# Patient Record
Sex: FEMALE | Race: WHITE
[De-identification: names, ages, dates, MRNs, and addresses within clinical notes are randomized per-mention and may not be internally consistent; named-entity substitution may affect disease eponyms.]

---

## 2020-07-28 ENCOUNTER — HOSPITAL ENCOUNTER (EMERGENCY)
Dept: HOSPITAL 8 - ED | Age: 85
End: 2020-07-28
Payer: MEDICARE

## 2020-07-28 VITALS — WEIGHT: 135.28 LBS | HEIGHT: 68 IN | BODY MASS INDEX: 20.5 KG/M2

## 2020-07-28 VITALS — DIASTOLIC BLOOD PRESSURE: 77 MMHG | SYSTOLIC BLOOD PRESSURE: 135 MMHG

## 2020-07-28 DIAGNOSIS — M79.7: Primary | ICD-10-CM

## 2020-07-28 DIAGNOSIS — Z76.0: ICD-10-CM

## 2020-07-28 PROCEDURE — 99283 EMERGENCY DEPT VISIT LOW MDM: CPT

## 2020-07-28 NOTE — NUR
D/C INSTRUCTIONS RV'WD WITH PT. PT AGAIN ASKING FOR OXYCODONE RX. INSTRUCTED PT 
SHE MUST F/U WITH A LOCAL PCP FOR THAT RX. NAPROXEN RX GIVEN. PT STATES SHE 
WILL CALL HER BROTHER TO HAVE HIM PICK HER UP.

## 2020-07-28 NOTE — NUR
PT BIB MIGDALIA FROM HER BROTHER'S HOUSE, WHERE SHE JUST MOVED FROM [place], [place]. PER PT AND EMS, PT RAN OUT OF HER OXYCODONE RX 3 DAYS AGO AND HASN'T BEEN 
ABLE TO ESTABLISH CARE WITH A PCP HERE. PT HAS HX OF FIBROMYALGIA & CHRONIC 
PAIN. REPORTS PAIN "GREATER THAN 10" OUT OF 10 CURRENTLY. NO OBVIOUS SIGNS OF 
DISTRESS. PT ALSO HAS HX OF MI, 4-VESSEL BYPASS, AFIB, EPILEPSY, HTN, 
HYPOTHYROID. ARRIVES TO ED A&OX4.

## 2020-08-04 ENCOUNTER — HOSPITAL ENCOUNTER (EMERGENCY)
Facility: MEDICAL CENTER | Age: 85
End: 2020-08-04
Attending: EMERGENCY MEDICINE
Payer: MEDICARE

## 2020-08-04 VITALS
RESPIRATION RATE: 18 BRPM | OXYGEN SATURATION: 99 % | SYSTOLIC BLOOD PRESSURE: 166 MMHG | WEIGHT: 175 LBS | TEMPERATURE: 97.2 F | HEART RATE: 73 BPM | BODY MASS INDEX: 26.52 KG/M2 | DIASTOLIC BLOOD PRESSURE: 78 MMHG | HEIGHT: 68 IN

## 2020-08-04 DIAGNOSIS — M79.7 FIBROMYALGIA: ICD-10-CM

## 2020-08-04 DIAGNOSIS — G89.4 CHRONIC PAIN SYNDROME: ICD-10-CM

## 2020-08-04 LAB
ALBUMIN SERPL BCP-MCNC: 4.2 G/DL (ref 3.2–4.9)
ALBUMIN/GLOB SERPL: 1.4 G/DL
ALP SERPL-CCNC: 89 U/L (ref 30–99)
ALT SERPL-CCNC: 20 U/L (ref 2–50)
ANION GAP SERPL CALC-SCNC: 12 MMOL/L (ref 7–16)
AST SERPL-CCNC: 21 U/L (ref 12–45)
BASOPHILS # BLD AUTO: 0.8 % (ref 0–1.8)
BASOPHILS # BLD: 0.07 K/UL (ref 0–0.12)
BILIRUB SERPL-MCNC: 0.3 MG/DL (ref 0.1–1.5)
BUN SERPL-MCNC: 19 MG/DL (ref 8–22)
CALCIUM SERPL-MCNC: 9.6 MG/DL (ref 8.5–10.5)
CHLORIDE SERPL-SCNC: 95 MMOL/L (ref 96–112)
CO2 SERPL-SCNC: 22 MMOL/L (ref 20–33)
CREAT SERPL-MCNC: 0.99 MG/DL (ref 0.5–1.4)
EOSINOPHIL # BLD AUTO: 0.61 K/UL (ref 0–0.51)
EOSINOPHIL NFR BLD: 6.8 % (ref 0–6.9)
ERYTHROCYTE [DISTWIDTH] IN BLOOD BY AUTOMATED COUNT: 43 FL (ref 35.9–50)
GLOBULIN SER CALC-MCNC: 3.1 G/DL (ref 1.9–3.5)
GLUCOSE SERPL-MCNC: 109 MG/DL (ref 65–99)
HCT VFR BLD AUTO: 37.8 % (ref 37–47)
HGB BLD-MCNC: 12.5 G/DL (ref 12–16)
IMM GRANULOCYTES # BLD AUTO: 0.05 K/UL (ref 0–0.11)
IMM GRANULOCYTES NFR BLD AUTO: 0.6 % (ref 0–0.9)
LYMPHOCYTES # BLD AUTO: 3.03 K/UL (ref 1–4.8)
LYMPHOCYTES NFR BLD: 33.7 % (ref 22–41)
MCH RBC QN AUTO: 28.7 PG (ref 27–33)
MCHC RBC AUTO-ENTMCNC: 33.1 G/DL (ref 33.6–35)
MCV RBC AUTO: 86.9 FL (ref 81.4–97.8)
MONOCYTES # BLD AUTO: 0.73 K/UL (ref 0–0.85)
MONOCYTES NFR BLD AUTO: 8.1 % (ref 0–13.4)
NEUTROPHILS # BLD AUTO: 4.49 K/UL (ref 2–7.15)
NEUTROPHILS NFR BLD: 50 % (ref 44–72)
NRBC # BLD AUTO: 0 K/UL
NRBC BLD-RTO: 0 /100 WBC
PLATELET # BLD AUTO: 325 K/UL (ref 164–446)
PMV BLD AUTO: 9.9 FL (ref 9–12.9)
POTASSIUM SERPL-SCNC: 4.3 MMOL/L (ref 3.6–5.5)
PROT SERPL-MCNC: 7.3 G/DL (ref 6–8.2)
RBC # BLD AUTO: 4.35 M/UL (ref 4.2–5.4)
SODIUM SERPL-SCNC: 129 MMOL/L (ref 135–145)
WBC # BLD AUTO: 9 K/UL (ref 4.8–10.8)

## 2020-08-04 PROCEDURE — 85025 COMPLETE CBC W/AUTO DIFF WBC: CPT

## 2020-08-04 PROCEDURE — 99284 EMERGENCY DEPT VISIT MOD MDM: CPT

## 2020-08-04 PROCEDURE — 80053 COMPREHEN METABOLIC PANEL: CPT

## 2020-08-04 RX ORDER — CITALOPRAM 20 MG/1
10 TABLET ORAL DAILY
Status: SHIPPED | COMMUNITY
End: 2021-10-21

## 2020-08-04 RX ORDER — LOSARTAN POTASSIUM 50 MG/1
50 TABLET ORAL DAILY
Status: SHIPPED | COMMUNITY
End: 2022-01-12 | Stop reason: SDUPTHER

## 2020-08-04 RX ORDER — LEVOTHYROXINE SODIUM 0.15 MG/1
150 TABLET ORAL
Status: SHIPPED | COMMUNITY
End: 2021-10-21

## 2020-08-04 RX ORDER — NORTRIPTYLINE HYDROCHLORIDE 25 MG/1
25 CAPSULE ORAL NIGHTLY
Status: ON HOLD | COMMUNITY
End: 2020-11-15

## 2020-08-04 RX ORDER — OXYCODONE AND ACETAMINOPHEN 10; 325 MG/1; MG/1
1 TABLET ORAL EVERY 6 HOURS PRN
Status: SHIPPED | COMMUNITY
End: 2021-10-21

## 2020-08-04 RX ORDER — NAPROXEN 500 MG/1
500 TABLET ORAL EVERY 12 HOURS PRN
Status: ON HOLD | COMMUNITY
End: 2020-11-15

## 2020-08-04 RX ORDER — ZOLPIDEM TARTRATE 5 MG/1
10 TABLET ORAL NIGHTLY PRN
Status: ON HOLD | COMMUNITY
End: 2020-11-15

## 2020-08-04 RX ORDER — ASPIRIN 81 MG/1
81 TABLET, CHEWABLE ORAL DAILY
Status: SHIPPED | COMMUNITY
End: 2021-10-21

## 2020-08-04 RX ORDER — DIAZEPAM 5 MG/1
5 TABLET ORAL
Status: ON HOLD | COMMUNITY
End: 2020-11-15

## 2020-08-04 RX ORDER — ROSUVASTATIN CALCIUM 10 MG/1
10 TABLET, COATED ORAL EVERY EVENING
Status: ON HOLD | COMMUNITY
End: 2020-11-15

## 2020-08-04 NOTE — ED NOTES
Patient came in with large blue box of home meds. Home meds placed in pharm bag and signed by 2 RNs. Tech aware of need for pickup.

## 2020-08-04 NOTE — DISCHARGE INSTRUCTIONS
As you are chronic pain patient who receives narcotics, additional narcotics will not be prescribed you from the emergency room.  If you are changing locations you need to get a new physician, please call the number provided to get an appoint with a next available primary practitioner, also ask if they have an availability for a pain management physician.

## 2020-08-04 NOTE — ED PROVIDER NOTES
"ED Provider  Scribed for Julio César Sandy D.O. by Esthela Denton. 8/4/2020  6:32 AM    Means of arrival:EMS  History obtained from:Patient and EMS  History limited by: None    CHIEF COMPLAINT  Chief Complaint   Patient presents with   • Pain     generalized pain x 3 weeks. per patient she fell off her bed yesterday. denies hitting her hed.        [facility]  Davie Montejo is a 85 y.o. female who presents with generalized body pain(\"hurting all over to my feet\").  Patient has a history of fibromyalgia, and ran out of her Percocet prescription 5 days ago.  Patient went to [facility] for similar complaints, and was prescribed naproxen which did not help.  Patient also had a mechanical fall in her shower 3 days ago, where she fell from her chair and landed on her backside.  She did not sustain any injuries or head trauma in the fall, and denies loss of consciousness, or bleeding. Patient denies history of frequent falls or presyncopal symptoms.  Patient denies headache, vision changes, chest pain, shortness of breath, palpitations, nausea, vomiting, abdominal pain fever, chills, dysuria, hematuria, leg swelling.      REVIEW OF SYSTEMS  See HPI for further details. All other systems are negative.     PAST MEDICAL HISTORY   Fibromyalgia  Coronary artery disease status post four-vessel CABG in 2008    SOCIAL HISTORY  Social History     Tobacco Use   • Smoking status: Not on file   Substance and Sexual Activity   • Alcohol use: Not on file   • Drug use: Not on file   • Sexual activity: Not on file       SURGICAL HISTORY   has a past surgical history that includes coronary artery bypas, 4 (2008).    CURRENT MEDICATIONS  Home Medications     Reviewed by Radha Garcia R.N. (Registered Nurse) on 08/04/20 at 0638  Med List Status: Partial   Medication Last Dose Status   aspirin (ASA) 81 MG Chew Tab chewable tablet  Active   citalopram (CELEXA) 20 MG Tab  Active   diazePAM (VALIUM) 5 MG Tab  Active   levothyroxine " "(SYNTHROID) 150 MCG Tab  Active   losartan (COZAAR) 50 MG Tab  Active   metoprolol (LOPRESSOR) 25 MG Tab  Active   naproxen (NAPROSYN) 500 MG Tab  Active   nortriptyline (PAMELOR) 25 MG Cap  Active   oxyCODONE-acetaminophen (PERCOCET-10)  MG Tab  Active   rosuvastatin (CRESTOR) 10 MG Tab  Active   zolpidem (AMBIEN) 5 MG Tab  Active                ALLERGIES  Allergies   Allergen Reactions   • Sulfa Drugs Unspecified     Patient states \"I can't remember what this does to me\" but recalls and allergy.        PHYSICAL EXAM  VITAL SIGNS: BP (!) 181/77   Pulse 69   Temp 36.2 °C (97.2 °F) (Temporal)   Resp (!) 22   Ht 1.727 m (5' 8\")   Wt 79.4 kg (175 lb)   LMP  (LMP Unknown)   SpO2 96%   Breastfeeding No   BMI 26.61 kg/m²   Constitutional: Alert in no apparent distress.  HENT: No signs of trauma, mucous membranes are moist  Eyes: Conjunctiva normal, Non-icteric.   Neck: Normal range of motion, No tenderness, Supple.  Lymphatic: No lymphadenopathy noted.   Cardiovascular: Regular rate and rhythm, no murmurs.   Thorax & Lungs: Normal breath sounds, No respiratory distress, No wheezing, No chest tenderness.   Abdomen: Bowel sounds normal, Soft, No tenderness, No masses, No pulsatile masses. No peritoneal signs.  Skin: Warm, Dry, normal color.   Back: No bony tenderness, No CVA tenderness.  No obvious injuries or bruises.  Extremities: No edema, No tenderness, No cyanosis  Musculoskeletal: Good range of motion in all major joints. No tenderness to palpation or major deformities noted.   Neurologic: Alert and oriented x4, Normal motor function, Normal sensory function, No focal deficits noted.   Psychiatric: Affect normal, Judgment normal, Mood normal.       DIAGNOSTIC STUDIES / PROCEDURES  N/A    LABS  Results for orders placed or performed during the hospital encounter of 08/04/20   CBC WITH DIFFERENTIAL   Result Value Ref Range    WBC 9.0 4.8 - 10.8 K/uL    RBC 4.35 4.20 - 5.40 M/uL    Hemoglobin 12.5 12.0 - " 16.0 g/dL    Hematocrit 37.8 37.0 - 47.0 %    MCV 86.9 81.4 - 97.8 fL    MCH 28.7 27.0 - 33.0 pg    MCHC 33.1 (L) 33.6 - 35.0 g/dL    RDW 43.0 35.9 - 50.0 fL    Platelet Count 325 164 - 446 K/uL    MPV 9.9 9.0 - 12.9 fL    Neutrophils-Polys 50.00 44.00 - 72.00 %    Lymphocytes 33.70 22.00 - 41.00 %    Monocytes 8.10 0.00 - 13.40 %    Eosinophils 6.80 0.00 - 6.90 %    Basophils 0.80 0.00 - 1.80 %    Immature Granulocytes 0.60 0.00 - 0.90 %    Nucleated RBC 0.00 /100 WBC    Neutrophils (Absolute) 4.49 2.00 - 7.15 K/uL    Lymphs (Absolute) 3.03 1.00 - 4.80 K/uL    Monos (Absolute) 0.73 0.00 - 0.85 K/uL    Eos (Absolute) 0.61 (H) 0.00 - 0.51 K/uL    Baso (Absolute) 0.07 0.00 - 0.12 K/uL    Immature Granulocytes (abs) 0.05 0.00 - 0.11 K/uL    NRBC (Absolute) 0.00 K/uL   COMP METABOLIC PANEL   Result Value Ref Range    Sodium 129 (L) 135 - 145 mmol/L    Potassium 4.3 3.6 - 5.5 mmol/L    Chloride 95 (L) 96 - 112 mmol/L    Co2 22 20 - 33 mmol/L    Anion Gap 12.0 7.0 - 16.0    Glucose 109 (H) 65 - 99 mg/dL    Bun 19 8 - 22 mg/dL    Creatinine 0.99 0.50 - 1.40 mg/dL    Calcium 9.6 8.5 - 10.5 mg/dL    AST(SGOT) 21 12 - 45 U/L    ALT(SGPT) 20 2 - 50 U/L    Alkaline Phosphatase 89 30 - 99 U/L    Total Bilirubin 0.3 0.1 - 1.5 mg/dL    Albumin 4.2 3.2 - 4.9 g/dL    Total Protein 7.3 6.0 - 8.2 g/dL    Globulin 3.1 1.9 - 3.5 g/dL    A-G Ratio 1.4 g/dL   ESTIMATED GFR   Result Value Ref Range    GFR If African American >60 >60 mL/min/1.73 m 2    GFR If Non African American 54 (A) >60 mL/min/1.73 m 2     All labs reviewed by me.    RADIOLOGY  No orders to display     The radiologist's interpretations of all radiological studies have been reviewed by me.    Films have been independently by me      COURSE  Pertinent Labs & Imaging studies reviewed. (See chart for details)    6:32 AM - Patient seen and examined at bedside. Discussed plan of care.    MEDICAL DECISION MAKING  This is a 85 y.o. female who presents with generalized body pain  after she ran out of her Percocet prescription for chronic pain secondary to fibromyalgia.  Patient is on other central nervous system depressing medications including Ambien and Valium.  Therefore it is unsafe to prescribe narcotics.  At this point, imaging does not appear to be indicated for patient's fall.    Patient was independently interviewed by me at this time. She is here for exacerbation of chronic pain and out of medications. She has not made an appointment in this state with a primary or pain management doctor. I explained the policies including no narcotic prescription with patient who already has a prescribing physician.     I Dr. Rodgers still concur with the history and physical of the resident.  This time there is no medical emergency noted, pain management is an outpatient consideration and she is stable to continue outpatient follow-up for that.        FINAL IMPRESSION  1. Chronic pain syndrome    2. Fibromyalgia         Esthela MCGARRY (Scribe), am scribing for, and in the presence of, Julio César Sandy D.O..    Electronically signed by: Esthela Denton (Elia), 8/4/2020    Julio César MCGARRY D.O. personally performed the services described in this documentation, as scribed by Esthela Denton in my presence, and it is both accurate and complete.    The note accurately reflects work and decisions made by me.  Julio César Sandy D.O.  8/4/2020  8:05 AM

## 2020-08-04 NOTE — ED TRIAGE NOTES
"Chief Complaint   Patient presents with   • Pain     generalized pain x 3 weeks. per patient she fell off her bed yesterday. denies hitting her hed.      Patient to BLUE 22. Hooked up to monitors. VSS. Patient disoriented to year.    Patient states she \"needs oxycodone to walk.\"   "

## 2020-08-04 NOTE — ED NOTES
Pt does not have phone numbers available for ride home. Located number for her brother but apparently was incorrect. She states she is unable to afford a cab and was provided with a voucher by .  Verbalizes understanding of discharge and followup instructions. PIV removed. Ambulatory at baseline and assisted to discharge by wheelchair.

## 2020-08-04 NOTE — ED NOTES
Assumed care of patient. Resting on gurney. Labs in process. Given blankets for comfort and call light within reach.

## 2020-08-24 PROBLEM — E03.9 HYPOTHYROIDISM: Status: ACTIVE | Noted: 2020-08-24

## 2020-08-24 PROBLEM — I25.10 ARTERIOSCLEROSIS OF CORONARY ARTERY: Status: ACTIVE | Noted: 2020-08-24

## 2020-08-24 PROBLEM — I50.23 ACUTE ON CHRONIC SYSTOLIC HEART FAILURE (HCC): Status: ACTIVE | Noted: 2020-08-24

## 2020-08-24 PROBLEM — G40.909 SEIZURE DISORDER (HCC): Status: ACTIVE | Noted: 2020-08-24

## 2020-08-24 PROBLEM — F32.A DEPRESSIVE DISORDER: Status: ACTIVE | Noted: 2020-08-24

## 2020-08-24 PROBLEM — Z87.39 HISTORY OF MUSCULOSKELETAL DISORDER: Status: ACTIVE | Noted: 2020-08-24

## 2020-08-24 PROBLEM — I10 ESSENTIAL HYPERTENSION: Status: ACTIVE | Noted: 2018-10-16

## 2020-08-24 PROBLEM — E78.5 HYPERLIPIDEMIA: Status: ACTIVE | Noted: 2020-08-24

## 2020-11-15 ENCOUNTER — APPOINTMENT (OUTPATIENT)
Dept: RADIOLOGY | Facility: MEDICAL CENTER | Age: 85
End: 2020-11-15
Attending: EMERGENCY MEDICINE
Payer: MEDICARE

## 2020-11-15 ENCOUNTER — PHARMACY VISIT (OUTPATIENT)
Dept: PHARMACY | Facility: MEDICAL CENTER | Age: 85
End: 2020-11-15
Payer: COMMERCIAL

## 2020-11-15 ENCOUNTER — HOSPITAL ENCOUNTER (OUTPATIENT)
Facility: MEDICAL CENTER | Age: 85
End: 2020-11-15
Attending: EMERGENCY MEDICINE | Admitting: STUDENT IN AN ORGANIZED HEALTH CARE EDUCATION/TRAINING PROGRAM
Payer: MEDICARE

## 2020-11-15 VITALS
RESPIRATION RATE: 16 BRPM | WEIGHT: 164.68 LBS | DIASTOLIC BLOOD PRESSURE: 54 MMHG | BODY MASS INDEX: 24.96 KG/M2 | HEIGHT: 68 IN | SYSTOLIC BLOOD PRESSURE: 122 MMHG | TEMPERATURE: 98.3 F | OXYGEN SATURATION: 94 % | HEART RATE: 63 BPM

## 2020-11-15 DIAGNOSIS — R07.9 CHEST PAIN, UNSPECIFIED TYPE: ICD-10-CM

## 2020-11-15 PROBLEM — R41.89 COGNITIVE IMPAIRMENT: Status: RESOLVED | Noted: 2020-11-15 | Resolved: 2020-11-15

## 2020-11-15 PROBLEM — R41.89 COGNITIVE IMPAIRMENT: Status: ACTIVE | Noted: 2020-11-15

## 2020-11-15 PROBLEM — E78.5 HYPERLIPIDEMIA: Status: RESOLVED | Noted: 2020-08-24 | Resolved: 2020-11-15

## 2020-11-15 PROBLEM — Z79.899 POLYPHARMACY: Status: ACTIVE | Noted: 2020-11-15

## 2020-11-15 LAB
AMPHET UR QL SCN: NEGATIVE
ANION GAP SERPL CALC-SCNC: 7 MMOL/L (ref 7–16)
APPEARANCE UR: CLEAR
APTT PPP: 31.7 SEC (ref 24.7–36)
BARBITURATES UR QL SCN: NEGATIVE
BASOPHILS # BLD AUTO: 1 % (ref 0–1.8)
BASOPHILS # BLD: 0.07 K/UL (ref 0–0.12)
BENZODIAZ UR QL SCN: NEGATIVE
BILIRUB UR QL STRIP.AUTO: NEGATIVE
BUN SERPL-MCNC: 17 MG/DL (ref 8–22)
BZE UR QL SCN: NEGATIVE
CALCIUM SERPL-MCNC: 9.2 MG/DL (ref 8.5–10.5)
CANNABINOIDS UR QL SCN: POSITIVE
CHLORIDE SERPL-SCNC: 103 MMOL/L (ref 96–112)
CO2 SERPL-SCNC: 25 MMOL/L (ref 20–33)
COLOR UR: YELLOW
COVID ORDER STATUS COVID19: NORMAL
CREAT SERPL-MCNC: 0.92 MG/DL (ref 0.5–1.4)
EKG IMPRESSION: NORMAL
EOSINOPHIL # BLD AUTO: 0.25 K/UL (ref 0–0.51)
EOSINOPHIL NFR BLD: 3.6 % (ref 0–6.9)
ERYTHROCYTE [DISTWIDTH] IN BLOOD BY AUTOMATED COUNT: 44.5 FL (ref 35.9–50)
GLUCOSE SERPL-MCNC: 101 MG/DL (ref 65–99)
GLUCOSE UR STRIP.AUTO-MCNC: NEGATIVE MG/DL
HCT VFR BLD AUTO: 35.6 % (ref 37–47)
HGB BLD-MCNC: 11.4 G/DL (ref 12–16)
IMM GRANULOCYTES # BLD AUTO: 0.03 K/UL (ref 0–0.11)
IMM GRANULOCYTES NFR BLD AUTO: 0.4 % (ref 0–0.9)
INR PPP: 0.96 (ref 0.87–1.13)
KETONES UR STRIP.AUTO-MCNC: NEGATIVE MG/DL
LEUKOCYTE ESTERASE UR QL STRIP.AUTO: NEGATIVE
LYMPHOCYTES # BLD AUTO: 3.19 K/UL (ref 1–4.8)
LYMPHOCYTES NFR BLD: 45.4 % (ref 22–41)
MCH RBC QN AUTO: 28.8 PG (ref 27–33)
MCHC RBC AUTO-ENTMCNC: 32 G/DL (ref 33.6–35)
MCV RBC AUTO: 89.9 FL (ref 81.4–97.8)
METHADONE UR QL SCN: NEGATIVE
MICRO URNS: NORMAL
MONOCYTES # BLD AUTO: 0.77 K/UL (ref 0–0.85)
MONOCYTES NFR BLD AUTO: 11 % (ref 0–13.4)
NEUTROPHILS # BLD AUTO: 2.72 K/UL (ref 2–7.15)
NEUTROPHILS NFR BLD: 38.6 % (ref 44–72)
NITRITE UR QL STRIP.AUTO: NEGATIVE
NRBC # BLD AUTO: 0 K/UL
NRBC BLD-RTO: 0 /100 WBC
OPIATES UR QL SCN: NEGATIVE
OXYCODONE UR QL SCN: POSITIVE
PCP UR QL SCN: NEGATIVE
PH UR STRIP.AUTO: 6.5 [PH] (ref 5–8)
PLATELET # BLD AUTO: 314 K/UL (ref 164–446)
PMV BLD AUTO: 10.1 FL (ref 9–12.9)
POTASSIUM SERPL-SCNC: 4.1 MMOL/L (ref 3.6–5.5)
PROPOXYPH UR QL SCN: NEGATIVE
PROT UR QL STRIP: NEGATIVE MG/DL
PROTHROMBIN TIME: 13 SEC (ref 12–14.6)
RBC # BLD AUTO: 3.96 M/UL (ref 4.2–5.4)
RBC UR QL AUTO: NEGATIVE
SARS-COV-2 RNA RESP QL NAA+PROBE: NOTDETECTED
SODIUM SERPL-SCNC: 135 MMOL/L (ref 135–145)
SP GR UR STRIP.AUTO: 1.01
SPECIMEN SOURCE: NORMAL
TROPONIN T SERPL-MCNC: 10 NG/L (ref 6–19)
TROPONIN T SERPL-MCNC: 11 NG/L (ref 6–19)
UROBILINOGEN UR STRIP.AUTO-MCNC: 0.2 MG/DL
WBC # BLD AUTO: 7 K/UL (ref 4.8–10.8)

## 2020-11-15 PROCEDURE — U0003 INFECTIOUS AGENT DETECTION BY NUCLEIC ACID (DNA OR RNA); SEVERE ACUTE RESPIRATORY SYNDROME CORONAVIRUS 2 (SARS-COV-2) (CORONAVIRUS DISEASE [COVID-19]), AMPLIFIED PROBE TECHNIQUE, MAKING USE OF HIGH THROUGHPUT TECHNOLOGIES AS DESCRIBED BY CMS-2020-01-R: HCPCS

## 2020-11-15 PROCEDURE — 84484 ASSAY OF TROPONIN QUANT: CPT

## 2020-11-15 PROCEDURE — A9270 NON-COVERED ITEM OR SERVICE: HCPCS | Performed by: STUDENT IN AN ORGANIZED HEALTH CARE EDUCATION/TRAINING PROGRAM

## 2020-11-15 PROCEDURE — 700111 HCHG RX REV CODE 636 W/ 250 OVERRIDE (IP): Performed by: STUDENT IN AN ORGANIZED HEALTH CARE EDUCATION/TRAINING PROGRAM

## 2020-11-15 PROCEDURE — 85730 THROMBOPLASTIN TIME PARTIAL: CPT

## 2020-11-15 PROCEDURE — 93005 ELECTROCARDIOGRAM TRACING: CPT | Mod: XE,77 | Performed by: STUDENT IN AN ORGANIZED HEALTH CARE EDUCATION/TRAINING PROGRAM

## 2020-11-15 PROCEDURE — G0378 HOSPITAL OBSERVATION PER HR: HCPCS

## 2020-11-15 PROCEDURE — 93005 ELECTROCARDIOGRAM TRACING: CPT | Mod: XE,76 | Performed by: INTERNAL MEDICINE

## 2020-11-15 PROCEDURE — 85610 PROTHROMBIN TIME: CPT

## 2020-11-15 PROCEDURE — 36415 COLL VENOUS BLD VENIPUNCTURE: CPT

## 2020-11-15 PROCEDURE — 80048 BASIC METABOLIC PNL TOTAL CA: CPT

## 2020-11-15 PROCEDURE — 93010 ELECTROCARDIOGRAM REPORT: CPT | Performed by: INTERNAL MEDICINE

## 2020-11-15 PROCEDURE — 81003 URINALYSIS AUTO W/O SCOPE: CPT

## 2020-11-15 PROCEDURE — 85025 COMPLETE CBC W/AUTO DIFF WBC: CPT

## 2020-11-15 PROCEDURE — 96372 THER/PROPH/DIAG INJ SC/IM: CPT

## 2020-11-15 PROCEDURE — 93005 ELECTROCARDIOGRAM TRACING: CPT | Mod: XE | Performed by: EMERGENCY MEDICINE

## 2020-11-15 PROCEDURE — 93010 ELECTROCARDIOGRAM REPORT: CPT | Mod: 76 | Performed by: INTERNAL MEDICINE

## 2020-11-15 PROCEDURE — 99226 PR SUBSEQUENT OBSERVATION CARE,LEVEL III: CPT | Performed by: STUDENT IN AN ORGANIZED HEALTH CARE EDUCATION/TRAINING PROGRAM

## 2020-11-15 PROCEDURE — 700102 HCHG RX REV CODE 250 W/ 637 OVERRIDE(OP): Performed by: STUDENT IN AN ORGANIZED HEALTH CARE EDUCATION/TRAINING PROGRAM

## 2020-11-15 PROCEDURE — 80307 DRUG TEST PRSMV CHEM ANLYZR: CPT

## 2020-11-15 PROCEDURE — 71045 X-RAY EXAM CHEST 1 VIEW: CPT

## 2020-11-15 PROCEDURE — 99285 EMERGENCY DEPT VISIT HI MDM: CPT

## 2020-11-15 PROCEDURE — RXMED WILLOW AMBULATORY MEDICATION CHARGE: Performed by: STUDENT IN AN ORGANIZED HEALTH CARE EDUCATION/TRAINING PROGRAM

## 2020-11-15 RX ORDER — MEMANTINE HYDROCHLORIDE 10 MG/1
10 TABLET ORAL DAILY
Status: CANCELLED | OUTPATIENT
Start: 2020-11-15

## 2020-11-15 RX ORDER — ROSUVASTATIN CALCIUM 20 MG/1
20 TABLET, COATED ORAL EVERY EVENING
Status: DISCONTINUED | OUTPATIENT
Start: 2020-11-15 | End: 2020-11-15

## 2020-11-15 RX ORDER — CLOPIDOGREL BISULFATE 75 MG/1
75 TABLET ORAL DAILY
Status: DISCONTINUED | OUTPATIENT
Start: 2020-11-15 | End: 2020-11-15 | Stop reason: HOSPADM

## 2020-11-15 RX ORDER — CITALOPRAM 20 MG/1
20 TABLET ORAL DAILY
Status: CANCELLED | OUTPATIENT
Start: 2020-11-15

## 2020-11-15 RX ORDER — DONEPEZIL HYDROCHLORIDE 5 MG/1
10 TABLET, FILM COATED ORAL EVERY EVENING
Status: CANCELLED | OUTPATIENT
Start: 2020-11-15

## 2020-11-15 RX ORDER — LEVOTHYROXINE SODIUM 0.07 MG/1
150 TABLET ORAL
Status: CANCELLED | OUTPATIENT
Start: 2020-11-15

## 2020-11-15 RX ORDER — OXYCODONE AND ACETAMINOPHEN 10; 325 MG/1; MG/1
1 TABLET ORAL ONCE
Status: COMPLETED | OUTPATIENT
Start: 2020-11-15 | End: 2020-11-15

## 2020-11-15 RX ORDER — NAPROXEN 500 MG/1
500 TABLET ORAL 2 TIMES DAILY
Status: CANCELLED | OUTPATIENT
Start: 2020-11-15

## 2020-11-15 RX ORDER — ATORVASTATIN CALCIUM 40 MG/1
40 TABLET, FILM COATED ORAL DAILY
Qty: 30 TAB | Refills: 0 | Status: SHIPPED | OUTPATIENT
Start: 2020-11-15 | End: 2021-10-21

## 2020-11-15 RX ORDER — HEPARIN SODIUM 5000 [USP'U]/ML
5000 INJECTION, SOLUTION INTRAVENOUS; SUBCUTANEOUS EVERY 8 HOURS
Status: DISCONTINUED | OUTPATIENT
Start: 2020-11-15 | End: 2020-11-15 | Stop reason: HOSPADM

## 2020-11-15 RX ORDER — CLOPIDOGREL BISULFATE 75 MG/1
75 TABLET ORAL DAILY
Qty: 30 TAB | Refills: 0 | Status: SHIPPED | OUTPATIENT
Start: 2020-11-16 | End: 2022-01-12 | Stop reason: SDUPTHER

## 2020-11-15 RX ORDER — GABAPENTIN 100 MG/1
100 CAPSULE ORAL 3 TIMES DAILY
Status: DISCONTINUED | OUTPATIENT
Start: 2020-11-15 | End: 2020-11-15 | Stop reason: HOSPADM

## 2020-11-15 RX ORDER — GABAPENTIN 100 MG/1
100 CAPSULE ORAL 3 TIMES DAILY
Qty: 90 CAP | Refills: 0 | Status: SHIPPED | OUTPATIENT
Start: 2020-11-15 | End: 2021-12-29

## 2020-11-15 RX ADMIN — ASPIRIN 81 MG: 81 TABLET, COATED ORAL at 10:07

## 2020-11-15 RX ADMIN — OXYCODONE HYDROCHLORIDE AND ACETAMINOPHEN 1 TABLET: 10; 325 TABLET ORAL at 08:32

## 2020-11-15 RX ADMIN — HEPARIN SODIUM 5000 UNITS: 5000 INJECTION, SOLUTION INTRAVENOUS; SUBCUTANEOUS at 13:17

## 2020-11-15 RX ADMIN — GABAPENTIN 100 MG: 100 CAPSULE ORAL at 08:32

## 2020-11-15 RX ADMIN — HEPARIN SODIUM 5000 UNITS: 5000 INJECTION, SOLUTION INTRAVENOUS; SUBCUTANEOUS at 05:27

## 2020-11-15 RX ADMIN — GABAPENTIN 100 MG: 100 CAPSULE ORAL at 17:00

## 2020-11-15 RX ADMIN — CLOPIDOGREL BISULFATE 75 MG: 75 TABLET ORAL at 10:07

## 2020-11-15 RX ADMIN — GABAPENTIN 100 MG: 100 CAPSULE ORAL at 13:17

## 2020-11-15 SDOH — HEALTH STABILITY: MENTAL HEALTH: HOW OFTEN DO YOU HAVE A DRINK CONTAINING ALCOHOL?: NEVER

## 2020-11-15 ASSESSMENT — ENCOUNTER SYMPTOMS
PSYCHIATRIC NEGATIVE: 1
DIAPHORESIS: 0
GASTROINTESTINAL NEGATIVE: 1
RESPIRATORY NEGATIVE: 1
MYALGIAS: 1
SHORTNESS OF BREATH: 0
COUGH: 0
CONSTITUTIONAL NEGATIVE: 1
EYES NEGATIVE: 1
NEUROLOGICAL NEGATIVE: 1
MUSCULOSKELETAL NEGATIVE: 1

## 2020-11-15 ASSESSMENT — FIBROSIS 4 INDEX
FIB4 SCORE: 1.2
FIB4 SCORE: 1.24

## 2020-11-15 ASSESSMENT — PAIN DESCRIPTION - PAIN TYPE: TYPE: ACUTE PAIN

## 2020-11-15 NOTE — NON-PROVIDER
Daily Progress Note:     Date of Service: 11/15/2020  Primary Team: [employer] [facility] Blue Team   Attending: Conrad Lyon M.D.   Senior Resident: Dr. Chavira  Intern: Dr. Sin  Contact:  [phone]    Chief Complaint:   Chest pain    Subjective:   86 yo F who presented with chest pain that started in the neck and spread to her chest, to her R arm, then to her L arm, that was unresponsive to 650 mg total aspirin and 3 doses nitroglycerin.     Upon encounter pt denies chest pain and instead c/o severe generalized pain. Hx fibromyalgia. Upon chart review pt was noted to have recently moved from [place] (July 20) and to have polypharmacy. Pt is an unreliable historian and unable to tell which medications she is on currently. She organizes them herself. Pain improved significantly with gabapentin.     Consultants/Specialty:  N/A    Review of Systems:    Review of Systems   Constitutional: Negative for diaphoresis.   Respiratory: Negative for cough and shortness of breath.    Cardiovascular: Positive for chest pain. Negative for leg swelling.        On admit. Current chest pain is similar to her usual fibromyalgia pain.   Musculoskeletal: Positive for joint pain and myalgias.        Hx fibromyalgia. Pt reports she usually takes oxycodone but has not had it this morning.       Objective Data:   Physical Exam:   Vitals:   Temp:  [36.2 °C (97.1 °F)-37.1 °C (98.7 °F)] 36.7 °C (98.1 °F)  Pulse:  [58-80] 66  Resp:  [11-40] 16  BP: ()/(44-80) 114/80  SpO2:  [92 %-97 %] 92 %     Physical Exam  Constitutional:       General: She is in acute distress.      Appearance: She is not ill-appearing, toxic-appearing or diaphoretic.      Comments: Appears to be in pain and a state of confusion.    Cardiovascular:      Rate and Rhythm: Normal rate and regular rhythm.      Pulses: Normal pulses.      Heart sounds: Normal heart sounds.   Pulmonary:      Effort: Pulmonary effort is normal. No respiratory distress.      Breath sounds: Normal  "breath sounds.      Comments: Tenderness to slight pressure from stethoscope while listening to heart sounds.  Chest:      Chest wall: Tenderness present.   Abdominal:      General: Bowel sounds are normal. There is no distension.      Palpations: Abdomen is soft.   Neurological:      Comments: Answers questions appropriately about half of the time during interview. Ex: on med questions, \"that sounds familiar..\"; also unable to answer with her address when prompted.           Labs:   Lab Results   Component Value Date/Time    WBC 7.0 11/15/2020 03:25 AM    RBC 3.96 (L) 11/15/2020 03:25 AM    HEMOGLOBIN 11.4 (L) 11/15/2020 03:25 AM    HEMATOCRIT 35.6 (L) 11/15/2020 03:25 AM    MCV 89.9 11/15/2020 03:25 AM    MCH 28.8 11/15/2020 03:25 AM    MCHC 32.0 (L) 11/15/2020 03:25 AM    MPV 10.1 11/15/2020 03:25 AM    NEUTSPOLYS 38.60 (L) 11/15/2020 03:25 AM    LYMPHOCYTES 45.40 (H) 11/15/2020 03:25 AM    MONOCYTES 11.00 11/15/2020 03:25 AM    EOSINOPHILS 3.60 11/15/2020 03:25 AM    BASOPHILS 1.00 11/15/2020 03:25 AM      Lab Results   Component Value Date/Time    SODIUM 135 11/15/2020 03:25 AM    POTASSIUM 4.1 11/15/2020 03:25 AM    CHLORIDE 103 11/15/2020 03:25 AM    CO2 25 11/15/2020 03:25 AM    GLUCOSE 101 (H) 11/15/2020 03:25 AM    BUN 17 11/15/2020 03:25 AM    CREATININE 0.92 11/15/2020 03:25 AM      Lab Results   Component Value Date/Time    PROTHROMBTM 13.0 11/15/2020 03:25 AM    INR 0.96 11/15/2020 03:25 AM      Results for JUNIOR CHIU (MRN [id number]) as of 11/15/2020 14:49   Ref. Range 11/15/2020 11:15   Barbiturates Latest Ref Range: Negative  Negative   Benzodiazepines Latest Ref Range: Negative  Negative   Cannabinoid Metab Latest Ref Range: Negative  Positive (A)   Cocaine Metabolite Latest Ref Range: Negative  Negative   Methadone Latest Ref Range: Negative  Negative   Opiates Latest Ref Range: Negative  Negative   Phencyclidine -Pcp Latest Ref Range: Negative  Negative   Propoxyphene Latest Ref " Range: Negative  Negative   Oxycodone Latest Ref Range: Negative  Positive (A)   Amphetamines Urine Latest Ref Range: Negative  Negative   Pt received fentanyl in ED.    Results for JUNIOR CHIU (MRN [id number]) as of 11/15/2020 14:49   Ref. Range 11/15/2020 03:25 11/15/2020 08:37   Troponin T Latest Ref Range: 6 - 19 ng/L 11 10         EKG 11/15 1045  Interpretive Statements   SINUS RHYTHM   FIRST DEGREE AV BLOCK   NONSPECIFIC INTRAVENTRICULAR CONDUCTION DELAY   Compared to ECG 11/15/2020 06:20:25   Sinus bradycardia no longer present   Left ventricular hypertrophy no longer present     Imaging:   CXR 11/15 1225  IMPRESSION:     No acute cardiopulmonary abnormality.    A&P:  86 yo F who presented with chest pain not responsive to aspirin or nitroglycerin. She was admitted to telemetry as a r/o ACS.     #Polypharmacy  Pt's list of medications was outdated, as pt ran out of several after moving here from [place]. Medications were thoroughly reviewed with her and her status on them are still unclear. She says she takes 6-7 pills daily. She verbally confirmed taking phenobarbital and diazepam but is negative on UDS.  · D/C phenobarbital and diazepam, as she tested negative on UDS  · D/C estrogen, as pt has hx of MI in 2008. Unclear if she is taking them currently.  · D/C solifenacin and lasix, as these medications counteract each other  · D/C phenytoin, as pt reports not taking and reports no seizures in over a year   · D/C magnesium oxide, pt reports not taking  · D/C donepezil and memantine, as pt reports not taking memantine  · D/C alendronate, pt reports not taking  · D/C nortriptyline, pt reports not taking  · D/C zolpidem due to incr fall risk and drowsiness  · D/C naproxen due to increased bleeding risk  · D/C pravastatin  · Rosuvastatin dose increased to 20 mg; high dose statin indicated due to hx of MI, CAD, 4 stent CABG  · Cont Zantac 300 mg, sublingual nitroglycerin PRN, levothyroxine 150 mcg,  citalopram 10 mg bid, losartan 50 mg, and metoprolol 12.5 mg bid (25 mg tablets, pt to cut in half)    #Chest pain likely due to fibromyalgia  EKG showed nonspecific findings and trending troponin values were normal. Since admit pt has not complained of chest pain, but rather generalized pain concentrated in the shoulders, and has been requesting oxycodone. She received IV fentanyl in the ED but missed her dose this morning.  · D/C Percocet  · Cont sublingual nitroglycerin PRN  · Initiated gabapentin titrate to help with neurologic pain  ·  and encourage pt that this will help with the fibromyalgia pain more than an opioid  · Citalopram 10 mg bid (20 mg tablets, pt to cut in half)  · PT denies but is poor historian  · Continued to help with possible anxiety factor to her pain, unlikely to cause complications  · F/U outpatient PCP to ensure adequate pain management    #Hx of MI  · ASA 81 mg due to hx of cardiac event  · Plavix 75 mg due to hx of CABG with 4 vessel stent    #Essential HTN  Present on admit, currently stable and well controlled.  · Metoprolol 12.5 mg bid (25 mg tablets, pt to cut in half)  · Losartan 50 mg   · F/U outpatient    #Hypothyroidism  Pt reported history. Takes synthroid 150 mcg at home  · Continue home dose, did not take while inpatient  · F/U with outpatient for TSH level and further assessment    D/C today

## 2020-11-15 NOTE — ED TRIAGE NOTES
"Chief Complaint   Patient presents with   • Chest Pain   • Back Pain       BP (!) 199/77   Pulse 63   Temp 36.2 °C (97.2 °F) (Temporal)   Resp 18   Ht 1.727 m (5' 8\")   Wt 86.2 kg (190 lb)   LMP  (LMP Unknown)   SpO2 96%   BMI 28.89 kg/m²     Pt arrives via EMS, with c/o of Chest pain radiating down to arms. Pt states pain started around mid afternoon. Took some nitro and one 324mg ASA took a nap. Pain worsening, took 2 more nitro and another 324 ASA. Some relief but pain worsening now. Pt's brother convinced her to come. EMS gave 100mcg of fentanyl in route. Pain was 8/10 and now currently a 4/10. .  Pt placed in gown, and connected to cardiac monitor. Pt has cardiac hx and fibromyalgia.   "

## 2020-11-15 NOTE — PROGRESS NOTES
Pt received from ED. Tele monitor in place and monitor room notified. VSS. Pt oriented to room. Educated on use of the call light. Pt demonstrated use of the call light. Discussed POC. All questions answered.

## 2020-11-15 NOTE — PROGRESS NOTES
2 RN skin check complete.   Devices in place PIV.  Skin assessed under devices intact.  Confirmed pressure ulcers found on NA.  Sacrum red and blanching. Skin grossly intact.

## 2020-11-15 NOTE — ED PROVIDER NOTES
ED Provider Note        Primary care provider: No primary care provider on file.    I verified that the patient was wearing a mask and I was wearing appropriate PPE every time I entered the room. The patient's mask was on the patient at all times during my encounter except for a brief view of the oropharynx.      CHIEF COMPLAINT  Chief Complaint   Patient presents with   • Chest Pain   • Back Pain       HPI  Davie Montejo is a 85 y.o. female who presents to the Emergency Department with chief complaint of chest pain.  Patient reports that she awoke shortly prior to arrival and was having some pain in the substernal area she also reports that she had radiation of the pain down her right arm and then moved to her left arm.  He stated that it did not go into her neck or jaw like it did when she had her heart attack.  She had minimal shortness of breath that she had no fevers she reports that she had a coughing fit earlier in the evening but took a cough drop and this resolved.  No headache no fevers no chills no nausea no vomiting she reports intermittent constipation and diarrhea over the last few days no urinary symptoms no skin changes no other acute symptoms or concerns she had taken 2/3/2025 aspirin prior to calling EMS and 3 nitroglycerin she was still having moderate pain and EMS administered 100 mcg of fentanyl.    REVIEW OF SYSTEMS  10 systems reviewed and otherwise negative, pertinent positives and negatives listed in the history of present illness.    PAST MEDICAL HISTORY   Coronary artery disease    SURGICAL HISTORY   has a past surgical history that includes coronary artery bypas, 4 (2008).    SOCIAL HISTORY  Social History     Tobacco Use   • Smoking status: Not on file   Substance Use Topics   • Alcohol use: Not on file   • Drug use: Not on file      Social History     Substance and Sexual Activity   Drug Use Not on file       FAMILY HISTORY  Non-Contributory    CURRENT MEDICATIONS  Home  "Medications    **Home medications have not yet been reviewed for this encounter**         ALLERGIES  Allergies   Allergen Reactions   • Codeine Unspecified   • Lisinopril    • Lyrica Unspecified     Lyrica affects patients vision.     • Penicillin G    • Simvastatin    • Sulfa Drugs Unspecified     Patient states \"I can't remember what this does to me\" but recalls and allergy.        PHYSICAL EXAM  VITAL SIGNS: BP (!) 199/77   Pulse 63   Temp 36.2 °C (97.2 °F) (Temporal)   Resp 18   Ht 1.727 m (5' 8\")   Wt 86.2 kg (190 lb)   LMP  (LMP Unknown)   SpO2 96%   BMI 28.89 kg/m²   Pulse ox interpretation: I interpret this pulse ox as normal.  Constitutional: Alert and oriented x 3, no acute distress  HEENT: Atraumatic normocephalic, pupils are equal round reactive to light extraocular movements are intact. The nares is clear, external ears are normal, mouth shows moist mucous membranes  Neck: Supple, no JVD no tracheal deviation  Cardiovascular: Regular rate and rhythm no murmur rub or gallop 2+ pulses peripherally x4  Thorax & Lungs: No respiratory distress, no wheezes rales or rhonchi, No chest tenderness.   GI: Soft nontender nondistended positive bowel sounds, no peritoneal signs  Skin: Warm dry no acute rash or lesion  Musculoskeletal: Moving all extremities with full range and 5 of 5 strength, no acute  deformity  Neurologic: Cranial nerves III through XII are grossly intact, no sensory deficit, no cerebellar dysfunction   Psychiatric: Appropriate affect for situation at this time      DIAGNOSTIC STUDIES / PROCEDURES  LABS      Results for orders placed or performed during the hospital encounter of 11/15/20   CBC w/ Differential   Result Value Ref Range    WBC 7.0 4.8 - 10.8 K/uL    RBC 3.96 (L) 4.20 - 5.40 M/uL    Hemoglobin 11.4 (L) 12.0 - 16.0 g/dL    Hematocrit 35.6 (L) 37.0 - 47.0 %    MCV 89.9 81.4 - 97.8 fL    MCH 28.8 27.0 - 33.0 pg    MCHC 32.0 (L) 33.6 - 35.0 g/dL    RDW 44.5 35.9 - 50.0 fL    " Platelet Count 314 164 - 446 K/uL    MPV 10.1 9.0 - 12.9 fL    Neutrophils-Polys 38.60 (L) 44.00 - 72.00 %    Lymphocytes 45.40 (H) 22.00 - 41.00 %    Monocytes 11.00 0.00 - 13.40 %    Eosinophils 3.60 0.00 - 6.90 %    Basophils 1.00 0.00 - 1.80 %    Immature Granulocytes 0.40 0.00 - 0.90 %    Nucleated RBC 0.00 /100 WBC    Neutrophils (Absolute) 2.72 2.00 - 7.15 K/uL    Lymphs (Absolute) 3.19 1.00 - 4.80 K/uL    Monos (Absolute) 0.77 0.00 - 0.85 K/uL    Eos (Absolute) 0.25 0.00 - 0.51 K/uL    Baso (Absolute) 0.07 0.00 - 0.12 K/uL    Immature Granulocytes (abs) 0.03 0.00 - 0.11 K/uL    NRBC (Absolute) 0.00 K/uL   Basic Metabolic Panel (BMP)   Result Value Ref Range    Sodium 135 135 - 145 mmol/L    Potassium 4.1 3.6 - 5.5 mmol/L    Chloride 103 96 - 112 mmol/L    Co2 25 20 - 33 mmol/L    Glucose 101 (H) 65 - 99 mg/dL    Bun 17 8 - 22 mg/dL    Creatinine 0.92 0.50 - 1.40 mg/dL    Calcium 9.2 8.5 - 10.5 mg/dL    Anion Gap 7.0 7.0 - 16.0   Troponin STAT   Result Value Ref Range    Troponin T 11 6 - 19 ng/L   PT/INR   Result Value Ref Range    PT 13.0 12.0 - 14.6 sec    INR 0.96 0.87 - 1.13   APTT   Result Value Ref Range    APTT 31.7 24.7 - 36.0 sec   COVID/SARS CoV-2 PCR    Specimen: Nasopharyngeal; Respirate   Result Value Ref Range    COVID Order Status Received    ESTIMATED GFR   Result Value Ref Range    GFR If African American >60 >60 mL/min/1.73 m 2    GFR If Non African American 58 (A) >60 mL/min/1.73 m 2   SARS-CoV-2, PCR (In-House)   Result Value Ref Range    SARS-CoV-2 Source NP Swab    EKG (NOW)   Result Value Ref Range    Report       [facility] Emergency Dept[facility]    Test Date:  2020-11-15  Pt Name:    JUNIOR CHIU            Department: ER  MRN:        [id number]                      Room:        14  Gender:     Female                       Technician: [id number]  :        [id number]                   Requested By:VIC CLEVELAND  Order #:    [id number]                    Reading  "MD: VIC CLEVELAND MD    Measurements  Intervals                                Axis  Rate:       61                           P:          77  CO:         228                          QRS:        -42  QRSD:       112                          T:          124  QT:         464  QTc:        468    Interpretive Statements  First-degree AV block minimal ST elevation limited to lead V2 and V3 less  than 1  box in comparison to the TP segment T wave inversion in the lateral leads  Electronically Signed On 11- 3:30:38 PST by VIC CLEVELAND MD         All labs reviewed by me.      RADIOLOGY  DX-CHEST-PORTABLE (1 VIEW)   Final Result      No acute cardiopulmonary abnormality.        The radiologist's interpretation of all radiological studies have been reviewed by me.    COURSE & MEDICAL DECISION MAKING  Pertinent Labs & Imaging studies reviewed. (See chart for details)    3:14 AM - Patient seen and examined at bedside.     Coagulation studies were ordered in light of chest pain and possible need for anticoagulation    Patient noted to have slightly elevated blood pressure likely circumstantial secondary to presenting complaint. Referred to primary care physician for further evaluation.      Medical Decision Making: Patient previous history of coronary artery disease has not had any provocative testing or angiogram in several years she just moved here from [place] she does not have an established cardiologist or primary care physician.  Her pain does feel similar to her previous MI.  She has some very minor ST elevation in the anterior precordial leads on ekg   x-ray is unremarkable her troponin is negative at this point patient will be admitted for further evaluation and treatment I discussed this with hospitalist Dr. Hanna and she is admitted in guarded condition.       BP (!) 199/77   Pulse 63   Temp 36.2 °C (97.2 °F) (Temporal)   Resp 18   Ht 1.727 m (5' 8\")   Wt 86.2 kg (190 lb)   LMP  (LMP " Unknown)   SpO2 96%   BMI 28.89 kg/m²           FINAL IMPRESSION  1. Chest pain, unspecified type Active        This dictation has been created using voice recognition software and/or scribes. The accuracy of the dictation is limited by the abilities of the software and the expertise of the scribes. I expect there may be some errors of grammar and possibly content. I made every attempt to manually correct the errors within my dictation. However, errors related to voice recognition software and/or scribes may still exist and should be interpreted within the appropriate context.

## 2020-11-15 NOTE — DISCHARGE INSTRUCTIONS
Myofascial Pain Syndrome and Fibromyalgia  Myofascial pain syndrome and fibromyalgia are both pain disorders. This pain may be felt mainly in your muscles.  · Myofascial pain syndrome:  ? Always has tender points in the muscle that will cause pain when pressed (trigger points). The pain may come and go.  ? Usually affects your neck, upper back, and shoulder areas. The pain often radiates into your arms and hands.  · Fibromyalgia:  ? Has muscle pains and tenderness that come and go.  ? Is often associated with fatigue and sleep problems.  ? Has trigger points.  ? Tends to be long-lasting (chronic), but is not life-threatening.  Fibromyalgia and myofascial pain syndrome are not the same. However, they often occur together. If you have both conditions, each can make the other worse. Both are common and can cause enough pain and fatigue to make day-to-day activities difficult. Both can be hard to diagnose because their symptoms are common in many other conditions.  What are the causes?  The exact causes of these conditions are not known.  What increases the risk?  You are more likely to develop this condition if:  · You have a family history of the condition.  · You have certain triggers, such as:  ? Spine disorders.  ? An injury (trauma) or other physical stressors.  ? Being under a lot of stress.  ? Medical conditions such as osteoarthritis, rheumatoid arthritis, or lupus.  What are the signs or symptoms?  Fibromyalgia  The main symptom of fibromyalgia is widespread pain and tenderness in your muscles. Pain is sometimes described as stabbing, shooting, or burning.  You may also have:  · Tingling or numbness.  · Sleep problems and fatigue.  · Problems with attention and concentration (fibro fog).  Other symptoms may include:   · Bowel and bladder problems.  · Headaches.  · Visual problems.  · Problems with odors and noises.  · Depression or mood changes.  · Painful menstrual periods (dysmenorrhea).  · Dry skin or  eyes.  These symptoms can vary over time.  Myofascial pain syndrome  Symptoms of myofascial pain syndrome include:  · Tight, ropy bands of muscle.  · Uncomfortable sensations in muscle areas. These may include aching, cramping, burning, numbness, tingling, and weakness.  · Difficulty moving certain parts of the body freely (poor range of motion).  How is this diagnosed?  This condition may be diagnosed by your symptoms and medical history. You will also have a physical exam. In general:  · Fibromyalgia is diagnosed if you have pain, fatigue, and other symptoms for more than 3 months, and symptoms cannot be explained by another condition.  · Myofascial pain syndrome is diagnosed if you have trigger points in your muscles, and those trigger points are tender and cause pain elsewhere in your body (referred pain).  How is this treated?  Treatment for these conditions depends on the type that you have.  · For fibromyalgia:  ? Pain medicines, such as NSAIDs.  ? Medicines for treating depression.  ? Medicines for treating seizures.  ? Medicines that relax the muscles.  · For myofascial pain:  ? Pain medicines, such as NSAIDs.  ? Cooling and stretching of muscles.  ? Trigger point injections.  ? Sound wave (ultrasound) treatments to stimulate muscles.  Treating these conditions often requires a team of health care providers. These may include:  · Your primary care provider.  · Physical therapist.  · Complementary health care providers, such as massage therapists or acupuncturists.  · Psychiatrist for cognitive behavioral therapy.  Follow these instructions at home:  Medicines  · Take over-the-counter and prescription medicines only as told by your health care provider.  · Do not drive or use heavy machinery while taking prescription pain medicine.  · If you are taking prescription pain medicine, take actions to prevent or treat constipation. Your health care provider may recommend that you:  ? Drink enough fluid to keep  your urine pale yellow.  ? Eat foods that are high in fiber, such as fresh fruits and vegetables, whole grains, and beans.  ? Limit foods that are high in fat and processed sugars, such as fried or sweet foods.  ? Take an over-the-counter or prescription medicine for constipation.  Lifestyle    · Exercise as directed by your health care provider or physical therapist.  · Practice relaxation techniques to control your stress. You may want to try:  ? Biofeedback.  ? Visual imagery.  ? Hypnosis.  ? Muscle relaxation.  ? Yoga.  ? Meditation.  · Maintain a healthy lifestyle. This includes eating a healthy diet and getting enough sleep.  · Do not use any products that contain nicotine or tobacco, such as cigarettes and e-cigarettes. If you need help quitting, ask your health care provider.  General instructions  · Talk to your health care provider about complementary treatments, such as acupuncture or massage.  · Consider joining a support group with others who are diagnosed with this condition.  · Do not do activities that stress or strain your muscles. This includes repetitive motions and heavy lifting.  · Keep all follow-up visits as told by your health care provider. This is important.  Where to find more information  · National Fibromyalgia Association: www.fmaware.org  · Arthritis Foundation: www.arthritis.org  · American Chronic Pain Association: www.theacpa.org  Contact a health care provider if:  · You have new symptoms.  · Your symptoms get worse or your pain is severe.  · You have side effects from your medicines.  · You have trouble sleeping.  · Your condition is causing depression or anxiety.  Summary  · Myofascial pain syndrome and fibromyalgia are pain disorders.  · Myofascial pain syndrome has tender points in the muscle that will cause pain when pressed (trigger points). Fibromyalgia also has muscle pains and tenderness that come and go, but this condition is often associated with fatigue and sleep  disturbances.  · Fibromyalgia and myofascial pain syndrome are not the same but often occur together, causing pain and fatigue that make day-to-day activities difficult.  · Treatment for fibromyalgia includes taking medicines to relax the muscles and medicines for pain, depression, or seizures. Treatment for myofascial pain syndrome includes taking medicines for pain, cooling and stretching of muscles, and injecting medicines into trigger points.  · Follow your health care provider's instructions for taking medicines and maintaining a healthy lifestyle.  This information is not intended to replace advice given to you by your health care provider. Make sure you discuss any questions you have with your health care provider.  Document Released: 12/18/2006 Document Revised: 04/10/2020 Document Reviewed: 01/02/2019  [employer] Patient Education © 2020 [employer]    Discharge Instructions    Discharged to home by taxi with self. Discharged via wheelchair, hospital escort: Yes.  Special equipment needed: Not Applicable    Be sure to schedule a follow-up appointment with your primary care doctor or any specialists as instructed.     Discharge Plan:        I understand that a diet low in cholesterol, fat, and sodium is recommended for good health. Unless I have been given specific instructions below for another diet, I accept this instruction as my diet prescription.   Other diet: heart healthy    Special Instructions: None    · Is patient discharged on Warfarin / Coumadin?   No     Depression / Suicide Risk    As you are discharged from this [facility] facility, it is important to learn how to keep safe from harming yourself.    Recognize the warning signs:  · Abrupt changes in personality, positive or negative- including increase in energy   · Giving away possessions  · Change in eating patterns- significant weight changes-  positive or negative  · Change in sleeping patterns- unable to sleep or sleeping all the  time   · Unwillingness or inability to communicate  · Depression  · Unusual sadness, discouragement and loneliness  · Talk of wanting to die  · Neglect of personal appearance   · Rebelliousness- reckless behavior  · Withdrawal from people/activities they love  · Confusion- inability to concentrate     If you or a loved one observes any of these behaviors or has concerns about self-harm, here's what you can do:  · Talk about it- your feelings and reasons for harming yourself  · Remove any means that you might use to hurt yourself (examples: pills, rope, extension cords, firearm)  · Get professional help from the community (Mental Health, Substance Abuse, psychological counseling)  · Do not be alone:Call your Safe Contact- someone whom you trust who will be there for you.  · Call your local CRISIS HOTLINE 421-7091 or 757-454-2973  · Call your local Children's Mobile Crisis Response Team [place] (837) 358-6369 or www.PriceTag  · Call the toll free National Suicide Prevention Hotlines   · National Suicide Prevention Lifeline 698-912-AMLK (1821)  · "[employer] Line Network 800-SUICIDE (809-0270)    Chest Pain Observation  It is often hard to give a specific diagnosis for the cause of chest pain. Among other possibilities your symptoms might be caused by inadequate oxygen delivery to your heart (angina). Angina that is not treated or evaluated can lead to a heart attack (myocardial infarction) or death.  Blood tests, electrocardiograms, and X-rays may have been done to help determine a possible cause of your chest pain. After evaluation and observation, your health care provider has determined that it is unlikely your pain was caused by an unstable condition that requires hospitalization. However, a full evaluation of your pain may need to be completed, with additional diagnostic testing as directed. It is very important to keep your follow-up appointments. Not keeping your follow-up appointments could  result in permanent heart damage, disability, or death. If there is any problem keeping your follow-up appointments, you must call your health care provider.  HOME CARE INSTRUCTIONS   Due to the slight chance that your pain could be angina, it is important to follow your health care provider's treatment plan and also maintain a healthy lifestyle:  · Maintain or work toward achieving a healthy weight.  · Stay physically active and exercise regularly.  · Decrease your salt intake.  · Eat a balanced, healthy diet. Talk to a dietitian to learn about heart-healthy foods.  · Increase your fiber intake by including whole grains, vegetables, fruits, and nuts in your diet.  · Avoid situations that cause stress, anger, or depression.  · Take medicines as advised by your health care provider. Report any side effects to your health care provider. Do not stop medicines or adjust the dosages on your own.  · Quit smoking. Do not use nicotine patches or gum until you check with your health care provider.  · Keep your blood pressure, blood sugar, and cholesterol levels within normal limits.  · Limit alcohol intake to no more than 1 drink per day for women who are not pregnant and 2 drinks per day for men.  · Do not abuse drugs.  SEEK IMMEDIATE MEDICAL CARE IF:  You have severe chest pain or pressure which may include symptoms such as:  · You feel pain or pressure in your arms, neck, jaw, or back.  · You have severe back or abdominal pain, feel sick to your stomach (nauseous), or throw up (vomit).  · You are sweating profusely.  · You are having a fast or irregular heartbeat.  · You feel short of breath while at rest.  · You notice increasing shortness of breath during rest, sleep, or with activity.  · You have chest pain that does not get better after rest or after taking your usual medicine.  · You wake from sleep with chest pain.  · You are unable to sleep because you cannot breathe.  · You develop a frequent cough or you are  coughing up blood.  · You feel dizzy, faint, or experience extreme fatigue.  · You develop severe weakness, dizziness, fainting, or chills.  Any of these symptoms may represent a serious problem that is an emergency. Do not wait to see if the symptoms will go away. Call your local emergency services (911 in the [place] Do not drive yourself to the hospital.  MAKE SURE YOU:  · Understand these instructions.  · Will watch your condition.  · Will get help right away if you are not doing well or get worse.     This information is not intended to replace advice given to you by your health care provider. Make sure you discuss any questions you have with your health care provider.     Document Released: 01/20/2012 Document Revised: 12/23/2014 Document Reviewed: 06/19/2014  [employer] Interactive Patient Education ©2016 [employer]      Hypertension, Adult  Hypertension is another name for high blood pressure. High blood pressure forces your heart to work harder to pump blood. This can cause problems over time.  There are two numbers in a blood pressure reading. There is a top number (systolic) over a bottom number (diastolic). It is best to have a blood pressure that is below 120/80. Healthy choices can help lower your blood pressure, or you may need medicine to help lower it.  What are the causes?  The cause of this condition is not known. Some conditions may be related to high blood pressure.  What increases the risk?  · Smoking.  · Having type 2 diabetes mellitus, high cholesterol, or both.  · Not getting enough exercise or physical activity.  · Being overweight.  · Having too much fat, sugar, calories, or salt (sodium) in your diet.  · Drinking too much alcohol.  · Having long-term (chronic) kidney disease.  · Having a family history of high blood pressure.  · Age. Risk increases with age.  · Race. You may be at higher risk if you are .  · Gender. Men are at higher risk than women before age 45. After  age 65, women are at higher risk than men.  · Having obstructive sleep apnea.  · Stress.  What are the signs or symptoms?  · High blood pressure may not cause symptoms. Very high blood pressure (hypertensive crisis) may cause:  ? Headache.  ? Feelings of worry or nervousness (anxiety).  ? Shortness of breath.  ? Nosebleed.  ? A feeling of being sick to your stomach (nausea).  ? Throwing up (vomiting).  ? Changes in how you see.  ? Very bad chest pain.  ? Seizures.  How is this treated?  · This condition is treated by making healthy lifestyle changes, such as:  ? Eating healthy foods.  ? Exercising more.  ? Drinking less alcohol.  · Your health care provider may prescribe medicine if lifestyle changes are not enough to get your blood pressure under control, and if:  ? Your top number is above 130.  ? Your bottom number is above 80.  · Your personal target blood pressure may vary.  Follow these instructions at home:  Eating and drinking    · If told, follow the DASH eating plan. To follow this plan:  ? Fill one half of your plate at each meal with fruits and vegetables.  ? Fill one fourth of your plate at each meal with whole grains. Whole grains include whole-wheat pasta, brown rice, and whole-grain bread.  ? Eat or drink low-fat dairy products, such as skim milk or low-fat yogurt.  ? Fill one fourth of your plate at each meal with low-fat (lean) proteins. Low-fat proteins include fish, chicken without skin, eggs, beans, and tofu.  ? Avoid fatty meat, cured and processed meat, or chicken with skin.  ? Avoid pre-made or processed food.  · Eat less than 1,500 mg of salt each day.  · Do not drink alcohol if:  ? Your doctor tells you not to drink.  ? You are pregnant, may be pregnant, or are planning to become pregnant.  · If you drink alcohol:  ? Limit how much you use to:  § 0-1 drink a day for women.  § 0-2 drinks a day for men.  ? Be aware of how much alcohol is in your drink. In the [place] one drink equals one 12 oz  bottle of beer (355 mL), one 5 oz glass of wine (148 mL), or one 1½ oz glass of hard liquor (44 mL).  Lifestyle    · Work with your doctor to stay at a healthy weight or to lose weight. Ask your doctor what the best weight is for you.  · Get at least 30 minutes of exercise most days of the week. This may include walking, swimming, or biking.  · Get at least 30 minutes of exercise that strengthens your muscles (resistance exercise) at least 3 days a week. This may include lifting weights or doing Pilates.  · Do not use any products that contain nicotine or tobacco, such as cigarettes, e-cigarettes, and chewing tobacco. If you need help quitting, ask your doctor.  · Check your blood pressure at home as told by your doctor.  · Keep all follow-up visits as told by your doctor. This is important.  Medicines  · Take over-the-counter and prescription medicines only as told by your doctor. Follow directions carefully.  · Do not skip doses of blood pressure medicine. The medicine does not work as well if you skip doses. Skipping doses also puts you at risk for problems.  · Ask your doctor about side effects or reactions to medicines that you should watch for.  Contact a doctor if you:  · Think you are having a reaction to the medicine you are taking.  · Have headaches that keep coming back (recurring).  · Feel dizzy.  · Have swelling in your ankles.  · Have trouble with your vision.  Get help right away if you:  · Get a very bad headache.  · Start to feel mixed up (confused).  · Feel weak or numb.  · Feel faint.  · Have very bad pain in your:  ? Chest.  ? Belly (abdomen).  · Throw up more than once.  · Have trouble breathing.  Summary  · Hypertension is another name for high blood pressure.  · High blood pressure forces your heart to work harder to pump blood.  · For most people, a normal blood pressure is less than 120/80.  · Making healthy choices can help lower blood pressure. If your blood pressure does not get lower  with healthy choices, you may need to take medicine.  This information is not intended to replace advice given to you by your health care provider. Make sure you discuss any questions you have with your health care provider.  Document Released: 06/05/2009 Document Revised: 08/28/2019 Document Reviewed: 08/28/2019  [employer] Patient Education © 2020 [employer]      Polypharmacy Problems  Polypharmacy problems mean that certain medicines can cause problems when you take them together. Some medicine problems can be life-threatening. Your doctors need to know about all the medicines you take. This includes vitamins, herbs, eyedrops, over-the-counter medicines, prescribed medicines, and creams.  HOME CARE  · Pick one doctor to be in charge of your medicines. Tell this doctor about all the medicines you take, even if they are prescribed by another doctor.  · Buy all your medicines at the same pharmacy. The pharmacy keeps track of your medicines. They can tell your doctor if there are possible problems with your medicines.  · Read the instructions you get with your medicines.  · Keep a list of all your medicines and how much you need to take (doses) with you.  · Use a system to keep track of your medicines. You can use a pillbox to sort your medicines by the day and time you need to take them.  · Keep a list of your medical problems with you.  · Ask your doctor or pharmacy if you have any questions about your medicines.  GET HELP RIGHT AWAY IF:   · You have any problems that may be caused by your medicines.  · You have chest pain.  · You are short of breath.  · You have a pulse that is not normal.  · You pass out (faint).  · You are shaking (tremors).  · You feel weak or tired (lethargic).  · You have a rash or puffiness (swelling) in any part of your body.  · You have more bleeding, or bleeding from the butt (rectum) or vagina.  · You bruise easily.  · You have belly (abdominal) pain.  · You feel sick to your stomach  (nauseous).  · You throw up (vomit).  MAKE SURE YOU:  · Understand these instructions.  · Will watch your condition.  · Will get help right away if you are not doing well or get worse.     This information is not intended to replace advice given to you by your health care provider. Make sure you discuss any questions you have with your health care provider.     Document Released: 03/14/2011 Document Revised: 03/11/2013 Document Reviewed: 09/06/2012  [employer] Interactive Patient Education ©2016 [employer]

## 2020-11-15 NOTE — DISCHARGE PLANNING
Care Transition Team Assessment      Patient reports No PCP, difficulty getting prescriptions filled, and lack of socialization since moving here from [place].     Found pt's Medi-[employer] to be inactive; Pt only has Medicare part B and reports she does not pay for the premiums. Email sent to [facility] to assist patient in filing for Medicaid. Pt reports she receives about $900 a month from SSDI.   Pt does not have PCP. With her permission called [facility], left her information for them to contact her to set-up appointment for PCP.    Pt was medicated so difficult to assess cognitive level, however not sure patient will f/u with Medicaid, PCP, etc. Sent email to [facility] requesting community f/u. Pt would most likely benefit from Community  involvement.     Pt recently re-located to [place] from [facility] and verbalized lack of socialization. Lives with brother, who has health issues. His caregiver also lives there.     Reports she has a hx of depression that she thinks she is on medication for, and a remote hx of SI.     Pt will need assistance getting home upon discharge. She came by ambulance, has no support here except brother who cannot drive.     Information Source  Information Given By: Patient  Informant's Name: Davie  Who is responsible for making decisions for patient? : Patient    Readmission Evaluation  Is this a readmission?: No    Elopement Risk  Legal Hold: No    Interdisciplinary Discharge Planning  Does Admitting Nurse Feel This Could be a Complex Discharge?: Yes  Primary Care Physician: none([facility] scheduling called)  Lives with - Patient's Self Care Capacity: Other (Comments)(brother and his caregiver)  Support Systems: Other (Comments)(brother)  Housing / Facility: [address]  Do You Take your Prescribed Medications Regularly: (difficult to determine. Reports difficulty getting RX filled)  Able to Return to Previous ADL's: Yes  Mobility Issues: No  Patient Prefers to be  Discharged to:: home  Assistance Needed: Yes(will need dc prescriptions covered)  Durable Medical Equipment: Not Applicable    Discharge Preparedness  What is your plan after discharge?: Other (comment)(Home independent)  What are your discharge supports?: Sibling  Prior Functional Level: Drives Self, Independent with Activities of Daily Living, Independent with Medication Management  Difficulity with ADLs: None  Difficulity with IADLs: None    Functional Assesment  Prior Functional Level: Drives Self, Independent with Activities of Daily Living, Independent with Medication Management    Finances  Financial Barriers to Discharge: Yes(no prescription coverage)  Average Monthly Income: 900 $  Source of Income: Social Security Disability  Prescription Coverage: No  Prescription Coverage Comments: no longer has active medi-sylvia              Advance Directive  Advance Directive?: None  Advance Directive offered?: AD Booklet given    Domestic Abuse  Have you ever been the victim of abuse or violence?: No    Psychological Assessment  History of Substance Abuse: Alcohol  Date Last Used - Alcohol: about a year  History of Psychiatric Problems: Yes(thinks she takes medication for this, hx of SI remote past)    Discharge Risks or Barriers  Discharge risks or barriers?: No PCP, Uninsured / underinsured, Mental health  Patient risk factors: Cognitive / sensory / physical deficit, Lack of outside supports, Mental health, No PCP    Anticipated Discharge Information  Discharge Disposition: (home)  Discharge Address: [address]  Discharge Contact Phone Number: [phone]

## 2020-11-15 NOTE — H&P
Hospital Medicine History & Physical Note    Date of Service  11/15/2020    Primary Care Physician  No primary care provider on file.    Consultants  None    Code Status  Full Code    Chief Complaint  Chief Complaint   Patient presents with   • Chest Pain   • Back Pain       History of Presenting Illness  85 y.o. female who presented 11/15/2020 with chest pain.  Patient states that chest pain started today in the afternoon.  She took 325 milligrams of aspirin for the pain. However pain would not resolve so she ended up taking nitroglycerin and another 325 mg aspirin.  Patient was given 100 mcg of fentanyl by EMS.     In ED, pt found to have elevated blood pressure, other vital signs wnl. Remarkable labs include normocytic anemia, lymphocytosis, sugar 101, troponin x1 negative.  EKG shows normal sinus rhythm with left axis deviation nonspecific T wave changes in the lateral leads.    When seen at bedside, patient states that her chest pain has resolved but however she does have pain in her right arm.    Review of Systems  Review of Systems   Constitutional: Negative.    HENT: Negative.    Eyes: Negative.    Respiratory: Negative.    Cardiovascular: Positive for chest pain.   Gastrointestinal: Negative.    Genitourinary: Negative.    Musculoskeletal: Negative.    Skin: Negative.    Neurological: Negative.    Endo/Heme/Allergies: Negative.    Psychiatric/Behavioral: Negative.          Past Medical History   has no past medical history on file.    Surgical History   has a past surgical history that includes coronary artery bypas, 4 (2008).     Family History  family history is not on file.     Social History   reports that she has quit smoking. She has never used smokeless tobacco. She reports previous drug use. She reports that she does not drink alcohol.    Allergies  Allergies   Allergen Reactions   • Codeine Unspecified   • Lisinopril    • Lyrica Unspecified     Lyrica affects patients vision.     • Penicillin G   "  • Simvastatin    • Sulfa Drugs Unspecified     Patient states \"I can't remember what this does to me\" but recalls and allergy.        Medications  Prior to Admission Medications   Prescriptions Last Dose Informant Patient Reported? Taking?   PHENobarbital (LUMINAL) 32.4 MG Tab   Yes No   Sig: Take 32.4 mg by mouth.   alendronate (FOSAMAX) 70 MG Tab   Yes No   Sig: Fosamax 70 mg   aspirin (ASA) 81 MG Chew Tab chewable tablet   Yes No   Sig: Take 81 mg by mouth every day.   citalopram (CELEXA) 20 MG Tab   Yes No   Sig: Take 10 mg by mouth every day.   clopidogrel (PLAVIX) 75 MG Tab   Yes No   Sig: Plavix 75 mg   diazePAM (VALIUM) 5 MG Tab   Yes No   Sig: Take 5 mg by mouth 1 time daily as needed for Anxiety.   donepezil (ARICEPT) 10 MG tablet   Yes No   Sig: Take 10 mg by mouth.   estrogens, conjugated (PREMARIN) 0.625 MG Tab   Yes No   Sig: Take 0.625 mg by mouth every day.   folic acid (FOLVITE) 1 MG Tab   Yes No   Sig: folic acid 1 mg   furosemide (LASIX) 40 MG Tab   Yes No   Sig: Take 40 mg by mouth.   levothyroxine (SYNTHROID) 150 MCG Tab   Yes No   Sig: Take 150 mcg by mouth Every morning on an empty stomach.   loratadine (CLARITIN) 10 MG Tab   Yes No   Sig: Claritin 10 mg   losartan (COZAAR) 50 MG Tab   Yes No   Sig: Take 50 mg by mouth every day.   magnesium oxide (MAG-OX) 400 MG Tab tablet   Yes No   Sig: Take 400 mg by mouth every day.   memantine (NAMENDA) 10 MG Tab   Yes No   Sig: Take 10 mg by mouth.   metoprolol (LOPRESSOR) 25 MG Tab   Yes No   Sig: Take 12.5 mg by mouth 2 times a day.   naproxen (NAPROSYN) 500 MG Tab   Yes No   Sig: Take 500 mg by mouth every 12 hours as needed.   nitroglycerin (NITROSTAT) 0.4 MG SL Tab   Yes No   Sig: Nitrostat 0.4 mg   nortriptyline (PAMELOR) 25 MG Cap   Yes No   Sig: Take 25 mg by mouth every evening.   oxyCODONE-acetaminophen (PERCOCET-10)  MG Tab   Yes No   Sig: Take 1 Tab by mouth every 6 hours as needed for Severe Pain.   phenytoin (DILANTIN) 200 MG ER " capsule   Yes No   Sig: Take 200 mg by mouth.   potassium chloride (KLOR-CON) 20 MEQ Pack   Yes No   Sig: Take 20 mEq by mouth.   pravastatin (PRAVACHOL) 40 MG tablet   Yes No   Sig: pravastatin 40 mg   ranitidine (ZANTAC) 300 MG tablet   Yes No   Sig: Zantac 300 300 mg   rosuvastatin (CRESTOR) 10 MG Tab   Yes No   Sig: Take 10 mg by mouth every evening.   solifenacin (VESICARE) 10 MG tablet   Yes No   Sig: VESIcare 10 mg   zolpidem (AMBIEN) 5 MG Tab   Yes No   Sig: Take 10 mg by mouth at bedtime as needed for Sleep.      Facility-Administered Medications: None       Physical Exam  Temp:  [36.2 °C (97.2 °F)] 36.2 °C (97.2 °F)  Pulse:  [58-63] 58  Resp:  [11-40] 40  BP: ()/(50-77) 109/55  SpO2:  [96 %-97 %] 97 %    Physical Exam    Laboratory:  Recent Labs     11/15/20  0325   WBC 7.0   RBC 3.96*   HEMOGLOBIN 11.4*   HEMATOCRIT 35.6*   MCV 89.9   MCH 28.8   MCHC 32.0*   RDW 44.5   PLATELETCT 314   MPV 10.1     Recent Labs     11/15/20  0325   SODIUM 135   POTASSIUM 4.1   CHLORIDE 103   CO2 25   GLUCOSE 101*   BUN 17   CREATININE 0.92   CALCIUM 9.2     Recent Labs     11/15/20  0325   GLUCOSE 101*     Recent Labs     11/15/20  0325   APTT 31.7   INR 0.96     No results for input(s): NTPROBNP in the last 72 hours.      Recent Labs     11/15/20  0325   TROPONINT 11       Imaging:  DX-CHEST-PORTABLE (1 VIEW)    (Results Pending)         Assessment/Plan:  I anticipate this patient is appropriate for observation status at this time.    Pain in the chest  Assessment & Plan  Admit patient to telemetry for chest pain rule out ACS  EKG shows NSR w/ mild ST repolarization abnormalities and non specific T wave changes in lateral leads   Initial troponin x1 -, trend 1 more troponin 7 AM  Patient took total 650 mg aspirin today      Essential hypertension- (present on admission)  Assessment & Plan  Restart medications as needed

## 2020-11-15 NOTE — DISCHARGE SUMMARY
Discharge Summary    CHIEF COMPLAINT ON ADMISSION  Chief Complaint   Patient presents with   • Chest Pain   • Back Pain       Reason for Admission  Chest pain    Admission Date  11/15/2020    CODE STATUS  Full Code    HPI & HOSPITAL COURSE:  History is limited, the patient is poor historian  85 years old female patient with a past medical history of fibromyalgia, coronary artery disease status post CABG who presented to the ER on 11/15/2020 with chest pain.    Further questioning, patient denied actual chest pain.  However she reported neck pain radiating to right arm which is he thinks just worsening of her underlying fibromyalgia.  In the ED the patient was found to have elevated blood pressure.  She received aspirin 320 mg and nitroglycerin.  EKG was done did not show acute ischemic changes, initial troponin was 11, repeat is 10.  The patient remained chest pain-free throughout hospital admission.  Upon chart review, patient was noted to be on several medications, the medications list was reviewed with the patient and cleaned.     Therefore, she is discharged in good and stable condition to home with close outpatient follow-up.  With PCP    The patient recovered much more quickly than anticipated on admission.    Discharge Date  11/15/2020    FOLLOW UP ITEMS POST DISCHARGE  Follow-up with primary care physician    DISCHARGE DIAGNOSES  Active Problems:    Essential hypertension POA: Yes    Pain in the chest POA: Unknown    Polypharmacy POA: Unknown  Resolved Problems:    Hyperlipidemia POA: Yes    Cognitive impairment POA: Unknown      FOLLOW UP  No future appointments.  [facility]  [address] [place] [address]  Schedule an appointment as soon as possible for a visit in 1 week  For titration of gabapentin, anti-seizure needs, thyroid check      MEDICATIONS ON DISCHARGE     Medication List      START taking these medications      Instructions   atorvastatin 40 MG Tabs  Commonly known as: LIPITOR    Take 1 Tablet by mouth every day.  Dose: 40 mg     gabapentin 100 MG Caps  Commonly known as: NEURONTIN   Take 1 Capsule by mouth 3 times a day.  Dose: 100 mg        CHANGE how you take these medications      Instructions   clopidogrel 75 MG Tabs  Start taking on: November 16, 2020  What changed: See the new instructions.  Commonly known as: Plavix   Take 1 Tablet by mouth every day.  Dose: 75 mg        CONTINUE taking these medications      Instructions   aspirin 81 MG Chew chewable tablet  Commonly known as: ASA   Take 81 mg by mouth every day.  Dose: 81 mg     citalopram 20 MG Tabs  Commonly known as: CeleXA   Take 10 mg by mouth every day.  Dose: 10 mg     folic acid 1 MG Tabs  Commonly known as: FOLVITE   folic acid 1 mg     levothyroxine 150 MCG Tabs  Commonly known as: SYNTHROID   Take 150 mcg by mouth Every morning on an empty stomach.  Dose: 150 mcg     losartan 50 MG Tabs  Commonly known as: COZAAR   Take 50 mg by mouth every day.  Dose: 50 mg     metoprolol 25 MG Tabs  Commonly known as: LOPRESSOR   Take 12.5 mg by mouth 2 times a day.  Dose: 12.5 mg     Nitrostat 0.4 MG Subl  Generic drug: nitroglycerin   Nitrostat 0.4 mg     oxyCODONE-acetaminophen  MG Tabs  Commonly known as: PERCOCET-10   Take 1 Tab by mouth every 6 hours as needed for Severe Pain.  Dose: 1 Tab     Zantac 300 MG tablet  Generic drug: ranitidine   Zantac 300 300 mg        STOP taking these medications    Claritin 10 MG Tabs  Generic drug: loratadine     diazePAM 5 MG Tabs  Commonly known as: VALIUM     donepezil 10 MG tablet  Commonly known as: ARICEPT     estrogens, conjugated 0.625 MG Tabs  Commonly known as: PREMARIN     Fosamax 70 MG Tabs  Generic drug: alendronate     furosemide 40 MG Tabs  Commonly known as: LASIX     magnesium oxide 400 MG Tabs tablet  Commonly known as: MAG-OX     memantine 10 MG Tabs  Commonly known as: Namenda     naproxen 500 MG Tabs  Commonly known as: NAPROSYN     nortriptyline 25 MG Caps  Commonly  "known as: PAMELOR     PHENobarbital 32.4 MG Tabs  Commonly known as: LUMINAL     phenytoin 200 MG ER capsule  Commonly known as: DILANTIN     potassium chloride 20 MEQ Pack  Commonly known as: KLOR-CON     pravastatin 40 MG tablet  Commonly known as: PRAVACHOL     rosuvastatin 10 MG Tabs  Commonly known as: CRESTOR     VESIcare 10 MG tablet  Generic drug: solifenacin     zolpidem 5 MG Tabs  Commonly known as: AMBIEN            Allergies  Allergies   Allergen Reactions   • Codeine Unspecified   • Lisinopril    • Lyrica Unspecified     Lyrica affects patients vision.     • Penicillin G    • Simvastatin    • Sulfa Drugs Unspecified     Patient states \"I can't remember what this does to me\" but recalls and allergy.        DIET  Orders Placed This Encounter   Procedures   • Diet Order Diet: Cardiac     Standing Status:   Standing     Number of Occurrences:   1     Order Specific Question:   Diet:     Answer:   Cardiac [6]       ACTIVITY  As tolerated.  Weight bearing as tolerated    CONSULTATIONS  none    PROCEDURES  DX-CHEST-PORTABLE (1 VIEW)   Final Result      No acute cardiopulmonary abnormality.            LABORATORY  Lab Results   Component Value Date    SODIUM 135 11/15/2020    POTASSIUM 4.1 11/15/2020    CHLORIDE 103 11/15/2020    CO2 25 11/15/2020    GLUCOSE 101 (H) 11/15/2020    BUN 17 11/15/2020    CREATININE 0.92 11/15/2020        Lab Results   Component Value Date    WBC 7.0 11/15/2020    HEMOGLOBIN 11.4 (L) 11/15/2020    HEMATOCRIT 35.6 (L) 11/15/2020    PLATELETCT 314 11/15/2020        Total time of the discharge process exceeds 45 minutes.  "

## 2020-11-15 NOTE — ASSESSMENT & PLAN NOTE
Admit patient to telemetry for chest pain rule out ACS  EKG shows NSR w/ mild ST repolarization abnormalities and non specific T wave changes in lateral leads   Initial troponin x1 -, trend 1 more troponin 7 AM  Patient took total 650 mg aspirin today

## 2020-11-15 NOTE — ASSESSMENT & PLAN NOTE
Concern for polypharmacy given multiple medications this can worsen her cognition.  We will try to clean up the medications last

## 2020-11-16 ENCOUNTER — PATIENT OUTREACH (OUTPATIENT)
Dept: HEALTH INFORMATION MANAGEMENT | Facility: OTHER | Age: 85
End: 2020-11-16

## 2020-11-16 NOTE — PROGRESS NOTES
Pt dc'd home. IV and monitor removed; monitor room notified. Pt left unit via wheelchair with CNA. Personal belongings with pt when leaving unit. Pt given discharge instructions prior to leaving unit including where to  prescriptions and when to follow-up; verbalizes understanding. Copy of discharge instructions with pt and in the chart.

## 2020-11-16 NOTE — DISCHARGE PLANNING
Meds-to-Beds: Discharge prescription orders listed below delivered to patient's bedside. RN notified. Patient counseled.       Davie Montejo   Home Medication Instructions JAZMINE:[id number]    Printed on:11/15/20 1632   Medication Information                      atorvastatin (LIPITOR) 40 MG Tab  Take 1 Tablet by mouth every day.             clopidogrel (PLAVIX) 75 MG Tab  Take 1 Tablet by mouth every day.             gabapentin (NEURONTIN) 100 MG Cap  Take 1 Capsule by mouth 3 times a day.                 Devaughn Soliman, Pharmacy Intern

## 2020-11-16 NOTE — PROGRESS NOTES
CHW received incoming message from [facility] to reach-out and connect this patient with a PCP, medication assistance, and any other resources she may benefit from. This worker sent e-mail referral to [facility] care to reach-out and connect this patient with follow-up care, medication assistance, and any other resources she may need or benefit from.

## 2020-12-04 ENCOUNTER — HOME HEALTH ADMISSION (OUTPATIENT)
Dept: HOME HEALTH SERVICES | Facility: HOME HEALTHCARE | Age: 85
End: 2020-12-04
Payer: MEDICARE

## 2021-01-12 ENCOUNTER — PATIENT OUTREACH (OUTPATIENT)
Dept: HEALTH INFORMATION MANAGEMENT | Facility: OTHER | Age: 86
End: 2021-01-12

## 2021-01-12 ENCOUNTER — HOSPITAL ENCOUNTER (EMERGENCY)
Facility: MEDICAL CENTER | Age: 86
End: 2021-01-12
Attending: EMERGENCY MEDICINE
Payer: MEDICARE

## 2021-01-12 VITALS
DIASTOLIC BLOOD PRESSURE: 55 MMHG | HEART RATE: 70 BPM | OXYGEN SATURATION: 96 % | WEIGHT: 165 LBS | HEIGHT: 66 IN | RESPIRATION RATE: 14 BRPM | SYSTOLIC BLOOD PRESSURE: 113 MMHG | TEMPERATURE: 98.7 F | BODY MASS INDEX: 26.52 KG/M2

## 2021-01-12 DIAGNOSIS — G89.4 CHRONIC PAIN SYNDROME: ICD-10-CM

## 2021-01-12 DIAGNOSIS — Z23 NEED FOR VACCINATION: ICD-10-CM

## 2021-01-12 DIAGNOSIS — F11.93 OPIOID WITHDRAWAL (HCC): ICD-10-CM

## 2021-01-12 LAB
ALBUMIN SERPL BCP-MCNC: 4.9 G/DL (ref 3.2–4.9)
ALBUMIN/GLOB SERPL: 1.3 G/DL
ALP SERPL-CCNC: 115 U/L (ref 30–99)
ALT SERPL-CCNC: 12 U/L (ref 2–50)
ANION GAP SERPL CALC-SCNC: 14 MMOL/L (ref 7–16)
AST SERPL-CCNC: 16 U/L (ref 12–45)
BILIRUB SERPL-MCNC: 0.6 MG/DL (ref 0.1–1.5)
BUN SERPL-MCNC: 15 MG/DL (ref 8–22)
CALCIUM SERPL-MCNC: 10.6 MG/DL (ref 8.5–10.5)
CHLORIDE SERPL-SCNC: 100 MMOL/L (ref 96–112)
CO2 SERPL-SCNC: 21 MMOL/L (ref 20–33)
CREAT SERPL-MCNC: 0.7 MG/DL (ref 0.5–1.4)
EKG IMPRESSION: NORMAL
ERYTHROCYTE [DISTWIDTH] IN BLOOD BY AUTOMATED COUNT: 44.7 FL (ref 35.9–50)
GLOBULIN SER CALC-MCNC: 3.7 G/DL (ref 1.9–3.5)
GLUCOSE SERPL-MCNC: 143 MG/DL (ref 65–99)
HCT VFR BLD AUTO: 42.3 % (ref 37–47)
HGB BLD-MCNC: 14 G/DL (ref 12–16)
MCH RBC QN AUTO: 28.3 PG (ref 27–33)
MCHC RBC AUTO-ENTMCNC: 33.1 G/DL (ref 33.6–35)
MCV RBC AUTO: 85.6 FL (ref 81.4–97.8)
PLATELET # BLD AUTO: 382 K/UL (ref 164–446)
PMV BLD AUTO: 10.8 FL (ref 9–12.9)
POTASSIUM SERPL-SCNC: 3.4 MMOL/L (ref 3.6–5.5)
PROT SERPL-MCNC: 8.6 G/DL (ref 6–8.2)
RBC # BLD AUTO: 4.94 M/UL (ref 4.2–5.4)
SODIUM SERPL-SCNC: 135 MMOL/L (ref 135–145)
TROPONIN T SERPL-MCNC: 13 NG/L (ref 6–19)
WBC # BLD AUTO: 7.7 K/UL (ref 4.8–10.8)

## 2021-01-12 PROCEDURE — 93005 ELECTROCARDIOGRAM TRACING: CPT | Performed by: EMERGENCY MEDICINE

## 2021-01-12 PROCEDURE — 99285 EMERGENCY DEPT VISIT HI MDM: CPT

## 2021-01-12 PROCEDURE — 700102 HCHG RX REV CODE 250 W/ 637 OVERRIDE(OP): Performed by: EMERGENCY MEDICINE

## 2021-01-12 PROCEDURE — A9270 NON-COVERED ITEM OR SERVICE: HCPCS | Performed by: EMERGENCY MEDICINE

## 2021-01-12 PROCEDURE — 96374 THER/PROPH/DIAG INJ IV PUSH: CPT

## 2021-01-12 PROCEDURE — 84484 ASSAY OF TROPONIN QUANT: CPT

## 2021-01-12 PROCEDURE — 700105 HCHG RX REV CODE 258: Performed by: EMERGENCY MEDICINE

## 2021-01-12 PROCEDURE — 85027 COMPLETE CBC AUTOMATED: CPT

## 2021-01-12 PROCEDURE — 700111 HCHG RX REV CODE 636 W/ 250 OVERRIDE (IP): Performed by: EMERGENCY MEDICINE

## 2021-01-12 PROCEDURE — 96375 TX/PRO/DX INJ NEW DRUG ADDON: CPT

## 2021-01-12 PROCEDURE — 80053 COMPREHEN METABOLIC PANEL: CPT

## 2021-01-12 RX ORDER — ONDANSETRON 4 MG/1
4 TABLET, ORALLY DISINTEGRATING ORAL EVERY 6 HOURS PRN
Qty: 20 TAB | Refills: 1 | Status: SHIPPED | OUTPATIENT
Start: 2021-01-12 | End: 2021-10-21

## 2021-01-12 RX ORDER — MORPHINE SULFATE 4 MG/ML
4 INJECTION, SOLUTION INTRAMUSCULAR; INTRAVENOUS ONCE
Status: COMPLETED | OUTPATIENT
Start: 2021-01-12 | End: 2021-01-12

## 2021-01-12 RX ORDER — OXYCODONE AND ACETAMINOPHEN 10; 325 MG/1; MG/1
1 TABLET ORAL ONCE
Status: COMPLETED | OUTPATIENT
Start: 2021-01-12 | End: 2021-01-12

## 2021-01-12 RX ORDER — ACETAMINOPHEN 325 MG/1
650 TABLET ORAL ONCE
Status: COMPLETED | OUTPATIENT
Start: 2021-01-12 | End: 2021-01-12

## 2021-01-12 RX ORDER — PROCHLORPERAZINE EDISYLATE 5 MG/ML
10 INJECTION INTRAMUSCULAR; INTRAVENOUS ONCE
Status: COMPLETED | OUTPATIENT
Start: 2021-01-12 | End: 2021-01-12

## 2021-01-12 RX ORDER — SODIUM CHLORIDE 9 MG/ML
500 INJECTION, SOLUTION INTRAVENOUS ONCE
Status: COMPLETED | OUTPATIENT
Start: 2021-01-12 | End: 2021-01-12

## 2021-01-12 RX ORDER — OXYCODONE AND ACETAMINOPHEN 10; 325 MG/1; MG/1
1 TABLET ORAL EVERY 8 HOURS PRN
Qty: 21 TAB | Refills: 0 | Status: SHIPPED | OUTPATIENT
Start: 2021-01-12 | End: 2021-01-19

## 2021-01-12 RX ADMIN — MORPHINE SULFATE 4 MG: 4 INJECTION INTRAVENOUS at 13:19

## 2021-01-12 RX ADMIN — OXYCODONE HYDROCHLORIDE AND ACETAMINOPHEN 1 TABLET: 10; 325 TABLET ORAL at 16:10

## 2021-01-12 RX ADMIN — ACETAMINOPHEN 650 MG: 325 TABLET, FILM COATED ORAL at 22:51

## 2021-01-12 RX ADMIN — SODIUM CHLORIDE 500 ML: 9 INJECTION, SOLUTION INTRAVENOUS at 18:30

## 2021-01-12 RX ADMIN — PROCHLORPERAZINE EDISYLATE 10 MG: 5 INJECTION INTRAMUSCULAR; INTRAVENOUS at 13:18

## 2021-01-12 SDOH — ECONOMIC STABILITY: TRANSPORTATION INSECURITY
IN THE PAST 12 MONTHS, HAS THE LACK OF TRANSPORTATION KEPT YOU FROM MEDICAL APPOINTMENTS OR FROM GETTING MEDICATIONS?: YES

## 2021-01-12 ASSESSMENT — LIFESTYLE VARIABLES
EVER FELT BAD OR GUILTY ABOUT YOUR DRINKING: NO
HAVE PEOPLE ANNOYED YOU BY CRITICIZING YOUR DRINKING: NO
DOES PATIENT WANT TO STOP DRINKING: NO
TOTAL SCORE: 0
EVER HAD A DRINK FIRST THING IN THE MORNING TO STEADY YOUR NERVES TO GET RID OF A HANGOVER: NO
CONSUMPTION TOTAL: INCOMPLETE
TOTAL SCORE: 0
DO YOU DRINK ALCOHOL: NO
TOTAL SCORE: 0
HAVE YOU EVER FELT YOU SHOULD CUT DOWN ON YOUR DRINKING: NO

## 2021-01-12 ASSESSMENT — FIBROSIS 4 INDEX: FIB4 SCORE: 1.24

## 2021-01-12 NOTE — DISCHARGE PLANNING
Received incoming order from [facility] to establish patient with PCP for med refill. CHW met pt at bedside and offered CCM program services. Patient accepted assistance. CHW will be calling [facility] services to schedule patient.     Patient disclosed she currently has no transportation. CHW informed patient she can use [facility] for transportation to medical appointments or  transportation services as well. Patient accepted the offer for transportation services.

## 2021-01-12 NOTE — DISCHARGE PLANNING
Patient has been scheduled at [facility] for PCP 1.25.21 at 10:00 am. CHW will be contacting [facility] transportation for patient.     CHW has given CCM pamphlet and [facility] transportation to patient as well. This has all been reiterated to patient at bedside.

## 2021-01-12 NOTE — ED TRIAGE NOTES
"Here for opiod withdrawal. EMS states her bottle was empty when there should have been 14 days left of meds. Pt states she lives with roommates and thinks someone takes them. Denies SI or HI. Here for generalized body aches and nausea with HA. .     Gets meds filled at \"[facility] [address]\"  "

## 2021-01-13 SDOH — ECONOMIC STABILITY: FOOD INSECURITY: WITHIN THE PAST 12 MONTHS, YOU WORRIED THAT YOUR FOOD WOULD RUN OUT BEFORE YOU GOT MONEY TO BUY MORE.: PATIENT DECLINED

## 2021-01-13 SDOH — ECONOMIC STABILITY: FOOD INSECURITY: WITHIN THE PAST 12 MONTHS, THE FOOD YOU BOUGHT JUST DIDN'T LAST AND YOU DIDN'T HAVE MONEY TO GET MORE.: PATIENT DECLINED

## 2021-01-13 NOTE — PROGRESS NOTES
CHW has called  transportation for patient. [facility] has been scheduled for  at 9:15 am 1.25.21. CHW called patient (mobile and home) to confirm this information but no answer. CHW left a VM on patient's mobile. CHW will be calling patient again to confirm the information

## 2021-01-13 NOTE — ED PROVIDER NOTES
ED Provider Note    CHIEF COMPLAINT  Chief Complaint   Patient presents with   • Generalized Body Aches     withdrawl from Opiod.        HPI  Davie Montejo is a 85 y.o. female who presents to emergency room today with generalized body aches, nausea and vomiting.  Patient has history of chronic pain.  Takes Percocet 10 mg tablets for this.  Her last dose was yesterday morning.  Started developing symptoms later that day.  Generalized body aches nausea vomiting headache.  She denies any fever chills no changes to bowel or bladder.  Sure previous chronic management doctor has retired Dr. Benson.  Patient states that she did not think about getting a new physician to help her with her pain management.  That she ran out of her medication she states that possibly a couple pills were lost.  She denies any Covid risks or exposures.    REVIEW OF SYSTEMS  See HPI for further details. All other systems are negative.     PAST MEDICAL HISTORY  Past Medical History:   Diagnosis Date   • Heart attack (HCC) 2008       FAMILY HISTORY  [unfilled]    SOCIAL HISTORY  Social History     Socioeconomic History   • Marital status:      Spouse name: Not on file   • Number of children: Not on file   • Years of education: Not on file   • Highest education level: Not on file   Occupational History   • Not on file   Social Needs   • Financial resource strain: Not on file   • Food insecurity     Worry: Not on file     Inability: Not on file   • Transportation needs     Medical: Yes     Non-medical: Not on file   Tobacco Use   • Smoking status: Former Smoker   • Smokeless tobacco: Never Used   Substance and Sexual Activity   • Alcohol use: Never     Frequency: Never   • Drug use: Not Currently   • Sexual activity: Not on file   Lifestyle   • Physical activity     Days per week: Not on file     Minutes per session: Not on file   • Stress: Not on file   Relationships   • Social connections     Talks on phone: Not on file     Gets  "together: Not on file     Attends Baptism service: Not on file     Active member of club or organization: Not on file     Attends meetings of clubs or organizations: Not on file     Relationship status: Not on file   • Intimate partner violence     Fear of current or ex partner: Not on file     Emotionally abused: Not on file     Physically abused: Not on file     Forced sexual activity: Not on file   Other Topics Concern   • Not on file   Social History Narrative   • Not on file       SURGICAL HISTORY  Past Surgical History:   Procedure Laterality Date   • CORONARY ARTERY BYPAS, 4 2008       CURRENT MEDICATIONS  Home Medications    **Home medications have not yet been reviewed for this encounter**         ALLERGIES  Allergies   Allergen Reactions   • Codeine Unspecified   • Lisinopril    • Lyrica Unspecified     Lyrica affects patients vision.     • Penicillin G    • Simvastatin    • Sulfa Drugs Unspecified     Patient states \"I can't remember what this does to me\" but recalls and allergy.        PHYSICAL EXAM  VITAL SIGNS: BP (!) 166/70   Pulse 98   Temp 37.1 °C (98.7 °F) (Tympanic)   Resp 16   Ht 1.676 m (5' 6\")   Wt 74.8 kg (165 lb)   LMP  (LMP Unknown)   SpO2 94%   BMI 26.63 kg/m²       Constitutional: Well developed, Well nourished, acute distress, Non-toxic appearance.   HENT: Normocephalic, Atraumatic, Bilateral external ears normal, Oropharynx moist, No oral exudates, Nose normal.   Eyes: PERRLA, EOMI, Conjunctiva normal, No discharge.   Neck: Normal range of motion, No tenderness, Supple, No stridor.   Lymphatic: No lymphadenopathy noted.   Cardiovascular: Normal heart rate, Normal rhythm, No murmurs, No rubs, No gallops.   Thorax & Lungs: Normal breath sounds, No respiratory distress, No wheezing, No chest tenderness.   Abdomen: Bowel sounds normal, Soft, No tenderness, No masses, No pulsatile masses.   Skin: Warm, Dry, No erythema, No rash.   Back: No tenderness, No CVA tenderness.    "   Extremities: Intact distal pulses, No edema, No tenderness, No cyanosis, No clubbing.   Musculoskeletal: Good range of motion in all major joints. No tenderness to palpation or major deformities noted.   Neurologic: Alert & oriented x 3, Normal motor function, Normal sensory function, No focal deficits noted.   Psychiatric: Affect normal, Judgment poor, Mood normal.     EKG  Normal sinus rhythm at 98 bpm, no ST elevation or depression, there is widening of the QRS complex, poor R wave progression and left axis deviation noted from left bundle branch block.  ND intervals are normal.  This is a twelve-lead EKG no previous EKG available to staff comparison.      RADIOLOGY/PROCEDURES  No orders to display         COURSE & MEDICAL DECISION MAKING  Pertinent Labs & Imaging studies reviewed. (See chart for details)  Patient appears dehydrated given IV fluids this improved most likely from her vomiting cannot hold down oral fluids Compazine, morphine with resolution of her symptoms she still continued has some pain and was given her usual dose of Percocet.  On reexamination she appears much improved.  Attempted to get her into a new appointment for new physician and were able to schedule appointment with [facility] on 1/25 for a new physician and chronic pain management.  I will attempt to try to bridge her in which she was given the medication of Percocet limited amount.  Antinausea medication Zofran.  She will follow-up as directed and hopefully will get a earlier appointment she was given information in the ER  has come down and talk to her.  Have case management to help her get home.  Her vital signs improved and she is able to take an oral fluids without difficulty and improved and discharged as above to home.    FINAL IMPRESSION  1.  Acute opiate withdrawal  2.  Chronic pain  3.  Nausea and vomiting second #1         Electronically signed by: Oliver Griffiths D.O., 1/12/2021 4:16 PM

## 2021-01-13 NOTE — ED NOTES
Patient remains comfortable at this time. BP wdl. Updated charge RN, Micha, about planning of discharge.

## 2021-01-13 NOTE — ED NOTES
Discharge instructions given to patient. Education provided on diagnosis, treatment, follow up, and prescriptions provided. Pt verbalized understanding. All questions answered. IV removed. Pt taken to the lobby via wheelchair, waiting for taxi. ED tech in lobby calling taxi and in possession of taxi voucher.

## 2021-01-13 NOTE — ED NOTES
Awaiting for better Blood Pressure, pt is up for DC pending a better BP on file. Oncoming ERP (Dr. Minor made aware of this; initial ERP,  Hearing left for shift) charge RN aware. Will inform as needed on status.    Pt has been placed on full cardiac monitor and fitting cuff applied for most accurate vitals

## 2021-01-21 NOTE — PROGRESS NOTES
CHW called patient to confirm transportation information with patient. Patient did not answer. CHW left a VM for patient. CHW will be discharging patient from list.

## 2021-09-09 ENCOUNTER — HOSPITAL ENCOUNTER (EMERGENCY)
Dept: HOSPITAL 8 - ED | Age: 86
Discharge: HOME | End: 2021-09-09
Payer: MEDICAID

## 2021-09-09 VITALS — BODY MASS INDEX: 23.72 KG/M2 | WEIGHT: 156.53 LBS | HEIGHT: 68 IN

## 2021-09-09 VITALS — SYSTOLIC BLOOD PRESSURE: 135 MMHG | DIASTOLIC BLOOD PRESSURE: 88 MMHG

## 2021-09-09 DIAGNOSIS — R94.31: ICD-10-CM

## 2021-09-09 DIAGNOSIS — R10.13: ICD-10-CM

## 2021-09-09 DIAGNOSIS — Z72.9: ICD-10-CM

## 2021-09-09 DIAGNOSIS — M79.7: ICD-10-CM

## 2021-09-09 DIAGNOSIS — R07.9: ICD-10-CM

## 2021-09-09 DIAGNOSIS — R51.9: ICD-10-CM

## 2021-09-09 DIAGNOSIS — I71.4: ICD-10-CM

## 2021-09-09 DIAGNOSIS — R10.84: Primary | ICD-10-CM

## 2021-09-09 LAB
ALBUMIN SERPL-MCNC: 3.4 G/DL (ref 3.4–5)
ALP SERPL-CCNC: 98 U/L (ref 45–117)
ALT SERPL-CCNC: 24 U/L (ref 12–78)
ANION GAP SERPL CALC-SCNC: 5 MMOL/L (ref 5–15)
BASOPHILS # BLD AUTO: 0.1 X10^3/UL (ref 0–0.1)
BASOPHILS NFR BLD AUTO: 1 % (ref 0–1)
BILIRUB SERPL-MCNC: 0.5 MG/DL (ref 0.2–1)
CALCIUM SERPL-MCNC: 9.3 MG/DL (ref 8.5–10.1)
CHLORIDE SERPL-SCNC: 110 MMOL/L (ref 98–107)
CREAT SERPL-MCNC: 0.9 MG/DL (ref 0.55–1.02)
EOSINOPHIL # BLD AUTO: 0.3 X10^3/UL (ref 0–0.4)
EOSINOPHIL NFR BLD AUTO: 3 % (ref 1–7)
ERYTHROCYTE [DISTWIDTH] IN BLOOD BY AUTOMATED COUNT: 14.9 % (ref 9.6–15.2)
LYMPHOCYTES # BLD AUTO: 2.4 X10^3/UL (ref 1–3.4)
LYMPHOCYTES NFR BLD AUTO: 29 % (ref 22–44)
MCH RBC QN AUTO: 28.9 PG (ref 27–34.8)
MCHC RBC AUTO-ENTMCNC: 33.4 G/DL (ref 32.4–35.8)
MONOCYTES # BLD AUTO: 0.8 X10^3/UL (ref 0.2–0.8)
MONOCYTES NFR BLD AUTO: 9 % (ref 2–9)
NEUTROPHILS # BLD AUTO: 4.9 X10^3/UL (ref 1.8–6.8)
NEUTROPHILS NFR BLD AUTO: 58 % (ref 42–75)
PLATELET # BLD AUTO: 289 X10^3/UL (ref 130–400)
PMV BLD AUTO: 8.4 FL (ref 7.4–10.4)
PROT SERPL-MCNC: 7.2 G/DL (ref 6.4–8.2)
RBC # BLD AUTO: 4.17 X10^6/UL (ref 3.82–5.3)
TROPONIN I SERPL-MCNC: < 0.015 NG/ML (ref 0–0.04)

## 2021-09-09 PROCEDURE — 84484 ASSAY OF TROPONIN QUANT: CPT

## 2021-09-09 PROCEDURE — 85025 COMPLETE CBC W/AUTO DIFF WBC: CPT

## 2021-09-09 PROCEDURE — 96374 THER/PROPH/DIAG INJ IV PUSH: CPT

## 2021-09-09 PROCEDURE — 83690 ASSAY OF LIPASE: CPT

## 2021-09-09 PROCEDURE — 80048 BASIC METABOLIC PNL TOTAL CA: CPT

## 2021-09-09 PROCEDURE — 99285 EMERGENCY DEPT VISIT HI MDM: CPT

## 2021-09-09 PROCEDURE — 93005 ELECTROCARDIOGRAM TRACING: CPT

## 2021-09-09 PROCEDURE — 36415 COLL VENOUS BLD VENIPUNCTURE: CPT

## 2021-09-09 PROCEDURE — 74177 CT ABD & PELVIS W/CONTRAST: CPT

## 2021-09-09 PROCEDURE — 70450 CT HEAD/BRAIN W/O DYE: CPT

## 2021-09-09 PROCEDURE — 96375 TX/PRO/DX INJ NEW DRUG ADDON: CPT

## 2021-09-09 PROCEDURE — 96376 TX/PRO/DX INJ SAME DRUG ADON: CPT

## 2021-09-09 PROCEDURE — 80076 HEPATIC FUNCTION PANEL: CPT

## 2021-09-09 PROCEDURE — 71045 X-RAY EXAM CHEST 1 VIEW: CPT

## 2021-09-09 NOTE — NUR
First contact with patient, dc instruct and f/u provided. Pt verbalizes 
understanding of instruct. VSS, provided with cab voucher as pt states she came 
by [facility] and has no means of getting home.

## 2021-10-14 ENCOUNTER — APPOINTMENT (OUTPATIENT)
Dept: RADIOLOGY | Facility: MEDICAL CENTER | Age: 86
End: 2021-10-14
Attending: EMERGENCY MEDICINE
Payer: MEDICARE

## 2021-10-14 ENCOUNTER — HOSPITAL ENCOUNTER (EMERGENCY)
Facility: MEDICAL CENTER | Age: 86
End: 2021-10-14
Attending: EMERGENCY MEDICINE
Payer: MEDICARE

## 2021-10-14 VITALS
TEMPERATURE: 97.3 F | HEART RATE: 81 BPM | RESPIRATION RATE: 18 BRPM | DIASTOLIC BLOOD PRESSURE: 65 MMHG | OXYGEN SATURATION: 99 % | WEIGHT: 140 LBS | SYSTOLIC BLOOD PRESSURE: 108 MMHG | HEIGHT: 64 IN | BODY MASS INDEX: 23.9 KG/M2

## 2021-10-14 DIAGNOSIS — R07.2 PRECORDIAL PAIN: ICD-10-CM

## 2021-10-14 LAB
ALBUMIN SERPL BCP-MCNC: 4.3 G/DL (ref 3.2–4.9)
ALBUMIN/GLOB SERPL: 1.3 G/DL
ALP SERPL-CCNC: 117 U/L (ref 30–99)
ALT SERPL-CCNC: 11 U/L (ref 2–50)
ANION GAP SERPL CALC-SCNC: 11 MMOL/L (ref 7–16)
AST SERPL-CCNC: 18 U/L (ref 12–45)
BASOPHILS # BLD AUTO: 0.5 % (ref 0–1.8)
BASOPHILS # BLD: 0.06 K/UL (ref 0–0.12)
BILIRUB SERPL-MCNC: 0.3 MG/DL (ref 0.1–1.5)
BUN SERPL-MCNC: 34 MG/DL (ref 8–22)
CALCIUM SERPL-MCNC: 9.6 MG/DL (ref 8.5–10.5)
CHLORIDE SERPL-SCNC: 103 MMOL/L (ref 96–112)
CO2 SERPL-SCNC: 24 MMOL/L (ref 20–33)
COHGB MFR BLD: 1.8 % (ref 0–4.9)
CREAT SERPL-MCNC: 1.21 MG/DL (ref 0.5–1.4)
EKG IMPRESSION: NORMAL
EOSINOPHIL # BLD AUTO: 0.46 K/UL (ref 0–0.51)
EOSINOPHIL NFR BLD: 4 % (ref 0–6.9)
ERYTHROCYTE [DISTWIDTH] IN BLOOD BY AUTOMATED COUNT: 44.4 FL (ref 35.9–50)
GLOBULIN SER CALC-MCNC: 3.2 G/DL (ref 1.9–3.5)
GLUCOSE SERPL-MCNC: 106 MG/DL (ref 65–99)
HCT VFR BLD AUTO: 35.9 % (ref 37–47)
HGB BLD-MCNC: 11.8 G/DL (ref 12–16)
IMM GRANULOCYTES # BLD AUTO: 0.08 K/UL (ref 0–0.11)
IMM GRANULOCYTES NFR BLD AUTO: 0.7 % (ref 0–0.9)
LYMPHOCYTES # BLD AUTO: 2.5 K/UL (ref 1–4.8)
LYMPHOCYTES NFR BLD: 21.7 % (ref 22–41)
MCH RBC QN AUTO: 29.2 PG (ref 27–33)
MCHC RBC AUTO-ENTMCNC: 32.9 G/DL (ref 33.6–35)
MCV RBC AUTO: 88.9 FL (ref 81.4–97.8)
MONOCYTES # BLD AUTO: 1.09 K/UL (ref 0–0.85)
MONOCYTES NFR BLD AUTO: 9.5 % (ref 0–13.4)
NEUTROPHILS # BLD AUTO: 7.33 K/UL (ref 2–7.15)
NEUTROPHILS NFR BLD: 63.6 % (ref 44–72)
NRBC # BLD AUTO: 0 K/UL
NRBC BLD-RTO: 0 /100 WBC
PLATELET # BLD AUTO: 374 K/UL (ref 164–446)
PMV BLD AUTO: 10.3 FL (ref 9–12.9)
POTASSIUM SERPL-SCNC: 4.6 MMOL/L (ref 3.6–5.5)
PROT SERPL-MCNC: 7.5 G/DL (ref 6–8.2)
RBC # BLD AUTO: 4.04 M/UL (ref 4.2–5.4)
SODIUM SERPL-SCNC: 138 MMOL/L (ref 135–145)
TROPONIN T SERPL-MCNC: 12 NG/L (ref 6–19)
WBC # BLD AUTO: 11.5 K/UL (ref 4.8–10.8)

## 2021-10-14 PROCEDURE — 36415 COLL VENOUS BLD VENIPUNCTURE: CPT

## 2021-10-14 PROCEDURE — 82375 ASSAY CARBOXYHB QUANT: CPT

## 2021-10-14 PROCEDURE — 700101 HCHG RX REV CODE 250: Performed by: EMERGENCY MEDICINE

## 2021-10-14 PROCEDURE — 80053 COMPREHEN METABOLIC PANEL: CPT

## 2021-10-14 PROCEDURE — 71045 X-RAY EXAM CHEST 1 VIEW: CPT

## 2021-10-14 PROCEDURE — 84484 ASSAY OF TROPONIN QUANT: CPT

## 2021-10-14 PROCEDURE — 85025 COMPLETE CBC W/AUTO DIFF WBC: CPT

## 2021-10-14 PROCEDURE — 99284 EMERGENCY DEPT VISIT MOD MDM: CPT

## 2021-10-14 PROCEDURE — 93005 ELECTROCARDIOGRAM TRACING: CPT | Performed by: EMERGENCY MEDICINE

## 2021-10-14 RX ORDER — LIDOCAINE 50 MG/G
1 PATCH TOPICAL ONCE
Status: DISCONTINUED | OUTPATIENT
Start: 2021-10-14 | End: 2021-10-14 | Stop reason: HOSPADM

## 2021-10-14 RX ORDER — LIDOCAINE 50 MG/G
1 PATCH TOPICAL EVERY 24 HOURS
Qty: 20 PATCH | Refills: 0 | Status: SHIPPED | OUTPATIENT
Start: 2021-10-14 | End: 2021-10-21

## 2021-10-14 RX ADMIN — LIDOCAINE 1 PATCH: 50 PATCH CUTANEOUS at 11:30

## 2021-10-14 ASSESSMENT — FIBROSIS 4 INDEX: FIB4 SCORE: 1.02

## 2021-10-14 ASSESSMENT — LIFESTYLE VARIABLES
AVERAGE NUMBER OF DAYS PER WEEK YOU HAVE A DRINK CONTAINING ALCOHOL: 0
DO YOU DRINK ALCOHOL: NO
DOES PATIENT WANT TO STOP DRINKING: NO
CONSUMPTION TOTAL: NEGATIVE
TOTAL SCORE: 0
TOTAL SCORE: 0
EVER FELT BAD OR GUILTY ABOUT YOUR DRINKING: NO
ON A TYPICAL DAY WHEN YOU DRINK ALCOHOL HOW MANY DRINKS DO YOU HAVE: 0
HAVE YOU EVER FELT YOU SHOULD CUT DOWN ON YOUR DRINKING: NO
HAVE PEOPLE ANNOYED YOU BY CRITICIZING YOUR DRINKING: NO
HOW MANY TIMES IN THE PAST YEAR HAVE YOU HAD 5 OR MORE DRINKS IN A DAY: 0
TOTAL SCORE: 0
EVER HAD A DRINK FIRST THING IN THE MORNING TO STEADY YOUR NERVES TO GET RID OF A HANGOVER: NO
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
## 2021-10-14 NOTE — DISCHARGE INSTRUCTIONS
Your laboratory results and imaging were reassuring.  We will have you follow-up with your cardiologist.  Unfortunately I do not believe it is appropriate to prescribe you oxycodone at this point as I believe this is a poor medication for chronic pain, continue taking the medications that were prescribed by your primary care physician, you can also use the Lidoderm patches which I am sending you home with.

## 2021-10-14 NOTE — ED TRIAGE NOTES
Chief Complaint   Patient presents with   • Chest Pain     BIBA for chest pain substernal radiating to back x 2 days. Patient NTG x 1 at home with no relief. Pt describes pain as non constant.   • Back Pain     Patient reports chronic lower back pain.          Vitals:    10/14/21 1013   BP: 102/55   Pulse: 88   Resp: 16   Temp: 36.4 °C (97.5 °F)   SpO2: 97%

## 2021-10-14 NOTE — ED PROVIDER NOTES
ED Provider Note    CHIEF COMPLAINT  Chief Complaint   Patient presents with   • Chest Pain     BIBA for chest pain substernal radiating to back x 2 days. Patient NTG x 1 at home with no relief. Pt describes pain as non constant.   • Back Pain     Patient reports chronic lower back pain.        HPI  Davie Montejo is a 86 y.o. female who presents with chief complaint of chronic chest and back pain.  Patient reports a longstanding history of chest and back pain.  Patient reports that she has had chronic chest and back pain since she had a CABG in .  She is on oxycodone for this.  Patient reports that her primary care physician has recently taken her off the oxycodone as they did not feel it was appropriate for her chronic pain.  Patient reports that her pain has worsened since stopping the narcotic pain medication.  Patient denies any associated nausea or diaphoresis or shortness of breath.  Patient denies any association of pain with exertion.  Patient denies any tearing quality the pain.  Pain is constant and worse with movement of her thorax.  Patient denies any associated pain with deep breaths.  She reports the pain is worse when she presses on her chest or her back.  Patient lives in her  brother's home.  Per EMS she was heating the home with her gas stove.  She reports that she did this because the heater was broken.     REVIEW OF SYSTEMS  ROS    See HPI for further details. All other systems are negative.     PAST MEDICAL HISTORY   has a past medical history of Heart attack (HCC) (2008).    SOCIAL HISTORY  Social History     Tobacco Use   • Smoking status: Former Smoker   • Smokeless tobacco: Never Used   Substance and Sexual Activity   • Alcohol use: Never   • Drug use: Not Currently   • Sexual activity: Not on file       SURGICAL HISTORY   has a past surgical history that includes coronary artery bypas, 4 (2008).    CURRENT MEDICATIONS  Home Medications     Reviewed by Migue Duckworth,  "[place] (Registered Nurse) on 10/14/21 at 1014  Med List Status: Not Addressed   Medication Last Dose Status   aspirin (ASA) 81 MG Chew Tab chewable tablet  Active   atorvastatin (LIPITOR) 40 MG Tab  Active   citalopram (CELEXA) 20 MG Tab  Active   clopidogrel (PLAVIX) 75 MG Tab  Active   folic acid (FOLVITE) 1 MG Tab  Active   gabapentin (NEURONTIN) 100 MG Cap  Active   levothyroxine (SYNTHROID) 150 MCG Tab  Active   losartan (COZAAR) 50 MG Tab  Active   metoprolol (LOPRESSOR) 25 MG Tab  Active   nitroglycerin (NITROSTAT) 0.4 MG SL Tab  Active   ondansetron (ZOFRAN ODT) 4 MG TABLET DISPERSIBLE  Active   oxyCODONE-acetaminophen (PERCOCET-10)  MG Tab  Active   ranitidine (ZANTAC) 300 MG tablet  Active                ALLERGIES  Allergies   Allergen Reactions   • Codeine Unspecified   • Lisinopril    • Lyrica Unspecified     Lyrica affects patients vision.     • Penicillin G    • Simvastatin    • Sulfa Drugs Unspecified     Patient states \"I can't remember what this does to me\" but recalls and allergy.        PHYSICAL EXAM  Vitals:    10/14/21 1013   BP: 102/55   Pulse: 88   Resp: 16   Temp: 36.4 °C (97.5 °F)   SpO2: 97%       Physical Exam  Constitutional:       General: She is not in acute distress.     Appearance: She is well-developed and normal weight. She is not ill-appearing.      Comments: Well kempt     HENT:      Head: Normocephalic and atraumatic.   Eyes:      Conjunctiva/sclera: Conjunctivae normal.   Cardiovascular:      Rate and Rhythm: Normal rate and regular rhythm.      Comments: Toes to palpation along bilateral parasternal margins.  No overlying skin changes.  There is tenderness along patient's entire mid back, from lateral to medial aspect.  There is no specific tenderness of the midline or bony abnormality.  Pulmonary:      Effort: Pulmonary effort is normal.      Breath sounds: Normal breath sounds.   Abdominal:      General: Bowel sounds are normal. There is no distension.      Palpations: " Abdomen is soft.      Tenderness: There is no abdominal tenderness. There is no rebound.   Musculoskeletal:      Cervical back: Normal range of motion and neck supple.   Skin:     General: Skin is warm and dry.      Findings: No rash.   Neurological:      Mental Status: She is alert and oriented to person, place, and time.   Psychiatric:      Comments: Patient is cantankerous and frustrated with my questioning.  She denies any suicidal homicidal ideation.  She reports she can take care of herself without issue.           DIAGNOSTIC STUDIES / PROCEDURES    EKG  See below    LABS  Results for orders placed or performed during the hospital encounter of 10/14/21   CBC WITH DIFFERENTIAL   Result Value Ref Range    WBC 11.5 (H) 4.8 - 10.8 K/uL    RBC 4.04 (L) 4.20 - 5.40 M/uL    Hemoglobin 11.8 (L) 12.0 - 16.0 g/dL    Hematocrit 35.9 (L) 37.0 - 47.0 %    MCV 88.9 81.4 - 97.8 fL    MCH 29.2 27.0 - 33.0 pg    MCHC 32.9 (L) 33.6 - 35.0 g/dL    RDW 44.4 35.9 - 50.0 fL    Platelet Count 374 164 - 446 K/uL    MPV 10.3 9.0 - 12.9 fL    Neutrophils-Polys 63.60 44.00 - 72.00 %    Lymphocytes 21.70 (L) 22.00 - 41.00 %    Monocytes 9.50 0.00 - 13.40 %    Eosinophils 4.00 0.00 - 6.90 %    Basophils 0.50 0.00 - 1.80 %    Immature Granulocytes 0.70 0.00 - 0.90 %    Nucleated RBC 0.00 /100 WBC    Neutrophils (Absolute) 7.33 (H) 2.00 - 7.15 K/uL    Lymphs (Absolute) 2.50 1.00 - 4.80 K/uL    Monos (Absolute) 1.09 (H) 0.00 - 0.85 K/uL    Eos (Absolute) 0.46 0.00 - 0.51 K/uL    Baso (Absolute) 0.06 0.00 - 0.12 K/uL    Immature Granulocytes (abs) 0.08 0.00 - 0.11 K/uL    NRBC (Absolute) 0.00 K/uL   CMP   Result Value Ref Range    Sodium 138 135 - 145 mmol/L    Potassium 4.6 3.6 - 5.5 mmol/L    Chloride 103 96 - 112 mmol/L    Co2 24 20 - 33 mmol/L    Anion Gap 11.0 7.0 - 16.0    Glucose 106 (H) 65 - 99 mg/dL    Bun 34 (H) 8 - 22 mg/dL    Creatinine 1.21 0.50 - 1.40 mg/dL    Calcium 9.6 8.5 - 10.5 mg/dL    AST(SGOT) 18 12 - 45 U/L    ALT(SGPT)  11 2 - 50 U/L    Alkaline Phosphatase 117 (H) 30 - 99 U/L    Total Bilirubin 0.3 0.1 - 1.5 mg/dL    Albumin 4.3 3.2 - 4.9 g/dL    Total Protein 7.5 6.0 - 8.2 g/dL    Globulin 3.2 1.9 - 3.5 g/dL    A-G Ratio 1.3 g/dL   TROPONIN   Result Value Ref Range    Troponin T 12 6 - 19 ng/L   CARBOXYHEMOGLOBIN   Result Value Ref Range    Carbon Monoxide-Co 1.80 0.00 - 4.90 %   ESTIMATED GFR   Result Value Ref Range    GFR If  51 (A) >60 mL/min/1.73 m 2    GFR If Non  42 (A) >60 mL/min/1.73 m 2   EKG (NOW)   Result Value Ref Range    Report       [facility] Emergency Dept.    Test Date:  2021-10-14  Pt Name:    JUNIOR CHUI            Department: ER  MRN:        [id number]                      Room:        11  Gender:     Female                       Technician: [id number]  :        1935                   Requested By:ER TRIAGE PROTOCOL  Order #:    [id number]                    Reading MD: Marta Blanco MD    Measurements  Intervals                                Axis  Rate:       79                           P:          68  NE:         200                          QRS:        -22  QRSD:       92                           T:          129  QT:         392  QTc:        450    Interpretive Statements  EKG is normal sinus rhythm, normal axis, left bundle branch block is  resolved, T  wave inversions in V2, Q waves present in V1 and V2 which are chronic and  unchanged from prior.  No ST elevation or depression consistent with acute  regional ischemia  Electronically Signed On 10- 10:30:46 PDT by SWATHI Blanco MD           RADIOLOGY  DX-CHEST-PORTABLE (1 VIEW)   Final Result      1.  There is no acute cardiopulmonary process.              COURSE & MEDICAL DECISION MAKING  Pertinent Labs & Imaging studies reviewed. (See chart for details)    Well-appearing patient here with symptoms consistent with chronic pain exacerbation secondary to cessation of narcotic  analgesia.  Patient appears very well.  Her symptoms are highly atypical of ACS, pulmonary embolus or aortic dissection.  Patient pain is entirely reproducible.  EKG fails to reveal any evidence of acute regional ischemia.  Patient has had symptoms for greater than 6 hours, I believe a single troponin is sufficient.  Patient will be set up with outpatient cardiology follow-up.  We will report the case to APS and our  will also work with patient to ensure a safe discharge plan.  Patient has been evaluated by our social work team, she is already set up for Meals on Wheels, EPS report has been filed and they will help ensure that patient's living situation is fully evaluated by the elder protective services team  Patient labs are reassuring.  Imaging is reassuring.  Patient has equal pulses bilaterally.  Patient will be discharged home, with follow-up with EPS.  Follow-up with her primary care physician.  Have also placed cardiology referral.  Patient will be followed up by our cardiology team in an expedited manner.    The patient will return for worsening symptoms and is stable at the time of discharge. The patient verbalizes understanding and will comply.    FINAL IMPRESSION    1. Precordial pain             Electronically signed by: Francis Lozano M.D., 10/14/2021 10:16 AM

## 2021-10-21 ENCOUNTER — APPOINTMENT (OUTPATIENT)
Dept: RADIOLOGY | Facility: MEDICAL CENTER | Age: 86
DRG: 378 | End: 2021-10-21
Attending: HOSPITALIST
Payer: MEDICARE

## 2021-10-21 ENCOUNTER — APPOINTMENT (OUTPATIENT)
Dept: RADIOLOGY | Facility: MEDICAL CENTER | Age: 86
DRG: 378 | End: 2021-10-21
Attending: EMERGENCY MEDICINE
Payer: MEDICARE

## 2021-10-21 ENCOUNTER — HOSPITAL ENCOUNTER (INPATIENT)
Facility: MEDICAL CENTER | Age: 86
LOS: 10 days | DRG: 378 | End: 2021-10-31
Attending: EMERGENCY MEDICINE | Admitting: INTERNAL MEDICINE
Payer: MEDICARE

## 2021-10-21 DIAGNOSIS — E03.9 HYPOTHYROIDISM, UNSPECIFIED TYPE: ICD-10-CM

## 2021-10-21 DIAGNOSIS — D62 ACUTE BLOOD LOSS ANEMIA: ICD-10-CM

## 2021-10-21 DIAGNOSIS — F32.A DEPRESSIVE DISORDER: ICD-10-CM

## 2021-10-21 DIAGNOSIS — R07.2 PRECORDIAL PAIN: ICD-10-CM

## 2021-10-21 DIAGNOSIS — K25.2 ACUTE PERFORATED GASTRIC ULCER WITH HEMORRHAGE (HCC): ICD-10-CM

## 2021-10-21 DIAGNOSIS — I25.709 CORONARY ARTERY DISEASE INVOLVING CORONARY BYPASS GRAFT OF NATIVE HEART WITH ANGINA PECTORIS (HCC): ICD-10-CM

## 2021-10-21 DIAGNOSIS — R07.9 CHEST PAIN, UNSPECIFIED TYPE: ICD-10-CM

## 2021-10-21 DIAGNOSIS — E87.1 HYPONATREMIA: ICD-10-CM

## 2021-10-21 PROBLEM — N17.9 AKI (ACUTE KIDNEY INJURY) (HCC): Status: ACTIVE | Noted: 2021-10-21

## 2021-10-21 LAB
ABO + RH BLD: NORMAL
ABO GROUP BLD: NORMAL
ALBUMIN SERPL BCP-MCNC: 3.7 G/DL (ref 3.2–4.9)
ALBUMIN/GLOB SERPL: 1.2 G/DL
ALP SERPL-CCNC: 97 U/L (ref 30–99)
ALT SERPL-CCNC: 9 U/L (ref 2–50)
ANION GAP SERPL CALC-SCNC: 11 MMOL/L (ref 7–16)
APTT PPP: 32.6 SEC (ref 24.7–36)
AST SERPL-CCNC: 16 U/L (ref 12–45)
BARCODED ABORH UBTYP: 6200
BARCODED PRD CODE UBPRD: NORMAL
BARCODED UNIT NUM UBUNT: NORMAL
BASOPHILS # BLD AUTO: 0.5 % (ref 0–1.8)
BASOPHILS # BLD: 0.05 K/UL (ref 0–0.12)
BILIRUB SERPL-MCNC: 0.2 MG/DL (ref 0.1–1.5)
BLD GP AB SCN SERPL QL: NORMAL
BUN SERPL-MCNC: 24 MG/DL (ref 8–22)
CALCIUM SERPL-MCNC: 9.2 MG/DL (ref 8.4–10.2)
CFT BLD TEG: 4.4 MIN (ref 4.6–9.1)
CFT P HPASE BLD TEG: 4.2 MIN (ref 4.3–8.3)
CHLORIDE SERPL-SCNC: 99 MMOL/L (ref 96–112)
CLOT ANGLE BLD TEG: 81.5 DEGREES (ref 63–78)
CLOT LYSIS 30M P MA LENFR BLD TEG: 0 % (ref 0–2.6)
CO2 SERPL-SCNC: 21 MMOL/L (ref 20–33)
COMPONENT P 8504P: NORMAL
COMPONENT R 8504R: NORMAL
COMPONENT R 8504R: NORMAL
CREAT SERPL-MCNC: 1.25 MG/DL (ref 0.5–1.4)
CT.EXTRINSIC BLD ROTEM: 0.6 MIN (ref 0.8–2.1)
EKG IMPRESSION: NORMAL
EOSINOPHIL # BLD AUTO: 0.24 K/UL (ref 0–0.51)
EOSINOPHIL NFR BLD: 2.3 % (ref 0–6.9)
ERYTHROCYTE [DISTWIDTH] IN BLOOD BY AUTOMATED COUNT: 46.3 FL (ref 35.9–50)
GLOBULIN SER CALC-MCNC: 3 G/DL (ref 1.9–3.5)
GLUCOSE SERPL-MCNC: 100 MG/DL (ref 65–99)
HCT VFR BLD AUTO: 22.2 % (ref 37–47)
HGB BLD-MCNC: 7 G/DL (ref 12–16)
HGB BLD-MCNC: 7.4 G/DL (ref 12–16)
HGB BLD-MCNC: 8 G/DL (ref 12–16)
HGB BLD-MCNC: 8.2 G/DL (ref 12–16)
IMM GRANULOCYTES # BLD AUTO: 0.07 K/UL (ref 0–0.11)
IMM GRANULOCYTES NFR BLD AUTO: 0.7 % (ref 0–0.9)
INR PPP: 1.06 (ref 0.87–1.13)
LYMPHOCYTES # BLD AUTO: 1.85 K/UL (ref 1–4.8)
LYMPHOCYTES NFR BLD: 17.6 % (ref 22–41)
MCF BLD TEG: 71.2 MM (ref 52–69)
MCF.PLATELET INHIB BLD ROTEM: 40.7 MM (ref 15–32)
MCH RBC QN AUTO: 28.9 PG (ref 27–33)
MCHC RBC AUTO-ENTMCNC: 31.5 G/DL (ref 33.6–35)
MCV RBC AUTO: 91.7 FL (ref 81.4–97.8)
MONOCYTES # BLD AUTO: 1.04 K/UL (ref 0–0.85)
MONOCYTES NFR BLD AUTO: 9.9 % (ref 0–13.4)
NEUTROPHILS # BLD AUTO: 7.29 K/UL (ref 2–7.15)
NEUTROPHILS NFR BLD: 69 % (ref 44–72)
NRBC # BLD AUTO: 0 K/UL
NRBC BLD-RTO: 0 /100 WBC
PA AA BLD-ACNC: 62.6 % (ref 0–11)
PA ADP BLD-ACNC: 46.6 % (ref 0–17)
PLATELET # BLD AUTO: 344 K/UL (ref 164–446)
PMV BLD AUTO: 10.2 FL (ref 9–12.9)
POTASSIUM SERPL-SCNC: 3.9 MMOL/L (ref 3.6–5.5)
PRODUCT TYPE UPROD: NORMAL
PROT SERPL-MCNC: 6.7 G/DL (ref 6–8.2)
PROTHROMBIN TIME: 13 SEC (ref 12–14.6)
RBC # BLD AUTO: 2.42 M/UL (ref 4.2–5.4)
RH BLD: NORMAL
SODIUM SERPL-SCNC: 131 MMOL/L (ref 135–145)
TEG ALGORITHM TGALG: ABNORMAL
TROPONIN T SERPL-MCNC: 15 NG/L (ref 6–19)
UNIT STATUS USTAT: NORMAL
WBC # BLD AUTO: 10.5 K/UL (ref 4.8–10.8)

## 2021-10-21 PROCEDURE — 700111 HCHG RX REV CODE 636 W/ 250 OVERRIDE (IP): Performed by: INTERNAL MEDICINE

## 2021-10-21 PROCEDURE — P9016 RBC LEUKOCYTES REDUCED: HCPCS

## 2021-10-21 PROCEDURE — 700101 HCHG RX REV CODE 250: Performed by: EMERGENCY MEDICINE

## 2021-10-21 PROCEDURE — 36415 COLL VENOUS BLD VENIPUNCTURE: CPT

## 2021-10-21 PROCEDURE — 80053 COMPREHEN METABOLIC PANEL: CPT

## 2021-10-21 PROCEDURE — 700111 HCHG RX REV CODE 636 W/ 250 OVERRIDE (IP): Performed by: EMERGENCY MEDICINE

## 2021-10-21 PROCEDURE — 85347 COAGULATION TIME ACTIVATED: CPT

## 2021-10-21 PROCEDURE — 74176 CT ABD & PELVIS W/O CONTRAST: CPT | Mod: MG

## 2021-10-21 PROCEDURE — 85576 BLOOD PLATELET AGGREGATION: CPT | Mod: 91

## 2021-10-21 PROCEDURE — 93005 ELECTROCARDIOGRAM TRACING: CPT | Performed by: EMERGENCY MEDICINE

## 2021-10-21 PROCEDURE — P9034 PLATELETS, PHERESIS: HCPCS

## 2021-10-21 PROCEDURE — 96367 TX/PROPH/DG ADDL SEQ IV INF: CPT

## 2021-10-21 PROCEDURE — 700105 HCHG RX REV CODE 258: Performed by: EMERGENCY MEDICINE

## 2021-10-21 PROCEDURE — 85730 THROMBOPLASTIN TIME PARTIAL: CPT

## 2021-10-21 PROCEDURE — 84484 ASSAY OF TROPONIN QUANT: CPT

## 2021-10-21 PROCEDURE — 99291 CRITICAL CARE FIRST HOUR: CPT | Mod: AI | Performed by: INTERNAL MEDICINE

## 2021-10-21 PROCEDURE — 85018 HEMOGLOBIN: CPT | Mod: 91,XU

## 2021-10-21 PROCEDURE — 86900 BLOOD TYPING SEROLOGIC ABO: CPT

## 2021-10-21 PROCEDURE — C9113 INJ PANTOPRAZOLE SODIUM, VIA: HCPCS | Performed by: EMERGENCY MEDICINE

## 2021-10-21 PROCEDURE — 85610 PROTHROMBIN TIME: CPT

## 2021-10-21 PROCEDURE — 30233R1 TRANSFUSION OF NONAUTOLOGOUS PLATELETS INTO PERIPHERAL VEIN, PERCUTANEOUS APPROACH: ICD-10-PCS | Performed by: INTERNAL MEDICINE

## 2021-10-21 PROCEDURE — 700101 HCHG RX REV CODE 250: Performed by: INTERNAL MEDICINE

## 2021-10-21 PROCEDURE — A9270 NON-COVERED ITEM OR SERVICE: HCPCS | Performed by: INTERNAL MEDICINE

## 2021-10-21 PROCEDURE — 96375 TX/PRO/DX INJ NEW DRUG ADDON: CPT | Mod: XU

## 2021-10-21 PROCEDURE — 700102 HCHG RX REV CODE 250 W/ 637 OVERRIDE(OP): Performed by: INTERNAL MEDICINE

## 2021-10-21 PROCEDURE — 99285 EMERGENCY DEPT VISIT HI MDM: CPT

## 2021-10-21 PROCEDURE — 85025 COMPLETE CBC W/AUTO DIFF WBC: CPT

## 2021-10-21 PROCEDURE — 74175 CTA ABDOMEN W/CONTRAST: CPT | Mod: ME

## 2021-10-21 PROCEDURE — 86901 BLOOD TYPING SEROLOGIC RH(D): CPT

## 2021-10-21 PROCEDURE — 700105 HCHG RX REV CODE 258: Performed by: INTERNAL MEDICINE

## 2021-10-21 PROCEDURE — 30233N1 TRANSFUSION OF NONAUTOLOGOUS RED BLOOD CELLS INTO PERIPHERAL VEIN, PERCUTANEOUS APPROACH: ICD-10-PCS | Performed by: INTERNAL MEDICINE

## 2021-10-21 PROCEDURE — 96366 THER/PROPH/DIAG IV INF ADDON: CPT

## 2021-10-21 PROCEDURE — 700117 HCHG RX CONTRAST REV CODE 255: Performed by: HOSPITALIST

## 2021-10-21 PROCEDURE — C9113 INJ PANTOPRAZOLE SODIUM, VIA: HCPCS | Performed by: INTERNAL MEDICINE

## 2021-10-21 PROCEDURE — 71045 X-RAY EXAM CHEST 1 VIEW: CPT

## 2021-10-21 PROCEDURE — 700111 HCHG RX REV CODE 636 W/ 250 OVERRIDE (IP): Performed by: HOSPITALIST

## 2021-10-21 PROCEDURE — 85384 FIBRINOGEN ACTIVITY: CPT

## 2021-10-21 PROCEDURE — 96365 THER/PROPH/DIAG IV INF INIT: CPT | Mod: XU

## 2021-10-21 PROCEDURE — 86923 COMPATIBILITY TEST ELECTRIC: CPT

## 2021-10-21 PROCEDURE — 770020 HCHG ROOM/CARE - TELE (206)

## 2021-10-21 PROCEDURE — 86850 RBC ANTIBODY SCREEN: CPT

## 2021-10-21 PROCEDURE — 700117 HCHG RX CONTRAST REV CODE 255: Performed by: EMERGENCY MEDICINE

## 2021-10-21 PROCEDURE — 36430 TRANSFUSION BLD/BLD COMPNT: CPT

## 2021-10-21 RX ORDER — ATORVASTATIN CALCIUM 40 MG/1
40 TABLET, FILM COATED ORAL DAILY
Status: DISCONTINUED | OUTPATIENT
Start: 2021-10-21 | End: 2021-10-24

## 2021-10-21 RX ORDER — AMOXICILLIN 250 MG
2 CAPSULE ORAL 2 TIMES DAILY
Status: DISCONTINUED | OUTPATIENT
Start: 2021-10-21 | End: 2021-10-28

## 2021-10-21 RX ORDER — CITALOPRAM 20 MG/1
10 TABLET ORAL DAILY
Status: DISCONTINUED | OUTPATIENT
Start: 2021-10-21 | End: 2021-10-31 | Stop reason: HOSPADM

## 2021-10-21 RX ORDER — GABAPENTIN 100 MG/1
100 CAPSULE ORAL 3 TIMES DAILY
Status: DISCONTINUED | OUTPATIENT
Start: 2021-10-21 | End: 2021-10-31 | Stop reason: HOSPADM

## 2021-10-21 RX ORDER — ROSUVASTATIN CALCIUM 10 MG/1
10 TABLET, COATED ORAL EVERY EVENING
COMMUNITY
End: 2021-12-01

## 2021-10-21 RX ORDER — MELOXICAM 15 MG/1
15 TABLET ORAL DAILY
Status: ON HOLD | COMMUNITY
End: 2021-10-31

## 2021-10-21 RX ORDER — LEVOTHYROXINE SODIUM 0.07 MG/1
150 TABLET ORAL
Status: DISCONTINUED | OUTPATIENT
Start: 2021-10-21 | End: 2021-10-31 | Stop reason: HOSPADM

## 2021-10-21 RX ORDER — MORPHINE SULFATE 4 MG/ML
2 INJECTION, SOLUTION INTRAMUSCULAR; INTRAVENOUS ONCE
Status: COMPLETED | OUTPATIENT
Start: 2021-10-21 | End: 2021-10-21

## 2021-10-21 RX ORDER — HYDROMORPHONE HYDROCHLORIDE 1 MG/ML
0.5 INJECTION, SOLUTION INTRAMUSCULAR; INTRAVENOUS; SUBCUTANEOUS
Status: DISCONTINUED | OUTPATIENT
Start: 2021-10-21 | End: 2021-10-24

## 2021-10-21 RX ORDER — SODIUM CHLORIDE 9 MG/ML
INJECTION, SOLUTION INTRAVENOUS CONTINUOUS
Status: DISCONTINUED | OUTPATIENT
Start: 2021-10-21 | End: 2021-10-22

## 2021-10-21 RX ORDER — SODIUM CHLORIDE 9 MG/ML
500 INJECTION, SOLUTION INTRAVENOUS ONCE
Status: COMPLETED | OUTPATIENT
Start: 2021-10-21 | End: 2021-10-21

## 2021-10-21 RX ORDER — POLYETHYLENE GLYCOL 3350 17 G/17G
1 POWDER, FOR SOLUTION ORAL
Status: DISCONTINUED | OUTPATIENT
Start: 2021-10-21 | End: 2021-10-28

## 2021-10-21 RX ORDER — ONDANSETRON 4 MG/1
4 TABLET, ORALLY DISINTEGRATING ORAL EVERY 4 HOURS PRN
Status: DISCONTINUED | OUTPATIENT
Start: 2021-10-21 | End: 2021-10-31 | Stop reason: HOSPADM

## 2021-10-21 RX ORDER — HYDROCODONE BITARTRATE AND ACETAMINOPHEN 5; 325 MG/1; MG/1
1 TABLET ORAL EVERY 6 HOURS PRN
Status: ON HOLD | COMMUNITY
End: 2021-10-31 | Stop reason: SDUPTHER

## 2021-10-21 RX ORDER — LOSARTAN POTASSIUM 25 MG/1
50 TABLET ORAL DAILY
Status: DISCONTINUED | OUTPATIENT
Start: 2021-10-21 | End: 2021-10-21

## 2021-10-21 RX ORDER — OXYCODONE HYDROCHLORIDE 5 MG/1
5 TABLET ORAL
Status: DISCONTINUED | OUTPATIENT
Start: 2021-10-21 | End: 2021-10-22

## 2021-10-21 RX ORDER — ACETAMINOPHEN 325 MG/1
650 TABLET ORAL EVERY 6 HOURS PRN
Status: DISCONTINUED | OUTPATIENT
Start: 2021-10-21 | End: 2021-10-28

## 2021-10-21 RX ORDER — ENALAPRILAT 1.25 MG/ML
1.25 INJECTION INTRAVENOUS EVERY 6 HOURS PRN
Status: DISCONTINUED | OUTPATIENT
Start: 2021-10-21 | End: 2021-10-31 | Stop reason: HOSPADM

## 2021-10-21 RX ORDER — LIDOCAINE 50 MG/G
1 PATCH TOPICAL EVERY 24 HOURS
Status: DISCONTINUED | OUTPATIENT
Start: 2021-10-21 | End: 2021-10-31 | Stop reason: HOSPADM

## 2021-10-21 RX ORDER — BISACODYL 10 MG
10 SUPPOSITORY, RECTAL RECTAL
Status: DISCONTINUED | OUTPATIENT
Start: 2021-10-21 | End: 2021-10-28

## 2021-10-21 RX ORDER — LABETALOL HYDROCHLORIDE 5 MG/ML
10 INJECTION, SOLUTION INTRAVENOUS EVERY 4 HOURS PRN
Status: DISCONTINUED | OUTPATIENT
Start: 2021-10-21 | End: 2021-10-31 | Stop reason: HOSPADM

## 2021-10-21 RX ORDER — CEFTRIAXONE 2 G/1
2 INJECTION, POWDER, FOR SOLUTION INTRAMUSCULAR; INTRAVENOUS ONCE
Status: COMPLETED | OUTPATIENT
Start: 2021-10-21 | End: 2021-10-21

## 2021-10-21 RX ORDER — LORAZEPAM 2 MG/ML
0.5 INJECTION INTRAMUSCULAR EVERY 4 HOURS PRN
Status: DISCONTINUED | OUTPATIENT
Start: 2021-10-21 | End: 2021-10-31 | Stop reason: HOSPADM

## 2021-10-21 RX ORDER — ONDANSETRON 2 MG/ML
4 INJECTION INTRAMUSCULAR; INTRAVENOUS EVERY 4 HOURS PRN
Status: DISCONTINUED | OUTPATIENT
Start: 2021-10-21 | End: 2021-10-31 | Stop reason: HOSPADM

## 2021-10-21 RX ORDER — OXYCODONE HYDROCHLORIDE 10 MG/1
10 TABLET ORAL
Status: DISCONTINUED | OUTPATIENT
Start: 2021-10-21 | End: 2021-10-22

## 2021-10-21 RX ORDER — FOLIC ACID 1 MG/1
1 TABLET ORAL DAILY
Status: DISCONTINUED | OUTPATIENT
Start: 2021-10-21 | End: 2021-10-31 | Stop reason: HOSPADM

## 2021-10-21 RX ADMIN — SODIUM CHLORIDE 80 MG: 9 INJECTION, SOLUTION INTRAVENOUS at 08:30

## 2021-10-21 RX ADMIN — METOPROLOL TARTRATE 12.5 MG: 25 TABLET, FILM COATED ORAL at 08:24

## 2021-10-21 RX ADMIN — SODIUM CHLORIDE 500 ML: 9 INJECTION, SOLUTION INTRAVENOUS at 04:50

## 2021-10-21 RX ADMIN — IOHEXOL 100 ML: 350 INJECTION, SOLUTION INTRAVENOUS at 05:10

## 2021-10-21 RX ADMIN — GABAPENTIN 100 MG: 100 CAPSULE ORAL at 08:28

## 2021-10-21 RX ADMIN — SODIUM CHLORIDE: 9 INJECTION, SOLUTION INTRAVENOUS at 19:30

## 2021-10-21 RX ADMIN — IOHEXOL 25 ML: 240 INJECTION, SOLUTION INTRATHECAL; INTRAVASCULAR; INTRAVENOUS; ORAL at 12:17

## 2021-10-21 RX ADMIN — ATORVASTATIN CALCIUM 40 MG: 40 TABLET, FILM COATED ORAL at 08:28

## 2021-10-21 RX ADMIN — LORAZEPAM 0.5 MG: 2 INJECTION INTRAMUSCULAR; INTRAVENOUS at 17:22

## 2021-10-21 RX ADMIN — SODIUM CHLORIDE 8 MG/HR: 9 INJECTION, SOLUTION INTRAVENOUS at 15:30

## 2021-10-21 RX ADMIN — LOSARTAN POTASSIUM 50 MG: 25 TABLET, FILM COATED ORAL at 08:26

## 2021-10-21 RX ADMIN — LEVOTHYROXINE SODIUM 150 MCG: 0.07 TABLET ORAL at 08:25

## 2021-10-21 RX ADMIN — LIDOCAINE 1 PATCH: 50 PATCH TOPICAL at 08:29

## 2021-10-21 RX ADMIN — MORPHINE SULFATE 2 MG: 4 INJECTION INTRAVENOUS at 04:44

## 2021-10-21 RX ADMIN — OXYCODONE HYDROCHLORIDE 5 MG: 5 TABLET ORAL at 20:45

## 2021-10-21 RX ADMIN — GABAPENTIN 100 MG: 100 CAPSULE ORAL at 17:30

## 2021-10-21 RX ADMIN — FOLIC ACID 1 MG: 1 TABLET ORAL at 08:26

## 2021-10-21 RX ADMIN — SODIUM CHLORIDE 8 MG/HR: 9 INJECTION, SOLUTION INTRAVENOUS at 09:13

## 2021-10-21 RX ADMIN — GABAPENTIN 100 MG: 100 CAPSULE ORAL at 14:11

## 2021-10-21 RX ADMIN — CITALOPRAM HYDROBROMIDE 10 MG: 20 TABLET ORAL at 08:23

## 2021-10-21 RX ADMIN — METRONIDAZOLE 500 MG: 500 INJECTION, SOLUTION INTRAVENOUS at 22:27

## 2021-10-21 RX ADMIN — METOPROLOL TARTRATE 12.5 MG: 25 TABLET, FILM COATED ORAL at 17:30

## 2021-10-21 RX ADMIN — CEFTRIAXONE SODIUM 2 G: 2 INJECTION, POWDER, FOR SOLUTION INTRAMUSCULAR; INTRAVENOUS at 06:10

## 2021-10-21 RX ADMIN — HYDROMORPHONE HYDROCHLORIDE 0.5 MG: 1 INJECTION, SOLUTION INTRAMUSCULAR; INTRAVENOUS; SUBCUTANEOUS at 08:23

## 2021-10-21 RX ADMIN — METRONIDAZOLE 500 MG: 500 INJECTION, SOLUTION INTRAVENOUS at 06:16

## 2021-10-21 RX ADMIN — METRONIDAZOLE 500 MG: 500 INJECTION, SOLUTION INTRAVENOUS at 15:31

## 2021-10-21 ASSESSMENT — COGNITIVE AND FUNCTIONAL STATUS - GENERAL
DRESSING REGULAR UPPER BODY CLOTHING: A LITTLE
WALKING IN HOSPITAL ROOM: A LITTLE
SUGGESTED CMS G CODE MODIFIER DAILY ACTIVITY: CK
CLIMB 3 TO 5 STEPS WITH RAILING: A LOT
HELP NEEDED FOR BATHING: A LOT
MOVING FROM LYING ON BACK TO SITTING ON SIDE OF FLAT BED: A LITTLE
DRESSING REGULAR LOWER BODY CLOTHING: A LOT
SUGGESTED CMS G CODE MODIFIER MOBILITY: CK
MOBILITY SCORE: 18
STANDING UP FROM CHAIR USING ARMS: A LITTLE
MOVING TO AND FROM BED TO CHAIR: A LITTLE
DAILY ACTIVITIY SCORE: 19

## 2021-10-21 ASSESSMENT — ENCOUNTER SYMPTOMS
NAUSEA: 0
DIZZINESS: 0
BACK PAIN: 1
TINGLING: 0
CONSTIPATION: 0
VOMITING: 0
HEADACHES: 0
SHORTNESS OF BREATH: 0
DEPRESSION: 0
PALPITATIONS: 0
MYALGIAS: 0
ABDOMINAL PAIN: 1
SPUTUM PRODUCTION: 0
WEAKNESS: 0
DIARRHEA: 0
STRIDOR: 0
FALLS: 0
LOSS OF CONSCIOUSNESS: 0
FEVER: 0
CHILLS: 0
COUGH: 0

## 2021-10-21 ASSESSMENT — PAIN DESCRIPTION - PAIN TYPE
TYPE: ACUTE PAIN
TYPE: CHRONIC PAIN
TYPE: ACUTE PAIN
TYPE: CHRONIC PAIN
TYPE: ACUTE PAIN

## 2021-10-21 ASSESSMENT — PATIENT HEALTH QUESTIONNAIRE - PHQ9
2. FEELING DOWN, DEPRESSED, IRRITABLE, OR HOPELESS: NOT AT ALL
1. LITTLE INTEREST OR PLEASURE IN DOING THINGS: NOT AT ALL
1. LITTLE INTEREST OR PLEASURE IN DOING THINGS: NOT AT ALL
SUM OF ALL RESPONSES TO PHQ9 QUESTIONS 1 AND 2: 0
SUM OF ALL RESPONSES TO PHQ9 QUESTIONS 1 AND 2: 0
2. FEELING DOWN, DEPRESSED, IRRITABLE, OR HOPELESS: NOT AT ALL

## 2021-10-21 ASSESSMENT — LIFESTYLE VARIABLES
EVER HAD A DRINK FIRST THING IN THE MORNING TO STEADY YOUR NERVES TO GET RID OF A HANGOVER: NO
HAVE PEOPLE ANNOYED YOU BY CRITICIZING YOUR DRINKING: NO
ON A TYPICAL DAY WHEN YOU DRINK ALCOHOL HOW MANY DRINKS DO YOU HAVE: 0
HAVE YOU EVER FELT YOU SHOULD CUT DOWN ON YOUR DRINKING: NO
AVERAGE NUMBER OF DAYS PER WEEK YOU HAVE A DRINK CONTAINING ALCOHOL: 0
TOTAL SCORE: 0
HOW MANY TIMES IN THE PAST YEAR HAVE YOU HAD 5 OR MORE DRINKS IN A DAY: 0
ALCOHOL_USE: YES
TOTAL SCORE: 0
TOTAL SCORE: 0
EVER FELT BAD OR GUILTY ABOUT YOUR DRINKING: NO
CONSUMPTION TOTAL: NEGATIVE

## 2021-10-21 ASSESSMENT — FIBROSIS 4 INDEX
FIB4 SCORE: 1.3
FIB4 SCORE: 1.22
FIB4 SCORE: 1.3

## 2021-10-21 NOTE — ED NOTES
Pharmacy Medication Reconciliation      ~Medication reconciliation updated and complete per pt at bedside & pt home pharmacy  ~Allergies have been verified and updated (pt is unable to recall reactions to medications)  ~No oral ABX within the last 30 days  ~Patient home pharmacy:[facility]

## 2021-10-21 NOTE — CONSULTS
CONSULT NOTE  10/21/21  Consulting Physician: Dr. Hampton    PATIENT ID  Name:             Davie Montejo   YOB: 1935  Age:                 86 y.o.  female   MRN:               [id number]    CHIEF COMPLAINT:        Antral perforation    HISTORY OF PRESENT ILLNESS:  This 86-year-old female who presented to the emergency department with a several month history of chronic chest pain and a significant cardiac history.  She had acute onset of sudden substernal chest pain took 3 nitros 2 aspirins and called the ambulance.  She was evaluated in the emerge department actually with concerns for cardiac events and was found to have a possible contained gastric antral ulcer.    REVIEW OF SYSTEMS:   Constitutional: Denies unintended weight change, night sweats, fatigue  Eyes:   Denies eye pain, redness, discharge, vision changes  Ears/Nose/Throat/Mouth: Denies hearing changes, ear pain, nasal congestion, sore throat, dysphagia  Cardiovascular:  Denies Chest pain, shortness of breath, orthopnea, palpitations, claudication  Respiratory:  Denies cough, sputum, wheezing, dyspnea  Gastrointestinal/Hepatic:  Denies dysphagia, melena, jaundice, hematochezia, changing heartburn  Genitourinary:  Denies dysuria, increased frequency, hematuria, urgency  Musculoskeletal/Rheum: Denies changing arthralgias, myalgias, joint swelling, joint stiffness  Skin/Breast: Denies changing skin lesions, pruritis, nipple discharge, hair changes  Neurological: Denies weakness, numbness, paresthesia, syncope, dizziness  Pyschiatric: Denies acute depression, anxiety, insomnia, personality changes, delusions  Endocrine: Denies temperature intolerance, polydipsia, polyuria  Heme/Oncology/Lymph Nodes: Denies easy bruising, bleeding, lymphadenopathy  All other systems were reviewed and are negative                 Past Medical History:   Past Medical History:   Diagnosis Date   • CAD (coronary artery disease)    • Fibromyalgia    •  Heart attack (HCC) 2008       Past Surgical History:  Past Surgical History:   Procedure Laterality Date   • CORONARY ARTERY BYPAS, 4  2008   • CORONARY ARTERY BYPASS, 1  2008       Current Outpatient Medications:  No current facility-administered medications on file prior to encounter.     Current Outpatient Medications on File Prior to Encounter   Medication Sig Dispense Refill   • lidocaine (LIDODERM) 5 % Patch Place 1 Patch on the skin every 24 hours. 20 Patch 0   • ondansetron (ZOFRAN ODT) 4 MG TABLET DISPERSIBLE Take 1 Tab by mouth every 6 hours as needed for Nausea. 20 Tab 1   • clopidogrel (PLAVIX) 75 MG Tab Take 1 Tablet by mouth every day. 30 Tab 0   • gabapentin (NEURONTIN) 100 MG Cap Take 1 Capsule by mouth 3 times a day. 90 Cap 0   • atorvastatin (LIPITOR) 40 MG Tab Take 1 Tablet by mouth every day. 30 Tab 0   • ranitidine (ZANTAC) 300 MG tablet Zantac 300 300 mg     • nitroglycerin (NITROSTAT) 0.4 MG SL Tab Nitrostat 0.4 mg     • folic acid (FOLVITE) 1 MG Tab folic acid 1 mg     • levothyroxine (SYNTHROID) 150 MCG Tab Take 150 mcg by mouth Every morning on an empty stomach.     • citalopram (CELEXA) 20 MG Tab Take 10 mg by mouth every day.     • losartan (COZAAR) 50 MG Tab Take 50 mg by mouth every day.     • aspirin (ASA) 81 MG Chew Tab chewable tablet Take 81 mg by mouth every day.     • metoprolol (LOPRESSOR) 25 MG Tab Take 12.5 mg by mouth 2 times a day.     • oxyCODONE-acetaminophen (PERCOCET-10)  MG Tab Take 1 Tab by mouth every 6 hours as needed for Severe Pain.         Current Inpatient Medications:   Current Facility-Administered Medications   Medication Last Admin   • pantoprazole (PROTONIX) 80 mg in  mL Infusion     • atorvastatin (LIPITOR) tablet 40 mg 40 mg at 10/21/21 0828   • citalopram (CeleXA) tablet 10 mg 10 mg at 10/21/21 0823   • folic acid (FOLVITE) tablet 1 mg 1 mg at 10/21/21 0826   • gabapentin (NEURONTIN) capsule 100 mg 100 mg at 10/21/21 0828   • levothyroxine  (SYNTHROID) tablet 150 mcg 150 mcg at 10/21/21 0825   • lidocaine (LIDODERM) 5 % 1 Patch 1 Patch at 10/21/21 0829   • losartan (COZAAR) tablet 50 mg 50 mg at 10/21/21 0826   • metoprolol tartrate (LOPRESSOR) tablet 12.5 mg 12.5 mg at 10/21/21 0824   • senna-docusate (PERICOLACE or SENOKOT S) 8.6-50 MG per tablet 2 Tablet      And   • polyethylene glycol/lytes (MIRALAX) PACKET 1 Packet      And   • magnesium hydroxide (MILK OF MAGNESIA) suspension 30 mL      And   • bisacodyl (DULCOLAX) suppository 10 mg     • NS infusion     • acetaminophen (Tylenol) tablet 650 mg     • Pharmacy Consult Request ...Pain Management Review 1 Each     • oxyCODONE immediate-release (ROXICODONE) tablet 5 mg      Or   • oxyCODONE immediate release (ROXICODONE) tablet 10 mg      Or   • HYDROmorphone (Dilaudid) injection 0.5 mg 0.5 mg at 10/21/21 0823   • [START ON 10/22/2021] cefTRIAXone (Rocephin) 1 g in  mL IVPB     • Pharmacy Consult Request     • enalaprilat (Vasotec) injection 1.25 mg 1 mL     • labetalol (NORMODYNE/TRANDATE) injection 10 mg     • ondansetron (ZOFRAN) syringe/vial injection 4 mg     • ondansetron (ZOFRAN ODT) dispertab 4 mg     • metroNIDAZOLE (FLAGYL) IVPB 500 mg       Current Outpatient Medications   Medication   • lidocaine (LIDODERM) 5 % Patch   • ondansetron (ZOFRAN ODT) 4 MG TABLET DISPERSIBLE   • clopidogrel (PLAVIX) 75 MG Tab   • gabapentin (NEURONTIN) 100 MG Cap   • atorvastatin (LIPITOR) 40 MG Tab   • ranitidine (ZANTAC) 300 MG tablet   • nitroglycerin (NITROSTAT) 0.4 MG SL Tab   • folic acid (FOLVITE) 1 MG Tab   • levothyroxine (SYNTHROID) 150 MCG Tab   • citalopram (CELEXA) 20 MG Tab   • losartan (COZAAR) 50 MG Tab   • aspirin (ASA) 81 MG Chew Tab chewable tablet   • metoprolol (LOPRESSOR) 25 MG Tab   • oxyCODONE-acetaminophen (PERCOCET-10)  MG Tab       Medication Allergy/Sensitivities:  Allergies   Allergen Reactions   • Codeine Unspecified   • Lisinopril    • Lyrica Unspecified     Lyrica  "affects patients vision.     • Penicillin G    • Simvastatin    • Sulfa Drugs Unspecified     Patient states \"I can't remember what this does to me\" but recalls and allergy.        Family History:  History reviewed. No pertinent family history.  Denies family history of bleeding disorders or reactions to anesthesia    Social History:  PCP: Pcp Unknown  Social History     Tobacco Use   Smoking Status Former Smoker   Smokeless Tobacco Never Used     Social History     Substance and Sexual Activity   Alcohol Use Never     Social History     Substance and Sexual Activity   Drug Use Not Currently       PHYSICAL EXAM:  Weight/BMI: Body mass index is 24.22 kg/m².  Vitals:    10/21/21 0629 10/21/21 0644 10/21/21 0657 10/21/21 0702   BP: 119/58 140/76 159/65    Pulse: 81 75 70 73   Resp: (!) 22 20 19    Temp: 36.2 °C (97.2 °F) 36.3 °C (97.3 °F)     TempSrc: Temporal Temporal     SpO2: 99% 100%  96%   Weight:         Oxygen Therapy:  Pulse Oximetry: 96 %, O2 Delivery Device: None - Room Air    Constitutional: Well developed, Well nourished, No acute distress, Non-toxic appearance.    HENMT: Normocephalic, Atraumatic, Bilateral external ears normal, Oropharynx moist mucous membranes, No oral exudates, Nose normal.  No thyromegaly.   Eyes: PERRLA, EOMI, Conjunctiva normal, No discharge.   Neck: Normal range of motion, No cervical tenderness, Supple, No stridor, no JVD.  Cardiovascular: Normal heart rate, Normal rhythm, No murmurs, No rubs, No gallops.   Extremites with intact distal pulses, no cyanosis, clubbing or edema.   Lungs:  Respiratory effort is normal. Normal breath sounds, breath sounds clear to auscultation bilaterally,  no rales, no rhonchi, no wheezing.   Abdomen: Bowel sounds normal, Soft, minimal abdominal tenderness, No guarding, No rebound, No masses, No hepatosplenomegaly.    Skin: Warm, Dry, No erythema, No rash, no induration or crepitus.        Neurologic: Alert & oriented x 3, Normal motor function, Normal " sensory function, No focal deficits noted, cranial nerves II through XII are normal.  Psychiatric: Affect normal, Judgment normal, Mood normal.     LAB DATA REVIEWED:  Recent Results (from the past 24 hour(s))   EKG    Collection Time: 10/21/21  3:03 AM   Result Value Ref Range    Report       [facility] Emergency Dept.    Test Date:  2021-10-21  Pt Name:    JUNIOR CHIU            Department[facility]  MRN:        [id number]                      [facility]  Gender:     Female                       Technician: [id number]  :        1935                   Requested By:ER TRIAGE PROTOCOL  Order #:    [id number]                    Reading MD: Donavan Orozco MD    Measurements  Intervals                                Axis  Rate:       75                           P:          75  FL:         207                          QRS:        -17  QRSD:       107                          T:          107  QT:         401  QTc:        448    Interpretive Statements  Sinus rhythm  Borderline left axis deviation  Anteroseptal infarct, old  Nonspecific T abnormalities, lateral leads  Compared to ECG 10/14/2021 10:16:52  Myocardial infarct finding now present  T-wave abnormality now present  Q waves no longer present  ST (T wave) deviation no longer present  Pos sible ischemia no longer present  Electronically Signed On 10- 4:52:47 PDT by Donavan Orozco MD     Complete Metabolic Panel (CMP)    Collection Time: 10/21/21  3:09 AM   Result Value Ref Range    Sodium 131 (L) 135 - 145 mmol/L    Potassium 3.9 3.6 - 5.5 mmol/L    Chloride 99 96 - 112 mmol/L    Co2 21 20 - 33 mmol/L    Anion Gap 11.0 7.0 - 16.0    Glucose 100 (H) 65 - 99 mg/dL    Bun 24 (H) 8 - 22 mg/dL    Creatinine 1.25 0.50 - 1.40 mg/dL    Calcium 9.2 8.4 - 10.2 mg/dL    AST(SGOT) 16 12 - 45 U/L    ALT(SGPT) 9 2 - 50 U/L    Alkaline Phosphatase 97 30 - 99 U/L    Total Bilirubin 0.2 0.1 - 1.5 mg/dL    Albumin 3.7 3.2 - 4.9 g/dL    Total  Protein 6.7 6.0 - 8.2 g/dL    Globulin 3.0 1.9 - 3.5 g/dL    A-G Ratio 1.2 g/dL   Troponin    Collection Time: 10/21/21  3:09 AM   Result Value Ref Range    Troponin T 15 6 - 19 ng/L   ESTIMATED GFR    Collection Time: 10/21/21  3:09 AM   Result Value Ref Range    GFR If  49 (A) >60 mL/min/1.73 m 2    GFR If Non  41 (A) >60 mL/min/1.73 m 2   CBC with Differential    Collection Time: 10/21/21  3:24 AM   Result Value Ref Range    WBC 10.5 4.8 - 10.8 K/uL    RBC 2.42 (L) 4.20 - 5.40 M/uL    Hemoglobin 7.0 (L) 12.0 - 16.0 g/dL    Hematocrit 22.2 (L) 37.0 - 47.0 %    MCV 91.7 81.4 - 97.8 fL    MCH 28.9 27.0 - 33.0 pg    MCHC 31.5 (L) 33.6 - 35.0 g/dL    RDW 46.3 35.9 - 50.0 fL    Platelet Count 344 164 - 446 K/uL    MPV 10.2 9.0 - 12.9 fL    Neutrophils-Polys 69.00 44.00 - 72.00 %    Lymphocytes 17.60 (L) 22.00 - 41.00 %    Monocytes 9.90 0.00 - 13.40 %    Eosinophils 2.30 0.00 - 6.90 %    Basophils 0.50 0.00 - 1.80 %    Immature Granulocytes 0.70 0.00 - 0.90 %    Nucleated RBC 0.00 /100 WBC    Neutrophils (Absolute) 7.29 (H) 2.00 - 7.15 K/uL    Lymphs (Absolute) 1.85 1.00 - 4.80 K/uL    Monos (Absolute) 1.04 (H) 0.00 - 0.85 K/uL    Eos (Absolute) 0.24 0.00 - 0.51 K/uL    Baso (Absolute) 0.05 0.00 - 0.12 K/uL    Immature Granulocytes (abs) 0.07 0.00 - 0.11 K/uL    NRBC (Absolute) 0.00 K/uL   COD - Adult (Type and Screen)    Collection Time: 10/21/21  3:24 AM   Result Value Ref Range    ABO Grouping Only A     Rh Grouping Only POS     Antibody Screen-Cod NEG     Component R       R99                 Red Cells, LR       [id number]   issued       10/21/21   06:23      Product Type R99     Dispense Status issued     Unit Number (Barcoded) [id number]     Product Code (Barcoded) W5544M89     Blood Type (Barcoded) 6200    ABO Rh Confirm    Collection Time: 10/21/21  4:45 AM   Result Value Ref Range    ABO Rh Confirm A POS        IMAGING:   Images Independently Reviewed   CT-CTA COMPLETE  THORACOABDOMINAL AORTA   Final Result         1.  Thickened gastric antrum with extraluminal fluid collection and nondependent focus of air superior to the stomach. Appearance favors perforated gastric ulcer with adjacent gastric succus or abscess.   2.  No aortic aneurysm or dissection identified. There is 2.3 cm infrarenal fusiform abdominal aortic ectasia is seen.   3.  Diverticulosis   4.  Atherosclerosis      These findings were discussed with the patient's clinician, Donavan Orozco, on 10/21/2021 5:38 AM.      DX-CHEST-PORTABLE (1 VIEW)   Final Result         1.  No acute cardiopulmonary disease.          ASSESSMENT/PLAN     Patient with what appears to be a contained gastric antral ulcer.  Recommend n.p.o. and fluoroscopy upper GI to evaluate for extravasation of content.  We will follow-up recommendations after this test.    Mookie Santos MD  [facility]

## 2021-10-21 NOTE — H&P
"Hospital Medicine History & Physical Note    Date of Service  10/21/2021    Primary Care Physician  Pcp Unknown    Consultants  general surgery    Specialist Names: Dr Santos    Code Status  Full Code    Chief Complaint  Chief Complaint   Patient presents with   • Chest Pain     Patient BIB [facility] from home for chest pain \"for months.\" Another sharp sudden onset tonight while \"sound asleep,\" pt took 3 nitro & 2 \"large pink\" ASA. CP dimished to dull ache. Hx MI & 4x bypass in 2008. EKG sinus en route, 114/60, 84 sinus, 96% RA.       History of Presenting Illness  Davie Montejo is a 86 y.o. female who presented 10/21/2021 with chest and back pain.  Patient states she has had a substernal/epigastric and pain under her left breast for months.  She states however tonight it was more severe and woke her from her sleep.  She also feels like the pain radiates to her back.  She did go to [facility] yesterday.  Patient describes the pain as sharp at times, pressure as well, now severe.  She does note black stool, states that has been going on for multiple days as well.  Upon arrival, patient was noted to have a significant decrease in her hemoglobin, upon further work-up, she was noted to have a perforated gastric ulcer.  I did discuss the case including labs and imaging with the ER physician.    I discussed the plan of care with patient and bedside RN.    Review of Systems  Review of Systems   Constitutional: Positive for malaise/fatigue. Negative for chills and fever.   HENT: Negative for congestion.    Respiratory: Negative for cough, sputum production, shortness of breath and stridor.    Cardiovascular: Positive for chest pain. Negative for palpitations and leg swelling.   Gastrointestinal: Positive for abdominal pain and melena. Negative for constipation, diarrhea, nausea and vomiting.   Genitourinary: Negative for dysuria and urgency.   Musculoskeletal: Positive for back pain. Negative for falls and myalgias. " "  Neurological: Negative for dizziness, tingling, loss of consciousness, weakness and headaches.   Psychiatric/Behavioral: Negative for depression and suicidal ideas.   All other systems reviewed and are negative.      Past Medical History   has a past medical history of CAD (coronary artery disease), Fibromyalgia, and Heart attack (HCC) (2008).    Surgical History   has a past surgical history that includes coronary artery bypas, 4 (2008) and coronary artery bypass, 1 (2008).     Family History  family history is not on file.   Family history reviewed with patient. There is no family history that is pertinent to the chief complaint.     Social History   reports that she has quit smoking. She has never used smokeless tobacco. She reports previous drug use. She reports that she does not drink alcohol.    Allergies  Allergies   Allergen Reactions   • Codeine Unspecified   • Lisinopril    • Lyrica Unspecified     Lyrica affects patients vision.     • Penicillin G    • Simvastatin    • Sulfa Drugs Unspecified     Patient states \"I can't remember what this does to me\" but recalls and allergy.        Medications  Prior to Admission Medications   Prescriptions Last Dose Informant Patient Reported? Taking?   aspirin (ASA) 81 MG Chew Tab chewable tablet   Yes No   Sig: Take 81 mg by mouth every day.   atorvastatin (LIPITOR) 40 MG Tab   No No   Sig: Take 1 Tablet by mouth every day.   citalopram (CELEXA) 20 MG Tab   Yes No   Sig: Take 10 mg by mouth every day.   clopidogrel (PLAVIX) 75 MG Tab   No No   Sig: Take 1 Tablet by mouth every day.   folic acid (FOLVITE) 1 MG Tab   Yes No   Sig: folic acid 1 mg   gabapentin (NEURONTIN) 100 MG Cap   No No   Sig: Take 1 Capsule by mouth 3 times a day.   levothyroxine (SYNTHROID) 150 MCG Tab   Yes No   Sig: Take 150 mcg by mouth Every morning on an empty stomach.   lidocaine (LIDODERM) 5 % Patch   No No   Sig: Place 1 Patch on the skin every 24 hours.   losartan (COZAAR) 50 MG Tab   Yes " No   Sig: Take 50 mg by mouth every day.   metoprolol (LOPRESSOR) 25 MG Tab   Yes No   Sig: Take 12.5 mg by mouth 2 times a day.   nitroglycerin (NITROSTAT) 0.4 MG SL Tab   Yes No   Sig: Nitrostat 0.4 mg   ondansetron (ZOFRAN ODT) 4 MG TABLET DISPERSIBLE   No No   Sig: Take 1 Tab by mouth every 6 hours as needed for Nausea.   oxyCODONE-acetaminophen (PERCOCET-10)  MG Tab   Yes No   Sig: Take 1 Tab by mouth every 6 hours as needed for Severe Pain.   ranitidine (ZANTAC) 300 MG tablet   Yes No   Sig: Zantac 300 300 mg      Facility-Administered Medications: None       Physical Exam  Temp:  [36.1 °C (97 °F)-36.3 °C (97.3 °F)] 36.3 °C (97.3 °F)  Pulse:  [72-85] 75  Resp:  [17-22] 20  BP: (119-149)/(46-76) 140/76  SpO2:  [93 %-100 %] 100 %  Blood Pressure : 140/76   Temperature: 36.3 °C (97.3 °F)   Pulse: 75   Respiration: 20   Pulse Oximetry: 100 %       Physical Exam  Vitals and nursing note reviewed.   Constitutional:       General: She is not in acute distress.     Appearance: She is well-developed. She is not diaphoretic.   HENT:      Head: Normocephalic and atraumatic.      Right Ear: External ear normal.      Left Ear: External ear normal.      Nose: Nose normal. No congestion or rhinorrhea.      Mouth/Throat:      Mouth: Mucous membranes are dry.      Pharynx: No oropharyngeal exudate.   Eyes:      General:         Right eye: No discharge.         Left eye: No discharge.      Extraocular Movements: Extraocular movements intact.   Neck:      Trachea: No tracheal deviation.   Cardiovascular:      Rate and Rhythm: Normal rate and regular rhythm.      Heart sounds: No murmur heard.   No friction rub. No gallop.    Pulmonary:      Effort: Pulmonary effort is normal. No respiratory distress.      Breath sounds: Normal breath sounds. No stridor. No wheezing or rales.   Chest:      Chest wall: No tenderness.   Abdominal:      General: Bowel sounds are normal. There is no distension.      Palpations: Abdomen is soft.       Tenderness: There is abdominal tenderness in the epigastric area and left upper quadrant.   Musculoskeletal:         General: No tenderness. Normal range of motion.      Cervical back: Normal range of motion and neck supple.      Right lower leg: No edema.      Left lower leg: No edema.   Lymphadenopathy:      Cervical: No cervical adenopathy.   Skin:     General: Skin is warm and dry.      Coloration: Skin is not jaundiced.      Findings: No erythema or rash.   Neurological:      General: No focal deficit present.      Mental Status: She is alert and oriented to person, place, and time.      Cranial Nerves: No cranial nerve deficit.   Psychiatric:         Mood and Affect: Mood normal.         Behavior: Behavior normal.         Thought Content: Thought content normal.         Judgment: Judgment normal.         Laboratory:  Recent Labs     10/21/21  0324   WBC 10.5   RBC 2.42*   HEMOGLOBIN 7.0*   HEMATOCRIT 22.2*   MCV 91.7   MCH 28.9   MCHC 31.5*   RDW 46.3   PLATELETCT 344   MPV 10.2     Recent Labs     10/21/21  0309   SODIUM 131*   POTASSIUM 3.9   CHLORIDE 99   CO2 21   GLUCOSE 100*   BUN 24*   CREATININE 1.25   CALCIUM 9.2     Recent Labs     10/21/21  0309   ALTSGPT 9   ASTSGOT 16   ALKPHOSPHAT 97   TBILIRUBIN 0.2   GLUCOSE 100*         No results for input(s): NTPROBNP in the last 72 hours.      Recent Labs     10/21/21  0309   TROPONINT 15       Imaging:  CT-CTA COMPLETE THORACOABDOMINAL AORTA   Final Result         1.  Thickened gastric antrum with extraluminal fluid collection and nondependent focus of air superior to the stomach. Appearance favors perforated gastric ulcer with adjacent gastric succus or abscess.   2.  No aortic aneurysm or dissection identified. There is 2.3 cm infrarenal fusiform abdominal aortic ectasia is seen.   3.  Diverticulosis   4.  Atherosclerosis      These findings were discussed with the patient's clinician, Donavan Orozco, on 10/21/2021 5:38 AM.      DX-CHEST-PORTABLE (1  VIEW)   Final Result         1.  No acute cardiopulmonary disease.          X-Ray:  I have personally reviewed the images and compared with prior images.    Assessment/Plan:  I anticipate this patient will require at least two midnights for appropriate medical management, necessitating inpatient admission.    Acute perforated gastric ulcer with hemorrhage (HCC)- (present on admission)  Assessment & Plan  -Noted on CTA of the chest  -ERP has discussed the case with surgery  -Keep patient n.p.o. for potential intervention  -Causing GI bleeding, continue Protonix drip  -High risk of worsening, closely monitor on telemetry  -Causing significant pain, narcotics for pain management  -Potentially associated with an abscess, start Rocephin and Flagyl    Acute blood loss anemia- (present on admission)  Assessment & Plan  -I feel this is more likely acute on chronic however it is chronic only because she is likely been bleeding to some degree for quite some time  -ERP has ordered a unit of of packed red blood cells, obtain hemoglobin frequently  -Continue to transfuse for hemoglobin less than 7  -Patient remains hemodynamically stable    VINCE (acute kidney injury) (HCC)- (present on admission)  Assessment & Plan  -Mild, due to dehydration  -start IV fluids  -Repeat BMP in the morning    Hyponatremia- (present on admission)  Assessment & Plan  -Mild, likely due to dehydration  -Start IV fluids    Hyperlipidemia- (present on admission)  Assessment & Plan  -Continue home statin    Hypothyroidism- (present on admission)  Assessment & Plan  -Continue home Synthroid dose at 150 mcg daily  -No recent TSH    Essential hypertension- (present on admission)  Assessment & Plan  -Continue home losartan and metoprolol  -Start as needed enalapril and labetalol  -Adjust as needed    Depressive disorder- (present on admission)  Assessment & Plan  -Continue home Celexa    Patient is critically ill.   The patient continues to have : gi  bleed  The vital organ system that is effected is the: all are at risk  If untreated there is a high chance of deterioration into: shock   The critical care that I am providing today is: protonix gtt  The critical care that has been undertaken is medically complex.   There has been no overlap in critical care time.  Critical care time not including procedures, no overlap: 40 minutes    VTE prophylaxis: SCDs/TEDs

## 2021-10-21 NOTE — ED PROVIDER NOTES
"ED Provider Note    CHIEF COMPLAINT  Chief Complaint   Patient presents with   • Chest Pain     Patient GANESH FITZGERALD from home for chest pain \"for months.\" Another sharp sudden onset tonight while \"sound asleep,\" pt took 3 nitro & 2 \"large pink\" ASA. CP dimished to dull ache. Hx MI & 4x bypass in 2008. EKG sinus en route, 114/60, 84 sinus, 96% RA.       HPI  Davie Montejo is a 86 y.o. female who presents with a chief complaint of substernal chest pain that has been ongoing for several months.  She notes tonight's chest pain woke her from sleep.  She had some associated foamy emesis and shortness of breath.  She took 3 nitroglycerin without relief.  EMS gave her another dose of nitro in route.  She still complains of pain under her left breast.  She notes that she was seen at [place] yesterday and they \"did nothing\".    REVIEW OF SYSTEMS  See HPI for further details.  Chest pain.  Nausea and vomiting.  Shortness of breath.  All other systems are negative.     PAST MEDICAL HISTORY   has a past medical history of Fibromyalgia and Heart attack (HCC) (2008).    SOCIAL HISTORY  Social History     Tobacco Use   • Smoking status: Former Smoker   • Smokeless tobacco: Never Used   Substance and Sexual Activity   • Alcohol use: Never   • Drug use: Not Currently   • Sexual activity: Not on file       SURGICAL HISTORY   has a past surgical history that includes coronary artery bypas, 4 (2008) and other cardiac surgery.    CURRENT MEDICATIONS  Home Medications    **Home medications have not yet been reviewed for this encounter**         ALLERGIES  Allergies   Allergen Reactions   • Codeine Unspecified   • Lisinopril    • Lyrica Unspecified     Lyrica affects patients vision.     • Penicillin G    • Simvastatin    • Sulfa Drugs Unspecified     Patient states \"I can't remember what this does to me\" but recalls and allergy.        PHYSICAL EXAM  VITAL SIGNS: /46   Pulse 75   Resp 17   Wt 64 kg (141 lb 1.5 oz)   " LMP  (LMP Unknown)   SpO2 95%   BMI 24.22 kg/m²    Pulse ox interpretation: I interpret this pulse ox as normal.  Constitutional: Alert in no apparent distress.  Sunglasses on.  HENT: No signs of trauma, Bilateral external ears normal, Nose normal.  Dry mucous membranes.  Eyes: Pupils are equal and reactive, Conjunctiva normal, Non-icteric.   Neck: Normal range of motion, No tenderness, Supple, No stridor.   Lymphatic: No lymphadenopathy noted.   Cardiovascular: Regular rate and rhythm, no murmurs. Pulses symmetrical.  Thorax & Lungs: Normal breath sounds, No respiratory distress, No wheezing, chest wall tenderness.  Abdomen: Bowel sounds normal, Soft, epigastric tenderness, No masses, No pulsatile masses. No peritoneal signs.  Rectal: Small nonthrombosed external hemorrhoid.  Melanotic, hemoccult positive stool in the rectal vault.  Skin: Warm, Dry, No erythema, No rash.   Back: Normal alignment.  Extremities: No cyanosis.  Musculoskeletal: No major deformities noted.   Neurologic: Alert, No focal deficits noted.   Psychiatric: Affect normal, Judgment normal, Mood normal.     DIAGNOSTIC STUDIES / PROCEDURES    LABS  Results for orders placed or performed during the hospital encounter of 10/21/21   CBC with Differential   Result Value Ref Range    WBC 10.5 4.8 - 10.8 K/uL    RBC 2.42 (L) 4.20 - 5.40 M/uL    Hemoglobin 7.0 (L) 12.0 - 16.0 g/dL    Hematocrit 22.2 (L) 37.0 - 47.0 %    MCV 91.7 81.4 - 97.8 fL    MCH 28.9 27.0 - 33.0 pg    MCHC 31.5 (L) 33.6 - 35.0 g/dL    RDW 46.3 35.9 - 50.0 fL    Platelet Count 344 164 - 446 K/uL    MPV 10.2 9.0 - 12.9 fL    Neutrophils-Polys 69.00 44.00 - 72.00 %    Lymphocytes 17.60 (L) 22.00 - 41.00 %    Monocytes 9.90 0.00 - 13.40 %    Eosinophils 2.30 0.00 - 6.90 %    Basophils 0.50 0.00 - 1.80 %    Immature Granulocytes 0.70 0.00 - 0.90 %    Nucleated RBC 0.00 /100 WBC    Neutrophils (Absolute) 7.29 (H) 2.00 - 7.15 K/uL    Lymphs (Absolute) 1.85 1.00 - 4.80 K/uL    Monos  (Absolute) 1.04 (H) 0.00 - 0.85 K/uL    Eos (Absolute) 0.24 0.00 - 0.51 K/uL    Baso (Absolute) 0.05 0.00 - 0.12 K/uL    Immature Granulocytes (abs) 0.07 0.00 - 0.11 K/uL    NRBC (Absolute) 0.00 K/uL   Complete Metabolic Panel (CMP)   Result Value Ref Range    Sodium 131 (L) 135 - 145 mmol/L    Potassium 3.9 3.6 - 5.5 mmol/L    Chloride 99 96 - 112 mmol/L    Co2 21 20 - 33 mmol/L    Anion Gap 11.0 7.0 - 16.0    Glucose 100 (H) 65 - 99 mg/dL    Bun 24 (H) 8 - 22 mg/dL    Creatinine 1.25 0.50 - 1.40 mg/dL    Calcium 9.2 8.4 - 10.2 mg/dL    AST(SGOT) 16 12 - 45 U/L    ALT(SGPT) 9 2 - 50 U/L    Alkaline Phosphatase 97 30 - 99 U/L    Total Bilirubin 0.2 0.1 - 1.5 mg/dL    Albumin 3.7 3.2 - 4.9 g/dL    Total Protein 6.7 6.0 - 8.2 g/dL    Globulin 3.0 1.9 - 3.5 g/dL    A-G Ratio 1.2 g/dL   Troponin   Result Value Ref Range    Troponin T 15 6 - 19 ng/L   ESTIMATED GFR   Result Value Ref Range    GFR If  49 (A) >60 mL/min/1.73 m 2    GFR If Non  41 (A) >60 mL/min/1.73 m 2       RADIOLOGY  DX-CHEST-PORTABLE (1 VIEW)    (Results Pending)       COURSE & MEDICAL DECISION MAKING  Pertinent Labs & Imaging studies reviewed. (See chart for details)  Records obtained and reviewed: Patient was most recently seen in this emergency department 7 days ago 10/14/2021 for chest and back pain.  She noted that she has had chest and back pain since CABG in 2008 and was previously on oxycodone.  Her primary care physician recently took her off oxycodone as they did not feel it was appropriate treatment for her chronic pain.  She had no associated nausea, diaphoresis, or shortness of breath.  There is no worsening of pain with exertion or tearing quality.  She noted the pain was worse when she pressed on her chest or back.  Patient's labs and imaging were reassuring.  She was set up with outpatient cardiology follow-up.  The case was reported to APS as there was some concern for her being unable to take care of  herself well at home.  She was already set up for Meals on Wheels.  She was discharged to follow-up as an outpatient.    Records were obtained from the patient's ED visit to [facility] yesterday.  She presented there complaining of left-sided/central chest pain radiating to her left flank.  She also complained of generalized abdominal pain.  She had cough and shortness of breath as well as sweating.  The patient was found to be anemic and complained of melena but apparently the patient became upset when nonnarcotic pain medications were offered to her and she ultimately left AGAINST MEDICAL ADVICE.  Her H/H at that time was noted to be 9.3/27.5.  Her troponin was 14.  She was also seen at [facility] 10/17/2021 for chest pain underneath her breasts and radiating up her sternum.  Cardiology was contacted during that time who felt that she was safe for discharge with outpatient follow-up.  Her D-dimer was normal for age-adjusted numbers.    This is an 86-year-old female with a history of CAD and CABG in 2008 as well as chronic chest pain who is here with worsening chest pain, nausea, vomiting, and shortness of breath.  Differential diagnosis includes, but is not limited to, ACS, PE, stable angina, musculoskeletal pain, GERD, fibromyalgia, aortic dissection, pneumonia.  Arrives afebrile with normal vital signs.  Appears well-hydrated and nontoxic.  She does have some chest wall tenderness as well as epigastric and bilateral upper quadrant tenderness but no peritoneal signs.    CBC is without leukocytosis but reveals significant anemia.  H/H is 7/22 today and 1 week ago it was 11.8/35.9.  Metabolic panel demonstrates hyponatremia to 131 with a blood glucose of 100, BUN/creatinine of 24/1.25, and a troponin of 15.    Type and hold was ordered.    Rectal exam was performed.  Patient does have melanotic, Hemoccult positive stool in the rectal vault.  Patient started on Protonix bolus and drip.    Patient now complaining of  back pain.  Given the significant drop in hemoglobin as well as chest pain radiating to the back, a CTA of the thoracoabdominal aorta was ordered.  Patient was given a small dose of morphine.    Given the rapid drop in the patient's H/H is compared to yesterday's visit from [place] I do feel she requires blood transfusion.  1 unit packed red blood cells was ordered.    I have explained to the patient the risks and benefits of transfusion of blood products.  This includes, as appropriate, the risk of mild allergic reaction, hemolytic reaction, transfusion-associated lung injury, febrile reactions, circulatory or iron overload, and infection.    We discussed possible alternatives and their risks, including directed donation, autologous transfusion, and no transfusion, including IV or oral iron supplementation, as appropriate.  I believe the patient understands the risks and benefits and was able to express understanding.    I was contacted by the radiologist to discuss CTA findings that suggest a perforated gastric ulcer with extraluminal fluid collection and free air. Also noted was a 2.3cm  infrarenal aortic ectasia.    Patient was started on ceftriaxone and flagyl. She has a history of PCN G allergy.    I discussed the case with on-call general surgeon, Dr. Santos, who will determine plan of care after he evaluates the patient. For the meantime, she is hemodynamically stable with normal vital signs. She is warm and well perfused. Her abdomen is not peritoneal. He has requested hospitalist admission.    Patient was discussed with the hospitalist and admitted in guarded condition.    Patient is critically ill.   The patient continues to have: perforated viscous and anemia  The vital organ system that is affected is the: all are at risk  If untreated there is a high chance of deterioration into: hemorrhagic shock, severe sepsis, septic shock, cardiovascular collapse, respiratory arrest, and eventually death.   The  critical care that I am providing today is: Initial bedside evaluation and resuscitation, review of medical records, interpretation of lab and imaging results, multiple and frequent bedside reevaluations, discussion with radiologist, general surgeon, and hospitalist, and preparation of the medical record.  The critical that has been undertaken is medically complex.   There has been no overlap in critical care time.   Critical Care Time not including procedures: 35 minutes    HYDRATION  HYDRATION: Based on the patient's presentation of Other pre CT scan hydration the patient was given IV fluids. IV Hydration was used because oral hydration was not adequate alone. Upon recheck following hydration, the patient was Unchanged.    FINAL IMPRESSION  1.  Anemia  2.  Perforated peptic ulcer  3.  Hyponatremia     Electronically signed by: Donavan Orozco M.D., 10/21/2021 3:13 AM

## 2021-10-21 NOTE — ASSESSMENT & PLAN NOTE
More likely acute on chronic  Transfuse if hemoglobin less than 7  Patient remains hemodynamically stable

## 2021-10-21 NOTE — ASSESSMENT & PLAN NOTE
Noted on CTA of the chest  Causing GI bleeding, continue Protonix  Completed Rocephin and Flagyl IV.  Monitor HG q 6 hours, required transfusions PRBCs 10/21 and 10/22  Surgery following, conservative management, recommendations appreciated  Started CLD on 10/27  Advanced diet to full liquid on 10/27  Advanced diet to soft and bite sized on 10/29

## 2021-10-21 NOTE — ED NOTES
0715 Report received from jess  , [facility] at this time  Pt resting in bed , blood transfusion ongoing  0848 blood transfusion completed, no adverse reaction.  protonix bolus and am medication given with small sips.  Pt aware of NPO status.  Call light within reach, no further questions at this time

## 2021-10-21 NOTE — ED NOTES
1030 additional labs done for mapping and hemoglobin ,  poc ex[plained to pt, states pain better at this time

## 2021-10-21 NOTE — ED NOTES
PRBC infusing. This RN at bedside for first 15 minutes to monitor for adverse reactions. Patient tolerated well and vital signs stable.

## 2021-10-21 NOTE — PROGRESS NOTES
Patient admitted 10/21/21 by Dr. Hampton.  I have examined patient and managed due to critical gastric ulcer bleed.  Repeat Hg 8 post transfusion, patient takes plavix and TEG does show 67% inhibition for clotting.  will give platelets to help reverse inhibition and stop bleeding.  CT abdomen with po contrast showing contained gastric perforation.  Dr. Santos aware.  Patient has stable VS at this time.

## 2021-10-22 ENCOUNTER — APPOINTMENT (OUTPATIENT)
Dept: CARDIOLOGY | Facility: MEDICAL CENTER | Age: 86
DRG: 378 | End: 2021-10-22
Attending: HOSPITALIST
Payer: MEDICARE

## 2021-10-22 PROBLEM — D68.9 COAGULOPATHY (HCC): Status: ACTIVE | Noted: 2021-10-22

## 2021-10-22 LAB
ANION GAP SERPL CALC-SCNC: 11 MMOL/L (ref 7–16)
BUN SERPL-MCNC: 12 MG/DL (ref 8–22)
CALCIUM SERPL-MCNC: 8.1 MG/DL (ref 8.4–10.2)
CHLORIDE SERPL-SCNC: 108 MMOL/L (ref 96–112)
CO2 SERPL-SCNC: 18 MMOL/L (ref 20–33)
CREAT SERPL-MCNC: 0.98 MG/DL (ref 0.5–1.4)
EKG IMPRESSION: NORMAL
ERYTHROCYTE [DISTWIDTH] IN BLOOD BY AUTOMATED COUNT: 49.4 FL (ref 35.9–50)
GLUCOSE SERPL-MCNC: 81 MG/DL (ref 65–99)
HCT VFR BLD AUTO: 22 % (ref 37–47)
HGB BLD-MCNC: 6.9 G/DL (ref 12–16)
HGB BLD-MCNC: 8.8 G/DL (ref 12–16)
HGB BLD-MCNC: 9 G/DL (ref 12–16)
LV EJECT FRACT  99904: 65
LV EJECT FRACT MOD 2C 99903: 63.42
LV EJECT FRACT MOD 4C 99902: 61.05
LV EJECT FRACT MOD BP 99901: 63.63
MCH RBC QN AUTO: 29.2 PG (ref 27–33)
MCHC RBC AUTO-ENTMCNC: 31.4 G/DL (ref 33.6–35)
MCV RBC AUTO: 93.2 FL (ref 81.4–97.8)
PLATELET # BLD AUTO: 355 K/UL (ref 164–446)
PMV BLD AUTO: 10 FL (ref 9–12.9)
POTASSIUM SERPL-SCNC: 3.9 MMOL/L (ref 3.6–5.5)
RBC # BLD AUTO: 2.36 M/UL (ref 4.2–5.4)
SODIUM SERPL-SCNC: 137 MMOL/L (ref 135–145)
WBC # BLD AUTO: 7.3 K/UL (ref 4.8–10.8)

## 2021-10-22 PROCEDURE — 85027 COMPLETE CBC AUTOMATED: CPT | Mod: XU

## 2021-10-22 PROCEDURE — A9270 NON-COVERED ITEM OR SERVICE: HCPCS | Performed by: INTERNAL MEDICINE

## 2021-10-22 PROCEDURE — 93306 TTE W/DOPPLER COMPLETE: CPT | Mod: 26 | Performed by: INTERNAL MEDICINE

## 2021-10-22 PROCEDURE — P9016 RBC LEUKOCYTES REDUCED: HCPCS

## 2021-10-22 PROCEDURE — 36430 TRANSFUSION BLD/BLD COMPNT: CPT

## 2021-10-22 PROCEDURE — 700101 HCHG RX REV CODE 250: Performed by: INTERNAL MEDICINE

## 2021-10-22 PROCEDURE — 93010 ELECTROCARDIOGRAM REPORT: CPT | Performed by: INTERNAL MEDICINE

## 2021-10-22 PROCEDURE — 86923 COMPATIBILITY TEST ELECTRIC: CPT

## 2021-10-22 PROCEDURE — 85018 HEMOGLOBIN: CPT | Mod: XU

## 2021-10-22 PROCEDURE — 80048 BASIC METABOLIC PNL TOTAL CA: CPT

## 2021-10-22 PROCEDURE — 700102 HCHG RX REV CODE 250 W/ 637 OVERRIDE(OP): Performed by: INTERNAL MEDICINE

## 2021-10-22 PROCEDURE — 700111 HCHG RX REV CODE 636 W/ 250 OVERRIDE (IP): Performed by: HOSPITALIST

## 2021-10-22 PROCEDURE — 99233 SBSQ HOSP IP/OBS HIGH 50: CPT | Performed by: HOSPITALIST

## 2021-10-22 PROCEDURE — C9113 INJ PANTOPRAZOLE SODIUM, VIA: HCPCS | Performed by: INTERNAL MEDICINE

## 2021-10-22 PROCEDURE — 94760 N-INVAS EAR/PLS OXIMETRY 1: CPT

## 2021-10-22 PROCEDURE — 770020 HCHG ROOM/CARE - TELE (206)

## 2021-10-22 PROCEDURE — 700105 HCHG RX REV CODE 258: Performed by: INTERNAL MEDICINE

## 2021-10-22 PROCEDURE — 700101 HCHG RX REV CODE 250: Performed by: HOSPITALIST

## 2021-10-22 PROCEDURE — 700111 HCHG RX REV CODE 636 W/ 250 OVERRIDE (IP): Performed by: INTERNAL MEDICINE

## 2021-10-22 PROCEDURE — 93306 TTE W/DOPPLER COMPLETE: CPT

## 2021-10-22 RX ORDER — DEXTROSE MONOHYDRATE, SODIUM CHLORIDE, AND POTASSIUM CHLORIDE 50; 1.49; 9 G/1000ML; G/1000ML; G/1000ML
INJECTION, SOLUTION INTRAVENOUS CONTINUOUS
Status: DISCONTINUED | OUTPATIENT
Start: 2021-10-22 | End: 2021-10-25

## 2021-10-22 RX ADMIN — METRONIDAZOLE 500 MG: 500 INJECTION, SOLUTION INTRAVENOUS at 21:29

## 2021-10-22 RX ADMIN — HYDROMORPHONE HYDROCHLORIDE 0.5 MG: 1 INJECTION, SOLUTION INTRAMUSCULAR; INTRAVENOUS; SUBCUTANEOUS at 16:06

## 2021-10-22 RX ADMIN — SODIUM CHLORIDE: 9 INJECTION, SOLUTION INTRAVENOUS at 13:48

## 2021-10-22 RX ADMIN — CEFTRIAXONE SODIUM 1 G: 1 INJECTION, POWDER, FOR SOLUTION INTRAMUSCULAR; INTRAVENOUS at 06:00

## 2021-10-22 RX ADMIN — SODIUM CHLORIDE 8 MG/HR: 9 INJECTION, SOLUTION INTRAVENOUS at 10:03

## 2021-10-22 RX ADMIN — ATORVASTATIN CALCIUM 40 MG: 40 TABLET, FILM COATED ORAL at 05:40

## 2021-10-22 RX ADMIN — SODIUM CHLORIDE 8 MG/HR: 9 INJECTION, SOLUTION INTRAVENOUS at 20:50

## 2021-10-22 RX ADMIN — SENNOSIDES AND DOCUSATE SODIUM 2 TABLET: 50; 8.6 TABLET ORAL at 05:41

## 2021-10-22 RX ADMIN — CITALOPRAM HYDROBROMIDE 10 MG: 20 TABLET ORAL at 05:41

## 2021-10-22 RX ADMIN — METRONIDAZOLE 500 MG: 500 INJECTION, SOLUTION INTRAVENOUS at 05:39

## 2021-10-22 RX ADMIN — METRONIDAZOLE 500 MG: 500 INJECTION, SOLUTION INTRAVENOUS at 13:46

## 2021-10-22 RX ADMIN — LEVOTHYROXINE SODIUM 150 MCG: 0.07 TABLET ORAL at 05:41

## 2021-10-22 RX ADMIN — LIDOCAINE 1 PATCH: 50 PATCH TOPICAL at 05:42

## 2021-10-22 RX ADMIN — POTASSIUM CHLORIDE, DEXTROSE MONOHYDRATE AND SODIUM CHLORIDE: 150; 5; 900 INJECTION, SOLUTION INTRAVENOUS at 16:43

## 2021-10-22 RX ADMIN — GABAPENTIN 100 MG: 100 CAPSULE ORAL at 05:41

## 2021-10-22 RX ADMIN — FOLIC ACID 1 MG: 1 TABLET ORAL at 05:40

## 2021-10-22 RX ADMIN — LORAZEPAM 0.5 MG: 2 INJECTION INTRAMUSCULAR; INTRAVENOUS at 02:28

## 2021-10-22 RX ADMIN — OXYCODONE HYDROCHLORIDE 10 MG: 10 TABLET ORAL at 00:05

## 2021-10-22 RX ADMIN — HYDROMORPHONE HYDROCHLORIDE 0.5 MG: 1 INJECTION, SOLUTION INTRAMUSCULAR; INTRAVENOUS; SUBCUTANEOUS at 20:27

## 2021-10-22 ASSESSMENT — ENCOUNTER SYMPTOMS
VOMITING: 0
BACK PAIN: 0
WEAKNESS: 0
DIAPHORESIS: 0
WHEEZING: 0
SENSORY CHANGE: 0
PALPITATIONS: 0
LOSS OF CONSCIOUSNESS: 0
DIARRHEA: 0
CLAUDICATION: 0
HEADACHES: 0
SPUTUM PRODUCTION: 0
NECK PAIN: 0
SORE THROAT: 0
CONSTIPATION: 0
HEMOPTYSIS: 0
EYE DISCHARGE: 0
FOCAL WEAKNESS: 0
COUGH: 0
FEVER: 0
DEPRESSION: 0
CHILLS: 0
ABDOMINAL PAIN: 1
BRUISES/BLEEDS EASILY: 0
MYALGIAS: 0
DIZZINESS: 0
SPEECH CHANGE: 0
SHORTNESS OF BREATH: 0
NAUSEA: 0
EYE PAIN: 0

## 2021-10-22 ASSESSMENT — PAIN DESCRIPTION - PAIN TYPE
TYPE: CHRONIC PAIN
TYPE: CHRONIC PAIN
TYPE: ACUTE PAIN;CHRONIC PAIN
TYPE: ACUTE PAIN;CHRONIC PAIN

## 2021-10-22 ASSESSMENT — LIFESTYLE VARIABLES: SUBSTANCE_ABUSE: 0

## 2021-10-22 NOTE — CODE DOCUMENTATION
Dr. Hampton consulted. EKG obtained. Pt has gastric ulcer and had similar pain last night. Will treat pain per MD. No need for further cardiac w/u at this time per MD.

## 2021-10-22 NOTE — CARE PLAN
Assumed day shift care at start of shift  Patient a+o x 4  denies pain  Monitored on telemetry (SR/SB), vss - on 6lpm via oxymask on initial assessment, titrated down to room air and oxygen saturation is 96%  1 unit of prbc's infused - no adverse reaction noted  Maintained npo - dr. Rodriguez to check with GI if NPO should be strict or sips with meds  No needs at this time, wctm      Fall precautions/hourly rounding maintained, call light within reach and functioning, all items within reach.  Patient encouraged to call for assistance, poc reviewed with patient, ?'s/concerns answered.  Bed alarm active.   Problem: Knowledge Deficit - Standard  Goal: Patient and family/care givers will demonstrate understanding of plan of care, disease process/condition, diagnostic tests and medications  Outcome: Progressing   The patient is Stable - Low risk of patient condition declining or worsening    Shift Goals  Clinical Goals: Pain medication  Patient Goals: Be able to eat

## 2021-10-22 NOTE — ASSESSMENT & PLAN NOTE
Secondary to asa and plavix use.  Holding both medications.  TEG with increased inhibition noted.  Gave 1 unit platelets 10/21.  Monitor for further bleeding and given platelets as needed to correct coagulopathy from plavix.  PTT and INR wnl.

## 2021-10-22 NOTE — PROGRESS NOTES
Telemetry shift summary:  Rhythm: SR, SB with 1st degree AV-block and BBB     HR Range: 50s to 60s      Measurements from strip printed at 02:46:31   OR: 0.20   QRS 0.12   QT 0.42     Ectopies: Rare PVC and rare couplets.

## 2021-10-22 NOTE — CARE PLAN
Problem: Pain - Standard  Goal: Alleviation of pain or a reduction in pain to the patient’s comfort goal  Outcome: Progressing     Problem: Knowledge Deficit - Standard  Goal: Patient and family/care givers will demonstrate understanding of plan of care, disease process/condition, diagnostic tests and medications  Outcome: Progressing     Problem: Fall Risk  Goal: Patient will remain free from falls  Outcome: Progressing         The patient is Watcher - Medium risk of patient condition declining or worsening    Shift Goals  Clinical Goals: Pain medication  Patient Goals: Be able to eat    Progress made toward(s) clinical / shift goals:  Have been assessing Pt fot pain and teaching her pain scale. Encouraged Pt to report any pain or discomfort. Applied comfort measures. Gave pain medication to her when she asked for it. Instructed her to notify RN if pain relief is not achieved.      Patient is not progressing towards the following goals:

## 2021-10-22 NOTE — PROGRESS NOTES
At 23:50 Pt c/o CP, pressure in the mid chest, No SOB or any other symptoms. VS wnl, EKG performed, no ST elevation. Charge RN and Dr Hampton notified. Will medicate her for pain and continue monitoring her.

## 2021-10-22 NOTE — PROGRESS NOTES
4 Eyes Skin Assessment Completed by Nancy RN and Brenda RN.    Head Bruising to outer aspect of R eye  Ears WDL  Nose WDL  Mouth WDL  Neck WDL  Breast/Chest WDL  Shoulder Blades WDL  Spine WDL  (R) Arm/Elbow/Hand WDLexcept scratches  (L) Arm/Elbow/Hand WDL except scratches  Abdomen WDL  Groin WDL  Scrotum/Coccyx/Buttocks WDL  (R) Leg WDL except scratches  (L) Leg WDL except scratches  (R) Heel/Foot/Toe WDL  (L) Heel/Foot/Toe WDL          Devices In Places Oxy Mask, telemetry monitor.      Interventions In Place Pillows    Possible Skin Injury No    Pictures Uploaded Into Epic N/A  Wound Consult Placed N/A  RN Wound Prevention Protocol Ordered No

## 2021-10-22 NOTE — PROGRESS NOTES
Bedside report received from Marni ALLISON and assumed Pt's cares. Call light within reach. Safety measures in place.

## 2021-10-22 NOTE — PROGRESS NOTES
Telemetry Shift Summary    Rhythm SR, first deg block  HR Range 66  Ectopy R PVC  Measurements 0.24/0.10/0.42        Normal Values  Rhythm SR  HR Range    Measurements 0.12-0.20 / 0.06-0.10  / 0.30-0.52

## 2021-10-22 NOTE — DIETARY
"Nutrition services: Day 1 of admit.  Davie Montejo is a 86 y.o. female with admitting DX of acute perforated gastric ulcer with hemorrhage.    Consult received for MST 2 with unsure weight loss in >1 year.    Pt asleep at visit, resting in bed with blankets covering neck and down. No obvious observations of malnutrition at this time.    Assessment:  Height: 170.2 cm (5' 7\")  Weight: 64 kg (141 lb 1.5 oz) - bed scale  Body mass index is 22.1 kg/m²., BMI classification: normal  Diet/Intake: NPO    Evaluation:   1. PMH of CAD, fibromyalgia, and heart attack.  2. Pt is NPO currently, repeat upper GI for possible gastric antral ulcer planned for Monday 10/25.   3. Per chart review, pt previously at 165 lbs on 1/12/21 and current weight is 141 lb, with 24 lb (14.5%) weight loss in 9 months which is not significant.  4. Skin: No wounds noted and no edema noted.  5. Labs: Reviewed.  6. Meds: lipitor, celexa, folic acid, synthroid, colace, tylenol prn, zofran prn, bowel protocol, NS infusion, protonix drip  7. Last BM: PTA    Malnutrition Risk: Malnutrition criteria not met.    Recommendations/Plan:  1. Advance diet to cardiac as appropriate.  2. Encourage intake of meals.  3. Document intake of all meals as % taken in ADL's to provide interdisciplinary communication across all shifts.   4. Monitor weight.  5. Nutrition rep will continue to see patient for ongoing meal and snack preferences.     RD following.            "

## 2021-10-22 NOTE — PROGRESS NOTES
"S: Davie Montejo  is a 86 y.o.  female admitted for contained gastric ulcer perforation. H&H downtrending this AM. Pain unchanged      O:  BP (!) 102/38   Pulse 60   Temp 36.7 °C (98.1 °F) (Oral)   Resp 18   Ht 1.702 m (5' 7\")   Wt 64 kg (141 lb 1.5 oz)   SpO2 100%   Intake/Output                             10/20/21 0700 - 10/21/21 0659 (Not Admitted) 10/21/21 0700 - 10/22/21 0659 10/22/21 0700 - 10/23/21 0659     [id number] 0651-3801 Total [id number] [id number] Total [id number] 2679-3656 Total                    Intake    I.V.  --  -- --  --  830 830  --  -- --    Volume (mL) (NS infusion) -- -- -- -- 830 830 -- -- --    IV Piggyback  --  -- --  --  200 200  --  -- --    Volume (mL) (metroNIDAZOLE (FLAGYL) IVPB 500 mg) -- -- -- -- 200 200 -- -- --    Total Intake -- -- -- -- 1030 1030 -- -- --       Output    Urine  --  -- --  --  800 800  --  -- --    Number of Times Voided -- -- -- -- 4 x 4 x -- -- --    Urine Void (mL) -- -- -- -- 800 800 -- -- --    Total Output -- -- -- -- 800 800 -- -- --       Net I/O     -- -- -- -- 230 230 -- -- --        Recent Labs     10/21/21  0309 10/22/21  0515   SODIUM 131* 137   POTASSIUM 3.9 3.9   CHLORIDE 99 108   CO2 21 18*   GLUCOSE 100* 81   BUN 24* 12   CREATININE 1.25 0.98   CALCIUM 9.2 8.1*     Recent Labs     10/21/21  0324 10/21/21  1038 10/21/21  1614 10/21/21  2225 10/22/21  0515   WBC 10.5  --   --   --  7.3   RBC 2.42*  --   --   --  2.36*   HEMOGLOBIN 7.0*   < > 8.2* 7.4* 6.9*   HEMATOCRIT 22.2*  --   --   --  22.0*   MCV 91.7  --   --   --  93.2   MCH 28.9  --   --   --  29.2   MCHC 31.5*  --   --   --  31.4*   RDW 46.3  --   --   --  49.4   PLATELETCT 344  --   --   --  355   MPV 10.2  --   --   --  10.0    < > = values in this interval not displayed.       Alert and Oriented x3, No Acute Distress  Normal Respiratory Effort  Abdomen soft, mildly tender in epigastrium and RUQ  Extremities warm and well perfused    A/P:  Patient with contained gastric " perforation. Recommend strict NPO given perforation. Would continue with conservative management of bleeding given its relatively slow rate and continued hemodynamic stability, crit is essentially unchanged from addmission. Repeat UGI on Monday. Continue PPI and trending crit. Call with questions or concerns. Will be available over the weekend for questions or acute changes in status as needed.    Mookie Santos MD  [facility]
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
## 2021-10-22 NOTE — PROGRESS NOTES
Hospital Medicine Daily Progress Note    Date of Service  10/22/2021    Chief Complaint  Davie Montejo is a 86 y.o. female admitted 10/21/2021 with acute GI bleed with perforation and anemia requiring blood transfusion    Hospital Course  Davie Montejo is a 86 y.o. female who presented 10/21/2021 with chest and back pain.  Patient states she has had a substernal/epigastric and pain under her left breast for months.  She states however tonight it was more severe and woke her from her sleep.  She also feels like the pain radiates to her back.  She did go to [place] yesterday.  Patient describes the pain as sharp at times, pressure as well, now severe.  She does note black stool, states that has been going on for multiple days as well.  Upon arrival, patient was noted to have a significant decrease in her hemoglobin, upon further work-up, she was noted to have a perforated gastric ulcer.  Patient admitted 10/21/21 by Dr. Hampton.  I have examined patient and managed due to critical gastric ulcer bleed.  Repeat Hg 8 post transfusion, patient takes plavix and TEG does show 67% inhibition for clotting.  will give platelets to help reverse inhibition and stop bleeding.  CT abdomen with po contrast showing contained gastric perforation.  Dr. Santos aware.     Interval Problem Update  10/22:  Patient wants fluids, however strict NPO per surgery x 3 days total to prevent need for OR given her contained gastric perforation.  IV protonix gtts continue.  Need for 1 unit PRBCs this a.m. for Hg 6.9.      I have personally seen and examined the patient at bedside. I discussed the plan of care with patient, bedside RN, charge RN and .    Consultants/Specialty  general surgery    Code Status  Full Code    Disposition  Patient is not medically cleared.   Anticipate discharge to to home with organized home healthcare and close outpatient follow-up.  I have placed the appropriate orders for  post-discharge needs.    Review of Systems  Review of Systems   Constitutional: Negative for chills, diaphoresis, fever and malaise/fatigue.   HENT: Negative for congestion and sore throat.    Eyes: Negative for pain and discharge.   Respiratory: Negative for cough, hemoptysis, sputum production, shortness of breath and wheezing.    Cardiovascular: Negative for chest pain, palpitations and claudication.   Gastrointestinal: Positive for abdominal pain. Negative for constipation, diarrhea, melena, nausea and vomiting.   Genitourinary: Negative for dysuria, frequency and urgency.   Musculoskeletal: Negative for back pain, joint pain, myalgias and neck pain.   Skin: Negative for itching and rash.   Neurological: Negative for dizziness, sensory change, speech change, focal weakness, loss of consciousness, weakness and headaches.   Endo/Heme/Allergies: Does not bruise/bleed easily.   Psychiatric/Behavioral: Negative for depression, substance abuse and suicidal ideas.        Physical Exam  Temp:  [36 °C (96.8 °F)-36.9 °C (98.4 °F)] 36.3 °C (97.4 °F)  Pulse:  [58-72] 71  Resp:  [16-22] 16  BP: (101-129)/(35-53) 129/53  SpO2:  [90 %-100 %] 96 %    Physical Exam  Vitals and nursing note reviewed.   Constitutional:       General: She is not in acute distress.     Appearance: Normal appearance. She is well-developed and normal weight. She is not diaphoretic.      Comments: Patient states she is hungry   HENT:      Head: Normocephalic and atraumatic.      Nose: Nose normal.      Mouth/Throat:      Pharynx: No oropharyngeal exudate.   Eyes:      General: No scleral icterus.        Right eye: No discharge.         Left eye: No discharge.      Conjunctiva/sclera: Conjunctivae normal.      Pupils: Pupils are equal, round, and reactive to light.   Neck:      Thyroid: No thyromegaly.      Vascular: No JVD.      Trachea: No tracheal deviation.   Cardiovascular:      Rate and Rhythm: Normal rate and regular rhythm.      Heart sounds:  Normal heart sounds. No murmur heard.   No friction rub. No gallop.    Pulmonary:      Effort: Pulmonary effort is normal. No respiratory distress.      Breath sounds: Normal breath sounds. No stridor. No wheezing or rales.   Chest:      Chest wall: No tenderness.   Abdominal:      General: Bowel sounds are normal. There is no distension.      Palpations: Abdomen is soft. There is no mass.      Tenderness: There is abdominal tenderness (epigastric, no abdominal distention). There is no guarding or rebound.   Musculoskeletal:         General: No tenderness. Normal range of motion.      Cervical back: Normal range of motion and neck supple.   Lymphadenopathy:      Cervical: No cervical adenopathy.   Skin:     General: Skin is warm and dry.      Findings: No erythema or rash.   Neurological:      General: No focal deficit present.      Mental Status: She is alert and oriented to person, place, and time.      Cranial Nerves: No cranial nerve deficit.      Motor: No abnormal muscle tone.      Coordination: Coordination normal.   Psychiatric:         Mood and Affect: Mood normal.         Behavior: Behavior normal.         Thought Content: Thought content normal.         Judgment: Judgment normal.         Fluids    Intake/Output Summary (Last 24 hours) at 10/22/2021 1615  Last data filed at 10/22/2021 1140  Gross per 24 hour   Intake 1355 ml   Output 800 ml   Net 555 ml       Laboratory  Recent Labs     10/21/21  0324 10/21/21  1038 10/21/21  2225 10/22/21  0515 10/22/21  1239   WBC 10.5  --   --  7.3  --    RBC 2.42*  --   --  2.36*  --    HEMOGLOBIN 7.0*   < > 7.4* 6.9* 8.8*   HEMATOCRIT 22.2*  --   --  22.0*  --    MCV 91.7  --   --  93.2  --    MCH 28.9  --   --  29.2  --    MCHC 31.5*  --   --  31.4*  --    RDW 46.3  --   --  49.4  --    PLATELETCT 344  --   --  355  --    MPV 10.2  --   --  10.0  --     < > = values in this interval not displayed.     Recent Labs     10/21/21  0309 10/22/21  0515   SODIUM 131* 137    POTASSIUM 3.9 3.9   CHLORIDE 99 108   CO2 21 18*   GLUCOSE 100* 81   BUN 24* 12   CREATININE 1.25 0.98   CALCIUM 9.2 8.1*     Recent Labs     10/21/21  1038   APTT 32.6   INR 1.06               Imaging  CT-ABDOMEN-PELVIS W/O   Final Result      1.  Again seen prominent thickening of the wall of the stomach most prominently within the gastric antrum and extending into the proximal duodenum consistent with peptic ulcer disease. There is again seen a focal area of contrast density fluid and gas    extending cephalad from that area of gastric antral thickening which appears to be extraluminal and with some surrounding inflammatory stranding and fluid within the mesentery. This most likely represents a contained perforated gastric antral ulcer.    There is no other evidence of free intraperitoneal air or evidence of contrast extravasation into the peritoneal cavity.      2.  Prior cholecystectomy.      3.  Renal cortical thinning and scarring.      4.  Sigmoid diverticulosis.      CT-CTA COMPLETE THORACOABDOMINAL AORTA   Final Result         1.  Thickened gastric antrum with extraluminal fluid collection and nondependent focus of air superior to the stomach. Appearance favors perforated gastric ulcer with adjacent gastric succus or abscess.   2.  No aortic aneurysm or dissection identified. There is 2.3 cm infrarenal fusiform abdominal aortic ectasia is seen.   3.  Diverticulosis   4.  Atherosclerosis      These findings were discussed with the patient's clinician, Donavan Orozco, on 10/21/2021 5:38 AM.      DX-CHEST-PORTABLE (1 VIEW)   Final Result         1.  No acute cardiopulmonary disease.      EC-ECHOCARDIOGRAM COMPLETE W/O CONT    (Results Pending)        Assessment/Plan  Coagulopathy (HCC)  Assessment & Plan  Secondary to asa and plavix use.  Holding both medications.  TEG with increased inhibition noted.  Gave 1 unit platelets 10/21.  Monitor for further bleeding and given platelets as needed to correct  coagulopathy from plavix.  PTT and INR wnl.    Acute blood loss anemia- (present on admission)  Assessment & Plan  -I feel this is more likely acute on chronic however it is chronic only because she is likely been bleeding to some degree for quite some time  protonix gtts.  -Continue to transfuse for hemoglobin less than 7  -Patient remains hemodynamically stable    VINCE (acute kidney injury) (HCC)- (present on admission)  Assessment & Plan  -Mild, due to dehydration  -start IV fluids  -Repeat BMP improved    Hyponatremia- (present on admission)  Assessment & Plan  -Mild, likely due to dehydration  - IV fluids    Acute perforated gastric ulcer with hemorrhage (HCC)- (present on admission)  Assessment & Plan  -Noted on CTA of the chest  -ERP has discussed the case with surgery  -Causing GI bleeding, continue Protonix drip  -High risk of worsening, closely monitor on telemetry  -Causing significant pain, narcotics for pain management  -Potentially associated with an abscess, start Rocephin and Flagyl IV.  Per Dr. Santos, strict NPO x 3 days, then trial of ice chips.  Monitor HG q 6 hours, required transfusions PRBCs 10/21 and 10/22.    Hyperlipidemia- (present on admission)  Assessment & Plan  -hold home statin    Hypothyroidism- (present on admission)  Assessment & Plan  -hold home Synthroid dose at 150 mcg daily since strict npo x 3 days.  -No recent TSH    Essential hypertension- (present on admission)  Assessment & Plan  -hold home losartan and metoprolol  -Start as needed enalapril and labetalol      Depressive disorder- (present on admission)  Assessment & Plan  -hold home Celexa       VTE prophylaxis: SCDs/TEDs    I have performed a physical exam and reviewed and updated ROS and Plan today (10/22/2021). In review of yesterday's note (10/21/2021), there are no changes except as documented above.

## 2021-10-22 NOTE — PROGRESS NOTES
Medicated for anxiety as requested by Pt, Ativan given as per MAR.    04:40 Pt resting with eyes closed. Easily to arousal. Regular and unlabored breathing. No signs of acute distress noticed. Safety measures in place.

## 2021-10-23 LAB
ANION GAP SERPL CALC-SCNC: 11 MMOL/L (ref 7–16)
BUN SERPL-MCNC: 12 MG/DL (ref 8–22)
CALCIUM SERPL-MCNC: 8.5 MG/DL (ref 8.4–10.2)
CHLORIDE SERPL-SCNC: 105 MMOL/L (ref 96–112)
CO2 SERPL-SCNC: 20 MMOL/L (ref 20–33)
CREAT SERPL-MCNC: 0.95 MG/DL (ref 0.5–1.4)
GLUCOSE SERPL-MCNC: 91 MG/DL (ref 65–99)
HGB BLD-MCNC: 10.4 G/DL (ref 12–16)
HGB BLD-MCNC: 9 G/DL (ref 12–16)
POTASSIUM SERPL-SCNC: 4.2 MMOL/L (ref 3.6–5.5)
SODIUM SERPL-SCNC: 136 MMOL/L (ref 135–145)

## 2021-10-23 PROCEDURE — 700105 HCHG RX REV CODE 258: Performed by: INTERNAL MEDICINE

## 2021-10-23 PROCEDURE — 700101 HCHG RX REV CODE 250: Performed by: INTERNAL MEDICINE

## 2021-10-23 PROCEDURE — 80048 BASIC METABOLIC PNL TOTAL CA: CPT

## 2021-10-23 PROCEDURE — 700111 HCHG RX REV CODE 636 W/ 250 OVERRIDE (IP): Performed by: INTERNAL MEDICINE

## 2021-10-23 PROCEDURE — 99232 SBSQ HOSP IP/OBS MODERATE 35: CPT | Performed by: HOSPITALIST

## 2021-10-23 PROCEDURE — 700101 HCHG RX REV CODE 250: Performed by: HOSPITALIST

## 2021-10-23 PROCEDURE — 770020 HCHG ROOM/CARE - TELE (206)

## 2021-10-23 PROCEDURE — 85018 HEMOGLOBIN: CPT

## 2021-10-23 RX ADMIN — POTASSIUM CHLORIDE, DEXTROSE MONOHYDRATE AND SODIUM CHLORIDE: 150; 5; 900 INJECTION, SOLUTION INTRAVENOUS at 18:10

## 2021-10-23 RX ADMIN — ONDANSETRON 4 MG: 2 INJECTION INTRAMUSCULAR; INTRAVENOUS at 03:48

## 2021-10-23 RX ADMIN — HYDROMORPHONE HYDROCHLORIDE 0.5 MG: 1 INJECTION, SOLUTION INTRAMUSCULAR; INTRAVENOUS; SUBCUTANEOUS at 00:15

## 2021-10-23 RX ADMIN — HYDROMORPHONE HYDROCHLORIDE 0.5 MG: 1 INJECTION, SOLUTION INTRAMUSCULAR; INTRAVENOUS; SUBCUTANEOUS at 23:38

## 2021-10-23 RX ADMIN — HYDROMORPHONE HYDROCHLORIDE 0.5 MG: 1 INJECTION, SOLUTION INTRAMUSCULAR; INTRAVENOUS; SUBCUTANEOUS at 15:31

## 2021-10-23 RX ADMIN — METRONIDAZOLE 500 MG: 500 INJECTION, SOLUTION INTRAVENOUS at 05:01

## 2021-10-23 RX ADMIN — HYDROMORPHONE HYDROCHLORIDE 0.5 MG: 1 INJECTION, SOLUTION INTRAMUSCULAR; INTRAVENOUS; SUBCUTANEOUS at 08:27

## 2021-10-23 RX ADMIN — CEFTRIAXONE SODIUM 1 G: 1 INJECTION, POWDER, FOR SOLUTION INTRAMUSCULAR; INTRAVENOUS at 05:00

## 2021-10-23 RX ADMIN — HYDROMORPHONE HYDROCHLORIDE 0.5 MG: 1 INJECTION, SOLUTION INTRAMUSCULAR; INTRAVENOUS; SUBCUTANEOUS at 03:38

## 2021-10-23 RX ADMIN — METRONIDAZOLE 500 MG: 500 INJECTION, SOLUTION INTRAVENOUS at 23:10

## 2021-10-23 RX ADMIN — POTASSIUM CHLORIDE, DEXTROSE MONOHYDRATE AND SODIUM CHLORIDE: 150; 5; 900 INJECTION, SOLUTION INTRAVENOUS at 03:33

## 2021-10-23 RX ADMIN — LIDOCAINE 1 PATCH: 50 PATCH TOPICAL at 05:00

## 2021-10-23 RX ADMIN — METRONIDAZOLE 500 MG: 500 INJECTION, SOLUTION INTRAVENOUS at 15:11

## 2021-10-23 ASSESSMENT — ENCOUNTER SYMPTOMS
WHEEZING: 0
NAUSEA: 0
EYE DISCHARGE: 0
EYE PAIN: 0
CONSTIPATION: 0
DIARRHEA: 0
COUGH: 0
DEPRESSION: 0
CLAUDICATION: 0
VOMITING: 0
HEMOPTYSIS: 0
CHILLS: 0
DIAPHORESIS: 0
SENSORY CHANGE: 0
FEVER: 0
LOSS OF CONSCIOUSNESS: 0
MYALGIAS: 0
WEAKNESS: 0
DIZZINESS: 0
HEADACHES: 0
BACK PAIN: 0
BRUISES/BLEEDS EASILY: 0
ABDOMINAL PAIN: 1
SORE THROAT: 0
FOCAL WEAKNESS: 0
SPUTUM PRODUCTION: 0
SPEECH CHANGE: 0
NECK PAIN: 0
PALPITATIONS: 0
SHORTNESS OF BREATH: 0

## 2021-10-23 ASSESSMENT — PAIN DESCRIPTION - PAIN TYPE
TYPE: ACUTE PAIN;CHRONIC PAIN
TYPE: ACUTE PAIN
TYPE: ACUTE PAIN
TYPE: CHRONIC PAIN

## 2021-10-23 ASSESSMENT — LIFESTYLE VARIABLES: SUBSTANCE_ABUSE: 0

## 2021-10-23 NOTE — CARE PLAN
The patient is Stable - Low risk of patient condition declining or worsening    Shift Goals  Clinical Goals: pain control  Patient Goals: sleep, pain control    Progress made toward(s) clinical / shift goals:        Problem: Knowledge Deficit - Standard  Goal: Patient and family/care givers will demonstrate understanding of plan of care, disease process/condition, diagnostic tests and medications  Outcome: Progressing     Problem: Fall Risk  Goal: Patient will remain free from falls  Outcome: Progressing       Patient is not progressing towards the following goals:      Problem: Pain - Standard  Goal: Alleviation of pain or a reduction in pain to the patient’s comfort goal  Outcome: Not Met   Patient has chronic pain. PRN pain medication administered per MAR. Heat packs also given and repositioning patient as requested.

## 2021-10-23 NOTE — PROGRESS NOTES
Telemetry Shift Summary    Rhythm SR with BBB and 1*AVB  HR Range 60s  Ectopy rare PVC/PAC  Measurements 0.24/0.14/0.42        Normal Values  Rhythm SR  HR Range    Measurements 0.12-0.20 / 0.06-0.10  / 0.30-0.52

## 2021-10-23 NOTE — PROGRESS NOTES
Report received from Abhijit ALLISON. Pt awake and watching TV at the time of report. Patient A&O 4. Plan of care discussed at bedside. White board has been. Safety precautions in place and call light within reach.

## 2021-10-23 NOTE — PROGRESS NOTES
Telemetry Shift Summary     RHYTHM: SB/SR first degree AVB/BBB  HR RANGE: 69-79  ECTOPY: r PVC  MEASUREMENTS: 0.22/0.12/0.44    Normal Measurements  Rhythm: SR  HR RANGE:   Measurements: 0.12-0.20/0.06-0.10/0.30-0.52      Tele strips reviewed and placed in chart

## 2021-10-23 NOTE — PROGRESS NOTES
Hospital Medicine Daily Progress Note    Date of Service  10/23/2021    Chief Complaint  Davie Montejo is a 86 y.o. female admitted 10/21/2021 with acute GI bleed with perforation and anemia requiring blood transfusion    Hospital Course  Davie Montejo is a 86 y.o. female who presented 10/21/2021 with chest and back pain.  Patient states she has had a substernal/epigastric and pain under her left breast for months.  She states however tonight it was more severe and woke her from her sleep.  She also feels like the pain radiates to her back.  She did go to [place] yesterday.  Patient describes the pain as sharp at times, pressure as well, now severe.  She does note black stool, states that has been going on for multiple days as well.  Upon arrival, patient was noted to have a significant decrease in her hemoglobin, upon further work-up, she was noted to have a perforated gastric ulcer.  Patient admitted 10/21/21 by Dr. Hampton.  I have examined patient and managed due to critical gastric ulcer bleed.  Repeat Hg 8 post transfusion, patient takes plavix and TEG does show 67% inhibition for clotting.  will give platelets to help reverse inhibition and stop bleeding.  CT abdomen with po contrast showing contained gastric perforation.  Dr. Santos aware.     Interval Problem Update  10/22:  Patient wants fluids, however strict NPO per surgery x 3 days total to prevent need for OR given her contained gastric perforation.  IV protonix gtts continue.  Need for 1 unit PRBCs this a.m. for Hg 6.9.    10/23: Hemoglobin stabilizing 10.4-9.0 after 1 unit packed red blood cells yesterday.  No further transfusions overnight.  Vital signs stable.  EF 65%.  She is currently on room air.  She is on strict n.p.o. per general surgeon until Monday when we repeat an upper GI to assess her perforated gastric ulcer.  Contained.  She remains on Rocephin and Flagyl as well as D5 normal saline +20 K at 100 and hour.   Remains on protein drip.    I have personally seen and examined the patient at bedside. I discussed the plan of care with patient, bedside RN, charge RN and .    Consultants/Specialty  general surgery    Code Status  Full Code    Disposition  Patient is not medically cleared.   Anticipate discharge to to home with organized home healthcare and close outpatient follow-up.  I have placed the appropriate orders for post-discharge needs.    Review of Systems  Review of Systems   Constitutional: Negative for chills, diaphoresis, fever and malaise/fatigue.   HENT: Negative for congestion and sore throat.    Eyes: Negative for pain and discharge.   Respiratory: Negative for cough, hemoptysis, sputum production, shortness of breath and wheezing.    Cardiovascular: Negative for chest pain, palpitations and claudication.   Gastrointestinal: Positive for abdominal pain. Negative for constipation, diarrhea, melena, nausea and vomiting.   Genitourinary: Negative for dysuria, frequency and urgency.   Musculoskeletal: Negative for back pain, joint pain, myalgias and neck pain.   Skin: Negative for itching and rash.   Neurological: Negative for dizziness, sensory change, speech change, focal weakness, loss of consciousness, weakness and headaches.   Endo/Heme/Allergies: Does not bruise/bleed easily.   Psychiatric/Behavioral: Negative for depression, substance abuse and suicidal ideas.        Physical Exam  Temp:  [36.3 °C (97.4 °F)-37.2 °C (98.9 °F)] 36.6 °C (97.9 °F)  Pulse:  [63-93] 93  Resp:  [16-19] 19  BP: (107-132)/(32-53) 125/46  SpO2:  [91 %-97 %] 93 %    Physical Exam  Vitals and nursing note reviewed.   Constitutional:       General: She is not in acute distress.     Appearance: Normal appearance. She is well-developed and normal weight. She is not diaphoretic.      Comments: Patient states she is hungry   HENT:      Head: Normocephalic and atraumatic.      Nose: Nose normal.      Mouth/Throat:      Pharynx:  No oropharyngeal exudate.   Eyes:      General: No scleral icterus.        Right eye: No discharge.         Left eye: No discharge.      Conjunctiva/sclera: Conjunctivae normal.      Pupils: Pupils are equal, round, and reactive to light.   Neck:      Thyroid: No thyromegaly.      Vascular: No JVD.      Trachea: No tracheal deviation.   Cardiovascular:      Rate and Rhythm: Normal rate and regular rhythm.      Heart sounds: Normal heart sounds. No murmur heard.   No friction rub. No gallop.    Pulmonary:      Effort: Pulmonary effort is normal. No respiratory distress.      Breath sounds: Normal breath sounds. No stridor. No wheezing or rales.   Chest:      Chest wall: No tenderness.   Abdominal:      General: Bowel sounds are normal. There is no distension.      Palpations: Abdomen is soft. There is no mass.      Tenderness: There is abdominal tenderness (epigastric, no abdominal distention). There is no guarding or rebound.   Musculoskeletal:         General: No tenderness. Normal range of motion.      Cervical back: Normal range of motion and neck supple.   Lymphadenopathy:      Cervical: No cervical adenopathy.   Skin:     General: Skin is warm and dry.      Findings: No erythema or rash.   Neurological:      General: No focal deficit present.      Mental Status: She is alert and oriented to person, place, and time.      Cranial Nerves: No cranial nerve deficit.      Motor: No abnormal muscle tone.      Coordination: Coordination normal.   Psychiatric:         Mood and Affect: Mood normal.         Behavior: Behavior normal.         Thought Content: Thought content normal.         Judgment: Judgment normal.         Fluids  No intake or output data in the 24 hours ending 10/23/21 1329    Laboratory  Recent Labs     10/21/21  0324 10/21/21  1038 10/22/21  0515 10/22/21  1239 10/22/21  1826 10/23/21  0041 10/23/21  0717   WBC 10.5  --  7.3  --   --   --   --    RBC 2.42*  --  2.36*  --   --   --   --    HEMOGLOBIN  7.0*   < > 6.9*   < > 9.0* 10.4* 9.0*   HEMATOCRIT 22.2*  --  22.0*  --   --   --   --    MCV 91.7  --  93.2  --   --   --   --    MCH 28.9  --  29.2  --   --   --   --    MCHC 31.5*  --  31.4*  --   --   --   --    RDW 46.3  --  49.4  --   --   --   --    PLATELETCT 344  --  355  --   --   --   --    MPV 10.2  --  10.0  --   --   --   --     < > = values in this interval not displayed.     Recent Labs     10/21/21  0309 10/22/21  0515 10/23/21  0041   SODIUM 131* 137 136   POTASSIUM 3.9 3.9 4.2   CHLORIDE 99 108 105   CO2 21 18* 20   GLUCOSE 100* 81 91   BUN 24* 12 12   CREATININE 1.25 0.98 0.95   CALCIUM 9.2 8.1* 8.5     Recent Labs     10/21/21  1038   APTT 32.6   INR 1.06               Imaging  EC-ECHOCARDIOGRAM COMPLETE W/O CONT   Final Result      CT-ABDOMEN-PELVIS W/O   Final Result      1.  Again seen prominent thickening of the wall of the stomach most prominently within the gastric antrum and extending into the proximal duodenum consistent with peptic ulcer disease. There is again seen a focal area of contrast density fluid and gas    extending cephalad from that area of gastric antral thickening which appears to be extraluminal and with some surrounding inflammatory stranding and fluid within the mesentery. This most likely represents a contained perforated gastric antral ulcer.    There is no other evidence of free intraperitoneal air or evidence of contrast extravasation into the peritoneal cavity.      2.  Prior cholecystectomy.      3.  Renal cortical thinning and scarring.      4.  Sigmoid diverticulosis.      CT-CTA COMPLETE THORACOABDOMINAL AORTA   Final Result         1.  Thickened gastric antrum with extraluminal fluid collection and nondependent focus of air superior to the stomach. Appearance favors perforated gastric ulcer with adjacent gastric succus or abscess.   2.  No aortic aneurysm or dissection identified. There is 2.3 cm infrarenal fusiform abdominal aortic ectasia is seen.   3.   Diverticulosis   4.  Atherosclerosis      These findings were discussed with the patient's clinician, Donavan Orozco, on 10/21/2021 5:38 AM.      DX-CHEST-PORTABLE (1 VIEW)   Final Result         1.  No acute cardiopulmonary disease.           Assessment/Plan  Coagulopathy (HCC)  Assessment & Plan  Secondary to asa and plavix use.  Holding both medications.  TEG with increased inhibition noted.  Gave 1 unit platelets 10/21.  Monitor for further bleeding and given platelets as needed to correct coagulopathy from plavix.  PTT and INR wnl.    Acute blood loss anemia- (present on admission)  Assessment & Plan  -I feel this is more likely acute on chronic however it is chronic only because she is likely been bleeding to some degree for quite some time  protonix gtts.  -Continue to transfuse for hemoglobin less than 7  -Patient remains hemodynamically stable    VINCE (acute kidney injury) (HCC)- (present on admission)  Assessment & Plan  -Mild, due to dehydration  -start IV fluids  -Repeat BMP improved    Hyponatremia- (present on admission)  Assessment & Plan  -Mild, likely due to dehydration  - IV fluids    Acute perforated gastric ulcer with hemorrhage (HCC)- (present on admission)  Assessment & Plan  -Noted on CTA of the chest  -ERP has discussed the case with surgery  -Causing GI bleeding, continue Protonix drip  -High risk of worsening, closely monitor on telemetry  -Causing significant pain, narcotics for pain management  -Potentially associated with an abscess, start Rocephin and Flagyl IV.  Per Dr. Santos, strict NPO x 3 days, then trial of ice chips.  Plan for repeat UGI xrays on 10/25 to ensure no leakage of contrast.  Monitor HG q 6 hours, required transfusions PRBCs 10/21 and 10/22.  10/23:  Stable Hg, no further melena noted.    Hyperlipidemia- (present on admission)  Assessment & Plan  -hold home statin    Hypothyroidism- (present on admission)  Assessment & Plan  -hold home Synthroid dose at 150 mcg daily since  strict npo x 3 days.  -No recent TSH    Essential hypertension- (present on admission)  Assessment & Plan  -hold home losartan and metoprolol  -Start as needed enalapril and labetalol      Depressive disorder- (present on admission)  Assessment & Plan  -hold home Celexa       VTE prophylaxis: SCDs/TEDs    I have performed a physical exam and reviewed and updated ROS and Plan today (10/23/2021). In review of yesterday's note (10/22/2021), there are no changes except as documented above.

## 2021-10-24 LAB
ANION GAP SERPL CALC-SCNC: 9 MMOL/L (ref 7–16)
BASOPHILS # BLD AUTO: 0.8 % (ref 0–1.8)
BASOPHILS # BLD: 0.05 K/UL (ref 0–0.12)
BUN SERPL-MCNC: 6 MG/DL (ref 8–22)
CALCIUM SERPL-MCNC: 8.7 MG/DL (ref 8.4–10.2)
CHLORIDE SERPL-SCNC: 104 MMOL/L (ref 96–112)
CO2 SERPL-SCNC: 21 MMOL/L (ref 20–33)
CREAT SERPL-MCNC: 0.82 MG/DL (ref 0.5–1.4)
EOSINOPHIL # BLD AUTO: 0.25 K/UL (ref 0–0.51)
EOSINOPHIL NFR BLD: 4.2 % (ref 0–6.9)
ERYTHROCYTE [DISTWIDTH] IN BLOOD BY AUTOMATED COUNT: 46.4 FL (ref 35.9–50)
GLUCOSE SERPL-MCNC: 84 MG/DL (ref 65–99)
HCT VFR BLD AUTO: 30.7 % (ref 37–47)
HGB BLD-MCNC: 9.9 G/DL (ref 12–16)
IMM GRANULOCYTES # BLD AUTO: 0.05 K/UL (ref 0–0.11)
IMM GRANULOCYTES NFR BLD AUTO: 0.8 % (ref 0–0.9)
LYMPHOCYTES # BLD AUTO: 1.08 K/UL (ref 1–4.8)
LYMPHOCYTES NFR BLD: 18 % (ref 22–41)
MCH RBC QN AUTO: 29.2 PG (ref 27–33)
MCHC RBC AUTO-ENTMCNC: 32.2 G/DL (ref 33.6–35)
MCV RBC AUTO: 90.6 FL (ref 81.4–97.8)
MONOCYTES # BLD AUTO: 0.58 K/UL (ref 0–0.85)
MONOCYTES NFR BLD AUTO: 9.7 % (ref 0–13.4)
NEUTROPHILS # BLD AUTO: 3.99 K/UL (ref 2–7.15)
NEUTROPHILS NFR BLD: 66.5 % (ref 44–72)
NRBC # BLD AUTO: 0 K/UL
NRBC BLD-RTO: 0 /100 WBC
PLATELET # BLD AUTO: 410 K/UL (ref 164–446)
PMV BLD AUTO: 9.2 FL (ref 9–12.9)
POTASSIUM SERPL-SCNC: 4 MMOL/L (ref 3.6–5.5)
RBC # BLD AUTO: 3.39 M/UL (ref 4.2–5.4)
SODIUM SERPL-SCNC: 134 MMOL/L (ref 135–145)
WBC # BLD AUTO: 6 K/UL (ref 4.8–10.8)

## 2021-10-24 PROCEDURE — 700111 HCHG RX REV CODE 636 W/ 250 OVERRIDE (IP): Performed by: HOSPITALIST

## 2021-10-24 PROCEDURE — 85025 COMPLETE CBC W/AUTO DIFF WBC: CPT

## 2021-10-24 PROCEDURE — 700101 HCHG RX REV CODE 250: Performed by: INTERNAL MEDICINE

## 2021-10-24 PROCEDURE — 770020 HCHG ROOM/CARE - TELE (206)

## 2021-10-24 PROCEDURE — 700101 HCHG RX REV CODE 250: Performed by: HOSPITALIST

## 2021-10-24 PROCEDURE — 80048 BASIC METABOLIC PNL TOTAL CA: CPT

## 2021-10-24 PROCEDURE — 700105 HCHG RX REV CODE 258: Performed by: INTERNAL MEDICINE

## 2021-10-24 PROCEDURE — 99232 SBSQ HOSP IP/OBS MODERATE 35: CPT | Performed by: HOSPITALIST

## 2021-10-24 PROCEDURE — C9113 INJ PANTOPRAZOLE SODIUM, VIA: HCPCS | Performed by: INTERNAL MEDICINE

## 2021-10-24 PROCEDURE — 700111 HCHG RX REV CODE 636 W/ 250 OVERRIDE (IP): Performed by: INTERNAL MEDICINE

## 2021-10-24 RX ORDER — HYDROMORPHONE HYDROCHLORIDE 1 MG/ML
1 INJECTION, SOLUTION INTRAMUSCULAR; INTRAVENOUS; SUBCUTANEOUS EVERY 4 HOURS PRN
Status: DISCONTINUED | OUTPATIENT
Start: 2021-10-24 | End: 2021-10-25

## 2021-10-24 RX ADMIN — LORAZEPAM 0.5 MG: 2 INJECTION INTRAMUSCULAR; INTRAVENOUS at 22:00

## 2021-10-24 RX ADMIN — METRONIDAZOLE 500 MG: 500 INJECTION, SOLUTION INTRAVENOUS at 05:37

## 2021-10-24 RX ADMIN — LORAZEPAM 0.5 MG: 2 INJECTION INTRAMUSCULAR; INTRAVENOUS at 02:10

## 2021-10-24 RX ADMIN — SODIUM CHLORIDE 8 MG/HR: 9 INJECTION, SOLUTION INTRAVENOUS at 08:30

## 2021-10-24 RX ADMIN — LIDOCAINE 1 PATCH: 50 PATCH TOPICAL at 05:37

## 2021-10-24 RX ADMIN — HYDROMORPHONE HYDROCHLORIDE 0.5 MG: 1 INJECTION, SOLUTION INTRAMUSCULAR; INTRAVENOUS; SUBCUTANEOUS at 11:42

## 2021-10-24 RX ADMIN — METRONIDAZOLE 500 MG: 500 INJECTION, SOLUTION INTRAVENOUS at 21:08

## 2021-10-24 RX ADMIN — HYDROMORPHONE HYDROCHLORIDE 1 MG: 1 INJECTION, SOLUTION INTRAMUSCULAR; INTRAVENOUS; SUBCUTANEOUS at 14:56

## 2021-10-24 RX ADMIN — POTASSIUM CHLORIDE, DEXTROSE MONOHYDRATE AND SODIUM CHLORIDE: 150; 5; 900 INJECTION, SOLUTION INTRAVENOUS at 16:32

## 2021-10-24 RX ADMIN — SODIUM CHLORIDE 8 MG/HR: 9 INJECTION, SOLUTION INTRAVENOUS at 19:02

## 2021-10-24 RX ADMIN — CEFTRIAXONE SODIUM 1 G: 1 INJECTION, POWDER, FOR SOLUTION INTRAMUSCULAR; INTRAVENOUS at 05:01

## 2021-10-24 RX ADMIN — METRONIDAZOLE 500 MG: 500 INJECTION, SOLUTION INTRAVENOUS at 14:55

## 2021-10-24 RX ADMIN — HYDROMORPHONE HYDROCHLORIDE 1 MG: 1 INJECTION, SOLUTION INTRAMUSCULAR; INTRAVENOUS; SUBCUTANEOUS at 21:08

## 2021-10-24 ASSESSMENT — ENCOUNTER SYMPTOMS
ABDOMINAL PAIN: 1
SPUTUM PRODUCTION: 0
PALPITATIONS: 0
SORE THROAT: 0
DIARRHEA: 0
BRUISES/BLEEDS EASILY: 0
MYALGIAS: 0
SHORTNESS OF BREATH: 0
NAUSEA: 0
BACK PAIN: 0
WHEEZING: 0
LOSS OF CONSCIOUSNESS: 0
DIAPHORESIS: 0
COUGH: 0
CONSTIPATION: 0
DEPRESSION: 0
CHILLS: 0
CLAUDICATION: 0
HEADACHES: 0
HEMOPTYSIS: 0
WEAKNESS: 0
FEVER: 0
VOMITING: 0
FOCAL WEAKNESS: 0
NECK PAIN: 0
EYE PAIN: 0
SPEECH CHANGE: 0
SENSORY CHANGE: 0
EYE DISCHARGE: 0
DIZZINESS: 0

## 2021-10-24 ASSESSMENT — PAIN DESCRIPTION - PAIN TYPE
TYPE: ACUTE PAIN

## 2021-10-24 ASSESSMENT — LIFESTYLE VARIABLES: SUBSTANCE_ABUSE: 0

## 2021-10-24 NOTE — PROGRESS NOTES
Received bedside report from RN Vickie, pt care assumed. VSS, pt assessment complete. Pt AOx4, no c/o pain at this time. POC discussed with pt and verbalizes no questions. Pt denies any additional needs at this time. Bed locked and in lowest position, bed alarm is on. Pt educated on fall risk and verbalized understanding, call light within reach, hourly rounding initiated.

## 2021-10-24 NOTE — PROGRESS NOTES
Hospital Medicine Daily Progress Note    Date of Service  10/24/2021    Chief Complaint  Davie Montejo is a 86 y.o. female admitted 10/21/2021 with acute GI bleed with perforation and anemia requiring blood transfusion    Hospital Course  Davie Montejo is a 86 y.o. female who presented 10/21/2021 with chest and back pain.  Patient states she has had a substernal/epigastric and pain under her left breast for months.  She states however tonight it was more severe and woke her from her sleep.  She also feels like the pain radiates to her back.  She did go to [place] yesterday.  Patient describes the pain as sharp at times, pressure as well, now severe.  She does note black stool, states that has been going on for multiple days as well.  Upon arrival, patient was noted to have a significant decrease in her hemoglobin, upon further work-up, she was noted to have a perforated gastric ulcer.  Patient admitted 10/21/21 by Dr. Hampton.  I have examined patient and managed due to critical gastric ulcer bleed.  Repeat Hg 8 post transfusion, patient takes plavix and TEG does show 67% inhibition for clotting.  will give platelets to help reverse inhibition and stop bleeding.  CT abdomen with po contrast showing contained gastric perforation.  Dr. Santos aware.     Interval Problem Update  10/22:  Patient wants fluids, however strict NPO per surgery x 3 days total to prevent need for OR given her contained gastric perforation.  IV protonix gtts continue.  Need for 1 unit PRBCs this a.m. for Hg 6.9.    10/23: Hemoglobin stabilizing 10.4-9.0 after 1 unit packed red blood cells yesterday.  No further transfusions overnight.  Vital signs stable.  EF 65%.  She is currently on room air.  She is on strict n.p.o. per general surgeon until Monday when we repeat an upper GI to assess her perforated gastric ulcer.  Contained.  She remains on Rocephin and Flagyl as well as D5 normal saline +20 K at 100 and hour.   Remains on protonix drip.  10/24: Patient sleeping on exam.  She was able to awaken briefly but falls back to sleep.  She remains strict n.p.o. until upper GI with p.o. contrast study performed tomorrow.  She remains sinus rhythm 65-89.  Hemoglobin stable.    I have personally seen and examined the patient at bedside. I discussed the plan of care with patient, bedside RN, charge RN and .    Consultants/Specialty  general surgery    Code Status  Full Code    Disposition  Patient is not medically cleared.   Anticipate discharge to to home with organized home healthcare and close outpatient follow-up.  I have placed the appropriate orders for post-discharge needs.    Review of Systems  Review of Systems   Constitutional: Negative for chills, diaphoresis, fever and malaise/fatigue.   HENT: Negative for congestion and sore throat.    Eyes: Negative for pain and discharge.   Respiratory: Negative for cough, hemoptysis, sputum production, shortness of breath and wheezing.    Cardiovascular: Negative for chest pain, palpitations and claudication.   Gastrointestinal: Positive for abdominal pain. Negative for constipation, diarrhea, melena, nausea and vomiting.   Genitourinary: Negative for dysuria, frequency and urgency.   Musculoskeletal: Negative for back pain, joint pain, myalgias and neck pain.   Skin: Negative for itching and rash.   Neurological: Negative for dizziness, sensory change, speech change, focal weakness, loss of consciousness, weakness and headaches.   Endo/Heme/Allergies: Does not bruise/bleed easily.   Psychiatric/Behavioral: Negative for depression, substance abuse and suicidal ideas.        Physical Exam  Temp:  [36.8 °C (98.2 °F)-36.8 °C (98.3 °F)] 36.8 °C (98.3 °F)  Pulse:  [61-67] 67  Resp:  [16-18] 16  BP: (140-169)/(46-57) 169/57  SpO2:  [90 %-95 %] 95 %    Physical Exam  Vitals and nursing note reviewed.   Constitutional:       General: She is not in acute distress.     Appearance:  Normal appearance. She is well-developed and normal weight. She is not diaphoretic.      Comments: Patient states she is hungry   HENT:      Head: Normocephalic and atraumatic.      Nose: Nose normal.      Mouth/Throat:      Pharynx: No oropharyngeal exudate.   Eyes:      General: No scleral icterus.        Right eye: No discharge.         Left eye: No discharge.      Conjunctiva/sclera: Conjunctivae normal.      Pupils: Pupils are equal, round, and reactive to light.   Neck:      Thyroid: No thyromegaly.      Vascular: No JVD.      Trachea: No tracheal deviation.   Cardiovascular:      Rate and Rhythm: Normal rate and regular rhythm.      Heart sounds: Normal heart sounds. No murmur heard.   No friction rub. No gallop.    Pulmonary:      Effort: Pulmonary effort is normal. No respiratory distress.      Breath sounds: Normal breath sounds. No stridor. No wheezing or rales.   Chest:      Chest wall: No tenderness.   Abdominal:      General: Bowel sounds are normal. There is no distension.      Palpations: Abdomen is soft. There is no mass.      Tenderness: There is abdominal tenderness (epigastric, no abdominal distention). There is no guarding or rebound.   Musculoskeletal:         General: No tenderness. Normal range of motion.      Cervical back: Normal range of motion and neck supple.   Lymphadenopathy:      Cervical: No cervical adenopathy.   Skin:     General: Skin is warm and dry.      Findings: No erythema or rash.   Neurological:      General: No focal deficit present.      Mental Status: She is alert and oriented to person, place, and time.      Cranial Nerves: No cranial nerve deficit.      Motor: No abnormal muscle tone.      Coordination: Coordination normal.   Psychiatric:         Mood and Affect: Mood normal.         Behavior: Behavior normal.         Thought Content: Thought content normal.         Judgment: Judgment normal.         Fluids  No intake or output data in the 24 hours ending 10/24/21  1331    Laboratory  Recent Labs     10/22/21  0515 10/22/21  1239 10/22/21  1826 10/23/21  0041 10/23/21  0717   WBC 7.3  --   --   --   --    RBC 2.36*  --   --   --   --    HEMOGLOBIN 6.9*   < > 9.0* 10.4* 9.0*   HEMATOCRIT 22.0*  --   --   --   --    MCV 93.2  --   --   --   --    MCH 29.2  --   --   --   --    MCHC 31.4*  --   --   --   --    RDW 49.4  --   --   --   --    PLATELETCT 355  --   --   --   --    MPV 10.0  --   --   --   --     < > = values in this interval not displayed.     Recent Labs     10/22/21  0515 10/23/21  0041 10/24/21  0024   SODIUM 137 136 134*   POTASSIUM 3.9 4.2 4.0   CHLORIDE 108 105 104   CO2 18* 20 21   GLUCOSE 81 91 84   BUN 12 12 6*   CREATININE 0.98 0.95 0.82   CALCIUM 8.1* 8.5 8.7                   Imaging  EC-ECHOCARDIOGRAM COMPLETE W/O CONT   Final Result      CT-ABDOMEN-PELVIS W/O   Final Result      1.  Again seen prominent thickening of the wall of the stomach most prominently within the gastric antrum and extending into the proximal duodenum consistent with peptic ulcer disease. There is again seen a focal area of contrast density fluid and gas    extending cephalad from that area of gastric antral thickening which appears to be extraluminal and with some surrounding inflammatory stranding and fluid within the mesentery. This most likely represents a contained perforated gastric antral ulcer.    There is no other evidence of free intraperitoneal air or evidence of contrast extravasation into the peritoneal cavity.      2.  Prior cholecystectomy.      3.  Renal cortical thinning and scarring.      4.  Sigmoid diverticulosis.      CT-CTA COMPLETE THORACOABDOMINAL AORTA   Final Result         1.  Thickened gastric antrum with extraluminal fluid collection and nondependent focus of air superior to the stomach. Appearance favors perforated gastric ulcer with adjacent gastric succus or abscess.   2.  No aortic aneurysm or dissection identified. There is 2.3 cm infrarenal  fusiform abdominal aortic ectasia is seen.   3.  Diverticulosis   4.  Atherosclerosis      These findings were discussed with the patient's clinician, Donavan Orozco, on 10/21/2021 5:38 AM.      DX-CHEST-PORTABLE (1 VIEW)   Final Result         1.  No acute cardiopulmonary disease.           Assessment/Plan  Coagulopathy (HCC)  Assessment & Plan  Secondary to asa and plavix use.  Holding both medications.  TEG with increased inhibition noted.  Gave 1 unit platelets 10/21.  Monitor for further bleeding and given platelets as needed to correct coagulopathy from plavix.  PTT and INR wnl.    Acute blood loss anemia- (present on admission)  Assessment & Plan  -I feel this is more likely acute on chronic however it is chronic only because she is likely been bleeding to some degree for quite some time  protonix gtts.  -Continue to transfuse for hemoglobin less than 7  -Patient remains hemodynamically stable    VINCE (acute kidney injury) (HCC)- (present on admission)  Assessment & Plan  -Mild, due to dehydration  -start IV fluids  -Repeat BMP improved    Hyponatremia- (present on admission)  Assessment & Plan  -Mild, likely due to dehydration  - IV fluids    Acute perforated gastric ulcer with hemorrhage (HCC)- (present on admission)  Assessment & Plan  -Noted on CTA of the chest  -ERP has discussed the case with surgery  -Causing GI bleeding, continue Protonix drip  -High risk of worsening, closely monitor on telemetry  -Causing significant pain, narcotics for pain management  -Potentially associated with an abscess, start Rocephin and Flagyl IV.  Per Dr. Santos, strict NPO x 3 days, then trial of ice chips.  Plan for repeat UGI xrays on 10/25 to ensure no leakage of contrast.  Monitor HG q 6 hours, required transfusions PRBCs 10/21 and 10/22.  10/23:  Stable Hg, no further melena noted.    Hyperlipidemia- (present on admission)  Assessment & Plan  -hold home statin    Hypothyroidism- (present on admission)  Assessment &  Plan  -hold home Synthroid dose at 150 mcg daily since strict npo x 3 days.  -No recent TSH    Essential hypertension- (present on admission)  Assessment & Plan  -hold home losartan and metoprolol  -Start as needed enalapril and labetalol      Depressive disorder- (present on admission)  Assessment & Plan  -hold home Celexa       VTE prophylaxis: SCDs/TEDs    I have performed a physical exam and reviewed and updated ROS and Plan today (10/24/2021). In review of yesterday's note (10/23/2021), there are no changes except as documented above.

## 2021-10-24 NOTE — CARE PLAN
Problem: Pain - Standard  Goal: Alleviation of pain or a reduction in pain to the patient’s comfort goal  Outcome: Progressing     Problem: Knowledge Deficit - Standard  Goal: Patient and family/care givers will demonstrate understanding of plan of care, disease process/condition, diagnostic tests and medications  Outcome: Progressing     Problem: Fall Risk  Goal: Patient will remain free from falls  Outcome: Progressing   The patient is Watcher - Medium risk of patient condition declining or worsening    Shift Goals  Clinical Goals: monitor Hgb  Patient Goals: rest    Progress made toward(s) clinical / shift goals:  pt medicated for pain and anxiety.     Patient is not progressing towards the following goals:

## 2021-10-24 NOTE — PROGRESS NOTES
Telemetry Shift Summary    Rhythm SR, BBB  HR Range 65-89  Ectopy R/O PVC, R couplet  Measurements 0.20/0.12/0.44        Normal Values  Rhythm SR  HR Range    Measurements 0.12-0.20 / 0.06-0.10  / 0.30-0.52

## 2021-10-24 NOTE — PROGRESS NOTES
Patient resting quietly in bed. No c/o pain. Bed is locked and in lowest position. Call light within reach, pt calls appropriately and does not get out of bed.

## 2021-10-24 NOTE — PROGRESS NOTES
Telemetry Shift Summary    Rhythm SR with BBB, 1*AVB  HR Range 60s  Ectopy rare PVC's  Measurements 0.24/0.12/0.46        Normal Values  Rhythm SR  HR Range    Measurements 0.12-0.20 / 0.06-0.10  / 0.30-0.52

## 2021-10-25 ENCOUNTER — APPOINTMENT (OUTPATIENT)
Dept: RADIOLOGY | Facility: MEDICAL CENTER | Age: 86
DRG: 378 | End: 2021-10-25
Attending: HOSPITALIST
Payer: MEDICARE

## 2021-10-25 LAB
ANION GAP SERPL CALC-SCNC: 10 MMOL/L (ref 7–16)
BASOPHILS # BLD AUTO: 0.9 % (ref 0–1.8)
BASOPHILS # BLD: 0.05 K/UL (ref 0–0.12)
BUN SERPL-MCNC: 4 MG/DL (ref 8–22)
CALCIUM SERPL-MCNC: 8.1 MG/DL (ref 8.4–10.2)
CHLORIDE SERPL-SCNC: 107 MMOL/L (ref 96–112)
CO2 SERPL-SCNC: 20 MMOL/L (ref 20–33)
CREAT SERPL-MCNC: 0.8 MG/DL (ref 0.5–1.4)
EOSINOPHIL # BLD AUTO: 0.3 K/UL (ref 0–0.51)
EOSINOPHIL NFR BLD: 5.6 % (ref 0–6.9)
ERYTHROCYTE [DISTWIDTH] IN BLOOD BY AUTOMATED COUNT: 47.1 FL (ref 35.9–50)
GLUCOSE SERPL-MCNC: 105 MG/DL (ref 65–99)
HCT VFR BLD AUTO: 29.8 % (ref 37–47)
HGB BLD-MCNC: 9.7 G/DL (ref 12–16)
IMM GRANULOCYTES # BLD AUTO: 0.02 K/UL (ref 0–0.11)
IMM GRANULOCYTES NFR BLD AUTO: 0.4 % (ref 0–0.9)
LYMPHOCYTES # BLD AUTO: 1.44 K/UL (ref 1–4.8)
LYMPHOCYTES NFR BLD: 26.8 % (ref 22–41)
MCH RBC QN AUTO: 29.4 PG (ref 27–33)
MCHC RBC AUTO-ENTMCNC: 32.6 G/DL (ref 33.6–35)
MCV RBC AUTO: 90.3 FL (ref 81.4–97.8)
MONOCYTES # BLD AUTO: 0.66 K/UL (ref 0–0.85)
MONOCYTES NFR BLD AUTO: 12.3 % (ref 0–13.4)
NEUTROPHILS # BLD AUTO: 2.9 K/UL (ref 2–7.15)
NEUTROPHILS NFR BLD: 54 % (ref 44–72)
NRBC # BLD AUTO: 0 K/UL
NRBC BLD-RTO: 0 /100 WBC
PLATELET # BLD AUTO: 411 K/UL (ref 164–446)
PMV BLD AUTO: 9.5 FL (ref 9–12.9)
POTASSIUM SERPL-SCNC: 3.8 MMOL/L (ref 3.6–5.5)
RBC # BLD AUTO: 3.3 M/UL (ref 4.2–5.4)
SODIUM SERPL-SCNC: 137 MMOL/L (ref 135–145)
WBC # BLD AUTO: 5.4 K/UL (ref 4.8–10.8)

## 2021-10-25 PROCEDURE — 700101 HCHG RX REV CODE 250: Performed by: INTERNAL MEDICINE

## 2021-10-25 PROCEDURE — 700105 HCHG RX REV CODE 258: Performed by: INTERNAL MEDICINE

## 2021-10-25 PROCEDURE — 770006 HCHG ROOM/CARE - MED/SURG/GYN SEMI*

## 2021-10-25 PROCEDURE — 700117 HCHG RX CONTRAST REV CODE 255: Performed by: HOSPITALIST

## 2021-10-25 PROCEDURE — 700111 HCHG RX REV CODE 636 W/ 250 OVERRIDE (IP): Performed by: INTERNAL MEDICINE

## 2021-10-25 PROCEDURE — C9113 INJ PANTOPRAZOLE SODIUM, VIA: HCPCS

## 2021-10-25 PROCEDURE — A9270 NON-COVERED ITEM OR SERVICE: HCPCS | Performed by: HOSPITALIST

## 2021-10-25 PROCEDURE — 85025 COMPLETE CBC W/AUTO DIFF WBC: CPT

## 2021-10-25 PROCEDURE — 700102 HCHG RX REV CODE 250 W/ 637 OVERRIDE(OP): Performed by: HOSPITALIST

## 2021-10-25 PROCEDURE — 36415 COLL VENOUS BLD VENIPUNCTURE: CPT

## 2021-10-25 PROCEDURE — 700111 HCHG RX REV CODE 636 W/ 250 OVERRIDE (IP): Performed by: HOSPITALIST

## 2021-10-25 PROCEDURE — 99232 SBSQ HOSP IP/OBS MODERATE 35: CPT | Performed by: HOSPITALIST

## 2021-10-25 PROCEDURE — A9270 NON-COVERED ITEM OR SERVICE: HCPCS

## 2021-10-25 PROCEDURE — 700101 HCHG RX REV CODE 250: Performed by: HOSPITALIST

## 2021-10-25 PROCEDURE — C9113 INJ PANTOPRAZOLE SODIUM, VIA: HCPCS | Performed by: INTERNAL MEDICINE

## 2021-10-25 PROCEDURE — 700111 HCHG RX REV CODE 636 W/ 250 OVERRIDE (IP)

## 2021-10-25 PROCEDURE — 700102 HCHG RX REV CODE 250 W/ 637 OVERRIDE(OP)

## 2021-10-25 PROCEDURE — 74240 X-RAY XM UPR GI TRC 1CNTRST: CPT

## 2021-10-25 PROCEDURE — 80048 BASIC METABOLIC PNL TOTAL CA: CPT

## 2021-10-25 RX ORDER — PANTOPRAZOLE SODIUM 40 MG/10ML
INJECTION, POWDER, LYOPHILIZED, FOR SOLUTION INTRAVENOUS
Status: COMPLETED
Start: 2021-10-25 | End: 2021-10-25

## 2021-10-25 RX ORDER — PANTOPRAZOLE SODIUM 40 MG/10ML
40 INJECTION, POWDER, LYOPHILIZED, FOR SOLUTION INTRAVENOUS 2 TIMES DAILY
Status: DISCONTINUED | OUTPATIENT
Start: 2021-10-25 | End: 2021-10-28

## 2021-10-25 RX ORDER — HYDROMORPHONE HYDROCHLORIDE 1 MG/ML
0.5 INJECTION, SOLUTION INTRAMUSCULAR; INTRAVENOUS; SUBCUTANEOUS EVERY 4 HOURS PRN
Status: DISCONTINUED | OUTPATIENT
Start: 2021-10-25 | End: 2021-10-25

## 2021-10-25 RX ORDER — OXYCODONE HYDROCHLORIDE 5 MG/1
5 TABLET ORAL EVERY 4 HOURS PRN
Status: DISCONTINUED | OUTPATIENT
Start: 2021-10-25 | End: 2021-10-26

## 2021-10-25 RX ORDER — GABAPENTIN 100 MG/1
CAPSULE ORAL
Status: COMPLETED
Start: 2021-10-25 | End: 2021-10-25

## 2021-10-25 RX ADMIN — PANTOPRAZOLE SODIUM 40 MG: 40 INJECTION, POWDER, LYOPHILIZED, FOR SOLUTION INTRAVENOUS at 18:32

## 2021-10-25 RX ADMIN — HYDROMORPHONE HYDROCHLORIDE 1 MG: 1 INJECTION, SOLUTION INTRAMUSCULAR; INTRAVENOUS; SUBCUTANEOUS at 06:19

## 2021-10-25 RX ADMIN — HYDROMORPHONE HYDROCHLORIDE 0.5 MG: 1 INJECTION, SOLUTION INTRAMUSCULAR; INTRAVENOUS; SUBCUTANEOUS at 16:05

## 2021-10-25 RX ADMIN — METOPROLOL TARTRATE 12.5 MG: 25 TABLET, FILM COATED ORAL at 18:31

## 2021-10-25 RX ADMIN — CEFTRIAXONE SODIUM 1 G: 1 INJECTION, POWDER, FOR SOLUTION INTRAMUSCULAR; INTRAVENOUS at 05:03

## 2021-10-25 RX ADMIN — IOHEXOL 200 ML: 300 INJECTION, SOLUTION INTRAVENOUS at 10:52

## 2021-10-25 RX ADMIN — LIDOCAINE 1 PATCH: 50 PATCH TOPICAL at 05:01

## 2021-10-25 RX ADMIN — METRONIDAZOLE 500 MG: 500 INJECTION, SOLUTION INTRAVENOUS at 06:21

## 2021-10-25 RX ADMIN — GABAPENTIN 100 MG: 100 CAPSULE ORAL at 18:31

## 2021-10-25 RX ADMIN — POTASSIUM CHLORIDE, DEXTROSE MONOHYDRATE AND SODIUM CHLORIDE: 150; 5; 900 INJECTION, SOLUTION INTRAVENOUS at 04:15

## 2021-10-25 RX ADMIN — METRONIDAZOLE 500 MG: 500 INJECTION, SOLUTION INTRAVENOUS at 14:23

## 2021-10-25 RX ADMIN — LORAZEPAM 0.5 MG: 2 INJECTION INTRAMUSCULAR; INTRAVENOUS at 21:34

## 2021-10-25 RX ADMIN — OXYCODONE 5 MG: 5 TABLET ORAL at 22:49

## 2021-10-25 RX ADMIN — SODIUM CHLORIDE 8 MG/HR: 9 INJECTION, SOLUTION INTRAVENOUS at 04:15

## 2021-10-25 ASSESSMENT — ENCOUNTER SYMPTOMS
MYALGIAS: 0
FOCAL WEAKNESS: 0
SORE THROAT: 0
COUGH: 0
DIZZINESS: 0
ABDOMINAL PAIN: 1
WHEEZING: 0
EYE DISCHARGE: 0
WEAKNESS: 0
CONSTIPATION: 0
VOMITING: 0
BACK PAIN: 0
PALPITATIONS: 0
LOSS OF CONSCIOUSNESS: 0
DIARRHEA: 0
CLAUDICATION: 0
EYE PAIN: 0
HEADACHES: 0
DEPRESSION: 0
NAUSEA: 0
DIAPHORESIS: 0
CHILLS: 0
FEVER: 0
SPUTUM PRODUCTION: 0
SPEECH CHANGE: 0
SHORTNESS OF BREATH: 0
HEMOPTYSIS: 0
BRUISES/BLEEDS EASILY: 0
NECK PAIN: 0
SENSORY CHANGE: 0

## 2021-10-25 ASSESSMENT — LIFESTYLE VARIABLES: SUBSTANCE_ABUSE: 0

## 2021-10-25 ASSESSMENT — PAIN DESCRIPTION - PAIN TYPE
TYPE: ACUTE PAIN

## 2021-10-25 NOTE — PROGRESS NOTES
Received bedside report from ESTEFANY Cruz, pt care assumed. VSS, pt assessment complete. Pt AOx4, no pain at this time. POC discussed with pt and verbalizes no questions. Pt denies any additional needs at this time. Bed locked and in lowest position, bed alarm is on. Pt educated on fall risk and verbalized understanding, call light within reach, hourly rounding initiated.

## 2021-10-25 NOTE — PROGRESS NOTES
Hospital Medicine Daily Progress Note    Date of Service  10/25/2021    Chief Complaint  Davie Montejo is a 86 y.o. female admitted 10/21/2021 with acute GI bleed with perforation and anemia requiring blood transfusion    Hospital Course  Davie Montejo is a 86 y.o. female who presented 10/21/2021 with chest and back pain.  Patient states she has had a substernal/epigastric and pain under her left breast for months.  She states however tonight it was more severe and woke her from her sleep.  She also feels like the pain radiates to her back.  She did go to [place] yesterday.  Patient describes the pain as sharp at times, pressure as well, now severe.  She does note black stool, states that has been going on for multiple days as well.  Upon arrival, patient was noted to have a significant decrease in her hemoglobin, upon further work-up, she was noted to have a perforated gastric ulcer.  Patient admitted 10/21/21 by Dr. Hampton.  I have examined patient and managed due to critical gastric ulcer bleed.  Repeat Hg 8 post transfusion, patient takes plavix and TEG does show 67% inhibition for clotting.  will give platelets to help reverse inhibition and stop bleeding.  CT abdomen with po contrast showing contained gastric perforation.  Dr. Santos aware.     Interval Problem Update  10/22:  Patient wants fluids, however strict NPO per surgery x 3 days total to prevent need for OR given her contained gastric perforation.  IV protonix gtts continue.  Need for 1 unit PRBCs this a.m. for Hg 6.9.    10/23: Hemoglobin stabilizing 10.4-9.0 after 1 unit packed red blood cells yesterday.  No further transfusions overnight.  Vital signs stable.  EF 65%.  She is currently on room air.  She is on strict n.p.o. per general surgeon until Monday when we repeat an upper GI to assess her perforated gastric ulcer.  Contained.  She remains on Rocephin and Flagyl as well as D5 normal saline +20 K at 100 and hour.   Remains on protonix drip.  10/24: Patient sleeping on exam.  She was able to awaken briefly but falls back to sleep.  She remains strict n.p.o. until upper GI with p.o. contrast study performed tomorrow.  She remains sinus rhythm 65-89.  Hemoglobin stable.  10/25:  Patient SR.  Hg stable. UGI study stable.  Dc telemetry.  Spoke with Dr. Santos, agrees with ice chips for now and will see patient today for recs.  Hg 9.9 to 9.7.  Patient states her pain has improved since admission and is hungry.  Completed 6 days of IV rocephin and flagyl.  Remains on protonix drip.    I have personally seen and examined the patient at bedside. I discussed the plan of care with patient, bedside RN, charge RN and .    Consultants/Specialty  general surgery    Code Status  Full Code    Disposition  Patient is not medically cleared.   Anticipate discharge to to home with organized home healthcare and close outpatient follow-up.  I have placed the appropriate orders for post-discharge needs.    Review of Systems  Review of Systems   Constitutional: Negative for chills, diaphoresis, fever and malaise/fatigue.   HENT: Negative for congestion and sore throat.    Eyes: Negative for pain and discharge.   Respiratory: Negative for cough, hemoptysis, sputum production, shortness of breath and wheezing.    Cardiovascular: Negative for chest pain, palpitations and claudication.   Gastrointestinal: Positive for abdominal pain. Negative for constipation, diarrhea, melena, nausea and vomiting.   Genitourinary: Negative for dysuria, frequency and urgency.   Musculoskeletal: Negative for back pain, joint pain, myalgias and neck pain.   Skin: Negative for itching and rash.   Neurological: Negative for dizziness, sensory change, speech change, focal weakness, loss of consciousness, weakness and headaches.   Endo/Heme/Allergies: Does not bruise/bleed easily.   Psychiatric/Behavioral: Negative for depression, substance abuse and suicidal ideas.         Physical Exam  Temp:  [36.8 °C (98.3 °F)-36.9 °C (98.4 °F)] 36.9 °C (98.4 °F)  Pulse:  [61-81] 61  Resp:  [15-16] 16  BP: (117-137)/() 127/60  SpO2:  [92 %-95 %] 92 %    Physical Exam  Vitals and nursing note reviewed.   Constitutional:       General: She is not in acute distress.     Appearance: Normal appearance. She is well-developed and normal weight. She is not diaphoretic.      Comments: Patient states she is hungry   HENT:      Head: Normocephalic and atraumatic.      Nose: Nose normal.      Mouth/Throat:      Pharynx: No oropharyngeal exudate.   Eyes:      General: No scleral icterus.        Right eye: No discharge.         Left eye: No discharge.      Conjunctiva/sclera: Conjunctivae normal.      Pupils: Pupils are equal, round, and reactive to light.   Neck:      Thyroid: No thyromegaly.      Vascular: No JVD.      Trachea: No tracheal deviation.   Cardiovascular:      Rate and Rhythm: Normal rate and regular rhythm.      Heart sounds: Normal heart sounds. No murmur heard.   No friction rub. No gallop.    Pulmonary:      Effort: Pulmonary effort is normal. No respiratory distress.      Breath sounds: Normal breath sounds. No stridor. No wheezing or rales.   Chest:      Chest wall: No tenderness.   Abdominal:      General: Bowel sounds are normal. There is no distension.      Palpations: Abdomen is soft. There is no mass.      Tenderness: There is abdominal tenderness (epigastric, no abdominal distention). There is no guarding or rebound.   Musculoskeletal:         General: No tenderness. Normal range of motion.      Cervical back: Normal range of motion and neck supple.   Lymphadenopathy:      Cervical: No cervical adenopathy.   Skin:     General: Skin is warm and dry.      Findings: No erythema or rash.   Neurological:      General: No focal deficit present.      Mental Status: She is alert and oriented to person, place, and time.      Cranial Nerves: No cranial nerve deficit.      Motor: No  abnormal muscle tone.      Coordination: Coordination normal.   Psychiatric:         Mood and Affect: Mood normal.         Behavior: Behavior normal.         Thought Content: Thought content normal.         Judgment: Judgment normal.         Fluids  No intake or output data in the 24 hours ending 10/25/21 1419    Laboratory  Recent Labs     10/23/21  0717 10/24/21  1509 10/25/21  0406   WBC  --  6.0 5.4   RBC  --  3.39* 3.30*   HEMOGLOBIN 9.0* 9.9* 9.7*   HEMATOCRIT  --  30.7* 29.8*   MCV  --  90.6 90.3   MCH  --  29.2 29.4   MCHC  --  32.2* 32.6*   RDW  --  46.4 47.1   PLATELETCT  --  410 411   MPV  --  9.2 9.5     Recent Labs     10/23/21  0041 10/24/21  0024 10/25/21  0406   SODIUM 136 134* 137   POTASSIUM 4.2 4.0 3.8   CHLORIDE 105 104 107   CO2 20 21 20   GLUCOSE 91 84 105*   BUN 12 6* 4*   CREATININE 0.95 0.82 0.80   CALCIUM 8.5 8.7 8.1*                   Imaging  DX-UPPER GI-SERIES WITH KUB   Final Result      1.  Contained gastric perforation contiguous with the superior aspect of the gastric distal body/antrum      2.  No free leakage into the peritoneal cavity      3.  The distal body and antrum of the stomach are narrowed consistent with wall thickening      4.  Overall, there probably no changes since recent CT abdomen and pelvis      EC-ECHOCARDIOGRAM COMPLETE W/O CONT   Final Result      CT-ABDOMEN-PELVIS W/O   Final Result      1.  Again seen prominent thickening of the wall of the stomach most prominently within the gastric antrum and extending into the proximal duodenum consistent with peptic ulcer disease. There is again seen a focal area of contrast density fluid and gas    extending cephalad from that area of gastric antral thickening which appears to be extraluminal and with some surrounding inflammatory stranding and fluid within the mesentery. This most likely represents a contained perforated gastric antral ulcer.    There is no other evidence of free intraperitoneal air or evidence of  contrast extravasation into the peritoneal cavity.      2.  Prior cholecystectomy.      3.  Renal cortical thinning and scarring.      4.  Sigmoid diverticulosis.      CT-CTA COMPLETE THORACOABDOMINAL AORTA   Final Result         1.  Thickened gastric antrum with extraluminal fluid collection and nondependent focus of air superior to the stomach. Appearance favors perforated gastric ulcer with adjacent gastric succus or abscess.   2.  No aortic aneurysm or dissection identified. There is 2.3 cm infrarenal fusiform abdominal aortic ectasia is seen.   3.  Diverticulosis   4.  Atherosclerosis      These findings were discussed with the patient's clinician, Donavan Orozco, on 10/21/2021 5:38 AM.      DX-CHEST-PORTABLE (1 VIEW)   Final Result         1.  No acute cardiopulmonary disease.           Assessment/Plan  Coagulopathy (HCC)- (present on admission)  Assessment & Plan  Secondary to asa and plavix use.  Holding both medications.  TEG with increased inhibition noted.  Gave 1 unit platelets 10/21.  Monitor for further bleeding and given platelets as needed to correct coagulopathy from plavix.  PTT and INR wnl.    Acute blood loss anemia- (present on admission)  Assessment & Plan  -I feel this is more likely acute on chronic however it is chronic only because she is likely been bleeding to some degree for quite some time  protonix gtts.  -Continue to transfuse for hemoglobin less than 7  -Patient remains hemodynamically stable    VINCE (acute kidney injury) (HCC)- (present on admission)  Assessment & Plan  -Mild, due to dehydration  Resolved with IVFs.    Hyponatremia- (present on admission)  Assessment & Plan  -Mild, likely due to dehydration  - IV fluids    Acute perforated gastric ulcer with hemorrhage (HCC)- (present on admission)  Assessment & Plan  -Noted on CTA of the chest  -ERP has discussed the case with surgery  -Causing GI bleeding, continue Protonix drip  -High risk of worsening, closely monitor on  telemetry  -Causing significant pain, narcotics for pain management  -Potentially associated with an abscess, start Rocephin and Flagyl IV.  Per Dr. Santos, strict NPO x 3 days, then trial of ice chips.  Plan for repeat UGI xrays on 10/25 to ensure no leakage of contrast.  Monitor HG q 6 hours, required transfusions PRBCs 10/21 and 10/22.  10/23:  Stable Hg, no further melena noted.  10/25:  UGI stable, no peritoneal leakage of contrast.  Contained gastric perforation.  Ice chips started.  Continue IVFs D5NS + 20 K.  protonix gtts.    Hyperlipidemia- (present on admission)  Assessment & Plan  -hold home statin    Hypothyroidism- (present on admission)  Assessment & Plan  -hold home Synthroid dose at 150 mcg daily since strict npo x 3 days.  -No recent TSH    Essential hypertension- (present on admission)  Assessment & Plan  -hold home losartan and metoprolol  -Start as needed enalapril and labetalol      Depressive disorder- (present on admission)  Assessment & Plan  -hold home Celexa       VTE prophylaxis: SCDs/TEDs    I have performed a physical exam and reviewed and updated ROS and Plan today (10/25/2021). In review of yesterday's note (10/24/2021), there are no changes except as documented above.

## 2021-10-25 NOTE — DISCHARGE PLANNING
Anticipated Discharge Disposition:   Home when medically cleared     Action:   Chart review complete.     Per chart review, this patient to have an upper GI study with contrast today.     Patient is A&Ox4 and is ambulatory. Anticipated discharge to home with resources.     Barriers to Discharge:   Medical Clearance    Plan:   Hospital care management will continue to assist with discharge planning needs.     Care Transition Team Assessment    Information Source  Orientation Level: Oriented X4  Information Given By: Other (Comments)  Who is responsible for making decisions for patient? : Patient    Readmission Evaluation  Is this a readmission?: Yes - unplanned readmission    Elopement Risk  Legal Hold: No  Ambulatory or Self Mobile in Wheelchair: Yes  Disoriented: No  Psychiatric Symptoms: None  History of Wandering: No  Elopement this Admit: No  Vocalizing Wanting to Leave: No  Displays Behaviors, Body Language Wanting to Leave: No-Not at Risk for Elopement  Elopement Risk: Not at Risk for Elopement    Interdisciplinary Discharge Planning  Does Admitting Nurse Feel This Could be a Complex Discharge?: No  Lives with - Patient's Self Care Capacity: Other (Comments) (adult brother)  Patient or legal guardian wants to designate a caregiver: No  Support Systems: Friends / Neighbors  Housing / Facility: [address]  Do You Take your Prescribed Medications Regularly: Yes  Able to Return to Previous ADL's: Yes  Mobility Issues: No  Durable Medical Equipment: Unknown    Discharge Preparedness  What is your plan after discharge?: Home with help  What are your discharge supports?: Other (comment)  Prior Functional Level: Ambulatory    Functional Assesment  Prior Functional Level: Ambulatory    Finances  Financial Barriers to Discharge: No  Prescription Coverage: Yes    Vision / Hearing Impairment  Vision Impairment : Yes  Right Eye Vision: Impaired, Wears Glasses  Left Eye Vision: Impaired, Wears Glasses  Hearing Impairment  : No    Advance Directive  Advance Directive?: None    Domestic Abuse  Have you ever been the victim of abuse or violence?: no  Was the violence by:: Other (caregiver)  Is this happening now?: No  Has the violence increased in frequency and severity?: No  Are you afraid to go home today?: No  Did you have pets at the time of Abuse?: No  Do you know Where to get Help?: No  Physical Abuse or Sexual Abuse: No  Verbal Abuse or Emotional Abuse: No  Possible Abuse/Neglect Reported to:: Not Applicable    Discharge risks or Barriers  Discharge risks or barriers?: Complex medical needs  Patient risk factors: Complex medical needs, Vulnerable adult    Anticipated Discharge Information  Discharge Disposition: Still a Patient (30)  Discharge Address: [address] [facility]

## 2021-10-25 NOTE — PROGRESS NOTES
Telemetry Shift Summary     RHYTHM: SR First Degree AVB/BBB  HR RANGE: 67-71  ECTOPY: r PVC, r PAC  MEASUREMENTS: 0.22/0.12/0.42    Normal Measurements  Rhythm: SR  HR RANGE:   Measurements: 0.12-0.20/0.06-0.10/0.30-0.52      Tele strips reviewed and placed in chart

## 2021-10-25 NOTE — CARE PLAN
The patient is Stable - Low risk of patient condition declining or worsening    Shift Goals  Clinical Goals: manage pain  Patient Goals: manage pain, rest  Family Goals: no family present    Progress made toward(s) clinical / shift goals:     Problem: Knowledge Deficit - Standard  Goal: Patient and family/care givers will demonstrate understanding of plan of care, disease process/condition, diagnostic tests and medications  Outcome: Progressing     Problem: Fall Risk  Goal: Patient will remain free from falls  Outcome: Progressing     Problem: Communication  Goal: The ability to communicate needs accurately and effectively will improve  Outcome: Progressing     Problem: Mobility  Goal: Patient's capacity to carry out activities will improve  Outcome: Progressing     Problem: Self Care  Goal: Patient will have the ability to perform ADLs independently or with assistance (bathe, groom, dress, toilet and feed)  Outcome: Progressing       Patient is not progressing towards the following goals:      Problem: Pain - Standard  Goal: Alleviation of pain or a reduction in pain to the patient’s comfort goal  Outcome: Not Met

## 2021-10-25 NOTE — PROGRESS NOTES
Report received from Laura ALLISON. Pt awake and sitting up in bed at the time of report. Plan of care discussed at bedside. White board has been updated. Safety precautions in place and call light within reach.

## 2021-10-26 LAB
ANION GAP SERPL CALC-SCNC: 12 MMOL/L (ref 7–16)
BASOPHILS # BLD AUTO: 1 % (ref 0–1.8)
BASOPHILS # BLD: 0.06 K/UL (ref 0–0.12)
BUN SERPL-MCNC: 3 MG/DL (ref 8–22)
CALCIUM SERPL-MCNC: 8.8 MG/DL (ref 8.4–10.2)
CHLORIDE SERPL-SCNC: 103 MMOL/L (ref 96–112)
CO2 SERPL-SCNC: 22 MMOL/L (ref 20–33)
CREAT SERPL-MCNC: 0.84 MG/DL (ref 0.5–1.4)
EOSINOPHIL # BLD AUTO: 0.29 K/UL (ref 0–0.51)
EOSINOPHIL NFR BLD: 4.9 % (ref 0–6.9)
ERYTHROCYTE [DISTWIDTH] IN BLOOD BY AUTOMATED COUNT: 46.5 FL (ref 35.9–50)
GLUCOSE SERPL-MCNC: 72 MG/DL (ref 65–99)
HCT VFR BLD AUTO: 31.6 % (ref 37–47)
HGB BLD-MCNC: 10.1 G/DL (ref 12–16)
IMM GRANULOCYTES # BLD AUTO: 0.04 K/UL (ref 0–0.11)
IMM GRANULOCYTES NFR BLD AUTO: 0.7 % (ref 0–0.9)
LYMPHOCYTES # BLD AUTO: 1.65 K/UL (ref 1–4.8)
LYMPHOCYTES NFR BLD: 27.9 % (ref 22–41)
MCH RBC QN AUTO: 28.7 PG (ref 27–33)
MCHC RBC AUTO-ENTMCNC: 32 G/DL (ref 33.6–35)
MCV RBC AUTO: 89.8 FL (ref 81.4–97.8)
MONOCYTES # BLD AUTO: 0.59 K/UL (ref 0–0.85)
MONOCYTES NFR BLD AUTO: 10 % (ref 0–13.4)
NEUTROPHILS # BLD AUTO: 3.29 K/UL (ref 2–7.15)
NEUTROPHILS NFR BLD: 55.5 % (ref 44–72)
NRBC # BLD AUTO: 0 K/UL
NRBC BLD-RTO: 0 /100 WBC
PLATELET # BLD AUTO: 431 K/UL (ref 164–446)
PMV BLD AUTO: 9.6 FL (ref 9–12.9)
POTASSIUM SERPL-SCNC: 3.7 MMOL/L (ref 3.6–5.5)
RBC # BLD AUTO: 3.52 M/UL (ref 4.2–5.4)
SODIUM SERPL-SCNC: 137 MMOL/L (ref 135–145)
WBC # BLD AUTO: 5.9 K/UL (ref 4.8–10.8)

## 2021-10-26 PROCEDURE — 770006 HCHG ROOM/CARE - MED/SURG/GYN SEMI*

## 2021-10-26 PROCEDURE — 700111 HCHG RX REV CODE 636 W/ 250 OVERRIDE (IP): Performed by: INTERNAL MEDICINE

## 2021-10-26 PROCEDURE — 36415 COLL VENOUS BLD VENIPUNCTURE: CPT

## 2021-10-26 PROCEDURE — 85025 COMPLETE CBC W/AUTO DIFF WBC: CPT

## 2021-10-26 PROCEDURE — A9270 NON-COVERED ITEM OR SERVICE: HCPCS | Performed by: INTERNAL MEDICINE

## 2021-10-26 PROCEDURE — 700101 HCHG RX REV CODE 250: Performed by: INTERNAL MEDICINE

## 2021-10-26 PROCEDURE — 80048 BASIC METABOLIC PNL TOTAL CA: CPT

## 2021-10-26 PROCEDURE — 700102 HCHG RX REV CODE 250 W/ 637 OVERRIDE(OP): Performed by: INTERNAL MEDICINE

## 2021-10-26 PROCEDURE — 700111 HCHG RX REV CODE 636 W/ 250 OVERRIDE (IP): Performed by: HOSPITALIST

## 2021-10-26 PROCEDURE — 700105 HCHG RX REV CODE 258: Performed by: INTERNAL MEDICINE

## 2021-10-26 PROCEDURE — C9113 INJ PANTOPRAZOLE SODIUM, VIA: HCPCS | Performed by: HOSPITALIST

## 2021-10-26 PROCEDURE — 99232 SBSQ HOSP IP/OBS MODERATE 35: CPT | Performed by: STUDENT IN AN ORGANIZED HEALTH CARE EDUCATION/TRAINING PROGRAM

## 2021-10-26 RX ORDER — SODIUM CHLORIDE, SODIUM LACTATE, POTASSIUM CHLORIDE, AND CALCIUM CHLORIDE .6; .31; .03; .02 G/100ML; G/100ML; G/100ML; G/100ML
500 INJECTION, SOLUTION INTRAVENOUS ONCE
Status: COMPLETED | OUTPATIENT
Start: 2021-10-26 | End: 2021-10-27

## 2021-10-26 RX ORDER — OXYCODONE HYDROCHLORIDE 5 MG/1
5-10 TABLET ORAL EVERY 4 HOURS PRN
Status: DISCONTINUED | OUTPATIENT
Start: 2021-10-26 | End: 2021-10-28

## 2021-10-26 RX ADMIN — OXYCODONE HYDROCHLORIDE 10 MG: 5 TABLET ORAL at 13:03

## 2021-10-26 RX ADMIN — OXYCODONE HYDROCHLORIDE 10 MG: 5 TABLET ORAL at 03:01

## 2021-10-26 RX ADMIN — CEFTRIAXONE SODIUM 1 G: 1 INJECTION, POWDER, FOR SOLUTION INTRAMUSCULAR; INTRAVENOUS at 05:58

## 2021-10-26 RX ADMIN — FOLIC ACID 1 MG: 1 TABLET ORAL at 06:01

## 2021-10-26 RX ADMIN — GABAPENTIN 100 MG: 100 CAPSULE ORAL at 11:31

## 2021-10-26 RX ADMIN — SODIUM CHLORIDE, POTASSIUM CHLORIDE, SODIUM LACTATE AND CALCIUM CHLORIDE 500 ML: 600; 310; 30; 20 INJECTION, SOLUTION INTRAVENOUS at 23:39

## 2021-10-26 RX ADMIN — METOPROLOL TARTRATE 12.5 MG: 25 TABLET, FILM COATED ORAL at 06:02

## 2021-10-26 RX ADMIN — GABAPENTIN 100 MG: 100 CAPSULE ORAL at 06:01

## 2021-10-26 RX ADMIN — PANTOPRAZOLE SODIUM 40 MG: 40 INJECTION, POWDER, LYOPHILIZED, FOR SOLUTION INTRAVENOUS at 05:58

## 2021-10-26 RX ADMIN — CITALOPRAM HYDROBROMIDE 10 MG: 20 TABLET ORAL at 06:01

## 2021-10-26 RX ADMIN — LIDOCAINE 1 PATCH: 50 PATCH TOPICAL at 06:03

## 2021-10-26 RX ADMIN — ONDANSETRON 4 MG: 4 TABLET, ORALLY DISINTEGRATING ORAL at 20:57

## 2021-10-26 RX ADMIN — SENNOSIDES AND DOCUSATE SODIUM 2 TABLET: 50; 8.6 TABLET ORAL at 06:00

## 2021-10-26 RX ADMIN — LEVOTHYROXINE SODIUM 150 MCG: 0.07 TABLET ORAL at 06:00

## 2021-10-26 ASSESSMENT — PAIN DESCRIPTION - PAIN TYPE
TYPE: ACUTE PAIN

## 2021-10-26 ASSESSMENT — ENCOUNTER SYMPTOMS
COUGH: 0
HEADACHES: 0
EYES NEGATIVE: 1
SHORTNESS OF BREATH: 0
FEVER: 0
VOMITING: 0
MYALGIAS: 0
ABDOMINAL PAIN: 1
WEAKNESS: 1
NAUSEA: 0
CHILLS: 0

## 2021-10-26 NOTE — CARE PLAN
The patient is Stable - Low risk of patient condition declining or worsening    Shift Goals  Clinical Goals: pain management   Patient Goals: manage pain, rest  Family Goals: no family present    Progress made toward(s) clinical / shift goals:    Problem: Pain - Standard  Goal: Alleviation of pain or a reduction in pain to the patient’s comfort goal  Outcome: Progressing  Note: Pain managed per MAR        Problem: Knowledge Deficit - Standard  Goal: Patient and family/care givers will demonstrate understanding of plan of care, disease process/condition, diagnostic tests and medications  Outcome: Progressing     Problem: Fall Risk  Goal: Patient will remain free from falls  Outcome: Progressing       Patient is not progressing towards the following goals:

## 2021-10-26 NOTE — PROGRESS NOTES
Received bedside report from NOC RN, pt care assumed. VSS, pt assessment complete. Pt AAOx4, no c/o pain at this time. POC discussed with pt and verbalizes no questions. Pt denies any additional needs at this time. Bed locked and in lowest position. Pt educated on fall risk and verbalized understanding, call light within reach, hourly rounding initiated.

## 2021-10-26 NOTE — CARE PLAN
Problem: Nutritional:  Goal: Achieve adequate nutritional intake  Description: Patient will consume 50% of meals  Outcome: Progressing     Per surgeon's progress note, UGI was negative. Clear liquids started with recommendation from surgeon to advance slowly. Pt NPO/clears x 5 days. Boost Breeze added to meals to increase kcals and protein provided. RD will continue to follow for advancement beyond clears.

## 2021-10-26 NOTE — PROGRESS NOTES
"S: Davie Montejo  is a 86 y.o.  female admitted for contained gastric ulcer perforation. Unchanged this AM      O:  /42   Pulse 69   Temp 36.9 °C (98.4 °F) (Axillary)   Resp 18   Ht 1.702 m (5' 7\")   Wt 64 kg (141 lb 1.5 oz)   SpO2 93%   Intake/Output                             10/24/21 0700 - 10/25/21 0659 10/25/21 0700 - 10/26/21 0659 10/26/21 0700 - 10/27/21 0659     0700-1859 1900-0659 Total 0700-1859 1900-0659 Total 0700-1859 1900-0659 Total                    Intake    Total Intake -- -- -- -- -- -- -- -- --       Output    Urine  --  -- --  --  -- --  --  -- --    Number of Times Voided -- 2 x 2 x -- -- -- -- -- --    Total Output -- -- -- -- -- -- -- -- --       Net I/O     -- -- -- -- -- -- -- -- --        Recent Labs     10/24/21  0024 10/25/21  0406 10/26/21  0239   SODIUM 134* 137 137   POTASSIUM 4.0 3.8 3.7   CHLORIDE 104 107 103   CO2 21 20 22   GLUCOSE 84 105* 72   BUN 6* 4* 3*   CREATININE 0.82 0.80 0.84   CALCIUM 8.7 8.1* 8.8     Recent Labs     10/24/21  1509 10/25/21  0406 10/26/21  0239   WBC 6.0 5.4 5.9   RBC 3.39* 3.30* 3.52*   HEMOGLOBIN 9.9* 9.7* 10.1*   HEMATOCRIT 30.7* 29.8* 31.6*   MCV 90.6 90.3 89.8   MCH 29.2 29.4 28.7   MCHC 32.2* 32.6* 32.0*   RDW 46.4 47.1 46.5   PLATELETCT 410 411 431   MPV 9.2 9.5 9.6       Alert and Oriented x3, No Acute Distress  Normal Respiratory Effort  Abdomen soft, mildly tender in epigastrium and RUQ  Extremities warm and well perfused    A/P:  Negative UGI. Start CLD. If significant changes in pain or vitals would hold PO intake. Slow advancement.    Mookie Santos MD  [facility]  "

## 2021-10-26 NOTE — PROGRESS NOTES
Hospital Medicine Daily Progress Note    Date of Service  10/26/2021    Chief Complaint  Davie Montejo is a 86 y.o. female admitted 10/21/2021 with chest pain    Hospital Course  An 86-year-old woman with h/o HTN, HLD, hypothyroidism, depression presented with chest and back pain. She had substernal/epigastric pain under her left breast for months, however the night before admission, she woke up from pain.  On admission, patient was found to have low hemoglobin. CT abdomen showed contained gastric perforation. Patient received blood transfusions, platelet transfusion.   Surgery recommended conservative management. Patient was on rocephin, flagyl, PPI. Started CLD on 10/25. Slow advancement of diet as tolerated.    Interval Problem Update  10/26: Patient report abdominal pain 7/10. Afebrile, hemodynamically stable. On room air. RLQ pain    I have personally seen and examined the patient at bedside. I discussed the plan of care with patient, bedside RN, charge RN,  and pharmacy.    Consultants/Specialty  general surgery    Code Status  Full Code    Disposition  Patient is not medically cleared.   Anticipate discharge to to home with close outpatient follow-up.  I have placed the appropriate orders for post-discharge needs.    Review of Systems  Review of Systems   Constitutional: Positive for malaise/fatigue. Negative for chills and fever.   HENT: Negative.    Eyes: Negative.    Respiratory: Negative for cough and shortness of breath.    Cardiovascular: Negative for chest pain.   Gastrointestinal: Positive for abdominal pain. Negative for nausea and vomiting.   Musculoskeletal: Negative for myalgias.   Skin: Negative for rash.   Neurological: Positive for weakness. Negative for headaches.        Physical Exam  Temp:  [36.9 °C (98.4 °F)] 36.9 °C (98.4 °F)  Pulse:  [69] 69  Resp:  [18] 18  BP: (117)/(42) 117/42  SpO2:  [93 %] 93 %    Physical Exam  Vitals and nursing note reviewed.    Constitutional:       Appearance: She is ill-appearing.   HENT:      Head: Normocephalic and atraumatic.      Mouth/Throat:      Mouth: Mucous membranes are moist.      Pharynx: Oropharynx is clear.   Eyes:      Pupils: Pupils are equal, round, and reactive to light.   Cardiovascular:      Rate and Rhythm: Normal rate and regular rhythm.   Pulmonary:      Effort: Pulmonary effort is normal. No respiratory distress.      Breath sounds: No wheezing or rales.   Abdominal:      General: Abdomen is flat. Bowel sounds are normal.      Tenderness: There is abdominal tenderness (RLQ). There is no guarding.   Musculoskeletal:         General: Normal range of motion.      Cervical back: Normal range of motion.   Skin:     General: Skin is warm.   Neurological:      General: No focal deficit present.      Mental Status: She is alert and oriented to person, place, and time.         Fluids  No intake or output data in the 24 hours ending 10/26/21 1254    Laboratory  Recent Labs     10/24/21  1509 10/25/21  0406 10/26/21  0239   WBC 6.0 5.4 5.9   RBC 3.39* 3.30* 3.52*   HEMOGLOBIN 9.9* 9.7* 10.1*   HEMATOCRIT 30.7* 29.8* 31.6*   MCV 90.6 90.3 89.8   MCH 29.2 29.4 28.7   MCHC 32.2* 32.6* 32.0*   RDW 46.4 47.1 46.5   PLATELETCT 410 411 431   MPV 9.2 9.5 9.6     Recent Labs     10/24/21  0024 10/25/21  0406 10/26/21  0239   SODIUM 134* 137 137   POTASSIUM 4.0 3.8 3.7   CHLORIDE 104 107 103   CO2 21 20 22   GLUCOSE 84 105* 72   BUN 6* 4* 3*   CREATININE 0.82 0.80 0.84   CALCIUM 8.7 8.1* 8.8                   Imaging  DX-UPPER GI-SERIES WITH KUB   Final Result      1.  Contained gastric perforation contiguous with the superior aspect of the gastric distal body/antrum      2.  No free leakage into the peritoneal cavity      3.  The distal body and antrum of the stomach are narrowed consistent with wall thickening      4.  Overall, there probably no changes since recent CT abdomen and pelvis      EC-ECHOCARDIOGRAM COMPLETE W/O CONT    Final Result      CT-ABDOMEN-PELVIS W/O   Final Result      1.  Again seen prominent thickening of the wall of the stomach most prominently within the gastric antrum and extending into the proximal duodenum consistent with peptic ulcer disease. There is again seen a focal area of contrast density fluid and gas    extending cephalad from that area of gastric antral thickening which appears to be extraluminal and with some surrounding inflammatory stranding and fluid within the mesentery. This most likely represents a contained perforated gastric antral ulcer.    There is no other evidence of free intraperitoneal air or evidence of contrast extravasation into the peritoneal cavity.      2.  Prior cholecystectomy.      3.  Renal cortical thinning and scarring.      4.  Sigmoid diverticulosis.      CT-CTA COMPLETE THORACOABDOMINAL AORTA   Final Result         1.  Thickened gastric antrum with extraluminal fluid collection and nondependent focus of air superior to the stomach. Appearance favors perforated gastric ulcer with adjacent gastric succus or abscess.   2.  No aortic aneurysm or dissection identified. There is 2.3 cm infrarenal fusiform abdominal aortic ectasia is seen.   3.  Diverticulosis   4.  Atherosclerosis      These findings were discussed with the patient's clinician, Donavan Orozco, on 10/21/2021 5:38 AM.      DX-CHEST-PORTABLE (1 VIEW)   Final Result         1.  No acute cardiopulmonary disease.           Assessment/Plan  * Acute perforated gastric ulcer with hemorrhage (HCC)- (present on admission)  Assessment & Plan  -Noted on CTA of the chest  -Causing GI bleeding, continue Protonix drip  -Completed Rocephin and Flagyl IV.  -Per Dr. Santos, strict NPO x 3 days, then trial of ice chips.  -Monitor HG q 6 hours, required transfusions PRBCs 10/21 and 10/22.  -10/25:  UGI stable, no peritoneal leakage of contrast.  Contained gastric perforation.  Ice chips started.  Continue IVFs D5NS + 20 K.  protonix  gtts.  -surgery is following, recommendations appreciated    Coagulopathy (HCC)- (present on admission)  Assessment & Plan  Secondary to asa and plavix use.  Holding both medications.  TEG with increased inhibition noted.  Gave 1 unit platelets 10/21.  Monitor for further bleeding and given platelets as needed to correct coagulopathy from plavix.  PTT and INR wnl.    Acute blood loss anemia- (present on admission)  Assessment & Plan  -more likely acute on chronic however it is chronic only because she is likely been bleeding to some degree for quite some time  protonix gtts.  -Continue to transfuse for hemoglobin less than 7  -Patient remains hemodynamically stable    VINCE (acute kidney injury) (HCC)- (present on admission)  Assessment & Plan  -Mild, due to dehydration  -Resolved with IVFs.    Hyponatremia- (present on admission)  Assessment & Plan  -Resolved  -Mild, likely due to dehydration    Hyperlipidemia- (present on admission)  Assessment & Plan  -hold home statin    Hypothyroidism- (present on admission)  Assessment & Plan  -Continue home Synthroid dose at 150 mcg daily    Essential hypertension- (present on admission)  Assessment & Plan  -hold home losartan  -continue metoprolol  -as needed enalapril and labetalol    Depressive disorder- (present on admission)  Assessment & Plan  -continue home Celexa       VTE prophylaxis: SCDs/TEDs    I have performed a physical exam and reviewed and updated ROS and Plan today (10/26/2021). In review of yesterday's note (10/25/2021), there are no changes except as documented above.

## 2021-10-26 NOTE — PROGRESS NOTES
"S: Davie Montejo  is a 86 y.o.  female admitted for contained gastric ulcer perforation. Unchanged this AM      O:  /42   Pulse 69   Temp 36.9 °C (98.4 °F) (Axillary)   Resp 18   Ht 1.702 m (5' 7\")   Wt 64 kg (141 lb 1.5 oz)   SpO2 93%   Intake/Output                             10/24/21 0700 - 10/25/21 0659 10/25/21 0700 - 10/26/21 0659 10/26/21 0700 - 10/27/21 0659     0700-1859 1900-0659 Total 0700-1859 1900-0659 Total 0700-1859 1900-0659 Total                    Intake    Total Intake -- -- -- -- -- -- -- -- --       Output    Urine  --  -- --  --  -- --  --  -- --    Number of Times Voided -- 2 x 2 x -- -- -- -- -- --    Total Output -- -- -- -- -- -- -- -- --       Net I/O     -- -- -- -- -- -- -- -- --        Recent Labs     10/24/21  0024 10/25/21  0406 10/26/21  0239   SODIUM 134* 137 137   POTASSIUM 4.0 3.8 3.7   CHLORIDE 104 107 103   CO2 21 20 22   GLUCOSE 84 105* 72   BUN 6* 4* 3*   CREATININE 0.82 0.80 0.84   CALCIUM 8.7 8.1* 8.8     Recent Labs     10/24/21  1509 10/25/21  0406 10/26/21  0239   WBC 6.0 5.4 5.9   RBC 3.39* 3.30* 3.52*   HEMOGLOBIN 9.9* 9.7* 10.1*   HEMATOCRIT 30.7* 29.8* 31.6*   MCV 90.6 90.3 89.8   MCH 29.2 29.4 28.7   MCHC 32.2* 32.6* 32.0*   RDW 46.4 47.1 46.5   PLATELETCT 410 411 431   MPV 9.2 9.5 9.6       A/P:  Negative UGI. alyssa CLD. If significant changes in pain or vitals would hold PO intake. Slow advancement to fulls and then soft as tolerated    Mookie Santos MD  [facility]  "

## 2021-10-27 PROBLEM — N17.9 AKI (ACUTE KIDNEY INJURY) (HCC): Status: RESOLVED | Noted: 2021-10-21 | Resolved: 2021-10-27

## 2021-10-27 PROBLEM — E87.1 HYPONATREMIA: Status: RESOLVED | Noted: 2021-10-21 | Resolved: 2021-10-27

## 2021-10-27 LAB
ALBUMIN SERPL BCP-MCNC: 3.2 G/DL (ref 3.2–4.9)
ALBUMIN/GLOB SERPL: 1.2 G/DL
ALP SERPL-CCNC: 80 U/L (ref 30–99)
ALT SERPL-CCNC: 6 U/L (ref 2–50)
ANION GAP SERPL CALC-SCNC: 11 MMOL/L (ref 7–16)
AST SERPL-CCNC: 16 U/L (ref 12–45)
BASOPHILS # BLD AUTO: 0.8 % (ref 0–1.8)
BASOPHILS # BLD: 0.06 K/UL (ref 0–0.12)
BILIRUB SERPL-MCNC: 0.3 MG/DL (ref 0.1–1.5)
BUN SERPL-MCNC: 4 MG/DL (ref 8–22)
CALCIUM SERPL-MCNC: 8.6 MG/DL (ref 8.4–10.2)
CHLORIDE SERPL-SCNC: 101 MMOL/L (ref 96–112)
CO2 SERPL-SCNC: 23 MMOL/L (ref 20–33)
CREAT SERPL-MCNC: 0.87 MG/DL (ref 0.5–1.4)
EOSINOPHIL # BLD AUTO: 0.21 K/UL (ref 0–0.51)
EOSINOPHIL NFR BLD: 2.8 % (ref 0–6.9)
ERYTHROCYTE [DISTWIDTH] IN BLOOD BY AUTOMATED COUNT: 47.8 FL (ref 35.9–50)
GLOBULIN SER CALC-MCNC: 2.6 G/DL (ref 1.9–3.5)
GLUCOSE SERPL-MCNC: 83 MG/DL (ref 65–99)
HCT VFR BLD AUTO: 31.8 % (ref 37–47)
HGB BLD-MCNC: 9.9 G/DL (ref 12–16)
IMM GRANULOCYTES # BLD AUTO: 0.05 K/UL (ref 0–0.11)
IMM GRANULOCYTES NFR BLD AUTO: 0.7 % (ref 0–0.9)
LYMPHOCYTES # BLD AUTO: 1.93 K/UL (ref 1–4.8)
LYMPHOCYTES NFR BLD: 25.7 % (ref 22–41)
MCH RBC QN AUTO: 28.7 PG (ref 27–33)
MCHC RBC AUTO-ENTMCNC: 31.1 G/DL (ref 33.6–35)
MCV RBC AUTO: 92.2 FL (ref 81.4–97.8)
MONOCYTES # BLD AUTO: 0.87 K/UL (ref 0–0.85)
MONOCYTES NFR BLD AUTO: 11.6 % (ref 0–13.4)
NEUTROPHILS # BLD AUTO: 4.38 K/UL (ref 2–7.15)
NEUTROPHILS NFR BLD: 58.4 % (ref 44–72)
NRBC # BLD AUTO: 0 K/UL
NRBC BLD-RTO: 0 /100 WBC
PLATELET # BLD AUTO: 384 K/UL (ref 164–446)
PMV BLD AUTO: 9.4 FL (ref 9–12.9)
POTASSIUM SERPL-SCNC: 3.4 MMOL/L (ref 3.6–5.5)
PROT SERPL-MCNC: 5.8 G/DL (ref 6–8.2)
RBC # BLD AUTO: 3.45 M/UL (ref 4.2–5.4)
SODIUM SERPL-SCNC: 135 MMOL/L (ref 135–145)
WBC # BLD AUTO: 7.5 K/UL (ref 4.8–10.8)

## 2021-10-27 PROCEDURE — C9113 INJ PANTOPRAZOLE SODIUM, VIA: HCPCS | Performed by: HOSPITALIST

## 2021-10-27 PROCEDURE — 700102 HCHG RX REV CODE 250 W/ 637 OVERRIDE(OP): Performed by: INTERNAL MEDICINE

## 2021-10-27 PROCEDURE — 700105 HCHG RX REV CODE 258: Performed by: INTERNAL MEDICINE

## 2021-10-27 PROCEDURE — 36415 COLL VENOUS BLD VENIPUNCTURE: CPT

## 2021-10-27 PROCEDURE — 99232 SBSQ HOSP IP/OBS MODERATE 35: CPT | Performed by: STUDENT IN AN ORGANIZED HEALTH CARE EDUCATION/TRAINING PROGRAM

## 2021-10-27 PROCEDURE — 700111 HCHG RX REV CODE 636 W/ 250 OVERRIDE (IP): Performed by: HOSPITALIST

## 2021-10-27 PROCEDURE — 700111 HCHG RX REV CODE 636 W/ 250 OVERRIDE (IP): Performed by: INTERNAL MEDICINE

## 2021-10-27 PROCEDURE — A9270 NON-COVERED ITEM OR SERVICE: HCPCS | Performed by: INTERNAL MEDICINE

## 2021-10-27 PROCEDURE — 80053 COMPREHEN METABOLIC PANEL: CPT

## 2021-10-27 PROCEDURE — 770006 HCHG ROOM/CARE - MED/SURG/GYN SEMI*

## 2021-10-27 PROCEDURE — 700102 HCHG RX REV CODE 250 W/ 637 OVERRIDE(OP): Performed by: STUDENT IN AN ORGANIZED HEALTH CARE EDUCATION/TRAINING PROGRAM

## 2021-10-27 PROCEDURE — 700101 HCHG RX REV CODE 250: Performed by: INTERNAL MEDICINE

## 2021-10-27 PROCEDURE — A9270 NON-COVERED ITEM OR SERVICE: HCPCS | Performed by: STUDENT IN AN ORGANIZED HEALTH CARE EDUCATION/TRAINING PROGRAM

## 2021-10-27 PROCEDURE — 85025 COMPLETE CBC W/AUTO DIFF WBC: CPT

## 2021-10-27 RX ORDER — POTASSIUM CHLORIDE 20 MEQ/1
40 TABLET, EXTENDED RELEASE ORAL ONCE
Status: COMPLETED | OUTPATIENT
Start: 2021-10-27 | End: 2021-10-27

## 2021-10-27 RX ADMIN — PANTOPRAZOLE SODIUM 40 MG: 40 INJECTION, POWDER, LYOPHILIZED, FOR SOLUTION INTRAVENOUS at 17:30

## 2021-10-27 RX ADMIN — PANTOPRAZOLE SODIUM 40 MG: 40 INJECTION, POWDER, LYOPHILIZED, FOR SOLUTION INTRAVENOUS at 04:58

## 2021-10-27 RX ADMIN — OXYCODONE HYDROCHLORIDE 10 MG: 5 TABLET ORAL at 15:06

## 2021-10-27 RX ADMIN — FOLIC ACID 1 MG: 1 TABLET ORAL at 04:57

## 2021-10-27 RX ADMIN — LIDOCAINE 1 PATCH: 50 PATCH TOPICAL at 05:10

## 2021-10-27 RX ADMIN — LEVOTHYROXINE SODIUM 150 MCG: 0.07 TABLET ORAL at 04:58

## 2021-10-27 RX ADMIN — OXYCODONE HYDROCHLORIDE 5 MG: 5 TABLET ORAL at 01:18

## 2021-10-27 RX ADMIN — LORAZEPAM 0.5 MG: 2 INJECTION INTRAMUSCULAR; INTRAVENOUS at 23:21

## 2021-10-27 RX ADMIN — GABAPENTIN 100 MG: 100 CAPSULE ORAL at 17:29

## 2021-10-27 RX ADMIN — METOPROLOL TARTRATE 12.5 MG: 25 TABLET, FILM COATED ORAL at 17:30

## 2021-10-27 RX ADMIN — GABAPENTIN 100 MG: 100 CAPSULE ORAL at 04:58

## 2021-10-27 RX ADMIN — CITALOPRAM HYDROBROMIDE 10 MG: 20 TABLET ORAL at 04:57

## 2021-10-27 RX ADMIN — GABAPENTIN 100 MG: 100 CAPSULE ORAL at 10:52

## 2021-10-27 RX ADMIN — CEFTRIAXONE SODIUM 1 G: 1 INJECTION, POWDER, FOR SOLUTION INTRAMUSCULAR; INTRAVENOUS at 04:58

## 2021-10-27 RX ADMIN — POTASSIUM CHLORIDE 40 MEQ: 1500 TABLET, EXTENDED RELEASE ORAL at 10:52

## 2021-10-27 RX ADMIN — OXYCODONE HYDROCHLORIDE 5 MG: 5 TABLET ORAL at 10:55

## 2021-10-27 RX ADMIN — METOPROLOL TARTRATE 12.5 MG: 25 TABLET, FILM COATED ORAL at 04:56

## 2021-10-27 ASSESSMENT — PAIN DESCRIPTION - PAIN TYPE
TYPE: ACUTE PAIN

## 2021-10-27 NOTE — PROGRESS NOTES
Pt AOx4, resting in bed. Pain managed with PRN medications per MAR. Pt able to make needs known, safety precautions in place.     COVID-19 surge in effect.

## 2021-10-27 NOTE — PROGRESS NOTES
Hospital Medicine Daily Progress Note    Date of Service  10/27/2021    Chief Complaint  Davie Montejo is a 86 y.o. female admitted 10/21/2021 with chest pain.    Hospital Course  An 86-year-old woman with h/o HTN, HLD, hypothyroidism, depression presented with chest and back pain. She had substernal/epigastric pain under her left breast for months, however the night before admission, she woke up from pain.  On admission, patient was found to have low hemoglobin. CT showed contained gastric perforation. Patient received blood, platelet transfusions.  Surgery recommended conservative management. Patient was on rocephin, flagyl, PPI. Started CLD on 10/25.    Interval Problem Update  10/27: Patient reports diffuse abdominal pain 7/10. Pain is stable, nausea, vomiting, loose stool x1 daily. Afebrile, hemodynamically stable. BP was soft at night. Received LR bolus 500 ml. K 3.4 replaced. Surgery is following recommendations appreciated. Advanced diet to full liquid per surgery recommendation.      I have personally seen and examined the patient at bedside. I discussed the plan of care with patient, bedside RN, charge RN,  and pharmacy.    Consultants/Specialty  general surgery    Code Status  Full Code    Disposition  Patient is not medically cleared.   Anticipate discharge to to home with close outpatient follow-up.  I have placed the appropriate orders for post-discharge needs.    Review of Systems  Constitutional: Positive for malaise/fatigue. Negative for chills and fever.   HENT: Negative.    Eyes: Negative.    Respiratory: Negative for cough and shortness of breath.    Cardiovascular: Negative for chest pain.   Gastrointestinal: Positive for abdominal pain. Negative for nausea and vomiting.   Musculoskeletal: Negative for myalgias.   Skin: Negative for rash.   Neurological: Positive for weakness. Negative for headaches.    Physical Exam  Temp:  [36.8 °C (98.3 °F)] 36.8 °C (98.3 °F)  Pulse:   [57-67] 57  Resp:  [16-18] 16  BP: ()/(34-60) 123/45  SpO2:  [96 %-100 %] 96 %    Physical Exam  Vitals and nursing note reviewed.   Constitutional:       Appearance: She is ill-appearing.   HENT:      Head: Normocephalic and atraumatic.      Mouth/Throat:      Mouth: Mucous membranes are moist.      Pharynx: Oropharynx is clear.   Eyes:      Pupils: Pupils are equal, round, and reactive to light.   Cardiovascular:      Rate and Rhythm: Normal rate and regular rhythm.   Pulmonary:      Effort: Pulmonary effort is normal. No respiratory distress.      Breath sounds: No wheezing or rales.   Abdominal:      General: Abdomen is flat. Bowel sounds are normal.      Tenderness: There is abdominal tenderness (RLQ). There is no guarding.   Musculoskeletal:         General: Normal range of motion.      Cervical back: Normal range of motion.   Skin:     General: Skin is warm.   Neurological:      General: No focal deficit present.      Mental Status: She is alert and oriented to person, place, and time.     Fluids  No intake or output data in the 24 hours ending 10/27/21 1144    Laboratory  Recent Labs     10/25/21  0406 10/26/21  0239 10/27/21  0232   WBC 5.4 5.9 7.5   RBC 3.30* 3.52* 3.45*   HEMOGLOBIN 9.7* 10.1* 9.9*   HEMATOCRIT 29.8* 31.6* 31.8*   MCV 90.3 89.8 92.2   MCH 29.4 28.7 28.7   MCHC 32.6* 32.0* 31.1*   RDW 47.1 46.5 47.8   PLATELETCT 411 431 384   MPV 9.5 9.6 9.4     Recent Labs     10/25/21  0406 10/26/21  0239 10/27/21  0232   SODIUM 137 137 135   POTASSIUM 3.8 3.7 3.4*   CHLORIDE 107 103 101   CO2 20 22 23   GLUCOSE 105* 72 83   BUN 4* 3* 4*   CREATININE 0.80 0.84 0.87   CALCIUM 8.1* 8.8 8.6                   Imaging  DX-UPPER GI-SERIES WITH KUB   Final Result      1.  Contained gastric perforation contiguous with the superior aspect of the gastric distal body/antrum      2.  No free leakage into the peritoneal cavity      3.  The distal body and antrum of the stomach are narrowed consistent with wall  thickening      4.  Overall, there probably no changes since recent CT abdomen and pelvis      EC-ECHOCARDIOGRAM COMPLETE W/O CONT   Final Result      CT-ABDOMEN-PELVIS W/O   Final Result      1.  Again seen prominent thickening of the wall of the stomach most prominently within the gastric antrum and extending into the proximal duodenum consistent with peptic ulcer disease. There is again seen a focal area of contrast density fluid and gas    extending cephalad from that area of gastric antral thickening which appears to be extraluminal and with some surrounding inflammatory stranding and fluid within the mesentery. This most likely represents a contained perforated gastric antral ulcer.    There is no other evidence of free intraperitoneal air or evidence of contrast extravasation into the peritoneal cavity.      2.  Prior cholecystectomy.      3.  Renal cortical thinning and scarring.      4.  Sigmoid diverticulosis.      CT-CTA COMPLETE THORACOABDOMINAL AORTA   Final Result         1.  Thickened gastric antrum with extraluminal fluid collection and nondependent focus of air superior to the stomach. Appearance favors perforated gastric ulcer with adjacent gastric succus or abscess.   2.  No aortic aneurysm or dissection identified. There is 2.3 cm infrarenal fusiform abdominal aortic ectasia is seen.   3.  Diverticulosis   4.  Atherosclerosis      These findings were discussed with the patient's clinician, Donavan Orozco, on 10/21/2021 5:38 AM.      DX-CHEST-PORTABLE (1 VIEW)   Final Result         1.  No acute cardiopulmonary disease.           Assessment/Plan  * Acute perforated gastric ulcer with hemorrhage (HCC)- (present on admission)  Assessment & Plan  Noted on CTA of the chest  Causing GI bleeding, continue Protonix  Completed Rocephin and Flagyl IV.  Monitor HG q 6 hours, required transfusions PRBCs 10/21 and 10/22  Surgery following, conservative management, recommendations appreciated  Started CLD on  10/27  Advanced diet to full liquid on 10/27    Coagulopathy (HCC)- (present on admission)  Assessment & Plan  Secondary to asa and plavix use.  Holding both medications.  TEG with increased inhibition noted.  Gave 1 unit platelets 10/21.  Monitor for further bleeding and given platelets as needed to correct coagulopathy from plavix.  PTT and INR wnl.    Acute blood loss anemia- (present on admission)  Assessment & Plan  More likely acute on chronic  Transfuse if hemoglobin less than 7  Patient remains hemodynamically stable    Hyperlipidemia- (present on admission)  Assessment & Plan  hold home statin    Hypothyroidism- (present on admission)  Assessment & Plan  Continue home Synthroid dose at 150 mcg daily    Essential hypertension- (present on admission)  Assessment & Plan  hold home losartan  continue metoprolol  as needed enalapril and labetalol    Depressive disorder- (present on admission)  Assessment & Plan  continue home Celexa       VTE prophylaxis: SCDs/TEDs    I have performed a physical exam and reviewed and updated ROS and Plan today (10/27/2021). In review of yesterday's note (10/26/2021), there are no changes except as documented above.

## 2021-10-27 NOTE — PROGRESS NOTES
COVID 19 surge in effect.     Received report from night shift RN, assumed care of pt. AAOx4. VSS. Pt resting in bed, no signs of distress noted with unlabored breathing. Safety precautions in place. Pt educated to call for assistance.

## 2021-10-27 NOTE — PROGRESS NOTES
"S: Davie Montejo  is a 86 y.o.  female admitted for contained gastric ulcer perforation. Unchanged this AM      O:  /45   Pulse (!) 57   Temp 36.8 °C (98.3 °F) (Oral)   Resp 18   Ht 1.702 m (5' 7\")   Wt 64 kg (141 lb 1.5 oz)   SpO2 96%   Intake/Output     None        Recent Labs     10/25/21  0406 10/26/21  0239 10/27/21  0232   SODIUM 137 137 135   POTASSIUM 3.8 3.7 3.4*   CHLORIDE 107 103 101   CO2 20 22 23   GLUCOSE 105* 72 83   BUN 4* 3* 4*   CREATININE 0.80 0.84 0.87   CALCIUM 8.1* 8.8 8.6     Recent Labs     10/25/21  0406 10/26/21  0239 10/27/21  0232   WBC 5.4 5.9 7.5   RBC 3.30* 3.52* 3.45*   HEMOGLOBIN 9.7* 10.1* 9.9*   HEMATOCRIT 29.8* 31.6* 31.8*   MCV 90.3 89.8 92.2   MCH 29.4 28.7 28.7   MCHC 32.6* 32.0* 31.1*   RDW 47.1 46.5 47.8   PLATELETCT 411 431 384   MPV 9.5 9.6 9.4       A/P:  Negative UGI. alyssa CLD. If significant changes in pain or vitals would hold PO intake. Slow advancement to fulls and then soft as tolerated    Mookie Santos MD  [facility]  "

## 2021-10-27 NOTE — CARE PLAN
The patient is Stable - Low risk of patient condition declining or worsening    Shift Goals  Clinical Goals: pain managment, safety   Patient Goals: manage pain, rest  Family Goals: no family present    Progress made toward(s) clinical / shift goals:    Problem: Pain - Standard  Goal: Alleviation of pain or a reduction in pain to the patient’s comfort goal  Outcome: Progressing     Problem: Knowledge Deficit - Standard  Goal: Patient and family/care givers will demonstrate understanding of plan of care, disease process/condition, diagnostic tests and medications  Outcome: Progressing     Problem: Fall Risk  Goal: Patient will remain free from falls  Outcome: Progressing       Patient is not progressing towards the following goals:

## 2021-10-28 LAB
ANION GAP SERPL CALC-SCNC: 10 MMOL/L (ref 7–16)
BASOPHILS # BLD AUTO: 1 % (ref 0–1.8)
BASOPHILS # BLD: 0.06 K/UL (ref 0–0.12)
BUN SERPL-MCNC: 4 MG/DL (ref 8–22)
CALCIUM SERPL-MCNC: 8.6 MG/DL (ref 8.4–10.2)
CHLORIDE SERPL-SCNC: 106 MMOL/L (ref 96–112)
CO2 SERPL-SCNC: 23 MMOL/L (ref 20–33)
CREAT SERPL-MCNC: 0.87 MG/DL (ref 0.5–1.4)
EKG IMPRESSION: NORMAL
EOSINOPHIL # BLD AUTO: 0.38 K/UL (ref 0–0.51)
EOSINOPHIL NFR BLD: 6 % (ref 0–6.9)
ERYTHROCYTE [DISTWIDTH] IN BLOOD BY AUTOMATED COUNT: 47.5 FL (ref 35.9–50)
GLUCOSE SERPL-MCNC: 90 MG/DL (ref 65–99)
HCT VFR BLD AUTO: 30.1 % (ref 37–47)
HGB BLD-MCNC: 9.5 G/DL (ref 12–16)
IMM GRANULOCYTES # BLD AUTO: 0.02 K/UL (ref 0–0.11)
IMM GRANULOCYTES NFR BLD AUTO: 0.3 % (ref 0–0.9)
LYMPHOCYTES # BLD AUTO: 2.17 K/UL (ref 1–4.8)
LYMPHOCYTES NFR BLD: 34.4 % (ref 22–41)
MCH RBC QN AUTO: 28.8 PG (ref 27–33)
MCHC RBC AUTO-ENTMCNC: 31.6 G/DL (ref 33.6–35)
MCV RBC AUTO: 91.2 FL (ref 81.4–97.8)
MONOCYTES # BLD AUTO: 0.6 K/UL (ref 0–0.85)
MONOCYTES NFR BLD AUTO: 9.5 % (ref 0–13.4)
NEUTROPHILS # BLD AUTO: 3.07 K/UL (ref 2–7.15)
NEUTROPHILS NFR BLD: 48.8 % (ref 44–72)
NRBC # BLD AUTO: 0 K/UL
NRBC BLD-RTO: 0 /100 WBC
PLATELET # BLD AUTO: 394 K/UL (ref 164–446)
PMV BLD AUTO: 9.5 FL (ref 9–12.9)
POTASSIUM SERPL-SCNC: 3.7 MMOL/L (ref 3.6–5.5)
RBC # BLD AUTO: 3.3 M/UL (ref 4.2–5.4)
SODIUM SERPL-SCNC: 139 MMOL/L (ref 135–145)
TROPONIN T SERPL-MCNC: 13 NG/L (ref 6–19)
WBC # BLD AUTO: 6.3 K/UL (ref 4.8–10.8)

## 2021-10-28 PROCEDURE — 770006 HCHG ROOM/CARE - MED/SURG/GYN SEMI*

## 2021-10-28 PROCEDURE — 700111 HCHG RX REV CODE 636 W/ 250 OVERRIDE (IP): Performed by: HOSPITALIST

## 2021-10-28 PROCEDURE — 85025 COMPLETE CBC W/AUTO DIFF WBC: CPT

## 2021-10-28 PROCEDURE — 700102 HCHG RX REV CODE 250 W/ 637 OVERRIDE(OP): Performed by: INTERNAL MEDICINE

## 2021-10-28 PROCEDURE — 80048 BASIC METABOLIC PNL TOTAL CA: CPT

## 2021-10-28 PROCEDURE — A9270 NON-COVERED ITEM OR SERVICE: HCPCS | Performed by: INTERNAL MEDICINE

## 2021-10-28 PROCEDURE — 99232 SBSQ HOSP IP/OBS MODERATE 35: CPT | Performed by: STUDENT IN AN ORGANIZED HEALTH CARE EDUCATION/TRAINING PROGRAM

## 2021-10-28 PROCEDURE — C9113 INJ PANTOPRAZOLE SODIUM, VIA: HCPCS | Performed by: HOSPITALIST

## 2021-10-28 PROCEDURE — 93010 ELECTROCARDIOGRAM REPORT: CPT | Performed by: INTERNAL MEDICINE

## 2021-10-28 PROCEDURE — 87493 C DIFF AMPLIFIED PROBE: CPT

## 2021-10-28 PROCEDURE — 93005 ELECTROCARDIOGRAM TRACING: CPT | Performed by: STUDENT IN AN ORGANIZED HEALTH CARE EDUCATION/TRAINING PROGRAM

## 2021-10-28 PROCEDURE — A9270 NON-COVERED ITEM OR SERVICE: HCPCS | Performed by: STUDENT IN AN ORGANIZED HEALTH CARE EDUCATION/TRAINING PROGRAM

## 2021-10-28 PROCEDURE — 36415 COLL VENOUS BLD VENIPUNCTURE: CPT

## 2021-10-28 PROCEDURE — 700101 HCHG RX REV CODE 250: Performed by: INTERNAL MEDICINE

## 2021-10-28 PROCEDURE — 700102 HCHG RX REV CODE 250 W/ 637 OVERRIDE(OP): Performed by: STUDENT IN AN ORGANIZED HEALTH CARE EDUCATION/TRAINING PROGRAM

## 2021-10-28 PROCEDURE — 84484 ASSAY OF TROPONIN QUANT: CPT

## 2021-10-28 RX ORDER — OXYCODONE HYDROCHLORIDE 5 MG/1
5-10 TABLET ORAL EVERY 4 HOURS PRN
Status: DISCONTINUED | OUTPATIENT
Start: 2021-10-28 | End: 2021-10-29

## 2021-10-28 RX ORDER — OMEPRAZOLE 20 MG/1
20 CAPSULE, DELAYED RELEASE ORAL 2 TIMES DAILY
Status: DISCONTINUED | OUTPATIENT
Start: 2021-10-28 | End: 2021-10-31 | Stop reason: HOSPADM

## 2021-10-28 RX ORDER — ACETAMINOPHEN 325 MG/1
650 TABLET ORAL EVERY 6 HOURS PRN
Status: DISCONTINUED | OUTPATIENT
Start: 2021-10-28 | End: 2021-10-31 | Stop reason: HOSPADM

## 2021-10-28 RX ADMIN — OXYCODONE 10 MG: 5 TABLET ORAL at 08:37

## 2021-10-28 RX ADMIN — LORAZEPAM 0.5 MG: 2 INJECTION INTRAMUSCULAR; INTRAVENOUS at 23:05

## 2021-10-28 RX ADMIN — LIDOCAINE 1 PATCH: 50 PATCH TOPICAL at 05:46

## 2021-10-28 RX ADMIN — METOPROLOL TARTRATE 12.5 MG: 25 TABLET, FILM COATED ORAL at 05:45

## 2021-10-28 RX ADMIN — PANTOPRAZOLE SODIUM 40 MG: 40 INJECTION, POWDER, LYOPHILIZED, FOR SOLUTION INTRAVENOUS at 05:43

## 2021-10-28 RX ADMIN — OXYCODONE 10 MG: 5 TABLET ORAL at 14:40

## 2021-10-28 RX ADMIN — GABAPENTIN 100 MG: 100 CAPSULE ORAL at 17:25

## 2021-10-28 RX ADMIN — GABAPENTIN 100 MG: 100 CAPSULE ORAL at 05:45

## 2021-10-28 RX ADMIN — GABAPENTIN 100 MG: 100 CAPSULE ORAL at 11:09

## 2021-10-28 RX ADMIN — LEVOTHYROXINE SODIUM 150 MCG: 0.07 TABLET ORAL at 05:44

## 2021-10-28 RX ADMIN — FOLIC ACID 1 MG: 1 TABLET ORAL at 05:45

## 2021-10-28 RX ADMIN — OMEPRAZOLE 20 MG: 20 CAPSULE, DELAYED RELEASE ORAL at 17:25

## 2021-10-28 RX ADMIN — CITALOPRAM HYDROBROMIDE 10 MG: 20 TABLET ORAL at 05:45

## 2021-10-28 ASSESSMENT — PAIN DESCRIPTION - PAIN TYPE
TYPE: CHRONIC PAIN
TYPE: ACUTE PAIN
TYPE: CHRONIC PAIN
TYPE: CHRONIC PAIN

## 2021-10-28 ASSESSMENT — FIBROSIS 4 INDEX: FIB4 SCORE: 1.39

## 2021-10-28 NOTE — PROGRESS NOTES
COVID-19 surge in effect  Received report from night shift RN. Pt is awake and alert. No signs of distress. Pt denies any nausea at this time. Pt denies any numbness or tingling. Pt reports pain 10/10, pt medicated per MAR.  fall precautions in place. Bed in lowest and locked position. Call light within reach.

## 2021-10-28 NOTE — PROGRESS NOTES
Hospital Medicine Daily Progress Note    Date of Service  10/28/2021    Chief Complaint  Davie Montejo is a 86 y.o. female admitted 10/21/2021 with chest pain    Hospital Course  An 86-year-old woman with h/o HTN, HLD, hypothyroidism, depression presented with chest and back pain. She had substernal/epigastric pain under her left breast for months, however the night before admission, she woke up from pain.  On admission, patient was found to have low hemoglobin. CT showed contained gastric perforation. Patient received blood, platelet transfusions.  Surgery recommended conservative management. Patient was on rocephin, flagyl, PPI. Started CLD on 10/25.    Interval Problem Update  10/27: Patient reports diffuse abdominal pain 7/10. Pain is stable, nausea, vomiting, loose stool x1 daily. Afebrile, hemodynamically stable. BP was soft at night. Received LR bolus 500 ml. K 3.4 replaced. Surgery is following recommendations appreciated. Advanced diet to full liquid per surgery recommendation.    10/28: Patient reports chest pain. Ordered ecg, troponin. Afebrile, hemodynamically stable. Pain goes from 10/10 to 5/10 after oxycodone. Reports diarrhea. Ordered c.diff.      I have personally seen and examined the patient at bedside. I discussed the plan of care with patient, bedside RN, charge RN,  and pharmacy.    Consultants/Specialty  general surgery    Code Status  Full Code    Disposition  Patient is not medically cleared.   Anticipate discharge to to home with close outpatient follow-up.  I have placed the appropriate orders for post-discharge needs.    Review of Systems  Constitutional: Positive for malaise/fatigue. Negative for chills and fever.   HENT: Negative.    Eyes: Negative.    Respiratory: Negative for cough and shortness of breath.    Cardiovascular: Negative for chest pain.   Gastrointestinal: Positive for abdominal pain. Negative for nausea and vomiting.   Musculoskeletal: Negative  for myalgias.   Skin: Negative for rash.   Neurological: Positive for weakness. Negative for headaches.     Physical Exam  Temp:  [36.7 °C (98 °F)-36.9 °C (98.4 °F)] 36.7 °C (98 °F)  Pulse:  [51-62] 56  Resp:  [18] 18  BP: (114-149)/(56-79) 114/71  SpO2:  [94 %-99 %] 95 %    Physical Exam  Vitals and nursing note reviewed.   Constitutional:       Appearance: She is ill-appearing.   HENT:      Head: Normocephalic and atraumatic.      Mouth/Throat:      Mouth: Mucous membranes are moist.      Pharynx: Oropharynx is clear.   Eyes:      Pupils: Pupils are equal, round, and reactive to light.   Cardiovascular:      Rate and Rhythm: Normal rate and regular rhythm.   Pulmonary:      Effort: Pulmonary effort is normal. No respiratory distress.      Breath sounds: No wheezing or rales.   Abdominal:      General: Abdomen is flat. Bowel sounds are normal.      Tenderness: There is abdominal tenderness (RLQ). There is no guarding.   Musculoskeletal:         General: Normal range of motion.      Cervical back: Normal range of motion.   Skin:     General: Skin is warm.   Neurological:      General: No focal deficit present.      Mental Status: She is alert and oriented to person, place, and time.        Fluids    Intake/Output Summary (Last 24 hours) at 10/28/2021 1230  Last data filed at 10/28/2021 0426  Gross per 24 hour   Intake 1520 ml   Output --   Net 1520 ml       Laboratory  Recent Labs     10/26/21  0239 10/27/21  0232 10/28/21  0237   WBC 5.9 7.5 6.3   RBC 3.52* 3.45* 3.30*   HEMOGLOBIN 10.1* 9.9* 9.5*   HEMATOCRIT 31.6* 31.8* 30.1*   MCV 89.8 92.2 91.2   MCH 28.7 28.7 28.8   MCHC 32.0* 31.1* 31.6*   RDW 46.5 47.8 47.5   PLATELETCT 431 384 394   MPV 9.6 9.4 9.5     Recent Labs     10/26/21  0239 10/27/21  0232 10/28/21  0237   SODIUM 137 135 139   POTASSIUM 3.7 3.4* 3.7   CHLORIDE 103 101 106   CO2 22 23 23   GLUCOSE 72 83 90   BUN 3* 4* 4*   CREATININE 0.84 0.87 0.87   CALCIUM 8.8 8.6 8.6                    Imaging  DX-UPPER GI-SERIES WITH KUB   Final Result      1.  Contained gastric perforation contiguous with the superior aspect of the gastric distal body/antrum      2.  No free leakage into the peritoneal cavity      3.  The distal body and antrum of the stomach are narrowed consistent with wall thickening      4.  Overall, there probably no changes since recent CT abdomen and pelvis      EC-ECHOCARDIOGRAM COMPLETE W/O CONT   Final Result      CT-ABDOMEN-PELVIS W/O   Final Result      1.  Again seen prominent thickening of the wall of the stomach most prominently within the gastric antrum and extending into the proximal duodenum consistent with peptic ulcer disease. There is again seen a focal area of contrast density fluid and gas    extending cephalad from that area of gastric antral thickening which appears to be extraluminal and with some surrounding inflammatory stranding and fluid within the mesentery. This most likely represents a contained perforated gastric antral ulcer.    There is no other evidence of free intraperitoneal air or evidence of contrast extravasation into the peritoneal cavity.      2.  Prior cholecystectomy.      3.  Renal cortical thinning and scarring.      4.  Sigmoid diverticulosis.      CT-CTA COMPLETE THORACOABDOMINAL AORTA   Final Result         1.  Thickened gastric antrum with extraluminal fluid collection and nondependent focus of air superior to the stomach. Appearance favors perforated gastric ulcer with adjacent gastric succus or abscess.   2.  No aortic aneurysm or dissection identified. There is 2.3 cm infrarenal fusiform abdominal aortic ectasia is seen.   3.  Diverticulosis   4.  Atherosclerosis      These findings were discussed with the patient's clinician, Donavan Orozco, on 10/21/2021 5:38 AM.      DX-CHEST-PORTABLE (1 VIEW)   Final Result         1.  No acute cardiopulmonary disease.           Assessment/Plan  * Acute perforated gastric ulcer with hemorrhage (HCC)-  (present on admission)  Assessment & Plan  Noted on CTA of the chest  Causing GI bleeding, continue Protonix  Completed Rocephin and Flagyl IV.  Monitor HG q 6 hours, required transfusions PRBCs 10/21 and 10/22  Surgery following, conservative management, recommendations appreciated  Started CLD on 10/27  Advanced diet to full liquid on 10/27    Coagulopathy (HCC)- (present on admission)  Assessment & Plan  Secondary to asa and plavix use.  Holding both medications.  TEG with increased inhibition noted.  Gave 1 unit platelets 10/21.  Monitor for further bleeding and given platelets as needed to correct coagulopathy from plavix.  PTT and INR wnl.    Acute blood loss anemia- (present on admission)  Assessment & Plan  More likely acute on chronic  Transfuse if hemoglobin less than 7  Patient remains hemodynamically stable    Hyperlipidemia- (present on admission)  Assessment & Plan  hold home statin    Hypothyroidism- (present on admission)  Assessment & Plan  Continue home Synthroid dose at 150 mcg daily    Essential hypertension- (present on admission)  Assessment & Plan  hold home losartan  continue metoprolol  as needed enalapril and labetalol    Depressive disorder- (present on admission)  Assessment & Plan  continue home Celexa       VTE prophylaxis: SCDs/TEDs    I have performed a physical exam and reviewed and updated ROS and Plan today (10/28/2021). In review of yesterday's note (10/27/2021), there are no changes except as documented above.

## 2021-10-28 NOTE — PROGRESS NOTES
"S: Davie Montejo  is a 86 y.o.  female admitted for contained gastric ulcer perforation. Unchanged this AM      O:  /71   Pulse (!) 56   Temp 36.7 °C (98 °F) (Oral)   Resp 18   Ht 1.702 m (5' 7\")   Wt 75.4 kg (166 lb 3.6 oz)   SpO2 95%   Intake/Output                             10/26/21 0700 - 10/27/21 0659 10/27/21 0700 - 10/28/21 0659 10/28/21 0700 - 10/29/21 0659     9515-6038 5898-6968 Total 7519-2569 7596-8750 Total 4078-8275 6852-0346 Total                    Intake    P.O.  --  -- --  1490  1520 3010  --  -- --    P.O. -- -- -- 1490 1520 3010 -- -- --    Total Intake -- -- -- 1490 1520 3010 -- -- --       Output    Stool  --  -- --  --  -- --  --  -- --    Number of Times Stooled -- -- -- -- 2 x 2 x -- -- --    Total Output -- -- -- -- -- -- -- -- --       Net I/O     -- -- -- 1490 1520 3010 -- -- --        Recent Labs     10/26/21  0239 10/27/21  0232 10/28/21  0237   SODIUM 137 135 139   POTASSIUM 3.7 3.4* 3.7   CHLORIDE 103 101 106   CO2 22 23 23   GLUCOSE 72 83 90   BUN 3* 4* 4*   CREATININE 0.84 0.87 0.87   CALCIUM 8.8 8.6 8.6     Recent Labs     10/26/21  0239 10/27/21  0232 10/28/21  0237   WBC 5.9 7.5 6.3   RBC 3.52* 3.45* 3.30*   HEMOGLOBIN 10.1* 9.9* 9.5*   HEMATOCRIT 31.6* 31.8* 30.1*   MCV 89.8 92.2 91.2   MCH 28.7 28.7 28.8   MCHC 32.0* 31.1* 31.6*   RDW 46.5 47.8 47.5   PLATELETCT 431 384 394   MPV 9.6 9.4 9.5       A/P:  Negative UGI. alyssa CLD. If significant changes in pain or vitals would hold PO intake. Slow advancement to fulls and then soft as tolerated. Would treat with quad therapy on discharge. No need for outpatient follow-up with surgery. Will need outpatient GI follow-up for EGD more then 6 weeks from now.     Mookie Santos MD  [facility]  "

## 2021-10-28 NOTE — CARE PLAN
The patient is Stable - Low risk of patient condition declining or worsening    Shift Goals  Clinical Goals: pain control, rest  Patient Goals: pt will rest  Family Goals: no family present    Progress made toward(s) clinical / shift goals:  pt provided with oxycodone 10mg and heat packs during shift for pain as well as rest.    Patient is not progressing towards the following goals:

## 2021-10-28 NOTE — CARE PLAN
The patient is Stable - Low risk of patient condition declining or worsening    Shift Goals  Clinical Goals: Pt will rest  Patient Goals: pt will rest  Family Goals: no family present    Progress made toward(s) clinical / shift goals:  Pt rested well    Patient is not progressing towards the following goals:      Problem: Pain - Standard  Goal: Alleviation of pain or a reduction in pain to the patient’s comfort goal  Outcome: Progressing     Problem: Knowledge Deficit - Standard  Goal: Patient and family/care givers will demonstrate understanding of plan of care, disease process/condition, diagnostic tests and medications  Outcome: Progressing     Problem: Fall Risk  Goal: Patient will remain free from falls  Outcome: Progressing

## 2021-10-28 NOTE — PROGRESS NOTES
"\"Covid 19 surge in effect\"    Report received from day RN Pt is AAO x 4.  Pt reports no pain except continuously going to the bathroom for semi diarrhea.POC discussed to continue monitoring bowel movement.  All needs met at this time.Bed in low position.Call light within reach.Rounding in place.   "

## 2021-10-28 NOTE — DIETARY
Nutrition Services: Update   Day 7 of admit.  Davie Montejo is a 86 y.o. female with admitting DX of Acute perforated gastric ulcer with hemorrhage (HCC) [K25.2]    Pt is currently on full liquid diet. PO intake % x 2 and 50-75% x 1. If fulls is tolerated plan is to advance to GI soft diet.     Wt 10/28: 75.4 kg via bed scale - Questionable weight gain noted of 11.4 kg x 7 days. Weight taken on 10/28 may be inaccurate.    Malnutrition Risk: criteria not met    Recommendations/Plan:   1. Encourage intake of meals  2. Document intake of all meals as % taken in ADL's to provide interdisciplinary communication across all shifts.   3. Monitor weight.  4. Nutrition rep will continue to see patient for ongoing meal and snack preferences.    RD following

## 2021-10-28 NOTE — CARE PLAN
Problem: Nutritional:  Goal: Achieve adequate nutritional intake  Description: Patient will consume 50% of meals  Outcome: Progressing   See RD note.

## 2021-10-28 NOTE — PROGRESS NOTES
"Pt reporting pain in her chest. Pt reports it feels similar to pain on admission but Pt reports, \"it has let up a bit\". When asked where location of pain was Pt gestured to left shoulder and down across chest to abdomen. Pt reports slight shortness of breath but denies any lightheadedness or dizziness while laying in bed. Vitals taken 140/71 HR: 56 SPO2 95% on RA, resps 16. Dr. Preston updated and aware.  MD to place orders for EKG and troponin.  "

## 2021-10-29 LAB
BASOPHILS # BLD AUTO: 1 % (ref 0–1.8)
BASOPHILS # BLD: 0.06 K/UL (ref 0–0.12)
C DIFF DNA SPEC QL NAA+PROBE: NEGATIVE
C DIFF TOX GENS STL QL NAA+PROBE: NEGATIVE
EOSINOPHIL # BLD AUTO: 0.39 K/UL (ref 0–0.51)
EOSINOPHIL NFR BLD: 6.7 % (ref 0–6.9)
ERYTHROCYTE [DISTWIDTH] IN BLOOD BY AUTOMATED COUNT: 47.5 FL (ref 35.9–50)
HCT VFR BLD AUTO: 30 % (ref 37–47)
HGB BLD-MCNC: 9.6 G/DL (ref 12–16)
IMM GRANULOCYTES # BLD AUTO: 0.01 K/UL (ref 0–0.11)
IMM GRANULOCYTES NFR BLD AUTO: 0.2 % (ref 0–0.9)
LYMPHOCYTES # BLD AUTO: 2.2 K/UL (ref 1–4.8)
LYMPHOCYTES NFR BLD: 38.1 % (ref 22–41)
MCH RBC QN AUTO: 29.3 PG (ref 27–33)
MCHC RBC AUTO-ENTMCNC: 32 G/DL (ref 33.6–35)
MCV RBC AUTO: 91.5 FL (ref 81.4–97.8)
MONOCYTES # BLD AUTO: 0.54 K/UL (ref 0–0.85)
MONOCYTES NFR BLD AUTO: 9.3 % (ref 0–13.4)
NEUTROPHILS # BLD AUTO: 2.58 K/UL (ref 2–7.15)
NEUTROPHILS NFR BLD: 44.7 % (ref 44–72)
NRBC # BLD AUTO: 0 K/UL
NRBC BLD-RTO: 0 /100 WBC
PLATELET # BLD AUTO: 372 K/UL (ref 164–446)
PMV BLD AUTO: 9.7 FL (ref 9–12.9)
RBC # BLD AUTO: 3.28 M/UL (ref 4.2–5.4)
WBC # BLD AUTO: 5.8 K/UL (ref 4.8–10.8)

## 2021-10-29 PROCEDURE — 36415 COLL VENOUS BLD VENIPUNCTURE: CPT

## 2021-10-29 PROCEDURE — A9270 NON-COVERED ITEM OR SERVICE: HCPCS | Performed by: INTERNAL MEDICINE

## 2021-10-29 PROCEDURE — 700102 HCHG RX REV CODE 250 W/ 637 OVERRIDE(OP): Performed by: STUDENT IN AN ORGANIZED HEALTH CARE EDUCATION/TRAINING PROGRAM

## 2021-10-29 PROCEDURE — 99232 SBSQ HOSP IP/OBS MODERATE 35: CPT | Performed by: STUDENT IN AN ORGANIZED HEALTH CARE EDUCATION/TRAINING PROGRAM

## 2021-10-29 PROCEDURE — 97165 OT EVAL LOW COMPLEX 30 MIN: CPT

## 2021-10-29 PROCEDURE — 770006 HCHG ROOM/CARE - MED/SURG/GYN SEMI*

## 2021-10-29 PROCEDURE — 700111 HCHG RX REV CODE 636 W/ 250 OVERRIDE (IP): Performed by: STUDENT IN AN ORGANIZED HEALTH CARE EDUCATION/TRAINING PROGRAM

## 2021-10-29 PROCEDURE — 700102 HCHG RX REV CODE 250 W/ 637 OVERRIDE(OP): Performed by: INTERNAL MEDICINE

## 2021-10-29 PROCEDURE — 97162 PT EVAL MOD COMPLEX 30 MIN: CPT

## 2021-10-29 PROCEDURE — 700101 HCHG RX REV CODE 250: Performed by: INTERNAL MEDICINE

## 2021-10-29 PROCEDURE — A9270 NON-COVERED ITEM OR SERVICE: HCPCS | Performed by: STUDENT IN AN ORGANIZED HEALTH CARE EDUCATION/TRAINING PROGRAM

## 2021-10-29 PROCEDURE — 85025 COMPLETE CBC W/AUTO DIFF WBC: CPT

## 2021-10-29 RX ORDER — HEPARIN SODIUM 5000 [USP'U]/ML
5000 INJECTION, SOLUTION INTRAVENOUS; SUBCUTANEOUS EVERY 8 HOURS
Status: DISCONTINUED | OUTPATIENT
Start: 2021-10-29 | End: 2021-10-31 | Stop reason: HOSPADM

## 2021-10-29 RX ORDER — HYDROCODONE BITARTRATE AND ACETAMINOPHEN 5; 325 MG/1; MG/1
1-2 TABLET ORAL EVERY 6 HOURS PRN
Status: DISCONTINUED | OUTPATIENT
Start: 2021-10-29 | End: 2021-10-29

## 2021-10-29 RX ORDER — LOSARTAN POTASSIUM 25 MG/1
25 TABLET ORAL
Status: DISCONTINUED | OUTPATIENT
Start: 2021-10-29 | End: 2021-10-31 | Stop reason: HOSPADM

## 2021-10-29 RX ORDER — HYDROCODONE BITARTRATE AND ACETAMINOPHEN 5; 325 MG/1; MG/1
1-2 TABLET ORAL EVERY 6 HOURS PRN
Status: DISCONTINUED | OUTPATIENT
Start: 2021-10-29 | End: 2021-10-31 | Stop reason: HOSPADM

## 2021-10-29 RX ORDER — CHOLECALCIFEROL (VITAMIN D3) 125 MCG
5 CAPSULE ORAL NIGHTLY
Status: DISCONTINUED | OUTPATIENT
Start: 2021-10-29 | End: 2021-10-31 | Stop reason: HOSPADM

## 2021-10-29 RX ADMIN — OMEPRAZOLE 20 MG: 20 CAPSULE, DELAYED RELEASE ORAL at 17:34

## 2021-10-29 RX ADMIN — GABAPENTIN 100 MG: 100 CAPSULE ORAL at 11:27

## 2021-10-29 RX ADMIN — LIDOCAINE 1 PATCH: 50 PATCH TOPICAL at 05:35

## 2021-10-29 RX ADMIN — LEVOTHYROXINE SODIUM 150 MCG: 0.07 TABLET ORAL at 05:34

## 2021-10-29 RX ADMIN — Medication 5 MG: at 23:14

## 2021-10-29 RX ADMIN — HYDROCODONE BITARTRATE AND ACETAMINOPHEN 2 TABLET: 5; 325 TABLET ORAL at 22:36

## 2021-10-29 RX ADMIN — GABAPENTIN 100 MG: 100 CAPSULE ORAL at 17:34

## 2021-10-29 RX ADMIN — OMEPRAZOLE 20 MG: 20 CAPSULE, DELAYED RELEASE ORAL at 05:33

## 2021-10-29 RX ADMIN — OXYCODONE 10 MG: 5 TABLET ORAL at 11:35

## 2021-10-29 RX ADMIN — HEPARIN SODIUM 5000 UNITS: 5000 INJECTION, SOLUTION INTRAVENOUS; SUBCUTANEOUS at 22:36

## 2021-10-29 RX ADMIN — METOPROLOL TARTRATE 12.5 MG: 25 TABLET, FILM COATED ORAL at 05:34

## 2021-10-29 RX ADMIN — LOSARTAN POTASSIUM 25 MG: 25 TABLET, FILM COATED ORAL at 11:52

## 2021-10-29 RX ADMIN — HYDROCODONE BITARTRATE AND ACETAMINOPHEN 2 TABLET: 5; 325 TABLET ORAL at 16:34

## 2021-10-29 RX ADMIN — HEPARIN SODIUM 5000 UNITS: 5000 INJECTION, SOLUTION INTRAVENOUS; SUBCUTANEOUS at 13:46

## 2021-10-29 RX ADMIN — CITALOPRAM HYDROBROMIDE 10 MG: 20 TABLET ORAL at 05:33

## 2021-10-29 RX ADMIN — METOPROLOL TARTRATE 12.5 MG: 25 TABLET, FILM COATED ORAL at 17:34

## 2021-10-29 RX ADMIN — GABAPENTIN 100 MG: 100 CAPSULE ORAL at 05:34

## 2021-10-29 RX ADMIN — FOLIC ACID 1 MG: 1 TABLET ORAL at 05:34

## 2021-10-29 ASSESSMENT — PAIN DESCRIPTION - PAIN TYPE
TYPE: CHRONIC PAIN
TYPE: ACUTE PAIN

## 2021-10-29 ASSESSMENT — COGNITIVE AND FUNCTIONAL STATUS - GENERAL
SUGGESTED CMS G CODE MODIFIER DAILY ACTIVITY: CI
MOBILITY SCORE: 24
HELP NEEDED FOR BATHING: A LITTLE
SUGGESTED CMS G CODE MODIFIER MOBILITY: CH
DAILY ACTIVITIY SCORE: 23

## 2021-10-29 ASSESSMENT — GAIT ASSESSMENTS
GAIT LEVEL OF ASSIST: SUPERVISED
DEVIATION: STEP TO
DISTANCE (FEET): 300

## 2021-10-29 ASSESSMENT — ACTIVITIES OF DAILY LIVING (ADL): TOILETING: INDEPENDENT

## 2021-10-29 NOTE — PROGRESS NOTES
Received report from night shift. Patient A&Ox4, denies N/V, SOB at this time. Patient reporting 10/10 generalized pain. Upon arrival with pain medication less than five minutes later, patient found calm, sleeping, respirations even and unlabored. Call light, belongings in reach. Bed in lowest, locked position.     COVID-19 surge in effect.

## 2021-10-29 NOTE — PROGRESS NOTES
Received bedside patient report from ESTEFANY Carmichael. Patient resting comfortably in bed, no complaints at this time. Safety precautions in place. Will continue to monitor.

## 2021-10-29 NOTE — THERAPY
Occupational Therapy   Initial Evaluation     Patient Name: Davie Montejo  Age:  86 y.o., Sex:  female  Medical Record #: [id number]  Today's Date: 10/29/2021     Precautions  Precautions: (P) Fall Risk    Assessment  Patient is 86 y.o. female admitted with gastric ulcer perforation. A&Ox4, motivated for activity. Shows Spv/Mod Indep with ADL's. Indep/Spv with transfers, mobility. Appears to be at baseline level. Lives alone   (had been living at brother's who passed away recently). No further acute OT needs.       Plan  Recommend Occupational Therapy for Evaluation only     DC Equipment Recommendations: None  Discharge Recommendations: Anticipate that the patient will have no further occupational therapy needs after discharge from the hospital      10/29/21 1501   Prior Living Situation   Prior Services None   Housing / Facility 1 Story House   Steps Into Home 0   Steps In Home 0   Bathroom Set up Walk In Shower   Equipment Owned None   Lives with - Patient's Self Care Capacity Alone and Able to Care For Self  (had been with brother who has passed away recently)   Prior Level of ADL Function   Self Feeding Independent   Grooming / Hygiene Independent   Bathing Independent   Dressing Independent   Toileting Independent   Prior Level of IADL Function   Medication Management Independent   Laundry Independent   Kitchen Mobility Independent   Finances Independent   Home Management Independent   Shopping Independent   Prior Level Of Mobility Independent Without Device in Home   Driving / Transportation Unable To Determine At This Time   Occupation (Pre-Hospital Vocational) Retired Due To Age   Leisure Interests Unable To Determine At This Time   Precautions   Precautions Fall Risk   Vitals   O2 Delivery Device None - Room Air   Pain 0 - 10 Group   Therapist Pain Assessment Post Activity Pain Same as Prior to Activity;Nurse Notified   Cognition    Cognition / Consciousness WDL   Passive ROM Upper Body   Passive  ROM Upper Body WDL   Active ROM Upper Body   Active ROM Upper Body  WDL   Strength Upper Body   Upper Body Strength  WDL   Sensation Upper Body   Upper Extremity Sensation  WDL   Upper Body Muscle Tone   Upper Body Muscle Tone  WDL   Neurological Concerns   Neurological Concerns No   Coordination Upper Body   Coordination WDL   Balance Assessment   Sitting Balance (Static) Good   Sitting Balance (Dynamic) Good   Standing Balance (Static) Good   Standing Balance (Dynamic) Fair +   Weight Shift Sitting Good   Weight Shift Standing Good   Bed Mobility    Supine to Sit Independent   Sit to Supine Independent   Scooting Independent   Rolling Independent   ADL Assessment   Eating Independent   Grooming Supervision;Standing   Upper Body Dressing Independent   Lower Body Dressing Independent   Toileting Independent   How much help from another person does the patient currently need...   Putting on and taking off regular lower body clothing? 4   Bathing (including washing, rinsing, and drying)? 3   Toileting, which includes using a toilet, bedpan, or urinal? 4   Putting on and taking off regular upper body clothing? 4   Taking care of personal grooming such as brushing teeth? 4   Eating meals? 4   6 Clicks Daily Activity Score 23   Functional Mobility   Sit to Stand Supervised   Bed, Chair, Wheelchair Transfer Supervised   Toilet Transfers Supervised   Transfer Method Stand Step   Activity Tolerance   Sitting in Chair 7   Sitting Edge of Bed 18   Standing 10

## 2021-10-29 NOTE — CARE PLAN
The patient is Stable - Low risk of patient condition declining or worsening    Shift Goals  Clinical Goals: Remain free from falls and injury  Patient Goals: Rest and sleep  Family Goals: no family present    Progress made toward(s) clinical / shift goals: Patient reporting pain, medicated per MAR.     Problem: Fall Risk  Goal: Patient will remain free from falls  Outcome: Progressing  Note: Bed in lowest, locked position. Safety precautions in place.      Problem: Knowledge Deficit - Standard  Goal: Patient and family/care givers will demonstrate understanding of plan of care, disease process/condition, diagnostic tests and medications  Outcome: Progressing     Problem: Pain - Standard  Goal: Alleviation of pain or a reduction in pain to the patient’s comfort goal  Outcome: Progressing

## 2021-10-29 NOTE — DISCHARGE PLANNING
Anticipated Discharge Disposition: Home with resources    Action: Discussed pt in IDT rounds. Per MD, pt is cleared to d/c in 2 days. Pt has a 6-clicks score of 19. No needs reported or identified at this time.    Barriers to Discharge: None    Plan: LSW to follow and assist as needed. LSW to provide pt with resources at d/c.

## 2021-10-29 NOTE — PROGRESS NOTES
"S: Davie Montejo  is a 86 y.o.  female admitted for contained gastric ulcer perforation. Unchanged this AM      O:  /43   Pulse 61   Temp 36.4 °C (97.6 °F) (Oral)   Resp 18   Ht 1.702 m (5' 7\")   Wt 75.4 kg (166 lb 3.6 oz)   SpO2 91%   Intake/Output                             10/27/21 0700 - 10/28/21 0659 10/28/21 0700 - 10/29/21 0659 10/29/21 0700 - 10/30/21 0659     9523-7050 1992-4816 Total 9896-9930 8435-9194 Total 1868-6519 6087-4349 Total                    Intake    P.O.  1490  1520 3010  640  -- 640  --  -- --    P.O. 1490 1520 3010 640 -- 640 -- -- --    Total Intake 1490 1520 3010 640 -- 640 -- -- --       Output    Urine  --  -- --  --  -- --  --  -- --    Number of Times Voided -- -- -- 3 x 1 x 4 x 1 x -- 1 x    Stool  --  -- --  --  -- --  --  -- --    Number of Times Stooled -- 2 x 2 x 3 x -- 3 x 1 x -- 1 x    Total Output -- -- -- -- -- -- -- -- --       Net I/O     1490 1520 3010 640 -- 640 -- -- --        Recent Labs     10/27/21  0232 10/28/21  0237   SODIUM 135 139   POTASSIUM 3.4* 3.7   CHLORIDE 101 106   CO2 23 23   GLUCOSE 83 90   BUN 4* 4*   CREATININE 0.87 0.87   CALCIUM 8.6 8.6     Recent Labs     10/27/21  0232 10/28/21  0237 10/29/21  0440   WBC 7.5 6.3 5.8   RBC 3.45* 3.30* 3.28*   HEMOGLOBIN 9.9* 9.5* 9.6*   HEMATOCRIT 31.8* 30.1* 30.0*   MCV 92.2 91.2 91.5   MCH 28.7 28.8 29.3   MCHC 31.1* 31.6* 32.0*   RDW 47.8 47.5 47.5   PLATELETCT 384 394 372   MPV 9.4 9.5 9.7       A/P:  Negative UGI. alyssa CLD. If significant changes in pain or vitals would hold PO intake. Slow advancement to fulls and then soft as tolerated. Would treat with quad therapy for h pylori empirically on discharge. No need for outpatient follow-up with surgery. Will need outpatient GI follow-up for EGD more then 6 weeks from now.     Mookie Santos MD  [facility]  "

## 2021-10-29 NOTE — PROGRESS NOTES
Hospital Medicine Daily Progress Note    Date of Service  10/29/2021    Chief Complaint  Davie Montejo is a 86 y.o. female admitted 10/21/2021 with chest pain.    Hospital Course  An 86-year-old woman with h/o HTN, HLD, hypothyroidism, depression presented with chest and back pain. She had substernal/epigastric pain under her left breast for months, however the night before admission, she woke up from pain.  On admission, patient was found to have low hemoglobin. CT showed contained gastric perforation. Patient received blood, platelet transfusions.  Surgery recommended conservative management. Patient was on rocephin, flagyl, PPI. Started CLD on 10/25.    Interval Problem Update  10/27: Patient reports diffuse abdominal pain 7/10. Pain is stable, nausea, vomiting, loose stool x1 daily. Afebrile, hemodynamically stable. BP was soft at night. Received LR bolus 500 ml. K 3.4 replaced. Surgery is following recommendations appreciated. Advanced diet to full liquid per surgery recommendation.     10/28: Patient reports chest pain. Ordered ecg, troponin. Afebrile, hemodynamically stable. Pain goes from 10/10 to 5/10 after oxycodone. Troponin negative. No ECG changes. Reports diarrhea. Ordered c.diff.    10/29: Still reports chest pain, abdominal diffuse pain, nausea, vomiting. Still has loose stool. Afebrile, hemodynamically stable. C.diff negative. Ordered PT, OT eval. Advanced diet to soft and bite sized. Resumed home losartan at dose 25 mg.    I have personally seen and examined the patient at bedside. I discussed the plan of care with patient, bedside RN, charge RN,  and pharmacy.    Consultants/Specialty  general surgery    Code Status  Full Code    Disposition  Patient is not medically cleared.   Anticipate discharge to to home with close outpatient follow-up.  I have placed the appropriate orders for post-discharge needs.    Review of Systems  Constitutional: Positive for malaise/fatigue.  Negative for chills and fever.   HENT: Negative.    Eyes: Negative.    Respiratory: Negative for cough and shortness of breath.    Cardiovascular: Negative for chest pain.   Gastrointestinal: Positive for abdominal pain. Negative for nausea and vomiting.   Musculoskeletal: Negative for myalgias.   Skin: Negative for rash.   Neurological: Positive for weakness. Negative for headaches.    Physical Exam  Temp:  [36.4 °C (97.6 °F)-36.8 °C (98.2 °F)] 36.7 °C (98 °F)  Pulse:  [55-69] 55  Resp:  [16-18] 16  BP: (102-148)/(43-74) 146/58  SpO2:  [91 %-96 %] 95 %    Physical Exam  Vitals and nursing note reviewed.   Constitutional:       Appearance: She is ill-appearing.   HENT:      Head: Normocephalic and atraumatic.      Mouth/Throat:      Mouth: Mucous membranes are moist.      Pharynx: Oropharynx is clear.   Eyes:      Pupils: Pupils are equal, round, and reactive to light.   Cardiovascular:      Rate and Rhythm: Normal rate and regular rhythm.   Pulmonary:      Effort: Pulmonary effort is normal. No respiratory distress.      Breath sounds: No wheezing or rales.   Abdominal:      General: Abdomen is flat. Bowel sounds are normal.      Tenderness: There is abdominal tenderness (RLQ). There is no guarding.   Musculoskeletal:         General: Normal range of motion.      Cervical back: Normal range of motion.   Skin:     General: Skin is warm.   Neurological:      General: No focal deficit present.      Mental Status: She is alert and oriented to person, place, and time.     Fluids    Intake/Output Summary (Last 24 hours) at 10/29/2021 1149  Last data filed at 10/28/2021 1800  Gross per 24 hour   Intake 400 ml   Output --   Net 400 ml       Laboratory  Recent Labs     10/27/21  0232 10/28/21  0237 10/29/21  0440   WBC 7.5 6.3 5.8   RBC 3.45* 3.30* 3.28*   HEMOGLOBIN 9.9* 9.5* 9.6*   HEMATOCRIT 31.8* 30.1* 30.0*   MCV 92.2 91.2 91.5   MCH 28.7 28.8 29.3   MCHC 31.1* 31.6* 32.0*   RDW 47.8 47.5 47.5   PLATELETCT 384 394 372    MPV 9.4 9.5 9.7     Recent Labs     10/27/21  0232 10/28/21  0237   SODIUM 135 139   POTASSIUM 3.4* 3.7   CHLORIDE 101 106   CO2 23 23   GLUCOSE 83 90   BUN 4* 4*   CREATININE 0.87 0.87   CALCIUM 8.6 8.6                   Imaging  DX-UPPER GI-SERIES WITH KUB   Final Result      1.  Contained gastric perforation contiguous with the superior aspect of the gastric distal body/antrum      2.  No free leakage into the peritoneal cavity      3.  The distal body and antrum of the stomach are narrowed consistent with wall thickening      4.  Overall, there probably no changes since recent CT abdomen and pelvis      EC-ECHOCARDIOGRAM COMPLETE W/O CONT   Final Result      CT-ABDOMEN-PELVIS W/O   Final Result      1.  Again seen prominent thickening of the wall of the stomach most prominently within the gastric antrum and extending into the proximal duodenum consistent with peptic ulcer disease. There is again seen a focal area of contrast density fluid and gas    extending cephalad from that area of gastric antral thickening which appears to be extraluminal and with some surrounding inflammatory stranding and fluid within the mesentery. This most likely represents a contained perforated gastric antral ulcer.    There is no other evidence of free intraperitoneal air or evidence of contrast extravasation into the peritoneal cavity.      2.  Prior cholecystectomy.      3.  Renal cortical thinning and scarring.      4.  Sigmoid diverticulosis.      CT-CTA COMPLETE THORACOABDOMINAL AORTA   Final Result         1.  Thickened gastric antrum with extraluminal fluid collection and nondependent focus of air superior to the stomach. Appearance favors perforated gastric ulcer with adjacent gastric succus or abscess.   2.  No aortic aneurysm or dissection identified. There is 2.3 cm infrarenal fusiform abdominal aortic ectasia is seen.   3.  Diverticulosis   4.  Atherosclerosis      These findings were discussed with the patient's  clinician, Donavan Orozco, on 10/21/2021 5:38 AM.      DX-CHEST-PORTABLE (1 VIEW)   Final Result         1.  No acute cardiopulmonary disease.           Assessment/Plan  * Acute perforated gastric ulcer with hemorrhage (HCC)- (present on admission)  Assessment & Plan  Noted on CTA of the chest  Causing GI bleeding, continue Protonix  Completed Rocephin and Flagyl IV.  Monitor HG q 6 hours, required transfusions PRBCs 10/21 and 10/22  Surgery following, conservative management, recommendations appreciated  Started CLD on 10/27  Advanced diet to full liquid on 10/27  Advanced diet to soft and bite sized on 10/29    Coagulopathy (HCC)- (present on admission)  Assessment & Plan  Secondary to asa and plavix use.  Holding both medications.  TEG with increased inhibition noted.  Gave 1 unit platelets 10/21.  Monitor for further bleeding and given platelets as needed to correct coagulopathy from plavix.  PTT and INR wnl.    Acute blood loss anemia- (present on admission)  Assessment & Plan  More likely acute on chronic  Transfuse if hemoglobin less than 7  Patient remains hemodynamically stable    Hyperlipidemia- (present on admission)  Assessment & Plan  hold home statin    Hypothyroidism- (present on admission)  Assessment & Plan  Continue home Synthroid dose at 150 mcg daily    Essential hypertension- (present on admission)  Assessment & Plan  Resumed home losartan on 10/29  continue metoprolol  as needed enalapril and labetalol    Depressive disorder- (present on admission)  Assessment & Plan  continue home Celexa       VTE prophylaxis: heparin ppx    I have performed a physical exam and reviewed and updated ROS and Plan today (10/29/2021). In review of yesterday's note (10/28/2021), there are no changes except as documented above.

## 2021-10-29 NOTE — CARE PLAN
The patient is Stable - Low risk of patient condition declining or worsening    Shift Goals  Clinical Goals: Free from falls and injury  Patient Goals: Rest and sleep  Family Goals: no family present    Progress made toward(s) clinical / shift goals:    Problem: Knowledge Deficit - Standard  Goal: Patient and family/care givers will demonstrate understanding of plan of care, disease process/condition, diagnostic tests and medications  Outcome: Progressing  Note: Patient updated on the plan of care. Encouraged to voice feelings and to ask questions. Answered all questions and concerns. Agrees with the plan of care.      Problem: Fall Risk  Goal: Patient will remain free from falls  Outcome: Progressing  Note: Safety precautions in place. Bed alarm ON. Will continue to monitor patient.

## 2021-10-29 NOTE — THERAPY
Physical Therapy   Initial Evaluation     Patient Name: Davie Montejo  Age:  86 y.o., Sex:  female  Medical Record #: [id number]  Today's Date: 10/29/2021          Assessment  Patient is 86 y.o. female with a diagnosis of acute perforated gastric ulcer.Pt lives at home with a friend of her brother who recently passed away.Pt is safe with bed mob,transfers and ambulation.She denies any equipment needs but is concerned about living with the brothers friend      Plan    Recommend Physical Therapy for Evaluation only      10/29/21 1500   Total Time Spent   Total Time Spent (Mins) 30   Charge Group   PT Evaluation PT Evaluation Mod   Initial Contact Note    Initial Contact Note Order Received and Verified, Physical Therapy Evaluation in Progress with Full Report to Follow.   Pain 0 - 10 Group   Therapist Pain Assessment 3   Prior Living Situation   Prior Services None   Housing / Facility 1 Story House   Steps Into Home 0   Steps In Home 0   Equipment Owned None   Lives with - Patient's Self Care Capacity Other (Comments)  (brothers friend)   Prior Level of Functional Mobility   Bed Mobility Independent   Transfer Status Independent   Ambulation Independent   Distance Ambulation (Feet)   (community amb)   Assistive Devices Used None   Cognition    Cognition / Consciousness WDL   Passive ROM Lower Body   Passive ROM Lower Body WDL   Active ROM Lower Body    Active ROM Lower Body  WDL   Strength Lower Body   Lower Body Strength  WDL   Sensation Lower Body   Lower Extremity Sensation   WDL   Coordination Lower Body    Coordination Lower Body  WDL   Balance Assessment   Sitting Balance (Static) Good   Sitting Balance (Dynamic) Good   Standing Balance (Static) Good   Standing Balance (Dynamic) Fair +   Weight Shift Sitting Good   Weight Shift Standing Good   Gait Analysis   Gait Level Of Assist Supervised   Assistive Device None   Distance (Feet) 300   # of Times Distance was Traveled 1   Deviation Step To   Weight  Bearing Status full   Bed Mobility    Supine to Sit Independent   Sit to Supine Independent   Scooting Independent   Functional Mobility   Sit to Stand Supervised   Bed, Chair, Wheelchair Transfer Supervised   Transfer Method Stand Step   How much difficulty does the patient currently have...   Turning over in bed (including adjusting bedclothes, sheets and blankets)? 4   Sitting down on and standing up from a chair with arms (e.g., wheelchair, bedside commode, etc.) 4   Moving from lying on back to sitting on the side of the bed? 4   How much help from another person does the patient currently need...   Moving to and from a bed to a chair (including a wheelchair)? 4   Need to walk in a hospital room? 4   Climbing 3-5 steps with a railing? 4   6 clicks Mobility Score 24   Activity Tolerance   Sitting Edge of Bed 10   Standing 10   Patient / Family Goals    Patient / Family Goal #1 Home   Anticipated Discharge Equipment and Recommendations   DC Equipment Recommendations None   Discharge Recommendations Anticipate that the patient will have no further physical therapy needs after discharge from the hospital   Interdisciplinary Plan of Care Collaboration   IDT Collaboration with  Nursing   Session Information   Date / Session Number  10/29   Priority 0       DC Equipment Recommendations: (P) None  Discharge Recommendations: (P) Anticipate that the patient will have no further physical therapy needs after discharge from the hospital

## 2021-10-30 LAB
ANION GAP SERPL CALC-SCNC: 12 MMOL/L (ref 7–16)
BUN SERPL-MCNC: 9 MG/DL (ref 8–22)
CALCIUM SERPL-MCNC: 8.6 MG/DL (ref 8.4–10.2)
CHLORIDE SERPL-SCNC: 105 MMOL/L (ref 96–112)
CO2 SERPL-SCNC: 21 MMOL/L (ref 20–33)
CREAT SERPL-MCNC: 1.02 MG/DL (ref 0.5–1.4)
ERYTHROCYTE [DISTWIDTH] IN BLOOD BY AUTOMATED COUNT: 47.2 FL (ref 35.9–50)
GLUCOSE SERPL-MCNC: 93 MG/DL (ref 65–99)
HCT VFR BLD AUTO: 31.4 % (ref 37–47)
HGB BLD-MCNC: 10 G/DL (ref 12–16)
MCH RBC QN AUTO: 29.1 PG (ref 27–33)
MCHC RBC AUTO-ENTMCNC: 31.8 G/DL (ref 33.6–35)
MCV RBC AUTO: 91.3 FL (ref 81.4–97.8)
PLATELET # BLD AUTO: 363 K/UL (ref 164–446)
PMV BLD AUTO: 9.9 FL (ref 9–12.9)
POTASSIUM SERPL-SCNC: 4.2 MMOL/L (ref 3.6–5.5)
RBC # BLD AUTO: 3.44 M/UL (ref 4.2–5.4)
SODIUM SERPL-SCNC: 138 MMOL/L (ref 135–145)
WBC # BLD AUTO: 5.7 K/UL (ref 4.8–10.8)

## 2021-10-30 PROCEDURE — 80048 BASIC METABOLIC PNL TOTAL CA: CPT

## 2021-10-30 PROCEDURE — 94760 N-INVAS EAR/PLS OXIMETRY 1: CPT

## 2021-10-30 PROCEDURE — 700102 HCHG RX REV CODE 250 W/ 637 OVERRIDE(OP): Performed by: INTERNAL MEDICINE

## 2021-10-30 PROCEDURE — 85027 COMPLETE CBC AUTOMATED: CPT

## 2021-10-30 PROCEDURE — 700101 HCHG RX REV CODE 250: Performed by: INTERNAL MEDICINE

## 2021-10-30 PROCEDURE — 99232 SBSQ HOSP IP/OBS MODERATE 35: CPT | Performed by: STUDENT IN AN ORGANIZED HEALTH CARE EDUCATION/TRAINING PROGRAM

## 2021-10-30 PROCEDURE — 770006 HCHG ROOM/CARE - MED/SURG/GYN SEMI*

## 2021-10-30 PROCEDURE — A9270 NON-COVERED ITEM OR SERVICE: HCPCS | Performed by: STUDENT IN AN ORGANIZED HEALTH CARE EDUCATION/TRAINING PROGRAM

## 2021-10-30 PROCEDURE — 36415 COLL VENOUS BLD VENIPUNCTURE: CPT

## 2021-10-30 PROCEDURE — 700102 HCHG RX REV CODE 250 W/ 637 OVERRIDE(OP): Performed by: STUDENT IN AN ORGANIZED HEALTH CARE EDUCATION/TRAINING PROGRAM

## 2021-10-30 PROCEDURE — A9270 NON-COVERED ITEM OR SERVICE: HCPCS | Performed by: INTERNAL MEDICINE

## 2021-10-30 PROCEDURE — 700111 HCHG RX REV CODE 636 W/ 250 OVERRIDE (IP): Performed by: STUDENT IN AN ORGANIZED HEALTH CARE EDUCATION/TRAINING PROGRAM

## 2021-10-30 RX ADMIN — GABAPENTIN 100 MG: 100 CAPSULE ORAL at 17:32

## 2021-10-30 RX ADMIN — GABAPENTIN 100 MG: 100 CAPSULE ORAL at 11:53

## 2021-10-30 RX ADMIN — HYDROCODONE BITARTRATE AND ACETAMINOPHEN 2 TABLET: 5; 325 TABLET ORAL at 20:50

## 2021-10-30 RX ADMIN — HEPARIN SODIUM 5000 UNITS: 5000 INJECTION, SOLUTION INTRAVENOUS; SUBCUTANEOUS at 20:51

## 2021-10-30 RX ADMIN — OMEPRAZOLE 20 MG: 20 CAPSULE, DELAYED RELEASE ORAL at 17:32

## 2021-10-30 RX ADMIN — CITALOPRAM HYDROBROMIDE 10 MG: 20 TABLET ORAL at 05:57

## 2021-10-30 RX ADMIN — FOLIC ACID 1 MG: 1 TABLET ORAL at 05:57

## 2021-10-30 RX ADMIN — HEPARIN SODIUM 5000 UNITS: 5000 INJECTION, SOLUTION INTRAVENOUS; SUBCUTANEOUS at 13:32

## 2021-10-30 RX ADMIN — Medication 5 MG: at 20:50

## 2021-10-30 RX ADMIN — LIDOCAINE 1 PATCH: 50 PATCH TOPICAL at 05:59

## 2021-10-30 RX ADMIN — HEPARIN SODIUM 5000 UNITS: 5000 INJECTION, SOLUTION INTRAVENOUS; SUBCUTANEOUS at 05:57

## 2021-10-30 RX ADMIN — OMEPRAZOLE 20 MG: 20 CAPSULE, DELAYED RELEASE ORAL at 05:57

## 2021-10-30 RX ADMIN — HYDROCODONE BITARTRATE AND ACETAMINOPHEN 2 TABLET: 5; 325 TABLET ORAL at 06:03

## 2021-10-30 RX ADMIN — HYDROCODONE BITARTRATE AND ACETAMINOPHEN 2 TABLET: 5; 325 TABLET ORAL at 13:43

## 2021-10-30 RX ADMIN — GABAPENTIN 100 MG: 100 CAPSULE ORAL at 05:57

## 2021-10-30 RX ADMIN — METOPROLOL TARTRATE 12.5 MG: 25 TABLET, FILM COATED ORAL at 05:57

## 2021-10-30 RX ADMIN — LOSARTAN POTASSIUM 25 MG: 25 TABLET, FILM COATED ORAL at 05:57

## 2021-10-30 RX ADMIN — LEVOTHYROXINE SODIUM 150 MCG: 0.07 TABLET ORAL at 05:57

## 2021-10-30 ASSESSMENT — PATIENT HEALTH QUESTIONNAIRE - PHQ9
2. FEELING DOWN, DEPRESSED, IRRITABLE, OR HOPELESS: NOT AT ALL
1. LITTLE INTEREST OR PLEASURE IN DOING THINGS: NOT AT ALL
SUM OF ALL RESPONSES TO PHQ9 QUESTIONS 1 AND 2: 0

## 2021-10-30 ASSESSMENT — PAIN DESCRIPTION - PAIN TYPE
TYPE: ACUTE PAIN
TYPE: ACUTE PAIN
TYPE: CHRONIC PAIN
TYPE: ACUTE PAIN
TYPE: ACUTE PAIN
TYPE: CHRONIC PAIN

## 2021-10-30 NOTE — PROGRESS NOTES
Received report from night shift RN. Patient A&Ox4, denies N/V, Sob at this time. Safety precautions in place. Call light, belongings in reach.     COVID-19 surge in effect.

## 2021-10-30 NOTE — CARE PLAN
The patient is Stable - Low risk of patient condition declining or worsening    Shift Goals  Clinical Goals: Safety & hemodynamic stability   Patient Goals: Pain control and sleep   Family Goals: DEE DEE    Progress made toward(s) clinical / shift goals: Pain well controlled with PRN medication and repositioning. Hemodynamically stable. Melotonin for sleep is effective.    Patient is not progressing towards the following goals: N/A      Problem: Pain - Standard  Goal: Alleviation of pain or a reduction in pain to the patient’s comfort goal  Outcome: Progressing     Problem: Fall Risk  Goal: Patient will remain free from falls  Outcome: Progressing     Problem: Hemodynamics  Goal: Patient's hemodynamics, fluid balance and neurologic status will be stable or improve  Outcome: Progressing

## 2021-10-30 NOTE — PROGRESS NOTES
Assumed care of patient at 1915, received bedside report from day shift RN. Bed is locked and in lowest position with call light within reach. Treaded socks in place. Patient updated on plan of care. Patient has generalized pain and requests pain medications. Patient understands that pain medications must be spaced out q6 hours. Will reassess when patient eligible for medication. White board updated. Pt A&Ox4. Patient's breathing pattern is unlabored. All needs met at this time.

## 2021-10-30 NOTE — PROGRESS NOTES
[facility] Hospital Medicine Daily Progress Note    Date of Service  10/30/2021    Chief Complaint  Davie Montejo is a 86 y.o. female admitted 10/21/2021 with chest pain.    Hospital Course  An 86-year-old woman with h/o HTN, HLD, hypothyroidism, depression presented with chest and back pain. She had substernal/epigastric pain under her left breast for months, however the night before admission, she woke up from pain.  On admission, patient was found to have low hemoglobin. CT showed contained gastric perforation. Patient received blood, platelet transfusions.  Surgery recommended conservative management. Patient was on rocephin, flagyl, PPI. Started CLD on 10/25.     10/27: Patient reports diffuse abdominal pain 7/10. Pain is stable, nausea, vomiting, loose stool x1 daily. Afebrile, hemodynamically stable. BP was soft at night. Received LR bolus 500 ml. K 3.4 replaced. Surgery is following recommendations appreciated. Advanced diet to full liquid per surgery recommendation.     10/28: Patient reports chest pain. Ordered ecg, troponin. Afebrile, hemodynamically stable. Pain goes from 10/10 to 5/10 after oxycodone. Troponin negative. No ECG changes. Reports diarrhea. Ordered c.diff.     10/29: Still reports chest pain, abdominal diffuse pain, nausea, vomiting. Still has loose stool. Afebrile, hemodynamically stable. C.diff negative. Ordered PT, OT eval. Advanced diet to soft and bite sized. Resumed home losartan at dose 25 mg.      Interval Problem Update  10/30: Reports that abdominal pain is better today, has nausea, vomited once yesterday. Couldn't sleep last night. Afebrile, hemodynamically stable. C.diff negative. PT, OT no needs. Askin for oxycodone 320 mg, says, she is taking it for fybromyalgia x 4-5 years. Advanced diet to level 7 easy to chew.    I have personally seen and examined the patient at bedside. I discussed the plan of care with patient, bedside RN, charge RN,  and  pharmacy.    Consultants/Specialty  general surgery    Code Status  Full Code    Disposition  Patient is not medically cleared.   Anticipate discharge to to home with close outpatient follow-up.  I have placed the appropriate orders for post-discharge needs.    Review of Systems  Constitutional: Positive for malaise/fatigue. Negative for chills and fever.   HENT: Negative.    Eyes: Negative.    Respiratory: Negative for cough and shortness of breath.    Cardiovascular: Negative for chest pain.   Gastrointestinal: Positive for abdominal pain, nausea.   Musculoskeletal: Positive for myalgias.   Skin: Negative for rash.   Neurological: Positive for weakness. Negative for headaches.    Physical Exam  Temp:  [36.5 °C (97.7 °F)-36.9 °C (98.4 °F)] 36.7 °C (98 °F)  Pulse:  [57-69] 60  Resp:  [16-17] 16  BP: (119-139)/(41-64) 133/57  SpO2:  [93 %-96 %] 96 %    Physical Exam  Vitals and nursing note reviewed.   Constitutional:       Appearance: She is ill-appearing.   HENT:      Head: Normocephalic and atraumatic.      Mouth/Throat:      Mouth: Mucous membranes are moist.      Pharynx: Oropharynx is clear.   Eyes:      Pupils: Pupils are equal, round, and reactive to light.   Cardiovascular:      Rate and Rhythm: Normal rate and regular rhythm.   Pulmonary:      Effort: Pulmonary effort is normal. No respiratory distress.      Breath sounds: No wheezing or rales.   Abdominal:      General: Abdomen is flat. Bowel sounds are normal.      Tenderness: There is abdominal tenderness (RLQ). There is no guarding.   Musculoskeletal:         General: Normal range of motion.      Cervical back: Normal range of motion.   Skin:     General: Skin is warm.   Neurological:      General: No focal deficit present.      Mental Status: She is alert and oriented to person, place, and time.     Fluids    Intake/Output Summary (Last 24 hours) at 10/30/2021 1140  Last data filed at 10/30/2021 0809  Gross per 24 hour   Intake 720 ml   Output --   Net  720 ml       Laboratory  Recent Labs     10/28/21  0237 10/29/21  0440 10/30/21  0329   WBC 6.3 5.8 5.7   RBC 3.30* 3.28* 3.44*   HEMOGLOBIN 9.5* 9.6* 10.0*   HEMATOCRIT 30.1* 30.0* 31.4*   MCV 91.2 91.5 91.3   MCH 28.8 29.3 29.1   MCHC 31.6* 32.0* 31.8*   RDW 47.5 47.5 47.2   PLATELETCT 394 372 363   MPV 9.5 9.7 9.9     Recent Labs     10/28/21  0237 10/30/21  0329   SODIUM 139 138   POTASSIUM 3.7 4.2   CHLORIDE 106 105   CO2 23 21   GLUCOSE 90 93   BUN 4* 9   CREATININE 0.87 1.02   CALCIUM 8.6 8.6                   Imaging  DX-UPPER GI-SERIES WITH KUB   Final Result      1.  Contained gastric perforation contiguous with the superior aspect of the gastric distal body/antrum      2.  No free leakage into the peritoneal cavity      3.  The distal body and antrum of the stomach are narrowed consistent with wall thickening      4.  Overall, there probably no changes since recent CT abdomen and pelvis      EC-ECHOCARDIOGRAM COMPLETE W/O CONT   Final Result      CT-ABDOMEN-PELVIS W/O   Final Result      1.  Again seen prominent thickening of the wall of the stomach most prominently within the gastric antrum and extending into the proximal duodenum consistent with peptic ulcer disease. There is again seen a focal area of contrast density fluid and gas    extending cephalad from that area of gastric antral thickening which appears to be extraluminal and with some surrounding inflammatory stranding and fluid within the mesentery. This most likely represents a contained perforated gastric antral ulcer.    There is no other evidence of free intraperitoneal air or evidence of contrast extravasation into the peritoneal cavity.      2.  Prior cholecystectomy.      3.  Renal cortical thinning and scarring.      4.  Sigmoid diverticulosis.      CT-CTA COMPLETE THORACOABDOMINAL AORTA   Final Result         1.  Thickened gastric antrum with extraluminal fluid collection and nondependent focus of air superior to the stomach.  Appearance favors perforated gastric ulcer with adjacent gastric succus or abscess.   2.  No aortic aneurysm or dissection identified. There is 2.3 cm infrarenal fusiform abdominal aortic ectasia is seen.   3.  Diverticulosis   4.  Atherosclerosis      These findings were discussed with the patient's clinician, Donavan Orozco, on 10/21/2021 5:38 AM.      DX-CHEST-PORTABLE (1 VIEW)   Final Result         1.  No acute cardiopulmonary disease.           Assessment/Plan  * Acute perforated gastric ulcer with hemorrhage (HCC)- (present on admission)  Assessment & Plan  Noted on CTA of the chest  Causing GI bleeding, continue Protonix  Completed Rocephin and Flagyl IV.  Monitor HG q 6 hours, required transfusions PRBCs 10/21 and 10/22  Surgery following, conservative management, recommendations appreciated  Started CLD on 10/27  Advanced diet to full liquid on 10/27  Advanced diet to soft and bite sized on 10/29    Coagulopathy (HCC)- (present on admission)  Assessment & Plan  Secondary to asa and plavix use.  Holding both medications.  TEG with increased inhibition noted.  Gave 1 unit platelets 10/21.  Monitor for further bleeding and given platelets as needed to correct coagulopathy from plavix.  PTT and INR wnl.    Acute blood loss anemia- (present on admission)  Assessment & Plan  More likely acute on chronic  Transfuse if hemoglobin less than 7  Patient remains hemodynamically stable    Hyperlipidemia- (present on admission)  Assessment & Plan  hold home statin    Hypothyroidism- (present on admission)  Assessment & Plan  Continue home Synthroid dose at 150 mcg daily    Essential hypertension- (present on admission)  Assessment & Plan  Resumed home losartan on 10/29  continue metoprolol  as needed enalapril and labetalol    Depressive disorder- (present on admission)  Assessment & Plan  continue home Celexa       VTE prophylaxis: heparin ppx    I have performed a physical exam and reviewed and updated ROS and Plan  today (10/30/2021). In review of yesterday's note (10/29/2021), there are no changes except as documented above.

## 2021-10-31 VITALS
HEIGHT: 67 IN | DIASTOLIC BLOOD PRESSURE: 62 MMHG | RESPIRATION RATE: 18 BRPM | WEIGHT: 166.23 LBS | BODY MASS INDEX: 26.09 KG/M2 | TEMPERATURE: 97.7 F | HEART RATE: 56 BPM | OXYGEN SATURATION: 95 % | SYSTOLIC BLOOD PRESSURE: 132 MMHG

## 2021-10-31 LAB
ERYTHROCYTE [DISTWIDTH] IN BLOOD BY AUTOMATED COUNT: 47.9 FL (ref 35.9–50)
HCT VFR BLD AUTO: 32.1 % (ref 37–47)
HGB BLD-MCNC: 10 G/DL (ref 12–16)
MCH RBC QN AUTO: 28.9 PG (ref 27–33)
MCHC RBC AUTO-ENTMCNC: 31.2 G/DL (ref 33.6–35)
MCV RBC AUTO: 92.8 FL (ref 81.4–97.8)
PLATELET # BLD AUTO: 355 K/UL (ref 164–446)
PMV BLD AUTO: 10.2 FL (ref 9–12.9)
RBC # BLD AUTO: 3.46 M/UL (ref 4.2–5.4)
WBC # BLD AUTO: 5.4 K/UL (ref 4.8–10.8)

## 2021-10-31 PROCEDURE — 700102 HCHG RX REV CODE 250 W/ 637 OVERRIDE(OP): Performed by: STUDENT IN AN ORGANIZED HEALTH CARE EDUCATION/TRAINING PROGRAM

## 2021-10-31 PROCEDURE — 700102 HCHG RX REV CODE 250 W/ 637 OVERRIDE(OP): Performed by: INTERNAL MEDICINE

## 2021-10-31 PROCEDURE — 99239 HOSP IP/OBS DSCHRG MGMT >30: CPT | Performed by: STUDENT IN AN ORGANIZED HEALTH CARE EDUCATION/TRAINING PROGRAM

## 2021-10-31 PROCEDURE — 85027 COMPLETE CBC AUTOMATED: CPT

## 2021-10-31 PROCEDURE — A9270 NON-COVERED ITEM OR SERVICE: HCPCS | Performed by: STUDENT IN AN ORGANIZED HEALTH CARE EDUCATION/TRAINING PROGRAM

## 2021-10-31 PROCEDURE — A9270 NON-COVERED ITEM OR SERVICE: HCPCS | Performed by: INTERNAL MEDICINE

## 2021-10-31 PROCEDURE — 36415 COLL VENOUS BLD VENIPUNCTURE: CPT

## 2021-10-31 PROCEDURE — 700111 HCHG RX REV CODE 636 W/ 250 OVERRIDE (IP): Performed by: STUDENT IN AN ORGANIZED HEALTH CARE EDUCATION/TRAINING PROGRAM

## 2021-10-31 PROCEDURE — 700101 HCHG RX REV CODE 250: Performed by: INTERNAL MEDICINE

## 2021-10-31 PROCEDURE — 700111 HCHG RX REV CODE 636 W/ 250 OVERRIDE (IP): Performed by: HOSPITALIST

## 2021-10-31 RX ORDER — ONDANSETRON 4 MG/1
4 TABLET, ORALLY DISINTEGRATING ORAL EVERY 4 HOURS PRN
Qty: 10 TABLET | Refills: 0 | Status: SHIPPED | OUTPATIENT
Start: 2021-10-31 | End: 2021-12-04

## 2021-10-31 RX ORDER — OMEPRAZOLE 20 MG/1
20 CAPSULE, DELAYED RELEASE ORAL 2 TIMES DAILY
Qty: 28 CAPSULE | Refills: 0 | Status: SHIPPED | OUTPATIENT
Start: 2021-10-31 | End: 2021-11-14

## 2021-10-31 RX ORDER — ACETAMINOPHEN 325 MG/1
650 TABLET ORAL EVERY 6 HOURS PRN
Qty: 30 TABLET | Refills: 0 | Status: SHIPPED | OUTPATIENT
Start: 2021-10-31 | End: 2021-12-04

## 2021-10-31 RX ORDER — TETRACYCLINE HYDROCHLORIDE 500 MG/1
500 CAPSULE ORAL 4 TIMES DAILY
Qty: 56 CAPSULE | Refills: 0 | Status: SHIPPED | OUTPATIENT
Start: 2021-10-31 | End: 2021-11-14

## 2021-10-31 RX ORDER — LEVOTHYROXINE SODIUM 0.15 MG/1
150 TABLET ORAL
Qty: 30 TABLET | Refills: 1 | Status: SHIPPED | OUTPATIENT
Start: 2021-10-31 | End: 2021-12-22 | Stop reason: SDUPTHER

## 2021-10-31 RX ORDER — HYDROCODONE BITARTRATE AND ACETAMINOPHEN 5; 325 MG/1; MG/1
1 TABLET ORAL EVERY 6 HOURS PRN
Qty: 12 TABLET | Refills: 0 | Status: SHIPPED | OUTPATIENT
Start: 2021-10-31 | End: 2021-11-03

## 2021-10-31 RX ORDER — BISMUTH SUBSALICYLATE 525 MG/30ML
300 LIQUID ORAL 4 TIMES DAILY
Qty: 354 ML | Refills: 0 | Status: SHIPPED | OUTPATIENT
Start: 2021-10-31 | End: 2021-11-14

## 2021-10-31 RX ORDER — CITALOPRAM 20 MG/1
10 TABLET ORAL DAILY
Qty: 30 TABLET | Refills: 1 | Status: SHIPPED | OUTPATIENT
Start: 2021-10-31 | End: 2022-01-12 | Stop reason: SDUPTHER

## 2021-10-31 RX ORDER — METRONIDAZOLE 500 MG/1
500 TABLET ORAL EVERY 8 HOURS
Qty: 42 TABLET | Refills: 0 | Status: SHIPPED | OUTPATIENT
Start: 2021-10-31 | End: 2021-11-14

## 2021-10-31 RX ORDER — LIDOCAINE 50 MG/G
1 PATCH TOPICAL EVERY 24 HOURS
Qty: 20 PATCH | Refills: 0 | Status: SHIPPED | OUTPATIENT
Start: 2021-10-31 | End: 2022-01-11 | Stop reason: SDUPTHER

## 2021-10-31 RX ORDER — FOLIC ACID 1 MG/1
1 TABLET ORAL DAILY
Qty: 30 TABLET | Refills: 0 | Status: SHIPPED | OUTPATIENT
Start: 2021-11-01 | End: 2021-12-04

## 2021-10-31 RX ADMIN — OMEPRAZOLE 20 MG: 20 CAPSULE, DELAYED RELEASE ORAL at 06:13

## 2021-10-31 RX ADMIN — GABAPENTIN 100 MG: 100 CAPSULE ORAL at 06:14

## 2021-10-31 RX ADMIN — HEPARIN SODIUM 5000 UNITS: 5000 INJECTION, SOLUTION INTRAVENOUS; SUBCUTANEOUS at 06:09

## 2021-10-31 RX ADMIN — HYDROCODONE BITARTRATE AND ACETAMINOPHEN 2 TABLET: 5; 325 TABLET ORAL at 06:22

## 2021-10-31 RX ADMIN — LORAZEPAM 0.5 MG: 2 INJECTION INTRAMUSCULAR; INTRAVENOUS at 00:19

## 2021-10-31 RX ADMIN — GABAPENTIN 100 MG: 100 CAPSULE ORAL at 11:44

## 2021-10-31 RX ADMIN — LEVOTHYROXINE SODIUM 150 MCG: 0.07 TABLET ORAL at 06:14

## 2021-10-31 RX ADMIN — LIDOCAINE 1 PATCH: 50 PATCH TOPICAL at 06:07

## 2021-10-31 RX ADMIN — LOSARTAN POTASSIUM 25 MG: 25 TABLET, FILM COATED ORAL at 06:15

## 2021-10-31 RX ADMIN — FOLIC ACID 1 MG: 1 TABLET ORAL at 06:14

## 2021-10-31 RX ADMIN — CITALOPRAM HYDROBROMIDE 10 MG: 20 TABLET ORAL at 06:14

## 2021-10-31 ASSESSMENT — PAIN DESCRIPTION - PAIN TYPE: TYPE: CHRONIC PAIN

## 2021-10-31 NOTE — DISCHARGE PLANNING
Anticipated Discharge Disposition: Home with resources    Action: LSW called [facility] at [id number], spoke to Janette. LSW notified Janette that pt is discharging without HH. Per Janette, she will notify the .    1025: LSW met w/ pt at bedside to provide resources and deliver IMM. Pt expressed understanding and signed IMM 10/31/2021 at 1025. LSW provided pt with copy of IMM. LSW provided pt with list of general resources. Pt states she is hoping Alberto, her SW, can pick her up from hospital.    LSW faxed IMM to [facility].    Barriers to Discharge: None    Plan: LSW to follow and assist as needed.    1245: LSW notified that pt needs a ride. LSW provided cab voucher to [employer].

## 2021-10-31 NOTE — CARE PLAN
The patient is Stable - Low risk of patient condition declining or worsening    Shift Goals  Clinical Goals: Safety  Patient Goals: Pain mgmt  Family Goals: DEE DEE    Progress made toward(s) clinical / shift goals:    Problem: Pain - Standard  Goal: Alleviation of pain or a reduction in pain to the patient’s comfort goal  Outcome: Met     Problem: Knowledge Deficit - Standard  Goal: Patient and family/care givers will demonstrate understanding of plan of care, disease process/condition, diagnostic tests and medications  Outcome: Met     Problem: Fall Risk  Goal: Patient will remain free from falls  Outcome: Met     Problem: Psychosocial  Goal: Patient's level of anxiety will decrease  Outcome: Met  Goal: Patient's ability to verbalize feelings about condition will improve  Outcome: Met  Goal: Patient's ability to re-evaluate and adapt role responsibilities will improve  Outcome: Met  Goal: Patient and family will demonstrate ability to cope with life altering diagnosis and/or procedure  Outcome: Met  Goal: Spiritual and cultural needs incorporated into hospitalization  Outcome: Met     Problem: Communication  Goal: The ability to communicate needs accurately and effectively will improve  Outcome: Met     Problem: Discharge Barriers/Planning  Goal: Patient's continuum of care needs are met  Outcome: Met     Problem: Hemodynamics  Goal: Patient's hemodynamics, fluid balance and neurologic status will be stable or improve  Outcome: Met     Problem: Respiratory  Goal: Patient will achieve/maintain optimum respiratory ventilation and gas exchange  Outcome: Met     Problem: Chest Tube Management  Goal: Complications related to chest tube will be avoided or minimized  Outcome: Met     Problem: Fluid Volume  Goal: Fluid volume balance will be maintained  Outcome: Met     Problem: Mechanical Ventilation  Goal: Safe management of artificial airway and ventilation  Outcome: Met  Goal: Successful weaning off mechanical ventilator,  spontaneously maintains adequate gas exchange  Outcome: Met  Goal: Patient will be able to express needs and understand communication  Outcome: Met     Problem: Dysphagia  Goal: Dysphagia will improve  Outcome: Met     Problem: Risk for Aspiration  Goal: Patient's risk for aspiration will be absent or decrease  Outcome: Met     Problem: Nutrition  Goal: Patient's nutritional and fluid intake will be adequate or improve  Outcome: Met  Goal: Enteral nutrition will be maintained or improve  Outcome: Met  Goal: Enteral nutrition will be maintained or improve  Outcome: Met     Problem: Urinary Elimination  Goal: Establish and maintain regular urinary output  Outcome: Met     Problem: Bowel Elimination  Goal: Establish and maintain regular bowel function  Outcome: Met     Problem: Gastrointestinal Irritability  Goal: Nausea and vomiting will be absent or improve  Outcome: Met  Goal: Diarrhea will be absent or improved  Outcome: Met     Problem: Rectal Tube  Goal: Fecal output will be contained and skin will remain free from irritation  Outcome: Met     Problem: Mobility  Goal: Patient's capacity to carry out activities will improve  Outcome: Met     Problem: Self Care  Goal: Patient will have the ability to perform ADLs independently or with assistance (bathe, groom, dress, toilet and feed)  Outcome: Met     Problem: Infection - Standard  Goal: Patient will remain free from infection  Outcome: Met     Problem: Wound/ / Incision Healing  Goal: Patient's wound/surgical incision will decrease in size and heals properly  Outcome: Met     Problem: Skin Integrity  Goal: Skin integrity is maintained or improved  Outcome: Met  Patient progressing.    Patient is not progressing towards the following goals:

## 2021-10-31 NOTE — CARE PLAN
The patient is Stable - Low risk of patient condition declining or worsening    Shift Goals  Clinical Goals: Safety, pain control  Patient Goals: Pain control and sleep   Family Goals: DEE DEE    Progress made toward(s) clinical / shift goals:  Patient able to communicate needs appropriately. Patient medicated PRN for pain per MAR.   Problem: Pain - Standard  Goal: Alleviation of pain or a reduction in pain to the patient’s comfort goal  Outcome: Progressing     Problem: Knowledge Deficit - Standard  Goal: Patient and family/care givers will demonstrate understanding of plan of care, disease process/condition, diagnostic tests and medications  Outcome: Progressing     Problem: Fall Risk  Goal: Patient will remain free from falls  Outcome: Progressing     Problem: Psychosocial  Goal: Patient's level of anxiety will decrease  Outcome: Progressing  Goal: Patient's ability to verbalize feelings about condition will improve  Outcome: Progressing  Goal: Patient's ability to re-evaluate and adapt role responsibilities will improve  Outcome: Progressing  Goal: Patient and family will demonstrate ability to cope with life altering diagnosis and/or procedure  Outcome: Progressing  Goal: Spiritual and cultural needs incorporated into hospitalization  Outcome: Progressing     Problem: Communication  Goal: The ability to communicate needs accurately and effectively will improve  Outcome: Progressing     Problem: Discharge Barriers/Planning  Goal: Patient's continuum of care needs are met  Outcome: Progressing     Problem: Hemodynamics  Goal: Patient's hemodynamics, fluid balance and neurologic status will be stable or improve  Outcome: Progressing     Problem: Respiratory  Goal: Patient will achieve/maintain optimum respiratory ventilation and gas exchange  Outcome: Progressing     Problem: Nutrition  Goal: Patient's nutritional and fluid intake will be adequate or improve  Outcome: Progressing  Goal: Enteral nutrition will be  maintained or improve  Outcome: Progressing  Goal: Enteral nutrition will be maintained or improve  Outcome: Progressing     Problem: Urinary Elimination  Goal: Establish and maintain regular urinary output  Outcome: Progressing     Problem: Bowel Elimination  Goal: Establish and maintain regular bowel function  Outcome: Progressing     Problem: Gastrointestinal Irritability  Goal: Nausea and vomiting will be absent or improve  Outcome: Progressing  Goal: Diarrhea will be absent or improved  Outcome: Progressing     Problem: Mobility  Goal: Patient's capacity to carry out activities will improve  Outcome: Progressing     Problem: Self Care  Goal: Patient will have the ability to perform ADLs independently or with assistance (bathe, groom, dress, toilet and feed)  Outcome: Progressing     Problem: Infection - Standard  Goal: Patient will remain free from infection  Outcome: Progressing     Problem: Skin Integrity  Goal: Skin integrity is maintained or improved  Outcome: Progressing

## 2021-10-31 NOTE — CARE PLAN
Problem: Pain - Standard  Goal: Alleviation of pain or a reduction in pain to the patient’s comfort goal  Outcome: Met     Problem: Knowledge Deficit - Standard  Goal: Patient and family/care givers will demonstrate understanding of plan of care, disease process/condition, diagnostic tests and medications  Outcome: Met     Problem: Fall Risk  Goal: Patient will remain free from falls  Outcome: Met     Problem: Psychosocial  Goal: Patient's level of anxiety will decrease  Outcome: Met  Goal: Patient's ability to verbalize feelings about condition will improve  Outcome: Met  Goal: Patient's ability to re-evaluate and adapt role responsibilities will improve  Outcome: Met  Goal: Patient and family will demonstrate ability to cope with life altering diagnosis and/or procedure  Outcome: Met  Goal: Spiritual and cultural needs incorporated into hospitalization  Outcome: Met     Problem: Communication  Goal: The ability to communicate needs accurately and effectively will improve  Outcome: Met     Problem: Discharge Barriers/Planning  Goal: Patient's continuum of care needs are met  Outcome: Met     Problem: Hemodynamics  Goal: Patient's hemodynamics, fluid balance and neurologic status will be stable or improve  Outcome: Met     Problem: Respiratory  Goal: Patient will achieve/maintain optimum respiratory ventilation and gas exchange  Outcome: Met     Problem: Chest Tube Management  Goal: Complications related to chest tube will be avoided or minimized  Outcome: Met     Problem: Fluid Volume  Goal: Fluid volume balance will be maintained  Outcome: Met     Problem: Mechanical Ventilation  Goal: Safe management of artificial airway and ventilation  Outcome: Met  Goal: Successful weaning off mechanical ventilator, spontaneously maintains adequate gas exchange  Outcome: Met  Goal: Patient will be able to express needs and understand communication  Outcome: Met     Problem: Dysphagia  Goal: Dysphagia will improve  Outcome:  Met     Problem: Risk for Aspiration  Goal: Patient's risk for aspiration will be absent or decrease  Outcome: Met     Problem: Nutrition  Goal: Patient's nutritional and fluid intake will be adequate or improve  Outcome: Met  Goal: Enteral nutrition will be maintained or improve  Outcome: Met  Goal: Enteral nutrition will be maintained or improve  Outcome: Met     Problem: Urinary Elimination  Goal: Establish and maintain regular urinary output  Outcome: Met     Problem: Bowel Elimination  Goal: Establish and maintain regular bowel function  Outcome: Met     Problem: Gastrointestinal Irritability  Goal: Nausea and vomiting will be absent or improve  Outcome: Met  Goal: Diarrhea will be absent or improved  Outcome: Met     Problem: Rectal Tube  Goal: Fecal output will be contained and skin will remain free from irritation  Outcome: Met     Problem: Mobility  Goal: Patient's capacity to carry out activities will improve  Outcome: Met     Problem: Self Care  Goal: Patient will have the ability to perform ADLs independently or with assistance (bathe, groom, dress, toilet and feed)  Outcome: Met     Problem: Infection - Standard  Goal: Patient will remain free from infection  Outcome: Met     Problem: Wound/ / Incision Healing  Goal: Patient's wound/surgical incision will decrease in size and heals properly  Outcome: Met     Problem: Skin Integrity  Goal: Skin integrity is maintained or improved  Outcome: Met   The patient is Watcher - Medium risk of patient condition declining or worsening    Shift Goals  Clinical Goals: Safety  Patient Goals: Pain mgmt  Family Goals: DEE DEE    Progress made toward(s) clinical / shift goals: Patient progressing    Patient is not progressing towards the following goals:

## 2021-10-31 NOTE — DISCHARGE SUMMARY
"Discharge Summary    CHIEF COMPLAINT ON ADMISSION  Chief Complaint   Patient presents with   • Chest Pain     Patient GANESH FITZGERALD from home for chest pain \"for months.\" Another sharp sudden onset tonight while \"sound asleep,\" pt took 3 nitro & 2 \"large pink\" ASA. CP dimished to dull ache. Hx MI & 4x bypass in 2008. EKG sinus en route, 114/60, 84 sinus, 96% RA.       Reason for Admission  EMS     Admission Date  10/21/2021    CODE STATUS  Full Code    HPI & HOSPITAL COURSE  An 86-year-old woman with h/o HTN, HLD, hypothyroidism, depression presented with chest and back pain. She had substernal/epigastric pain under her left breast for months, however the night before admission, she woke up from pain.  On admission, patient was found to have low hemoglobin. CT showed contained gastric perforation. Patient received blood, platelet transfusions.  Surgery recommended conservative management. Patient was on rocephin, flagyl, PPI. Started CLD on 10/25. Advanced diet to full liquid per surgery recommendation on 10/27. On 10/28 patient developed diarrhea. D.diff negative. Advanced diet to soft and bite sized on 10/29. Resumed home losartan at dose 25 mg.  PT, OT no needs after discharge. Advanced diet to level 7 easy to chew on 10/30.    Patient is afebrile, hemodynamically stable. Surgery recommended   treat with quad therapy for H. pylori empirically on discharge; no need for outpatient follow-up with surgery; will need outpatient GI follow-up for EGD more then 6 weeks from now.        Therefore, she is discharged in fair and stable condition to home with close outpatient follow-up.    The patient met 2-midnight criteria for an inpatient stay at the time of discharge.    Discharge Date  10/31/2021.    FOLLOW UP ITEMS POST DISCHARGE  Follow up with PCP  Follow up with GI    DISCHARGE DIAGNOSES  Principal Problem:    Acute perforated gastric ulcer with hemorrhage (HCC) POA: Yes  Active Problems:    Depressive disorder POA: " Yes    Essential hypertension POA: Yes    Hypothyroidism POA: Yes    Hyperlipidemia POA: Yes    Acute blood loss anemia POA: Yes    Coagulopathy (HCC) POA: Yes  Resolved Problems:    Hyponatremia POA: Yes    VINCE (acute kidney injury) (HCC) POA: Yes      FOLLOW UP  No future appointments.  No follow-up provider specified.    MEDICATIONS ON DISCHARGE     Medication List      START taking these medications      Instructions   acetaminophen 325 MG Tabs  Commonly known as: Tylenol   Take 2 Tablets by mouth every 6 hours as needed.  Dose: 650 mg     Bismuth Subsalicylate 525 MG/30ML Susp   Take 300 mg by mouth 4 times a day for 14 days.  Dose: 300 mg     metroNIDAZOLE 500 MG Tabs  Commonly known as: FLAGYL   Take 1 Tablet by mouth every 8 hours for 14 days.  Dose: 500 mg     omeprazole 20 MG delayed-release capsule  Commonly known as: PRILOSEC   Take 1 Capsule by mouth 2 times a day for 14 days.  Dose: 20 mg     ondansetron 4 MG Tbdp  Commonly known as: ZOFRAN ODT   Take 1 Tablet by mouth every four hours as needed for Nausea (give PO if IV route is unavailable.).  Dose: 4 mg     tetracycline 500 MG Caps  Commonly known as: SUMYCIN   Take 1 Capsule by mouth 4 times a day for 14 days.  Dose: 500 mg        CHANGE how you take these medications      Instructions   folic acid 1 MG Tabs  Start taking on: November 1, 2021  What changed: See the new instructions.  Commonly known as: FOLVITE   Take 1 Tablet by mouth every day.  Dose: 1 mg     metoprolol tartrate 25 MG Tabs  What changed: when to take this  Commonly known as: LOPRESSOR   Take 0.5 Tablets by mouth 2 times a day.  Dose: 12.5 mg        CONTINUE taking these medications      Instructions   citalopram 20 MG Tabs  Commonly known as: CeleXA   Take 0.5 Tablets by mouth every day.  Dose: 10 mg     clopidogrel 75 MG Tabs  Commonly known as: Plavix   Take 1 Tablet by mouth every day.  Dose: 75 mg     gabapentin 100 MG Caps  Commonly known as: NEURONTIN   Take 1 Capsule by  "mouth 3 times a day.  Dose: 100 mg     HYDROcodone-acetaminophen 5-325 MG Tabs per tablet  Commonly known as: NORCO   Take 1 Tablet by mouth every 6 hours as needed (Pain) for up to 3 days.  Dose: 1 Tablet     levothyroxine 150 MCG Tabs  Commonly known as: SYNTHROID   Take 1 Tablet by mouth every morning on an empty stomach.  Dose: 150 mcg     lidocaine 5 % Ptch  Commonly known as: LIDODERM   Place 1 Patch on the skin every 24 hours.  Dose: 1 Patch     losartan 50 MG Tabs  Commonly known as: COZAAR   Take 50 mg by mouth every day.  Dose: 50 mg     Nitrostat 0.4 MG Subl  Generic drug: nitroglycerin   Place 0.4 mg under the tongue as needed.  Dose: 0.4 mg     rosuvastatin 10 MG Tabs  Commonly known as: CRESTOR   Take 10 mg by mouth every evening.  Dose: 10 mg        STOP taking these medications    meloxicam 15 MG tablet  Commonly known as: MOBIC            Allergies  Allergies   Allergen Reactions   • Codeine Unspecified     Unknown reaction     • Lisinopril Unspecified     Unknown reaction   • Penicillin G Unspecified     Unknown reaction     • Pregabalin Unspecified     Lyrica affects patients vision.     • Simvastatin Unspecified     Unknown reaction     • Sulfa Drugs Unspecified     Patient states \"I can't remember what this does to me\" but recalls and allergy.        DIET  Orders Placed This Encounter   Procedures   • Diet Order Diet: Level 7 - Easy to Chew (no coffee, please give tea); Liquid level: Level 0 - Thin     Standing Status:   Standing     Number of Occurrences:   1     Order Specific Question:   Diet:     Answer:   Level 7 - Easy to Chew [22]     Comments:   no coffee, please give tea     Order Specific Question:   Liquid level     Answer:   Level 0 - Thin       ACTIVITY  As tolerated.  Weight bearing as tolerated    CONSULTATIONS  General surgery    PROCEDURES  None    LABORATORY  Lab Results   Component Value Date    SODIUM 138 10/30/2021    POTASSIUM 4.2 10/30/2021    CHLORIDE 105 10/30/2021    " CO2 21 10/30/2021    GLUCOSE 93 10/30/2021    BUN 9 10/30/2021    CREATININE 1.02 10/30/2021        Lab Results   Component Value Date    WBC 5.4 10/31/2021    HEMOGLOBIN 10.0 (L) 10/31/2021    HEMATOCRIT 32.1 (L) 10/31/2021    PLATELETCT 355 10/31/2021        Total time of the discharge process exceeds 35 minutes.
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
## 2021-10-31 NOTE — DISCHARGE INSTRUCTIONS
Discharge Instructions    Discharged to home by taxi with self. Discharged via wheelchair, hospital escort: Yes.  Special equipment needed: Not Applicable    Be sure to schedule a follow-up appointment with your primary care doctor or any specialists as instructed.     Discharge Plan:   Diet Plan: Discussed  Activity Level: Discussed  Confirmed Follow up Appointment: Patient to Call and Schedule Appointment  Confirmed Symptoms Management: Discussed  Medication Reconciliation Updated: Yes  Influenza Vaccine Indication: Patient Refuses    I understand that a diet low in cholesterol, fat, and sodium is recommended for good health. Unless I have been given specific instructions below for another diet, I accept this instruction as my diet prescription.   Other diet: home diet    Special Instructions: None    · Is patient discharged on Warfarin / Coumadin?   No     Depression / Suicide Risk    As you are discharged from this [facility] facility, it is important to learn how to keep safe from harming yourself.    Recognize the warning signs:  · Abrupt changes in personality, positive or negative- including increase in energy   · Giving away possessions  · Change in eating patterns- significant weight changes-  positive or negative  · Change in sleeping patterns- unable to sleep or sleeping all the time   · Unwillingness or inability to communicate  · Depression  · Unusual sadness, discouragement and loneliness  · Talk of wanting to die  · Neglect of personal appearance   · Rebelliousness- reckless behavior  · Withdrawal from people/activities they love  · Confusion- inability to concentrate     If you or a loved one observes any of these behaviors or has concerns about self-harm, here's what you can do:  · Talk about it- your feelings and reasons for harming yourself  · Remove any means that you might use to hurt yourself (examples: pills, rope, extension cords, firearm)  · Get professional help from the community  (Mental Health, Substance Abuse, psychological counseling)  · Do not be alone:Call your Safe Contact- someone whom you trust who will be there for you.  · Call your local CRISIS HOTLINE 102-4525 or 487-100-4143  · Call your local Children's Mobile Crisis Response Team [place] (349) 695-2696 or www.Paradine  · Call the toll free National Suicide Prevention Hotlines   · National Suicide Prevention Lifeline 981-955-DRLN (7428)  · [employer] Line Network 800-SUICIDE (586-5319)    Peptic Ulcer       A peptic ulcer is a sore in the lining of the stomach (gastric ulcer) or the first part of the small intestine (duodenal ulcer). The ulcer causes a gradual wearing away (erosion) of the deeper tissue.  What are the causes?  Normally, the lining of the stomach and the small intestine protects them from the acid that digests food. The protective lining can be damaged by:  · An infection caused by a type of bacteria called Helicobacter pylori or H. pylori.  · Regular use of NSAIDs, such as ibuprofen or aspirin.  · Rare tumors in the stomach, small intestine, or pancreas (Zollinger-Lynne syndrome).  What increases the risk?  The following factors may make you more likely to develop this condition:  · Smoking.  · Having a family history of ulcer disease.  · Drinking alcohol.  · Having been hospitalized in an intensive care unit (ICU).  What are the signs or symptoms?  Symptoms of this condition include:  · Persistent burning pain in the area between the chest and the belly button. The pain may be worse on an empty stomach and at night.  · Heartburn.  · Nausea and vomiting.  · Bloating.  If the ulcer results in bleeding, it can cause:  · Black, tarry stools.  · Vomiting of bright red blood.  · Vomiting of material that looks like coffee grounds.  How is this diagnosed?  This condition may be diagnosed based on:  · Your medical history and a physical exam.  · Various tests or procedures, such as:  ? Blood tests,  stool tests, or breath tests to check for the H. pylori bacteria.  ? An X-ray exam (upper gastrointestinal series) of the esophagus, stomach, and small intestine.  ? Upper endoscopy. The health care provider examines the esophagus, stomach, and small intestine using a small flexible tube that has a video camera at the end.  ? Biopsy. A tissue sample is removed to be examined under a microscope.  How is this treated?  Treatment for this condition may include:  · Eliminating the cause of the ulcer, such as smoking or use of NSAIDs, and limiting alcohol and caffeine intake.  · Medicines to reduce the amount of acid in your digestive tract.  · Antibiotic medicines, if the ulcer is caused by an H. pylori infection.  · An upper endoscopy may be used to treat a bleeding ulcer.  · Surgery. This may be needed if the bleeding is severe or if the ulcer created a hole somewhere in the digestive system.  Follow these instructions at home:  · Do not drink alcohol if your health care provider tells you not to drink.  · Do not use any products that contain nicotine or tobacco, such as cigarettes, e-cigarettes, and chewing tobacco. If you need help quitting, ask your health care provider.  · Take over-the-counter and prescription medicines only as told by your health care provider.  ? Do not use over-the-counter medicines in place of prescription medicines unless your health care provider approves.  ? Do not take aspirin, ibuprofen, or other NSAIDs unless your health care provider told you to do so.  · Take over-the-counter and prescription medicines only as told by your health care provider.  · Keep all follow-up visits as told by your health care provider. This is important.  Contact a health care provider if:  · Your symptoms do not improve within 7 days of starting treatment.  · You have ongoing indigestion or heartburn.  Get help right away if:  · You have sudden, sharp, or persistent pain in your abdomen.  · You have bloody or  dark black, tarry stools.  · You vomit blood or material that looks like coffee grounds.  · You become light-headed or you feel faint.  · You become weak.  · You become sweaty or clammy.  Summary  · A peptic ulcer is a sore in the lining of the stomach (gastric ulcer) or the first part of the small intestine (duodenal ulcer). The ulcer causes a gradual wearing away (erosion) of the deeper tissue.  · Do not use any products that contain nicotine or tobacco, such as cigarettes, e-cigarettes, and chewing tobacco. If you need help quitting, ask your health care provider.  · Take over-the-counter and prescription medicines only as told by your health care provider. Do not use over-the-counter medicines in place of prescription medicines unless your health care provider approves.  · Contact your health care provider if you have ongoing indigestion or heartburn.  · Keep all follow-up visits as told by your health care provider. This is important.  This information is not intended to replace advice given to you by your health care provider. Make sure you discuss any questions you have with your health care provider.  Document Released: 12/15/2001 Document Revised: 06/25/2019 Document Reviewed: 06/25/2019  Elsevier Patient Education © 2020 [employer].

## 2021-11-02 NOTE — DISCHARGE PLANNING
Call placed to [phone] to follow up on PA.  Mark Savage with PA department, No claims for this patient since September.     Call placed to [facility] pharmacy [phone]  Per Bryce pt has [employer] as primary Insurance. Not listed on facesheet.     Requested denial be faxed to this CM for follow up.     Call received from [facility] dept. Pt is not followed by their department. Calls were placed by CCM to pt using numbers listed on facesheet and they could not get through to the patient.     Update to  LSW.

## 2021-11-02 NOTE — DISCHARGE PLANNING
Received request for MICHELLE sparrow accessed Key [id number] case ID [id number].     Left message for Janette @ [phone] to return call back for this CM

## 2021-11-03 ENCOUNTER — PATIENT OUTREACH (OUTPATIENT)
Dept: HEALTH INFORMATION MANAGEMENT | Facility: OTHER | Age: 86
End: 2021-11-03

## 2021-11-05 NOTE — PROGRESS NOTES
CHW Prakash will discharge patient from CCM services due to lack of contact after x3 TC attempts 11/05/21. No contact numbers listed worked.

## 2021-11-06 ENCOUNTER — APPOINTMENT (OUTPATIENT)
Dept: RADIOLOGY | Facility: MEDICAL CENTER | Age: 86
End: 2021-11-06
Attending: EMERGENCY MEDICINE
Payer: MEDICARE

## 2021-11-06 ENCOUNTER — HOSPITAL ENCOUNTER (EMERGENCY)
Facility: MEDICAL CENTER | Age: 86
End: 2021-11-06
Attending: EMERGENCY MEDICINE
Payer: MEDICARE

## 2021-11-06 ENCOUNTER — PATIENT OUTREACH (OUTPATIENT)
Dept: HEALTH INFORMATION MANAGEMENT | Facility: OTHER | Age: 86
End: 2021-11-06

## 2021-11-06 VITALS
TEMPERATURE: 98 F | HEIGHT: 67 IN | HEART RATE: 76 BPM | DIASTOLIC BLOOD PRESSURE: 58 MMHG | SYSTOLIC BLOOD PRESSURE: 117 MMHG | WEIGHT: 166 LBS | RESPIRATION RATE: 18 BRPM | OXYGEN SATURATION: 90 % | BODY MASS INDEX: 26.06 KG/M2

## 2021-11-06 DIAGNOSIS — R07.9 CHEST PAIN, UNSPECIFIED TYPE: ICD-10-CM

## 2021-11-06 DIAGNOSIS — R10.10 UPPER ABDOMINAL PAIN: ICD-10-CM

## 2021-11-06 DIAGNOSIS — K27.9 PEPTIC ULCER DISEASE: ICD-10-CM

## 2021-11-06 LAB
ALBUMIN SERPL BCP-MCNC: 3.8 G/DL (ref 3.2–4.9)
ALBUMIN/GLOB SERPL: 1.3 G/DL
ALP SERPL-CCNC: 89 U/L (ref 30–99)
ALT SERPL-CCNC: 12 U/L (ref 2–50)
ANION GAP SERPL CALC-SCNC: 12 MMOL/L (ref 7–16)
AST SERPL-CCNC: 12 U/L (ref 12–45)
BASOPHILS # BLD AUTO: 0.8 % (ref 0–1.8)
BASOPHILS # BLD: 0.06 K/UL (ref 0–0.12)
BILIRUB SERPL-MCNC: 0.4 MG/DL (ref 0.1–1.5)
BUN SERPL-MCNC: 14 MG/DL (ref 8–22)
CALCIUM SERPL-MCNC: 9.1 MG/DL (ref 8.5–10.5)
CHLORIDE SERPL-SCNC: 101 MMOL/L (ref 96–112)
CO2 SERPL-SCNC: 24 MMOL/L (ref 20–33)
CREAT SERPL-MCNC: 1.02 MG/DL (ref 0.5–1.4)
EKG IMPRESSION: NORMAL
EOSINOPHIL # BLD AUTO: 0.17 K/UL (ref 0–0.51)
EOSINOPHIL NFR BLD: 2.3 % (ref 0–6.9)
ERYTHROCYTE [DISTWIDTH] IN BLOOD BY AUTOMATED COUNT: 46.8 FL (ref 35.9–50)
GLOBULIN SER CALC-MCNC: 2.9 G/DL (ref 1.9–3.5)
GLUCOSE SERPL-MCNC: 107 MG/DL (ref 65–99)
HCT VFR BLD AUTO: 31.2 % (ref 37–47)
HGB BLD-MCNC: 9.9 G/DL (ref 12–16)
IMM GRANULOCYTES # BLD AUTO: 0.09 K/UL (ref 0–0.11)
IMM GRANULOCYTES NFR BLD AUTO: 1.2 % (ref 0–0.9)
LACTATE BLD-SCNC: 1.2 MMOL/L (ref 0.5–2)
LIPASE SERPL-CCNC: 27 U/L (ref 11–82)
LYMPHOCYTES # BLD AUTO: 0.81 K/UL (ref 1–4.8)
LYMPHOCYTES NFR BLD: 10.9 % (ref 22–41)
MCH RBC QN AUTO: 28.9 PG (ref 27–33)
MCHC RBC AUTO-ENTMCNC: 31.7 G/DL (ref 33.6–35)
MCV RBC AUTO: 91 FL (ref 81.4–97.8)
MONOCYTES # BLD AUTO: 0.95 K/UL (ref 0–0.85)
MONOCYTES NFR BLD AUTO: 12.8 % (ref 0–13.4)
NEUTROPHILS # BLD AUTO: 5.32 K/UL (ref 2–7.15)
NEUTROPHILS NFR BLD: 72 % (ref 44–72)
NRBC # BLD AUTO: 0 K/UL
NRBC BLD-RTO: 0 /100 WBC
PLATELET # BLD AUTO: 261 K/UL (ref 164–446)
PMV BLD AUTO: 10.6 FL (ref 9–12.9)
POTASSIUM SERPL-SCNC: 3.9 MMOL/L (ref 3.6–5.5)
PROT SERPL-MCNC: 6.7 G/DL (ref 6–8.2)
RBC # BLD AUTO: 3.43 M/UL (ref 4.2–5.4)
SODIUM SERPL-SCNC: 137 MMOL/L (ref 135–145)
TROPONIN T SERPL-MCNC: 11 NG/L (ref 6–19)
TROPONIN T SERPL-MCNC: 12 NG/L (ref 6–19)
WBC # BLD AUTO: 7.4 K/UL (ref 4.8–10.8)

## 2021-11-06 PROCEDURE — 85025 COMPLETE CBC W/AUTO DIFF WBC: CPT

## 2021-11-06 PROCEDURE — 36415 COLL VENOUS BLD VENIPUNCTURE: CPT

## 2021-11-06 PROCEDURE — 96376 TX/PRO/DX INJ SAME DRUG ADON: CPT

## 2021-11-06 PROCEDURE — 700105 HCHG RX REV CODE 258: Performed by: EMERGENCY MEDICINE

## 2021-11-06 PROCEDURE — 96374 THER/PROPH/DIAG INJ IV PUSH: CPT | Mod: XU

## 2021-11-06 PROCEDURE — 80053 COMPREHEN METABOLIC PANEL: CPT

## 2021-11-06 PROCEDURE — 71045 X-RAY EXAM CHEST 1 VIEW: CPT

## 2021-11-06 PROCEDURE — 700111 HCHG RX REV CODE 636 W/ 250 OVERRIDE (IP): Performed by: EMERGENCY MEDICINE

## 2021-11-06 PROCEDURE — 83690 ASSAY OF LIPASE: CPT

## 2021-11-06 PROCEDURE — 84484 ASSAY OF TROPONIN QUANT: CPT

## 2021-11-06 PROCEDURE — 99285 EMERGENCY DEPT VISIT HI MDM: CPT

## 2021-11-06 PROCEDURE — 93005 ELECTROCARDIOGRAM TRACING: CPT | Performed by: EMERGENCY MEDICINE

## 2021-11-06 PROCEDURE — 700117 HCHG RX CONTRAST REV CODE 255: Performed by: EMERGENCY MEDICINE

## 2021-11-06 PROCEDURE — 96375 TX/PRO/DX INJ NEW DRUG ADDON: CPT

## 2021-11-06 PROCEDURE — 74177 CT ABD & PELVIS W/CONTRAST: CPT | Mod: ME

## 2021-11-06 PROCEDURE — A9270 NON-COVERED ITEM OR SERVICE: HCPCS | Performed by: EMERGENCY MEDICINE

## 2021-11-06 PROCEDURE — 83605 ASSAY OF LACTIC ACID: CPT

## 2021-11-06 PROCEDURE — 700102 HCHG RX REV CODE 250 W/ 637 OVERRIDE(OP): Performed by: EMERGENCY MEDICINE

## 2021-11-06 RX ORDER — ONDANSETRON 2 MG/ML
4 INJECTION INTRAMUSCULAR; INTRAVENOUS ONCE
Status: COMPLETED | OUTPATIENT
Start: 2021-11-06 | End: 2021-11-06

## 2021-11-06 RX ORDER — SODIUM CHLORIDE 9 MG/ML
500 INJECTION, SOLUTION INTRAVENOUS ONCE
Status: COMPLETED | OUTPATIENT
Start: 2021-11-06 | End: 2021-11-06

## 2021-11-06 RX ADMIN — LIDOCAINE HYDROCHLORIDE 30 ML: 20 SOLUTION OROPHARYNGEAL at 09:13

## 2021-11-06 RX ADMIN — FENTANYL CITRATE 50 MCG: 50 INJECTION, SOLUTION INTRAMUSCULAR; INTRAVENOUS at 05:02

## 2021-11-06 RX ADMIN — SODIUM CHLORIDE 500 ML: 9 INJECTION, SOLUTION INTRAVENOUS at 05:01

## 2021-11-06 RX ADMIN — FENTANYL CITRATE 50 MCG: 50 INJECTION, SOLUTION INTRAMUSCULAR; INTRAVENOUS at 06:21

## 2021-11-06 RX ADMIN — ONDANSETRON 4 MG: 2 INJECTION INTRAMUSCULAR; INTRAVENOUS at 05:01

## 2021-11-06 RX ADMIN — IOHEXOL 100 ML: 350 INJECTION, SOLUTION INTRAVENOUS at 06:45

## 2021-11-06 ASSESSMENT — FIBROSIS 4 INDEX
FIB4 SCORE: 1.11
FIB4 SCORE: 1.55

## 2021-11-06 ASSESSMENT — PAIN DESCRIPTION - PAIN TYPE
TYPE: ACUTE PAIN
TYPE: ACUTE PAIN

## 2021-11-06 NOTE — ED NOTES
This RN assumes care of patient, report received from ESTEFANY Pastor. Patient remains on monitoring, resting on cart with call light within reach.

## 2021-11-06 NOTE — ED NOTES
Patient is resting comfortably, wakes to verbal stim. Pt provided with cup of water and encouraged attempting to consume at this time. Pt removed from supplemental O2. Resting with unlabored respirs in no distress, states some continued nausea. Remains on monitoring, call light within reach, HOB elevated for comfort.

## 2021-11-06 NOTE — ED NOTES
Discharge orders received, IV and monitor discontinued, instructions and education given, follow-up discussed, pt verbalized understanding. Provided with cab voucher and ride called for transportation home.

## 2021-11-06 NOTE — ED NOTES
Pt dresses self, provided with wheelchair for disposition. [employer] cab called and ETA 10-15 minutes. Patient departs with all personal belongings.

## 2021-11-06 NOTE — ED NOTES
Pt ambulatory back to green 28 with stead gait, placed back on continuous monitors and provided additional warm blanket for comfort.

## 2021-11-06 NOTE — ED NOTES
Assist RN:  Pt repositioned for easier breathing,  Noted to 87-89% on RA while sleeping.  Provided crackers and hot tea and water for PO challenge after waking pt.  Pt continues to c/o pain in back.  Alerted ERP, new orders received.

## 2021-11-06 NOTE — ED TRIAGE NOTES
".  Chief Complaint   Patient presents with   • Chest Pain   • N/V   • Abdominal Pain     BIB EMS for CP and N/V that woke pt from sleep this AM. Pt was DC'd from hospital about 1 week ago after being seen for same. Hx MI with CABG. Pt is A&Ox4, VS WNL, pain 5/10 upon arrival.   ./65   Pulse 77   Temp 36.5 °C (97.7 °F) (Temporal)   Resp 16   Ht 1.702 m (5' 7\")   Wt 75.3 kg (166 lb)   LMP  (LMP Unknown)   SpO2 93%   BMI 26.00 kg/m²     "

## 2021-11-06 NOTE — DISCHARGE INSTRUCTIONS
Follow-up with primary care next week for reevaluation, medication management close pressure monitoring.  Assistance with referral to GI is encouraged as well.  Follow-up with GI specialist for reevaluation and EGD.    Continue home medications as previously indicated.    Encourage oral fluid hydration.  [employer] diet recommended.  Avoid taking any NSAIDs or other anti-inflammatory medications.    Return to the emergency department for persistent or worsening abdominal pain, fever, vomiting, bleeding or other new concerns.

## 2021-11-06 NOTE — ED NOTES
Patient is resting comfortably, updated on pending disposition, states improvement with meds received. SW contacted for transportation home, community health worker contacts patient & leaves follow up information for further assistance as needed.

## 2021-11-06 NOTE — PROGRESS NOTES
CHW called RN at bedside. RN will inform patient CHW has placed contact info for CCM in dc paperwork for patient to call when she is able. Patient has no current valid contact info. CHW will not actively follow.

## 2021-11-06 NOTE — ED NOTES
PIV placed. Pt tolerated placement well. Blood drawn, labelled with appropriate pt identifiers and sent to lab. EKG completed and given to Dr. Long for review.

## 2021-11-06 NOTE — ED PROVIDER NOTES
"ED Provider Note    CHIEF COMPLAINT  Chief Complaint   Patient presents with   • Chest Pain   • N/V   • Abdominal Pain       HPI  Davie Montejo is a 86 y.o. female who presents to the emergency department by ambulance from home for chest pain.  Patient points to the right lower chest.  Awoke her from sleep.  Sharp, constant.  No back pain.  She does have some upper abdominal pain.  Worse with movement, deep inspiration and palpation.  No shortness of breath.  No palpitations.  No syncope.  Nausea without vomiting.  Denies bloody stools.  Patient does describe black stools for 2 days.  She has been taking Pepto-Bismol.    She has been compliant with home medications since discharge.    REVIEW OF SYSTEMS  See HPI for further details. All other systems are negative.     PAST MEDICAL HISTORY   has a past medical history of CAD (coronary artery disease), Fibromyalgia, and Heart attack (HCC) (2008).    SOCIAL HISTORY  Social History     Tobacco Use   • Smoking status: Former Smoker   • Smokeless tobacco: Never Used   Vaping Use   • Vaping Use: Never used   Substance and Sexual Activity   • Alcohol use: Never   • Drug use: Not Currently   • Sexual activity: Not on file       SURGICAL HISTORY   has a past surgical history that includes coronary artery bypas, 4 (2008) and coronary artery bypass, 1 (2008).    CURRENT MEDICATIONS  Home Medications    **Home medications have not yet been reviewed for this encounter**         ALLERGIES  Allergies   Allergen Reactions   • Codeine Unspecified     Unknown reaction     • Lisinopril Unspecified     Unknown reaction   • Penicillin G Unspecified     Unknown reaction     • Pregabalin Unspecified     Lyrica affects patients vision.     • Simvastatin Unspecified     Unknown reaction     • Sulfa Drugs Unspecified     Patient states \"I can't remember what this does to me\" but recalls and allergy.        PHYSICAL EXAM  VITAL SIGNS: /58   Pulse 71   Temp 36.5 °C (97.7 °F) " "(Temporal)   Resp 17   Ht 1.702 m (5' 7\")   Wt 75.3 kg (166 lb)   LMP  (LMP Unknown)   SpO2 90%   BMI 26.00 kg/m²   Pulse ox interpretation: I interpret this pulse ox as normal.  Constitutional: Alert in no apparent distress.  Disheveled  HENT: Normocephalic, atraumatic. Bilateral external ears normal, Nose normal.  Dry mucous membranes.    Eyes: Pupils are equal and reactive, Conjunctiva normal.   Neck: Normal range of motion, Supple  Lymphatic: No lymphadenopathy noted.   Cardiovascular: Regular rate and rhythm, no murmurs. Distal pulses intact.    Thorax & Lungs: Normal breath sounds.  No wheezing/rales/ronchi. No increased work of breathing  Abdomen: Soft, non-distended.  Diffusely tender with light touch, greatest in epigastric region, voluntary guarding.  No peritonitis.  No palpable or pulsatile mass although deep palpation is limited due to discomfort.  Rectal: Normal BALBINA.  Dark stool, guaiac negative.  Skin: Warm, Dry, No erythema, No rash.   Musculoskeletal: Good range of motion in all major joints.  Neurologic: Alert and oriented x4.  Speech is clear.  Moves 4 extremity spontaneously.  Psychiatric: Affect normal, Judgment normal, Mood normal.       DIAGNOSTIC STUDIES / PROCEDURES  LABS  Results for orders placed or performed during the hospital encounter of 11/06/21   CBC with Differential   Result Value Ref Range    WBC 7.4 4.8 - 10.8 K/uL    RBC 3.43 (L) 4.20 - 5.40 M/uL    Hemoglobin 9.9 (L) 12.0 - 16.0 g/dL    Hematocrit 31.2 (L) 37.0 - 47.0 %    MCV 91.0 81.4 - 97.8 fL    MCH 28.9 27.0 - 33.0 pg    MCHC 31.7 (L) 33.6 - 35.0 g/dL    RDW 46.8 35.9 - 50.0 fL    Platelet Count 261 164 - 446 K/uL    MPV 10.6 9.0 - 12.9 fL    Neutrophils-Polys 72.00 44.00 - 72.00 %    Lymphocytes 10.90 (L) 22.00 - 41.00 %    Monocytes 12.80 0.00 - 13.40 %    Eosinophils 2.30 0.00 - 6.90 %    Basophils 0.80 0.00 - 1.80 %    Immature Granulocytes 1.20 (H) 0.00 - 0.90 %    Nucleated RBC 0.00 /100 WBC    Neutrophils " (Absolute) 5.32 2.00 - 7.15 K/uL    Lymphs (Absolute) 0.81 (L) 1.00 - 4.80 K/uL    Monos (Absolute) 0.95 (H) 0.00 - 0.85 K/uL    Eos (Absolute) 0.17 0.00 - 0.51 K/uL    Baso (Absolute) 0.06 0.00 - 0.12 K/uL    Immature Granulocytes (abs) 0.09 0.00 - 0.11 K/uL    NRBC (Absolute) 0.00 K/uL   Complete Metabolic Panel (CMP)   Result Value Ref Range    Sodium 137 135 - 145 mmol/L    Potassium 3.9 3.6 - 5.5 mmol/L    Chloride 101 96 - 112 mmol/L    Co2 24 20 - 33 mmol/L    Anion Gap 12.0 7.0 - 16.0    Glucose 107 (H) 65 - 99 mg/dL    Bun 14 8 - 22 mg/dL    Creatinine 1.02 0.50 - 1.40 mg/dL    Calcium 9.1 8.5 - 10.5 mg/dL    AST(SGOT) 12 12 - 45 U/L    ALT(SGPT) 12 2 - 50 U/L    Alkaline Phosphatase 89 30 - 99 U/L    Total Bilirubin 0.4 0.1 - 1.5 mg/dL    Albumin 3.8 3.2 - 4.9 g/dL    Total Protein 6.7 6.0 - 8.2 g/dL    Globulin 2.9 1.9 - 3.5 g/dL    A-G Ratio 1.3 g/dL   Troponin   Result Value Ref Range    Troponin T 11 6 - 19 ng/L   ESTIMATED GFR   Result Value Ref Range    GFR If African American >60 >60 mL/min/1.73 m 2    GFR If Non African American 52 (A) >60 mL/min/1.73 m 2   LACTIC ACID   Result Value Ref Range    Lactic Acid 1.2 0.5 - 2.0 mmol/L   TROPONIN   Result Value Ref Range    Troponin T 12 6 - 19 ng/L   LIPASE   Result Value Ref Range    Lipase 27 11 - 82 U/L   EKG   Result Value Ref Range    Report       [facility] Emergency Dept.    Test Date:  2021  Pt Name:    JUNIOR CHIU            Department: ER  MRN:        [id number]                      Room:        28  Gender:     Female                       Technician: [id number]  :        [id number]                   Requested By:ER TRIAGE PROTOCOL  Order #:    [id number]                    Reading MD: KEL CANALES, DO    Measurements  Intervals                                Axis  Rate:       77                           P:          69  IN:         212                          QRS:        -33  QRSD:       100                           T:          110  QT:         412  QTc:        467    Interpretive Statements  SINUS RHYTHM  BORDERLINE AV CONDUCTION DELAY  LEFT AXIS DEVIATION  LEFT VENTRICULAR HYPERTROPHY  ANTERIOR Q WAVES, POSSIBLY DUE TO LVH  Compared to ECG 10/28/2021 11:52:03  Left ventricular hypertrophy now present  Q waves now present  Sinus bradycardia no longer present  First degree AV block no longe r present  T-wave abnormality no longer present  Possible ischemia no longer present  Electronically Signed On 11-6-2021 5:02:52 PDT by KEL CANALES,        RADIOLOGY  CT-ABDOMEN-PELVIS WITH   Final Result         1.Wall thickening of the pylorus with a contained perforation is redemonstrated, similar to prior. No free fluid or free air in the abdomen.      DX-CHEST-PORTABLE (1 VIEW)   Final Result         1. No acute cardiopulmonary abnormalities are identified.          COURSE & MEDICAL DECISION MAKING  Nursing notes and vital signs were reviewed. (See chart for details)  The patients records were reviewed, history was obtained from the patient;     Medical record review: Discharge summary 10/31/2021 after ED evaluation for chest pain.  History of MI and 4 time bypass in 2008.  Patient was substernal, epigastric pain under her left breast for months, however the night before admission she woke up from pain.  On admission she was found to have low hemoglobin.  CT showed contained gastric perforation.  She received blood, platelet transfusions.  Surgery recommended conservative management.  She was on Rocephin, Flagyl, PPI.  Diarrhea, C. difficile negative. Surgery recommended treat with quad therapy for H. pylori empirically on discharge.  No need for outpatient follow-up with surgery.  Will need outpatient GI follow-up for EGD more than 6 weeks from now.    0445 -patient has been seen evaluated bedside.  CT has been called, stat study requested, she will be next on the table.  Labs are available at this time due to protocol  prior to my evaluation.  No leukocytosis or or left shift despite bandemia.  No electrolyte derangement.  Troponin normal.  EKG within normal limits and unchanged from previous, no ischemia.  Add fluids, pain control.    6:13 AM patient on CT table now.    CT is unrevealing.  No evidence for new gastric perforation or other free air or fluid.  Labs are unremarkable, hemoglobin is stable.  Dark stool but guaiac negative.  No evidence for GI bleed.  No electrolyte derangement.  No pancreatitis.  Lactate is normal.  Troponin negative twice.  Pain is controlled with fentanyl, and then with GI cocktail.  Suspect exacerbation of her peptic ulcer disease if not also gastritis.  She is tolerating oral fluids without difficulty.  She ambulates independently.  She is hemodynamically stable without fever, tachycardia, hypotension or hypoxia.  She will need to follow-up with GI, she is aware of this.  She will follow up with primary care.  Chart records indicate that she was not not able be contacted by community health worker, I spoken with them again today, they will speak with her before discharge today and make arrangements for good outpatient phone number.    Patient is stable for discharge at this time, anticipatory guidance provided, continue home medications, close follow-up is encouraged, and strict ED return instructions have been detailed. Patient is agreeable to the disposition and plan.    Patient's blood pressure was elevated in the emergency department, and has been referred to primary care for close monitoring.      FINAL IMPRESSION  (R10.10) Upper abdominal pain  (R07.9) Chest pain, unspecified type  (K27.9) Peptic ulcer disease      Electronically signed by: Laura Long D.O., 11/6/2021 4:49 AM      This dictation was created using voice recognition software. The accuracy of the dictation is limited to the abilities of the software. I expect there may be some errors of grammar and possibly content. The  nursing notes were reviewed and certain aspects of this information were incorporated into this note.

## 2021-11-11 ENCOUNTER — HOSPITAL ENCOUNTER (EMERGENCY)
Facility: MEDICAL CENTER | Age: 86
End: 2021-11-12
Attending: EMERGENCY MEDICINE
Payer: MEDICARE

## 2021-11-11 ENCOUNTER — HOSPITAL ENCOUNTER (EMERGENCY)
Facility: MEDICAL CENTER | Age: 86
End: 2021-11-11
Attending: EMERGENCY MEDICINE
Payer: MEDICARE

## 2021-11-11 VITALS
SYSTOLIC BLOOD PRESSURE: 178 MMHG | HEART RATE: 74 BPM | TEMPERATURE: 98.4 F | RESPIRATION RATE: 19 BRPM | DIASTOLIC BLOOD PRESSURE: 71 MMHG | OXYGEN SATURATION: 96 %

## 2021-11-11 DIAGNOSIS — R10.10 CHRONIC UPPER ABDOMINAL PAIN: ICD-10-CM

## 2021-11-11 DIAGNOSIS — R10.13 EPIGASTRIC ABDOMINAL PAIN: ICD-10-CM

## 2021-11-11 DIAGNOSIS — G89.29 CHRONIC UPPER ABDOMINAL PAIN: ICD-10-CM

## 2021-11-11 LAB
ALBUMIN SERPL BCP-MCNC: 3.9 G/DL (ref 3.2–4.9)
ALBUMIN/GLOB SERPL: 1.1 G/DL
ALP SERPL-CCNC: 87 U/L (ref 30–99)
ALT SERPL-CCNC: 10 U/L (ref 2–50)
ANION GAP SERPL CALC-SCNC: 13 MMOL/L (ref 7–16)
AST SERPL-CCNC: 23 U/L (ref 12–45)
BASOPHILS # BLD AUTO: 0.8 % (ref 0–1.8)
BASOPHILS # BLD: 0.06 K/UL (ref 0–0.12)
BILIRUB SERPL-MCNC: 0.3 MG/DL (ref 0.1–1.5)
BUN SERPL-MCNC: 12 MG/DL (ref 8–22)
CALCIUM SERPL-MCNC: 9 MG/DL (ref 8.5–10.5)
CHLORIDE SERPL-SCNC: 104 MMOL/L (ref 96–112)
CO2 SERPL-SCNC: 21 MMOL/L (ref 20–33)
CREAT SERPL-MCNC: 0.83 MG/DL (ref 0.5–1.4)
EKG IMPRESSION: NORMAL
EKG IMPRESSION: NORMAL
EOSINOPHIL # BLD AUTO: 0.13 K/UL (ref 0–0.51)
EOSINOPHIL NFR BLD: 1.8 % (ref 0–6.9)
ERYTHROCYTE [DISTWIDTH] IN BLOOD BY AUTOMATED COUNT: 47.2 FL (ref 35.9–50)
GLOBULIN SER CALC-MCNC: 3.4 G/DL (ref 1.9–3.5)
GLUCOSE SERPL-MCNC: 103 MG/DL (ref 65–99)
HCT VFR BLD AUTO: 37.2 % (ref 37–47)
HGB BLD-MCNC: 11.7 G/DL (ref 12–16)
IMM GRANULOCYTES # BLD AUTO: 0.04 K/UL (ref 0–0.11)
IMM GRANULOCYTES NFR BLD AUTO: 0.5 % (ref 0–0.9)
LACTATE BLD-SCNC: 1.5 MMOL/L (ref 0.5–2)
LIPASE SERPL-CCNC: 54 U/L (ref 11–82)
LYMPHOCYTES # BLD AUTO: 1.6 K/UL (ref 1–4.8)
LYMPHOCYTES NFR BLD: 21.7 % (ref 22–41)
MCH RBC QN AUTO: 28.4 PG (ref 27–33)
MCHC RBC AUTO-ENTMCNC: 31.5 G/DL (ref 33.6–35)
MCV RBC AUTO: 90.3 FL (ref 81.4–97.8)
MONOCYTES # BLD AUTO: 0.76 K/UL (ref 0–0.85)
MONOCYTES NFR BLD AUTO: 10.3 % (ref 0–13.4)
NEUTROPHILS # BLD AUTO: 4.78 K/UL (ref 2–7.15)
NEUTROPHILS NFR BLD: 64.9 % (ref 44–72)
NRBC # BLD AUTO: 0 K/UL
NRBC BLD-RTO: 0 /100 WBC
PLATELET # BLD AUTO: 338 K/UL (ref 164–446)
PMV BLD AUTO: 10.3 FL (ref 9–12.9)
POTASSIUM SERPL-SCNC: 4 MMOL/L (ref 3.6–5.5)
PROT SERPL-MCNC: 7.3 G/DL (ref 6–8.2)
RBC # BLD AUTO: 4.12 M/UL (ref 4.2–5.4)
SODIUM SERPL-SCNC: 138 MMOL/L (ref 135–145)
TROPONIN T SERPL-MCNC: 11 NG/L (ref 6–19)
WBC # BLD AUTO: 7.4 K/UL (ref 4.8–10.8)

## 2021-11-11 PROCEDURE — 85025 COMPLETE CBC W/AUTO DIFF WBC: CPT

## 2021-11-11 PROCEDURE — 700101 HCHG RX REV CODE 250: Performed by: EMERGENCY MEDICINE

## 2021-11-11 PROCEDURE — 99284 EMERGENCY DEPT VISIT MOD MDM: CPT

## 2021-11-11 PROCEDURE — 700105 HCHG RX REV CODE 258: Performed by: EMERGENCY MEDICINE

## 2021-11-11 PROCEDURE — 96375 TX/PRO/DX INJ NEW DRUG ADDON: CPT

## 2021-11-11 PROCEDURE — 96374 THER/PROPH/DIAG INJ IV PUSH: CPT

## 2021-11-11 PROCEDURE — 83690 ASSAY OF LIPASE: CPT

## 2021-11-11 PROCEDURE — 93005 ELECTROCARDIOGRAM TRACING: CPT | Performed by: EMERGENCY MEDICINE

## 2021-11-11 PROCEDURE — 80053 COMPREHEN METABOLIC PANEL: CPT

## 2021-11-11 PROCEDURE — 83605 ASSAY OF LACTIC ACID: CPT

## 2021-11-11 PROCEDURE — 84484 ASSAY OF TROPONIN QUANT: CPT

## 2021-11-11 PROCEDURE — 700111 HCHG RX REV CODE 636 W/ 250 OVERRIDE (IP): Performed by: EMERGENCY MEDICINE

## 2021-11-11 PROCEDURE — 93005 ELECTROCARDIOGRAM TRACING: CPT

## 2021-11-11 RX ORDER — KETOROLAC TROMETHAMINE 30 MG/ML
15 INJECTION, SOLUTION INTRAMUSCULAR; INTRAVENOUS ONCE
Status: COMPLETED | OUTPATIENT
Start: 2021-11-11 | End: 2021-11-11

## 2021-11-11 RX ORDER — LORAZEPAM 2 MG/ML
0.5 INJECTION INTRAMUSCULAR ONCE
Status: COMPLETED | OUTPATIENT
Start: 2021-11-12 | End: 2021-11-12

## 2021-11-11 RX ORDER — MIDAZOLAM HYDROCHLORIDE 1 MG/ML
0.5 INJECTION INTRAMUSCULAR; INTRAVENOUS ONCE
Status: COMPLETED | OUTPATIENT
Start: 2021-11-11 | End: 2021-11-11

## 2021-11-11 RX ADMIN — KETOROLAC TROMETHAMINE 15 MG: 30 INJECTION, SOLUTION INTRAMUSCULAR; INTRAVENOUS at 13:18

## 2021-11-11 RX ADMIN — KETAMINE HYDROCHLORIDE 25 MG: 10 INJECTION, SOLUTION INTRAMUSCULAR; INTRAVENOUS at 13:18

## 2021-11-11 RX ADMIN — MIDAZOLAM HYDROCHLORIDE 0.5 MG: 1 INJECTION, SOLUTION INTRAMUSCULAR; INTRAVENOUS at 13:18

## 2021-11-11 ASSESSMENT — FIBROSIS 4 INDEX: FIB4 SCORE: 1.81

## 2021-11-11 NOTE — ED NOTES
"Cab voucher provided for pt, pt till refusing to leave ER.  States \"I need to find other living arrangements\".  SW reminded pt of need [facility].  Pt reports \"he wont help me, He doesn't want to\".  Education and resources provided.  Pt verbalized understanding.    "

## 2021-11-11 NOTE — ED NOTES
Pt presents with chronic chest pain x several months. EKG complete. Pt well known to this facility. sw notified of need for social arrangements for pt, discussing case with dr soto now.
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
## 2021-11-11 NOTE — DISCHARGE INSTRUCTIONS
Please return if persistent vomiting, fever and functional decline  Please see your doctor in follow-up or obtain a doctor in [place] when you move there as per your stated plans

## 2021-11-12 ENCOUNTER — HOSPITAL ENCOUNTER (OUTPATIENT)
Dept: RADIOLOGY | Facility: MEDICAL CENTER | Age: 86
End: 2021-11-12
Attending: EMERGENCY MEDICINE
Payer: MEDICARE

## 2021-11-12 VITALS
TEMPERATURE: 97.7 F | SYSTOLIC BLOOD PRESSURE: 120 MMHG | OXYGEN SATURATION: 90 % | WEIGHT: 166 LBS | HEIGHT: 67 IN | BODY MASS INDEX: 26.06 KG/M2 | DIASTOLIC BLOOD PRESSURE: 80 MMHG | RESPIRATION RATE: 15 BRPM | HEART RATE: 76 BPM

## 2021-11-12 LAB
ALBUMIN SERPL BCP-MCNC: 4.2 G/DL (ref 3.2–4.9)
ALBUMIN/GLOB SERPL: 1.3 G/DL
ALP SERPL-CCNC: 93 U/L (ref 30–99)
ALT SERPL-CCNC: 9 U/L (ref 2–50)
ANION GAP SERPL CALC-SCNC: 14 MMOL/L (ref 7–16)
AST SERPL-CCNC: 16 U/L (ref 12–45)
BASOPHILS # BLD AUTO: 0.5 % (ref 0–1.8)
BASOPHILS # BLD: 0.04 K/UL (ref 0–0.12)
BILIRUB SERPL-MCNC: 0.4 MG/DL (ref 0.1–1.5)
BUN SERPL-MCNC: 16 MG/DL (ref 8–22)
CALCIUM SERPL-MCNC: 9.3 MG/DL (ref 8.5–10.5)
CHLORIDE SERPL-SCNC: 102 MMOL/L (ref 96–112)
CO2 SERPL-SCNC: 20 MMOL/L (ref 20–33)
CREAT SERPL-MCNC: 0.84 MG/DL (ref 0.5–1.4)
EOSINOPHIL # BLD AUTO: 0.13 K/UL (ref 0–0.51)
EOSINOPHIL NFR BLD: 1.7 % (ref 0–6.9)
ERYTHROCYTE [DISTWIDTH] IN BLOOD BY AUTOMATED COUNT: 45.9 FL (ref 35.9–50)
GLOBULIN SER CALC-MCNC: 3.3 G/DL (ref 1.9–3.5)
GLUCOSE SERPL-MCNC: 104 MG/DL (ref 65–99)
HCT VFR BLD AUTO: 34.7 % (ref 37–47)
HGB BLD-MCNC: 11.3 G/DL (ref 12–16)
IMM GRANULOCYTES # BLD AUTO: 0.05 K/UL (ref 0–0.11)
IMM GRANULOCYTES NFR BLD AUTO: 0.6 % (ref 0–0.9)
LIPASE SERPL-CCNC: 68 U/L (ref 11–82)
LYMPHOCYTES # BLD AUTO: 1.6 K/UL (ref 1–4.8)
LYMPHOCYTES NFR BLD: 20.6 % (ref 22–41)
MCH RBC QN AUTO: 28.8 PG (ref 27–33)
MCHC RBC AUTO-ENTMCNC: 32.6 G/DL (ref 33.6–35)
MCV RBC AUTO: 88.3 FL (ref 81.4–97.8)
MONOCYTES # BLD AUTO: 0.85 K/UL (ref 0–0.85)
MONOCYTES NFR BLD AUTO: 10.9 % (ref 0–13.4)
NEUTROPHILS # BLD AUTO: 5.11 K/UL (ref 2–7.15)
NEUTROPHILS NFR BLD: 65.7 % (ref 44–72)
NRBC # BLD AUTO: 0 K/UL
NRBC BLD-RTO: 0 /100 WBC
PLATELET # BLD AUTO: 365 K/UL (ref 164–446)
PMV BLD AUTO: 10.2 FL (ref 9–12.9)
POTASSIUM SERPL-SCNC: 3.8 MMOL/L (ref 3.6–5.5)
PROT SERPL-MCNC: 7.5 G/DL (ref 6–8.2)
RBC # BLD AUTO: 3.93 M/UL (ref 4.2–5.4)
SODIUM SERPL-SCNC: 136 MMOL/L (ref 135–145)
TROPONIN T SERPL-MCNC: 13 NG/L (ref 6–19)
WBC # BLD AUTO: 7.8 K/UL (ref 4.8–10.8)

## 2021-11-12 PROCEDURE — 700102 HCHG RX REV CODE 250 W/ 637 OVERRIDE(OP): Performed by: EMERGENCY MEDICINE

## 2021-11-12 PROCEDURE — 85025 COMPLETE CBC W/AUTO DIFF WBC: CPT

## 2021-11-12 PROCEDURE — A9270 NON-COVERED ITEM OR SERVICE: HCPCS | Performed by: EMERGENCY MEDICINE

## 2021-11-12 PROCEDURE — 83690 ASSAY OF LIPASE: CPT

## 2021-11-12 PROCEDURE — 84484 ASSAY OF TROPONIN QUANT: CPT

## 2021-11-12 PROCEDURE — 80053 COMPREHEN METABOLIC PANEL: CPT

## 2021-11-12 PROCEDURE — 700111 HCHG RX REV CODE 636 W/ 250 OVERRIDE (IP): Performed by: EMERGENCY MEDICINE

## 2021-11-12 PROCEDURE — 71045 X-RAY EXAM CHEST 1 VIEW: CPT

## 2021-11-12 RX ADMIN — LIDOCAINE HYDROCHLORIDE 30 ML: 20 SOLUTION OROPHARYNGEAL at 00:21

## 2021-11-12 RX ADMIN — LORAZEPAM 0.5 MG: 2 INJECTION INTRAMUSCULAR; INTRAVENOUS at 00:20
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
## 2021-11-12 NOTE — ED NOTES
Rounded on pt. Pt resting in bed, respirations even and unlabored, VSS, NADN, call light in reach.

## 2021-11-12 NOTE — ED NOTES
Discharge teaching with paperwork regarding epigastric abdominal pain and follow up care provided to pt. Pt verbalized understanding of teaching and all questions answered. Pt is A&Ox4 with stable vital signs, stable physical assessment, and PIV removed upon discharge. Pt ambulatory out of ED with steady gait with all personal belongings. Pt provided taxi voucher for transport home.

## 2021-11-12 NOTE — ED TRIAGE NOTES
".  Chief Complaint   Patient presents with   • Chest Pain     BIB EMS for chronic chest pain and epigastric pain unrelieved by nitro. Pt was seen here earlier today and discharged. Pt received 4mg morpine and 4mg zofran PTA.      ./81   Pulse 73   Temp 36.6 °C (97.8 °F) (Temporal)   Resp 16   Ht 1.702 m (5' 7\")   Wt 75.3 kg (166 lb)   LMP  (LMP Unknown)   SpO2 100%   BMI 26.00 kg/m²     "

## 2021-12-01 ENCOUNTER — APPOINTMENT (OUTPATIENT)
Dept: RADIOLOGY | Facility: MEDICAL CENTER | Age: 86
End: 2021-12-01
Attending: EMERGENCY MEDICINE
Payer: MEDICARE

## 2021-12-01 ENCOUNTER — HOSPITAL ENCOUNTER (EMERGENCY)
Facility: MEDICAL CENTER | Age: 86
End: 2021-12-01
Attending: EMERGENCY MEDICINE
Payer: MEDICARE

## 2021-12-01 ENCOUNTER — HOSPITAL ENCOUNTER (EMERGENCY)
Facility: MEDICAL CENTER | Age: 86
End: 2021-12-01
Payer: MEDICARE

## 2021-12-01 VITALS
RESPIRATION RATE: 20 BRPM | HEART RATE: 83 BPM | WEIGHT: 165.34 LBS | TEMPERATURE: 97.6 F | OXYGEN SATURATION: 93 % | DIASTOLIC BLOOD PRESSURE: 57 MMHG | HEIGHT: 67 IN | SYSTOLIC BLOOD PRESSURE: 120 MMHG | BODY MASS INDEX: 25.95 KG/M2

## 2021-12-01 DIAGNOSIS — R11.2 NAUSEA AND VOMITING, INTRACTABILITY OF VOMITING NOT SPECIFIED, UNSPECIFIED VOMITING TYPE: ICD-10-CM

## 2021-12-01 DIAGNOSIS — R10.13 EPIGASTRIC PAIN: ICD-10-CM

## 2021-12-01 LAB
ALBUMIN SERPL BCP-MCNC: 4.1 G/DL (ref 3.2–4.9)
ALBUMIN/GLOB SERPL: 1.1 G/DL
ALP SERPL-CCNC: 113 U/L (ref 30–99)
ALT SERPL-CCNC: 10 U/L (ref 2–50)
ANION GAP SERPL CALC-SCNC: 14 MMOL/L (ref 7–16)
APPEARANCE UR: CLEAR
AST SERPL-CCNC: 19 U/L (ref 12–45)
BACTERIA #/AREA URNS HPF: ABNORMAL /HPF
BASOPHILS # BLD AUTO: 0.5 % (ref 0–1.8)
BASOPHILS # BLD: 0.03 K/UL (ref 0–0.12)
BILIRUB SERPL-MCNC: 0.6 MG/DL (ref 0.1–1.5)
BILIRUB UR QL STRIP.AUTO: NEGATIVE
BUN SERPL-MCNC: 17 MG/DL (ref 8–22)
CALCIUM SERPL-MCNC: 9.3 MG/DL (ref 8.4–10.2)
CHLORIDE SERPL-SCNC: 104 MMOL/L (ref 96–112)
CO2 SERPL-SCNC: 21 MMOL/L (ref 20–33)
COLOR UR: YELLOW
CREAT SERPL-MCNC: 0.75 MG/DL (ref 0.5–1.4)
EKG IMPRESSION: NORMAL
EOSINOPHIL # BLD AUTO: 0 K/UL (ref 0–0.51)
EOSINOPHIL NFR BLD: 0 % (ref 0–6.9)
EPI CELLS #/AREA URNS HPF: ABNORMAL /HPF
ERYTHROCYTE [DISTWIDTH] IN BLOOD BY AUTOMATED COUNT: 42.5 FL (ref 35.9–50)
GLOBULIN SER CALC-MCNC: 3.7 G/DL (ref 1.9–3.5)
GLUCOSE SERPL-MCNC: 131 MG/DL (ref 65–99)
GLUCOSE UR STRIP.AUTO-MCNC: NEGATIVE MG/DL
HCT VFR BLD AUTO: 35.5 % (ref 37–47)
HGB BLD-MCNC: 11.3 G/DL (ref 12–16)
IMM GRANULOCYTES # BLD AUTO: 0.04 K/UL (ref 0–0.11)
IMM GRANULOCYTES NFR BLD AUTO: 0.7 % (ref 0–0.9)
KETONES UR STRIP.AUTO-MCNC: ABNORMAL MG/DL
LACTATE BLD-SCNC: 1.7 MMOL/L (ref 0.5–2)
LEUKOCYTE ESTERASE UR QL STRIP.AUTO: NEGATIVE
LIPASE SERPL-CCNC: 35 U/L (ref 7–58)
LYMPHOCYTES # BLD AUTO: 0.75 K/UL (ref 1–4.8)
LYMPHOCYTES NFR BLD: 12.6 % (ref 22–41)
MCH RBC QN AUTO: 27.2 PG (ref 27–33)
MCHC RBC AUTO-ENTMCNC: 31.8 G/DL (ref 33.6–35)
MCV RBC AUTO: 85.5 FL (ref 81.4–97.8)
MICRO URNS: ABNORMAL
MONOCYTES # BLD AUTO: 0.37 K/UL (ref 0–0.85)
MONOCYTES NFR BLD AUTO: 6.2 % (ref 0–13.4)
MUCOUS THREADS #/AREA URNS HPF: ABNORMAL /HPF
NEUTROPHILS # BLD AUTO: 4.78 K/UL (ref 2–7.15)
NEUTROPHILS NFR BLD: 80 % (ref 44–72)
NITRITE UR QL STRIP.AUTO: NEGATIVE
NRBC # BLD AUTO: 0 K/UL
NRBC BLD-RTO: 0 /100 WBC
PH UR STRIP.AUTO: 7 [PH] (ref 5–8)
PLATELET # BLD AUTO: 332 K/UL (ref 164–446)
PMV BLD AUTO: 10.9 FL (ref 9–12.9)
POTASSIUM SERPL-SCNC: 3.6 MMOL/L (ref 3.6–5.5)
PROT SERPL-MCNC: 7.8 G/DL (ref 6–8.2)
PROT UR QL STRIP: 30 MG/DL
RBC # BLD AUTO: 4.15 M/UL (ref 4.2–5.4)
RBC # URNS HPF: ABNORMAL /HPF
RBC UR QL AUTO: ABNORMAL
SODIUM SERPL-SCNC: 139 MMOL/L (ref 135–145)
SP GR UR STRIP.AUTO: 1.02
TROPONIN T SERPL-MCNC: 13 NG/L (ref 6–19)
WBC # BLD AUTO: 6 K/UL (ref 4.8–10.8)
WBC #/AREA URNS HPF: ABNORMAL /HPF

## 2021-12-01 PROCEDURE — 81001 URINALYSIS AUTO W/SCOPE: CPT

## 2021-12-01 PROCEDURE — 83605 ASSAY OF LACTIC ACID: CPT

## 2021-12-01 PROCEDURE — 74022 RADEX COMPL AQT ABD SERIES: CPT

## 2021-12-01 PROCEDURE — 84484 ASSAY OF TROPONIN QUANT: CPT

## 2021-12-01 PROCEDURE — 96375 TX/PRO/DX INJ NEW DRUG ADDON: CPT

## 2021-12-01 PROCEDURE — 87040 BLOOD CULTURE FOR BACTERIA: CPT | Mod: 91

## 2021-12-01 PROCEDURE — 700117 HCHG RX CONTRAST REV CODE 255: Performed by: EMERGENCY MEDICINE

## 2021-12-01 PROCEDURE — 700105 HCHG RX REV CODE 258: Performed by: EMERGENCY MEDICINE

## 2021-12-01 PROCEDURE — 74177 CT ABD & PELVIS W/CONTRAST: CPT | Mod: ME

## 2021-12-01 PROCEDURE — 700102 HCHG RX REV CODE 250 W/ 637 OVERRIDE(OP): Performed by: EMERGENCY MEDICINE

## 2021-12-01 PROCEDURE — 83690 ASSAY OF LIPASE: CPT

## 2021-12-01 PROCEDURE — 302449 STATCHG TRIAGE ONLY (STATISTIC)

## 2021-12-01 PROCEDURE — 85025 COMPLETE CBC W/AUTO DIFF WBC: CPT

## 2021-12-01 PROCEDURE — 96374 THER/PROPH/DIAG INJ IV PUSH: CPT | Mod: XU

## 2021-12-01 PROCEDURE — 93005 ELECTROCARDIOGRAM TRACING: CPT | Performed by: EMERGENCY MEDICINE

## 2021-12-01 PROCEDURE — A9270 NON-COVERED ITEM OR SERVICE: HCPCS | Performed by: EMERGENCY MEDICINE

## 2021-12-01 PROCEDURE — 99285 EMERGENCY DEPT VISIT HI MDM: CPT

## 2021-12-01 PROCEDURE — 36415 COLL VENOUS BLD VENIPUNCTURE: CPT

## 2021-12-01 PROCEDURE — 80053 COMPREHEN METABOLIC PANEL: CPT

## 2021-12-01 PROCEDURE — 700111 HCHG RX REV CODE 636 W/ 250 OVERRIDE (IP): Performed by: EMERGENCY MEDICINE

## 2021-12-01 RX ORDER — ONDANSETRON 2 MG/ML
4 INJECTION INTRAMUSCULAR; INTRAVENOUS ONCE
Status: COMPLETED | OUTPATIENT
Start: 2021-12-01 | End: 2021-12-01

## 2021-12-01 RX ORDER — SODIUM CHLORIDE, SODIUM LACTATE, POTASSIUM CHLORIDE, CALCIUM CHLORIDE 600; 310; 30; 20 MG/100ML; MG/100ML; MG/100ML; MG/100ML
1000 INJECTION, SOLUTION INTRAVENOUS ONCE
Status: COMPLETED | OUTPATIENT
Start: 2021-12-01 | End: 2021-12-01

## 2021-12-01 RX ORDER — ACETAMINOPHEN 325 MG/1
650 TABLET ORAL ONCE
Status: COMPLETED | OUTPATIENT
Start: 2021-12-01 | End: 2021-12-01

## 2021-12-01 RX ORDER — MORPHINE SULFATE 4 MG/ML
4 INJECTION INTRAVENOUS ONCE
Status: COMPLETED | OUTPATIENT
Start: 2021-12-01 | End: 2021-12-01

## 2021-12-01 RX ORDER — HYDROCODONE BITARTRATE AND ACETAMINOPHEN 5; 325 MG/1; MG/1
1 TABLET ORAL EVERY 4 HOURS PRN
Status: SHIPPED | COMMUNITY
End: 2021-12-04

## 2021-12-01 RX ORDER — ONDANSETRON 4 MG/1
4 TABLET, ORALLY DISINTEGRATING ORAL EVERY 8 HOURS PRN
Qty: 6 TABLET | Refills: 0 | Status: SHIPPED | OUTPATIENT
Start: 2021-12-01 | End: 2021-12-03

## 2021-12-01 RX ADMIN — SODIUM CHLORIDE, POTASSIUM CHLORIDE, SODIUM LACTATE AND CALCIUM CHLORIDE 1000 ML: 600; 310; 30; 20 INJECTION, SOLUTION INTRAVENOUS at 14:40

## 2021-12-01 RX ADMIN — ONDANSETRON 4 MG: 2 INJECTION INTRAMUSCULAR; INTRAVENOUS at 14:40

## 2021-12-01 RX ADMIN — IOHEXOL 100 ML: 350 INJECTION, SOLUTION INTRAVENOUS at 19:49

## 2021-12-01 RX ADMIN — MORPHINE SULFATE 4 MG: 4 INJECTION INTRAVENOUS at 14:42

## 2021-12-01 RX ADMIN — ACETAMINOPHEN 650 MG: 325 TABLET, FILM COATED ORAL at 18:49

## 2021-12-01 RX ADMIN — FAMOTIDINE 20 MG: 10 INJECTION INTRAVENOUS at 18:51

## 2021-12-01 ASSESSMENT — PAIN DESCRIPTION - PAIN TYPE: TYPE: ACUTE PAIN

## 2021-12-01 ASSESSMENT — FIBROSIS 4 INDEX: FIB4 SCORE: 1.23

## 2021-12-01 ASSESSMENT — LIFESTYLE VARIABLES: DO YOU DRINK ALCOHOL: NO

## 2021-12-01 NOTE — DISCHARGE PLANNING
SW received phone call from Sana Reyes who is a  for [facility] services. She requested an assessment to be done of pt prior to pt being discharged to ensure safe discharge plan as the pt lives alone. Contact for Sana is [phone]     Notified  ER  for [place].

## 2021-12-01 NOTE — ED PROVIDER NOTES
"ED Provider Note  CHIEF COMPLAINT  No chief complaint on file.      HPI  Davie Montejo is a 86 y.o. female who presents to the ER with complaint of nausea, vomiting and abdominal pain.  She says the abdominal pain started last night.  The vomiting started shortly thereafter.  She is vomited multiple times.  She denied blood in her emesis but said it was \"dark in color.\"  She cannot tell me if it look like coffee-ground.  She says she \"hurts all over\" but mostly describes abdominal pain.  No diarrhea.  She says she felt like she had to use the restroom and defecate but nothing came out.  No fevers or chills.  She had a little bit of a cough yesterday.  She is vaccinated for COVID-19.  She received both of her vaccinations.  She has not yet had a booster.  No ill contacts at home.  She has history of a four-way bypass.  She is on a water pill but is unsure if she has any history of heart failure.  Patient has been seen in the ER multiple times over the last several months at both [facility] as well as [facility].  She is also been seen several times at [facility].  This is her eighth ER visit in the last month and a half.  She typically presents with chest pain or abdominal pain.  She was admitted here at [facility] on October 21, 2021 for a perforated gastric ulcer which was contained.  She was discharged on October 31.  Plan was to send her home on quad therapy for H pylori and outpatient GI follow-up for EGD 6 weeks from the time of discharge.  She did not need to follow-up outpatient with general surgery.  General surgery was, however, consulted while she was inpatient and recommended conservative management.  Patient had a repeat CT scan done when she read presented with upper abdominal pain on November 6 which showed stable contained gastric perforation.  Patient reports having a dark stool  2 days ago but says that the stool became normal in color " yesterday.    REVIEW OF SYSTEMS  See HPI for further details. All other systems are negative.    PAST MEDICAL HISTORY  Past Medical History:   Diagnosis Date   • CAD (coronary artery disease)    • Fibromyalgia    • Heart attack (HCC) 2008       FAMILY HISTORY  No family history on file.    SOCIAL HISTORY  Social History     Socioeconomic History   • Marital status:      Spouse name: Not on file   • Number of children: Not on file   • Years of education: Not on file   • Highest education level: Not on file   Occupational History   • Not on file   Tobacco Use   • Smoking status: Former Smoker   • Smokeless tobacco: Never Used   Vaping Use   • Vaping Use: Never used   Substance and Sexual Activity   • Alcohol use: Never   • Drug use: Not Currently   • Sexual activity: Not on file   Other Topics Concern   • Not on file   Social History Narrative   • Not on file     Social Determinants of Health     Financial Resource Strain:    • Difficulty of Paying Living Expenses: Not on file   Food Insecurity: Unknown   • Worried About Running Out of Food in the Last Year: Patient refused   • Ran Out of Food in the Last Year: Patient refused   Transportation Needs: Unmet Transportation Needs   • Lack of Transportation (Medical): Yes   • Lack of Transportation (Non-Medical): Not on file   Physical Activity:    • Days of Exercise per Week: Not on file   • Minutes of Exercise per Session: Not on file   Stress:    • Feeling of Stress : Not on file   Social Connections:    • Frequency of Communication with Friends and Family: Not on file   • Frequency of Social Gatherings with Friends and Family: Not on file   • Attends Evangelical Services: Not on file   • Active Member of Clubs or Organizations: Not on file   • Attends Club or Organization Meetings: Not on file   • Marital Status: Not on file   Intimate Partner Violence:    • Fear of Current or Ex-Partner: Not on file   • Emotionally Abused: Not on file   • Physically Abused: Not  "on file   • Sexually Abused: Not on file   Housing Stability:    • Unable to Pay for Housing in the Last Year: Not on file   • Number of Places Lived in the Last Year: Not on file   • Unstable Housing in the Last Year: Not on file       SURGICAL HISTORY  Past Surgical History:   Procedure Laterality Date   • CORONARY ARTERY BYPAS, 4  2008   • CORONARY ARTERY BYPASS, 1  2008       CURRENT MEDICATIONS  Home Medications    **Home medications have not yet been reviewed for this encounter**         ALLERGIES  Allergies   Allergen Reactions   • Codeine Unspecified     Unknown reaction     • Lisinopril Unspecified     Unknown reaction   • Penicillin G Unspecified     Unknown reaction     • Pregabalin Unspecified     Lyrica affects patients vision.     • Simvastatin Unspecified     Unknown reaction     • Sulfa Drugs Unspecified     Patient states \"I can't remember what this does to me\" but recalls and allergy.        PHYSICAL EXAM  VITAL SIGNS: BP (!) 206/113   Pulse (!) 106   Temp 36.4 °C (97.5 °F) (Temporal)   Resp 20   Ht 1.702 m (5' 7\")   Wt 75 kg (165 lb 5.5 oz)   LMP  (LMP Unknown)   SpO2 99%   BMI 25.90 kg/m²      Constitutional: Well developed, well nourished; moderate acute distress; actively dry heaving.  Appears uncomfortable  HENT: Normocephalic, atraumatic; Bilateral external ears normal; oropharyngeal examination deferred due to COVID-19 outbreak and lack of oropharyngeal complaint  Eyes: PERRL, EOMI, Conjunctiva normal. No discharge.   Neck:  Supple, nontender midline; No stridor; No nuchal rigidity.   Lymphatic: No cervical lymphadenopathy noted.   Cardiovascular: Regular rate and rhythm without murmurs, rubs, or gallop.   Thorax & Lungs: No respiratory distress, breath sounds clear to auscultation bilaterally without wheezing, rales or rhonchi. Nontender chest wall. No crepitus or subcutaneous air  Abdomen: Soft, bowel sounds markedly diminished.  Diffuse tenderness to percussion.  Maximal " tenderness to palpation is in the midepigastrium, right upper quadrant and right lower quadrant and suprapubic area.. No obvious masses; No pulsatile masses; no rebound, guarding, or peritoneal signs.   : Brown stool guaiac negative  Skin: Good color; warm and dry without rash or petechia.  Back: Nontender, No CVA tenderness.   Extremities: Distal radial, dorsalis pedis, posterior tibial pulses are equal bilaterally; No edema; Nontender calves or saphenous, No cyanosis, No clubbing.   Musculoskeletal: Good range of motion in all major joints. No tenderness to palpation or major deformities noted.   Neurologic: Alert & oriented x 4, clear speech      EKG  Please see my EKG interpretation below    RADIOLOGY/PROCEDURES  CT-ABDOMEN-PELVIS WITH   Final Result      1.  Apparent interval decrease in size in contained perforation in the pylorus.      2.  Diverticulosis without diverticulitis.      DX-ABDOMEN COMPLETE WITH AP OR PA CXR   Final Result      Stable hyperinflation, sternotomy with no acute thoracic or abdominopelvic abnormality identified          COURSE & MEDICAL DECISION MAKING  Pertinent Labs & Imaging studies reviewed. (See chart for details)    Results for orders placed or performed during the hospital encounter of 12/01/21   CBC WITH DIFFERENTIAL   Result Value Ref Range    WBC 6.0 4.8 - 10.8 K/uL    RBC 4.15 (L) 4.20 - 5.40 M/uL    Hemoglobin 11.3 (L) 12.0 - 16.0 g/dL    Hematocrit 35.5 (L) 37.0 - 47.0 %    MCV 85.5 81.4 - 97.8 fL    MCH 27.2 27.0 - 33.0 pg    MCHC 31.8 (L) 33.6 - 35.0 g/dL    RDW 42.5 35.9 - 50.0 fL    Platelet Count 332 164 - 446 K/uL    MPV 10.9 9.0 - 12.9 fL    Neutrophils-Polys 80.00 (H) 44.00 - 72.00 %    Lymphocytes 12.60 (L) 22.00 - 41.00 %    Monocytes 6.20 0.00 - 13.40 %    Eosinophils 0.00 0.00 - 6.90 %    Basophils 0.50 0.00 - 1.80 %    Immature Granulocytes 0.70 0.00 - 0.90 %    Nucleated RBC 0.00 /100 WBC    Neutrophils (Absolute) 4.78 2.00 - 7.15 K/uL    Lymphs (Absolute)  0.75 (L) 1.00 - 4.80 K/uL    Monos (Absolute) 0.37 0.00 - 0.85 K/uL    Eos (Absolute) 0.00 0.00 - 0.51 K/uL    Baso (Absolute) 0.03 0.00 - 0.12 K/uL    Immature Granulocytes (abs) 0.04 0.00 - 0.11 K/uL    NRBC (Absolute) 0.00 K/uL   COMP METABOLIC PANEL   Result Value Ref Range    Sodium 139 135 - 145 mmol/L    Potassium 3.6 3.6 - 5.5 mmol/L    Chloride 104 96 - 112 mmol/L    Co2 21 20 - 33 mmol/L    Anion Gap 14.0 7.0 - 16.0    Glucose 131 (H) 65 - 99 mg/dL    Bun 17 8 - 22 mg/dL    Creatinine 0.75 0.50 - 1.40 mg/dL    Calcium 9.3 8.4 - 10.2 mg/dL    AST(SGOT) 19 12 - 45 U/L    ALT(SGPT) 10 2 - 50 U/L    Alkaline Phosphatase 113 (H) 30 - 99 U/L    Total Bilirubin 0.6 0.1 - 1.5 mg/dL    Albumin 4.1 3.2 - 4.9 g/dL    Total Protein 7.8 6.0 - 8.2 g/dL    Globulin 3.7 (H) 1.9 - 3.5 g/dL    A-G Ratio 1.1 g/dL   LIPASE   Result Value Ref Range    Lipase 35 7 - 58 U/L   TROPONIN   Result Value Ref Range    Troponin T 13 6 - 19 ng/L   LACTIC ACID   Result Value Ref Range    Lactic Acid 1.7 0.5 - 2.0 mmol/L   URINALYSIS (UA)    Specimen: Urine   Result Value Ref Range    Color Yellow     Character Clear     Specific Gravity 1.020 <1.035    Ph 7.0 5.0 - 8.0    Glucose Negative Negative mg/dL    Ketones Trace (A) Negative mg/dL    Protein 30 (A) Negative mg/dL    Bilirubin Negative Negative    Nitrite Negative Negative    Leukocyte Esterase Negative Negative    Occult Blood Trace (A) Negative    Micro Urine Req Microscopic    ESTIMATED GFR   Result Value Ref Range    GFR If African American >60 >60 mL/min/1.73 m 2    GFR If Non African American >60 >60 mL/min/1.73 m 2   URINE MICROSCOPIC (W/UA)   Result Value Ref Range    WBC 0-2 /hpf    RBC 0-2 /hpf    Bacteria Few (A) None /hpf    Epithelial Cells Few Few /hpf    Mucous Threads Few /hpf   EKG (NOW)   Result Value Ref Range    Report       [facility] Emergency Dept.    Test Date:  2021-12-01  Pt Name:    JUNIOR CHIU            Department:  [facility]  MRN:        [id number]                      [facility]  Gender:     Female                       Technician: LYN  :        [id number]                   Requested By:TRACE SANTANA  Order #:    [id number]                    Reading MD: Trace Santana    Measurements  Intervals                                Axis  Rate:       110                          P:          0  OR:         157                          QRS:        -53  QRSD:       117                          T:          95  QT:         340  QTc:        461    Interpretive Statements  Sinus tachycardia rate 110  T wave flat V2 and lead I  T wave inverted in aVL  No ST elevation or depression  Atrial premature complexes  Left anterior fascicular block  Left ventricular hypertrophy  Anterior Q waves, possibly due to LVH  Nonspecific T abnormalities, lateral leads  Compared to ECG 2021 23: 43:05  Electronically Signed On 2021 14:56:17 PST by Trace Santana       Patient presents to the ER with complaint of nausea and vomiting which began last night.  She says abdominal pain started first.  The vomiting started shortly thereafter.  No diarrhea reported to me, but she did report some diarrhea to the triage nurse.  She said she had a dark stool 2 days ago.  The stool has since become normal in color.  She is guaiac negative today on my examination in the stool is brown in color.  Patient was actively dry heaving when I saw her in the ER.  She looks uncomfortable.  She had tenderness in the midepigastrium and right upper quadrant.  Lab work is fairly unremarkable.  Hemoglobin is a little low at 11.3.  This is stable however when compared to previous labs.  Platelet count is normal.  Lactic acid levels normal.  She has history of four-way bypass so I did a troponin and an EKG.  EKG is nonacute.  It is unchanged from previous.  Troponin is 13 and within normal limits.  I do not think this is ACS.  CT scan of the abdomen pelvis was performed to evaluate  for worsening perforation, obstruction, or any other dangerous intra-abdominal pathology.  Patient CT scan reveals improved appearance of the previously seen contained gastric perforation and that the size is quite a bit smaller than seen previously on November 6.  Patient tells me she has not yet followed up with GI.  She says she did not know she needed to.  I will refer her to gastroenterology today.  Her nausea vomiting has been well controlled here in the ER with Zofran and morphine.  She was given a liter of fluids.  She says she is feeling much better.  She is also given some Pepcid.  Urine is clear.  No evidence for UTI.  Patient's vitals are normal and stable.  She is not febrile.  She is not septic or toxic in appearance.  CT scan is stable if not improved.  I think she is safe and stable for outpatient management discharge home.  She will go home with some Zofran.  I will give her referral to gastroenterology.  She has been given strict return precautions and discharge instructions and she understands treatment plan and follow-up.    I verified that the patient was wearing a mask and I was wearing appropriate PPE every time I entered the room. The patient's mask was on the patient at all times during my encounter except for a brief view of the oropharynx.    FINAL IMPRESSION  1. Epigastric pain Acute    2. Nausea and vomiting, intractability of vomiting not specified, unspecified vomiting type Acute ondansetron (ZOFRAN ODT) 4 MG TABLET DISPERSIBLE     .   This dictation has been created using voice recognition software. The accuracy of the dictation is limited by the abilities of the software. I expect there may be some errors of grammar and possibly content. I made every attempt to manually correct the errors within my dictation. However, errors related to voice recognition software may still exist and should be interpreted within the appropriate context.      Electronically signed by: Lamar Santana  M.D., 12/1/2021 2:13 PM

## 2021-12-01 NOTE — ED NOTES
Med rec updated and complete  Allergies reviewed  Pt reports that she is to tired to tell me what medications she is taking or when she took them last.  Pt reports the only pharmacy she goes to is [facility], called [facility] @ [phone] to verify all medications.  Per pharmacy pt last filled her ROSUVASTATIN 10MG on 6/13/2021 for 3 month supply, she picked up a FLAGYL 500MG  1 tablet 3 times a day on 10/31/2021 for 14 day course and OMEPRAZOLE 20MG 1 tablet BID on 10/31/2021 for 14 day course, pt is not sure if she finished those medications per pt reports she is to sick to remember    Per pharmacy is waiting for pts insurance to approve her LIDOCAINE PATCH 5%.    No current facility-administered medications on file prior to encounter.     Current Outpatient Medications on File Prior to Encounter   Medication Sig Dispense Refill   • HYDROcodone-acetaminophen (NORCO) 5-325 MG Tab per tablet Take 1 Tablet by mouth every four hours as needed. Per pharmacy pt picked up on 10/31/2021   Indications: Pain     • acetaminophen (TYLENOL) 325 MG Tab Take 2 Tablets by mouth every 6 hours as needed. (Patient taking differently: Take 650 mg by mouth every 6 hours as needed for Moderate Pain.) 30 Tablet 0   • folic acid (FOLVITE) 1 MG Tab Take 1 Tablet by mouth every day. (Patient taking differently: Take 1 mg by mouth every day. Pt picked up on 11/2/2021 for 30 day supply, with no refills per pharmacy) 30 Tablet 0   • levothyroxine (SYNTHROID) 150 MCG Tab Take 1 Tablet by mouth every morning on an empty stomach. 30 Tablet 1   • lidocaine (LIDODERM) 5 % Patch Place 1 Patch on the skin every 24 hours. 20 Patch 0   • citalopram (CELEXA) 20 MG Tab Take 0.5 Tablets by mouth every day. 30 Tablet 1   • metoprolol tartrate (LOPRESSOR) 25 MG Tab Take 0.5 Tablets by mouth 2 times a day. (Patient taking differently: Take 12.5 mg by mouth 2 times a day. Per pharmacy pt picked up on 10/31/2021 for 30 day course) 60 Tablet 1   • ondansetron (ZOFRAN ODT) 4  MG TABLET DISPERSIBLE Take 1 Tablet by mouth every four hours as needed for Nausea (give PO if IV route is unavailable.). (Patient taking differently: Take 4 mg by mouth every four hours as needed for Nausea (give PO if IV route is unavailable.). Per pharmacy pt picked up on 10/31/2021 for #10 tablets) 10 Tablet 0   • clopidogrel (PLAVIX) 75 MG Tab Take 1 Tablet by mouth every day. 30 Tab 0   • gabapentin (NEURONTIN) 100 MG Cap Take 1 Capsule by mouth 3 times a day. 90 Cap 0   • nitroglycerin (NITROSTAT) 0.4 MG SL Tab Place 0.4 mg under the tongue as needed for Chest Pain.     • losartan (COZAAR) 50 MG Tab Take 50 mg by mouth every day.

## 2021-12-01 NOTE — ED NOTES
"PT notified of need for urine specimen; pt replied \"I just went\".  Pt had been ambulatory to & from [facility].   "

## 2021-12-01 NOTE — ED NOTES
Additional IV placed on 3rd attempt; flushes w/out blood return.  Unable to draw labs from either IV.  LR infusing via RT hand 22G.  Pt now taken to XR per vivienne

## 2021-12-02 NOTE — ED NOTES
Pt c/o epigastric pain.  Pt reminded of need for urine specimen; reports no urge to void.  Pt states she lives alone, doesn't use a walker or cane, is able to get to grocery store & fix own meals, has meals on wheels.

## 2021-12-02 NOTE — ED NOTES
Pt sleeping; even chest rise & fall noted; cardiac monitoring continuing.  Side rails up x2, call light w/in reach.

## 2021-12-02 NOTE — DISCHARGE PLANNING
Bedside discussion with pt, who had difficulty answering questions due to nausea/vomiting.  Pt lives alone, her brother passed away recently. Pt states she has been getting assistance from Sana NICOLAS, pt is unsure where sana works. Pt states Sana is assisting with finding affordable senior housing, doctor appointments, errands, etc. Pt states she also uses [employer].

## 2021-12-02 NOTE — ED NOTES
Patient is stable for discharge at this time, anticipatory guidance provided, close follow-up is encouraged, and ED return instructions have been detailed. Patient is both agreeable to the disposition and plan and discharged home in ambulatory state and in good condition.     Rx education provided, Pt verbalized understanding;    Cab voucher provided to Pt to go home;

## 2021-12-02 NOTE — DISCHARGE PLANNING
Call placed to Sana NICOLAS with Senior services. Update given regarding pt assessment and D/C instructions. Discussed MTM benefits

## 2021-12-02 NOTE — ED NOTES
Pt ambulatory to & from [facility] w/ RN stand-by assist; voided specimen provided, clear yellow.  Pt c/o HA; requests pain med.  Will notify ERP

## 2021-12-02 NOTE — DISCHARGE INSTRUCTIONS
Follow-up with Dr. Grady, gastroenterologist, or what ever gastroenterologist you were referred to within the next 1 to 2 weeks as recommended upon your discharge at the end of October.    Return to the ER for any worsening abdominal pain, changing abdominal pain, worsening nausea/vomiting, diarrhea, black tarry stools, blood in vomit, dizziness or lightheadedness, fevers over 100.4, shaking chills, or for any concerns.    Clear liquid diet for the next 24 hours and then slowly advance your diet as tolerated.    Follow-up with your primary care doctor within the next 1 to 2 days.  Please call for appointment.

## 2021-12-04 ENCOUNTER — HOSPITAL ENCOUNTER (OUTPATIENT)
Facility: MEDICAL CENTER | Age: 86
End: 2021-12-06
Attending: EMERGENCY MEDICINE | Admitting: STUDENT IN AN ORGANIZED HEALTH CARE EDUCATION/TRAINING PROGRAM
Payer: MEDICARE

## 2021-12-04 ENCOUNTER — APPOINTMENT (OUTPATIENT)
Dept: RADIOLOGY | Facility: MEDICAL CENTER | Age: 86
End: 2021-12-04
Attending: EMERGENCY MEDICINE
Payer: MEDICARE

## 2021-12-04 DIAGNOSIS — R11.2 INTRACTABLE VOMITING WITH NAUSEA, UNSPECIFIED VOMITING TYPE: ICD-10-CM

## 2021-12-04 DIAGNOSIS — R10.9 ABDOMINAL PAIN, UNSPECIFIED ABDOMINAL LOCATION: ICD-10-CM

## 2021-12-04 LAB
ALBUMIN SERPL BCP-MCNC: 4.8 G/DL (ref 3.2–4.9)
ALBUMIN/GLOB SERPL: 1.2 G/DL
ALP SERPL-CCNC: 135 U/L (ref 30–99)
ALT SERPL-CCNC: 14 U/L (ref 2–50)
ANION GAP SERPL CALC-SCNC: 17 MMOL/L (ref 7–16)
APPEARANCE UR: CLEAR
AST SERPL-CCNC: 23 U/L (ref 12–45)
BACTERIA #/AREA URNS HPF: NEGATIVE /HPF
BASOPHILS # BLD AUTO: 0.5 % (ref 0–1.8)
BASOPHILS # BLD: 0.04 K/UL (ref 0–0.12)
BILIRUB SERPL-MCNC: 0.5 MG/DL (ref 0.1–1.5)
BILIRUB UR QL STRIP.AUTO: NEGATIVE
BUN SERPL-MCNC: 16 MG/DL (ref 8–22)
CALCIUM SERPL-MCNC: 10 MG/DL (ref 8.5–10.5)
CHLORIDE SERPL-SCNC: 98 MMOL/L (ref 96–112)
CO2 SERPL-SCNC: 21 MMOL/L (ref 20–33)
COLOR UR: YELLOW
CREAT SERPL-MCNC: 0.75 MG/DL (ref 0.5–1.4)
EKG IMPRESSION: NORMAL
EOSINOPHIL # BLD AUTO: 0.01 K/UL (ref 0–0.51)
EOSINOPHIL NFR BLD: 0.1 % (ref 0–6.9)
EPI CELLS #/AREA URNS HPF: NEGATIVE /HPF
ERYTHROCYTE [DISTWIDTH] IN BLOOD BY AUTOMATED COUNT: 41.6 FL (ref 35.9–50)
GLOBULIN SER CALC-MCNC: 4 G/DL (ref 1.9–3.5)
GLUCOSE SERPL-MCNC: 156 MG/DL (ref 65–99)
GLUCOSE UR STRIP.AUTO-MCNC: NEGATIVE MG/DL
HCT VFR BLD AUTO: 41.3 % (ref 37–47)
HGB BLD-MCNC: 13.5 G/DL (ref 12–16)
HYALINE CASTS #/AREA URNS LPF: NORMAL /LPF
IMM GRANULOCYTES # BLD AUTO: 0.05 K/UL (ref 0–0.11)
IMM GRANULOCYTES NFR BLD AUTO: 0.6 % (ref 0–0.9)
KETONES UR STRIP.AUTO-MCNC: ABNORMAL MG/DL
LEUKOCYTE ESTERASE UR QL STRIP.AUTO: NEGATIVE
LIPASE SERPL-CCNC: 43 U/L (ref 11–82)
LYMPHOCYTES # BLD AUTO: 1.13 K/UL (ref 1–4.8)
LYMPHOCYTES NFR BLD: 14.1 % (ref 22–41)
MAGNESIUM SERPL-MCNC: 1.9 MG/DL (ref 1.5–2.5)
MCH RBC QN AUTO: 27.5 PG (ref 27–33)
MCHC RBC AUTO-ENTMCNC: 32.7 G/DL (ref 33.6–35)
MCV RBC AUTO: 84.1 FL (ref 81.4–97.8)
MICRO URNS: ABNORMAL
MONOCYTES # BLD AUTO: 0.55 K/UL (ref 0–0.85)
MONOCYTES NFR BLD AUTO: 6.9 % (ref 0–13.4)
NEUTROPHILS # BLD AUTO: 6.21 K/UL (ref 2–7.15)
NEUTROPHILS NFR BLD: 77.8 % (ref 44–72)
NITRITE UR QL STRIP.AUTO: NEGATIVE
NRBC # BLD AUTO: 0 K/UL
NRBC BLD-RTO: 0 /100 WBC
PH UR STRIP.AUTO: 7 [PH] (ref 5–8)
PHOSPHATE SERPL-MCNC: 2.8 MG/DL (ref 2.5–4.5)
PLATELET # BLD AUTO: 373 K/UL (ref 164–446)
PMV BLD AUTO: 10.8 FL (ref 9–12.9)
POTASSIUM SERPL-SCNC: 3.2 MMOL/L (ref 3.6–5.5)
PROT SERPL-MCNC: 8.8 G/DL (ref 6–8.2)
PROT UR QL STRIP: 30 MG/DL
RBC # BLD AUTO: 4.91 M/UL (ref 4.2–5.4)
RBC # URNS HPF: NORMAL /HPF
RBC UR QL AUTO: NEGATIVE
SODIUM SERPL-SCNC: 136 MMOL/L (ref 135–145)
SP GR UR STRIP.AUTO: 1.01
TROPONIN T SERPL-MCNC: 14 NG/L (ref 6–19)
UROBILINOGEN UR STRIP.AUTO-MCNC: 0.2 MG/DL
WBC # BLD AUTO: 8 K/UL (ref 4.8–10.8)
WBC #/AREA URNS HPF: NORMAL /HPF

## 2021-12-04 PROCEDURE — 700111 HCHG RX REV CODE 636 W/ 250 OVERRIDE (IP): Performed by: EMERGENCY MEDICINE

## 2021-12-04 PROCEDURE — 99285 EMERGENCY DEPT VISIT HI MDM: CPT

## 2021-12-04 PROCEDURE — 96376 TX/PRO/DX INJ SAME DRUG ADON: CPT

## 2021-12-04 PROCEDURE — 85025 COMPLETE CBC W/AUTO DIFF WBC: CPT

## 2021-12-04 PROCEDURE — 84484 ASSAY OF TROPONIN QUANT: CPT

## 2021-12-04 PROCEDURE — 700105 HCHG RX REV CODE 258: Performed by: EMERGENCY MEDICINE

## 2021-12-04 PROCEDURE — 74177 CT ABD & PELVIS W/CONTRAST: CPT | Mod: MG

## 2021-12-04 PROCEDURE — G0378 HOSPITAL OBSERVATION PER HR: HCPCS

## 2021-12-04 PROCEDURE — 96375 TX/PRO/DX INJ NEW DRUG ADDON: CPT

## 2021-12-04 PROCEDURE — 84100 ASSAY OF PHOSPHORUS: CPT

## 2021-12-04 PROCEDURE — 83735 ASSAY OF MAGNESIUM: CPT

## 2021-12-04 PROCEDURE — A9270 NON-COVERED ITEM OR SERVICE: HCPCS | Performed by: STUDENT IN AN ORGANIZED HEALTH CARE EDUCATION/TRAINING PROGRAM

## 2021-12-04 PROCEDURE — 700102 HCHG RX REV CODE 250 W/ 637 OVERRIDE(OP): Performed by: STUDENT IN AN ORGANIZED HEALTH CARE EDUCATION/TRAINING PROGRAM

## 2021-12-04 PROCEDURE — 700105 HCHG RX REV CODE 258: Performed by: STUDENT IN AN ORGANIZED HEALTH CARE EDUCATION/TRAINING PROGRAM

## 2021-12-04 PROCEDURE — 81001 URINALYSIS AUTO W/SCOPE: CPT

## 2021-12-04 PROCEDURE — 96374 THER/PROPH/DIAG INJ IV PUSH: CPT | Mod: XU

## 2021-12-04 PROCEDURE — 700117 HCHG RX CONTRAST REV CODE 255: Performed by: EMERGENCY MEDICINE

## 2021-12-04 PROCEDURE — 93005 ELECTROCARDIOGRAM TRACING: CPT | Performed by: EMERGENCY MEDICINE

## 2021-12-04 PROCEDURE — 80053 COMPREHEN METABOLIC PANEL: CPT

## 2021-12-04 PROCEDURE — 99220 PR INITIAL OBSERVATION CARE,LEVL III: CPT | Performed by: STUDENT IN AN ORGANIZED HEALTH CARE EDUCATION/TRAINING PROGRAM

## 2021-12-04 PROCEDURE — 93005 ELECTROCARDIOGRAM TRACING: CPT

## 2021-12-04 PROCEDURE — 83690 ASSAY OF LIPASE: CPT

## 2021-12-04 RX ORDER — GABAPENTIN 100 MG/1
100 CAPSULE ORAL 3 TIMES DAILY
Status: DISCONTINUED | OUTPATIENT
Start: 2021-12-05 | End: 2021-12-06 | Stop reason: HOSPADM

## 2021-12-04 RX ORDER — ACETAMINOPHEN 325 MG/1
650 TABLET ORAL EVERY 6 HOURS PRN
Status: DISCONTINUED | OUTPATIENT
Start: 2021-12-10 | End: 2021-12-06 | Stop reason: HOSPADM

## 2021-12-04 RX ORDER — POLYETHYLENE GLYCOL 3350 17 G/17G
1 POWDER, FOR SOLUTION ORAL
Status: DISCONTINUED | OUTPATIENT
Start: 2021-12-04 | End: 2021-12-06 | Stop reason: HOSPADM

## 2021-12-04 RX ORDER — LOSARTAN POTASSIUM 50 MG/1
50 TABLET ORAL DAILY
Status: DISCONTINUED | OUTPATIENT
Start: 2021-12-05 | End: 2021-12-06 | Stop reason: HOSPADM

## 2021-12-04 RX ORDER — OXYCODONE HYDROCHLORIDE 10 MG/1
20 TABLET ORAL
Status: DISCONTINUED | OUTPATIENT
Start: 2021-12-04 | End: 2021-12-06 | Stop reason: HOSPADM

## 2021-12-04 RX ORDER — OXYCODONE HYDROCHLORIDE 10 MG/1
10 TABLET ORAL
Status: DISCONTINUED | OUTPATIENT
Start: 2021-12-04 | End: 2021-12-06 | Stop reason: HOSPADM

## 2021-12-04 RX ORDER — LABETALOL HYDROCHLORIDE 5 MG/ML
10 INJECTION, SOLUTION INTRAVENOUS EVERY 4 HOURS PRN
Status: DISCONTINUED | OUTPATIENT
Start: 2021-12-04 | End: 2021-12-06 | Stop reason: HOSPADM

## 2021-12-04 RX ORDER — LEVOTHYROXINE SODIUM 0.15 MG/1
150 TABLET ORAL
Status: DISCONTINUED | OUTPATIENT
Start: 2021-12-05 | End: 2021-12-06 | Stop reason: HOSPADM

## 2021-12-04 RX ORDER — ONDANSETRON 4 MG/1
4 TABLET, ORALLY DISINTEGRATING ORAL EVERY 4 HOURS PRN
Status: DISCONTINUED | OUTPATIENT
Start: 2021-12-04 | End: 2021-12-04

## 2021-12-04 RX ORDER — CITALOPRAM HYDROBROMIDE 10 MG/1
10 TABLET ORAL DAILY
Status: DISCONTINUED | OUTPATIENT
Start: 2021-12-05 | End: 2021-12-06 | Stop reason: HOSPADM

## 2021-12-04 RX ORDER — CLOPIDOGREL BISULFATE 75 MG/1
75 TABLET ORAL DAILY
Status: DISCONTINUED | OUTPATIENT
Start: 2021-12-05 | End: 2021-12-06 | Stop reason: HOSPADM

## 2021-12-04 RX ORDER — METOCLOPRAMIDE HYDROCHLORIDE 5 MG/ML
10 INJECTION INTRAMUSCULAR; INTRAVENOUS ONCE
Status: COMPLETED | OUTPATIENT
Start: 2021-12-04 | End: 2021-12-04

## 2021-12-04 RX ORDER — ACETAMINOPHEN 325 MG/1
650 TABLET ORAL EVERY 6 HOURS
Status: DISCONTINUED | OUTPATIENT
Start: 2021-12-05 | End: 2021-12-06 | Stop reason: HOSPADM

## 2021-12-04 RX ORDER — SODIUM CHLORIDE, SODIUM LACTATE, POTASSIUM CHLORIDE, CALCIUM CHLORIDE 600; 310; 30; 20 MG/100ML; MG/100ML; MG/100ML; MG/100ML
INJECTION, SOLUTION INTRAVENOUS CONTINUOUS
Status: ACTIVE | OUTPATIENT
Start: 2021-12-04 | End: 2021-12-05

## 2021-12-04 RX ORDER — BISACODYL 10 MG
10 SUPPOSITORY, RECTAL RECTAL
Status: DISCONTINUED | OUTPATIENT
Start: 2021-12-04 | End: 2021-12-06 | Stop reason: HOSPADM

## 2021-12-04 RX ORDER — FAMOTIDINE 20 MG/1
20 TABLET, FILM COATED ORAL 2 TIMES DAILY
Status: DISCONTINUED | OUTPATIENT
Start: 2021-12-04 | End: 2021-12-05

## 2021-12-04 RX ORDER — NITROGLYCERIN 0.4 MG/1
0.4 TABLET SUBLINGUAL
Status: COMPLETED | OUTPATIENT
Start: 2021-12-04 | End: 2021-12-04

## 2021-12-04 RX ORDER — AMOXICILLIN 250 MG
2 CAPSULE ORAL 2 TIMES DAILY
Status: DISCONTINUED | OUTPATIENT
Start: 2021-12-04 | End: 2021-12-06 | Stop reason: HOSPADM

## 2021-12-04 RX ORDER — LIDOCAINE 50 MG/G
1 PATCH TOPICAL EVERY 24 HOURS
Status: DISCONTINUED | OUTPATIENT
Start: 2021-12-05 | End: 2021-12-06 | Stop reason: HOSPADM

## 2021-12-04 RX ORDER — ONDANSETRON 2 MG/ML
4 INJECTION INTRAMUSCULAR; INTRAVENOUS ONCE
Status: COMPLETED | OUTPATIENT
Start: 2021-12-04 | End: 2021-12-04

## 2021-12-04 RX ORDER — DIPHENHYDRAMINE HYDROCHLORIDE 50 MG/ML
25 INJECTION INTRAMUSCULAR; INTRAVENOUS
Status: DISCONTINUED | OUTPATIENT
Start: 2021-12-04 | End: 2021-12-06 | Stop reason: HOSPADM

## 2021-12-04 RX ORDER — SODIUM CHLORIDE 9 MG/ML
1000 INJECTION, SOLUTION INTRAVENOUS ONCE
Status: COMPLETED | OUTPATIENT
Start: 2021-12-04 | End: 2021-12-04

## 2021-12-04 RX ORDER — LORAZEPAM 2 MG/ML
1 INJECTION INTRAMUSCULAR EVERY 6 HOURS PRN
Status: DISCONTINUED | OUTPATIENT
Start: 2021-12-04 | End: 2021-12-06 | Stop reason: HOSPADM

## 2021-12-04 RX ORDER — ACETAMINOPHEN 325 MG/1
650 TABLET ORAL EVERY 6 HOURS PRN
Status: DISCONTINUED | OUTPATIENT
Start: 2021-12-04 | End: 2021-12-04

## 2021-12-04 RX ORDER — HYDROMORPHONE HYDROCHLORIDE 1 MG/ML
1 INJECTION, SOLUTION INTRAMUSCULAR; INTRAVENOUS; SUBCUTANEOUS
Status: DISCONTINUED | OUTPATIENT
Start: 2021-12-04 | End: 2021-12-06 | Stop reason: HOSPADM

## 2021-12-04 RX ORDER — ONDANSETRON 2 MG/ML
4 INJECTION INTRAMUSCULAR; INTRAVENOUS EVERY 4 HOURS PRN
Status: DISCONTINUED | OUTPATIENT
Start: 2021-12-04 | End: 2021-12-04

## 2021-12-04 RX ADMIN — FENTANYL CITRATE 100 MCG: 50 INJECTION, SOLUTION INTRAMUSCULAR; INTRAVENOUS at 19:45

## 2021-12-04 RX ADMIN — IOHEXOL 100 ML: 350 INJECTION, SOLUTION INTRAVENOUS at 18:32

## 2021-12-04 RX ADMIN — DOCUSATE SODIUM 50 MG AND SENNOSIDES 8.6 MG 2 TABLET: 8.6; 5 TABLET, FILM COATED ORAL at 22:01

## 2021-12-04 RX ADMIN — LIDOCAINE HYDROCHLORIDE 30 ML: 20 SOLUTION OROPHARYNGEAL at 23:28

## 2021-12-04 RX ADMIN — SODIUM CHLORIDE, POTASSIUM CHLORIDE, SODIUM LACTATE AND CALCIUM CHLORIDE: 600; 310; 30; 20 INJECTION, SOLUTION INTRAVENOUS at 21:59

## 2021-12-04 RX ADMIN — SODIUM CHLORIDE 1000 ML: 9 INJECTION, SOLUTION INTRAVENOUS at 16:22

## 2021-12-04 RX ADMIN — FAMOTIDINE 20 MG: 20 TABLET, FILM COATED ORAL at 23:28

## 2021-12-04 RX ADMIN — METOCLOPRAMIDE 10 MG: 5 INJECTION, SOLUTION INTRAMUSCULAR; INTRAVENOUS at 19:44

## 2021-12-04 RX ADMIN — ACETAMINOPHEN 650 MG: 325 TABLET, FILM COATED ORAL at 23:28

## 2021-12-04 RX ADMIN — NITROGLYCERIN 0.4 MG: 0.4 TABLET, ORALLY DISINTEGRATING SUBLINGUAL at 23:12

## 2021-12-04 RX ADMIN — ONDANSETRON 4 MG: 2 INJECTION INTRAMUSCULAR; INTRAVENOUS at 16:22

## 2021-12-04 RX ADMIN — FENTANYL CITRATE 50 MCG: 50 INJECTION, SOLUTION INTRAMUSCULAR; INTRAVENOUS at 16:22

## 2021-12-04 RX ADMIN — METOPROLOL TARTRATE 12.5 MG: 25 TABLET, FILM COATED ORAL at 21:59

## 2021-12-04 RX ADMIN — FENTANYL CITRATE 50 MCG: 50 INJECTION, SOLUTION INTRAMUSCULAR; INTRAVENOUS at 18:43

## 2021-12-04 ASSESSMENT — LIFESTYLE VARIABLES
TOTAL SCORE: 0
ALCOHOL_USE: NO
CONSUMPTION TOTAL: INCOMPLETE
HAVE PEOPLE ANNOYED YOU BY CRITICIZING YOUR DRINKING: NO
HAVE YOU EVER FELT YOU SHOULD CUT DOWN ON YOUR DRINKING: NO
TOTAL SCORE: 0
EVER HAD A DRINK FIRST THING IN THE MORNING TO STEADY YOUR NERVES TO GET RID OF A HANGOVER: NO
EVER FELT BAD OR GUILTY ABOUT YOUR DRINKING: NO
TOTAL SCORE: 0
DOES PATIENT WANT TO STOP DRINKING: CANNOT ASSESS

## 2021-12-04 ASSESSMENT — PAIN DESCRIPTION - PAIN TYPE
TYPE: ACUTE PAIN

## 2021-12-04 ASSESSMENT — FIBROSIS 4 INDEX
FIB4 SCORE: 1.38
FIB4 SCORE: 1.52

## 2021-12-04 ASSESSMENT — PATIENT HEALTH QUESTIONNAIRE - PHQ9
2. FEELING DOWN, DEPRESSED, IRRITABLE, OR HOPELESS: NOT AT ALL
SUM OF ALL RESPONSES TO PHQ9 QUESTIONS 1 AND 2: 0
1. LITTLE INTEREST OR PLEASURE IN DOING THINGS: NOT AT ALL

## 2021-12-04 NOTE — ED TRIAGE NOTES
"Chief Complaint   Patient presents with   • Abdominal Pain     Starting last night with multiple recent bouts and ER visits for the same, seen in this ER 12/1 with improving gastric perforation seen on CT with unremarkable labs and EKG per MD. Pt states N/V started again last night, denies blood in vomit or stool. States pain is generalized across abdomen.    • N/V     Returning last night and continuing overnight and at triage. Denies blood in vomit.      Pt BIB EMS for above complaints, pt given IV but no interventions, VSS on RA, GCS 15.    Pt is vaccinated against covid and denies other viral sx.     Pt returned to lobby. Educated on triage process and to inform staff of any changes.     BP (!) 167/106   Pulse 87   Temp 36.2 °C (97.2 °F) (Temporal)   Resp 18   Ht 1.702 m (5' 7\")   Wt 74.8 kg (165 lb)   LMP  (LMP Unknown)   SpO2 100%   BMI 25.84 kg/m²      "

## 2021-12-05 PROBLEM — R10.84 GENERALIZED ABDOMINAL PAIN: Status: ACTIVE | Noted: 2021-12-05

## 2021-12-05 PROBLEM — K25.5 CHRONIC GASTRIC ULCER WITH PERFORATION (HCC): Status: ACTIVE | Noted: 2021-12-05

## 2021-12-05 LAB
ANION GAP SERPL CALC-SCNC: 12 MMOL/L (ref 7–16)
BASOPHILS # BLD AUTO: 0.7 % (ref 0–1.8)
BASOPHILS # BLD: 0.05 K/UL (ref 0–0.12)
BUN SERPL-MCNC: 14 MG/DL (ref 8–22)
CALCIUM SERPL-MCNC: 8.8 MG/DL (ref 8.5–10.5)
CHLORIDE SERPL-SCNC: 104 MMOL/L (ref 96–112)
CO2 SERPL-SCNC: 22 MMOL/L (ref 20–33)
CREAT SERPL-MCNC: 0.79 MG/DL (ref 0.5–1.4)
EKG IMPRESSION: NORMAL
EOSINOPHIL # BLD AUTO: 0.03 K/UL (ref 0–0.51)
EOSINOPHIL NFR BLD: 0.4 % (ref 0–6.9)
ERYTHROCYTE [DISTWIDTH] IN BLOOD BY AUTOMATED COUNT: 42.8 FL (ref 35.9–50)
GLUCOSE SERPL-MCNC: 106 MG/DL (ref 65–99)
HCT VFR BLD AUTO: 33.2 % (ref 37–47)
HGB BLD-MCNC: 10.9 G/DL (ref 12–16)
IMM GRANULOCYTES # BLD AUTO: 0.05 K/UL (ref 0–0.11)
IMM GRANULOCYTES NFR BLD AUTO: 0.7 % (ref 0–0.9)
LYMPHOCYTES # BLD AUTO: 2.22 K/UL (ref 1–4.8)
LYMPHOCYTES NFR BLD: 30.9 % (ref 22–41)
MAGNESIUM SERPL-MCNC: 2 MG/DL (ref 1.5–2.5)
MCH RBC QN AUTO: 27.9 PG (ref 27–33)
MCHC RBC AUTO-ENTMCNC: 32.8 G/DL (ref 33.6–35)
MCV RBC AUTO: 85.1 FL (ref 81.4–97.8)
MONOCYTES # BLD AUTO: 0.83 K/UL (ref 0–0.85)
MONOCYTES NFR BLD AUTO: 11.5 % (ref 0–13.4)
NEUTROPHILS # BLD AUTO: 4.01 K/UL (ref 2–7.15)
NEUTROPHILS NFR BLD: 55.8 % (ref 44–72)
NRBC # BLD AUTO: 0 K/UL
NRBC BLD-RTO: 0 /100 WBC
PLATELET # BLD AUTO: 315 K/UL (ref 164–446)
PMV BLD AUTO: 11 FL (ref 9–12.9)
POTASSIUM SERPL-SCNC: 3.2 MMOL/L (ref 3.6–5.5)
RBC # BLD AUTO: 3.9 M/UL (ref 4.2–5.4)
SODIUM SERPL-SCNC: 138 MMOL/L (ref 135–145)
WBC # BLD AUTO: 7.2 K/UL (ref 4.8–10.8)

## 2021-12-05 PROCEDURE — 36415 COLL VENOUS BLD VENIPUNCTURE: CPT

## 2021-12-05 PROCEDURE — 96372 THER/PROPH/DIAG INJ SC/IM: CPT

## 2021-12-05 PROCEDURE — C9113 INJ PANTOPRAZOLE SODIUM, VIA: HCPCS | Performed by: STUDENT IN AN ORGANIZED HEALTH CARE EDUCATION/TRAINING PROGRAM

## 2021-12-05 PROCEDURE — 83735 ASSAY OF MAGNESIUM: CPT

## 2021-12-05 PROCEDURE — 96375 TX/PRO/DX INJ NEW DRUG ADDON: CPT

## 2021-12-05 PROCEDURE — G0378 HOSPITAL OBSERVATION PER HR: HCPCS

## 2021-12-05 PROCEDURE — A9270 NON-COVERED ITEM OR SERVICE: HCPCS | Performed by: STUDENT IN AN ORGANIZED HEALTH CARE EDUCATION/TRAINING PROGRAM

## 2021-12-05 PROCEDURE — 700101 HCHG RX REV CODE 250: Performed by: STUDENT IN AN ORGANIZED HEALTH CARE EDUCATION/TRAINING PROGRAM

## 2021-12-05 PROCEDURE — 700102 HCHG RX REV CODE 250 W/ 637 OVERRIDE(OP): Performed by: STUDENT IN AN ORGANIZED HEALTH CARE EDUCATION/TRAINING PROGRAM

## 2021-12-05 PROCEDURE — 93010 ELECTROCARDIOGRAM REPORT: CPT | Performed by: INTERNAL MEDICINE

## 2021-12-05 PROCEDURE — 80048 BASIC METABOLIC PNL TOTAL CA: CPT

## 2021-12-05 PROCEDURE — 99225 PR SUBSEQUENT OBSERVATION CARE,LEVEL II: CPT | Performed by: STUDENT IN AN ORGANIZED HEALTH CARE EDUCATION/TRAINING PROGRAM

## 2021-12-05 PROCEDURE — 85025 COMPLETE CBC W/AUTO DIFF WBC: CPT

## 2021-12-05 PROCEDURE — 700111 HCHG RX REV CODE 636 W/ 250 OVERRIDE (IP): Performed by: STUDENT IN AN ORGANIZED HEALTH CARE EDUCATION/TRAINING PROGRAM

## 2021-12-05 PROCEDURE — 93005 ELECTROCARDIOGRAM TRACING: CPT | Performed by: STUDENT IN AN ORGANIZED HEALTH CARE EDUCATION/TRAINING PROGRAM

## 2021-12-05 RX ORDER — POTASSIUM CHLORIDE 20 MEQ/1
40 TABLET, EXTENDED RELEASE ORAL 3 TIMES DAILY
Status: COMPLETED | OUTPATIENT
Start: 2021-12-05 | End: 2021-12-05

## 2021-12-05 RX ORDER — PANTOPRAZOLE SODIUM 40 MG/10ML
40 INJECTION, POWDER, LYOPHILIZED, FOR SOLUTION INTRAVENOUS DAILY
Status: DISCONTINUED | OUTPATIENT
Start: 2021-12-05 | End: 2021-12-06 | Stop reason: HOSPADM

## 2021-12-05 RX ADMIN — HYDROMORPHONE HYDROCHLORIDE 1 MG: 1 INJECTION, SOLUTION INTRAMUSCULAR; INTRAVENOUS; SUBCUTANEOUS at 11:56

## 2021-12-05 RX ADMIN — POTASSIUM CHLORIDE 40 MEQ: 1500 TABLET, EXTENDED RELEASE ORAL at 07:43

## 2021-12-05 RX ADMIN — CITALOPRAM HYDROBROMIDE 10 MG: 10 TABLET ORAL at 05:15

## 2021-12-05 RX ADMIN — LOSARTAN POTASSIUM 50 MG: 50 TABLET, FILM COATED ORAL at 05:10

## 2021-12-05 RX ADMIN — OXYCODONE HYDROCHLORIDE 20 MG: 10 TABLET ORAL at 19:38

## 2021-12-05 RX ADMIN — ACETAMINOPHEN 650 MG: 325 TABLET, FILM COATED ORAL at 11:56

## 2021-12-05 RX ADMIN — OXYCODONE HYDROCHLORIDE 10 MG: 10 TABLET ORAL at 05:13

## 2021-12-05 RX ADMIN — CLOPIDOGREL BISULFATE 75 MG: 75 TABLET ORAL at 05:10

## 2021-12-05 RX ADMIN — PANTOPRAZOLE SODIUM 40 MG: 40 INJECTION, POWDER, FOR SOLUTION INTRAVENOUS at 05:11

## 2021-12-05 RX ADMIN — ASPIRIN 81 MG: 81 TABLET, COATED ORAL at 05:10

## 2021-12-05 RX ADMIN — GABAPENTIN 100 MG: 100 CAPSULE ORAL at 17:55

## 2021-12-05 RX ADMIN — ACETAMINOPHEN 650 MG: 325 TABLET, FILM COATED ORAL at 17:55

## 2021-12-05 RX ADMIN — LEVOTHYROXINE SODIUM 150 MCG: 0.15 TABLET ORAL at 05:10

## 2021-12-05 RX ADMIN — POTASSIUM CHLORIDE 40 MEQ: 1500 TABLET, EXTENDED RELEASE ORAL at 11:56

## 2021-12-05 RX ADMIN — METOPROLOL TARTRATE 12.5 MG: 25 TABLET, FILM COATED ORAL at 05:10

## 2021-12-05 RX ADMIN — LIDOCAINE HYDROCHLORIDE 15 ML: 20 SOLUTION OROPHARYNGEAL at 09:03

## 2021-12-05 RX ADMIN — ENOXAPARIN SODIUM 40 MG: 40 INJECTION SUBCUTANEOUS at 05:11

## 2021-12-05 RX ADMIN — GABAPENTIN 100 MG: 100 CAPSULE ORAL at 05:10

## 2021-12-05 RX ADMIN — LORAZEPAM 1 MG: 2 INJECTION INTRAMUSCULAR; INTRAVENOUS at 23:59

## 2021-12-05 RX ADMIN — LIDOCAINE HYDROCHLORIDE 15 ML: 20 SOLUTION OROPHARYNGEAL at 23:30

## 2021-12-05 RX ADMIN — GABAPENTIN 100 MG: 100 CAPSULE ORAL at 11:56

## 2021-12-05 RX ADMIN — LIDOCAINE 1 PATCH: 50 PATCH TOPICAL at 05:18

## 2021-12-05 ASSESSMENT — ENCOUNTER SYMPTOMS
DEPRESSION: 0
DOUBLE VISION: 0
SEIZURES: 0
LOSS OF CONSCIOUSNESS: 0
VOMITING: 1
ABDOMINAL PAIN: 1
HEADACHES: 0
SPUTUM PRODUCTION: 0
PALPITATIONS: 0
DIZZINESS: 0
FOCAL WEAKNESS: 0
SPEECH CHANGE: 0
SHORTNESS OF BREATH: 0
PHOTOPHOBIA: 0
EYE PAIN: 0
NAUSEA: 1
FEVER: 0
DIARRHEA: 0
COUGH: 0
MYALGIAS: 0
CHILLS: 0
SORE THROAT: 0
FALLS: 0
HEARTBURN: 0
NECK PAIN: 0
NERVOUS/ANXIOUS: 1
BLURRED VISION: 0
BACK PAIN: 0

## 2021-12-05 ASSESSMENT — LIFESTYLE VARIABLES
CONSUMPTION TOTAL: NEGATIVE
HAVE PEOPLE ANNOYED YOU BY CRITICIZING YOUR DRINKING: NO
ON A TYPICAL DAY WHEN YOU DRINK ALCOHOL HOW MANY DRINKS DO YOU HAVE: 0
HOW MANY TIMES IN THE PAST YEAR HAVE YOU HAD 5 OR MORE DRINKS IN A DAY: 0
TOTAL SCORE: 0
SUBSTANCE_ABUSE: 0
AVERAGE NUMBER OF DAYS PER WEEK YOU HAVE A DRINK CONTAINING ALCOHOL: 0
EVER HAD A DRINK FIRST THING IN THE MORNING TO STEADY YOUR NERVES TO GET RID OF A HANGOVER: NO
EVER FELT BAD OR GUILTY ABOUT YOUR DRINKING: NO
HAVE YOU EVER FELT YOU SHOULD CUT DOWN ON YOUR DRINKING: NO
ALCOHOL_USE: NO

## 2021-12-05 ASSESSMENT — PAIN DESCRIPTION - PAIN TYPE
TYPE: ACUTE PAIN
TYPE: ACUTE PAIN

## 2021-12-05 ASSESSMENT — PATIENT HEALTH QUESTIONNAIRE - PHQ9
2. FEELING DOWN, DEPRESSED, IRRITABLE, OR HOPELESS: NOT AT ALL
1. LITTLE INTEREST OR PLEASURE IN DOING THINGS: NOT AT ALL
SUM OF ALL RESPONSES TO PHQ9 QUESTIONS 1 AND 2: 0
1. LITTLE INTEREST OR PLEASURE IN DOING THINGS: NOT AT ALL
SUM OF ALL RESPONSES TO PHQ9 QUESTIONS 1 AND 2: 0
2. FEELING DOWN, DEPRESSED, IRRITABLE, OR HOPELESS: NOT AT ALL

## 2021-12-05 NOTE — PROGRESS NOTES
Pt arrived via transport to [facility]  VS WNL   Pt c/o pain in abdomen and back down to 6/10 after pain meds given in ER   Assessment completed, pt educated on NOC plan of care, all pt needs addressed at this time   Bed in low position, call light within reach

## 2021-12-05 NOTE — PROGRESS NOTES
"Pt c/o pain 8/10 radiating to chest under left breast  Pt states she takes Nitro at home for this type of pain   Pt also states she goes to bed each night with Nitro under her tongue \"in case\"   MD notified of chest pain and pts home nitro use   MD notified of increasing pain and nausea and orders requested   "

## 2021-12-05 NOTE — ED NOTES
Patient resting calmly on gurney. Requesting more pain medication for 9-10/10 abdominal and back pain. Dr. Coronado notified.

## 2021-12-05 NOTE — CARE PLAN
The patient is Stable - Low risk of patient condition declining or worsening    Shift Goals  Clinical Goals: pain and nausea control  Patient Goals: pain relief n/v relief  Family Goals: N/A    Progress made toward(s) clinical / shift goals:  Yes    Patient is not progressing towards the following goals:None      Problem: Pain - Standard  Goal: Alleviation of pain or a reduction in pain to the patient’s comfort goal  Outcome: Met     Problem: Knowledge Deficit - Standard  Goal: Patient and family/care givers will demonstrate understanding of plan of care, disease process/condition, diagnostic tests and medications  Outcome: Met

## 2021-12-05 NOTE — ED PROVIDER NOTES
"ED Provider Note    CHIEF COMPLAINT  Chief Complaint   Patient presents with   • Abdominal Pain     Starting last night with multiple recent bouts and ER visits for the same, seen in this ER 12/1 with improving gastric perforation seen on CT with unremarkable labs and EKG per MD. Pt states N/V started again last night, denies blood in vomit or stool. States pain is generalized across abdomen.    • N/V     Returning last night and continuing overnight and at triage. Denies blood in vomit.        HPI  Davie Montejo is a 86 y.o. female here for evaluation of abdominal pain.  The patient states that she has been having multiple bouts of abdominal pain, that is \"everywhere.\"  She has been seen multiple times for the same, and has had a history of gastric perforation.  Patient states that she has had some vomiting today, and some return of her pain.  Started around 2 PM.  She has no chest pain or shortness with, no headache.  She states the pain does wrap around to her back.  It does not go through to her back.  She is not taking anything for pain prior to arrival, and nothing seems alleviate exacerbate her symptoms.      ROS  See HPI for further details, o/w negative.     PAST MEDICAL HISTORY   has a past medical history of CAD (coronary artery disease), Fibromyalgia, and Heart attack (HCC) (2008).    SOCIAL HISTORY  Social History     Tobacco Use   • Smoking status: Former Smoker   • Smokeless tobacco: Never Used   Vaping Use   • Vaping Use: Never used   Substance and Sexual Activity   • Alcohol use: Never   • Drug use: Not Currently   • Sexual activity: Not on file       Family History  No bleeding disorders    SURGICAL HISTORY   has a past surgical history that includes coronary artery bypas, 4 (2008) and coronary artery bypass, 1 (2008).    CURRENT MEDICATIONS  Home Medications     Reviewed by Randal Ornelas R.N. (Registered Nurse) on 12/04/21 at 1259  Med List Status: <None>   Medication Last Dose " "Status   acetaminophen (TYLENOL) 325 MG Tab  Active   citalopram (CELEXA) 20 MG Tab  Active   clopidogrel (PLAVIX) 75 MG Tab  Active   folic acid (FOLVITE) 1 MG Tab  Active   gabapentin (NEURONTIN) 100 MG Cap  Active   HYDROcodone-acetaminophen (NORCO) 5-325 MG Tab per tablet  Active   levothyroxine (SYNTHROID) 150 MCG Tab  Active   lidocaine (LIDODERM) 5 % Patch  Active   losartan (COZAAR) 50 MG Tab  Active   metoprolol tartrate (LOPRESSOR) 25 MG Tab  Active   nitroglycerin (NITROSTAT) 0.4 MG SL Tab  Active   ondansetron (ZOFRAN ODT) 4 MG TABLET DISPERSIBLE  Active                ALLERGIES  Allergies   Allergen Reactions   • Codeine Unspecified     Unknown reaction     • Lisinopril Unspecified     Unknown reaction   • Penicillin G Unspecified     Unknown reaction     • Pregabalin Unspecified     Lyrica affects patients vision.     • Simvastatin Unspecified     Unknown reaction     • Sulfa Drugs Unspecified     Patient states \"I can't remember what this does to me\" but recalls and allergy.        REVIEW OF SYSTEMS  See HPI for further details. Review of systems as above, otherwise all other systems are negative.     PHYSICAL EXAM  Constitutional: Well developed, well nourished.  Mild acute distress.  HEENT: Normocephalic, atraumatic. Posterior pharynx clear and moist.  Eyes:  EOMI. Normal sclera.  Neck: Supple, Full range of motion, nontender.  Chest/Pulmonary: clear to ausculation. Symmetrical expansion.   Cardio: Regular rate and rhythm with no murmur.   Abdomen: Soft, diffuse tenderness, no peritoneal signs. No guarding. No palpable masses.  Musculoskeletal: No deformity, no edema, neurovascular intact.   Neuro: Clear speech, appropriate, cooperative, cranial nerves II-XII grossly intact.  Psych: Normal mood and affect      Results for orders placed or performed during the hospital encounter of 12/04/21   CBC WITH DIFFERENTIAL   Result Value Ref Range    WBC 8.0 4.8 - 10.8 K/uL    RBC 4.91 4.20 - 5.40 M/uL    " Hemoglobin 13.5 12.0 - 16.0 g/dL    Hematocrit 41.3 37.0 - 47.0 %    MCV 84.1 81.4 - 97.8 fL    MCH 27.5 27.0 - 33.0 pg    MCHC 32.7 (L) 33.6 - 35.0 g/dL    RDW 41.6 35.9 - 50.0 fL    Platelet Count 373 164 - 446 K/uL    MPV 10.8 9.0 - 12.9 fL    Neutrophils-Polys 77.80 (H) 44.00 - 72.00 %    Lymphocytes 14.10 (L) 22.00 - 41.00 %    Monocytes 6.90 0.00 - 13.40 %    Eosinophils 0.10 0.00 - 6.90 %    Basophils 0.50 0.00 - 1.80 %    Immature Granulocytes 0.60 0.00 - 0.90 %    Nucleated RBC 0.00 /100 WBC    Neutrophils (Absolute) 6.21 2.00 - 7.15 K/uL    Lymphs (Absolute) 1.13 1.00 - 4.80 K/uL    Monos (Absolute) 0.55 0.00 - 0.85 K/uL    Eos (Absolute) 0.01 0.00 - 0.51 K/uL    Baso (Absolute) 0.04 0.00 - 0.12 K/uL    Immature Granulocytes (abs) 0.05 0.00 - 0.11 K/uL    NRBC (Absolute) 0.00 K/uL   COMP METABOLIC PANEL   Result Value Ref Range    Sodium 136 135 - 145 mmol/L    Potassium 3.2 (L) 3.6 - 5.5 mmol/L    Chloride 98 96 - 112 mmol/L    Co2 21 20 - 33 mmol/L    Anion Gap 17.0 (H) 7.0 - 16.0    Glucose 156 (H) 65 - 99 mg/dL    Bun 16 8 - 22 mg/dL    Creatinine 0.75 0.50 - 1.40 mg/dL    Calcium 10.0 8.5 - 10.5 mg/dL    AST(SGOT) 23 12 - 45 U/L    ALT(SGPT) 14 2 - 50 U/L    Alkaline Phosphatase 135 (H) 30 - 99 U/L    Total Bilirubin 0.5 0.1 - 1.5 mg/dL    Albumin 4.8 3.2 - 4.9 g/dL    Total Protein 8.8 (H) 6.0 - 8.2 g/dL    Globulin 4.0 (H) 1.9 - 3.5 g/dL    A-G Ratio 1.2 g/dL   LIPASE   Result Value Ref Range    Lipase 43 11 - 82 U/L   URINALYSIS    Specimen: Urine   Result Value Ref Range    Color Yellow     Character Clear     Specific Gravity 1.010 <1.035    Ph 7.0 5.0 - 8.0    Glucose Negative Negative mg/dL    Ketones Trace (A) Negative mg/dL    Protein 30 (A) Negative mg/dL    Bilirubin Negative Negative    Urobilinogen, Urine 0.2 Negative    Nitrite Negative Negative    Leukocyte Esterase Negative Negative    Occult Blood Negative Negative    Micro Urine Req Microscopic    ESTIMATED GFR   Result Value Ref  Range    GFR If African American >60 >60 mL/min/1.73 m 2    GFR If Non African American >60 >60 mL/min/1.73 m 2   URINE MICROSCOPIC (W/UA)   Result Value Ref Range    WBC 0-2 /hpf    RBC 0-2 /hpf    Bacteria Negative None /hpf    Epithelial Cells Negative /hpf    Hyaline Cast 0-2 /lpf   EKG   Result Value Ref Range    Report       [facility] Emergency Dept.    Test Date:  2021  Pt Name:    JUNIOR CHIU            Department: ER  MRN:        [id number]                      Room:       [facility]  Gender:     Female                       Technician: [id number]  :        [id number]                   Requested By:ER TRIAGE PROTOCOL  Order #:    [id number]                    Reading MD:    Measurements  Intervals                                Axis  Rate:       126                          P:          -68  NM:         192                          QRS:        -58  QRSD:       112                          T:          95  QT:         348  QTc:        504    Interpretive Statements  SINUS OR ECTOPIC ATRIAL TACHYCARDIA  PROBABLE LEFT ATRIAL ABNORMALITY  LEFT ANTERIOR FASCICULAR BLOCK  LEFT VENTRICULAR HYPERTROPHY  ANTERIOR Q WAVES, POSSIBLY DUE TO LVH  Compared to ECG 2021 14:35:21  Sinus tachycardia no longer present  ST (T wave) deviation no longer present  Atrial premature complex(es) no longe r present  T-wave abnormality no longer present       Ekg; sinus tach at 126. No st elevation, no st depression qtc 504.  Compared to ekg from 21      CT-ABDOMEN-PELVIS WITH   Final Result      1.  Stable contained perforation of the pylorus. This is similar in appearance to prior exam.      2.  Diverticulosis.      3.  Atherosclerotic vascular calcification with mild infrarenal aortic ectasia.            PROCEDURES     MEDICAL RECORD  I have reviewed patient's medical record and pertinent results are listed.    COURSE & MEDICAL DECISION MAKING  I have reviewed any medical record information, laboratory  studies and radiographic results as noted above.    8:08 PM  The pt started vomiting again. She will need to be admitted for intractable vomiting.     HYDRATION: Based on the patient's presentation of Dehydration the patient was given IV fluids. IV Hydration was used because oral hydration was not adequate alone. Upon recheck following hydration, the patient was improved.        FINAL IMPRESSION  1. Abdominal pain, unspecified abdominal location     2. Intractable vomiting with nausea, unspecified vomiting type           Electronically signed by: Mike Coronado D.O., 12/4/2021 4:41 PM

## 2021-12-05 NOTE — PROGRESS NOTES
Pt sleeping comfortably   Still awaiting MD orders   Pt in no distress, easily aroused   Bed in low position, call light within reach, bed alarm on due to fall risk status

## 2021-12-05 NOTE — PROGRESS NOTES
Hospital Medicine Daily Progress Note    Date of Service  12/5/2021    Chief Complaint  Davie Montejo is a 86 y.o. female presented 12/4/2021 with abdominal pain and N/V    Hospital Course  Davie Montejo is a 86 y.o. F with known perforated gastric ulcer, HTN, HLD, hypothyroidism and history of multiple admissions for chronic pains who presented 12/4/2021 with abdominal pain with intractable vomiting and nausea.  Patient presented to the ED complaining of severe abdominal pain starting 2 PM on the day of admission that is generalized, cramping, associated with nausea and vomiting she denies any dyspnea, headache or vision changes, dysuria or diarrhea. Pain does not radiate to the back, no aggravating or alleviating factors.  Pain was admitted in October where she was found to have a contained perforated pyloric ulcer.  She has been doing relatively well since discharge, she unfortunately has had multiple trips to the ED for continued abdominal pain/chest pain but treated in the ED and discharged each time.      Upon presentation, she is nontoxic, she is initially hypertensive, tachycardic and tachypneic, afebrile, no leukocytosis or anemia, CHEM panel with hypokalemia 3.2, normal liver and renal function.  Normal lipase.  UA noninfectious.  CT abdomen pelvis with stable contained perforation of the pylorus similar to prior exam.  Patient was treated for her abdominal pain nausea, subsequently for the hospitalist for admission.    Interval Problem Update  12/5  Vitals reviewed; afebrile.  The rest of the vitals within normal parameters. BP improved   Pain scale reported - 2-6 in abdomen     At bedside, reports of still having pain in her abdomen and concerns about going home. She wondered why she kept having occasional abd pain. I explained her about stable contained perforated pylorus that eventually will need an EGD with GI outpatient.     Current plan for supportive care discussed.     Labs  and imagings reviewed  K 3.2 (supplemented)  No leukocytosis     I have personally seen and examined the patient at bedside. I discussed the plan of care with patient, bedside RN, charge RN and .    Consultants/Specialty  N/A    Code Status  Full Code    Disposition  Patient is not medically cleared.   Anticipate discharge to to home with close outpatient follow-up.  I have placed the appropriate orders for post-discharge needs.    Review of Systems  Review of Systems   Constitutional: Positive for malaise/fatigue. Negative for chills and fever.   HENT: Negative for sore throat.    Eyes: Negative for blurred vision and double vision.   Respiratory: Negative for cough, sputum production and shortness of breath.    Cardiovascular: Negative for chest pain, palpitations and leg swelling.   Gastrointestinal: Positive for abdominal pain, nausea and vomiting. Negative for heartburn.   Genitourinary: Negative for dysuria, frequency and urgency.   Musculoskeletal: Negative for myalgias and neck pain.   Neurological: Negative for dizziness, focal weakness and headaches.   Psychiatric/Behavioral: Negative for depression.        Physical Exam  Temp:  [36.1 °C (96.9 °F)-36.7 °C (98.1 °F)] 36.1 °C (96.9 °F)  Pulse:  [] 67  Resp:  [12-48] 16  BP: ()/(39-88) 85/46  SpO2:  [89 %-98 %] 94 %    Physical Exam  Vitals and nursing note reviewed.   Constitutional:       General: She is not in acute distress.     Appearance: Normal appearance. She is not ill-appearing.   HENT:      Head: Normocephalic and atraumatic.      Mouth/Throat:      Mouth: Mucous membranes are moist.      Pharynx: Oropharynx is clear.   Eyes:      General: No scleral icterus.     Conjunctiva/sclera: Conjunctivae normal.   Cardiovascular:      Rate and Rhythm: Normal rate and regular rhythm.      Pulses: Normal pulses.      Heart sounds: Normal heart sounds.   Pulmonary:      Effort: Pulmonary effort is normal. No respiratory distress.       Breath sounds: Normal breath sounds. No wheezing.   Abdominal:      General: Bowel sounds are normal. There is no distension.      Palpations: Abdomen is soft.      Tenderness: There is abdominal tenderness (mild).   Musculoskeletal:         General: No swelling or tenderness. Normal range of motion.   Skin:     General: Skin is warm and dry.      Capillary Refill: Capillary refill takes less than 2 seconds.   Neurological:      General: No focal deficit present.      Mental Status: She is alert and oriented to person, place, and time. Mental status is at baseline.   Psychiatric:         Mood and Affect: Mood normal.         Fluids    Intake/Output Summary (Last 24 hours) at 12/5/2021 1512  Last data filed at 12/5/2021 0456  Gross per 24 hour   Intake --   Output 400 ml   Net -400 ml       Laboratory  Recent Labs     12/04/21  1333 12/05/21  0431   WBC 8.0 7.2   RBC 4.91 3.90*   HEMOGLOBIN 13.5 10.9*   HEMATOCRIT 41.3 33.2*   MCV 84.1 85.1   MCH 27.5 27.9   MCHC 32.7* 32.8*   RDW 41.6 42.8   PLATELETCT 373 315   MPV 10.8 11.0     Recent Labs     12/04/21  1333 12/05/21  0431   SODIUM 136 138   POTASSIUM 3.2* 3.2*   CHLORIDE 98 104   CO2 21 22   GLUCOSE 156* 106*   BUN 16 14   CREATININE 0.75 0.79   CALCIUM 10.0 8.8                   Imaging  CT-ABDOMEN-PELVIS WITH   Final Result      1.  Stable contained perforation of the pylorus. This is similar in appearance to prior exam.      2.  Diverticulosis.      3.  Atherosclerotic vascular calcification with mild infrarenal aortic ectasia.           Assessment/Plan  * Intractable nausea and vomiting- (present on admission)  Assessment & Plan  Prolonged QTC, will need to avoid QTC prolonging agents, Ativan and Benadryl have been helping with nausea.  Pain control    12/5: tolerated some oral intake but still c/o pain       Chronic gastric ulcer with perforation (HCC)- (present on admission)  Assessment & Plan  Noted on CT October 21, was managed conservatively.  Has been  having normal formed brown bowel movements per patient, had been tolerating p.o. intake up until 2 days prior to presentation when she began having nausea and vomiting.  No reports of bleeding at home, hemoglobin up to 13.5.  Pain control.  Monitor for signs of bleeding.    Will need an outpatient GI follow up    Hypothyroidism- (present on admission)  Assessment & Plan  Continue Synthroid    Essential hypertension- (present on admission)  Assessment & Plan  Continue metoprolol, losartan      Depressive disorder- (present on admission)  Assessment & Plan  Continue Celexa    Coronary artery disease involving coronary bypass graft of native heart- (present on admission)  Assessment & Plan  Continue Plavix, beta-blocker, aspirin  Appeared statin intolerance in the past   Nitro as needed       VTE prophylaxis: enoxaparin ppx    I have performed a physical exam and reviewed and updated ROS and Plan today (12/5/2021).

## 2021-12-05 NOTE — ED NOTES
Pt to ED35 c/o abd pain pt connected to monitor-- tachycardic in 130s no P waves noted on monitor-- EKG ordered. No history of Afib or SVT per pt

## 2021-12-05 NOTE — ED NOTES
Med rec updated and complete. Allergies reviewed. Pt confirmed  Name and date of birth.  Pt denies antibiotic use in last 30 days. Pt took no medications today.    Home pharmacy [facility] [phone]

## 2021-12-05 NOTE — PROGRESS NOTES
4 Eyes Skin Assessment Completed by ESTEFANY Baker and ESTEFANY Killian.    Head WDL  Ears WDL  Nose WDL  Mouth WDL  Neck WDL  Breast/Chest WDL  Shoulder Blades WDL  Spine WDL  (R) Arm/Elbow/Hand Redness and Blanching  (L) Arm/Elbow/Hand WDL  Abdomen WDL  Groin WDL  Scrotum/Coccyx/Buttocks WDL  (R) Leg WDL  (L) Leg WDL  (R) Heel/Foot/Toe WDL  (L) Heel/Foot/Toe WDL          Devices In Places Pulse Ox      Interventions In Place N/A    Possible Skin Injury No    Pictures Uploaded Into Epic N/A  Wound Consult Placed N/A  RN Wound Prevention Protocol Ordered No

## 2021-12-05 NOTE — ASSESSMENT & PLAN NOTE
Noted on CT October 21, was managed conservatively.  Has been having normal formed brown bowel movements per patient, had been tolerating p.o. intake up until 2 days prior to presentation when she began having nausea and vomiting.  No reports of bleeding at home, hemoglobin up to 13.5.  Pain control.  Monitor for signs of bleeding.    Will need an outpatient GI follow up

## 2021-12-05 NOTE — PROGRESS NOTES
Pt resting comfortably in bed   No c/o pain at this time   Pt states GI cocktail resolved previous episode of chest pain

## 2021-12-05 NOTE — H&P
Hospital Medicine History & Physical Note    Date of Service  12/4/2021    Primary Care Physician  No primary care provider on file.    Consultants  None    Code Status  Full Code    Chief Complaint  Chief Complaint   Patient presents with   • Abdominal Pain     Starting last night with multiple recent bouts and ER visits for the same, seen in this ER 12/1 with improving gastric perforation seen on CT with unremarkable labs and EKG per MD. Pt states N/V started again last night, denies blood in vomit or stool. States pain is generalized across abdomen.    • N/V     Returning last night and continuing overnight and at triage. Denies blood in vomit.        History of Presenting Illness  Davie Montejo is a 86 y.o. female with known perforated gastric ulcer, hypertension, hyperlipidemia, hypothyroidism, history of multiple admissions for chronic pains who presented 12/4/2021 with abdominal pain with intractable vomiting and nausea.  Patient presented to the ED complaining of severe abdominal pain starting 2 PM on the day of admission that is generalized, cramping, associated with nausea and vomiting she denies any dyspnea, headache or vision changes, dysuria or diarrhea.  Pain does not radiate to the back, no aggravating or alleviating factors.  Pain was admitted in October where she was found to have a contained perforated pyloric ulcer.  She has been doing relatively well since discharge, she unfortunately has had multiple trips to the ED for continued abdominal pain/chest pain but treated in the ED and discharged each time.  Today presents she is nontoxic, she is initially hypertensive tachycardic and tachypneic, afebrile, no leukocytosis or anemia, CHEM panel with hypokalemia 3.2 normal liver and renal function.  Normal lipase.  UA noninfectious.  CT abdomen pelvis with stable contained perforation of the pylorus similar to prior exam.  Patient was treated for her abdominal pain nausea, subsequently for  "the hospitalist for admission    I discussed the plan of care with patient, bedside RN and pharmacy.    Review of Systems  Review of Systems   Constitutional: Negative for chills and fever.   HENT: Negative for congestion and nosebleeds.    Eyes: Negative for photophobia and pain.   Respiratory: Negative for cough and shortness of breath.    Cardiovascular: Positive for chest pain. Negative for palpitations.   Gastrointestinal: Positive for abdominal pain, nausea and vomiting. Negative for diarrhea.   Genitourinary: Negative for dysuria and urgency.   Musculoskeletal: Negative for back pain and falls.   Neurological: Negative for speech change, focal weakness, seizures and loss of consciousness.   Psychiatric/Behavioral: Negative for substance abuse. The patient is nervous/anxious.        Past Medical History   has a past medical history of CAD (coronary artery disease), Fibromyalgia, and Heart attack (HCC) (2008).    Surgical History   has a past surgical history that includes coronary artery bypas, 4 (2008) and coronary artery bypass, 1 (2008).     Family History  family history is not on file.   Family history reviewed with patient. There is no family history that is pertinent to the chief complaint.     Social History   reports that she has quit smoking. She has never used smokeless tobacco. She reports previous drug use. She reports that she does not drink alcohol.    Allergies  Allergies   Allergen Reactions   • Codeine Unspecified     Unknown reaction     • Lisinopril Unspecified     Unknown reaction   • Penicillin G Unspecified     Unknown reaction     • Pregabalin Unspecified     Lyrica affects patients vision.     • Simvastatin Unspecified     Unknown reaction     • Sulfa Drugs Unspecified     Patient states \"I can't remember what this does to me\" but recalls and allergy.        Medications  Prior to Admission Medications   Prescriptions Last Dose Informant Patient Reported? Taking?   citalopram (CELEXA) " 20 MG Tab 12/3/2021 at 1000 Patient No No   Sig: Take 0.5 Tablets by mouth every day.   clopidogrel (PLAVIX) 75 MG Tab 12/3/2021 at 1000 Patient No No   Sig: Take 1 Tablet by mouth every day.   gabapentin (NEURONTIN) 100 MG Cap 12/3/2021 at 1000 Patient No No   Sig: Take 1 Capsule by mouth 3 times a day.   levothyroxine (SYNTHROID) 150 MCG Tab 12/3/2021 at 1000 Patient No No   Sig: Take 1 Tablet by mouth every morning on an empty stomach.   lidocaine (LIDODERM) 5 % Patch 12/4/2021 at 1000 Patient No No   Sig: Place 1 Patch on the skin every 24 hours.   Patient taking differently: Place 1 Patch on the skin every 24 hours. Apply to lower back   losartan (COZAAR) 50 MG Tab 12/3/2021 at 1000 Patient Yes No   Sig: Take 50 mg by mouth every day.   metoprolol tartrate (LOPRESSOR) 25 MG Tab 12/3/2021 at 1000 Patient No No   Sig: Take 0.5 Tablets by mouth 2 times a day.      Facility-Administered Medications: None       Physical Exam  Temp:  [36.2 °C (97.2 °F)-36.3 °C (97.4 °F)] 36.3 °C (97.4 °F)  Pulse:  [] 93  Resp:  [12-48] 16  BP: (104-179)/() 169/81  SpO2:  [92 %-100 %] 94 %  Blood Pressure : (!) 169/81   Temperature: 36.3 °C (97.4 °F)   Pulse: 93   Respiration: 16   Pulse Oximetry: 94 %       Physical Exam  Vitals and nursing note reviewed. Exam conducted with a chaperone present.   Constitutional:       Appearance: She is normal weight. She is not toxic-appearing.      Comments: 86-year-old female appears stated age, alert and conversant able to speak full sentences, uncomfortable secondary abdominal pain   HENT:      Head: Normocephalic and atraumatic.      Nose: Nose normal. No rhinorrhea.      Mouth/Throat:      Mouth: Mucous membranes are dry.      Pharynx: Oropharynx is clear.   Eyes:      Extraocular Movements: Extraocular movements intact.      Conjunctiva/sclera: Conjunctivae normal.      Pupils: Pupils are equal, round, and reactive to light.   Cardiovascular:      Rate and Rhythm: Normal rate and  regular rhythm.      Pulses: Normal pulses.   Pulmonary:      Effort: Pulmonary effort is normal. No respiratory distress.      Breath sounds: No wheezing or rhonchi.   Abdominal:      General: Bowel sounds are normal.      Palpations: Abdomen is soft.      Tenderness: There is abdominal tenderness (generalized, mild). There is no guarding or rebound.   Musculoskeletal:         General: Normal range of motion.      Cervical back: Normal range of motion and neck supple.      Right lower leg: No edema.      Left lower leg: No edema.   Skin:     General: Skin is warm and dry.      Capillary Refill: Capillary refill takes less than 2 seconds.   Neurological:      General: No focal deficit present.      Mental Status: She is alert. Mental status is at baseline.      Cranial Nerves: No cranial nerve deficit.      Sensory: No sensory deficit.      Motor: No weakness.   Psychiatric:         Mood and Affect: Mood normal.         Behavior: Behavior normal.         Thought Content: Thought content normal.         Judgment: Judgment normal.         Laboratory:  Recent Labs     12/04/21  1333   WBC 8.0   RBC 4.91   HEMOGLOBIN 13.5   HEMATOCRIT 41.3   MCV 84.1   MCH 27.5   MCHC 32.7*   RDW 41.6   PLATELETCT 373   MPV 10.8     Recent Labs     12/04/21  1333   SODIUM 136   POTASSIUM 3.2*   CHLORIDE 98   CO2 21   GLUCOSE 156*   BUN 16   CREATININE 0.75   CALCIUM 10.0     Recent Labs     12/04/21  1333   ALTSGPT 14   ASTSGOT 23   ALKPHOSPHAT 135*   TBILIRUBIN 0.5   LIPASE 43   GLUCOSE 156*         No results for input(s): NTPROBNP in the last 72 hours.      Recent Labs     12/04/21  1333   TROPONINT 14       Imaging:  CT-ABDOMEN-PELVIS WITH   Final Result      1.  Stable contained perforation of the pylorus. This is similar in appearance to prior exam.      2.  Diverticulosis.      3.  Atherosclerotic vascular calcification with mild infrarenal aortic ectasia.          X-Ray:  No film today  EKG:  I have personally reviewed the  images and compared with prior images. and My impression is: Sinus tachycardia, left atrial abnormality, LAFB, LVH    Assessment/Plan:  I anticipate this patient is appropriate for observation status at this time.    * Intractable nausea and vomiting- (present on admission)  Assessment & Plan  Prolonged QTC, will need to avoid QTC prolonging agents, Ativan and Benadryl have been helping with nausea.  Pain control    Chronic gastric ulcer with perforation (HCC)- (present on admission)  Assessment & Plan  Noted on CT October 21, was managed conservatively.  Has been having normal formed brown bowel movements per patient, had been tolerating p.o. intake up until 2 days prior to presentation when she began having nausea and vomiting.  No reports of bleeding at home, hemoglobin up to 13.5.  Pain control.  Monitor for signs of bleeding.      Hypothyroidism- (present on admission)  Assessment & Plan  Continue Synthroid    Essential hypertension- (present on admission)  Assessment & Plan  Continue metoprolol, losartan      Depressive disorder- (present on admission)  Assessment & Plan  Continue Celexa    Coronary artery disease involving coronary bypass graft of native heart- (present on admission)  Assessment & Plan  Continue Plavix, beta-blocker, aspirin  Unclear why patient not on statin.  Nitro as needed        VTE prophylaxis: SCDs/TEDs and enoxaparin ppx

## 2021-12-05 NOTE — ASSESSMENT & PLAN NOTE
Noted on CT October 21, was managed conservatively.  Has been having normal formed brown bowel movements per patient, had been tolerating p.o. intake up until 2 days prior to presentation when she began having nausea and vomiting.

## 2021-12-05 NOTE — ASSESSMENT & PLAN NOTE
Prolonged QTC, will need to avoid QTC prolonging agents, Ativan and Benadryl have been helping with nausea.  Pain control    12/5: tolerated some oral intake but still c/o pain

## 2021-12-05 NOTE — ED NOTES
Patient ambulatory to bathroom with standby assist. Dry heaving. Requesting medication for 10/10 abdominal and back pain and for nausea. Dr. Coronado notified.

## 2021-12-06 ENCOUNTER — PHARMACY VISIT (OUTPATIENT)
Dept: PHARMACY | Facility: MEDICAL CENTER | Age: 86
End: 2021-12-06
Payer: COMMERCIAL

## 2021-12-06 ENCOUNTER — PATIENT OUTREACH (OUTPATIENT)
Dept: HEALTH INFORMATION MANAGEMENT | Facility: OTHER | Age: 86
End: 2021-12-06

## 2021-12-06 VITALS
RESPIRATION RATE: 18 BRPM | WEIGHT: 148.37 LBS | SYSTOLIC BLOOD PRESSURE: 107 MMHG | TEMPERATURE: 96.8 F | HEIGHT: 69 IN | DIASTOLIC BLOOD PRESSURE: 55 MMHG | BODY MASS INDEX: 21.98 KG/M2 | HEART RATE: 55 BPM | OXYGEN SATURATION: 97 %

## 2021-12-06 LAB
ANION GAP SERPL CALC-SCNC: 8 MMOL/L (ref 7–16)
BACTERIA BLD CULT: NORMAL
BACTERIA BLD CULT: NORMAL
BUN SERPL-MCNC: 14 MG/DL (ref 8–22)
CALCIUM SERPL-MCNC: 8.7 MG/DL (ref 8.5–10.5)
CHLORIDE SERPL-SCNC: 106 MMOL/L (ref 96–112)
CO2 SERPL-SCNC: 23 MMOL/L (ref 20–33)
CREAT SERPL-MCNC: 0.96 MG/DL (ref 0.5–1.4)
ERYTHROCYTE [DISTWIDTH] IN BLOOD BY AUTOMATED COUNT: 43.1 FL (ref 35.9–50)
GLUCOSE SERPL-MCNC: 86 MG/DL (ref 65–99)
HCT VFR BLD AUTO: 29.4 % (ref 37–47)
HGB BLD-MCNC: 9.5 G/DL (ref 12–16)
HGB RETIC QN AUTO: 29.5 PG/CELL (ref 29–35)
IMM RETICS NFR: 15.3 % (ref 9.3–17.4)
IRON SATN MFR SERPL: 18 % (ref 15–55)
IRON SERPL-MCNC: 60 UG/DL (ref 40–170)
MCH RBC QN AUTO: 27.9 PG (ref 27–33)
MCHC RBC AUTO-ENTMCNC: 32.3 G/DL (ref 33.6–35)
MCV RBC AUTO: 86.5 FL (ref 81.4–97.8)
PLATELET # BLD AUTO: 238 K/UL (ref 164–446)
PMV BLD AUTO: 11 FL (ref 9–12.9)
POTASSIUM SERPL-SCNC: 4.3 MMOL/L (ref 3.6–5.5)
RBC # BLD AUTO: 3.4 M/UL (ref 4.2–5.4)
RETICS # AUTO: 0.06 M/UL (ref 0.04–0.06)
RETICS/RBC NFR: 1.5 % (ref 0.8–2.1)
SIGNIFICANT IND 70042: NORMAL
SIGNIFICANT IND 70042: NORMAL
SITE SITE: NORMAL
SITE SITE: NORMAL
SODIUM SERPL-SCNC: 137 MMOL/L (ref 135–145)
SOURCE SOURCE: NORMAL
SOURCE SOURCE: NORMAL
TIBC SERPL-MCNC: 325 UG/DL (ref 250–450)
UIBC SERPL-MCNC: 265 UG/DL (ref 110–370)
WBC # BLD AUTO: 5.6 K/UL (ref 4.8–10.8)

## 2021-12-06 PROCEDURE — 96372 THER/PROPH/DIAG INJ SC/IM: CPT

## 2021-12-06 PROCEDURE — 85046 RETICYTE/HGB CONCENTRATE: CPT

## 2021-12-06 PROCEDURE — G0378 HOSPITAL OBSERVATION PER HR: HCPCS

## 2021-12-06 PROCEDURE — A9270 NON-COVERED ITEM OR SERVICE: HCPCS | Performed by: STUDENT IN AN ORGANIZED HEALTH CARE EDUCATION/TRAINING PROGRAM

## 2021-12-06 PROCEDURE — RXMED WILLOW AMBULATORY MEDICATION CHARGE: Performed by: STUDENT IN AN ORGANIZED HEALTH CARE EDUCATION/TRAINING PROGRAM

## 2021-12-06 PROCEDURE — 99217 PR OBSERVATION CARE DISCHARGE: CPT | Performed by: STUDENT IN AN ORGANIZED HEALTH CARE EDUCATION/TRAINING PROGRAM

## 2021-12-06 PROCEDURE — 80048 BASIC METABOLIC PNL TOTAL CA: CPT

## 2021-12-06 PROCEDURE — 83540 ASSAY OF IRON: CPT

## 2021-12-06 PROCEDURE — 85027 COMPLETE CBC AUTOMATED: CPT

## 2021-12-06 PROCEDURE — C9113 INJ PANTOPRAZOLE SODIUM, VIA: HCPCS | Performed by: STUDENT IN AN ORGANIZED HEALTH CARE EDUCATION/TRAINING PROGRAM

## 2021-12-06 PROCEDURE — 83550 IRON BINDING TEST: CPT

## 2021-12-06 PROCEDURE — 36415 COLL VENOUS BLD VENIPUNCTURE: CPT

## 2021-12-06 PROCEDURE — 700102 HCHG RX REV CODE 250 W/ 637 OVERRIDE(OP): Performed by: STUDENT IN AN ORGANIZED HEALTH CARE EDUCATION/TRAINING PROGRAM

## 2021-12-06 PROCEDURE — 700101 HCHG RX REV CODE 250: Performed by: STUDENT IN AN ORGANIZED HEALTH CARE EDUCATION/TRAINING PROGRAM

## 2021-12-06 PROCEDURE — 96376 TX/PRO/DX INJ SAME DRUG ADON: CPT

## 2021-12-06 PROCEDURE — 700111 HCHG RX REV CODE 636 W/ 250 OVERRIDE (IP): Performed by: STUDENT IN AN ORGANIZED HEALTH CARE EDUCATION/TRAINING PROGRAM

## 2021-12-06 RX ORDER — OXYCODONE HYDROCHLORIDE 5 MG/1
5 TABLET ORAL EVERY 8 HOURS PRN
Qty: 15 TABLET | Refills: 0 | Status: SHIPPED | OUTPATIENT
Start: 2021-12-06 | End: 2021-12-11

## 2021-12-06 RX ORDER — ONDANSETRON 4 MG/1
4 TABLET, FILM COATED ORAL EVERY 6 HOURS PRN
Qty: 20 TABLET | Refills: 0 | Status: SHIPPED | OUTPATIENT
Start: 2021-12-06 | End: 2021-12-11

## 2021-12-06 RX ORDER — OMEPRAZOLE 20 MG/1
20 CAPSULE, DELAYED RELEASE ORAL DAILY
Qty: 30 CAPSULE | Refills: 0 | Status: SHIPPED | OUTPATIENT
Start: 2021-12-06 | End: 2022-01-04 | Stop reason: SDUPTHER

## 2021-12-06 RX ADMIN — LEVOTHYROXINE SODIUM 150 MCG: 0.15 TABLET ORAL at 05:18

## 2021-12-06 RX ADMIN — CITALOPRAM HYDROBROMIDE 10 MG: 10 TABLET ORAL at 05:20

## 2021-12-06 RX ADMIN — ACETAMINOPHEN 650 MG: 325 TABLET, FILM COATED ORAL at 05:19

## 2021-12-06 RX ADMIN — CLOPIDOGREL BISULFATE 75 MG: 75 TABLET ORAL at 05:18

## 2021-12-06 RX ADMIN — ENOXAPARIN SODIUM 40 MG: 40 INJECTION SUBCUTANEOUS at 05:18

## 2021-12-06 RX ADMIN — LIDOCAINE 1 PATCH: 50 PATCH TOPICAL at 05:18

## 2021-12-06 RX ADMIN — GABAPENTIN 100 MG: 100 CAPSULE ORAL at 05:18

## 2021-12-06 RX ADMIN — METOPROLOL TARTRATE 12.5 MG: 25 TABLET, FILM COATED ORAL at 05:19

## 2021-12-06 RX ADMIN — OXYCODONE HYDROCHLORIDE 10 MG: 10 TABLET ORAL at 09:15

## 2021-12-06 RX ADMIN — DOCUSATE SODIUM 50 MG AND SENNOSIDES 8.6 MG 2 TABLET: 8.6; 5 TABLET, FILM COATED ORAL at 05:18

## 2021-12-06 RX ADMIN — PANTOPRAZOLE SODIUM 40 MG: 40 INJECTION, POWDER, FOR SOLUTION INTRAVENOUS at 05:18

## 2021-12-06 RX ADMIN — ASPIRIN 81 MG: 81 TABLET, COATED ORAL at 05:19

## 2021-12-06 NOTE — CARE PLAN
The patient is Stable - Low risk of patient condition declining or worsening    Shift Goals  Clinical Goals: pain and nausea control  Patient Goals: pain relief n/v relief  Family Goals: N/A    Progress made toward(s) clinical / shift goals:  patient has had improvement in her nausea and vomiting.     Patient is not progressing towards the following goals:

## 2021-12-06 NOTE — PROGRESS NOTES
Seen by MD this morning, plan for DC. Meds to bed. Will schedule for GI consultant follow up and PCP.

## 2021-12-06 NOTE — DISCHARGE PLANNING
Meds-to-Beds: Discharge prescription orders listed below delivered to patient's bedside. RN Aliyah notified. Patient counseled.     Current Outpatient Medications   Medication Sig Dispense Refill   • omeprazole (PRILOSEC) 20 MG delayed-release capsule Take 1 Capsule by mouth every day. 30 Capsule 0   • oxyCODONE immediate-release (ROXICODONE) 5 MG Tab Take 1 Tablet by mouth every 8 hours as needed for Severe Pain for up to 5 days. 15 Tablet 0   • ondansetron (ZOFRAN) 4 MG Tab tablet Take 1 Tablet by mouth every 6 hours as needed for Nausea/Vomiting for up to 5 days. 20 Tablet 0      Matilda Altamirano, PharmD

## 2021-12-06 NOTE — DISCHARGE INSTRUCTIONS
Abdominal Pain (Nonspecific)  Your exam might not show the exact reason you have abdominal pain. Since there are many different causes of abdominal pain, another checkup and more tests may be needed. It is very important to follow up for lasting (persistent) or worsening symptoms. A possible cause of abdominal pain in any person who still has his or her appendix is acute appendicitis. Appendicitis is often hard to diagnose. Normal blood tests, urine tests, ultrasound, and CT scans do not completely rule out early appendicitis or other causes of abdominal pain. Sometimes, only the changes that happen over time will allow appendicitis and other causes of abdominal pain to be determined. Other potential problems that may require surgery may also take time to become more apparent. Because of this, it is important that you follow all of the instructions below.  HOME CARE INSTRUCTIONS   · Rest as much as possible.   · Do not eat solid food until your pain is gone.   · While adults or children have pain: A diet of water, weak decaffeinated tea, broth or bouillon, gelatin, oral rehydration solutions (ORS), frozen ice pops, or ice chips may be helpful.   · When pain is gone in adults or children: Start a light diet (dry toast, crackers, applesauce, or white rice). Increase the diet slowly as long as it does not bother you. Eat no dairy products (including cheese and eggs) and no spicy, fatty, fried, or high-fiber foods.   · Use no alcohol, caffeine, or cigarettes.   · Take your regular medicines unless your caregiver told you not to.   · Take any prescribed medicine as directed.   · Only take over-the-counter or prescription medicines for pain, discomfort, or fever as directed by your caregiver. Do not give aspirin to children.   If your caregiver has given you a follow-up appointment, it is very important to keep that appointment. Not keeping the appointment could result in a permanent injury and/or lasting (chronic) pain  and/or disability. If there is any problem keeping the appointment, you must call to reschedule.   SEEK IMMEDIATE MEDICAL CARE IF:   · Your pain is not gone in 24 hours.   · Your pain becomes worse, changes location, or feels different.   · You or your child has an oral temperature above 102° F (38.9° C), not controlled by medicine.   · Your baby is older than 3 months with a rectal temperature of 102° F (38.9° C) or higher.   · Your baby is 3 months old or younger with a rectal temperature of 100.4° F (38° C) or higher.   · You have shaking chills.   · You keep throwing up (vomiting) or cannot drink liquids.   · There is blood in your vomit or you see blood in your bowel movements.   · Your bowel movements become dark or black.   · You have frequent bowel movements.   · Your bowel movements stop (become blocked) or you cannot pass gas.   · You have bloody, frequent, or painful urination.   · You have yellow discoloration in the skin or whites of the eyes.   · Your stomach becomes bloated or bigger.   · You have dizziness or fainting.   · You have chest or back pain.   MAKE SURE YOU:   · Understand these instructions.   · Will watch your condition.   · Will get help right away if you are not doing well or get worse.   Document Released: 12/18/2006 Document Revised: 03/11/2013 Document Reviewed: 11/15/2010  [employer]® Patient Information ©2013 [employer].    Discharge Instructions    Discharged to home by car with self. Discharged via walking, hospital escort: Yes.  Special equipment needed: Not Applicable    Be sure to schedule a follow-up appointment with your primary care doctor or any specialists as instructed.     Discharge Plan:   Diet Plan: Discussed  Activity Level: Discussed  Confirmed Follow up Appointment: Patient to Call and Schedule Appointment  Confirmed Symptoms Management: Discussed  Medication Reconciliation Updated: Yes  Influenza Vaccine Indication: Patient Refuses    I understand that a diet low  in cholesterol, fat, and sodium is recommended for good health. Unless I have been given specific instructions below for another diet, I accept this instruction as my diet prescription.   Other diet: Regular Diet    Special Instructions: None    · Is patient discharged on Warfarin / Coumadin?   No     Depression / Suicide Risk    As you are discharged from this [facility] facility, it is important to learn how to keep safe from harming yourself.    Recognize the warning signs:  · Abrupt changes in personality, positive or negative- including increase in energy   · Giving away possessions  · Change in eating patterns- significant weight changes-  positive or negative  · Change in sleeping patterns- unable to sleep or sleeping all the time   · Unwillingness or inability to communicate  · Depression  · Unusual sadness, discouragement and loneliness  · Talk of wanting to die  · Neglect of personal appearance   · Rebelliousness- reckless behavior  · Withdrawal from people/activities they love  · Confusion- inability to concentrate     If you or a loved one observes any of these behaviors or has concerns about self-harm, here's what you can do:  · Talk about it- your feelings and reasons for harming yourself  · Remove any means that you might use to hurt yourself (examples: pills, rope, extension cords, firearm)  · Get professional help from the community (Mental Health, Substance Abuse, psychological counseling)  · Do not be alone:Call your Safe Contact- someone whom you trust who will be there for you.  · Call your local CRISIS HOTLINE 429-2780 or 519-647-7570  · Call your local Children's Mobile Crisis Response Team [place] (997) 962-3482 or www.[employer]  · Call the toll free National Suicide Prevention Hotlines   · National Suicide Prevention Lifeline 825-331-KTIV (4974)  · National Hope Line Network 800-SUICIDE (314-2864)

## 2021-12-06 NOTE — DISCHARGE SUMMARY
Discharge Summary    CHIEF COMPLAINT ON ADMISSION  Chief Complaint   Patient presents with   • Abdominal Pain     Starting last night with multiple recent bouts and ER visits for the same, seen in this ER 12/1 with improving gastric perforation seen on CT with unremarkable labs and EKG per MD. Pt states N/V started again last night, denies blood in vomit or stool. States pain is generalized across abdomen.    • N/V     Returning last night and continuing overnight and at triage. Denies blood in vomit.        Reason for Admission  Intractable N/V     Admission Date  12/4/2021    CODE STATUS  FULL    HPI & HOSPITAL COURSE  Davie Montejo is a 86 y.o. F with known perforated gastric ulcer, HTN, HLD, hypothyroidism and history of multiple admissions for chronic pains who presented 12/4/2021 with abdominal pain with intractable vomiting and nausea.  Patient presented to the ED complaining of severe abdominal pain starting 2 PM on the day of admission that is generalized, cramping, associated with nausea and vomiting she denies any dyspnea, headache or vision changes, dysuria or diarrhea. Pain does not radiate to the back, no aggravating or alleviating factors.  Pain was admitted in October where she was found to have a contained perforated pyloric ulcer.  She has been doing relatively well since discharge, she unfortunately has had multiple trips to the ED for continued abdominal pain/chest pain but treated in the ED and discharged each time.       Upon presentation, she is nontoxic, she is initially hypertensive, tachycardic and tachypneic, afebrile, no leukocytosis or anemia, CHEM panel with hypokalemia 3.2, normal liver and renal function.  Normal lipase.  UA noninfectious.  CT abdomen pelvis with stable contained perforation of the pylorus similar to prior exam.  Patient was treated for her abdominal pain nausea, subsequently for the hospitalist for admission.    12/5 in CDU: BP improved. Pain scale  reported - 2-6 in abdomen. She does tolerated some oral diet but reported still having pain and nausea. She claimed vomiting (none noted). K supplemented.       12/6: stable vitals. Improved pain. Anemia noted but normal iron and retic count - likely a component of hemodilution (monitor as outpatient; no melena or hematochezia). At this point, plan to DC discussed with a close outpatient follow up. Advised to follow up with her community  for her home situation and an important of following up outpatient with GI and PCP.      Therefore, she is discharged in fair and stable condition to home with close outpatient follow-up.    The patient recovered much more quickly than anticipated on admission.    Discharge Date  12/6/2021    FOLLOW UP ITEMS POST DISCHARGE  N/A    DISCHARGE DIAGNOSES  Principal Problem:    Intractable nausea and vomiting POA: Yes  Active Problems:    Coronary artery disease involving coronary bypass graft of native heart POA: Yes      Overview: CABG June 2009    Depressive disorder POA: Yes    Essential hypertension POA: Yes    Hypothyroidism POA: Yes    Chronic gastric ulcer with perforation (HCC) POA: Yes  Resolved Problems:    * No resolved hospital problems. *      FOLLOW UP  [address] [address] [phone]  Call  Please call [facility] to schedule a hospital follow up appointment and to establish with a primary care provider. Thank you.    [facility] - [address] [address] [phone]  Call  Please call [facility] to schedule a follow up appointment. Thank you.      MEDICATIONS ON DISCHARGE     Medication List      START taking these medications      Instructions   omeprazole 20 MG delayed-release capsule  Commonly known as: PRILOSEC   Take 1 Capsule by mouth every day.  Dose: 20 mg     ondansetron 4 MG Tabs tablet  Commonly known as: Zofran   Take 1  "Tablet by mouth every 6 hours as needed for Nausea/Vomiting for up to 5 days.  Dose: 4 mg     oxyCODONE immediate-release 5 MG Tabs  Commonly known as: ROXICODONE   Take 1 Tablet by mouth every 8 hours as needed for Severe Pain for up to 5 days.  Dose: 5 mg        CHANGE how you take these medications      Instructions   lidocaine 5 % Ptch  What changed: additional instructions  Commonly known as: LIDODERM   Place 1 Patch on the skin every 24 hours.  Dose: 1 Patch        CONTINUE taking these medications      Instructions   citalopram 20 MG Tabs  Commonly known as: CeleXA   Take 0.5 Tablets by mouth every day.  Dose: 10 mg     clopidogrel 75 MG Tabs  Commonly known as: Plavix   Take 1 Tablet by mouth every day.  Dose: 75 mg     gabapentin 100 MG Caps  Commonly known as: NEURONTIN   Take 1 Capsule by mouth 3 times a day.  Dose: 100 mg     levothyroxine 150 MCG Tabs  Commonly known as: SYNTHROID   Take 1 Tablet by mouth every morning on an empty stomach.  Dose: 150 mcg     losartan 50 MG Tabs  Commonly known as: COZAAR   Take 50 mg by mouth every day.  Dose: 50 mg     metoprolol tartrate 25 MG Tabs  Commonly known as: LOPRESSOR   Take 0.5 Tablets by mouth 2 times a day.  Dose: 12.5 mg            Allergies  Allergies   Allergen Reactions   • Codeine Unspecified     Unknown reaction     • Lisinopril Unspecified     Unknown reaction   • Penicillin G Unspecified     Unknown reaction     • Pregabalin Unspecified     Lyrica affects patients vision.     • Simvastatin Unspecified     Unknown reaction     • Sulfa Drugs Unspecified     Patient states \"I can't remember what this does to me\" but recalls and allergy.        DIET  Cardiac    ACTIVITY  As tolerated.  Weight bearing as tolerated    CONSULTATIONS  N/A    PROCEDURES  N/A    LABORATORY  Lab Results   Component Value Date    SODIUM 137 12/06/2021    POTASSIUM 4.3 12/06/2021    CHLORIDE 106 12/06/2021    CO2 23 12/06/2021    GLUCOSE 86 12/06/2021    BUN 14 12/06/2021    " CREATININE 0.96 12/06/2021        Lab Results   Component Value Date    WBC 5.6 12/06/2021    HEMOGLOBIN 9.5 (L) 12/06/2021    HEMATOCRIT 29.4 (L) 12/06/2021    PLATELETCT 238 12/06/2021        Total time of the discharge process exceeds 36 minutes.

## 2021-12-07 ENCOUNTER — PATIENT OUTREACH (OUTPATIENT)
Dept: HEALTH INFORMATION MANAGEMENT | Facility: OTHER | Age: 86
End: 2021-12-07

## 2021-12-07 NOTE — PROGRESS NOTES
Community [facility] Worker Simone attempted to reach the patient after discharge from the hospital to follow up. Patient did not answer and CHW left a detailed voicemail requesting a call back.     [facility] Emily Nichols will attempt again at a later date.

## 2021-12-15 SDOH — ECONOMIC STABILITY: FOOD INSECURITY: WITHIN THE PAST 12 MONTHS, YOU WORRIED THAT YOUR FOOD WOULD RUN OUT BEFORE YOU GOT MONEY TO BUY MORE.: NEVER TRUE

## 2021-12-15 SDOH — ECONOMIC STABILITY: FOOD INSECURITY: WITHIN THE PAST 12 MONTHS, THE FOOD YOU BOUGHT JUST DIDN'T LAST AND YOU DIDN'T HAVE MONEY TO GET MORE.: NEVER TRUE

## 2021-12-15 ASSESSMENT — SOCIAL DETERMINANTS OF HEALTH (SDOH): HOW HARD IS IT FOR YOU TO PAY FOR THE VERY BASICS LIKE FOOD, HOUSING, MEDICAL CARE, AND HEATING?: NOT VERY HARD

## 2021-12-15 NOTE — PROGRESS NOTES
CHW Simone introduced community care management and geriatric specialty care to the patient. Patient accepted [facility] and requested CHW schedule a new patient appointment with [facility]. Patient has a PCP appointment scheduled for 1/18/22. Patient was eagar to get off the phone but CHW was able to provide information to Access to Healthcare. Patient reported no other barriers. CHW called Rosalie with [facility] and placed a referral.     Community Health Worker Intake  • Social determinates of health intake complete.   • Identified barriers to none.   • Contact information provided to Davie Montejo   • Has PCP appointment scheduled for 1/18  • Outpatient assessment completed  · Did the patient receive medications post discharge: Yes    Plan: CHW will remove the patient from [facility] caseload as the patient is moving on to other programs.

## 2021-12-29 PROBLEM — G89.29 OTHER CHRONIC PAIN: Status: ACTIVE | Noted: 2021-12-29

## 2022-01-10 ENCOUNTER — HOSPITAL ENCOUNTER (EMERGENCY)
Facility: MEDICAL CENTER | Age: 87
End: 2022-01-10
Attending: EMERGENCY MEDICINE
Payer: MEDICARE

## 2022-01-10 ENCOUNTER — APPOINTMENT (OUTPATIENT)
Dept: RADIOLOGY | Facility: MEDICAL CENTER | Age: 87
End: 2022-01-10
Attending: EMERGENCY MEDICINE
Payer: MEDICARE

## 2022-01-10 VITALS
TEMPERATURE: 97.5 F | SYSTOLIC BLOOD PRESSURE: 147 MMHG | DIASTOLIC BLOOD PRESSURE: 65 MMHG | HEART RATE: 75 BPM | OXYGEN SATURATION: 97 % | RESPIRATION RATE: 18 BRPM

## 2022-01-10 DIAGNOSIS — G89.29 OTHER CHRONIC PAIN: ICD-10-CM

## 2022-01-10 DIAGNOSIS — R10.84 GENERALIZED ABDOMINAL PAIN: ICD-10-CM

## 2022-01-10 DIAGNOSIS — R07.9 CHEST PAIN, UNSPECIFIED TYPE: ICD-10-CM

## 2022-01-10 DIAGNOSIS — Z87.39 HISTORY OF FIBROMYALGIA: ICD-10-CM

## 2022-01-10 LAB
ALBUMIN SERPL BCP-MCNC: 4.2 G/DL (ref 3.2–4.9)
ALBUMIN/GLOB SERPL: 1.3 G/DL
ALP SERPL-CCNC: 119 U/L (ref 30–99)
ALT SERPL-CCNC: 16 U/L (ref 2–50)
ANION GAP SERPL CALC-SCNC: 16 MMOL/L (ref 7–16)
APPEARANCE UR: CLEAR
AST SERPL-CCNC: 17 U/L (ref 12–45)
BACTERIA #/AREA URNS HPF: ABNORMAL /HPF
BASOPHILS # BLD AUTO: 0.6 % (ref 0–1.8)
BASOPHILS # BLD: 0.06 K/UL (ref 0–0.12)
BILIRUB SERPL-MCNC: 0.3 MG/DL (ref 0.1–1.5)
BILIRUB UR QL STRIP.AUTO: NEGATIVE
BUN SERPL-MCNC: 23 MG/DL (ref 8–22)
CALCIUM SERPL-MCNC: 9.7 MG/DL (ref 8.5–10.5)
CHLORIDE SERPL-SCNC: 100 MMOL/L (ref 96–112)
CO2 SERPL-SCNC: 17 MMOL/L (ref 20–33)
COLOR UR: YELLOW
CREAT SERPL-MCNC: 0.81 MG/DL (ref 0.5–1.4)
EKG IMPRESSION: NORMAL
EOSINOPHIL # BLD AUTO: 0.16 K/UL (ref 0–0.51)
EOSINOPHIL NFR BLD: 1.7 % (ref 0–6.9)
EPI CELLS #/AREA URNS HPF: NEGATIVE /HPF
ERYTHROCYTE [DISTWIDTH] IN BLOOD BY AUTOMATED COUNT: 46.9 FL (ref 35.9–50)
GLOBULIN SER CALC-MCNC: 3.2 G/DL (ref 1.9–3.5)
GLUCOSE SERPL-MCNC: 109 MG/DL (ref 65–99)
GLUCOSE UR STRIP.AUTO-MCNC: NEGATIVE MG/DL
HCT VFR BLD AUTO: 32.7 % (ref 37–47)
HGB BLD-MCNC: 10.6 G/DL (ref 12–16)
HYALINE CASTS #/AREA URNS LPF: ABNORMAL /LPF
IMM GRANULOCYTES # BLD AUTO: 0.05 K/UL (ref 0–0.11)
IMM GRANULOCYTES NFR BLD AUTO: 0.5 % (ref 0–0.9)
KETONES UR STRIP.AUTO-MCNC: NEGATIVE MG/DL
LEUKOCYTE ESTERASE UR QL STRIP.AUTO: ABNORMAL
LIPASE SERPL-CCNC: 48 U/L (ref 11–82)
LYMPHOCYTES # BLD AUTO: 2.08 K/UL (ref 1–4.8)
LYMPHOCYTES NFR BLD: 21.7 % (ref 22–41)
MCH RBC QN AUTO: 27 PG (ref 27–33)
MCHC RBC AUTO-ENTMCNC: 32.4 G/DL (ref 33.6–35)
MCV RBC AUTO: 83.4 FL (ref 81.4–97.8)
MICRO URNS: ABNORMAL
MONOCYTES # BLD AUTO: 0.96 K/UL (ref 0–0.85)
MONOCYTES NFR BLD AUTO: 10 % (ref 0–13.4)
NEUTROPHILS # BLD AUTO: 6.29 K/UL (ref 2–7.15)
NEUTROPHILS NFR BLD: 65.5 % (ref 44–72)
NITRITE UR QL STRIP.AUTO: NEGATIVE
NRBC # BLD AUTO: 0 K/UL
NRBC BLD-RTO: 0 /100 WBC
PH UR STRIP.AUTO: 6.5 [PH] (ref 5–8)
PLATELET # BLD AUTO: 283 K/UL (ref 164–446)
PMV BLD AUTO: 9.9 FL (ref 9–12.9)
POTASSIUM SERPL-SCNC: 4.2 MMOL/L (ref 3.6–5.5)
PROT SERPL-MCNC: 7.4 G/DL (ref 6–8.2)
PROT UR QL STRIP: NEGATIVE MG/DL
RBC # BLD AUTO: 3.92 M/UL (ref 4.2–5.4)
RBC # URNS HPF: ABNORMAL /HPF
RBC UR QL AUTO: NEGATIVE
SODIUM SERPL-SCNC: 133 MMOL/L (ref 135–145)
SP GR UR STRIP.AUTO: 1
TROPONIN T SERPL-MCNC: 11 NG/L (ref 6–19)
UROBILINOGEN UR STRIP.AUTO-MCNC: 0.2 MG/DL
WBC # BLD AUTO: 9.6 K/UL (ref 4.8–10.8)
WBC #/AREA URNS HPF: ABNORMAL /HPF

## 2022-01-10 PROCEDURE — 71045 X-RAY EXAM CHEST 1 VIEW: CPT

## 2022-01-10 PROCEDURE — 700111 HCHG RX REV CODE 636 W/ 250 OVERRIDE (IP): Performed by: EMERGENCY MEDICINE

## 2022-01-10 PROCEDURE — 93005 ELECTROCARDIOGRAM TRACING: CPT | Performed by: EMERGENCY MEDICINE

## 2022-01-10 PROCEDURE — 81001 URINALYSIS AUTO W/SCOPE: CPT

## 2022-01-10 PROCEDURE — 96374 THER/PROPH/DIAG INJ IV PUSH: CPT

## 2022-01-10 PROCEDURE — 99285 EMERGENCY DEPT VISIT HI MDM: CPT

## 2022-01-10 PROCEDURE — 93005 ELECTROCARDIOGRAM TRACING: CPT

## 2022-01-10 PROCEDURE — 85025 COMPLETE CBC W/AUTO DIFF WBC: CPT

## 2022-01-10 PROCEDURE — 84484 ASSAY OF TROPONIN QUANT: CPT

## 2022-01-10 PROCEDURE — 700102 HCHG RX REV CODE 250 W/ 637 OVERRIDE(OP): Performed by: EMERGENCY MEDICINE

## 2022-01-10 PROCEDURE — 80053 COMPREHEN METABOLIC PANEL: CPT

## 2022-01-10 PROCEDURE — 83690 ASSAY OF LIPASE: CPT

## 2022-01-10 PROCEDURE — 700105 HCHG RX REV CODE 258: Performed by: EMERGENCY MEDICINE

## 2022-01-10 PROCEDURE — A9270 NON-COVERED ITEM OR SERVICE: HCPCS | Performed by: EMERGENCY MEDICINE

## 2022-01-10 RX ORDER — SODIUM CHLORIDE 9 MG/ML
500 INJECTION, SOLUTION INTRAVENOUS ONCE
Status: COMPLETED | OUTPATIENT
Start: 2022-01-10 | End: 2022-01-10

## 2022-01-10 RX ORDER — HYDROCODONE BITARTRATE AND ACETAMINOPHEN 5; 325 MG/1; MG/1
1 TABLET ORAL ONCE
Status: COMPLETED | OUTPATIENT
Start: 2022-01-10 | End: 2022-01-10

## 2022-01-10 RX ADMIN — FENTANYL CITRATE 25 MCG: 50 INJECTION, SOLUTION INTRAMUSCULAR; INTRAVENOUS at 06:04

## 2022-01-10 RX ADMIN — SODIUM CHLORIDE 500 ML: 9 INJECTION, SOLUTION INTRAVENOUS at 05:16

## 2022-01-10 RX ADMIN — HYDROCODONE BITARTRATE AND ACETAMINOPHEN 1 TABLET: 5; 325 TABLET ORAL at 05:08

## 2022-01-10 ASSESSMENT — PAIN DESCRIPTION - PAIN TYPE: TYPE: ACUTE PAIN

## 2022-01-10 NOTE — DISCHARGE INSTRUCTIONS
Follow-up with primary care this week for re-evaluation, medication management and referral to Pain Management as indicated.    Continue home medications as previously indicated.    Diet and activity per routine; however, recommend avoiding and foods known to trigger your reflux and abdominal discomfort.    Return to the Emergency Department for intractable pain, chest pain, abdominal pain, vomiting, black or bloody stools, fever, syncope or other new concerns.

## 2022-01-10 NOTE — ED PROVIDER NOTES
ED Provider Note    CHIEF COMPLAINT  Chief Complaint   Patient presents with   • Chest Pain     epigastric pain radiating up sternum into shoulders   • Back Pain     shoulder blades up to neck   • Abdominal Pain     diffuse pain, last BM today       HPI  Davie Montejo is a 86 y.o. female who presents by ambulance from home with total body pain.  Patient describes history of fibromyalgia, states her pain is been poorly controlled since switching physicians 1 year ago and no longer being given narcotics.  Total body pain, worse over the last few days.  This includes chest pain, abdominal pain and back pain.  No palpitations, shortness of breath or syncope.  No nausea, vomiting or diarrhea.  No black or bloody stools.  Normal bowel movement earlier today.    REVIEW OF SYSTEMS  See HPI for further details. All other systems are negative.     PAST MEDICAL HISTORY   has a past medical history of CAD (coronary artery disease), Fibromyalgia, and Heart attack (HCC) (2008).    SOCIAL HISTORY  Social History     Tobacco Use   • Smoking status: Former Smoker   • Smokeless tobacco: Never Used   Vaping Use   • Vaping Use: Never used   Substance and Sexual Activity   • Alcohol use: Never   • Drug use: Not Currently   • Sexual activity: Not Currently       SURGICAL HISTORY   has a past surgical history that includes coronary artery bypas, 4 (2008) and coronary artery bypass, 1 (2008).    CURRENT MEDICATIONS  Home Medications     Reviewed by Sunshine Howe R.N. (Registered Nurse) on 01/10/22 at 0341  Med List Status: Not Addressed   Medication Last Dose Status   citalopram (CELEXA) 20 MG Tab  Active   clopidogrel (PLAVIX) 75 MG Tab  Active   gabapentin (NEURONTIN) 100 MG Cap  Active   levothyroxine (SYNTHROID) 150 MCG Tab  Active   lidocaine (LIDODERM) 5 % Patch  Active   losartan (COZAAR) 50 MG Tab  Active   metoprolol tartrate (LOPRESSOR) 25 MG Tab  Active   omeprazole (PRILOSEC) 20 MG delayed-release capsule   "Active                ALLERGIES  Allergies   Allergen Reactions   • Codeine Unspecified     Unknown reaction     • Lisinopril Unspecified     Unknown reaction   • Penicillin G Unspecified     Unknown reaction     • Pregabalin Unspecified     Lyrica affects patients vision.     • Simvastatin Unspecified     Unknown reaction     • Sulfa Drugs Unspecified     Patient states \"I can't remember what this does to me\" but recalls and allergy.        PHYSICAL EXAM  VITAL SIGNS: /65   Pulse 75   Temp 36.4 °C (97.5 °F) (Temporal)   Resp 18   LMP  (LMP Unknown)   SpO2 97%   Pulse ox interpretation: I interpret this pulse ox as normal.  Constitutional: Alert in no apparent distress.  HENT: Normocephalic, atraumatic. Bilateral external ears normal, Nose normal.  Eyes: Pupils are equal and reactive, Conjunctiva normal.   Neck: Normal range of motion, Supple.   Lymphatic: No lymphadenopathy noted.   Cardiovascular: Regular rate and rhythm, no murmurs. Distal pulses intact.    Thorax & Lungs: Normal breath sounds.  No wheezing/rales/ronchi. No increased work of breathing  Abdomen: Soft, non-distended, non-tender to palpation. No palpable or pulsatile masses. No peritoneal signs. No CVA tenderness.   Skin: Warm, Dry, No erythema, No rash.   Musculoskeletal: Good range of motion in all major joints. No major deformities noted.   Neurologic: Alert x4.  Speech clear and cohesive.  Moves 4 extremity spontaneously.  Psychiatric: Odd affect.  Cooperative.      DIAGNOSTIC STUDIES / PROCEDURES    LABS  Results for orders placed or performed during the hospital encounter of 01/10/22   CBC with Differential   Result Value Ref Range    WBC 9.6 4.8 - 10.8 K/uL    RBC 3.92 (L) 4.20 - 5.40 M/uL    Hemoglobin 10.6 (L) 12.0 - 16.0 g/dL    Hematocrit 32.7 (L) 37.0 - 47.0 %    MCV 83.4 81.4 - 97.8 fL    MCH 27.0 27.0 - 33.0 pg    MCHC 32.4 (L) 33.6 - 35.0 g/dL    RDW 46.9 35.9 - 50.0 fL    Platelet Count 283 164 - 446 K/uL    MPV 9.9 9.0 - " 12.9 fL    Neutrophils-Polys 65.50 44.00 - 72.00 %    Lymphocytes 21.70 (L) 22.00 - 41.00 %    Monocytes 10.00 0.00 - 13.40 %    Eosinophils 1.70 0.00 - 6.90 %    Basophils 0.60 0.00 - 1.80 %    Immature Granulocytes 0.50 0.00 - 0.90 %    Nucleated RBC 0.00 /100 WBC    Neutrophils (Absolute) 6.29 2.00 - 7.15 K/uL    Lymphs (Absolute) 2.08 1.00 - 4.80 K/uL    Monos (Absolute) 0.96 (H) 0.00 - 0.85 K/uL    Eos (Absolute) 0.16 0.00 - 0.51 K/uL    Baso (Absolute) 0.06 0.00 - 0.12 K/uL    Immature Granulocytes (abs) 0.05 0.00 - 0.11 K/uL    NRBC (Absolute) 0.00 K/uL   Complete Metabolic Panel (CMP)   Result Value Ref Range    Sodium 133 (L) 135 - 145 mmol/L    Potassium 4.2 3.6 - 5.5 mmol/L    Chloride 100 96 - 112 mmol/L    Co2 17 (L) 20 - 33 mmol/L    Anion Gap 16.0 7.0 - 16.0    Glucose 109 (H) 65 - 99 mg/dL    Bun 23 (H) 8 - 22 mg/dL    Creatinine 0.81 0.50 - 1.40 mg/dL    Calcium 9.7 8.5 - 10.5 mg/dL    AST(SGOT) 17 12 - 45 U/L    ALT(SGPT) 16 2 - 50 U/L    Alkaline Phosphatase 119 (H) 30 - 99 U/L    Total Bilirubin 0.3 0.1 - 1.5 mg/dL    Albumin 4.2 3.2 - 4.9 g/dL    Total Protein 7.4 6.0 - 8.2 g/dL    Globulin 3.2 1.9 - 3.5 g/dL    A-G Ratio 1.3 g/dL   Troponin   Result Value Ref Range    Troponin T 11 6 - 19 ng/L   LIPASE   Result Value Ref Range    Lipase 48 11 - 82 U/L   URINALYSIS    Specimen: Urine   Result Value Ref Range    Color Yellow     Character Clear     Specific Gravity 1.005 <1.035    Ph 6.5 5.0 - 8.0    Glucose Negative Negative mg/dL    Ketones Negative Negative mg/dL    Protein Negative Negative mg/dL    Bilirubin Negative Negative    Urobilinogen, Urine 0.2 Negative    Nitrite Negative Negative    Leukocyte Esterase Small (A) Negative    Occult Blood Negative Negative    Micro Urine Req Microscopic    ESTIMATED GFR   Result Value Ref Range    GFR If African American >60 >60 mL/min/1.73 m 2    GFR If Non African American >60 >60 mL/min/1.73 m 2   URINE MICROSCOPIC (W/UA)   Result Value Ref Range     WBC 10-20 (A) /hpf    RBC 0-2 /hpf    Bacteria Few (A) None /hpf    Epithelial Cells Negative /hpf    Hyaline Cast 0-2 /lpf     RADIOLOGY  DX-CHEST-PORTABLE (1 VIEW)   Final Result         1. No acute cardiopulmonary abnormalities are identified.          COURSE & MEDICAL DECISION MAKING  Nursing notes and vital signs were reviewed. (See chart for details)  The patients records were reviewed, history was obtained from the patient;     Medical record review: Multiple previous evaluations for chronic pain, abdominal pain, chest pain.  She does have history of CAD status post bypass.  History of peptic ulcer.    ED evaluation for total body pain that also includes chest pain abdominal pain today is unrevealing.  She is most concerned about pain control, and discharge with narcotics that she feels she should be on these at home, but her primary care physician will not prescribe these.  Pain is poorly controlled after single Norco, but more so after fentanyl.  She ambulates independently to the bathroom without difficulty.  Labs really quite unrevealing.  No leukocytosis.  Chronic but even improved, normocytic anemia.  Mild clinical dehydration, patient admits to decreased appetite and activity lately.  CO2 is 17 but renal function is preserved.  She did get small IV fluid bolus and is tolerating oral fluids before discharge.  Troponin is negative after days of chest pain.  Chest x-ray is unremarkable.    Exacerbation of chronic pain that includes chest pain and abdominal pain.  No evidence for acute disease process today.  Pain is controlled with Norco and fentanyl.  She strongly encouraged to follow-up with primary care and for pain management referral.    Patient is stable for discharge at this time, anticipatory guidance provided, continue home medications, close follow-up is encouraged, and strict ED return instructions have been detailed. Patient is agreeable to the disposition and plan.    Patient's blood  pressure was elevated in the emergency department, and has been referred to primary care for close monitoring.    FINAL IMPRESSION  (G89.29) Other chronic pain  (Z87.39) History of fibromyalgia  (R07.9) Chest pain, unspecified type  (R10.84) Generalized abdominal pain      Electronically signed by: Laura Long D.O., 1/10/2022 1:24 PM      This dictation was created using voice recognition software. The accuracy of the dictation is limited to the abilities of the software. I expect there may be some errors of grammar and possibly content. The nursing notes were reviewed and certain aspects of this information were incorporated into this note.

## 2022-01-10 NOTE — ED NOTES
Pt given discharge instructions/cab voucher given to pt/ home care instructions explained, pt verbalized understanding of instructions given/pt understands the importance of follow up, pt to [facility]an.

## 2022-01-10 NOTE — ED TRIAGE NOTES
Chief Complaint   Patient presents with   • Chest Pain     epigastric pain radiating up sternum into shoulders   • Back Pain     shoulder blades up to neck   • Abdominal Pain     diffuse pain, last BM today     BIB EMS from home for above complaints, pt unable to remember what symptom presented first. Pt took 3 nitro at home prior to EMS arrival, sts slight relieve but still uncomfortable.    Denies SOB, N/V/D.    /54   Pulse 97   Temp 37.2 °C (98.9 °F) (Temporal)   Resp 18   LMP  (LMP Unknown)   SpO2 96% Comment: Room air

## 2022-01-20 ENCOUNTER — APPOINTMENT (OUTPATIENT)
Dept: RADIOLOGY | Facility: MEDICAL CENTER | Age: 87
End: 2022-01-20
Attending: EMERGENCY MEDICINE
Payer: MEDICARE

## 2022-01-20 ENCOUNTER — HOSPITAL ENCOUNTER (EMERGENCY)
Facility: MEDICAL CENTER | Age: 87
End: 2022-01-20
Attending: EMERGENCY MEDICINE
Payer: MEDICARE

## 2022-01-20 VITALS
SYSTOLIC BLOOD PRESSURE: 158 MMHG | TEMPERATURE: 97.3 F | RESPIRATION RATE: 12 BRPM | OXYGEN SATURATION: 94 % | DIASTOLIC BLOOD PRESSURE: 67 MMHG | HEART RATE: 83 BPM

## 2022-01-20 DIAGNOSIS — G89.4 CHRONIC PAIN SYNDROME: ICD-10-CM

## 2022-01-20 DIAGNOSIS — R07.9 CHEST PAIN, UNSPECIFIED TYPE: ICD-10-CM

## 2022-01-20 LAB
ALBUMIN SERPL BCP-MCNC: 3.9 G/DL (ref 3.2–4.9)
ALBUMIN/GLOB SERPL: 1.2 G/DL
ALP SERPL-CCNC: 140 U/L (ref 30–99)
ALT SERPL-CCNC: 20 U/L (ref 2–50)
ANION GAP SERPL CALC-SCNC: 11 MMOL/L (ref 7–16)
AST SERPL-CCNC: 36 U/L (ref 12–45)
BASOPHILS # BLD AUTO: 0.6 % (ref 0–1.8)
BASOPHILS # BLD: 0.03 K/UL (ref 0–0.12)
BILIRUB SERPL-MCNC: 0.4 MG/DL (ref 0.1–1.5)
BUN SERPL-MCNC: 21 MG/DL (ref 8–22)
CALCIUM SERPL-MCNC: 9.6 MG/DL (ref 8.5–10.5)
CHLORIDE SERPL-SCNC: 105 MMOL/L (ref 96–112)
CO2 SERPL-SCNC: 19 MMOL/L (ref 20–33)
CREAT SERPL-MCNC: 0.86 MG/DL (ref 0.5–1.4)
EKG IMPRESSION: NORMAL
EOSINOPHIL # BLD AUTO: 0.07 K/UL (ref 0–0.51)
EOSINOPHIL NFR BLD: 1.5 % (ref 0–6.9)
ERYTHROCYTE [DISTWIDTH] IN BLOOD BY AUTOMATED COUNT: 45.5 FL (ref 35.9–50)
GLOBULIN SER CALC-MCNC: 3.2 G/DL (ref 1.9–3.5)
GLUCOSE SERPL-MCNC: 101 MG/DL (ref 65–99)
HCT VFR BLD AUTO: 30.3 % (ref 37–47)
HGB BLD-MCNC: 10.1 G/DL (ref 12–16)
IMM GRANULOCYTES # BLD AUTO: 0.03 K/UL (ref 0–0.11)
IMM GRANULOCYTES NFR BLD AUTO: 0.6 % (ref 0–0.9)
LYMPHOCYTES # BLD AUTO: 0.94 K/UL (ref 1–4.8)
LYMPHOCYTES NFR BLD: 19.7 % (ref 22–41)
MCH RBC QN AUTO: 27.4 PG (ref 27–33)
MCHC RBC AUTO-ENTMCNC: 33.3 G/DL (ref 33.6–35)
MCV RBC AUTO: 82.1 FL (ref 81.4–97.8)
MONOCYTES # BLD AUTO: 0.57 K/UL (ref 0–0.85)
MONOCYTES NFR BLD AUTO: 12 % (ref 0–13.4)
NEUTROPHILS # BLD AUTO: 3.12 K/UL (ref 2–7.15)
NEUTROPHILS NFR BLD: 65.6 % (ref 44–72)
NRBC # BLD AUTO: 0 K/UL
NRBC BLD-RTO: 0 /100 WBC
PLATELET # BLD AUTO: 314 K/UL (ref 164–446)
PMV BLD AUTO: 10.5 FL (ref 9–12.9)
POTASSIUM SERPL-SCNC: 4.1 MMOL/L (ref 3.6–5.5)
PROT SERPL-MCNC: 7.1 G/DL (ref 6–8.2)
RBC # BLD AUTO: 3.69 M/UL (ref 4.2–5.4)
SODIUM SERPL-SCNC: 135 MMOL/L (ref 135–145)
TROPONIN T SERPL-MCNC: 14 NG/L (ref 6–19)
WBC # BLD AUTO: 4.8 K/UL (ref 4.8–10.8)

## 2022-01-20 PROCEDURE — 700102 HCHG RX REV CODE 250 W/ 637 OVERRIDE(OP): Performed by: EMERGENCY MEDICINE

## 2022-01-20 PROCEDURE — 71045 X-RAY EXAM CHEST 1 VIEW: CPT

## 2022-01-20 PROCEDURE — 93005 ELECTROCARDIOGRAM TRACING: CPT

## 2022-01-20 PROCEDURE — 80053 COMPREHEN METABOLIC PANEL: CPT

## 2022-01-20 PROCEDURE — 94760 N-INVAS EAR/PLS OXIMETRY 1: CPT

## 2022-01-20 PROCEDURE — 93005 ELECTROCARDIOGRAM TRACING: CPT | Performed by: EMERGENCY MEDICINE

## 2022-01-20 PROCEDURE — 96374 THER/PROPH/DIAG INJ IV PUSH: CPT

## 2022-01-20 PROCEDURE — A9270 NON-COVERED ITEM OR SERVICE: HCPCS | Performed by: EMERGENCY MEDICINE

## 2022-01-20 PROCEDURE — 99284 EMERGENCY DEPT VISIT MOD MDM: CPT

## 2022-01-20 PROCEDURE — 700111 HCHG RX REV CODE 636 W/ 250 OVERRIDE (IP): Performed by: EMERGENCY MEDICINE

## 2022-01-20 PROCEDURE — 36415 COLL VENOUS BLD VENIPUNCTURE: CPT

## 2022-01-20 PROCEDURE — 84484 ASSAY OF TROPONIN QUANT: CPT

## 2022-01-20 PROCEDURE — 85025 COMPLETE CBC W/AUTO DIFF WBC: CPT

## 2022-01-20 PROCEDURE — 96375 TX/PRO/DX INJ NEW DRUG ADDON: CPT

## 2022-01-20 RX ORDER — ONDANSETRON 2 MG/ML
4 INJECTION INTRAMUSCULAR; INTRAVENOUS ONCE
Status: COMPLETED | OUTPATIENT
Start: 2022-01-20 | End: 2022-01-20

## 2022-01-20 RX ORDER — MORPHINE SULFATE 4 MG/ML
4 INJECTION INTRAVENOUS ONCE
Status: COMPLETED | OUTPATIENT
Start: 2022-01-20 | End: 2022-01-20

## 2022-01-20 RX ORDER — OXYCODONE HYDROCHLORIDE AND ACETAMINOPHEN 5; 325 MG/1; MG/1
1 TABLET ORAL ONCE
Status: COMPLETED | OUTPATIENT
Start: 2022-01-20 | End: 2022-01-20

## 2022-01-20 RX ADMIN — MORPHINE SULFATE 4 MG: 4 INJECTION INTRAVENOUS at 16:40

## 2022-01-20 RX ADMIN — OXYCODONE HYDROCHLORIDE AND ACETAMINOPHEN 1 TABLET: 5; 325 TABLET ORAL at 19:02

## 2022-01-20 RX ADMIN — ONDANSETRON 4 MG: 2 INJECTION INTRAMUSCULAR; INTRAVENOUS at 16:39

## 2022-01-20 RX ADMIN — FENTANYL CITRATE 50 MCG: 50 INJECTION INTRAMUSCULAR; INTRAVENOUS at 17:56

## 2022-01-20 ASSESSMENT — PAIN DESCRIPTION - DESCRIPTORS: DESCRIPTORS: CRAMPING

## 2022-01-21 NOTE — ED PROVIDER NOTES
"ED Provider Note    CHIEF COMPLAINT  Chief Complaint   Patient presents with   • Chest Pain       HPI  Davie Montejo is a 86 y.o. female who presents with chest pain.  Patient had a 15-minute episode this morning.  She could not find her nitroglycerin and called paramedics.  Upon their arrival, pain had resolved.  She has been pain-free since.  No dizziness, no shortness of breath.  She is currently suffering from her usual allover body discomfort she describes secondary to fibromyalgia.  Patient was seen a week ago with abdominal discomfort, she states this has improved since then.  No fever, no cough.  Patient states she takes nitroglycerin approximately twice a week for chest discomfort.  Review of chart shows the patient has had poor pain control over the last year since she was switched away from chronic oral narcotic use.    REVIEW OF SYSTEMS    Constitutional: No dizziness, no fever  Respiratory: No shortness of breath, no pleurisy  Cardiac: History of coronary artery disease.  Myocardial infarction 2008  Gastrointestinal: Denies abdominal pain.  No nausea or vomiting  Skin: No diaphoresis, no new rash, no new swelling  Musculoskeletal: \"All over body pain, fibromyalgia\"  Neurologic: Denies headache.  No acute numbness or weakness       All other systems are negative.       PAST MEDICAL HISTORY  Past Medical History:   Diagnosis Date   • CAD (coronary artery disease)    • Fibromyalgia    • Heart attack (HCC) 2008       FAMILY HISTORY  No family history on file.    SOCIAL HISTORY  Social History     Socioeconomic History   • Marital status:      Spouse name: Not on file   • Number of children: Not on file   • Years of education: Not on file   • Highest education level: Not on file   Occupational History   • Not on file   Tobacco Use   • Smoking status: Former Smoker   • Smokeless tobacco: Never Used   Vaping Use   • Vaping Use: Never used   Substance and Sexual Activity   • Alcohol use: Never "   • Drug use: Not Currently   • Sexual activity: Not Currently   Other Topics Concern   • Not on file   Social History Narrative   • Not on file     Social Determinants of Health     Financial Resource Strain: Low Risk    • Difficulty of Paying Living Expenses: Not very hard   Food Insecurity: No Food Insecurity   • Worried About Running Out of Food in the Last Year: Never true   • Ran Out of Food in the Last Year: Never true   Transportation Needs:    • Lack of Transportation (Medical): Not on file   • Lack of Transportation (Non-Medical): Not on file   Physical Activity:    • Days of Exercise per Week: Not on file   • Minutes of Exercise per Session: Not on file   Stress:    • Feeling of Stress : Not on file   Social Connections:    • Frequency of Communication with Friends and Family: Not on file   • Frequency of Social Gatherings with Friends and Family: Not on file   • Attends Restoration Services: Not on file   • Active Member of Clubs or Organizations: Not on file   • Attends Club or Organization Meetings: Not on file   • Marital Status: Not on file   Intimate Partner Violence:    • Fear of Current or Ex-Partner: Not on file   • Emotionally Abused: Not on file   • Physically Abused: Not on file   • Sexually Abused: Not on file   Housing Stability:    • Unable to Pay for Housing in the Last Year: Not on file   • Number of Places Lived in the Last Year: Not on file   • Unstable Housing in the Last Year: Not on file       SURGICAL HISTORY  Past Surgical History:   Procedure Laterality Date   • CORONARY ARTERY BYPAS, 4 2008   • CORONARY ARTERY BYPASS, 1 2008       CURRENT MEDICATIONS  Home Medications     Reviewed by Eufemia Sanchez R.N. (Registered Nurse) on 01/20/22 at 1645  Med List Status: <None>   Medication Last Dose Status   citalopram (CELEXA) 20 MG Tab  Active   clopidogrel (PLAVIX) 75 MG Tab  Active   gabapentin (NEURONTIN) 100 MG Cap  Active   levothyroxine (SYNTHROID) 150 MCG Tab  Active   lidocaine  "(LIDODERM) 5 % Patch  Active   losartan (COZAAR) 50 MG Tab  Active   metoprolol tartrate (LOPRESSOR) 25 MG Tab  Active   nitroglycerin (NITROSTAT) 0.4 MG SL Tab  Active   omeprazole (PRILOSEC) 20 MG delayed-release capsule  Active                ALLERGIES  Allergies   Allergen Reactions   • Codeine Unspecified     Unknown reaction     • Lisinopril Unspecified     Unknown reaction   • Penicillin G Unspecified     Unknown reaction     • Pregabalin Unspecified     Lyrica affects patients vision.     • Simvastatin Unspecified     Unknown reaction     • Sulfa Drugs Unspecified     Patient states \"I can't remember what this does to me\" but recalls and allergy.        PHYSICAL EXAM  VITAL SIGNS: /55   Pulse 87   Temp 36.5 °C (97.7 °F) (Temporal)   Resp (!) 23   LMP  (LMP Unknown)   SpO2 100%   Constitutional:  Non-toxic appearance.  No distress  HENT: No facial swelling  Eyes: Anicteric, no conjunctivitis.     Cardiovascular: Normal heart rate, Normal rhythm  Pulmonary:  No wheezing, No rales.   Gastrointestinal: Soft, No tenderness, No masses  Skin: Warm, Dry, No cyanosis.   Neurologic: Speech is clear, follows commands, facial expression is symmetrical.  Psychiatric: Affect normal,  Mood normal.  Patient is calm and cooperative  Musculoskeletal: Chest wall pain to palpation.  Diffuse discomfort to palpation extremities and back.  No deformity, no evidence of trauma.    EKG/Labs  Results for orders placed or performed during the hospital encounter of 01/20/22   CBC WITH DIFFERENTIAL   Result Value Ref Range    WBC 4.8 4.8 - 10.8 K/uL    RBC 3.69 (L) 4.20 - 5.40 M/uL    Hemoglobin 10.1 (L) 12.0 - 16.0 g/dL    Hematocrit 30.3 (L) 37.0 - 47.0 %    MCV 82.1 81.4 - 97.8 fL    MCH 27.4 27.0 - 33.0 pg    MCHC 33.3 (L) 33.6 - 35.0 g/dL    RDW 45.5 35.9 - 50.0 fL    Platelet Count 314 164 - 446 K/uL    MPV 10.5 9.0 - 12.9 fL    Neutrophils-Polys 65.60 44.00 - 72.00 %    Lymphocytes 19.70 (L) 22.00 - 41.00 %    Monocytes " 12.00 0.00 - 13.40 %    Eosinophils 1.50 0.00 - 6.90 %    Basophils 0.60 0.00 - 1.80 %    Immature Granulocytes 0.60 0.00 - 0.90 %    Nucleated RBC 0.00 /100 WBC    Neutrophils (Absolute) 3.12 2.00 - 7.15 K/uL    Lymphs (Absolute) 0.94 (L) 1.00 - 4.80 K/uL    Monos (Absolute) 0.57 0.00 - 0.85 K/uL    Eos (Absolute) 0.07 0.00 - 0.51 K/uL    Baso (Absolute) 0.03 0.00 - 0.12 K/uL    Immature Granulocytes (abs) 0.03 0.00 - 0.11 K/uL    NRBC (Absolute) 0.00 K/uL   Comp Metabolic Panel   Result Value Ref Range    Sodium 135 135 - 145 mmol/L    Potassium 4.1 3.6 - 5.5 mmol/L    Chloride 105 96 - 112 mmol/L    Co2 19 (L) 20 - 33 mmol/L    Anion Gap 11.0 7.0 - 16.0    Glucose 101 (H) 65 - 99 mg/dL    Bun 21 8 - 22 mg/dL    Creatinine 0.86 0.50 - 1.40 mg/dL    Calcium 9.6 8.5 - 10.5 mg/dL    AST(SGOT) 36 12 - 45 U/L    ALT(SGPT) 20 2 - 50 U/L    Alkaline Phosphatase 140 (H) 30 - 99 U/L    Total Bilirubin 0.4 0.1 - 1.5 mg/dL    Albumin 3.9 3.2 - 4.9 g/dL    Total Protein 7.1 6.0 - 8.2 g/dL    Globulin 3.2 1.9 - 3.5 g/dL    A-G Ratio 1.2 g/dL   TROPONIN   Result Value Ref Range    Troponin T 14 6 - 19 ng/L   ESTIMATED GFR   Result Value Ref Range    GFR If African American >60 >60 mL/min/1.73 m 2    GFR If Non African American >60 >60 mL/min/1.73 m 2   EKG (NOW)   Result Value Ref Range    Report       [facility] Emergency Dept.    Test Date:  2022  Pt Name:    JUNIOR CHIU            Department: ER  MRN:        [id number]                      Room:       TRAUMA - EXAM 1  Gender:     Female                       Technician: [id number]  :        [id number]                   Requested By:ER TRIAGE PROTOCOL  Order #:    [id number]                    Reading MD: CRESCENCIO AVILA MD    Measurements  Intervals                                Axis  Rate:       82                           P:          72  IA:         236                          QRS:        -61  QRSD:       134                          T:           127  QT:         400  QTc:        468    Interpretive Statements  SINUS RHYTHM  FIRST DEGREE AV BLOCK  IVCD, CONSIDER ATYPICAL RBBB  ANTERIOR Q WAVES, POSSIBLY DUE TO LVH  BASELINE WANDER IN LEAD(S) V2  Compared to ECG 01/10/2022 04:41:22  Limb leads are unchanged from previous  Leads V1 and V2 with different morphology  No evidence of acute ischemia or ar rhythmia  Electronically Signed On 1- 18:34:38 PST by CRESCENCIO AVILA MD           RADIOLOGY/PROCEDURES  DX-CHEST-PORTABLE (1 VIEW)   Final Result      No acute cardiopulmonary abnormality.               COURSE & MEDICAL DECISION MAKING  Pertinent Labs & Imaging studies reviewed. (See chart for details)  This is the patient's eighth visit to the emergency department for chest pain in the last 3 months.  Patient previously had her aorta scanned, no dissection, no aneurysm in the chest however there was a finding of a perforated gastric ulcer.  This was treated conservatively.  She denies recurrent abdominal pain.  No fever, no leukocytosis.  Her abdominal exam is benign, soft and nontender.  Patient has equal blood pressures in both arms, pain is nonmigratory, none tearing in nature, dissection unlikely.  No pleurisy, no shortness of breath, no cough, pulmonary embolism unlikely.  Troponin negative, EKG negative for ischemia.  Patient was given morphine for pain control, stated no relief.  She was given 50 mcg of fentanyl with moderate relief, she is comfortable in appearance, talkative.  She requested a prescription of pain medicine, given history, ER policy on chronic pain medication refills, and previous documentation, no prescription for pain will be given.  She was given a dose of oxycodone prior to discharge at her request.  She is advised to follow-up with her primary care doctor for reevaluation, discussion regarding chronic pain control and referral to pain specialist.  Patient states she has a refill of her nitroglycerin waiting for her at  the pharmacy and will pick this up as soon as possible.  The patient is well-appearing upon discharge    FINAL IMPRESSION     1. Chronic pain syndrome     2. Chest pain, unspecified type                     Electronically signed by: Imtiaz Haji M.D., 1/20/2022 6:31 PM

## 2022-01-21 NOTE — ED TRIAGE NOTES
CP for 15 hrs PTA. Saw PCP for Rx refill 1/19 unable to get to pharmacy after visit. No nitro at home. In ED notes left CP and pain in between shoulders. A/Ox3 unlaboured breathing. PMHx cabg & fibromyalgia

## 2022-01-21 NOTE — ED NOTES
MD aware of pain between shoulders. EKG completed and shown to MD. Repeat BP complete in BL arms. Blood work sent to lab via SL.

## 2022-01-21 NOTE — DISCHARGE INSTRUCTIONS
Call your primary doctor for evaluation, consider referral to pain specialist, discussed this with your primary doctor.  Return for fever, shortness of breath, dizziness, severe pain or any concerns.

## 2022-01-21 NOTE — ED NOTES
Pt A/Ox3 unlaboured breathing VSS. Agrees and understands d/c teaching. Ambulated off unit independently. Taking uber to pharmacy then home

## 2022-02-14 PROBLEM — F10.11 HISTORY OF ALCOHOL ABUSE: Status: ACTIVE | Noted: 2022-02-14

## 2022-02-14 PROBLEM — R41.3 MEMORY DEFICIT: Status: ACTIVE | Noted: 2020-11-15

## 2022-02-14 PROBLEM — M79.7 FIBROMYALGIA: Status: ACTIVE | Noted: 2022-02-14

## 2022-02-14 PROBLEM — Z95.1 HISTORY OF CORONARY ARTERY BYPASS SURGERY: Status: ACTIVE | Noted: 2022-02-14

## 2022-02-14 PROBLEM — F43.9 STRESS: Status: ACTIVE | Noted: 2022-02-14

## 2022-02-14 PROBLEM — D64.9 ANEMIA: Status: ACTIVE | Noted: 2022-02-14

## 2022-02-14 PROBLEM — F12.920: Status: ACTIVE | Noted: 2022-02-14

## 2022-02-14 PROBLEM — F43.21 GRIEF: Status: ACTIVE | Noted: 2022-02-14

## 2022-05-06 PROBLEM — R11.2 INTRACTABLE NAUSEA AND VOMITING: Status: RESOLVED | Noted: 2021-12-04 | Resolved: 2022-05-06

## 2022-05-06 PROBLEM — E03.9 HYPOTHYROIDISM: Chronic | Status: ACTIVE | Noted: 2020-08-24

## 2022-05-06 PROBLEM — I77.811 AORTIC ECTASIA, ABDOMINAL (HCC): Chronic | Status: ACTIVE | Noted: 2022-05-06

## 2022-05-06 PROBLEM — G40.909 SEIZURE DISORDER (HCC): Chronic | Status: ACTIVE | Noted: 2020-08-24

## 2022-05-06 PROBLEM — R07.9 PAIN IN THE CHEST: Status: RESOLVED | Noted: 2020-11-15 | Resolved: 2022-05-06

## 2022-05-06 PROBLEM — K25.2 ACUTE PERFORATED GASTRIC ULCER WITH HEMORRHAGE (HCC): Status: RESOLVED | Noted: 2021-10-21 | Resolved: 2022-05-06

## 2022-05-06 PROBLEM — K25.5 CHRONIC GASTRIC ULCER WITH PERFORATION (HCC): Chronic | Status: ACTIVE | Noted: 2021-12-05

## 2022-05-06 PROBLEM — I10 ESSENTIAL HYPERTENSION: Chronic | Status: ACTIVE | Noted: 2018-10-16

## 2022-05-06 PROBLEM — I50.23 ACUTE ON CHRONIC SYSTOLIC HEART FAILURE (HCC): Chronic | Status: ACTIVE | Noted: 2020-08-24

## 2022-05-06 PROBLEM — I77.811 AORTIC ECTASIA, ABDOMINAL (HCC): Status: ACTIVE | Noted: 2022-05-06

## 2022-05-07 PROBLEM — F33.41 RECURRENT MAJOR DEPRESSION IN PARTIAL REMISSION (HCC): Chronic | Status: ACTIVE | Noted: 2022-05-06

## 2022-05-07 PROBLEM — M79.7 FIBROMYALGIA: Chronic | Status: ACTIVE | Noted: 2022-02-14

## 2022-05-07 PROBLEM — Z91.89 AT RISK FOR POLYPHARMACY: Chronic | Status: ACTIVE | Noted: 2022-05-06

## 2022-05-07 PROBLEM — R41.3 MEMORY DEFICIT: Chronic | Status: ACTIVE | Noted: 2020-11-15

## 2022-05-07 PROBLEM — Z91.89 AT RISK FOR POLYPHARMACY: Status: ACTIVE | Noted: 2022-05-06

## 2022-05-07 PROBLEM — F33.41 RECURRENT MAJOR DEPRESSION IN PARTIAL REMISSION (HCC): Status: ACTIVE | Noted: 2022-05-06

## 2022-05-07 PROBLEM — F32.A DEPRESSIVE DISORDER: Status: RESOLVED | Noted: 2020-08-24 | Resolved: 2022-05-07

## 2022-06-11 PROBLEM — D62 ACUTE BLOOD LOSS ANEMIA: Status: RESOLVED | Noted: 2021-10-21 | Resolved: 2022-06-11

## 2022-06-11 PROBLEM — E55.9 VITAMIN D DEFICIENCY: Status: ACTIVE | Noted: 2022-06-10

## 2022-06-11 PROBLEM — D64.9 ANEMIA: Status: RESOLVED | Noted: 2022-02-14 | Resolved: 2022-06-11

## 2022-06-11 PROBLEM — K27.9 PEPTIC ULCER DISEASE: Chronic | Status: ACTIVE | Noted: 2022-06-10

## 2022-06-11 PROBLEM — K27.9 PEPTIC ULCER DISEASE: Status: ACTIVE | Noted: 2022-06-10

## 2022-06-11 PROBLEM — R10.84 GENERALIZED ABDOMINAL PAIN: Status: RESOLVED | Noted: 2021-12-05 | Resolved: 2022-06-11

## 2022-06-11 PROBLEM — F12.920: Status: RESOLVED | Noted: 2022-02-14 | Resolved: 2022-06-11

## 2022-07-11 ENCOUNTER — TELEPHONE (OUTPATIENT)
Dept: INTERNAL MEDICINE | Facility: OTHER | Age: 87
End: 2022-07-11
Payer: MEDICARE

## 2022-07-11 NOTE — TELEPHONE ENCOUNTER
Spoke with patient, it is starting to go away, she has a few spots on her arm and back, she does want to be seen, she will need a cab voucher, she has not been seen at our office but did want to schedule with Dr. Harper, transferred her to get scheduled as she is a new patient...

## 2022-07-11 NOTE — TELEPHONE ENCOUNTER
MA's: Can you please call the patient today and see if her rash is still occurring/if she would like to be seen today by a provider? Thank you!    Received a page from patient at 1:42 AM, Phone [phone]:    Patient had been having a reddish, itchy rash starting 1 week ago in both of her wrists which had been gradually spreading to her hands, inside of arms, and back. .She tried taking baths/showers to see if they would help but itching has been getting worse. She cannot sleep at all because of this. She has been outside recently only to take out the trash - did not go gardening anywhere. She recently changed her lotion - since could not found her previous one; she did not change her laundry detergent recently. She has no trouble breathing (change in breathing from baseline) or tongue swelling.    I told her that her rash may be an allergic reaction to the new lotion and for the severity of symptoms, she may need either corticosteroid creams/medications for this. Advised the patient to stop using her lotion.     It would be necessary for her to be seen in person by a medical provider for further diagnosis. I gave her a few options to get these - she could either go make an appointment with her PCP, or go to Urgent Care as soon as it opens, or if she would really like, could go to the emergency room. She stated that she would like to observe herself for a few hours and see how she does. I told her that if she were to have ANY issues with worsening of breathing or tongue swelling or if she was unable to protect her airway, to immediately call 911 to get help. She expressed understanding and thanked me for the call.    Bing Harper MD, MPH  [employer] [facility], PGY-3

## 2022-07-15 ENCOUNTER — OFFICE VISIT (OUTPATIENT)
Dept: CARDIOLOGY | Facility: MEDICAL CENTER | Age: 87
End: 2022-07-15
Attending: PHYSICIAN ASSISTANT
Payer: MEDICARE

## 2022-07-15 VITALS
RESPIRATION RATE: 12 BRPM | DIASTOLIC BLOOD PRESSURE: 30 MMHG | OXYGEN SATURATION: 94 % | SYSTOLIC BLOOD PRESSURE: 90 MMHG | BODY MASS INDEX: 25.43 KG/M2 | WEIGHT: 162 LBS | HEART RATE: 63 BPM | HEIGHT: 67 IN

## 2022-07-15 DIAGNOSIS — D53.0 ANEMIA DUE TO PROTEIN DEFICIENCY: ICD-10-CM

## 2022-07-15 DIAGNOSIS — D68.9 COAGULOPATHY (HCC): ICD-10-CM

## 2022-07-15 DIAGNOSIS — I25.10 ARTERIOSCLEROSIS OF CORONARY ARTERY: ICD-10-CM

## 2022-07-15 DIAGNOSIS — Z79.899 POLYPHARMACY: ICD-10-CM

## 2022-07-15 DIAGNOSIS — F10.11 HISTORY OF ALCOHOL ABUSE: ICD-10-CM

## 2022-07-15 DIAGNOSIS — K25.5 CHRONIC GASTRIC ULCER WITH PERFORATION (HCC): Chronic | ICD-10-CM

## 2022-07-15 DIAGNOSIS — F33.41 RECURRENT MAJOR DEPRESSION IN PARTIAL REMISSION (HCC): Chronic | ICD-10-CM

## 2022-07-15 DIAGNOSIS — I10 ESSENTIAL HYPERTENSION: Chronic | ICD-10-CM

## 2022-07-15 DIAGNOSIS — I77.811 AORTIC ECTASIA, ABDOMINAL (HCC): Chronic | ICD-10-CM

## 2022-07-15 DIAGNOSIS — I25.708 CORONARY ARTERY DISEASE OF BYPASS GRAFT OF NATIVE HEART WITH STABLE ANGINA PECTORIS (HCC): Chronic | ICD-10-CM

## 2022-07-15 DIAGNOSIS — E78.5 HYPERLIPIDEMIA, UNSPECIFIED HYPERLIPIDEMIA TYPE: ICD-10-CM

## 2022-07-15 DIAGNOSIS — I20.89 CHRONIC STABLE ANGINA (HCC): ICD-10-CM

## 2022-07-15 DIAGNOSIS — G40.909 SEIZURE DISORDER (HCC): Chronic | ICD-10-CM

## 2022-07-15 DIAGNOSIS — I50.23 ACUTE ON CHRONIC SYSTOLIC HEART FAILURE (HCC): Chronic | ICD-10-CM

## 2022-07-15 PROBLEM — D64.89 OTHER SPECIFIED ANEMIAS: Status: ACTIVE | Noted: 2022-02-14

## 2022-07-15 PROCEDURE — 99205 OFFICE O/P NEW HI 60 MIN: CPT | Performed by: INTERNAL MEDICINE

## 2022-07-15 RX ORDER — ATORVASTATIN CALCIUM 10 MG/1
10 TABLET, FILM COATED ORAL NIGHTLY
Qty: 30 TABLET | Refills: 11 | Status: SHIPPED | OUTPATIENT
Start: 2022-07-15 | End: 2022-07-18 | Stop reason: SDUPTHER

## 2022-07-15 ASSESSMENT — ENCOUNTER SYMPTOMS
SORE THROAT: 0
EYES NEGATIVE: 1
ORTHOPNEA: 0
STRIDOR: 0
SPUTUM PRODUCTION: 0
CLAUDICATION: 0
NEUROLOGICAL NEGATIVE: 1
COUGH: 0
BRUISES/BLEEDS EASILY: 0
SHORTNESS OF BREATH: 0
WEAKNESS: 0
CARDIOVASCULAR NEGATIVE: 1
CONSTITUTIONAL NEGATIVE: 1
GASTROINTESTINAL NEGATIVE: 1
LOSS OF CONSCIOUSNESS: 0
DIZZINESS: 0
WHEEZING: 0
FEVER: 0
RESPIRATORY NEGATIVE: 1
MUSCULOSKELETAL NEGATIVE: 1
PALPITATIONS: 0
CHILLS: 0
PND: 0
HEMOPTYSIS: 0

## 2022-07-15 ASSESSMENT — FIBROSIS 4 INDEX: FIB4 SCORE: 2.18

## 2022-07-15 NOTE — PROGRESS NOTES
Chief Complaint   Patient presents with   • Congestive Heart Failure     NP Dx: Acute on chronic systolic heart failure (HCC)   • Coronary Artery Disease     NP Dx: Coronary artery disease involving coronary bypass graft of native heart       Subjective     Davie Montejo is a 87 y.o. female who presents today as a new consultation for CAD hypertension hyperlipidemia and a chart diagnosis of heart failure.  She is an 87-year-old female the past medical history of CAD status post CABG back in 2008.  She does not know if she had any stents before or after.  She had an echocardiogram last year which was normal.  She has no shortness of breath with exertion.  She does endorse occasional chest pain but nothing functionally limiting and does not sound cardiac.  She is not taking a lipid-lowering medication.  She is on Plavix.  She does not check her blood pressure at home.  She never gets lightheaded or dizzy.    Past Medical History:   Diagnosis Date   • Acute blood loss anemia 10/21/2021   • Acute perforated gastric ulcer with hemorrhage ([facility]) 10/21/2021   • Anemia 2/14/2022   • Angina decubitus ([facility]) 6/18/2010   • CAD (coronary artery disease)    • Cannabis use, unspecified with intoxication, uncomplicated ([facility]) 2/14/2022   • Fibromyalgia    • Generalized abdominal pain 12/5/2021   • Heart attack ([facility]) 2008   • Intractable nausea and vomiting 12/4/2021   • Pain in the chest 11/15/2020     Past Surgical History:   Procedure Laterality Date   • CORONARY ARTERY BYPAS, 4 2008   • CORONARY ARTERY BYPASS, 1  2008     History reviewed. No pertinent family history.  Social History     Socioeconomic History   • Marital status:      Spouse name: Not on file   • Number of children: Not on file   • Years of education: Not on file   • Highest education level: Not on file   Occupational History   • Not on file   Tobacco Use   • Smoking status: Former Smoker   • Smokeless tobacco: Never Used   Vaping Use   • Vaping  "Use: Never used   Substance and Sexual Activity   • Alcohol use: Never   • Drug use: Not Currently   • Sexual activity: Not Currently   Other Topics Concern   • Not on file   Social History Narrative   • Not on file     Social Determinants of Health     Financial Resource Strain: Low Risk    • Difficulty of Paying Living Expenses: Not very hard   Food Insecurity: No Food Insecurity   • Worried About Running Out of Food in the Last Year: Never true   • Ran Out of Food in the Last Year: Never true   Transportation Needs: Not on file   Physical Activity: Not on file   Stress: Not on file   Social Connections: Not on file   Intimate Partner Violence: Not on file   Housing Stability: Not on file     Allergies   Allergen Reactions   • Tramadol Rash     Itchy red in nature   • Latex Unspecified   • Codeine Unspecified     Unknown reaction     • Lisinopril Unspecified     Unknown reaction   • Penicillin G Unspecified     Unknown reaction     • Pregabalin Unspecified     Lyrica affects patients vision.     • Simvastatin Unspecified     Unknown reaction     • Sulfa Drugs Unspecified     Patient states \"I can't remember what this does to me\" but recalls and allergy.      Outpatient Encounter Medications as of 7/15/2022   Medication Sig Dispense Refill   • atorvastatin (LIPITOR) 10 MG Tab Take 1 Tablet by mouth every evening. 30 Tablet 11   • losartan (COZAAR) 25 MG Tab Take 2 Tablets by mouth every day. 30 Tablet 11   • ergocalciferol (DRISDOL) 76960 UNIT capsule Take 1 Capsule by mouth every 7 days for 180 days. 4 Capsule 4   • citalopram (CELEXA) 20 MG Tab Take 1 Tablet by mouth every day. 30 Tablet 11   • clopidogrel (PLAVIX) 75 MG Tab Take 1 Tablet by mouth every day. 30 Tablet 11   • gabapentin (NEURONTIN) 600 MG tablet Take 1 Tablet by mouth 2 times a day. 60 Tablet 11   • acetaminophen (TYLENOL 8 HOUR) 650 MG CR tablet Take 1 Tablet by mouth 2 times a day. 60 Tablet 11   • levothyroxine (SYNTHROID) 125 MCG Tab Take 1 " "Tablet by mouth every morning on an empty stomach. 30 Tablet 11   • omeprazole (PRILOSEC) 20 MG delayed-release capsule Take 1 Capsule by mouth every day. 30 Capsule 11   • sucralfate (CARAFATE) 1 GM Tab Take 1 Tablet by mouth 2 (two) times a day. 60 Tablet 11   • nitroglycerin (NITROSTAT) 0.4 MG SL Tab Place 1 Tablet under the tongue as needed for Chest Pain. Call MD if 3 pills taken Q 5 min with no relief 25 Tablet 5   • [DISCONTINUED] HYDROcodone-acetaminophen (NORCO) 5-325 MG Tab per tablet Take 1 Tablet by mouth 2 times a day as needed. (Patient not taking: Reported on 7/15/2022)       No facility-administered encounter medications on file as of 7/15/2022.     Review of Systems   Constitutional: Negative.  Negative for chills, fever and malaise/fatigue.   HENT: Negative.  Negative for sore throat.    Eyes: Negative.    Respiratory: Negative.  Negative for cough, hemoptysis, sputum production, shortness of breath, wheezing and stridor.    Cardiovascular: Negative.  Negative for chest pain, palpitations, orthopnea, claudication, leg swelling and PND.   Gastrointestinal: Negative.    Genitourinary: Negative.    Musculoskeletal: Negative.    Skin: Negative.    Neurological: Negative.  Negative for dizziness, loss of consciousness and weakness.   Endo/Heme/Allergies: Negative.  Does not bruise/bleed easily.   All other systems reviewed and are negative.             Objective     BP (!) 90/30 (BP Location: Left arm, Patient Position: Sitting, BP Cuff Size: Adult)   Pulse 63   Resp 12   Ht 1.702 m (5' 7\")   Wt 73.5 kg (162 lb)   LMP  (LMP Unknown)   SpO2 94%   BMI 25.37 kg/m²     Physical Exam  Vitals and nursing note reviewed.   Constitutional:       General: She is not in acute distress.     Appearance: She is well-developed. She is not diaphoretic.   HENT:      Head: Normocephalic and atraumatic.      Right Ear: External ear normal.      Left Ear: External ear normal.      Nose: Nose normal.      " Mouth/Throat:      Pharynx: No oropharyngeal exudate.   Eyes:      General: No scleral icterus.        Right eye: No discharge.         Left eye: No discharge.      Conjunctiva/sclera: Conjunctivae normal.      Pupils: Pupils are equal, round, and reactive to light.   Neck:      Vascular: No JVD.   Cardiovascular:      Rate and Rhythm: Normal rate and regular rhythm.      Heart sounds: No murmur heard.    No friction rub. No gallop.   Pulmonary:      Effort: Pulmonary effort is normal. No respiratory distress.      Breath sounds: No stridor. No wheezing or rales.   Chest:      Chest wall: No tenderness.   Abdominal:      General: There is no distension.      Palpations: Abdomen is soft.      Tenderness: There is no guarding.   Musculoskeletal:         General: No tenderness or deformity. Normal range of motion.      Cervical back: Neck supple.   Skin:     General: Skin is warm and dry.      Coloration: Skin is not pale.      Findings: No erythema or rash.   Neurological:      Mental Status: She is alert.      Cranial Nerves: No cranial nerve deficit.      Motor: No abnormal muscle tone.      Coordination: Coordination normal.      Deep Tendon Reflexes: Reflexes are normal and symmetric. Reflexes normal.   Psychiatric:         Behavior: Behavior normal.         Thought Content: Thought content normal.         Judgment: Judgment normal.            Echocardiogram: Dated 10/22/2021 personally viewed and interpreted myself showing normal LV systolic function no valvular heart disease.    ECG: Dated 1/20/2022 personally viewed inter myself showing sinus rhythm with intraventricular conduction delay.    No results found for: CHOLSTRLTOT, LDL, HDL, TRIGLYCERIDE    Lab Results   Component Value Date/Time    SODIUM 135 01/20/2022 05:58 PM    POTASSIUM 4.1 01/20/2022 05:58 PM    CHLORIDE 105 01/20/2022 05:58 PM    CO2 19 (L) 01/20/2022 05:58 PM    GLUCOSE 101 (H) 01/20/2022 05:58 PM    BUN 21 01/20/2022 05:58 PM    CREATININE  0.86 01/20/2022 05:58 PM     Lab Results   Component Value Date/Time    ALKPHOSPHAT 140 (H) 01/20/2022 05:58 PM    ASTSGOT 36 01/20/2022 05:58 PM    ALTSGPT 20 01/20/2022 05:58 PM    TBILIRUBIN 0.4 01/20/2022 05:58 PM          Assessment & Plan     1. Acute on chronic systolic heart failure (HCC)     2. Coronary artery disease of bypass graft of native heart with stable angina pectoris (HCC)     3. Essential hypertension     4. Chronic gastric ulcer with perforation (HCC)     5. Aortic ectasia, abdominal (HCC)  Comp Metabolic Panel   6. 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 Arteriosclerosis of coronary artery  Comp Metabolic Panel    atorvastatin (LIPITOR) 10 MG Tab   8. Hyperlipidemia, unspecified hyperlipidemia type  Comp Metabolic Panel    atorvastatin (LIPITOR) 10 MG Tab   9. Coagulopathy (HCC)  Comp Metabolic Panel   10. Polypharmacy     11. History of alcohol abuse     12. Seizure disorder (HCC)     13. Chronic stable angina (HCC)     14. Anemia due to protein deficiency  CBC W/ DIFF W/O PLATELETS       Medical Decision Making: Today's Assessment/Status/Plan:        87-year-old female being evaluated for heart failure.  At this point she does not carry diagnosis of heart failure and this will be removed from her chart.  She does have some anemia.  We will recheck a CBC.  If continues to be reduced we will refer this back to her primary to work-up her anemia.  For her history of CAD status post CABG we will start on atorvastatin 10.  We can see her back as needed.

## 2022-07-18 PROBLEM — I50.23 ACUTE ON CHRONIC SYSTOLIC HEART FAILURE (HCC): Chronic | Status: RESOLVED | Noted: 2020-08-24 | Resolved: 2022-07-18

## 2022-07-18 PROBLEM — I95.2 HYPOTENSION DUE TO MEDICATION: Chronic | Status: ACTIVE | Noted: 2022-07-18

## 2022-07-18 PROBLEM — E55.9 VITAMIN D DEFICIENCY: Chronic | Status: ACTIVE | Noted: 2022-06-10

## 2022-07-18 PROBLEM — I95.2 HYPOTENSION DUE TO MEDICATION: Status: ACTIVE | Noted: 2022-07-18

## 2022-09-11 PROBLEM — F01.52: Chronic | Status: ACTIVE | Noted: 2022-09-11

## 2022-09-11 PROBLEM — F01.52: Status: ACTIVE | Noted: 2022-09-11

## 2022-12-27 PROBLEM — M54.50 CHRONIC BILATERAL LOW BACK PAIN WITHOUT SCIATICA: Status: ACTIVE | Noted: 2022-12-27

## 2022-12-27 PROBLEM — G89.29 CHRONIC BILATERAL LOW BACK PAIN WITHOUT SCIATICA: Status: ACTIVE | Noted: 2022-12-27

## 2023-01-31 PROBLEM — I95.2 HYPOTENSION DUE TO MEDICATION: Chronic | Status: RESOLVED | Noted: 2022-07-18 | Resolved: 2023-01-31

## 2023-03-03 ENCOUNTER — HOSPITAL ENCOUNTER (EMERGENCY)
Facility: MEDICAL CENTER | Age: 88
End: 2023-03-04
Attending: EMERGENCY MEDICINE
Payer: MEDICARE

## 2023-03-03 ENCOUNTER — APPOINTMENT (OUTPATIENT)
Dept: RADIOLOGY | Facility: MEDICAL CENTER | Age: 88
End: 2023-03-03
Attending: EMERGENCY MEDICINE
Payer: MEDICARE

## 2023-03-03 DIAGNOSIS — M54.2 NECK PAIN ON RIGHT SIDE: ICD-10-CM

## 2023-03-03 LAB
ALBUMIN SERPL BCP-MCNC: 4.2 G/DL (ref 3.2–4.9)
ALBUMIN/GLOB SERPL: 1.4 G/DL
ALP SERPL-CCNC: 136 U/L (ref 30–99)
ALT SERPL-CCNC: 13 U/L (ref 2–50)
ANION GAP SERPL CALC-SCNC: 11 MMOL/L (ref 7–16)
AST SERPL-CCNC: 17 U/L (ref 12–45)
BASOPHILS # BLD AUTO: 1 % (ref 0–1.8)
BASOPHILS # BLD: 0.05 K/UL (ref 0–0.12)
BILIRUB SERPL-MCNC: 0.3 MG/DL (ref 0.1–1.5)
BUN SERPL-MCNC: 7 MG/DL (ref 8–22)
CALCIUM ALBUM COR SERPL-MCNC: 9.2 MG/DL (ref 8.5–10.5)
CALCIUM SERPL-MCNC: 9.4 MG/DL (ref 8.5–10.5)
CHLORIDE SERPL-SCNC: 107 MMOL/L (ref 96–112)
CO2 SERPL-SCNC: 23 MMOL/L (ref 20–33)
CREAT SERPL-MCNC: 0.97 MG/DL (ref 0.5–1.4)
EKG IMPRESSION: NORMAL
EOSINOPHIL # BLD AUTO: 0.19 K/UL (ref 0–0.51)
EOSINOPHIL NFR BLD: 3.7 % (ref 0–6.9)
ERYTHROCYTE [DISTWIDTH] IN BLOOD BY AUTOMATED COUNT: 45.6 FL (ref 35.9–50)
GFR SERPLBLD CREATININE-BSD FMLA CKD-EPI: 57 ML/MIN/1.73 M 2
GLOBULIN SER CALC-MCNC: 2.9 G/DL (ref 1.9–3.5)
GLUCOSE SERPL-MCNC: 107 MG/DL (ref 65–99)
HCT VFR BLD AUTO: 37.7 % (ref 37–47)
HGB BLD-MCNC: 12.3 G/DL (ref 12–16)
IMM GRANULOCYTES # BLD AUTO: 0.01 K/UL (ref 0–0.11)
IMM GRANULOCYTES NFR BLD AUTO: 0.2 % (ref 0–0.9)
LYMPHOCYTES # BLD AUTO: 1.88 K/UL (ref 1–4.8)
LYMPHOCYTES NFR BLD: 37.1 % (ref 22–41)
MCH RBC QN AUTO: 27.8 PG (ref 27–33)
MCHC RBC AUTO-ENTMCNC: 32.6 G/DL (ref 33.6–35)
MCV RBC AUTO: 85.1 FL (ref 81.4–97.8)
MONOCYTES # BLD AUTO: 0.63 K/UL (ref 0–0.85)
MONOCYTES NFR BLD AUTO: 12.4 % (ref 0–13.4)
NEUTROPHILS # BLD AUTO: 2.31 K/UL (ref 2–7.15)
NEUTROPHILS NFR BLD: 45.6 % (ref 44–72)
NRBC # BLD AUTO: 0 K/UL
NRBC BLD-RTO: 0 /100 WBC
PLATELET # BLD AUTO: 286 K/UL (ref 164–446)
PMV BLD AUTO: 10.3 FL (ref 9–12.9)
POTASSIUM SERPL-SCNC: 4 MMOL/L (ref 3.6–5.5)
PROT SERPL-MCNC: 7.1 G/DL (ref 6–8.2)
RBC # BLD AUTO: 4.43 M/UL (ref 4.2–5.4)
SODIUM SERPL-SCNC: 141 MMOL/L (ref 135–145)
TROPONIN T SERPL-MCNC: 11 NG/L (ref 6–19)
WBC # BLD AUTO: 5.1 K/UL (ref 4.8–10.8)

## 2023-03-03 PROCEDURE — 700102 HCHG RX REV CODE 250 W/ 637 OVERRIDE(OP): Performed by: EMERGENCY MEDICINE

## 2023-03-03 PROCEDURE — A9270 NON-COVERED ITEM OR SERVICE: HCPCS | Performed by: EMERGENCY MEDICINE

## 2023-03-03 PROCEDURE — 36415 COLL VENOUS BLD VENIPUNCTURE: CPT

## 2023-03-03 PROCEDURE — 93005 ELECTROCARDIOGRAM TRACING: CPT | Performed by: EMERGENCY MEDICINE

## 2023-03-03 PROCEDURE — 99284 EMERGENCY DEPT VISIT MOD MDM: CPT

## 2023-03-03 PROCEDURE — 84484 ASSAY OF TROPONIN QUANT: CPT

## 2023-03-03 PROCEDURE — 85025 COMPLETE CBC W/AUTO DIFF WBC: CPT

## 2023-03-03 PROCEDURE — 80053 COMPREHEN METABOLIC PANEL: CPT

## 2023-03-03 PROCEDURE — 93005 ELECTROCARDIOGRAM TRACING: CPT

## 2023-03-03 PROCEDURE — 71045 X-RAY EXAM CHEST 1 VIEW: CPT

## 2023-03-03 RX ORDER — ACETAMINOPHEN 325 MG/1
650 TABLET ORAL ONCE
Status: COMPLETED | OUTPATIENT
Start: 2023-03-03 | End: 2023-03-03

## 2023-03-03 RX ADMIN — ACETAMINOPHEN 650 MG: 325 TABLET, FILM COATED ORAL at 23:15

## 2023-03-03 ASSESSMENT — PAIN DESCRIPTION - PAIN TYPE
TYPE: ACUTE PAIN
TYPE: ACUTE PAIN

## 2023-03-03 ASSESSMENT — FIBROSIS 4 INDEX: FIB4 SCORE: 2.2

## 2023-03-03 ASSESSMENT — PAIN DESCRIPTION - DESCRIPTORS: DESCRIPTORS: ACHING

## 2023-03-04 VITALS
RESPIRATION RATE: 19 BRPM | HEART RATE: 61 BPM | TEMPERATURE: 96.8 F | OXYGEN SATURATION: 96 % | SYSTOLIC BLOOD PRESSURE: 150 MMHG | WEIGHT: 170.86 LBS | DIASTOLIC BLOOD PRESSURE: 74 MMHG | BODY MASS INDEX: 25.89 KG/M2 | HEIGHT: 68 IN

## 2023-03-04 LAB — TROPONIN T SERPL-MCNC: 10 NG/L (ref 6–19)

## 2023-03-04 NOTE — ED NOTES
Pt has discharge orders. Provided discharged instructions. Pt. educated on medications and to follow up with MD. Pt verbalized understanding. Educated to come back to emergency if still symptoms unresolved.  Pt ambulatory with steady gait to lobby with all her belongings.

## 2023-03-04 NOTE — ED NOTES
Taken patient from triage waiting room, ambulatory with steady gait, alert x 4.Verified patient identification.  Assumed patient care. Placed on patient room. Changed clothes to hospital gown. Connected to cardiac monitor. Will continue to monitor for any complications     Complaining of neck pain, also on/off chest pain with shortness of breath

## 2023-03-04 NOTE — ED NOTES
Checked on bed, connected to monitor,  with unlabored respirations. Vital signs is stable. Denied any new complaints. Gurney in low position, side rail up for pt safety. Call light within reach. Will continue to monitor for any complications.

## 2023-03-04 NOTE — ED TRIAGE NOTES
Davie Montejo  88 y.o.  female  Chief Complaint   Patient presents with    Neck Pain     C/o right lateral neck pain radiates to shoulder and arm since yesterday. Described as constant aching. Off and on chest pain and SOB with exertion.

## 2023-03-04 NOTE — ED PROVIDER NOTES
ED Provider Note    CHIEF COMPLAINT  Chief Complaint   Patient presents with    Neck Pain     C/o right lateral neck pain radiates to shoulder and arm since yesterday. Described as constant aching. Off and on chest pain and SOB with exertion.        EXTERNAL RECORDS REVIEWED  Outpatient Notes patient has a history of CABG with the bypass of 4 vessels compliant with her medications has not had any recurrent issues in years per patient last echo 2021 EF 65%      HPI/ROS  LIMITATION TO HISTORY   Select: : None      Davie Montejo is a 88 y.o. female who presents to the emerged part chief complaint of right-sided neck pain that radiated down the right shoulder and arm last evening.  Patient states that she was sitting there and all of a sudden felt a sharp pain that went from her shoulder down her arm she states hurt for a little while and then she was able to sleep when she woke up this morning felt a little better she just feels a mild achiness now.  There is no associated chest pain or shortness of breath at the time.  No weakness numbness or tingling at the time or currently.  She states that she called the nurse hotline and they told her to come in to get checked because they made her worried.  Triage said something about chest pain or shortness of breath with exertion when I talk to the patient about it she states that she is always had that since her last CABG nothing is acutely changed including leg swelling and denies any chest pain since yesterday    PAST MEDICAL HISTORY   has a past medical history of Acute blood loss anemia (10/21/2021), Acute on chronic systolic heart failure (HCC) (8/24/2020), Acute perforated gastric ulcer with hemorrhage ([facility]) (10/21/2021), Anemia (2/14/2022), Angina decubitus ([facility]) (6/18/2010), CAD (coronary artery disease), Cannabis use, unspecified with intoxication, uncomplicated ([facility]) (2/14/2022), Fibromyalgia, Generalized abdominal pain (12/5/2021), Heart attack ([facility])  "(2008), Hypotension due to medication (7/18/2022), Intractable nausea and vomiting (12/4/2021), and Pain in the chest (11/15/2020).    SURGICAL HISTORY   has a past surgical history that includes coronary artery bypas, 4 (2008) and coronary artery bypass, 1 (2008).    FAMILY HISTORY  History reviewed. No pertinent family history.    SOCIAL HISTORY  Social History     Tobacco Use    Smoking status: Former    Smokeless tobacco: Never   Vaping Use    Vaping Use: Never used   Substance and Sexual Activity    Alcohol use: Never    Drug use: Not Currently    Sexual activity: Not Currently       CURRENT MEDICATIONS  Home Medications       Reviewed by Micha Farias R.N. (Registered Nurse) on 03/03/23 at 2036  Med List Status: Partial     Medication Last Dose Status   acetaminophen (TYLENOL 8 HOUR) 650 MG CR tablet  Active   atorvastatin (LIPITOR) 10 MG Tab  Active   clopidogrel (PLAVIX) 75 MG Tab  Active   DULoxetine (CYMBALTA) 60 MG Cap DR Particles delayed-release capsule  Active   gabapentin (NEURONTIN) 600 MG tablet  Active   levothyroxine (SYNTHROID) 125 MCG Tab  Active   nitroglycerin (NITROSTAT) 0.4 MG SL Tab  Active   omeprazole (PRILOSEC) 20 MG delayed-release capsule  Active   sucralfate (CARAFATE) 1 GM Tab  Active                    ALLERGIES  Allergies   Allergen Reactions    Tramadol Rash     Itchy red in nature    Latex Unspecified    Codeine Unspecified     Unknown reaction      Lisinopril Unspecified     Unknown reaction    Penicillin G Unspecified     Unknown reaction      Pregabalin Unspecified     Lyrica affects patients vision.      Simvastatin Unspecified     Unknown reaction      Sulfa Drugs Unspecified     Patient states \"I can't remember what this does to me\" but recalls and allergy.        PHYSICAL EXAM  VITAL SIGNS: BP (!) 156/54   Pulse 66   Temp 36.1 °C (97 °F) (Temporal)   Resp 18   Ht 1.727 m (5' 8\")   Wt 77.5 kg (170 lb 13.7 oz)   LMP  (LMP Unknown)   SpO2 97%   BMI 25.98 kg/m²    Pulse " Ox Interpretation:   Pulse Ox is normal   Constitutional: Alert in no apparent distress.  HENT: Normocephalic atraumatic, MMM  Eyes: PER, Conjunctiva normal, Non-icteric.   Neck: Normal range of motion, right anterior lateral muscular tenderness without swelling bruit or thrill supple, No stridor.   Cardiovascular: Regular rate and rhythm, no murmurs.  Radial pulses 2+ bilaterally  Thorax & Lungs: Normal breath sounds, No respiratory distress, No wheezing, No chest tenderness.   Abdomen: Bowel sounds normal, Soft, No tenderness, No pulsatile masses. No peritoneal signs.  Skin: Warm, Dry, No erythema, No rash.   Back: No bony tenderness, No CVA tenderness.   Extremities/MSK: Intact equal distal pulses, No edema, No tenderness, No cyanosis, no major deformities noted  Neurologic: Alert and oriented x3, cranial nerves II through XII and track strength 5 out of 5 bilateral upper lower extremities with sensation intact light touch no dysarthria      DIAGNOSTIC STUDIES / PROCEDURES  EKG  I have independently interpreted this EKG  Results for orders placed or performed during the hospital encounter of 23   EKG   Result Value Ref Range    Report       [facility] Emergency Dept.    Test Date:  2023  Pt Name:    JUNIOR CHIU            Department: ER  MRN:        [id number]                      Room:  Gender:     Female                       Technician: [id number]  :        [id number]                   Requested By:ER TRIAGE PROTOCOL  Order #:    [id number]                    Reading MD: Laura Gillis MD    Measurements  Intervals                                Axis  Rate:       67                           P:          72  TX:         214                          QRS:        -27  QRSD:       112                          T:          111  QT:         447  QTc:        472    Interpretive Statements  Sinus rhythm at a rate of 67 with a first-degree AV block no ST elevations or  ST  depressions or  T wave inversions in leads I and aVL which are unchanged from  prior Q waves in V1 and V2 also unchanged from prior almost RSr' possible  early  BBB otherwise normal intervals and normal axis  Compared to ECG 01/20/2022  16:00:43  no sig change  Electronically Signed On 3-3-2023 22:43:25 PST by Laura Gillis MD           LABS  Labs Reviewed   CBC WITH DIFFERENTIAL - Abnormal; Notable for the following components:       Result Value    MCHC 32.6 (*)     All other components within normal limits    Narrative:     Biotin intake of greater than 5 mg per day may interfere with  troponin levels, causing false low values.   COMP METABOLIC PANEL - Abnormal; Notable for the following components:    Glucose 107 (*)     Bun 7 (*)     Alkaline Phosphatase 136 (*)     All other components within normal limits    Narrative:     Biotin intake of greater than 5 mg per day may interfere with  troponin levels, causing false low values.   ESTIMATED GFR - Abnormal; Notable for the following components:    GFR (CKD-EPI) 57 (*)     All other components within normal limits    Narrative:     Biotin intake of greater than 5 mg per day may interfere with  troponin levels, causing false low values.   TROPONIN    Narrative:     Biotin intake of greater than 5 mg per day may interfere with  troponin levels, causing false low values.   CORRECTED CALCIUM    Narrative:     Biotin intake of greater than 5 mg per day may interfere with  troponin levels, causing false low values.   TROPONIN         RADIOLOGY  I have independently interpreted the diagnostic imaging associated with this visit and am waiting the final reading from the radiologist.   My preliminary interpretation is as follows:     CXR -wires seen in the sternum from prior CABG no evidence of pneumonia nor effusion pneumothorax    Radiologist interpretation:   DX-CHEST-PORTABLE (1 VIEW)   Final Result         No acute cardiac or pulmonary abnormality is identified.              COURSE  & MEDICAL DECISION MAKING    ED Observation Status? Yes; I am placing the patient in to an observation status due to a diagnostic uncertainty as well as therapeutic intensity. Patient placed in observation status at 10:50 PM, 3/3/2023.     Observation plan is as follows: Repeat troponin laboratory analysis    Upon Reevaluation, the patient's condition has: Improved; and will be discharged.    Patient discharged from ED Observation status at 12:16 AM  (Time) 03/04/23  (Date).     INITIAL ASSESSMENT, COURSE AND PLAN  Care Narrative: Patient presents with acute onset right hand and neck discomfort rating down the right arm yesterday, neurologic exam is normal I doubt acute stroke radial pulses are intact bilaterally and equal I doubt a dissection, there is no bruit or thrill she does have some muscular tenderness on that side it could have just been an acute muscle strain or spasm.  However she does have an extensive cardiac history so repeat troponin will be ordered the first 1 is within normal limits her EKG is unchanged.  The patient is very adamant that she does not wish to stay everything looks good which if her repeat troponin is normal I feel comfortable the patient going.  But at this time I discussed the importance of evaluating her heart a little further and she agrees  Treated w tylenol     12:16 AM  Patient resting comfortably repeat trop wnl patient wishes to go home.        ADDITIONAL PROBLEM LIST  Right neck pain right arm pain  DISPOSITION AND DISCUSSIONS    Patient presents with right neck and arm pain that began last evening and really is just been kind of achy today she does have tenderness on her musculoskeletal examination.  EKG is unchanged opponent is normal x2 she really had no chest pain or shortness of breath neurologically she was intact no weakness numbness or tingling with normal radial pulses no bruit I doubt a dissection she wishes to go home she feels better with the Tylenol and she will  follow with primary care.      Escalation of care considered, and ultimately not performed:acute inpatient care management, however at this time, the patient is most appropriate for outpatient management        Decision tools and prescription drugs considered including, but not limited to: HEART Score patient did nto actually have any chest pain so not indicated .    The patient will return for new or worsening symptoms and is stable at the time of discharge.    The patient is referred to a primary physician for blood pressure management, diabetic screening, and for all other preventative health concerns.    DISPOSITION:  Patient will be discharged home in stable condition.    FOLLOW UP:  PERLITA Lew  [address] [id number]  [phone]    Schedule an appointment as soon as possible for a visit       [facility], Emergency Dept  [address] [id number] [phone]    If symptoms worsen      OUTPATIENT MEDICATIONS:  Discharge Medication List as of 3/4/2023 12:21 AM            FINAL DIAGNOSIS  1. Neck pain on right side           Electronically signed by: Laura Gillis M.D., 3/3/2023 10:50 PM

## 2023-05-06 NOTE — PROGRESS NOTES
Assumed care of pt. Bedside report received from night ESTEFANY Sams. Pt appears to be asleep. Call light, phone and personal belongings within reach. Bed alarm on and working appropriately.      no

## 2023-05-11 PROBLEM — R23.2 HOT FLASHES: Chronic | Status: ACTIVE | Noted: 2023-05-11

## 2023-05-11 PROBLEM — R23.2 HOT FLASHES: Status: ACTIVE | Noted: 2023-05-11

## 2023-05-13 PROBLEM — J30.2 SEASONAL ALLERGIES: Status: ACTIVE | Noted: 2023-05-13

## 2023-05-13 PROBLEM — M54.2 NECK PAIN: Status: ACTIVE | Noted: 2023-05-13

## 2023-05-18 ENCOUNTER — OFFICE VISIT (OUTPATIENT)
Dept: URGENT CARE | Facility: CLINIC | Age: 88
End: 2023-05-18
Payer: MEDICARE

## 2023-05-18 ENCOUNTER — HOSPITAL ENCOUNTER (OUTPATIENT)
Dept: LAB | Facility: MEDICAL CENTER | Age: 88
End: 2023-05-18
Payer: MEDICARE

## 2023-05-18 VITALS
RESPIRATION RATE: 16 BRPM | HEART RATE: 79 BPM | WEIGHT: 168.1 LBS | DIASTOLIC BLOOD PRESSURE: 58 MMHG | BODY MASS INDEX: 26.38 KG/M2 | TEMPERATURE: 97.2 F | SYSTOLIC BLOOD PRESSURE: 128 MMHG | HEIGHT: 67 IN | OXYGEN SATURATION: 95 %

## 2023-05-18 DIAGNOSIS — M54.2 NECK PAIN: ICD-10-CM

## 2023-05-18 LAB
25(OH)D3 SERPL-MCNC: 23 NG/ML (ref 30–100)
ERYTHROCYTE [DISTWIDTH] IN BLOOD BY AUTOMATED COUNT: 47.7 FL (ref 35.9–50)
HCT VFR BLD AUTO: 39.1 % (ref 37–47)
HGB BLD-MCNC: 12.2 G/DL (ref 12–16)
MCH RBC QN AUTO: 27.7 PG (ref 27–33)
MCHC RBC AUTO-ENTMCNC: 31.2 G/DL (ref 33.6–35)
MCV RBC AUTO: 88.7 FL (ref 81.4–97.8)
PLATELET # BLD AUTO: 287 K/UL (ref 164–446)
PMV BLD AUTO: 10.9 FL (ref 9–12.9)
RBC # BLD AUTO: 4.41 M/UL (ref 4.2–5.4)
T3FREE SERPL-MCNC: 3.39 PG/ML (ref 2–4.4)
T4 FREE SERPL-MCNC: 1.83 NG/DL (ref 0.93–1.7)
TSH SERPL DL<=0.005 MIU/L-ACNC: <0.005 UIU/ML (ref 0.38–5.33)
WBC # BLD AUTO: 7.3 K/UL (ref 4.8–10.8)

## 2023-05-18 PROCEDURE — 3078F DIAST BP <80 MM HG: CPT | Performed by: NURSE PRACTITIONER

## 2023-05-18 PROCEDURE — 80053 COMPREHEN METABOLIC PANEL: CPT

## 2023-05-18 PROCEDURE — 82306 VITAMIN D 25 HYDROXY: CPT

## 2023-05-18 PROCEDURE — 84481 FREE ASSAY (FT-3): CPT

## 2023-05-18 PROCEDURE — 99214 OFFICE O/P EST MOD 30 MIN: CPT | Performed by: NURSE PRACTITIONER

## 2023-05-18 PROCEDURE — 84443 ASSAY THYROID STIM HORMONE: CPT

## 2023-05-18 PROCEDURE — 36415 COLL VENOUS BLD VENIPUNCTURE: CPT

## 2023-05-18 PROCEDURE — 85027 COMPLETE CBC AUTOMATED: CPT

## 2023-05-18 PROCEDURE — 3074F SYST BP LT 130 MM HG: CPT | Performed by: NURSE PRACTITIONER

## 2023-05-18 PROCEDURE — 80061 LIPID PANEL: CPT

## 2023-05-18 PROCEDURE — 84439 ASSAY OF FREE THYROXINE: CPT

## 2023-05-18 ASSESSMENT — ENCOUNTER SYMPTOMS
FEVER: 0
HEADACHES: 0
EYE PAIN: 0
MYALGIAS: 0
NAUSEA: 0
SHORTNESS OF BREATH: 0
NECK PAIN: 1
VOMITING: 0
BACK PAIN: 0
DIZZINESS: 0
CHILLS: 0
SORE THROAT: 0

## 2023-05-18 ASSESSMENT — FIBROSIS 4 INDEX: FIB4 SCORE: 1.42

## 2023-05-18 NOTE — PROGRESS NOTES
Subjective:   Davie Montejo is a 88 y.o. female who presents for Thyroid Problem      HPI  Patient is an 88-year-old female presents urgent care for evaluation of what she suspects is a thyroid issue that has been ongoing.  Patient states that she has had a history in the past of hypothyroidism does have a lab slip in her hand to have her thyroid checked.  Patient has been experiencing pain in the right side of her neck with swelling that waxes and wanes.  No acute injury to the neck.  She denies any difficulty swallowing, denies any shortness of breath, chest pain, palpitations.  Denies any blurred vision, headache.  Patient states pain is worse with certain movements has difficulty intermittently using her right shoulder.  Pain was worse last evening however has improved today.  She has not take any medication for the symptoms other than Tylenol.  Patient was recently seen by her PCP    Review of Systems   Constitutional:  Negative for chills, fever and malaise/fatigue.   HENT:  Negative for sore throat.    Eyes:  Negative for pain.   Respiratory:  Negative for shortness of breath.    Cardiovascular:  Negative for chest pain.   Gastrointestinal:  Negative for nausea and vomiting.   Genitourinary:  Negative for hematuria.   Musculoskeletal:  Positive for neck pain. Negative for back pain, joint pain and myalgias.   Skin:  Negative for rash.   Neurological:  Negative for dizziness and headaches.       Medications:    acetaminophen  atorvastatin Tabs  citalopram Tabs  clopidogrel Tabs  DULoxetine Cpep  estrogens, conjugated Tabs  gabapentin  levothyroxine Tabs  loratadine Tabs  losartan Tabs  nitroglycerin Subl  omeprazole  sucralfate Tabs    Allergies: Tramadol, Latex, Codeine, Lisinopril, Penicillin g, Pregabalin, Simvastatin, and Sulfa drugs    Problem List: Davie Montejo does not have any pertinent problems on file.    Surgical History:  Past Surgical History:   Procedure Laterality Date     "CORONARY ARTERY BYPAS, 4  2008    CORONARY ARTERY BYPASS, 1  2008       Past Social Hx: Davie Montejo  reports that she has quit smoking. She has never used smokeless tobacco. She reports that she does not currently use drugs. She reports that she does not drink alcohol.     Past Family Hx:  Davie Montejo family history is not on file.     Problem list, medications, and allergies reviewed by myself today in Epic.     Objective:     /58   Pulse 79   Temp 36.2 °C (97.2 °F) (Temporal)   Resp 16   Ht 1.702 m (5' 7\")   Wt 76.2 kg (168 lb 1.6 oz)   LMP  (LMP Unknown)   SpO2 95%   BMI 26.33 kg/m²     Physical Exam  Vitals and nursing note reviewed.   Constitutional:       General: She is not in acute distress.     Appearance: She is well-developed.   HENT:      Head: Normocephalic and atraumatic.      Right Ear: External ear normal.      Left Ear: External ear normal.      Nose: Nose normal.      Mouth/Throat:      Mouth: Mucous membranes are moist.   Eyes:      Conjunctiva/sclera: Conjunctivae normal.   Neck:     Cardiovascular:      Rate and Rhythm: Normal rate.   Pulmonary:      Effort: Pulmonary effort is normal. No respiratory distress.      Breath sounds: Normal breath sounds.   Abdominal:      General: There is no distension.   Musculoskeletal:         General: Normal range of motion.      Cervical back: Spasms and tenderness present. No swelling, erythema, signs of trauma or bony tenderness. Pain with movement and muscular tenderness present. Normal range of motion.      Thoracic back: Normal.      Lumbar back: Normal.        Back:    Skin:     General: Skin is warm and dry.   Neurological:      General: No focal deficit present.      Mental Status: She is alert and oriented to person, place, and time. Mental status is at baseline.      Gait: Gait (gait at baseline) normal.   Psychiatric:         Judgment: Judgment normal.         Assessment/Plan:     Diagnosis and associated " orders:     1. Neck pain           Comments/MDM:     I personally reviewed prior external notes and prior test results pertinent to today's visit.  Patient with muscular tenderness on the neck no masses or lumps noted discussed possible musculoskeletal encouraged gentle range of motion, heat,/, stretching.  Patient does have a lab order to have labs checked patient was directed to the lab after today's visit.  She will follow-up with her PCP for further evaluation and management.  Discussed management options, risks and benefits, and alternatives to treatment plan agreed upon.   Red flags discussed and indications to immediately call 911 or present to the Emergency Department.   Supportive care, differential diagnoses, and indications for immediate follow-up discussed with patient.    Patient expresses understanding and agrees to plan. Patient denies any other questions or concerns.            My total time spent caring for the patient on the day of the encounter was 31 minutes.   This does not include time spent on separately billable procedures/tests.      Please note that this dictation was created using voice recognition software. I have made a reasonable attempt to correct obvious errors, but I expect that there are errors of grammar and possibly content that I did not discover before finalizing the note.    This note was electronically signed by Serge TRIMBLE.

## 2023-05-19 LAB
ALBUMIN SERPL BCP-MCNC: 4.2 G/DL (ref 3.2–4.9)
ALBUMIN/GLOB SERPL: 1.4 G/DL
ALP SERPL-CCNC: 124 U/L (ref 30–99)
ALT SERPL-CCNC: 17 U/L (ref 2–50)
ANION GAP SERPL CALC-SCNC: 12 MMOL/L (ref 7–16)
AST SERPL-CCNC: 26 U/L (ref 12–45)
BILIRUB SERPL-MCNC: 0.4 MG/DL (ref 0.1–1.5)
BUN SERPL-MCNC: 15 MG/DL (ref 8–22)
CALCIUM ALBUM COR SERPL-MCNC: 9.3 MG/DL (ref 8.5–10.5)
CALCIUM SERPL-MCNC: 9.5 MG/DL (ref 8.5–10.5)
CHLORIDE SERPL-SCNC: 107 MMOL/L (ref 96–112)
CHOLEST SERPL-MCNC: 148 MG/DL (ref 100–199)
CO2 SERPL-SCNC: 24 MMOL/L (ref 20–33)
CREAT SERPL-MCNC: 0.95 MG/DL (ref 0.5–1.4)
GFR SERPLBLD CREATININE-BSD FMLA CKD-EPI: 58 ML/MIN/1.73 M 2
GLOBULIN SER CALC-MCNC: 3.1 G/DL (ref 1.9–3.5)
GLUCOSE SERPL-MCNC: 94 MG/DL (ref 65–99)
HDLC SERPL-MCNC: 57 MG/DL
LDLC SERPL CALC-MCNC: 70 MG/DL
POTASSIUM SERPL-SCNC: 4.4 MMOL/L (ref 3.6–5.5)
PROT SERPL-MCNC: 7.3 G/DL (ref 6–8.2)
SODIUM SERPL-SCNC: 143 MMOL/L (ref 135–145)
TRIGL SERPL-MCNC: 103 MG/DL (ref 0–149)

## 2023-06-09 ENCOUNTER — APPOINTMENT (OUTPATIENT)
Dept: RADIOLOGY | Facility: MEDICAL CENTER | Age: 88
End: 2023-06-09
Payer: MEDICARE

## 2023-06-09 DIAGNOSIS — M54.2 NECK PAIN: ICD-10-CM

## 2023-06-09 PROCEDURE — 76536 US EXAM OF HEAD AND NECK: CPT

## 2023-08-01 ENCOUNTER — HOSPITAL ENCOUNTER (OUTPATIENT)
Dept: LAB | Facility: MEDICAL CENTER | Age: 88
End: 2023-08-01
Payer: MEDICARE

## 2023-08-01 DIAGNOSIS — R73.9 HYPERGLYCEMIA: ICD-10-CM

## 2023-08-01 DIAGNOSIS — E78.5 HYPERLIPIDEMIA, UNSPECIFIED HYPERLIPIDEMIA TYPE: ICD-10-CM

## 2023-08-01 DIAGNOSIS — E03.9 HYPOTHYROIDISM, UNSPECIFIED TYPE: Chronic | ICD-10-CM

## 2023-08-01 DIAGNOSIS — E55.9 VITAMIN D DEFICIENCY: Chronic | ICD-10-CM

## 2023-08-01 LAB
25(OH)D3 SERPL-MCNC: 17 NG/ML (ref 30–100)
ALBUMIN SERPL BCP-MCNC: 4.2 G/DL (ref 3.2–4.9)
ALBUMIN/GLOB SERPL: 1.4 G/DL
ALP SERPL-CCNC: 118 U/L (ref 30–99)
ALT SERPL-CCNC: 13 U/L (ref 2–50)
ANION GAP SERPL CALC-SCNC: 13 MMOL/L (ref 7–16)
AST SERPL-CCNC: 19 U/L (ref 12–45)
BASOPHILS # BLD AUTO: 0.8 % (ref 0–1.8)
BASOPHILS # BLD: 0.05 K/UL (ref 0–0.12)
BILIRUB SERPL-MCNC: 0.6 MG/DL (ref 0.1–1.5)
BUN SERPL-MCNC: 14 MG/DL (ref 8–22)
CALCIUM ALBUM COR SERPL-MCNC: 9.4 MG/DL (ref 8.5–10.5)
CALCIUM SERPL-MCNC: 9.6 MG/DL (ref 8.5–10.5)
CHLORIDE SERPL-SCNC: 111 MMOL/L (ref 96–112)
CHOLEST SERPL-MCNC: 156 MG/DL (ref 100–199)
CO2 SERPL-SCNC: 23 MMOL/L (ref 20–33)
CREAT SERPL-MCNC: 0.83 MG/DL (ref 0.5–1.4)
CREAT UR-MCNC: 152.83 MG/DL
EOSINOPHIL # BLD AUTO: 0.12 K/UL (ref 0–0.51)
EOSINOPHIL NFR BLD: 2 % (ref 0–6.9)
ERYTHROCYTE [DISTWIDTH] IN BLOOD BY AUTOMATED COUNT: 46.2 FL (ref 35.9–50)
EST. AVERAGE GLUCOSE BLD GHB EST-MCNC: 111 MG/DL
GFR SERPLBLD CREATININE-BSD FMLA CKD-EPI: 68 ML/MIN/1.73 M 2
GLOBULIN SER CALC-MCNC: 2.9 G/DL (ref 1.9–3.5)
GLUCOSE SERPL-MCNC: 81 MG/DL (ref 65–99)
HBA1C MFR BLD: 5.5 % (ref 4–5.6)
HCT VFR BLD AUTO: 39.4 % (ref 37–47)
HDLC SERPL-MCNC: 61 MG/DL
HGB BLD-MCNC: 12.7 G/DL (ref 12–16)
IMM GRANULOCYTES # BLD AUTO: 0.04 K/UL (ref 0–0.11)
IMM GRANULOCYTES NFR BLD AUTO: 0.7 % (ref 0–0.9)
LDLC SERPL CALC-MCNC: 74 MG/DL
LYMPHOCYTES # BLD AUTO: 2.08 K/UL (ref 1–4.8)
LYMPHOCYTES NFR BLD: 33.9 % (ref 22–41)
MCH RBC QN AUTO: 28.4 PG (ref 27–33)
MCHC RBC AUTO-ENTMCNC: 32.2 G/DL (ref 32.2–35.5)
MCV RBC AUTO: 88.1 FL (ref 81.4–97.8)
MICROALBUMIN UR-MCNC: 6.3 MG/DL
MICROALBUMIN/CREAT UR: 41 MG/G (ref 0–30)
MONOCYTES # BLD AUTO: 0.69 K/UL (ref 0–0.85)
MONOCYTES NFR BLD AUTO: 11.2 % (ref 0–13.4)
NEUTROPHILS # BLD AUTO: 3.16 K/UL (ref 1.82–7.42)
NEUTROPHILS NFR BLD: 51.4 % (ref 44–72)
NRBC # BLD AUTO: 0 K/UL
NRBC BLD-RTO: 0 /100 WBC (ref 0–0.2)
PLATELET # BLD AUTO: 284 K/UL (ref 164–446)
PMV BLD AUTO: 11.2 FL (ref 9–12.9)
POTASSIUM SERPL-SCNC: 3.3 MMOL/L (ref 3.6–5.5)
PROT SERPL-MCNC: 7.1 G/DL (ref 6–8.2)
RBC # BLD AUTO: 4.47 M/UL (ref 4.2–5.4)
SODIUM SERPL-SCNC: 147 MMOL/L (ref 135–145)
T3FREE SERPL-MCNC: 3.54 PG/ML (ref 2–4.4)
T4 FREE SERPL-MCNC: 1.7 NG/DL (ref 0.93–1.7)
TRIGL SERPL-MCNC: 106 MG/DL (ref 0–149)
TSH SERPL DL<=0.005 MIU/L-ACNC: 0.01 UIU/ML (ref 0.38–5.33)
WBC # BLD AUTO: 6.1 K/UL (ref 4.8–10.8)

## 2023-08-01 PROCEDURE — 82570 ASSAY OF URINE CREATININE: CPT

## 2023-08-01 PROCEDURE — 84443 ASSAY THYROID STIM HORMONE: CPT

## 2023-08-01 PROCEDURE — 82043 UR ALBUMIN QUANTITATIVE: CPT

## 2023-08-01 PROCEDURE — 84681 ASSAY OF C-PEPTIDE: CPT

## 2023-08-01 PROCEDURE — 36415 COLL VENOUS BLD VENIPUNCTURE: CPT

## 2023-08-01 PROCEDURE — 85025 COMPLETE CBC W/AUTO DIFF WBC: CPT

## 2023-08-01 PROCEDURE — 83036 HEMOGLOBIN GLYCOSYLATED A1C: CPT | Mod: GA

## 2023-08-01 PROCEDURE — 84439 ASSAY OF FREE THYROXINE: CPT

## 2023-08-01 PROCEDURE — 80053 COMPREHEN METABOLIC PANEL: CPT

## 2023-08-01 PROCEDURE — 84481 FREE ASSAY (FT-3): CPT

## 2023-08-01 PROCEDURE — 80061 LIPID PANEL: CPT

## 2023-08-01 PROCEDURE — 82306 VITAMIN D 25 HYDROXY: CPT

## 2023-08-03 LAB — C PEPTIDE SERPL-MCNC: 2.2 NG/ML (ref 0.5–3.3)

## 2023-08-10 PROBLEM — D68.9 COAGULOPATHY (HCC): Chronic | Status: ACTIVE | Noted: 2021-10-22

## 2023-08-18 PROBLEM — F43.9 STRESS: Chronic | Status: ACTIVE | Noted: 2022-02-14

## 2024-02-08 PROBLEM — F33.42 RECURRENT MAJOR DEPRESSIVE DISORDER, IN FULL REMISSION (HCC): Status: ACTIVE | Noted: 2022-05-06

## 2024-02-08 PROBLEM — G89.29 CHRONIC BILATERAL LOW BACK PAIN WITHOUT SCIATICA: Chronic | Status: ACTIVE | Noted: 2022-12-27

## 2024-02-08 PROBLEM — F33.42 RECURRENT MAJOR DEPRESSIVE DISORDER, IN FULL REMISSION (HCC): Chronic | Status: ACTIVE | Noted: 2022-05-06

## 2024-02-08 PROBLEM — M54.50 CHRONIC BILATERAL LOW BACK PAIN WITHOUT SCIATICA: Chronic | Status: ACTIVE | Noted: 2022-12-27

## 2024-02-26 ENCOUNTER — HOSPITAL ENCOUNTER (INPATIENT)
Facility: MEDICAL CENTER | Age: 89
LOS: 3 days | DRG: 291 | End: 2024-02-29
Attending: STUDENT IN AN ORGANIZED HEALTH CARE EDUCATION/TRAINING PROGRAM | Admitting: STUDENT IN AN ORGANIZED HEALTH CARE EDUCATION/TRAINING PROGRAM
Payer: MEDICARE

## 2024-02-26 ENCOUNTER — APPOINTMENT (OUTPATIENT)
Dept: RADIOLOGY | Facility: MEDICAL CENTER | Age: 89
DRG: 291 | End: 2024-02-26
Attending: STUDENT IN AN ORGANIZED HEALTH CARE EDUCATION/TRAINING PROGRAM
Payer: MEDICARE

## 2024-02-26 DIAGNOSIS — R06.00 DYSPNEA, UNSPECIFIED TYPE: ICD-10-CM

## 2024-02-26 DIAGNOSIS — J90 PLEURAL EFFUSION: ICD-10-CM

## 2024-02-26 DIAGNOSIS — R79.89 ELEVATED BRAIN NATRIURETIC PEPTIDE (BNP) LEVEL: ICD-10-CM

## 2024-02-26 DIAGNOSIS — I50.21 ACUTE SYSTOLIC CONGESTIVE HEART FAILURE (HCC): ICD-10-CM

## 2024-02-26 PROBLEM — E78.5 HYPERLIPIDEMIA: Status: RESOLVED | Noted: 2020-08-24 | Resolved: 2024-02-26

## 2024-02-26 PROBLEM — E87.6 HYPOKALEMIA: Status: ACTIVE | Noted: 2024-02-26

## 2024-02-26 PROBLEM — Z71.89 ACP (ADVANCE CARE PLANNING): Status: ACTIVE | Noted: 2024-02-26

## 2024-02-26 PROBLEM — Z95.1 HX OF CABG: Status: ACTIVE | Noted: 2024-02-26

## 2024-02-26 LAB
ALBUMIN SERPL BCP-MCNC: 3.7 G/DL (ref 3.2–4.9)
ALBUMIN/GLOB SERPL: 1 G/DL
ALP SERPL-CCNC: 94 U/L (ref 30–99)
ALT SERPL-CCNC: 8 U/L (ref 2–50)
ANION GAP SERPL CALC-SCNC: 16 MMOL/L (ref 7–16)
AST SERPL-CCNC: 23 U/L (ref 12–45)
BASOPHILS # BLD AUTO: 1 % (ref 0–1.8)
BASOPHILS # BLD: 0.06 K/UL (ref 0–0.12)
BILIRUB SERPL-MCNC: 1.4 MG/DL (ref 0.1–1.5)
BUN SERPL-MCNC: 10 MG/DL (ref 8–22)
CALCIUM ALBUM COR SERPL-MCNC: 9.4 MG/DL (ref 8.5–10.5)
CALCIUM SERPL-MCNC: 9.2 MG/DL (ref 8.5–10.5)
CHLORIDE SERPL-SCNC: 101 MMOL/L (ref 96–112)
CO2 SERPL-SCNC: 23 MMOL/L (ref 20–33)
CREAT SERPL-MCNC: 0.97 MG/DL (ref 0.5–1.4)
EKG IMPRESSION: NORMAL
EOSINOPHIL # BLD AUTO: 0.01 K/UL (ref 0–0.51)
EOSINOPHIL NFR BLD: 0.2 % (ref 0–6.9)
ERYTHROCYTE [DISTWIDTH] IN BLOOD BY AUTOMATED COUNT: 53.2 FL (ref 35.9–50)
FLUAV RNA SPEC QL NAA+PROBE: NEGATIVE
FLUBV RNA SPEC QL NAA+PROBE: NEGATIVE
GFR SERPLBLD CREATININE-BSD FMLA CKD-EPI: 57 ML/MIN/1.73 M 2
GLOBULIN SER CALC-MCNC: 3.6 G/DL (ref 1.9–3.5)
GLUCOSE SERPL-MCNC: 115 MG/DL (ref 65–99)
HCT VFR BLD AUTO: 41.7 % (ref 37–47)
HGB BLD-MCNC: 13.6 G/DL (ref 12–16)
IMM GRANULOCYTES # BLD AUTO: 0.03 K/UL (ref 0–0.11)
IMM GRANULOCYTES NFR BLD AUTO: 0.5 % (ref 0–0.9)
LYMPHOCYTES # BLD AUTO: 1.2 K/UL (ref 1–4.8)
LYMPHOCYTES NFR BLD: 19.9 % (ref 22–41)
MCH RBC QN AUTO: 26 PG (ref 27–33)
MCHC RBC AUTO-ENTMCNC: 32.6 G/DL (ref 32.2–35.5)
MCV RBC AUTO: 79.7 FL (ref 81.4–97.8)
MONOCYTES # BLD AUTO: 0.51 K/UL (ref 0–0.85)
MONOCYTES NFR BLD AUTO: 8.5 % (ref 0–13.4)
NEUTROPHILS # BLD AUTO: 4.21 K/UL (ref 1.82–7.42)
NEUTROPHILS NFR BLD: 69.9 % (ref 44–72)
NRBC # BLD AUTO: 0 K/UL
NRBC BLD-RTO: 0 /100 WBC (ref 0–0.2)
NT-PROBNP SERPL IA-MCNC: ABNORMAL PG/ML (ref 0–125)
PLATELET # BLD AUTO: 233 K/UL (ref 164–446)
PMV BLD AUTO: 10.8 FL (ref 9–12.9)
POTASSIUM SERPL-SCNC: 2.9 MMOL/L (ref 3.6–5.5)
PROT SERPL-MCNC: 7.3 G/DL (ref 6–8.2)
RBC # BLD AUTO: 5.23 M/UL (ref 4.2–5.4)
RSV RNA SPEC QL NAA+PROBE: NEGATIVE
SARS-COV-2 RNA RESP QL NAA+PROBE: NOTDETECTED
SODIUM SERPL-SCNC: 140 MMOL/L (ref 135–145)
TROPONIN T SERPL-MCNC: 29 NG/L (ref 6–19)
WBC # BLD AUTO: 6 K/UL (ref 4.8–10.8)

## 2024-02-26 PROCEDURE — 83880 ASSAY OF NATRIURETIC PEPTIDE: CPT

## 2024-02-26 PROCEDURE — 71045 X-RAY EXAM CHEST 1 VIEW: CPT

## 2024-02-26 PROCEDURE — 93005 ELECTROCARDIOGRAM TRACING: CPT

## 2024-02-26 PROCEDURE — 80053 COMPREHEN METABOLIC PANEL: CPT

## 2024-02-26 PROCEDURE — 96374 THER/PROPH/DIAG INJ IV PUSH: CPT

## 2024-02-26 PROCEDURE — 84484 ASSAY OF TROPONIN QUANT: CPT

## 2024-02-26 PROCEDURE — 36415 COLL VENOUS BLD VENIPUNCTURE: CPT

## 2024-02-26 PROCEDURE — 770020 HCHG ROOM/CARE - TELE (206)

## 2024-02-26 PROCEDURE — 0241U HCHG SARS-COV-2 COVID-19 NFCT DS RESP RNA 4 TRGT ED POC: CPT

## 2024-02-26 PROCEDURE — A9270 NON-COVERED ITEM OR SERVICE: HCPCS | Mod: JZ | Performed by: STUDENT IN AN ORGANIZED HEALTH CARE EDUCATION/TRAINING PROGRAM

## 2024-02-26 PROCEDURE — 99223 1ST HOSP IP/OBS HIGH 75: CPT | Mod: AI,25 | Performed by: STUDENT IN AN ORGANIZED HEALTH CARE EDUCATION/TRAINING PROGRAM

## 2024-02-26 PROCEDURE — 700111 HCHG RX REV CODE 636 W/ 250 OVERRIDE (IP): Performed by: STUDENT IN AN ORGANIZED HEALTH CARE EDUCATION/TRAINING PROGRAM

## 2024-02-26 PROCEDURE — 85025 COMPLETE CBC W/AUTO DIFF WBC: CPT

## 2024-02-26 PROCEDURE — 700102 HCHG RX REV CODE 250 W/ 637 OVERRIDE(OP): Mod: JZ | Performed by: STUDENT IN AN ORGANIZED HEALTH CARE EDUCATION/TRAINING PROGRAM

## 2024-02-26 PROCEDURE — 99285 EMERGENCY DEPT VISIT HI MDM: CPT

## 2024-02-26 PROCEDURE — 93005 ELECTROCARDIOGRAM TRACING: CPT | Performed by: STUDENT IN AN ORGANIZED HEALTH CARE EDUCATION/TRAINING PROGRAM

## 2024-02-26 PROCEDURE — 99497 ADVNCD CARE PLAN 30 MIN: CPT | Performed by: STUDENT IN AN ORGANIZED HEALTH CARE EDUCATION/TRAINING PROGRAM

## 2024-02-26 RX ORDER — ESTRADIOL 1 MG/1
1 TABLET ORAL DAILY
Status: DISCONTINUED | OUTPATIENT
Start: 2024-02-27 | End: 2024-02-29 | Stop reason: HOSPADM

## 2024-02-26 RX ORDER — LEVOTHYROXINE SODIUM 0.12 MG/1
125 TABLET ORAL
Status: DISCONTINUED | OUTPATIENT
Start: 2024-02-27 | End: 2024-02-29 | Stop reason: HOSPADM

## 2024-02-26 RX ORDER — LOSARTAN POTASSIUM 25 MG/1
25 TABLET ORAL DAILY
Status: DISCONTINUED | OUTPATIENT
Start: 2024-02-27 | End: 2024-02-29 | Stop reason: HOSPADM

## 2024-02-26 RX ORDER — FUROSEMIDE 10 MG/ML
40 INJECTION INTRAMUSCULAR; INTRAVENOUS ONCE
Status: COMPLETED | OUTPATIENT
Start: 2024-02-26 | End: 2024-02-26

## 2024-02-26 RX ORDER — POTASSIUM CHLORIDE 20 MEQ/1
40 TABLET, EXTENDED RELEASE ORAL
Status: DISPENSED | OUTPATIENT
Start: 2024-02-26 | End: 2024-02-26

## 2024-02-26 RX ORDER — FUROSEMIDE 10 MG/ML
40 INJECTION INTRAMUSCULAR; INTRAVENOUS
Status: DISCONTINUED | OUTPATIENT
Start: 2024-02-27 | End: 2024-02-28

## 2024-02-26 RX ORDER — CLOPIDOGREL BISULFATE 75 MG/1
75 TABLET ORAL DAILY
Status: DISCONTINUED | OUTPATIENT
Start: 2024-02-27 | End: 2024-02-29 | Stop reason: HOSPADM

## 2024-02-26 RX ORDER — ACETAMINOPHEN 325 MG/1
650 TABLET ORAL EVERY 6 HOURS PRN
Status: DISCONTINUED | OUTPATIENT
Start: 2024-02-26 | End: 2024-02-29 | Stop reason: HOSPADM

## 2024-02-26 RX ORDER — DULOXETIN HYDROCHLORIDE 60 MG/1
60 CAPSULE, DELAYED RELEASE ORAL DAILY
Status: DISCONTINUED | OUTPATIENT
Start: 2024-02-27 | End: 2024-02-29 | Stop reason: HOSPADM

## 2024-02-26 RX ORDER — GABAPENTIN 300 MG/1
600 CAPSULE ORAL 2 TIMES DAILY
Status: DISCONTINUED | OUTPATIENT
Start: 2024-02-26 | End: 2024-02-29 | Stop reason: HOSPADM

## 2024-02-26 RX ORDER — ATORVASTATIN CALCIUM 10 MG/1
10 TABLET, FILM COATED ORAL NIGHTLY
Status: DISCONTINUED | OUTPATIENT
Start: 2024-02-26 | End: 2024-02-29 | Stop reason: HOSPADM

## 2024-02-26 RX ADMIN — GABAPENTIN 600 MG: 300 CAPSULE ORAL at 20:35

## 2024-02-26 RX ADMIN — POTASSIUM CHLORIDE 40 MEQ: 1500 TABLET, EXTENDED RELEASE ORAL at 20:34

## 2024-02-26 RX ADMIN — FUROSEMIDE 40 MG: 10 INJECTION INTRAMUSCULAR; INTRAVENOUS at 19:15

## 2024-02-26 ASSESSMENT — FIBROSIS 4 INDEX: FIB4 SCORE: 1.61

## 2024-02-26 NOTE — ED TRIAGE NOTES
"Chief Complaint   Patient presents with    Shortness of Breath     \"For a while\"     Pt BIB EMS for complaints of SOB and feeling cold. Pt states this has been going on for a while but can't state exactly how long.     /75   Pulse 86   Temp 36.7 °C (98.1 °F) (Temporal)   Resp (!) 24   Ht 1.702 m (5' 7\")   Wt 68.9 kg (152 lb)   SpO2 (!) 86%     "

## 2024-02-27 ENCOUNTER — APPOINTMENT (OUTPATIENT)
Dept: RADIOLOGY | Facility: MEDICAL CENTER | Age: 89
DRG: 291 | End: 2024-02-27
Attending: STUDENT IN AN ORGANIZED HEALTH CARE EDUCATION/TRAINING PROGRAM
Payer: MEDICARE

## 2024-02-27 ENCOUNTER — APPOINTMENT (OUTPATIENT)
Dept: CARDIOLOGY | Facility: MEDICAL CENTER | Age: 89
DRG: 291 | End: 2024-02-27
Attending: STUDENT IN AN ORGANIZED HEALTH CARE EDUCATION/TRAINING PROGRAM
Payer: MEDICARE

## 2024-02-27 PROBLEM — I50.21 ACUTE SYSTOLIC CONGESTIVE HEART FAILURE (HCC): Status: ACTIVE | Noted: 2020-08-24

## 2024-02-27 LAB
ANION GAP SERPL CALC-SCNC: 13 MMOL/L (ref 7–16)
BUN SERPL-MCNC: 10 MG/DL (ref 8–22)
CALCIUM SERPL-MCNC: 8.7 MG/DL (ref 8.5–10.5)
CHLORIDE SERPL-SCNC: 103 MMOL/L (ref 96–112)
CO2 SERPL-SCNC: 26 MMOL/L (ref 20–33)
CREAT SERPL-MCNC: 1.02 MG/DL (ref 0.5–1.4)
EKG IMPRESSION: NORMAL
ERYTHROCYTE [DISTWIDTH] IN BLOOD BY AUTOMATED COUNT: 54 FL (ref 35.9–50)
GFR SERPLBLD CREATININE-BSD FMLA CKD-EPI: 53 ML/MIN/1.73 M 2
GLUCOSE SERPL-MCNC: 89 MG/DL (ref 65–99)
HCT VFR BLD AUTO: 39.1 % (ref 37–47)
HGB BLD-MCNC: 12.6 G/DL (ref 12–16)
MCH RBC QN AUTO: 26.1 PG (ref 27–33)
MCHC RBC AUTO-ENTMCNC: 32.2 G/DL (ref 32.2–35.5)
MCV RBC AUTO: 81.1 FL (ref 81.4–97.8)
PLATELET # BLD AUTO: 221 K/UL (ref 164–446)
PMV BLD AUTO: 11 FL (ref 9–12.9)
POTASSIUM SERPL-SCNC: 3.3 MMOL/L (ref 3.6–5.5)
RBC # BLD AUTO: 4.82 M/UL (ref 4.2–5.4)
SODIUM SERPL-SCNC: 142 MMOL/L (ref 135–145)
TROPONIN T SERPL-MCNC: 35 NG/L (ref 6–19)
WBC # BLD AUTO: 6.1 K/UL (ref 4.8–10.8)

## 2024-02-27 PROCEDURE — A9270 NON-COVERED ITEM OR SERVICE: HCPCS | Performed by: INTERNAL MEDICINE

## 2024-02-27 PROCEDURE — 85027 COMPLETE CBC AUTOMATED: CPT

## 2024-02-27 PROCEDURE — C1729 CATH, DRAINAGE: HCPCS

## 2024-02-27 PROCEDURE — 93306 TTE W/DOPPLER COMPLETE: CPT

## 2024-02-27 PROCEDURE — 93005 ELECTROCARDIOGRAM TRACING: CPT | Performed by: STUDENT IN AN ORGANIZED HEALTH CARE EDUCATION/TRAINING PROGRAM

## 2024-02-27 PROCEDURE — 84484 ASSAY OF TROPONIN QUANT: CPT

## 2024-02-27 PROCEDURE — A9270 NON-COVERED ITEM OR SERVICE: HCPCS

## 2024-02-27 PROCEDURE — 93306 TTE W/DOPPLER COMPLETE: CPT | Mod: 26 | Performed by: INTERNAL MEDICINE

## 2024-02-27 PROCEDURE — 99223 1ST HOSP IP/OBS HIGH 75: CPT | Mod: GC | Performed by: INTERNAL MEDICINE

## 2024-02-27 PROCEDURE — 700102 HCHG RX REV CODE 250 W/ 637 OVERRIDE(OP)

## 2024-02-27 PROCEDURE — 700102 HCHG RX REV CODE 250 W/ 637 OVERRIDE(OP): Performed by: STUDENT IN AN ORGANIZED HEALTH CARE EDUCATION/TRAINING PROGRAM

## 2024-02-27 PROCEDURE — 770020 HCHG ROOM/CARE - TELE (206)

## 2024-02-27 PROCEDURE — 0W993ZZ DRAINAGE OF RIGHT PLEURAL CAVITY, PERCUTANEOUS APPROACH: ICD-10-PCS | Performed by: RADIOLOGY

## 2024-02-27 PROCEDURE — 97535 SELF CARE MNGMENT TRAINING: CPT

## 2024-02-27 PROCEDURE — 90662 IIV NO PRSV INCREASED AG IM: CPT | Performed by: STUDENT IN AN ORGANIZED HEALTH CARE EDUCATION/TRAINING PROGRAM

## 2024-02-27 PROCEDURE — A9270 NON-COVERED ITEM OR SERVICE: HCPCS | Performed by: STUDENT IN AN ORGANIZED HEALTH CARE EDUCATION/TRAINING PROGRAM

## 2024-02-27 PROCEDURE — 36415 COLL VENOUS BLD VENIPUNCTURE: CPT

## 2024-02-27 PROCEDURE — 99233 SBSQ HOSP IP/OBS HIGH 50: CPT | Performed by: STUDENT IN AN ORGANIZED HEALTH CARE EDUCATION/TRAINING PROGRAM

## 2024-02-27 PROCEDURE — 80048 BASIC METABOLIC PNL TOTAL CA: CPT

## 2024-02-27 PROCEDURE — 700117 HCHG RX CONTRAST REV CODE 255: Performed by: STUDENT IN AN ORGANIZED HEALTH CARE EDUCATION/TRAINING PROGRAM

## 2024-02-27 PROCEDURE — 3E02340 INTRODUCTION OF INFLUENZA VACCINE INTO MUSCLE, PERCUTANEOUS APPROACH: ICD-10-PCS | Performed by: STUDENT IN AN ORGANIZED HEALTH CARE EDUCATION/TRAINING PROGRAM

## 2024-02-27 PROCEDURE — 93010 ELECTROCARDIOGRAM REPORT: CPT | Performed by: INTERNAL MEDICINE

## 2024-02-27 PROCEDURE — 90471 IMMUNIZATION ADMIN: CPT

## 2024-02-27 PROCEDURE — 71045 X-RAY EXAM CHEST 1 VIEW: CPT

## 2024-02-27 PROCEDURE — 700111 HCHG RX REV CODE 636 W/ 250 OVERRIDE (IP): Performed by: STUDENT IN AN ORGANIZED HEALTH CARE EDUCATION/TRAINING PROGRAM

## 2024-02-27 PROCEDURE — 97162 PT EVAL MOD COMPLEX 30 MIN: CPT

## 2024-02-27 PROCEDURE — 700102 HCHG RX REV CODE 250 W/ 637 OVERRIDE(OP): Performed by: INTERNAL MEDICINE

## 2024-02-27 RX ORDER — METOPROLOL SUCCINATE 25 MG/1
12.5 TABLET, EXTENDED RELEASE ORAL EVERY EVENING
Status: DISCONTINUED | OUTPATIENT
Start: 2024-02-27 | End: 2024-02-29 | Stop reason: HOSPADM

## 2024-02-27 RX ORDER — OXYCODONE HYDROCHLORIDE 5 MG/1
5 TABLET ORAL EVERY 4 HOURS PRN
Status: DISCONTINUED | OUTPATIENT
Start: 2024-02-27 | End: 2024-02-28

## 2024-02-27 RX ORDER — SPIRONOLACTONE 25 MG/1
25 TABLET ORAL
Status: DISCONTINUED | OUTPATIENT
Start: 2024-02-27 | End: 2024-02-29

## 2024-02-27 RX ORDER — POTASSIUM CHLORIDE 20 MEQ/1
40 TABLET, EXTENDED RELEASE ORAL ONCE
Status: COMPLETED | OUTPATIENT
Start: 2024-02-27 | End: 2024-02-27

## 2024-02-27 RX ADMIN — LEVOTHYROXINE SODIUM 125 MCG: 0.12 TABLET ORAL at 05:37

## 2024-02-27 RX ADMIN — ATORVASTATIN CALCIUM 10 MG: 10 TABLET, FILM COATED ORAL at 20:34

## 2024-02-27 RX ADMIN — OXYCODONE 5 MG: 5 TABLET ORAL at 11:16

## 2024-02-27 RX ADMIN — FUROSEMIDE 40 MG: 10 INJECTION, SOLUTION INTRAVENOUS at 10:40

## 2024-02-27 RX ADMIN — ESTRADIOL 1 MG: 1 TABLET ORAL at 05:37

## 2024-02-27 RX ADMIN — ATORVASTATIN CALCIUM 10 MG: 10 TABLET, FILM COATED ORAL at 01:30

## 2024-02-27 RX ADMIN — HUMAN ALBUMIN MICROSPHERES AND PERFLUTREN 3 ML: 10; .22 INJECTION, SOLUTION INTRAVENOUS at 11:45

## 2024-02-27 RX ADMIN — INFLUENZA A VIRUS A/VICTORIA/4897/2022 IVR-238 (H1N1) ANTIGEN (FORMALDEHYDE INACTIVATED), INFLUENZA A VIRUS A/DARWIN/9/2021 SAN-010 (H3N2) ANTIGEN (FORMALDEHYDE INACTIVATED), INFLUENZA B VIRUS B/PHUKET/3073/2013 ANTIGEN (FORMALDEHYDE INACTIVATED), AND INFLUENZA B VIRUS B/MICHIGAN/01/2021 ANTIGEN (FORMALDEHYDE INACTIVATED) 0.7 ML: 60; 60; 60; 60 INJECTION, SUSPENSION INTRAMUSCULAR at 05:37

## 2024-02-27 RX ADMIN — DULOXETINE HYDROCHLORIDE 60 MG: 60 CAPSULE, DELAYED RELEASE ORAL at 05:37

## 2024-02-27 RX ADMIN — ACETAMINOPHEN 650 MG: 325 TABLET, FILM COATED ORAL at 03:53

## 2024-02-27 RX ADMIN — POTASSIUM CHLORIDE 40 MEQ: 1500 TABLET, EXTENDED RELEASE ORAL at 09:37

## 2024-02-27 RX ADMIN — LOSARTAN POTASSIUM 25 MG: 25 TABLET, FILM COATED ORAL at 05:37

## 2024-02-27 RX ADMIN — METOPROLOL SUCCINATE 12.5 MG: 25 TABLET, EXTENDED RELEASE ORAL at 17:48

## 2024-02-27 RX ADMIN — OXYCODONE 5 MG: 5 TABLET ORAL at 05:37

## 2024-02-27 RX ADMIN — OXYCODONE 5 MG: 5 TABLET ORAL at 22:39

## 2024-02-27 RX ADMIN — ACETAMINOPHEN 650 MG: 325 TABLET, FILM COATED ORAL at 23:41

## 2024-02-27 RX ADMIN — GABAPENTIN 600 MG: 300 CAPSULE ORAL at 05:37

## 2024-02-27 RX ADMIN — GABAPENTIN 600 MG: 300 CAPSULE ORAL at 17:48

## 2024-02-27 RX ADMIN — SPIRONOLACTONE 25 MG: 25 TABLET ORAL at 17:49

## 2024-02-27 RX ADMIN — ACETAMINOPHEN 650 MG: 325 TABLET, FILM COATED ORAL at 11:15

## 2024-02-27 RX ADMIN — CLOPIDOGREL BISULFATE 75 MG: 75 TABLET ORAL at 05:37

## 2024-02-27 ASSESSMENT — LIFESTYLE VARIABLES
ON A TYPICAL DAY WHEN YOU DRINK ALCOHOL HOW MANY DRINKS DO YOU HAVE: 0
ALCOHOL_USE: NO
HAVE PEOPLE ANNOYED YOU BY CRITICIZING YOUR DRINKING: NO
HAVE YOU EVER FELT YOU SHOULD CUT DOWN ON YOUR DRINKING: NO
AVERAGE NUMBER OF DAYS PER WEEK YOU HAVE A DRINK CONTAINING ALCOHOL: 0
TOTAL SCORE: 0
TOTAL SCORE: 0
EVER FELT BAD OR GUILTY ABOUT YOUR DRINKING: NO
SUBSTANCE_ABUSE: 0
HOW MANY TIMES IN THE PAST YEAR HAVE YOU HAD 5 OR MORE DRINKS IN A DAY: 0
EVER HAD A DRINK FIRST THING IN THE MORNING TO STEADY YOUR NERVES TO GET RID OF A HANGOVER: NO
TOTAL SCORE: 0
CONSUMPTION TOTAL: NEGATIVE

## 2024-02-27 ASSESSMENT — COGNITIVE AND FUNCTIONAL STATUS - GENERAL
SUGGESTED CMS G CODE MODIFIER MOBILITY: CJ
SUGGESTED CMS G CODE MODIFIER DAILY ACTIVITY: CJ
STANDING UP FROM CHAIR USING ARMS: A LITTLE
MOBILITY SCORE: 18
HELP NEEDED FOR BATHING: A LITTLE
SUGGESTED CMS G CODE MODIFIER MOBILITY: CK
TOILETING: A LITTLE
MOVING TO AND FROM BED TO CHAIR: A LITTLE
DAILY ACTIVITIY SCORE: 20
STANDING UP FROM CHAIR USING ARMS: A LITTLE
MOBILITY SCORE: 20
WALKING IN HOSPITAL ROOM: A LITTLE
MOVING FROM LYING ON BACK TO SITTING ON SIDE OF FLAT BED: A LITTLE
CLIMB 3 TO 5 STEPS WITH RAILING: A LOT
DRESSING REGULAR LOWER BODY CLOTHING: A LOT
WALKING IN HOSPITAL ROOM: A LITTLE
CLIMB 3 TO 5 STEPS WITH RAILING: A LOT

## 2024-02-27 ASSESSMENT — ENCOUNTER SYMPTOMS
FEVER: 0
NAUSEA: 0
INSOMNIA: 0
SHORTNESS OF BREATH: 1
PALPITATIONS: 0
COUGH: 1
HEMOPTYSIS: 0
SPUTUM PRODUCTION: 0
BACK PAIN: 0
DIZZINESS: 0
BLURRED VISION: 0
ABDOMINAL PAIN: 0
EYE PAIN: 0
CHILLS: 0
HEADACHES: 0
VOMITING: 0

## 2024-02-27 ASSESSMENT — FIBROSIS 4 INDEX: FIB4 SCORE: 3.04

## 2024-02-27 ASSESSMENT — PAIN DESCRIPTION - PAIN TYPE
TYPE: ACUTE PAIN
TYPE: CHRONIC PAIN
TYPE: ACUTE PAIN

## 2024-02-27 ASSESSMENT — PATIENT HEALTH QUESTIONNAIRE - PHQ9
SUM OF ALL RESPONSES TO PHQ9 QUESTIONS 1 AND 2: 0
2. FEELING DOWN, DEPRESSED, IRRITABLE, OR HOPELESS: NOT AT ALL
1. LITTLE INTEREST OR PLEASURE IN DOING THINGS: NOT AT ALL

## 2024-02-27 ASSESSMENT — GAIT ASSESSMENTS
GAIT LEVEL OF ASSIST: CONTACT GUARD ASSIST
DEVIATION: INCREASED BASE OF SUPPORT;BRADYKINETIC;DECREASED HEEL STRIKE;DECREASED TOE OFF
DISTANCE (FEET): 10

## 2024-02-27 NOTE — PROGRESS NOTES
4 Eyes Skin Assessment Completed by ESTEFANY Matos and PARTH Roman-A.    Head WDL  Ears WDL  Nose WDL  Mouth WDL  Neck WDL  Breast/Chest WDL  Shoulder Blades WDL  Spine WDL  (R) Arm/Elbow/Hand WDL  (L) Arm/Elbow/Hand WDL  Abdomen WDL  Groin WDL  Scrotum/Coccyx/Buttocks Redness and Blanching  (R) Leg WDL  (L) Leg WDL  (R) Heel/Foot/Toe Redness and Blanching  (L) Heel/Foot/Toe Redness and Blanching          Devices In Places Tele Box, Blood Pressure Cuff, Pulse Ox, and Nasal Cannula      Interventions In Place NC W/Ear Foams, Pillows, and Heels Loaded W/Pillows    Possible Skin Injury No    Pictures Uploaded Into Epic N/A  Wound Consult Placed N/A  RN Wound Prevention Protocol Ordered No

## 2024-02-27 NOTE — ASSESSMENT & PLAN NOTE
Reports progressively worsening shortness of breath, worse upon supine position.  Noted to have bilateral pleural effusion, right greater than left.  Patient has elevated BNP and hypokalemia.  Lasix IV daily  Echo shows new LV EF 20-25%, moderate TR/MR  S/p thoracentesis, 1.9L removed 2/27; follow up fluid labs  Oxygen as needed, keep O2 of 90%  Cardiology consulted for new CHF

## 2024-02-27 NOTE — H&P
"Hospital Medicine History & Physical Note    Date of Service  2/26/2024    Primary Care Physician  PERLITA Lew    Consultants  None    Code Status  DNAR/DNI    Chief Complaint  Chief Complaint   Patient presents with    Shortness of Breath     \"For a while\"       History of Presenting Illness  Davie Montejo is a 89 y.o. female who presented 2/26/2024 with shortness of breath.  Patient reports for the last few weeks she has had progressively worsening shortness of breath.  States that it is worse upon exertion, and worse upon supine position.  She reports that shortness of breath is improved upon sitting up.  She reports occasional sharp chest pain, that is worse with deep inspiration.  She denies lower extremity swelling, fever, chills, vomiting, diarrhea.  Patient lives alone at home and is able to take care of herself.  As symptoms not improved, decision was made to come to ER for evaluation.    In ER, patient found to be on 2 L of oxygen, saturating 97%. Found to have K 2.9, BNP 53702. Chest x-ray showing bilateral pleural effusion, right side greater than left.  Admitted for pleural effusion.    I discussed the plan of care with patient.    Review of Systems  ROS    Past Medical History   has a past medical history of Acute blood loss anemia (10/21/2021), Acute on chronic systolic heart failure (HCC) (8/24/2020), Acute perforated gastric ulcer with hemorrhage (HCC) (10/21/2021), Anemia (2/14/2022), Angina decubitus (6/18/2010), CAD (coronary artery disease), Cannabis use, unspecified with intoxication, uncomplicated (HCC) (2/14/2022), Fibromyalgia, Generalized abdominal pain (12/5/2021), Heart attack (HCC) (2008), Hypotension due to medication (7/18/2022), Intractable nausea and vomiting (12/4/2021), and Pain in the chest (11/15/2020).    Surgical History   has a past surgical history that includes coronary artery bypas, 4 (2008) and coronary artery bypass, 1 (2008).     Family History  family " "history is not on file.   Family history reviewed with patient. There is no family history that is pertinent to the chief complaint.     Social History   reports that she has quit smoking. She has never used smokeless tobacco. She reports that she does not currently use drugs. She reports that she does not drink alcohol.    Allergies  Allergies   Allergen Reactions    Tramadol Rash     Itchy red in nature    Latex Unspecified    Codeine Unspecified     Unknown reaction      Lisinopril Unspecified     Unknown reaction    Penicillin G Unspecified     Unknown reaction      Pregabalin Unspecified     Lyrica affects patients vision.    Simvastatin Unspecified     Unknown reaction      Sulfa Drugs Unspecified     Patient states \"I can't remember what this does to me\" but recalls and allergy.        Medications  Prior to Admission Medications   Prescriptions Last Dose Informant Patient Reported? Taking?   DULoxetine (CYMBALTA) 60 MG Cap DR Particles delayed-release capsule   No No   Sig: Take 1 Capsule by mouth every day. AFTER FINISHING 30MG DAILY FOR 7 DAYS.  FOR FIBROMYALGIA PAIN.   Misc Natural Products (TURMERIC CURCUMIN) Cap   Yes No   Sig: Take 250 mg by mouth every day.   acetaminophen (TYLENOL 8 HOUR) 650 MG CR tablet   No No   Sig: Take 1 Tablet by mouth 2 times a day.   atorvastatin (LIPITOR) 10 MG Tab   No No   Sig: Take 1 Tablet by mouth every evening. FOR CHOLESTEROL.   clopidogrel (PLAVIX) 75 MG Tab   No No   Sig: TAKE 1 TABLET BY MOUTH EVERY DAY.   estradiol (ESTRACE) 1 MG Tab   No No   Sig: TAKE 1 TABLET BY MOUTH EVERY DAY.   gabapentin (NEURONTIN) 600 MG tablet   No No   Sig: Take 1 Tablet by mouth 2 times a day.   levothyroxine (SYNTHROID) 125 MCG Tab   No No   Sig: TAKE 1 TABLET BY MOUTH EVERY MORNING ON AN EMPTY STOMACH.   loratadine (CLARITIN) 10 MG Tab   No No   Sig: Take 1 Tablet by mouth every day.   losartan (COZAAR) 25 MG Tab   No No   Sig: Take 1 Tablet by mouth every day. FOR BLOOD PRESSURE "   meloxicam (MOBIC) 15 MG tablet   No No   Sig: Take 1 Tablet by mouth every day. FOR BACK PAIN   nitroglycerin (NITROSTAT) 0.4 MG SL Tab   No No   Sig: PLACE 1 TABLET UNDER THE TONGUE AS NEEDED FOR CHEST PAIN. CALL MD IF THREE PILLS TAKEN EVERY 5 MIN WITH NO *   omeprazole (PRILOSEC) 20 MG delayed-release capsule   No No   Sig: TAKE 1 CAPSULE BY MOUTH EVERY DAY.   polyethylene glycol 3350 (MIRALAX) 17 GM/SCOOP Powder   No No   Sig: Take 17 g by mouth 1 time a day as needed (CONSTIPATION).   sucralfate (CARAFATE) 1 GM Tab   No No   Sig: TAKE 1 TABLET BY MOUTH TWO (TWO) TIMES A DAY.      Facility-Administered Medications: None       Physical Exam  Temp:  [36.7 °C (98.1 °F)] 36.7 °C (98.1 °F)  Pulse:  [64-86] 64  Resp:  [24] 24  BP: (134-139)/(73-75) 139/73  SpO2:  [86 %-97 %] 97 %  Blood Pressure : 139/73   Temperature: 36.7 °C (98.1 °F)   Pulse: 64   Respiration: (!) 24   Pulse Oximetry: 97 %       Physical Exam  Constitutional:       Appearance: Normal appearance. She is normal weight.   HENT:      Head: Normocephalic.      Nose: Nose normal.      Mouth/Throat:      Mouth: Mucous membranes are moist.   Eyes:      Pupils: Pupils are equal, round, and reactive to light.   Cardiovascular:      Rate and Rhythm: Normal rate and regular rhythm.      Pulses: Normal pulses.   Pulmonary:      Comments: Bibasilar crackles, right side greater than left  Abdominal:      General: Abdomen is flat. Bowel sounds are normal. There is no distension.      Palpations: Abdomen is soft.   Musculoskeletal:         General: No swelling.      Cervical back: Neck supple.   Skin:     General: Skin is warm.   Neurological:      General: No focal deficit present.      Mental Status: She is alert and oriented to person, place, and time.      Cranial Nerves: No cranial nerve deficit.   Psychiatric:         Mood and Affect: Mood normal.         Behavior: Behavior normal.         Thought Content: Thought content normal.         Judgment: Judgment  normal.         Laboratory:  Recent Labs     02/26/24  1536   WBC 6.0   RBC 5.23   HEMOGLOBIN 13.6   HEMATOCRIT 41.7   MCV 79.7*   MCH 26.0*   MCHC 32.6   RDW 53.2*   PLATELETCT 233   MPV 10.8     Recent Labs     02/26/24  1536   SODIUM 140   POTASSIUM 2.9*   CHLORIDE 101   CO2 23   GLUCOSE 115*   BUN 10   CREATININE 0.97   CALCIUM 9.2     Recent Labs     02/26/24  1536   ALTSGPT 8   ASTSGOT 23   ALKPHOSPHAT 94   TBILIRUBIN 1.4   GLUCOSE 115*         Recent Labs     02/26/24  1536   NTPROBNP 62426*         Recent Labs     02/26/24  1536   TROPONINT 29*       Imaging:  DX-CHEST-PORTABLE (1 VIEW)   Final Result      1. No pulmonary vascular congestion.   2. Bilateral pleural effusions, moderate to large on the right and small on the left.   3. Stable postsurgical changes in the sternum and mediastinum.      EC-ECHOCARDIOGRAM COMPLETE W/O CONT    (Results Pending)       X-Ray:  I have personally reviewed the images and compared with prior images.    Assessment/Plan:  Justification for Admission Status  I anticipate this patient will require at least two midnights for appropriate medical management, necessitating inpatient admission because pt has pleural effusion    Patient will need a Telemetry bed on MEDICAL service .  The need is secondary to pleural effusion.    * Pleural effusion- (present on admission)  Assessment & Plan  Reports progressively worsening shortness of breath, worse upon supine position.  Noted to have bilateral pleural effusion, right greater than left.  Patient has elevated BNP and hypokalemia.  Lasix IV daily  TTE  Thoracocentesis  Oxygen as needed, keep O2 of 90%    ACP (advance care planning)  Assessment & Plan  16-minute spent discussing goals of care with patient.  She was explained that she has pleural effusions and that she will have an ultrasound of the heart in the morning along with a possibility of thoracocentesis.  She lives alone at home and is able to take care of herself.  She was  asked about CODE STATUS, to which she states that she would not want her aggressive measures to prolong her life.  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

## 2024-02-27 NOTE — THERAPY
"Physical Therapy   Initial Evaluation     Patient Name: Davie Montejo  Age:  89 y.o., Sex:  female  Medical Record #: [id number]  Today's Date: 2/27/2024     Precautions  Precautions: Fall Risk    Assessment  Patient is 89 y.o. female admitted for worsening shortness of breath found to have bilateral pleural effusion.  PMH includes CABG, CAD, seizure disorder, fibromyalgia, peptic ulcer disease, dementia with paranoia, alcohol abuse, polypharmacy.      Patient received in bed and agreeable to PT session. Pt was able to mobilize at a SBA level with occasional unsteadiness. Pt was able to ambulate to the bathroom with no AD, however reported her balance felt off and required HHA back to bed. Pt declined further ambulation or stair training due to fatigue. Pt is currently mobilizing below baseline and is primarily limited by decreased functional strength, activity tolerance, and balance. Currently recommend post acute placement to address functional needs; pt agreeable. Will follow for acute care PT needs.    Plan    Physical Therapy Initial Treatment Plan   Treatment Plan : Bed Mobility, Gait Training, Neuro Re-Education / Balance, Self Care / Home Evaluation, Stair Training, Therapeutic Activities, Therapeutic Exercise  Treatment Frequency: 4 Times per Week  Duration: Until Therapy Goals Met    DC Equipment Recommendations: Unable to determine at this time (likely FWW)  Discharge Recommendations: Recommend post-acute placement for additional physical therapy services prior to discharge home       Subjective    \"My friend and I go walking often\".      Objective       02/27/24 0836   Initial Contact Note    Initial Contact Note Order Received and Verified, Physical Therapy Evaluation in Progress with Full Report to Follow.   Precautions   Precautions Fall Risk   Vitals   Pulse Oximetry 94 %   O2 (LPM) 3   O2 Delivery Device Silicone Nasal Cannula   Vitals Comments pt declined dizziness throughout session   Pain " 0 - 10 Group   Therapist Pain Assessment Post Activity Pain Same as Prior to Activity;Nurse Notified  (pain not rated)   Prior Living Situation   Prior Services Home-Independent   Housing / Facility 2 Story Apartment / Condo   Steps Into Home   (2 half FOS)   Rail Both Rail (Steps into Home)   Equipment Owned Single Point Cane   Lives with - Patient's Self Care Capacity Alone and Able to Care For Self   Prior Level of Functional Mobility   Bed Mobility Independent   Transfer Status Independent   Ambulation Independent   Assistive Devices Used None   Stairs Independent   History of Falls   History of Falls No   Cognition    Cognition / Consciousness WDL   Comments pleasant and cooperative   Active ROM Upper Body   Active ROM Upper Body  WDL   Strength Upper Body   Upper Body Strength  X   Comments generalized weakness, functional   Upper Body Muscle Tone   Upper Body Muscle Tone  WDL   Active ROM Lower Body    Active ROM Lower Body  WDL   Strength Lower Body   Lower Body Strength  X   Comments generalized weakness, functional for ambulation   Sensation Lower Body   Lower Extremity Sensation   WDL   Lower Body Muscle Tone   Lower Body Muscle Tone  WDL   Neurological Concerns   Neurological Concerns No   Coordination Lower Body    Coordination Lower Body  WDL   Balance Assessment   Sitting Balance (Static) Fair +   Sitting Balance (Dynamic) Fair   Standing Balance (Static) Fair -   Standing Balance (Dynamic) Fair -   Weight Shift Sitting Good   Weight Shift Standing Fair   Comments no AD   Bed Mobility    Supine to Sit Standby Assist   Sit to Supine Standby Assist   Scooting Standby Assist   Rolling Standby Assist   Comments HOB slightly elevated   Gait Analysis   Gait Level Of Assist Contact Guard Assist   Assistive Device None   Distance (Feet) 10   # of Times Distance was Traveled 2   Deviation Increased Base Of Support;Bradykinetic;Decreased Heel Strike;Decreased Toe Off   # of Stairs Climbed 0   Weight Bearing  Status no restrictions   Functional Mobility   Sit to Stand Standby Assist   Bed, Chair, Wheelchair Transfer Standby Assist   Toilet Transfers Standby Assist   Mobility bed <> ambulate to bathroom   How much difficulty does the patient currently have...   Turning over in bed (including adjusting bedclothes, sheets and blankets)? 4   Sitting down on and standing up from a chair with arms (e.g., wheelchair, bedside commode, etc.) 3   Moving from lying on back to sitting on the side of the bed? 3   How much help from another person does the patient currently need...   Moving to and from a bed to a chair (including a wheelchair)? 3   Need to walk in a hospital room? 3   Climbing 3-5 steps with a railing? 2   6 clicks Mobility Score 18   Short Term Goals    Short Term Goal # 1 Pt will be able to perform supine <> sit with bed flat with SPV in 6 visits to improve bed mobility   Short Term Goal # 2 Pt will be able to perform STS with SPV in 6 visits to improve functional mobility   Short Term Goal # 3 Pt will be able to ambulate 250ft with LRAD in 6 visits to improve functional ambulation   Short Term Goal # 4 Pt will be able to navigate a FOS with SPV in 6 visits in order to safely navigate her home   Education Group   Education Provided Role of Physical Therapist   Role of Physical Therapist Patient Response Patient;Acceptance;Explanation;Verbal Demonstration   Physical Therapy Initial Treatment Plan    Treatment Plan  Bed Mobility;Gait Training;Neuro Re-Education / Balance;Self Care / Home Evaluation;Stair Training;Therapeutic Activities;Therapeutic Exercise   Treatment Frequency 4 Times per Week   Duration Until Therapy Goals Met   Problem List    Problems Impaired Bed Mobility;Functional Strength Deficit;Decreased Activity Tolerance   Anticipated Discharge Equipment and Recommendations   DC Equipment Recommendations Unable to determine at this time  (likely FWW)   Discharge Recommendations Recommend post-acute  placement for additional physical therapy services prior to discharge home   Interdisciplinary Plan of Care Collaboration   IDT Collaboration with  Nursing   Patient Position at End of Therapy In Bed;Bed Alarm On;Call Light within Reach;Tray Table within Reach;Phone within Reach   Collaboration Comments RN updated   Session Information   Date / Session Number  2/27 - 1 (1/4, 3/04)

## 2024-02-27 NOTE — PROGRESS NOTES
Report received, patient transported to T8 via gurney, ACT-A, RN, and Lucio with oxygen running. Upon arrival patient A&Ox4, VS's, patient complaining of chronic pain and some dyspnea with exertion. Education provided on safety, diet, POC, fall precautions in place, all current questions and concerns addressed, call light within reach. Belongings accounted for.

## 2024-02-27 NOTE — ED PROVIDER NOTES
"ED Provider Note    CHIEF COMPLAINT  Chief Complaint   Patient presents with    Shortness of Breath     \"For a while\"       EXTERNAL RECORDS REVIEWED  Outpatient Notes patient seen by geriatrician 2/8/2024 for follow-up.  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 female who presents to the emergency department for evaluation of shortness of breath that has been going on now for the last few months.  She states initially it occurred only when she was attempting to walk around but now occurs at rest.  She also reports she is having difficulty laying flat as this makes her symptoms worse.  She denies any chest pain or pressure, cough fever, leg swelling or additional symptoms.    PAST MEDICAL HISTORY   has a past medical history of Acute blood loss anemia (10/21/2021), Acute on chronic systolic heart failure (HCC) (8/24/2020), Acute perforated gastric ulcer with hemorrhage ([facility]) (10/21/2021), Anemia (2/14/2022), Angina decubitus (6/18/2010), CAD (coronary artery disease), Cannabis use, unspecified with intoxication, uncomplicated ([facility]) (2/14/2022), Fibromyalgia, Generalized abdominal pain (12/5/2021), Heart attack ([facility]) (2008), Hypotension due to medication (7/18/2022), Intractable nausea and vomiting (12/4/2021), and Pain in the chest (11/15/2020).    SURGICAL HISTORY   has a past surgical history that includes coronary artery bypas, 4 (2008) and coronary artery bypass, 1 (2008).    FAMILY HISTORY  No family history on file.    SOCIAL HISTORY  Social History     Tobacco Use    Smoking status: Former    Smokeless tobacco: Never   Vaping Use    Vaping Use: Never used   Substance and Sexual Activity    Alcohol use: Never    Drug use: Not Currently    Sexual activity: Not Currently       CURRENT MEDICATIONS  Home " "Medications       Reviewed by Oswaldo Johnston (Pharmacy Tech) on 02/26/24 at 1938  Med List Status: Complete     Medication Last Dose Status   atorvastatin (LIPITOR) 10 MG Tab 2/25/2024 Active   clopidogrel (PLAVIX) 75 MG Tab 2/25/2024 Active   DULoxetine (CYMBALTA) 60 MG Cap DR Particles delayed-release capsule 2/25/2024 Active   estradiol (ESTRACE) 1 MG Tab 2/25/2024 Active   gabapentin (NEURONTIN) 600 MG tablet 2/25/2024 Active   levothyroxine (SYNTHROID) 125 MCG Tab 2/26/2024 Active   loratadine (CLARITIN) 10 MG Tab 2/25/2024 Active   losartan (COZAAR) 25 MG Tab 2/25/2024 Active   Misc Natural Products (TURMERIC CURCUMIN) Cap 2/25/2024 Active   nitroglycerin (NITROSTAT) 0.4 MG SL Tab 2/26/2024 Active   omeprazole (PRILOSEC) 20 MG delayed-release capsule 2/25/2024 Active   sucralfate (CARAFATE) 1 GM Tab 2/25/2024 Active                    ALLERGIES  Allergies   Allergen Reactions    Latex Unspecified          Tramadol Rash     Itchy red in nature    Codeine Unspecified     Unknown reaction      Lisinopril Unspecified     Unknown reaction    Penicillin G Unspecified     Unknown reaction      Pregabalin Unspecified     Lyrica affects patients vision.    Simvastatin Unspecified     Unknown reaction      Sulfa Drugs Unspecified     Patient states \"I can't remember what this does to me\" but recalls and allergy.        PHYSICAL EXAM  VITAL SIGNS: /73   Pulse 64   Temp 36.7 °C (98.1 °F) (Temporal)   Resp (!) 24   Ht 1.702 m (5' 7\")   Wt 68.9 kg (152 lb)   LMP  (LMP Unknown)   SpO2 97%   BMI 23.81 kg/m²    Constitutional: No acute distress, pleasant elderly female  HEENT: Atraumatic, normocephalic, pupils are equal round reactive to light, nose normal, mouth shows moist mucous membranes  Neck: Supple, bilateral elevated JVD, no tracheal deviation  Cardiovascular: Regular rate and rhythm, no murmur, rub or gallop, 2+ pulses peripherally x4  Thorax & Lungs: No respiratory distress, crackles through the mid " to lower lung fields bilaterally  GI: Soft, non-distended, non-tender, no rebound  Skin: Warm, dry, no acute rash or lesion  Musculoskeletal: Moving all extremities, no acute deformity, no edema, no tenderness  Neurologic: A&Ox3, at baseline mentation, cranial nerves II through XII are grossly intact, no sensory deficit, no ataxia  Psychiatric: Appropriate affect for situation at this time      DIAGNOSTIC STUDIES / PROCEDURES  EKG  I have independently interpreted this EKG  Results for orders placed or performed during the hospital encounter of 24   EKG   Result Value Ref Range    Report       [facility] Emergency Dept.    Test Date:  2024  Pt Name:    JUNIOR CHIU            Department: ER  MRN:        [id number]                      Room:        23  Gender:     Female                       Technician: [id number]  :        1935                   Requested By:ER TRIAGE PROTOCOL  Order #:    [id number]                    Reading MD: Joe Chiu    Measurements  Intervals                                Axis  Rate:       77                           P:          52  TX:         182                          QRS:        -57  QRSD:       148                          T:          146  QT:         531  QTc:        602    Interpretive Statements  Sinus arrhythmia at a rate of 77 with frequent PACs, left anterior fascicular  block, left bundle branch block not meeting Sgarbossa criteria but new  compared to prior EKG in   Electronically Signed On 2024 16:10:50 PST by Joe Chiu           LABS  Labs Reviewed   CBC WITH DIFFERENTIAL - Abnormal; Notable for the following components:       Result Value    MCV 79.7 (*)     MCH 26.0 (*)     RDW 53.2 (*)     Lymphocytes 19.90 (*)     All other components within normal limits   COMP METABOLIC PANEL - Abnormal; Notable for the following components:    Potassium 2.9 (*)     Glucose 115 (*)     Globulin 3.6 (*)     All other  components within normal limits   ESTIMATED GFR - Abnormal; Notable for the following components:    GFR (CKD-EPI) 57 (*)     All other components within normal limits   PROBRAIN NATRIURETIC PEPTIDE, NT - Abnormal; Notable for the following components:    NT-proBNP 67140 (*)     All other components within normal limits   TROPONIN - Abnormal; Notable for the following components:    Troponin T 29 (*)     All other components within normal limits   POCT COV-2, FLU A/B, RSV BY PCR   POC COV-2, FLU A/B, RSV BY PCR         RADIOLOGY  I have independently interpreted the diagnostic imaging associated with this visit and am waiting the final reading from the radiologist.   My preliminary interpretation is as follows: Bilateral moderate pleural effusions.  Radiologist interpretation:   DX-CHEST-PORTABLE (1 VIEW)   Final Result      1. No pulmonary vascular congestion.   2. Bilateral pleural effusions, moderate to large on the right and small on the left.   3. Stable postsurgical changes in the sternum and mediastinum.      EC-ECHOCARDIOGRAM COMPLETE W/O CONT    (Results Pending)   US-THORACENTESIS PUNCTURE RIGHT    (Results Pending)         COURSE & MEDICAL DECISION MAKING    ED Observation Status? No; Patient does not meet criteria for ED Observation.     INITIAL ASSESSMENT, COURSE AND PLAN  Care Narrative:     Patient presents to the emergency department for evaluation of subacute to chronic worsening dyspnea initially on exertion now at rest, orthopnea.  Is notably hypoxic on room air requiring 2 L supplemental oxygen which is new for her.  Chart review demonstrates history of biventricular heart failure the last echocardiogram in 2021 demonstrated an EF of 65%.  Patient not on diuretics.  Also EKG today demonstrates possible new onset atrial fibrillation versus significant ectopy and underlying sinus rhythm.  She is rate controlled however, blood pressure stable.  She denies any symptoms suggestive of ACS.  EKG not  acutely ischemic as she had a prior interventricular conduction delay and now left bundle branch block.  Will plan for troponin, BNP, basic labs and likely admission for echocardiogram.    Laboratory workup including elevated BNP greater than 14,000 concerning for heart failure with exacerbation.  Suspect elevated troponin is demand in the setting of this given no chest pain or pressure.  Will plan for IV Lasix and admission for echocardiogram.  Discussed this with patient who is on board with this plan.    Case discussed with admitting hospitalist Dr. Hanna who accepts patient for admission.      ADDITIONAL PROBLEM LIST  Hypokalemia, elevated troponin    DISPOSITION AND DISCUSSIONS  I have discussed management of the patient with the following physicians and LEYLA's: Dr. Hanna hospitalist    Discussion of management with other [facility] or appropriate source(s): None     FINAL DIAGNOSIS  1. Dyspnea, unspecified type    2. Elevated brain natriuretic peptide (BNP) level    3. Pleural effusion           Electronically signed by: Joe Chiu M.D., 2/26/2024 4:00 PM

## 2024-02-27 NOTE — ED NOTES
Bedside report received from off going RN/tech: Padmini, assumed care of patient.  POC discussed with patient. Call light within reach, all needs addressed at this time.       Fall risk interventions in place: Not Applicable (all applicable per [facility] Fall risk assessment)   Continuous monitoring: Pulse Ox or Blood Pressure  IVF/IV medications: Not Applicable   Oxygen: Room Air  Bedside sitter: Not Applicable   Isolation: Not Applicable

## 2024-02-27 NOTE — ASSESSMENT & PLAN NOTE
16-minute spent discussing goals of care with patient.  She was explained that she has pleural effusions and that she will have an ultrasound of the heart in the morning along with a possibility of thoracocentesis.  She lives alone at home and is able to take care of herself.  She was asked about CODE STATUS, to which she states that she would not want her aggressive measures to prolong her life.  Patient request to be DNR/DNI, okay for medical treatment

## 2024-02-27 NOTE — CARE PLAN
The patient is Stable - Low risk of patient condition declining or worsening    Shift Goals  Clinical Goals: NPO, Echo, Monitor Labs, pain cntrol  Patient Goals: pain control, sleep, breath easier  Family Goals: na    Progress made toward(s) clinical / shift goals:    Problem: Knowledge Deficit - Standard  Goal: Patient and family/care givers will demonstrate understanding of plan of care, disease process/condition, diagnostic tests and medications  Outcome: Progressing     Problem: Pain - Standard  Goal: Alleviation of pain or a reduction in pain to the patient’s comfort goal  Outcome: Progressing       Patient is not progressing towards the following goals:

## 2024-02-27 NOTE — ED NOTES
Medication history reviewed with patient at bedside.   Med rec is complete  Allergies reviewed.   Patient has not had any outpatient antibiotics in the last 30 days.   Anticoagulants: No    Oswaldo Johnston

## 2024-02-28 ENCOUNTER — APPOINTMENT (OUTPATIENT)
Dept: RADIOLOGY | Facility: MEDICAL CENTER | Age: 89
DRG: 291 | End: 2024-02-28
Attending: STUDENT IN AN ORGANIZED HEALTH CARE EDUCATION/TRAINING PROGRAM
Payer: MEDICARE

## 2024-02-28 LAB
ANION GAP SERPL CALC-SCNC: 11 MMOL/L (ref 7–16)
BUN SERPL-MCNC: 16 MG/DL (ref 8–22)
CALCIUM SERPL-MCNC: 8.9 MG/DL (ref 8.5–10.5)
CHLORIDE SERPL-SCNC: 99 MMOL/L (ref 96–112)
CHOLEST SERPL-MCNC: 145 MG/DL (ref 100–199)
CO2 SERPL-SCNC: 26 MMOL/L (ref 20–33)
CREAT SERPL-MCNC: 1.25 MG/DL (ref 0.5–1.4)
GFR SERPLBLD CREATININE-BSD FMLA CKD-EPI: 42 ML/MIN/1.73 M 2
GLUCOSE SERPL-MCNC: 89 MG/DL (ref 65–99)
HDLC SERPL-MCNC: 46 MG/DL
LACTATE SERPL-SCNC: 1.7 MMOL/L (ref 0.5–2)
LACTATE SERPL-SCNC: 2.2 MMOL/L (ref 0.5–2)
LACTATE SERPL-SCNC: 2.4 MMOL/L (ref 0.5–2)
LDLC SERPL CALC-MCNC: 83 MG/DL
POTASSIUM SERPL-SCNC: 3.9 MMOL/L (ref 3.6–5.5)
SODIUM SERPL-SCNC: 136 MMOL/L (ref 135–145)
T3FREE SERPL-MCNC: 1.27 PG/ML (ref 2–4.4)
T4 FREE SERPL-MCNC: 1.16 NG/DL (ref 0.93–1.7)
TRIGL SERPL-MCNC: 82 MG/DL (ref 0–149)
TSH SERPL DL<=0.005 MIU/L-ACNC: 13.2 UIU/ML (ref 0.38–5.33)

## 2024-02-28 PROCEDURE — 770020 HCHG ROOM/CARE - TELE (206)

## 2024-02-28 PROCEDURE — 84439 ASSAY OF FREE THYROXINE: CPT

## 2024-02-28 PROCEDURE — A9270 NON-COVERED ITEM OR SERVICE: HCPCS | Performed by: STUDENT IN AN ORGANIZED HEALTH CARE EDUCATION/TRAINING PROGRAM

## 2024-02-28 PROCEDURE — 700102 HCHG RX REV CODE 250 W/ 637 OVERRIDE(OP): Performed by: STUDENT IN AN ORGANIZED HEALTH CARE EDUCATION/TRAINING PROGRAM

## 2024-02-28 PROCEDURE — 83605 ASSAY OF LACTIC ACID: CPT | Mod: 91

## 2024-02-28 PROCEDURE — 700111 HCHG RX REV CODE 636 W/ 250 OVERRIDE (IP): Mod: JZ | Performed by: STUDENT IN AN ORGANIZED HEALTH CARE EDUCATION/TRAINING PROGRAM

## 2024-02-28 PROCEDURE — 700102 HCHG RX REV CODE 250 W/ 637 OVERRIDE(OP)

## 2024-02-28 PROCEDURE — 71045 X-RAY EXAM CHEST 1 VIEW: CPT

## 2024-02-28 PROCEDURE — 97530 THERAPEUTIC ACTIVITIES: CPT

## 2024-02-28 PROCEDURE — A9270 NON-COVERED ITEM OR SERVICE: HCPCS

## 2024-02-28 PROCEDURE — 84481 FREE ASSAY (FT-3): CPT

## 2024-02-28 PROCEDURE — 99233 SBSQ HOSP IP/OBS HIGH 50: CPT | Performed by: STUDENT IN AN ORGANIZED HEALTH CARE EDUCATION/TRAINING PROGRAM

## 2024-02-28 PROCEDURE — 80061 LIPID PANEL: CPT

## 2024-02-28 PROCEDURE — 36415 COLL VENOUS BLD VENIPUNCTURE: CPT

## 2024-02-28 PROCEDURE — 84443 ASSAY THYROID STIM HORMONE: CPT

## 2024-02-28 PROCEDURE — 80048 BASIC METABOLIC PNL TOTAL CA: CPT

## 2024-02-28 PROCEDURE — 700111 HCHG RX REV CODE 636 W/ 250 OVERRIDE (IP): Performed by: STUDENT IN AN ORGANIZED HEALTH CARE EDUCATION/TRAINING PROGRAM

## 2024-02-28 RX ORDER — METOPROLOL SUCCINATE 25 MG/1
12.5 TABLET, EXTENDED RELEASE ORAL EVERY EVENING
Qty: 30 TABLET | Refills: 0 | Status: SHIPPED | OUTPATIENT
Start: 2024-02-28 | End: 2024-02-29

## 2024-02-28 RX ORDER — POTASSIUM CHLORIDE 750 MG/1
10 TABLET, EXTENDED RELEASE ORAL DAILY
Qty: 60 TABLET | Refills: 0 | Status: ON HOLD | OUTPATIENT
Start: 2024-02-28 | End: 2024-03-11

## 2024-02-28 RX ORDER — FUROSEMIDE 40 MG/1
40 TABLET ORAL DAILY
Qty: 30 TABLET | Refills: 0 | Status: SHIPPED | OUTPATIENT
Start: 2024-02-28 | End: 2024-02-28

## 2024-02-28 RX ORDER — OXYCODONE HYDROCHLORIDE 5 MG/1
5 TABLET ORAL
Status: DISCONTINUED | OUTPATIENT
Start: 2024-02-28 | End: 2024-02-28

## 2024-02-28 RX ORDER — SPIRONOLACTONE 25 MG/1
25 TABLET ORAL DAILY
Qty: 30 TABLET | Refills: 0 | Status: SHIPPED | OUTPATIENT
Start: 2024-02-29 | End: 2024-03-08

## 2024-02-28 RX ORDER — FUROSEMIDE 20 MG/1
20 TABLET ORAL
Status: DISCONTINUED | OUTPATIENT
Start: 2024-02-29 | End: 2024-02-29 | Stop reason: HOSPADM

## 2024-02-28 RX ORDER — OXYCODONE HYDROCHLORIDE 5 MG/1
2.5 TABLET ORAL EVERY 4 HOURS PRN
Status: DISCONTINUED | OUTPATIENT
Start: 2024-02-28 | End: 2024-02-29 | Stop reason: HOSPADM

## 2024-02-28 RX ORDER — KETOROLAC TROMETHAMINE 15 MG/ML
15 INJECTION, SOLUTION INTRAMUSCULAR; INTRAVENOUS EVERY 6 HOURS PRN
Status: DISCONTINUED | OUTPATIENT
Start: 2024-02-28 | End: 2024-02-29 | Stop reason: HOSPADM

## 2024-02-28 RX ORDER — ENOXAPARIN SODIUM 100 MG/ML
30 INJECTION SUBCUTANEOUS DAILY
Status: DISCONTINUED | OUTPATIENT
Start: 2024-02-28 | End: 2024-02-29 | Stop reason: HOSPADM

## 2024-02-28 RX ORDER — OXYCODONE HYDROCHLORIDE 5 MG/1
5 TABLET ORAL EVERY 6 HOURS PRN
Qty: 14 TABLET | Refills: 0 | Status: SHIPPED | OUTPATIENT
Start: 2024-02-28 | End: 2024-02-29

## 2024-02-28 RX ADMIN — DULOXETINE HYDROCHLORIDE 60 MG: 60 CAPSULE, DELAYED RELEASE ORAL at 04:41

## 2024-02-28 RX ADMIN — KETOROLAC TROMETHAMINE 15 MG: 15 INJECTION, SOLUTION INTRAMUSCULAR; INTRAVENOUS at 21:47

## 2024-02-28 RX ADMIN — OXYCODONE 5 MG: 5 TABLET ORAL at 01:49

## 2024-02-28 RX ADMIN — SPIRONOLACTONE 25 MG: 25 TABLET ORAL at 04:41

## 2024-02-28 RX ADMIN — FUROSEMIDE 40 MG: 10 INJECTION, SOLUTION INTRAVENOUS at 04:40

## 2024-02-28 RX ADMIN — OXYCODONE 5 MG: 5 TABLET ORAL at 11:13

## 2024-02-28 RX ADMIN — OXYCODONE 5 MG: 5 TABLET ORAL at 08:33

## 2024-02-28 RX ADMIN — LEVOTHYROXINE SODIUM 125 MCG: 0.12 TABLET ORAL at 04:40

## 2024-02-28 RX ADMIN — ATORVASTATIN CALCIUM 10 MG: 10 TABLET, FILM COATED ORAL at 21:48

## 2024-02-28 RX ADMIN — CLOPIDOGREL BISULFATE 75 MG: 75 TABLET ORAL at 04:41

## 2024-02-28 RX ADMIN — GABAPENTIN 600 MG: 300 CAPSULE ORAL at 04:41

## 2024-02-28 RX ADMIN — ESTRADIOL 1 MG: 1 TABLET ORAL at 04:41

## 2024-02-28 RX ADMIN — ENOXAPARIN SODIUM 30 MG: 100 INJECTION SUBCUTANEOUS at 17:06

## 2024-02-28 RX ADMIN — GABAPENTIN 600 MG: 300 CAPSULE ORAL at 17:06

## 2024-02-28 ASSESSMENT — ENCOUNTER SYMPTOMS
HEADACHES: 0
CHILLS: 0
PALPITATIONS: 0
BLURRED VISION: 0
VOMITING: 0
EYE PAIN: 0
HEMOPTYSIS: 0
ABDOMINAL PAIN: 0
INSOMNIA: 0
FEVER: 0
SPUTUM PRODUCTION: 0
BACK PAIN: 0
SHORTNESS OF BREATH: 1
NAUSEA: 0
COUGH: 1
DIZZINESS: 0

## 2024-02-28 ASSESSMENT — PAIN DESCRIPTION - PAIN TYPE
TYPE: ACUTE PAIN
TYPE: ACUTE PAIN

## 2024-02-28 ASSESSMENT — GAIT ASSESSMENTS: GAIT LEVEL OF ASSIST: UNABLE TO PARTICIPATE

## 2024-02-28 ASSESSMENT — FIBROSIS 4 INDEX: FIB4 SCORE: 3.2

## 2024-02-28 ASSESSMENT — LIFESTYLE VARIABLES: SUBSTANCE_ABUSE: 0

## 2024-02-28 NOTE — CARE PLAN
The patient is Stable - Low risk of patient condition declining or worsening    Shift Goals  Clinical Goals: monitor respiratroy status, IS reinforcement  Patient Goals: pain control  Family Goals: DEE DEE    Progress made toward(s) clinical / shift goals:    Problem: Knowledge Deficit - Standard  Goal: Patient and family/care givers will demonstrate understanding of plan of care, disease process/condition, diagnostic tests and medications  Outcome: Progressing     Problem: Pain - Standard  Goal: Alleviation of pain or a reduction in pain to the patient’s comfort goal  Outcome: Progressing     Problem: Fall Risk  Goal: Patient will remain free from falls  Outcome: Progressing       Patient is not progressing towards the following goals:

## 2024-02-28 NOTE — PROGRESS NOTES
Monitor Summary  Rhythm: sinus arrythmia- afib  Rate: 69-82  Ectopy: PVC, PAC    .- / .15 / .-

## 2024-02-28 NOTE — FACE TO FACE
"Face to Face Note  -  Durable Medical Equipment    Shun Winter M.D. - NPI: [id number]  I certify that this patient is under my care and that they have had a durable medical equipment(DME)face to face encounter by myself that meets the physician DME face-to-face encounter requirements with this patient on:    Date of encounter:   Patient:                    MRN:                       YOB: 2022  Davie Montejo  [id number]  1935     The encounter with the patient was in whole, or in part, for the following medical condition, which is the primary reason for durable medical equipment:  CHF    I certify that, based on my findings, the following durable medical equipment is medically necessary:  Oxygen   HOME O2 Saturation Measurements:(Values must be present for Home Oxygen orders)  Room air sat at rest: 84  Room air sat with amb: 82  With liters of O2: 2, O2 sat at rest with O2: 91  With Liters of O2: 4, O2 sat with amb with O2 : 94  Is the patient mobile?: Yes  If patient feels more short of breath, they can go up to 6 liters per minute and contact healthcare provider.    Supporting Symptoms: The patient requires supplemental oxygen, as the following interventions have been tried with limited or no improvement: \"Ambulation with oximetry.        ------------------------------------------------------------------------------------------------------------------    Face to Face Supporting Documentation - Home Health    The encounter with this patient was in whole or in part the primary reason for home health admission.    Date of encounter:   Patient:                    MRN:                       YOB: 2022  Davie Montejo  [id number]  1935     Home health to see patient for:  Skilled Nursing care for assessment, interventions & education, Physical Therapy evaluation and treatment, and Occupational therapy evaluation and treatment    Skilled need " for:  New Onset Medical Diagnosis congestive heart failure    Skilled nursing interventions to include:  Comment: PT/OT    Homebound evidenced status by:  Needs the assistance of another person in order to leave the home. Leaving home must require a considerable and taxing effort. There must exist a normal inability to leave the home.    Community Physician to provide follow up care: PERLITA Lew     Optional Interventions    Wound information & treatment:    Home Infusion Therapy orders:    Line/Drain/Airway:    I certify the face to face encounter for this home care referral meets the CMS requirements and the encounter/clinical assessment with the patient was, in whole, or in part, for the medical condition(s) listed above, which is the primary reason for home health care. Based on my clinical findings: the service(s) are medically necessary, support the need for home health care, and the homebound criteria are met.  I certify that this patient has had a face to face encounter by myself.  Shun Winter M.D. - NPI: [id number]    *Debility, frailty and advanced age in the absence of an acute deterioration or exacerbation of a condition do not qualify a patient for home health.

## 2024-02-28 NOTE — PROGRESS NOTES
MD Shun Winter ordered lactic acid for pt. Lactic Acid came back at 2.2, MD alerted. MD ordered to repeat lactic Q4hrs, still no fluids at this time. Also no abx at this time. MD states he will adjust lasix for tomorrow and to otherwise monitor BP. No further orders at this time.

## 2024-02-28 NOTE — PROGRESS NOTES
[facility] Medicine Daily Progress Note    Date of Service  2/27/2024    Chief Complaint  Davie Montejo is a 89 y.o. female admitted 2/26/2024 with dyspnea.    Hospital Course  Davie Montejo is a 89 y.o. female who presented 2/26/2024 with shortness of breath.  Patient reports for the last few weeks she has had progressively worsening shortness of breath.  States that it is worse upon exertion, and worse upon supine position.  She reports that shortness of breath is improved upon sitting up.  She reports occasional sharp chest pain, that is worse with deep inspiration.  She denies lower extremity swelling, fever, chills, vomiting, diarrhea.  Patient lives alone at home and is able to take care of herself.  As symptoms not improved, decision was made to come to ER for evaluation.     In ER, patient found to be on 2 L of oxygen, saturating 97%. Found to have K 2.9, BNP 86498. Chest x-ray showing bilateral pleural effusion, right side greater than left.  Admitted for pleural effusion.    Interval Problem Update  No acute events overnight.  On 3L oxygen, wean as tolerated.  Patient feels well overall, reports dyspnea is improving.  S/p thoracentesis right pleural effusion today, 1.9L fluid removed.  Follow up fluid analysis. Suspect transudate.  Echocardiogram shows new LV EF 20-25%, moderate MR and TR. This is new finding compared to previous echo with 60% EF.  Cardiology consulted for consideration of inpatient ischemic evaluation given hx CABG in 2008.  Continue IV lasix for now to assist with pleural effusion resolution.  Repeat troponin trend flat.  K low, repleting orally. Monitor.  Anticipate possible discharge home in next day or two pending fluid analysis and clinical stability.    I have discussed this patient's plan of care and discharge plan at IDT rounds today with Case Management, Nursing, Nursing leadership, and other members of the IDT  team.    Consultants/Specialty  cardiology    Code Status  DNAR/DNI    Disposition  Medically Cleared  I have placed the appropriate orders for post-discharge needs.    Review of Systems  Review of Systems   Constitutional:  Negative for chills and fever.   Eyes:  Negative for blurred vision and pain.   Respiratory:  Positive for cough and shortness of breath. Negative for hemoptysis and sputum production.    Cardiovascular:  Negative for chest pain, palpitations and leg swelling.   Gastrointestinal:  Negative for abdominal pain, nausea and vomiting.   Genitourinary:  Negative for dysuria and urgency.   Musculoskeletal:  Negative for back pain.   Skin:  Negative for itching and rash.   Neurological:  Negative for dizziness and headaches.   Psychiatric/Behavioral:  Negative for substance abuse. The patient does not have insomnia.         Physical Exam  Temp:  [36.2 °C (97.2 °F)-36.8 °C (98.2 °F)] 36.3 °C (97.3 °F)  Pulse:  [66-83] 74  Resp:  [16-20] 20  BP: (108-145)/(48-70) 119/54  SpO2:  [90 %-100 %] 92 %    Physical Exam  Vitals reviewed.   Constitutional:       General: She is not in acute distress.     Appearance: She is not diaphoretic.   HENT:      Head: Normocephalic and atraumatic.      Right Ear: External ear normal.      Left Ear: External ear normal.      Nose: Nose normal. No congestion.      Mouth/Throat:      Pharynx: No oropharyngeal exudate or posterior oropharyngeal erythema.   Eyes:      Extraocular Movements: Extraocular movements intact.      Pupils: Pupils are equal, round, and reactive to light.   Cardiovascular:      Rate and Rhythm: Normal rate and regular rhythm.   Pulmonary:      Effort: Pulmonary effort is normal. No respiratory distress.      Breath sounds: Rhonchi present. No wheezing or rales.   Abdominal:      General: Bowel sounds are normal. There is no distension.      Palpations: Abdomen is soft.      Tenderness: There is no abdominal tenderness.   Musculoskeletal:         General:  No swelling. Normal range of motion.      Cervical back: Normal range of motion and neck supple.      Right lower leg: No edema.      Left lower leg: No edema.   Skin:     General: Skin is warm and dry.   Neurological:      General: No focal deficit present.      Mental Status: She is alert and oriented to person, place, and time.      Cranial Nerves: No cranial nerve deficit.      Sensory: No sensory deficit.      Motor: No weakness.   Psychiatric:         Mood and Affect: Mood normal.         Behavior: Behavior normal.         Fluids    Intake/Output Summary (Last 24 hours) at 2/27/2024 1728  Last data filed at 2/27/2024 1600  Gross per 24 hour   Intake --   Output 600 ml   Net -600 ml       Laboratory  Recent Labs     02/26/24  1536 02/27/24  0544   WBC 6.0 6.1   RBC 5.23 4.82   HEMOGLOBIN 13.6 12.6   HEMATOCRIT 41.7 39.1   MCV 79.7* 81.1*   MCH 26.0* 26.1*   MCHC 32.6 32.2   RDW 53.2* 54.0*   PLATELETCT 233 221   MPV 10.8 11.0     Recent Labs     02/26/24  1536 02/27/24  0544   SODIUM 140 142   POTASSIUM 2.9* 3.3*   CHLORIDE 101 103   CO2 23 26   GLUCOSE 115* 89   BUN 10 10   CREATININE 0.97 1.02   CALCIUM 9.2 8.7                   Imaging  US-THORACENTESIS PUNCTURE RIGHT   Final Result      1. Ultrasound guided right sided diagnostic and therapeutic thoracentesis.      2. 1900 mL of fluid withdrawn.      EC-ECHOCARDIOGRAM COMPLETE W/ CONT   Final Result      DX-CHEST-PORTABLE (1 VIEW)   Final Result      1. No pulmonary vascular congestion.   2. Bilateral pleural effusions, moderate to large on the right and small on the left.   3. Stable postsurgical changes in the sternum and mediastinum.      DX-CHEST-PORTABLE (1 VIEW)    (Results Pending)        Assessment/Plan  * Pleural effusion- (present on admission)  Assessment & Plan  Reports progressively worsening shortness of breath, worse upon supine position.  Noted to have bilateral pleural effusion, right greater than left.  Patient has elevated BNP and  hypokalemia.  Lasix IV daily  Echo shows new LV EF 20-25%, moderate TR/MR  S/p thoracentesis, 1.9L removed 2/27; follow up fluid labs  Oxygen as needed, keep O2 of 90%  Cardiology consulted for new CHF    ACP (advance care planning)  Assessment & Plan  16-minute spent discussing goals of care with patient.  She was explained that she has pleural effusions and that she will have an ultrasound of the heart in the morning along with a possibility of thoracocentesis.  She lives alone at home and is able to take care of herself.  She was asked about CODE STATUS, to which she states that she would not want her aggressive measures to prolong her life.  Patient request to be DNR/DNI, okay for medical treatment    Hypokalemia  Assessment & Plan  Replace    Hx of CABG  Assessment & Plan  Hx of  Continue lipitor    Vascular dementia with paranoia (HCC)- (present on admission)  Assessment & Plan  Hx of    Hypothyroid  Assessment & Plan  Synthroid    Essential hypertension- (present on admission)  Assessment & Plan  Resume home meds    Acute systolic congestive heart failure (HCC)- (present on admission)  Assessment & Plan  Echo shows new LV EF 20-25%, moderate TR and MR; new compared to prior echo with 60% EF  Hx CABG and CAD  Cardiology consulted for new CHF, pleural effusion  Cardiology recommend medical management, outpatient follow up         VTE prophylaxis:   SCDs/TEDs    My total time spent caring for the patient on the day of the encounter was 46 minutes. This time includes reviewing the hospital chart vitals, lab tests, and imaging; counseling and educating the patient about their diagnoses; coordinating care with the nurse, pharmacist, and specialists; documenting clinical information in the electronic medical record.  This does not include time spent on separately billable procedures/tests.

## 2024-02-28 NOTE — PROGRESS NOTES
"1120: PT called RN to report that pt BP dropped to 69/43 while up in chair. When RN arrived to room, pt was in bed and laying flat. BP still 64/39, BP cuff repositioned BP 80's/40's. Pt A&Ox4 but states she \"feels a little dizzy.\" MD Shun Winter walking by pt room and RN called for MD to come inside to assess the pt. RN reported events, MD assessed pt and ordered CXR but no fluids at this time.    1129: BP recovered to 101/54, pt sat up slightly (HOB<30). Pt mentation still intact.    4474-1044: RN continuing to talk with pt and remains at bedside. Pt remains awake and A&Ox4. See VS flowsheet for BP's and position changes.     1200: MD alerted that pt temp 96.6 and that pt is diaphoretic and cool to the touch. Pt still A&Ox4, RN alerted MD that pt did not have an AM CBC and that pt meets some criteria for SIRS. RN requested order for IVF and a lactic acid. MD stated to wait for finding of CXR to come back first before placing any new orders.     1225: MD alerted of results of CXR.  "

## 2024-02-28 NOTE — CONSULTS
CARDIOLOGY CONSULTATION NOTE      Date of Consultation: 2/27/2024  Consulting Provider: Dr. Shun Winter    Patient Name: Davie Montejo  YOB: 1935  MRN: [id number]    Reason for Consultation:   Shortness of breath    History of Present Illness:   Davie Montejo is a 89-year-old female with a past medical history of CAD s/p CABG x4 vessels in 2008 (reported, no records available), HFrEF, hypertension, and hyperlipidemia. Patient states that her CABG was done at [facility] in [place], [place].     Briefly, patient presented on 2/26/2024 with shortness of breath. Reports that for the last few weeks she has had progressively worsening shortness of breath which is exertional in nature.  Her dyspnea is positional: Worse with lying down, better on sitting up.  Patient has no lower extremity swelling, fevers, chills.  She lives alone at home and is able to take care of herself, and is independent with her ADLs.  Patient states that she lives in an apartment which is on second floor, and there is no elevator, so she uses the stairs to get to her apartment.  Patient is very independent and denies any heart failure symptoms.  Currently seen at bedside, patient is on 2 L of oxygen, lying flat in bed, no orthopnea, no crackles or wheezing.    Per chart review, she was seen by Dr. Reynold Zaragoza in 2022 to establish with cardiology. At the time, echocardiogram from 2021 showed no concern for heart failure: the left ventricular ejection fraction is visually estimated to be 65%. Estimated right ventricular systolic pressure is 38 mmHg.      Medical History:     Past Medical History:   Diagnosis Date    Acute blood loss anemia 10/21/2021    Acute on chronic systolic heart failure (HCC) 8/24/2020    Acute perforated gastric ulcer with hemorrhage (HCC) 10/21/2021    Anemia 2/14/2022    Angina decubitus 6/18/2010    CAD (coronary artery disease)     Cannabis use,  unspecified with intoxication, uncomplicated (HCC) 2/14/2022    Fibromyalgia     Generalized abdominal pain 12/5/2021    Heart attack (HCC) 2008    Hypotension due to medication 7/18/2022    Intractable nausea and vomiting 12/4/2021    Pain in the chest 11/15/2020       Surgical History:     Past Surgical History:   Procedure Laterality Date    CORONARY ARTERY BYPAS, 4  2008    CORONARY ARTERY BYPASS, 1  2008       Family History:   No family history on file.    Social History:   The patient is a former smoker, quit smoking years ago.  Denies any alcohol, or recreational IV drug use.    Medications and Allergies:     Current Facility-Administered Medications   Medication Dose Route Frequency Provider Last Rate Last Admin    oxyCODONE immediate-release (Roxicodone) tablet 5 mg  5 mg Oral Q4HRS PRN PERLITA Rascon   5 mg at 02/27/24 1116    atorvastatin (Lipitor) tablet 10 mg  10 mg Oral Nightly Alejandro Hanna M.D.   10 mg at 02/27/24 0130    clopidogrel (Plavix) tablet 75 mg  75 mg Oral DAILY Alejandro Hanna M.D.   75 mg at 02/27/24 0537    DULoxetine (Cymbalta) capsule 60 mg  60 mg Oral DAILY Alejandro Hanna M.D.   60 mg at 02/27/24 0537    estradiol (Estrace) tablet 1 mg  1 mg Oral DAILY Alejandro Hanna M.D.   1 mg at 02/27/24 0537    gabapentin (Neurontin) capsule 600 mg  600 mg Oral BID Alejandro Hanna M.D.   600 mg at 02/27/24 0537    levothyroxine (Synthroid) tablet 125 mcg  125 mcg Oral AM ES Alejandro Hanna M.D.   125 mcg at 02/27/24 0537    losartan (Cozaar) tablet 25 mg  25 mg Oral DAILY Alejandro Hanna M.D.   25 mg at 02/27/24 0537    acetaminophen (Tylenol) tablet 650 mg  650 mg Oral Q6HRS PRN Alejandro Hanna M.D.   650 mg at 02/27/24 1115    furosemide (Lasix) injection 40 mg  40 mg Intravenous Q DAY Alejandro Hanna M.D.   40 mg at 02/27/24 1040       Allergies   Allergen Reactions    Latex Unspecified          Tramadol Rash     Itchy red in nature    Codeine  "Unspecified     Unknown reaction      Lisinopril Unspecified     Unknown reaction    Penicillin G Unspecified     Unknown reaction      Pregabalin Unspecified     Lyrica affects patients vision.    Simvastatin Unspecified     Unknown reaction      Sulfa Drugs Unspecified     Patient states \"I can't remember what this does to me\" but recalls and allergy.        Review of Systems:   A pertinent review of systems was performed and was unremarkable except as per HPI above.    Vital Signs:   /65   Pulse 72   Temp 36.2 °C (97.2 °F) (Temporal)   Resp 20   Ht 1.702 m (5' 7\")   Wt 64.8 kg (142 lb 13.7 oz)   LMP  (LMP Unknown)   SpO2 96%   BMI 22.37 kg/m²   Vitals:    02/27/24 0718 02/27/24 0836 02/27/24 1257 02/27/24 1405   BP: 113/52  113/48 108/65   Pulse: 70  76 72   Resp: 16  20    Temp: 36.6 °C (97.9 °F)  36.2 °C (97.2 °F)    TempSrc: Temporal  Temporal    SpO2: 90% 94% 90% 96%   Weight:       Height:         Body mass index is 22.37 kg/m².  Oxygen Therapy:  Pulse Oximetry: 96 %, O2 (LPM): 3, O2 Delivery Device: Silicone Nasal Cannula    Physical Examination:   General: Well-appearing, no acute distress  Eyes: Extraocular movements intact, anicteric  HENT: Neck full range of motion, no jugular venous distension  Pulmonary: Normal respiratory effort, crackles noted in left lung base  Cardiovascular: Regular rate and rhythm, some ectopy noted on exam, no murmurs or gallops appreciated  Gastrointestinal: Soft, non-tender, non-distended  Extremities: Warm and well perfused, no lower extremity edema  Neurological: Alert and oriented, no gross focal motor deficits  Psychiatric: Normal affect    Laboratories:   Estimated Creatinine Clearance: 37.8 mL/min (by C-G formula based on SCr of 1.02 mg/dL).  Recent Labs     02/26/24  1536 02/27/24  0544   CREATININE 0.97 1.02   BUN 10 10   POTASSIUM 2.9* 3.3*   SODIUM 140 142   CALCIUM 9.2 8.7   CO2 23 26   ALBUMIN 3.7  --      Recent Labs     02/26/24  1536 " 02/27/24  0544   GLUCOSE 115* 89     Recent Labs     02/26/24  1536   ASTSGOT 23   ALTSGPT 8   ALKPHOSPHAT 94     Recent Labs     02/26/24  1536 02/27/24  0544   WBC 6.0 6.1   HEMOGLOBIN 13.6 12.6   PLATELETCT 233 221     Recent Labs     02/26/24  1536 02/27/24  0849   TROPONINT 29* 35*   NTPROBNP 08507*  --      Lab Results   Component Value Date/Time    LDL 74 08/01/2023 1116    LDL 70 05/18/2023 1537     Lab Results   Component Value Date/Time    HDL 61 08/01/2023 1116    HDL 57 05/18/2023 1537       Lab Results   Component Value Date/Time    TRIGLYCERIDE 106 08/01/2023 1116    TRIGLYCERIDE 103 05/18/2023 1537       Lab Results   Component Value Date/Time    CHOLSTRLTOT 156 08/01/2023 1116    CHOLSTRLTOT 148 05/18/2023 1537       Studies:   Echocardiography, 10/22/2021  The left ventricular ejection fraction is visually estimated to be 65%.  Estimated right ventricular systolic pressure is 38 mmHg.    Echocardiography, 2/26/2024  Severely reduced left ventricular systolic function.  The left ventricular ejection fraction is visually estimated to be 20-25%.  Normal right ventricular size. Reduced right ventricular systolic function.  Moderately dilated left atrium  Moderate mitral annular calcification.  Moderate mitral regurgitation.  Moderate tricuspid regurgitation.  Right heart pressures are consistent with moderate pulmonary   hypertension.    Coronary Angiography/Cardiac Catheterization  None    Stress Testing  None    Electrophysiology  None    Assessment and Recommendations:     Davie Montejo is a 89-year-old female with a past medical history of CAD s/p CABG x4 vessels in 2008 (reported, no records available), HFrEF, hypertension, and hyperlipidemia, who presented for worsening shortness of breath. Initial workup showed bilateral pleural effusion, right worse than left.  She underwent thoracentesis on 2/27/2024, with extraction of almost 2 L of fluid.  Fluid studies currently pending.  Patient  is clinically stable.  No concern for orthopnea.    #Heart failure with reduced ejection fraction  #Pleural effusion, bilateral  #History of coronary artery bypass graft  #Hypertension    -Continue medical management for pleural effusion with diuresis with IV furosemide  -GDMT and medical optimization of heart failure as tolerated by hemodynamics  -Consider left-sided thoracentesis for fluid removal  -Spironolactone 25 mg for GDMT, and to help with hypokalemia  -Check TSH, free T4, free T3.  Previous TSH in 2023 showed decreased levels, concerning for overmedication with levothyroxine. Adjust levothyroxine dose after TSH levels results back  -Check lipid levels  -Patient lives alone in a second floor apartment, independent with her ADLs, and uses the stairs to get to her apartment. No indication for ischemic workup at this time.  However, patient will benefit from cardiology referral in the outpatient setting, given new diagnosis of heart failure.      Please note that this dictation was created using voice recognition software. I have made every reasonable attempt to correct obvious errors, but there may be errors of grammar and possibly content that I did not discover before finalizing the note.     Joe Lange M.D.  Internal Medicine Resident  [facility]

## 2024-02-28 NOTE — ASSESSMENT & PLAN NOTE
Echo shows new LV EF 20-25%, moderate TR and MR; new compared to prior echo with 60% EF  Hx CABG and CAD  Cardiology consulted for new CHF, pleural effusion  Cardiology recommend medical management, outpatient follow up

## 2024-02-28 NOTE — DISCHARGE PLANNING
-[id number]  Agency/Facility Name: [facility]   Outcome: [facility] followed up on DME-O2 referral. Jorge Luis informed that they are working on the order, but there are several orders in front of this one. ETA on order could not be given but it will be delivered today at some point.

## 2024-02-28 NOTE — DISCHARGE INSTRUCTIONS
HF Patient Discharge Instructions  Monitor your weight daily, and maintain a weight chart, to track your weight changes.   Activity as tolerated, unless your Doctor has ordered otherwise. Other activity order: as tolerated.  Follow a low fat, low cholesterol, low salt diet unless instructed otherwise by your Doctor. Read the labels on the back of food products and track your intake of fat, cholesterol and salt.   Fluid Restriction No. If a Fluid Restriction has been ordered by your Doctor, measure fluids with a measuring cup to ensure that you are not exceeding the restriction.   No smoking.  Oxygen Yes. If your Doctor has ordered that you wear Oxygen at home, it is important to wear it as ordered.  Did you receive an explanation from staff on the importance of taking each of your medications and why it is necessary to keep taking them unless your doctor says to stop? Yes  Were all of your questions answered about how to manage your heart failure and what to do if you have increased signs and symptoms after you go home? Yes  Do you feel like your heart failure care team involved you in the care treatment plan and allowed you to make decisions regarding your care while in the hospital and addressed any discharge needs you might have? Yes    See the educational handout provided at discharge for more information on monitoring your daily weight, activity and diet. This also explains more about Heart Failure, symptoms of a flare-up and some of the tests that you have undergone.     Warning Signs of a Flare-Up include:  Swelling in the ankles or lower legs.  Shortness of breath, while at rest, or while doing normal activities.   Shortness of breath at night when in bed, or coughing in bed.   Requiring more pillows to sleep at night, or needing to sit up at night to sleep.  Feeling weak, dizzy or fatigued.     When to call your Doctor:  Call your Primary Care Physician or Cardiologist if:   You experience any pain  radiating to your jaw or neck.  You have any difficulty breathing.  You experience weight gain of 3 lbs in a day or 5 lbs in a week.   You feel any palpitations or irregular heartbeats.  You become dizzy or lose consciousness.   If you have had an angiogram or had a pacemaker or AICD placed, and experience:  Bleeding, drainage or swelling at the surgical / puncture site.  Fever greater than 100.0 F  Shock from internal defibrillator.  Cool and / or numb extremities.     Please access the [facility] My HF Guide/Heart Failure Interactive Workbook:   [email or web address]

## 2024-02-28 NOTE — PROGRESS NOTES
Bedside report received from Gena ALLISON. Pt is A&Aron4, currently in SR. Bed in lowest position, call light within reach, pt resting comfortably. Pt educated to call before getting out of bed. No further needs at this time.       Fall Risk Score: HIGH RISK  Fall risk interventions in place: Place yellow fall risk ID band on patient, Provide patient/family education based on risk assessment, Educate patient/family to call staff for assistance when getting out of bed, Place fall precaution signage outside patient door, Utilize bed/chair fall alarm, Notify charge of high risk for huddle, and Bed alarm connected correctly  Bed type: Regular (Regan Score less than 17 interventions in place)  Patient on cardiac monitor: Yes  IVF/IV medications: Not Applicable   Oxygen: How many liters 1L  Bedside sitter: Not Applicable   Isolation: Not applicable

## 2024-02-28 NOTE — THERAPY
"Physical Therapy   Daily Treatment     Patient Name: Davie Montejo  Age:  89 y.o., Sex:  female  Medical Record #: [id number]  Today's Date: 2/28/2024     Precautions  Precautions: Fall Risk    Assessment    This PT paged to see pt for DME recommendations as she is pending d/c home, declining placement. Before ambulation attempt, pt reporting mild dizziness/lightheadedness, BP 69/43, and pt returned to supine immediately, see details below for further vitals. RN and MD made aware. Continue to recommend placement as pt lives alone, hypotensive, with new O2 needs, at risk for falls and re-admission. However, if pt declines, recommend HH PT. Of note, pt will have significant difficulty carrying an O2 tank up the stairs to get to her apartment. Unsure if pt able to get over shoulder device, however, would greatly benefit, especially if deemed to require FWW. Unable to assess pt's need for FWW this session due to hypotension. Will follow up as appropriate.     Plan    Treatment Plan Status: Continue Current Treatment Plan  Type of Treatment: Bed Mobility, Gait Training, Neuro Re-Education / Balance, Self Care / Home Evaluation, Stair Training, Therapeutic Activities, Therapeutic Exercise  Treatment Frequency: 4 Times per Week  Treatment Duration: Until Therapy Goals Met    DC Equipment Recommendations:  (unable to assess need for AD this session. Will trial FWW, however, pt will need to carry it up the stairs if she were to d/c home)  Discharge Recommendations: Recommend post-acute placement for additional physical therapy services prior to discharge home (If declines placement, recommend HH PT)      Subjective    \"I can't carry that thing up the stairs, I won't make it!\"      Objective     02/28/24 1129   Vitals   Vitals Comments Pt c/o dizziness sitting in chair. BP 69/43, HR 82. Returned pt to supine immediately, BP 64/39 HR 82. Pt placed in trendelenburg position, BP returned to 83/42, HR 82 then few minutes " later 100's/40-50's. RN present, MD made aware   Pain 0 - 10 Group   Therapist Pain Assessment Nurse Notified  (c/o pain in her legs when supine and hypotensive. RN aware)   Cognition    Cognition / Consciousness WDL   Level of Consciousness Alert   Comments Pleasant and cooperative   Active ROM Lower Body    Active ROM Lower Body  WDL   Strength Lower Body   Comments not formally assessed due to hypotension   Balance   Sitting Balance (Static) Fair +   Sitting Balance (Dynamic) Fair   Standing Balance (Static) Fair -   Standing Balance (Dynamic) Fair -   Weight Shift Sitting Good   Weight Shift Standing Fair   Skilled Intervention Verbal Cuing   Comments no AD   Bed Mobility    Sit to Supine Supervised   Scooting Supervised   Comments bed flat   Gait Analysis   Gait Level Of Assist Unable to Participate   Comments due to hypotension   Functional Mobility   Sit to Stand Standby Assist   Bed, Chair, Wheelchair Transfer Standby Assist   Transfer Method Stand Step   Mobility chair back to bed   Skilled Intervention Verbal Cuing;Tactile Cuing   Activity Tolerance   Sitting in Chair up pre session   Sitting Edge of Bed < 30 seconds   Standing < 30 seconds   Short Term Goals    Short Term Goal # 1 Pt will be able to perform supine <> sit with bed flat with SPV in 6 visits to improve bed mobility   Goal Outcome # 1 Progressing as expected   Short Term Goal # 2 Pt will be able to perform STS with SPV in 6 visits to improve functional mobility   Goal Outcome # 2 Progressing as expected   Short Term Goal # 3 Pt will be able to ambulate 250ft with LRAD in 6 visits to improve functional ambulation   Goal Outcome # 3 Progressing as expected   Short Term Goal # 4 Pt will be able to navigate a FOS with SPV in 6 visits in order to safely navigate her home   Goal Outcome # 4 Goal not met   Education Group   Education Provided Role of Physical Therapist   Role of Physical Therapist Patient Response  Patient;Acceptance;Explanation;Verbal Demonstration   Physical Therapy Treatment Plan   Physical Therapy Treatment Plan Continue Current Treatment Plan   Anticipated Discharge Equipment and Recommendations   DC Equipment Recommendations   (unable to assess need for AD this session. Will trial FWW, however, pt will need to carry it up the stairs if she were to d/c home)   Discharge Recommendations Recommend post-acute placement for additional physical therapy services prior to discharge home  (If declines placement, recommend  PT)   Interdisciplinary Plan of Care Collaboration   IDT Collaboration with  Nursing;Physician   Patient Position at End of Therapy In Bed  (in trendelenburg with RN, CNA, and MD in room)   Collaboration Comments RN aware   Session Information   Date / Session Number  2/28- 2 (2/4, 3/4)   Priority 4  (amb/stairs, declining placement pending d/c if BP stable)

## 2024-02-28 NOTE — PROGRESS NOTES
Monitor Summary     Rhythm: Sinus Arrhythmia/Sinus Rhythm  Heart Rate: 66-74  Ectopy: R PVC, F PAC  Measurement: .18/.15/.40

## 2024-02-28 NOTE — PROGRESS NOTES
MD Shun Winter notified pt had no pharmacologic DVT prophylaxis. MD placed order for SQ Lovenox QDay. No further orders at this time.

## 2024-02-28 NOTE — PROGRESS NOTES
Bedside report received from off going RN/tech: Chris, assumed care of patient.     Fall Risk Score: MODERATE RISK  Fall risk interventions in place: Provide patient/family education based on risk assessment, Educate patient/family to call staff for assistance when getting out of bed, Place fall precaution signage outside patient door, and Utilize bed/chair fall alarm  Bed type: Regular (Regan Score less than 17 interventions in place)  Patient on cardiac monitor: Yes  IVF/IV medications: Not Applicable   Oxygen: How many liters 2L  Bedside sitter: Not Applicable   Isolation: Not applicable

## 2024-02-28 NOTE — DISCHARGE PLANNING
LMSW spoke with pt on DC plan due to PT recommending placement. Pt stated that she wants to go home with HH instead of post acute placement. CM messaged MD Winter.    LMSW received HH choice [facility]. [facility]. Along with DME choice for Oxygen with [facility]. Faxed to [facility]. Pending DME and HH orders.     [facility] with [facility] is delivered at bedside.

## 2024-02-28 NOTE — CARE PLAN
The patient is Stable - Low risk of patient condition declining or worsening    Shift Goals  Clinical Goals: monitor resp. status, IS reinforcement, pain control  Patient Goals: pain control  Family Goals: DEE DEE    Progress made toward(s) clinical / shift goals:    Problem: Knowledge Deficit - Standard  Goal: Patient and family/care givers will demonstrate understanding of plan of care, disease process/condition, diagnostic tests and medications  Description: Target End Date:  1-3 days or as soon as patient condition allows    Document in Patient Education    1.  Patient and family/caregiver oriented to unit, equipment, visitation policy and means for communicating concern  2.  Complete/review Learning Assessment  3.  Assess knowledge level of disease process/condition, treatment plan, diagnostic tests and medications  4.  Explain disease process/condition, treatment plan, diagnostic tests and medications  Outcome: Progressing  Note: Pt updated on POC. All questions answered at this time.     Problem: Fall Risk  Goal: Patient will remain free from falls  Description: Target End Date:  Prior to discharge or change in level of care    Document interventions on the Reny Wagner Fall Risk Assessment    1.  Assess for fall risk factors  2.  Implement fall precautions  Outcome: Progressing  Note: Pt is a high fall risk. Bed is in lowest, locked position. Call light and belongings within reach. Bed alarm is on. Pt calls appropriately.

## 2024-02-29 ENCOUNTER — PATIENT OUTREACH (OUTPATIENT)
Dept: SCHEDULING | Facility: IMAGING CENTER | Age: 89
End: 2024-02-29
Payer: MEDICARE

## 2024-02-29 VITALS
HEIGHT: 67 IN | DIASTOLIC BLOOD PRESSURE: 50 MMHG | TEMPERATURE: 96.8 F | SYSTOLIC BLOOD PRESSURE: 91 MMHG | OXYGEN SATURATION: 91 % | BODY MASS INDEX: 23.32 KG/M2 | WEIGHT: 148.59 LBS | RESPIRATION RATE: 17 BRPM | HEART RATE: 70 BPM

## 2024-02-29 LAB
ANION GAP SERPL CALC-SCNC: 12 MMOL/L (ref 7–16)
BASOPHILS # BLD AUTO: 0.8 % (ref 0–1.8)
BASOPHILS # BLD: 0.05 K/UL (ref 0–0.12)
BUN SERPL-MCNC: 21 MG/DL (ref 8–22)
CALCIUM SERPL-MCNC: 8.5 MG/DL (ref 8.5–10.5)
CHLORIDE SERPL-SCNC: 92 MMOL/L (ref 96–112)
CO2 SERPL-SCNC: 25 MMOL/L (ref 20–33)
CREAT SERPL-MCNC: 1.37 MG/DL (ref 0.5–1.4)
EOSINOPHIL # BLD AUTO: 0.19 K/UL (ref 0–0.51)
EOSINOPHIL NFR BLD: 2.9 % (ref 0–6.9)
ERYTHROCYTE [DISTWIDTH] IN BLOOD BY AUTOMATED COUNT: 51.8 FL (ref 35.9–50)
GFR SERPLBLD CREATININE-BSD FMLA CKD-EPI: 37 ML/MIN/1.73 M 2
GLUCOSE SERPL-MCNC: 96 MG/DL (ref 65–99)
HCT VFR BLD AUTO: 35.4 % (ref 37–47)
HGB BLD-MCNC: 11.5 G/DL (ref 12–16)
IMM GRANULOCYTES # BLD AUTO: 0.04 K/UL (ref 0–0.11)
IMM GRANULOCYTES NFR BLD AUTO: 0.6 % (ref 0–0.9)
LYMPHOCYTES # BLD AUTO: 1.86 K/UL (ref 1–4.8)
LYMPHOCYTES NFR BLD: 28.2 % (ref 22–41)
MCH RBC QN AUTO: 26 PG (ref 27–33)
MCHC RBC AUTO-ENTMCNC: 32.5 G/DL (ref 32.2–35.5)
MCV RBC AUTO: 79.9 FL (ref 81.4–97.8)
MONOCYTES # BLD AUTO: 1.04 K/UL (ref 0–0.85)
MONOCYTES NFR BLD AUTO: 15.8 % (ref 0–13.4)
NEUTROPHILS # BLD AUTO: 3.42 K/UL (ref 1.82–7.42)
NEUTROPHILS NFR BLD: 51.7 % (ref 44–72)
NRBC # BLD AUTO: 0 K/UL
NRBC BLD-RTO: 0 /100 WBC (ref 0–0.2)
PLATELET # BLD AUTO: 189 K/UL (ref 164–446)
PMV BLD AUTO: 11 FL (ref 9–12.9)
POTASSIUM SERPL-SCNC: 4 MMOL/L (ref 3.6–5.5)
PROCALCITONIN SERPL-MCNC: 0.12 NG/ML
RBC # BLD AUTO: 4.43 M/UL (ref 4.2–5.4)
SODIUM SERPL-SCNC: 129 MMOL/L (ref 135–145)
WBC # BLD AUTO: 6.6 K/UL (ref 4.8–10.8)

## 2024-02-29 PROCEDURE — 700102 HCHG RX REV CODE 250 W/ 637 OVERRIDE(OP): Performed by: STUDENT IN AN ORGANIZED HEALTH CARE EDUCATION/TRAINING PROGRAM

## 2024-02-29 PROCEDURE — 36415 COLL VENOUS BLD VENIPUNCTURE: CPT

## 2024-02-29 PROCEDURE — A9270 NON-COVERED ITEM OR SERVICE: HCPCS | Performed by: STUDENT IN AN ORGANIZED HEALTH CARE EDUCATION/TRAINING PROGRAM

## 2024-02-29 PROCEDURE — 85025 COMPLETE CBC W/AUTO DIFF WBC: CPT

## 2024-02-29 PROCEDURE — 80048 BASIC METABOLIC PNL TOTAL CA: CPT

## 2024-02-29 PROCEDURE — 99239 HOSP IP/OBS DSCHRG MGMT >30: CPT | Performed by: STUDENT IN AN ORGANIZED HEALTH CARE EDUCATION/TRAINING PROGRAM

## 2024-02-29 PROCEDURE — 700111 HCHG RX REV CODE 636 W/ 250 OVERRIDE (IP): Performed by: STUDENT IN AN ORGANIZED HEALTH CARE EDUCATION/TRAINING PROGRAM

## 2024-02-29 PROCEDURE — 97530 THERAPEUTIC ACTIVITIES: CPT

## 2024-02-29 PROCEDURE — 84145 PROCALCITONIN (PCT): CPT

## 2024-02-29 PROCEDURE — 97535 SELF CARE MNGMENT TRAINING: CPT

## 2024-02-29 RX ORDER — FUROSEMIDE 20 MG/1
20 TABLET ORAL DAILY
Qty: 5 TABLET | Refills: 0
Start: 2024-03-01 | End: 2024-03-06

## 2024-02-29 RX ORDER — METOPROLOL SUCCINATE 25 MG/1
12.5 TABLET, EXTENDED RELEASE ORAL EVERY EVENING
Qty: 30 TABLET | Refills: 0 | Status: ON HOLD
Start: 2024-02-29 | End: 2024-03-11

## 2024-02-29 RX ADMIN — OXYCODONE 2.5 MG: 5 TABLET ORAL at 02:43

## 2024-02-29 RX ADMIN — CLOPIDOGREL BISULFATE 75 MG: 75 TABLET ORAL at 05:05

## 2024-02-29 RX ADMIN — KETOROLAC TROMETHAMINE 15 MG: 15 INJECTION, SOLUTION INTRAMUSCULAR; INTRAVENOUS at 05:05

## 2024-02-29 RX ADMIN — LEVOTHYROXINE SODIUM 125 MCG: 0.12 TABLET ORAL at 05:05

## 2024-02-29 RX ADMIN — DULOXETINE HYDROCHLORIDE 60 MG: 60 CAPSULE, DELAYED RELEASE ORAL at 05:05

## 2024-02-29 RX ADMIN — OXYCODONE 2.5 MG: 5 TABLET ORAL at 14:07

## 2024-02-29 RX ADMIN — ESTRADIOL 1 MG: 1 TABLET ORAL at 05:05

## 2024-02-29 RX ADMIN — GABAPENTIN 600 MG: 300 CAPSULE ORAL at 05:04

## 2024-02-29 ASSESSMENT — COGNITIVE AND FUNCTIONAL STATUS - GENERAL
SUGGESTED CMS G CODE MODIFIER MOBILITY: CK
STANDING UP FROM CHAIR USING ARMS: A LITTLE
CLIMB 3 TO 5 STEPS WITH RAILING: A LITTLE
MOVING TO AND FROM BED TO CHAIR: A LITTLE
MOVING FROM LYING ON BACK TO SITTING ON SIDE OF FLAT BED: A LITTLE
WALKING IN HOSPITAL ROOM: A LITTLE
MOBILITY SCORE: 19

## 2024-02-29 ASSESSMENT — FIBROSIS 4 INDEX: FIB4 SCORE: 3.74

## 2024-02-29 ASSESSMENT — PAIN DESCRIPTION - PAIN TYPE
TYPE: ACUTE PAIN
TYPE: ACUTE PAIN

## 2024-02-29 ASSESSMENT — GAIT ASSESSMENTS
GAIT LEVEL OF ASSIST: STANDBY ASSIST
DEVIATION: INCREASED BASE OF SUPPORT;BRADYKINETIC;DECREASED HEEL STRIKE;DECREASED TOE OFF
ASSISTIVE DEVICE: FRONT WHEEL WALKER
DISTANCE (FEET): 50

## 2024-02-29 NOTE — DISCHARGE SUMMARY
"Discharge Summary    CHIEF COMPLAINT ON ADMISSION  Chief Complaint   Patient presents with    Shortness of Breath     \"For a while\"       Reason for Admission  Pleural effusion    Admission Date  2/26/2024     CODE STATUS  DNAR/DNI    HPI & HOSPITAL COURSE  Davie Montejo is a 89 y.o. female who presented 2/26/2024 with shortness of breath. Patient with history of CABG 2008. Patient reports for the last few weeks she has had progressively worsening shortness of breath.    In ER, patient found to be on 2 L of oxygen, saturating 97%. Found to have K 2.9, BNP 46551. Chest x-ray showing bilateral pleural effusion, right side greater than left. Patient was admitted for acute hypoxic respiratory failure due to acute heart failure exacerbation and pleural effusion. Echocardiogram shows new left ventricular ejection fraction 25%. Patient with no chest pain, flat troponin trend. Her home metoprolol and losartan were continued. Spironolactone added for low EF however her blood pressure could not tolerate this and so it was discontinued. Patient underwent thoracentesis of right pleural effusion with 1.9L fluid removed. Labs were unfortunately not obtained, however suspect effusion is due to underlying new heart failure. Patient treated with lasix, transitioned to oral lasix. Cardiology recommend maintenance lasix dose however patient with borderline hypotension and likely unable to tolerate daily lasix. She is on 1L oxygen at time of discharge. She is to take lasix 20 mg daily for next 5 days. She is to follow up with cardiology outpatient for continued management of heart failure.    Therefore, she is discharged in fair and stable condition to skilled nursing facility.    The patient met 2-midnight criteria for an inpatient stay at the time of discharge.      FOLLOW UP ITEMS POST DISCHARGE  Take medications as prescribed.  Follow up with cardiology and PCP.    DISCHARGE DIAGNOSES  Principal Problem:    Pleural " effusion (POA: Yes)  Active Problems:    Acute systolic congestive heart failure (HCC) (POA: Yes)    Essential hypertension (Chronic) (POA: Yes)    Hypothyroid (POA: Unknown)    Vascular dementia with paranoia (HCC) (POA: Yes)    Hx of CABG (POA: Unknown)    Hypokalemia (POA: Unknown)    ACP (advance care planning) (POA: Unknown)  Resolved Problems:    Hyperlipidemia (POA: Yes)      FOLLOW UP  Future Appointments   Date Time Provider Department Center   3/8/2024  2:30 PM PERLITA Lew [facility]   3/14/2024  3:15 PM PERLITA Kowalski [facility] None     PERLITA Lew  [address]  [facility] [facility] [address]  [phone]    Follow up in 1 week(s)      Hair Gutierrez M.D.  [address]  [facility] [address]  [facility] [address]  [phone]    Follow up in 1 week(s)        MEDICATIONS ON DISCHARGE     Medication List        START taking these medications        Instructions   furosemide 20 MG Tabs  Start taking on: March 1, 2024  Commonly known as: Lasix   Take 1 Tablet by mouth every day for 5 days.  Dose: 20 mg     metoprolol SR 25 MG Tb24  Commonly known as: Toprol XL   Take 0.5 Tablets by mouth every evening.  Dose: 12.5 mg     potassium chloride SA 10 MEQ Tbcr  Commonly known as: K-Dur   Take 1 Tablet by mouth every day.  Dose: 10 mEq     spironolactone 25 MG Tabs  Commonly known as: Aldactone   Take 1 Tablet by mouth every day.  Dose: 25 mg            CHANGE how you take these medications        Instructions   nitroglycerin 0.4 MG Subl  What changed: See the new instructions.  Commonly known as: Nitrostat   PLACE 1 TABLET UNDER THE TONGUE AS NEEDED FOR CHEST PAIN. CALL MD IF THREE PILLS TAKEN EVERY 5 MIN WITH NO *     sucralfate 1 GM Tabs  What changed: See the new instructions.  Commonly known as: Carafate   TAKE 1 TABLET BY MOUTH TWO (TWO) TIMES A DAY.            CONTINUE taking these medications        Instructions   atorvastatin 10 MG Tabs  Commonly known as: Lipitor   Take 1 Tablet by mouth every evening. FOR  "CHOLESTEROL.  Dose: 10 mg     clopidogrel 75 MG Tabs  Commonly known as: Plavix   TAKE 1 TABLET BY MOUTH EVERY DAY.  Dose: 75 mg     DULoxetine 60 MG Cpep delayed-release capsule  Commonly known as: Cymbalta   Take 1 Capsule by mouth every day. AFTER FINISHING 30MG DAILY FOR 7 DAYS.  FOR FIBROMYALGIA PAIN.  Dose: 60 mg     estradiol 1 MG Tabs  Commonly known as: Estrace   TAKE 1 TABLET BY MOUTH EVERY DAY.  Dose: 1 mg     gabapentin 600 MG tablet  Commonly known as: Neurontin   Take 1 Tablet by mouth 2 times a day.  Dose: 600 mg     levothyroxine 125 MCG Tabs  Commonly known as: Synthroid   TAKE 1 TABLET BY MOUTH EVERY MORNING ON AN EMPTY STOMACH.  Dose: 125 mcg     loratadine 10 MG Tabs  Commonly known as: Claritin   Take 1 Tablet by mouth every day.  Dose: 10 mg     losartan 25 MG Tabs  Commonly known as: Cozaar   Take 1 Tablet by mouth every day. FOR BLOOD PRESSURE  Dose: 25 mg     omeprazole 20 MG delayed-release capsule  Commonly known as: PriLOSEC   TAKE 1 CAPSULE BY MOUTH EVERY DAY.  Dose: 20 mg     Turmeric Curcumin Caps   Take 250 mg by mouth every day.  Dose: 250 mg              Allergies  Allergies   Allergen Reactions    Latex Unspecified          Tramadol Rash     Itchy red in nature    Codeine Unspecified     Unknown reaction      Lisinopril Unspecified     Unknown reaction    Penicillin G Unspecified     Unknown reaction      Pregabalin Unspecified     Lyrica affects patients vision.    Simvastatin Unspecified     Unknown reaction      Sulfa Drugs Unspecified     Patient states \"I can't remember what this does to me\" but recalls and allergy.        DIET  Orders Placed This Encounter   Procedures    Diet Order Diet: Cardiac     Standing Status:   Standing     Number of Occurrences:   1     Order Specific Question:   Diet:     Answer:   Cardiac [6]       ACTIVITY  As tolerated and directed by skilled nursing.  Weight bearing as tolerated    LINES, DRAINS, AND WOUNDS  This is an automated list. Peripheral " IVs will be removed prior to discharge.  Peripheral IV 02/27/24 20 G Anterior;Right Forearm (Active)   Site Assessment Clean;Dry;Intact 02/29/24 0905   Dressing Type Transparent 02/29/24 0905   Line Status Saline locked 02/29/24 0905   Dressing Status Clean;Intact;Dry 02/29/24 0905   Dressing Intervention N/A 02/29/24 0905   Dressing Change Due 03/02/24 02/27/24 2103   Infiltration Grading ([facility], CV) 0 02/29/24 0905   Phlebitis Scale ([facility] Only) 0 02/29/24 0905          Peripheral IV 02/27/24 20 G Anterior;Right Forearm (Active)   Site Assessment Clean;Dry;Intact 02/29/24 0905   Dressing Type Transparent 02/29/24 0905   Line Status Saline locked 02/29/24 0905   Dressing Status Clean;Intact;Dry 02/29/24 0905   Dressing Intervention N/A 02/29/24 0905   Dressing Change Due 03/02/24 02/27/24 2103   Infiltration Grading ([facility], CV) 0 02/29/24 0905   Phlebitis Scale ([facility] Only) 0 02/29/24 0905               MENTAL STATUS ON TRANSFER         Alert, oriented x3    CONSULTATIONS  cardiology    PROCEDURES  Thoracentesis as above    LABORATORY  Lab Results   Component Value Date    SODIUM 129 (L) 02/29/2024    POTASSIUM 4.0 02/29/2024    CHLORIDE 92 (L) 02/29/2024    CO2 25 02/29/2024    GLUCOSE 96 02/29/2024    BUN 21 02/29/2024    CREATININE 1.37 02/29/2024        Lab Results   Component Value Date    WBC 6.6 02/29/2024    HEMOGLOBIN 11.5 (L) 02/29/2024    HEMATOCRIT 35.4 (L) 02/29/2024    PLATELETCT 189 02/29/2024        Total time of the discharge process exceeds 38 minutes.

## 2024-02-29 NOTE — PROGRESS NOTES
Monitor Summary     Rhythm: SR  Heart Rate: 66-76  Ectopy: R PVC, F PAC  Measurement: .20/.14/.48

## 2024-02-29 NOTE — DOCUMENTATION QUERY
"                                                                         [facility]                                                                       Query Response Note      PATIENT:               JUNIOR CHIU  ACCT #:                  [id number]  MRN:                     [id number]  :                      1935  ADMIT DATE:       2024 3:14 PM  DISCH DATE:        2024 2:41 PM  RESPONDING  PROVIDER #:        [id number]           QUERY TEXT:    Decreased oxygen saturation, shortness of breath are documented in the Medical Record. Can a correlating dx be determined?    The patient's Clinical Indicators include:  89yo with dx of acute on chronic systolic heart failure, bilateral pleural effusions, CAD, HTN     H&P \"Reports progressively worsening shortness of breath, worse upon supine position\"     Epic Nurse VS RR 24, pulse oximetry 86% RA    Risk factors: advanced age, acute on chronic systolic heart failure, HTN, CAD, bilateral pleural effusions  Treatments: imaging, supplemental oxygen 2-3 L bnc, thoracentesis, IV diuretics, monitoring, labwork    Contact me with questions.     Thank you,  Patrica De La Rosa, LARRYP, CDI  [email or web address]  Options provided:   -- Acute respiratory failure with hypoxia   -- Acute on chronic respiratory failure with hypoxia   -- Other explanation:other explanation-, please specify   -- Unable to determine      Query created by: Patrica De La Rosa on 2024 5:01 AM    RESPONSE TEXT:    Acute respiratory failure with hypoxia          Electronically signed by:  NAIF BRADLEY MD 2024 3:41 PM              "

## 2024-02-29 NOTE — DISCHARGE PLANNING
Agency/Facility Name: [facility]  Spoke To: Jose  Outcome: DPA inquired about bed availability. Jose informed DPA that pt can be accepted to facility today. Planning for transport @1400, DPA to follow up with Jso[facility] once time is confirmed.     -1227  DPA called Jose @ [facility] to confirm that pt is scheduled to transport from [facility] between [id number].   YARELI called [facility] to inform company that pt will no longer need home O2 because she is going to SNF. [facility] came to [facility] from room.

## 2024-02-29 NOTE — PROGRESS NOTES
Bedside report received from off going RN/tech: Flaquita, assumed care of patient.     Fall Risk Score: HIGH RISK  Fall risk interventions in place: Provide patient/family education based on risk assessment, Educate patient/family to call staff for assistance when getting out of bed, Place fall precaution signage outside patient door, and Utilize bed/chair fall alarm  Bed type: Regular (Regan Score less than 17 interventions in place)  Patient on cardiac monitor: Yes  IVF/IV medications: Not Applicable   Oxygen: How many liters 2L  Bedside sitter: Not Applicable   Isolation: Not applicable

## 2024-02-29 NOTE — PROGRESS NOTES
Bedside report received from Gena ALLISON. Pt is A&Aron4, currently in SR. Bed in lowest position, call light within reach, pt resting comfortably. Pt educated to call before getting out of bed. No further needs at this time .    Fall Risk Score: HIGH RISK  Fall risk interventions in place: Place yellow fall risk ID band on patient, Provide patient/family education based on risk assessment, Educate patient/family to call staff for assistance when getting out of bed, Place fall precaution signage outside patient door, Utilize bed/chair fall alarm, Notify charge of high risk for huddle, and Bed alarm connected correctly  Bed type: Regular (Regan Score less than 17 interventions in place)  Patient on cardiac monitor: Yes  IVF/IV medications: Not Applicable   Oxygen: How many liters 1L  Bedside sitter: Not Applicable   Isolation: Not applicable

## 2024-02-29 NOTE — PROGRESS NOTES
[facility] Medicine Daily Progress Note    Date of Service  2/28/2024    Chief Complaint  Davie Montejo is a 89 y.o. female admitted 2/26/2024 with dyspnea.    Hospital Course  Davie Montejo is a 89 y.o. female who presented 2/26/2024 with shortness of breath.  Patient reports for the last few weeks she has had progressively worsening shortness of breath.  States that it is worse upon exertion, and worse upon supine position.  She reports that shortness of breath is improved upon sitting up.  She reports occasional sharp chest pain, that is worse with deep inspiration.  She denies lower extremity swelling, fever, chills, vomiting, diarrhea.  Patient lives alone at home and is able to take care of herself.  As symptoms not improved, decision was made to come to ER for evaluation.     In ER, patient found to be on 2 L of oxygen, saturating 97%. Found to have K 2.9, BNP 87532. Chest x-ray showing bilateral pleural effusion, right side greater than left.  Admitted for pleural effusion.    Interval Problem Update  No acute events overnight.  Patient feeling well, eager to go home.  Initially planning to discharge to home with home oxygen, however patient with hypotension when sitting in chair later in the day.  BP improved with rest and reverse trendelenburg positioning. BP now low normotension.  Patient with rales on exam similar to yesterday.  CXR ordered, shows improved right base pleural effusion, stable findings, no pneumothorax or pneumonia.  LA 2.2, trend q4h.  Hold off on IV fluids for now as not to exacerbate pleural effusions.  Can give fluid boluses prn if hypotensive and symptomatic.  IV lasix dose stopped, transition to PO lasix tomorrow with hold parameters.  Oxycodone dose decreased, may be contributing to hypotension. Toradol prn ordered for pain.  May need to consider titrating down/off heart failure medications if blood pressure does not tolerate them.  Home oxygen ordered. Home  health ordered per patient preference.  Monitor overnight, possible discharge home tomorrow if BP improves.    I have discussed this patient's plan of care and discharge plan at IDT rounds today with Case Management, Nursing, Nursing leadership, and other members of the IDT team.    Consultants/Specialty  cardiology    Code Status  DNAR/DNI    Disposition  Medically Cleared  I have placed the appropriate orders for post-discharge needs.    Review of Systems  Review of Systems   Constitutional:  Negative for chills and fever.   Eyes:  Negative for blurred vision and pain.   Respiratory:  Positive for cough and shortness of breath. Negative for hemoptysis and sputum production.    Cardiovascular:  Negative for chest pain, palpitations and leg swelling.   Gastrointestinal:  Negative for abdominal pain, nausea and vomiting.   Genitourinary:  Negative for dysuria and urgency.   Musculoskeletal:  Negative for back pain.   Skin:  Negative for itching and rash.   Neurological:  Negative for dizziness and headaches.   Psychiatric/Behavioral:  Negative for substance abuse. The patient does not have insomnia.         Physical Exam  Temp:  [36.2 °C (97.2 °F)-36.6 °C (97.9 °F)] 36.3 °C (97.3 °F)  Pulse:  [61-74] 68  Resp:  [14-20] 15  BP: ()/(39-68) 97/52  SpO2:  [92 %-97 %] 97 %    Physical Exam  Vitals reviewed.   Constitutional:       General: She is not in acute distress.     Appearance: She is not diaphoretic.   HENT:      Head: Normocephalic and atraumatic.      Right Ear: External ear normal.      Left Ear: External ear normal.      Nose: Nose normal. No congestion.      Mouth/Throat:      Pharynx: No oropharyngeal exudate or posterior oropharyngeal erythema.   Eyes:      Extraocular Movements: Extraocular movements intact.      Pupils: Pupils are equal, round, and reactive to light.   Cardiovascular:      Rate and Rhythm: Normal rate and regular rhythm.   Pulmonary:      Effort: Pulmonary effort is normal. No  respiratory distress.      Breath sounds: Rhonchi present. No wheezing or rales.   Abdominal:      General: Bowel sounds are normal. There is no distension.      Palpations: Abdomen is soft.      Tenderness: There is no abdominal tenderness.   Musculoskeletal:         General: No swelling. Normal range of motion.      Cervical back: Normal range of motion and neck supple.      Right lower leg: No edema.      Left lower leg: No edema.   Skin:     General: Skin is warm and dry.   Neurological:      General: No focal deficit present.      Mental Status: She is alert and oriented to person, place, and time.      Cranial Nerves: No cranial nerve deficit.      Sensory: No sensory deficit.      Motor: No weakness.   Psychiatric:         Mood and Affect: Mood normal.         Behavior: Behavior normal.         Fluids    Intake/Output Summary (Last 24 hours) at 2/28/2024 1628  Last data filed at 2/28/2024 0630  Gross per 24 hour   Intake 817 ml   Output 250 ml   Net 567 ml       Laboratory  Recent Labs     02/26/24  1536 02/27/24  0544   WBC 6.0 6.1   RBC 5.23 4.82   HEMOGLOBIN 13.6 12.6   HEMATOCRIT 41.7 39.1   MCV 79.7* 81.1*   MCH 26.0* 26.1*   MCHC 32.6 32.2   RDW 53.2* 54.0*   PLATELETCT 233 221   MPV 10.8 11.0     Recent Labs     02/26/24  1536 02/27/24  0544 02/28/24  0354   SODIUM 140 142 136   POTASSIUM 2.9* 3.3* 3.9   CHLORIDE 101 103 99   CO2 23 26 26   GLUCOSE 115* 89 89   BUN 10 10 16   CREATININE 0.97 1.02 1.25   CALCIUM 9.2 8.7 8.9             Recent Labs     02/28/24  0354   TRIGLYCERIDE 82   HDL 46   LDL 83       Imaging  DX-CHEST-PORTABLE (1 VIEW)   Final Result      1.  Increased patchy bilateral basilar consolidations with small underlying effusions.   2.  Stable enlargement of the cardiomediastinal silhouette.      US-THORACENTESIS PUNCTURE RIGHT   Final Result      1. Ultrasound guided right sided diagnostic and therapeutic thoracentesis.      2. 1900 mL of fluid withdrawn.      DX-CHEST-PORTABLE (1  VIEW)   Final Result      No evidence of pneumothorax status post right thoracentesis.      EC-ECHOCARDIOGRAM COMPLETE W/ CONT   Final Result      DX-CHEST-PORTABLE (1 VIEW)   Final Result      1. No pulmonary vascular congestion.   2. Bilateral pleural effusions, moderate to large on the right and small on the left.   3. Stable postsurgical changes in the sternum and mediastinum.           Assessment/Plan  * Pleural effusion- (present on admission)  Assessment & Plan  Reports progressively worsening shortness of breath, worse upon supine position.  Noted to have bilateral pleural effusion, right greater than left.  Patient has elevated BNP and hypokalemia.  Lasix IV daily  Echo shows new LV EF 20-25%, moderate TR/MR  S/p thoracentesis, 1.9L removed 2/27; follow up fluid labs  Oxygen as needed, keep O2 of 90%  Cardiology consulted for new CHF    ACP (advance care planning)  Assessment & Plan  16-minute spent discussing goals of care with patient.  She was explained that she has pleural effusions and that she will have an ultrasound of the heart in the morning along with a possibility of thoracocentesis.  She lives alone at home and is able to take care of herself.  She was asked about CODE STATUS, to which she states that she would not want her aggressive measures to prolong her life.  Patient request to be DNR/DNI, okay for medical treatment    Hypokalemia  Assessment & Plan  Replace    Hx of CABG  Assessment & Plan  Hx of  Continue lipitor    Vascular dementia with paranoia (HCC)- (present on admission)  Assessment & Plan  Hx of    Hypothyroid  Assessment & Plan  Synthroid    Essential hypertension- (present on admission)  Assessment & Plan  Resume home meds    Acute systolic congestive heart failure (HCC)- (present on admission)  Assessment & Plan  Echo shows new LV EF 20-25%, moderate TR and MR; new compared to prior echo with 60% EF  Hx CABG and CAD  Cardiology consulted for new CHF, pleural  effusion  Cardiology recommend medical management, outpatient follow up         VTE prophylaxis: VTE Selection    My total time spent caring for the patient on the day of the encounter was 50 minutes. This time includes reviewing the hospital chart vitals, lab tests, and imaging; counseling and educating the patient about their diagnoses; coordinating care with the nurse, pharmacist, and specialists; documenting clinical information in the electronic medical record.  This does not include time spent on separately billable procedures/tests.

## 2024-02-29 NOTE — DISCHARGE PLANNING
DC Transport Scheduled    Transport Company Scheduled:  [employer]  Spoke with Lydia at [facility] to schedule transport.  [facility] Trip #: [id number]    Scheduled Date: 2/29/2024  Scheduled Time: [id number]    Destination: [facility]   Destination address: [address], [place] [address]      Notified care team of scheduled transport via Vo[employer]te.     If there are any changes needed to the DC transportation scheduled, please contact [facility] at ext. [phone] between the hours of 5512-5302 Mon-Fri. If outside those hours, contact the ED Case Manager at ext. [phone].

## 2024-02-29 NOTE — CARE PLAN
The patient is Watcher - Medium risk of patient condition declining or worsening    Shift Goals  Clinical Goals: monitor respiratory status, monitor lactic/labs  Patient Goals: pain control, DC  Family Goals: DEE DEE    Progress made toward(s) clinical / shift goals:    Problem: Knowledge Deficit - Standard  Goal: Patient and family/care givers will demonstrate understanding of plan of care, disease process/condition, diagnostic tests and medications  Outcome: Progressing     Problem: Pain - Standard  Goal: Alleviation of pain or a reduction in pain to the patient’s comfort goal  Outcome: Progressing     Problem: Fall Risk  Goal: Patient will remain free from falls  Outcome: Progressing       Patient is not progressing towards the following goals:

## 2024-02-29 NOTE — DISCHARGE PLANNING
Case Management Discharge Planning    Admission Date: 2/26/2024  GMLOS: 3.9  ALOS: 3    6-Clicks ADL Score: 20  6-Clicks Mobility Score: 18      Anticipated Discharge Dispo: Discharge Disposition: D/T to SNF with Medicare cert in anticipation of skilled care (03)    PASRR # [id number]    LOC # [id number]       DME Needed: No    Action(s) Taken: DC Assessment Complete (See below), Choice obtained, and Transport Arranged     RN CM  met with pt at bedside to complete assessment and discuss discharge planning.Pt A&Ox4 and able to verify the information on the face sheet.     Pt lives alone in a two story apartment and uses no DME at baseline. Eric Hood  (p) [phone] is pt's  and emergency contact.       Patient reports, prior to admission patient is independent with ADL's and IADL’s. The patient's PCP is Jeff TRIMBLE.  Patient's preferred pharmacy is [facility].      Pt reported that her SW  is good support for her and she also has nieces she can call for assistance. Pt receives $ 900 monthly SSI deposits.      Patient denies a history of SNF/IPR nor HHC use in the past. Patient denies alcholol, tobacco and recreational drug use.  Pt denies any SA or MH concerns.      Patients confirmed medical coverage via Medicare and Medicaid FFS.       RN discussed discharge planning.  PT/OT recommending post-acute placement however patient is adamant on going home with home health. Bedside RN and CM spoke with patient and pt is now agreeable to d/c to SNF.     RN CM obtained SNF choice for [facility] from pt, DPA called [facility] and they can accept patient today at 1400. RN CM requested transport for 1400, awaiting confirmation from Ride line.     12:21 PM- RN CM called and spoke with Emelina at [facility], notified her that pt will be discharging to [facility].     Received Ride line conformation for transport at [facility] at [phone].     Escalations Completed: None    Medically Clear:  Yes    Next Steps:  by WMT at [phone].    Barriers to Discharge: Transportation    Is the patient up for discharge tomorrow: No, Pt discharging today to [facility].        Care Transition Team Assessment    Information Source  Orientation Level: Oriented X4  Information Given By: Patient  Informant's Name: Davie  Who is responsible for making decisions for patient? : Patient    Readmission Evaluation  Is this a readmission?: No    Elopement Risk  Legal Hold: No  Ambulatory or Self Mobile in Wheelchair: Yes  Disoriented: No  Psychiatric Symptoms: None  History of Wandering: No  Elopement this Admit: No  Vocalizing Wanting to Leave: No  Displays Behaviors, Body Language Wanting to Leave: No-Not at Risk for Elopement  Elopement Risk: Not at Risk for Elopement    Interdisciplinary Discharge Planning  Lives with - Patient's Self Care Capacity: Alone and Able to Care For Self  Patient or legal guardian wants to designate a caregiver: No  Support Systems: Friends / Neighbors  Housing / Facility: 2 Story Apartment / Condo  Prior Services: Home-Independent  Durable Medical Equipment: Not Applicable    Discharge Preparedness  What is your plan after discharge?: Skilled nursing facility  What are your discharge supports?: Other (comment) ( and [place])  Prior Functional Level: Ambulatory, Independent with Activities of Daily Living, Independent with Medication Management    Functional Assesment  Prior Functional Level: Ambulatory, Independent with Activities of Daily Living, Independent with Medication Management    Finances  Financial Barriers to Discharge: No  Prescription Coverage: Yes    Vision / Hearing Impairment  Vision Impairment : Yes  Right Eye Vision: Impaired, Wears Glasses  Left Eye Vision: Impaired, Wears Glasses  Hearing Impairment : Yes  Hearing Impairment: Both Ears, Hearing Device Not Available  Does Pt Need Special Equipment for the Hearing Impaired?: No     Advance Directive  Advance  Directive?: None    Domestic Abuse  Have you ever been the victim of abuse or violence?: No  Physical Abuse or Sexual Abuse: No  Verbal Abuse or Emotional Abuse: No  Possible Abuse/Neglect Reported to:: Not Applicable    Psychological Assessment  History of Substance Abuse: None  History of Psychiatric Problems: No    Discharge Risks or Barriers  Discharge risks or barriers?: Lives alone, no community support, Complex medical needs  Patient risk factors: Complex medical needs, Lack of outside supports    Anticipated Discharge Information  Discharge Disposition: D/T to SNF with Medicare cert in anticipation of skilled care (03)

## 2024-02-29 NOTE — CARE PLAN
The patient is Stable - Low risk of patient condition declining or worsening    Shift Goals  Clinical Goals: monitor respiratory status, monitor lactic/labs  Patient Goals: pain control, DC  Family Goals: DEE DEE    Progress made toward(s) clinical / shift goals:    Problem: Knowledge Deficit - Standard  Goal: Patient and family/care givers will demonstrate understanding of plan of care, disease process/condition, diagnostic tests and medications  Description: Target End Date:  1-3 days or as soon as patient condition allows    Document in Patient Education    1.  Patient and family/caregiver oriented to unit, equipment, visitation policy and means for communicating concern  2.  Complete/review Learning Assessment  3.  Assess knowledge level of disease process/condition, treatment plan, diagnostic tests and medications  4.  Explain disease process/condition, treatment plan, diagnostic tests and medications  Outcome: Progressing  Note: Pt updated on POC, all questions answered at this time.     Problem: Pain - Standard  Goal: Alleviation of pain or a reduction in pain to the patient’s comfort goal  Description: Target End Date:  Prior to discharge or change in level of care    Document on Vitals flowsheet    1.  Document pain using the appropriate pain scale per order or unit policy  2.  Educate and implement non-pharmacologic comfort measures (i.e. relaxation, distraction, massage, cold/heat therapy, etc.)  3.  Pain management medications as ordered  4.  Reassess pain after pain med administration per policy  5.  If opiods administered assess patient's response to pain medication is appropriate per POSS sedation scale  6.  Follow pain management plan developed in collaboration with patient and interdisciplinary team (including palliative care or pain specialists if applicable)  Outcome: Progressing  Note: Pt frequent complaints of severe generalize aching and burning pain from fibromyalgia. See MAR for implemented  pharmacological intervention. Heat packs also applied to patient back for pain relief.     Problem: Fall Risk  Goal: Patient will remain free from falls  Description: Target End Date:  Prior to discharge or change in level of care    Document interventions on the Reny Wagner Fall Risk Assessment    1.  Assess for fall risk factors  2.  Implement fall precautions  Outcome: Progressing  Note: Pt is a high fall risk. Bed is in lowest, locked position. Call light and belongings with reach. Bed alarm is on. Pt reeducated to utilize call light prior to ambulation.

## 2024-02-29 NOTE — THERAPY
"Physical Therapy   Daily Treatment     Patient Name: Davie Montejo  Age:  89 y.o., Sex:  female  Medical Record #: [id number]  Today's Date: 2/29/2024     Precautions  Precautions: (P) Fall Risk    Assessment    Pt received in bed and agreeable to PT session. Pt was able to ambulate 50ft x2 with FWW and SBA. Pt was able to ascend/descend 3 stairs with CGA; pt declined attempting further stairs due to fatigue. Extensive time discussing importance of frequent mobility and DC planning. Of note, pt will have significant difficulty carrying an oxygen tank up the stairs to get to her apartment. Unsure if pt able to get a backpack device, however, would greatly benefit. Continue to recommend post acute placement; if pt not agreeable then recommend home with HHPT and increased support from family/friends. Will follow for acute care PT.      Plan    Treatment Plan Status: (P) Continue Current Treatment Plan  Type of Treatment: Bed Mobility, Gait Training, Neuro Re-Education / Balance, Self Care / Home Evaluation, Stair Training, Therapeutic Activities, Therapeutic Exercise  Treatment Frequency: 4 Times per Week  Treatment Duration: Until Therapy Goals Met    DC Equipment Recommendations: (P) Front-Wheel Walker  Discharge Recommendations: (P) Recommend post-acute placement for additional physical therapy services prior to discharge home (If pt not agreeable, recommend home with HHPT and increased support from family/friends)      Subjective    \"I will figure it out\". (In reference to discussion on how pt will navigate a FOS with O2 tank)     Objective       02/29/24 1041   Precautions   Precautions Fall Risk   Vitals   Patient BP Position Sitting   Blood Pressure  97/52   O2 (LPM) 1.5   O2 Delivery Device Nasal Cannula   Vitals Comments pt reports no symptoms   Pain 0 - 10 Group   Therapist Pain Assessment Post Activity Pain Same as Prior to Activity;Nurse Notified  (pain not rated)   Cognition    Cognition / " Consciousness WDL   Level of Consciousness Alert   Comments pleasant and cooperative   Active ROM Lower Body    Active ROM Lower Body  WDL   Strength Lower Body   Lower Body Strength  X   Comments generalized weakness, functional for ambulation   Lower Body Muscle Tone   Lower Body Muscle Tone  WDL   Balance   Sitting Balance (Static) Fair +   Sitting Balance (Dynamic) Fair   Standing Balance (Static) Fair -   Standing Balance (Dynamic) Fair -   Weight Shift Sitting Good   Weight Shift Standing Fair   Skilled Intervention Verbal Cuing   Comments w/FWW   Bed Mobility    Supine to Sit Standby Assist   Sit to Supine Supervised   Scooting Supervised   Rolling Standby Assist   Comments HOB slightly elevated   Gait Analysis   Gait Level Of Assist Standby Assist   Assistive Device Front Wheel Walker   Distance (Feet) 50   # of Times Distance was Traveled 2   Deviation Increased Base Of Support;Bradykinetic;Decreased Heel Strike;Decreased Toe Off   # of Stairs Climbed 6   Level of Assist with Stairs Contact Guard    Weight Bearing Status no restrictions   Skilled Intervention Verbal Cuing   Functional Mobility   Sit to Stand Standby Assist   Bed, Chair, Wheelchair Transfer Standby Assist   Mobility bed > walk in hallway > stairs > bed   Skilled Intervention Verbal Cuing   6 Clicks Assessment   Turning from your back to your side while in a flat bed without using bedrails? 4   Moving from lying on your back to sitting on the side of a flat bed without using bedrails? 3   Moving to and from a bed to a chair (including a wheelchair)? 3   Standing up from a chair using your arms (e.g., wheelchair, or bedside chair)? 3   Walking in hospital room? 3   Climbing 3-5 steps with a railing? 3   6 clicks Mobility Score 19   Short Term Goals    Short Term Goal # 1 Pt will be able to perform supine <> sit with bed flat with SPV in 6 visits to improve bed mobility   Goal Outcome # 1 Progressing as expected   Short Term Goal # 2 Pt will be  able to perform STS with SPV in 6 visits to improve functional mobility   Goal Outcome # 2 Progressing as expected   Short Term Goal # 3 Pt will be able to ambulate 250ft with LRAD in 6 visits to improve functional ambulation   Goal Outcome # 3 Goal not met   Short Term Goal # 4 Pt will be able to navigate a FOS with SPV in 6 visits in order to safely navigate her home   Goal Outcome # 4 Goal not met   Education Group   Education Provided Role of Physical Therapist   Role of Physical Therapist Patient Response Patient;Acceptance;Explanation;Verbal Demonstration   Physical Therapy Treatment Plan   Physical Therapy Treatment Plan Continue Current Treatment Plan   Anticipated Discharge Equipment and Recommendations   DC Equipment Recommendations Front-Wheel Walker   Discharge Recommendations Recommend post-acute placement for additional physical therapy services prior to discharge home  (If pt not agreeable, recommend home with HHPT and increased support from family/friends)   Interdisciplinary Plan of Care Collaboration   IDT Collaboration with  Nursing   Patient Position at End of Therapy In Bed;Bed Alarm On;Call Light within Reach;Tray Table within Reach;Phone within Reach   Collaboration Comments RN updated   Session Information   Date / Session Number  2/29 - 3 (3/4, 3/4)

## 2024-02-29 NOTE — PROGRESS NOTES
Report called to Rebecca ALLISON at [facility]. IVs d/c'd with tip intact, gauze with tape applied. Pt instructed to remove dressing in 30 minutes. Discharge paperwork, all medications and f/u appts reviewed with pt. Discharge paperwork copy signed and placed in physical chart. No further questions. Pt dressed in own clothing. Pt taken to transportation via wheelchair, all belongings with pt on transfer. Report called to Rebecca ALLISON at [facility].

## 2024-03-08 ENCOUNTER — APPOINTMENT (OUTPATIENT)
Dept: RADIOLOGY | Facility: MEDICAL CENTER | Age: 89
DRG: 291 | End: 2024-03-08
Attending: STUDENT IN AN ORGANIZED HEALTH CARE EDUCATION/TRAINING PROGRAM
Payer: MEDICARE

## 2024-03-08 ENCOUNTER — HOSPITAL ENCOUNTER (INPATIENT)
Facility: MEDICAL CENTER | Age: 89
LOS: 5 days | DRG: 291 | End: 2024-03-13
Attending: STUDENT IN AN ORGANIZED HEALTH CARE EDUCATION/TRAINING PROGRAM | Admitting: HOSPITALIST
Payer: MEDICARE

## 2024-03-08 DIAGNOSIS — M79.7 FIBROMYALGIA: ICD-10-CM

## 2024-03-08 DIAGNOSIS — I50.21 ACUTE SYSTOLIC CONGESTIVE HEART FAILURE (HCC): ICD-10-CM

## 2024-03-08 PROBLEM — J96.01 ACUTE RESPIRATORY FAILURE WITH HYPOXIA (HCC): Status: ACTIVE | Noted: 2024-03-08

## 2024-03-08 PROBLEM — E43 SEVERE PROTEIN-CALORIE MALNUTRITION (GOMEZ: LESS THAN 60% OF STANDARD WEIGHT) (HCC): Status: ACTIVE | Noted: 2024-03-08

## 2024-03-08 LAB
ALBUMIN SERPL BCP-MCNC: 2.5 G/DL (ref 3.2–4.9)
ALBUMIN/GLOB SERPL: 1.3 G/DL
ALP SERPL-CCNC: 55 U/L (ref 30–99)
ALT SERPL-CCNC: 5 U/L (ref 2–50)
ANION GAP SERPL CALC-SCNC: 9 MMOL/L (ref 7–16)
AST SERPL-CCNC: 12 U/L (ref 12–45)
BASOPHILS # BLD AUTO: 1.2 % (ref 0–1.8)
BASOPHILS # BLD: 0.05 K/UL (ref 0–0.12)
BILIRUB SERPL-MCNC: 0.3 MG/DL (ref 0.1–1.5)
BUN SERPL-MCNC: 11 MG/DL (ref 8–22)
CALCIUM ALBUM COR SERPL-MCNC: 7.4 MG/DL (ref 8.5–10.5)
CALCIUM SERPL-MCNC: 6.2 MG/DL (ref 8.5–10.5)
CHLORIDE SERPL-SCNC: 114 MMOL/L (ref 96–112)
CO2 SERPL-SCNC: 20 MMOL/L (ref 20–33)
CREAT SERPL-MCNC: 0.59 MG/DL (ref 0.5–1.4)
EKG IMPRESSION: NORMAL
EOSINOPHIL # BLD AUTO: 0.1 K/UL (ref 0–0.51)
EOSINOPHIL NFR BLD: 2.4 % (ref 0–6.9)
ERYTHROCYTE [DISTWIDTH] IN BLOOD BY AUTOMATED COUNT: 56.7 FL (ref 35.9–50)
GFR SERPLBLD CREATININE-BSD FMLA CKD-EPI: 87 ML/MIN/1.73 M 2
GLOBULIN SER CALC-MCNC: 2 G/DL (ref 1.9–3.5)
GLUCOSE SERPL-MCNC: 73 MG/DL (ref 65–99)
HCT VFR BLD AUTO: 28.5 % (ref 37–47)
HGB BLD-MCNC: 8.8 G/DL (ref 12–16)
IMM GRANULOCYTES # BLD AUTO: 0.01 K/UL (ref 0–0.11)
IMM GRANULOCYTES NFR BLD AUTO: 0.2 % (ref 0–0.9)
LYMPHOCYTES # BLD AUTO: 1.28 K/UL (ref 1–4.8)
LYMPHOCYTES NFR BLD: 31.1 % (ref 22–41)
MCH RBC QN AUTO: 26 PG (ref 27–33)
MCHC RBC AUTO-ENTMCNC: 30.9 G/DL (ref 32.2–35.5)
MCV RBC AUTO: 84.3 FL (ref 81.4–97.8)
MONOCYTES # BLD AUTO: 0.48 K/UL (ref 0–0.85)
MONOCYTES NFR BLD AUTO: 11.7 % (ref 0–13.4)
NEUTROPHILS # BLD AUTO: 2.19 K/UL (ref 1.82–7.42)
NEUTROPHILS NFR BLD: 53.4 % (ref 44–72)
NRBC # BLD AUTO: 0 K/UL
NRBC BLD-RTO: 0 /100 WBC (ref 0–0.2)
NT-PROBNP SERPL IA-MCNC: 1975 PG/ML (ref 0–125)
PLATELET # BLD AUTO: 215 K/UL (ref 164–446)
PMV BLD AUTO: 11.1 FL (ref 9–12.9)
POTASSIUM SERPL-SCNC: 3.6 MMOL/L (ref 3.6–5.5)
PROT SERPL-MCNC: 4.5 G/DL (ref 6–8.2)
RBC # BLD AUTO: 3.38 M/UL (ref 4.2–5.4)
SODIUM SERPL-SCNC: 143 MMOL/L (ref 135–145)
TROPONIN T SERPL-MCNC: 17 NG/L (ref 6–19)
WBC # BLD AUTO: 4.1 K/UL (ref 4.8–10.8)

## 2024-03-08 PROCEDURE — 96375 TX/PRO/DX INJ NEW DRUG ADDON: CPT

## 2024-03-08 PROCEDURE — 83880 ASSAY OF NATRIURETIC PEPTIDE: CPT

## 2024-03-08 PROCEDURE — 700111 HCHG RX REV CODE 636 W/ 250 OVERRIDE (IP): Mod: JZ,JG,UD | Performed by: STUDENT IN AN ORGANIZED HEALTH CARE EDUCATION/TRAINING PROGRAM

## 2024-03-08 PROCEDURE — 700117 HCHG RX CONTRAST REV CODE 255: Mod: UD | Performed by: STUDENT IN AN ORGANIZED HEALTH CARE EDUCATION/TRAINING PROGRAM

## 2024-03-08 PROCEDURE — 85025 COMPLETE CBC W/AUTO DIFF WBC: CPT

## 2024-03-08 PROCEDURE — 74177 CT ABD & PELVIS W/CONTRAST: CPT

## 2024-03-08 PROCEDURE — 96365 THER/PROPH/DIAG IV INF INIT: CPT

## 2024-03-08 PROCEDURE — 770020 HCHG ROOM/CARE - TELE (206)

## 2024-03-08 PROCEDURE — 99223 1ST HOSP IP/OBS HIGH 75: CPT | Mod: 25,AI | Performed by: HOSPITALIST

## 2024-03-08 PROCEDURE — 99497 ADVNCD CARE PLAN 30 MIN: CPT | Performed by: HOSPITALIST

## 2024-03-08 PROCEDURE — 80053 COMPREHEN METABOLIC PANEL: CPT

## 2024-03-08 PROCEDURE — 36415 COLL VENOUS BLD VENIPUNCTURE: CPT

## 2024-03-08 PROCEDURE — 84484 ASSAY OF TROPONIN QUANT: CPT

## 2024-03-08 PROCEDURE — 71045 X-RAY EXAM CHEST 1 VIEW: CPT

## 2024-03-08 PROCEDURE — 93005 ELECTROCARDIOGRAM TRACING: CPT | Performed by: STUDENT IN AN ORGANIZED HEALTH CARE EDUCATION/TRAINING PROGRAM

## 2024-03-08 PROCEDURE — 99285 EMERGENCY DEPT VISIT HI MDM: CPT

## 2024-03-08 RX ORDER — LOSARTAN POTASSIUM 25 MG/1
12.5 TABLET ORAL DAILY
Status: DISCONTINUED | OUTPATIENT
Start: 2024-03-09 | End: 2024-03-09

## 2024-03-08 RX ORDER — DULOXETIN HYDROCHLORIDE 60 MG/1
60 CAPSULE, DELAYED RELEASE ORAL EVERY MORNING
Status: ON HOLD | COMMUNITY

## 2024-03-08 RX ORDER — SPIRONOLACTONE 25 MG/1
12.5 TABLET ORAL DAILY
Status: ON HOLD | COMMUNITY
End: 2024-03-11

## 2024-03-08 RX ORDER — METOPROLOL SUCCINATE 25 MG/1
12.5 TABLET, EXTENDED RELEASE ORAL EVERY EVENING
Status: DISCONTINUED | OUTPATIENT
Start: 2024-03-09 | End: 2024-03-13 | Stop reason: HOSPADM

## 2024-03-08 RX ORDER — CALCIUM GLUCONATE 20 MG/ML
2 INJECTION, SOLUTION INTRAVENOUS ONCE
Status: COMPLETED | OUTPATIENT
Start: 2024-03-08 | End: 2024-03-08

## 2024-03-08 RX ORDER — DULOXETIN HYDROCHLORIDE 60 MG/1
60 CAPSULE, DELAYED RELEASE ORAL DAILY
Status: DISCONTINUED | OUTPATIENT
Start: 2024-03-09 | End: 2024-03-13 | Stop reason: HOSPADM

## 2024-03-08 RX ORDER — LEVOTHYROXINE SODIUM 0.12 MG/1
125 TABLET ORAL
Status: DISCONTINUED | OUTPATIENT
Start: 2024-03-09 | End: 2024-03-13 | Stop reason: HOSPADM

## 2024-03-08 RX ORDER — LOSARTAN POTASSIUM 25 MG/1
12.5 TABLET ORAL EVERY MORNING
Status: ON HOLD | COMMUNITY
End: 2024-03-11

## 2024-03-08 RX ORDER — SUCRALFATE 1 G/1
1 TABLET ORAL
Status: ON HOLD | COMMUNITY

## 2024-03-08 RX ORDER — CLOPIDOGREL BISULFATE 75 MG/1
75 TABLET ORAL DAILY
Status: DISCONTINUED | OUTPATIENT
Start: 2024-03-09 | End: 2024-03-13 | Stop reason: HOSPADM

## 2024-03-08 RX ORDER — CALCIUM GLUCONATE 20 MG/ML
2 INJECTION, SOLUTION INTRAVENOUS ONCE
Status: DISCONTINUED | OUTPATIENT
Start: 2024-03-08 | End: 2024-03-08

## 2024-03-08 RX ORDER — OXYCODONE HYDROCHLORIDE 5 MG/1
5 TABLET ORAL EVERY 4 HOURS PRN
Status: ON HOLD | COMMUNITY
End: 2024-03-13

## 2024-03-08 RX ORDER — FUROSEMIDE 10 MG/ML
40 INJECTION INTRAMUSCULAR; INTRAVENOUS EVERY 8 HOURS
Status: DISCONTINUED | OUTPATIENT
Start: 2024-03-08 | End: 2024-03-08

## 2024-03-08 RX ORDER — ACETAMINOPHEN 325 MG/1
650 TABLET ORAL EVERY 6 HOURS PRN
Status: DISCONTINUED | OUTPATIENT
Start: 2024-03-08 | End: 2024-03-13 | Stop reason: HOSPADM

## 2024-03-08 RX ORDER — HYDRALAZINE HYDROCHLORIDE 20 MG/ML
10 INJECTION INTRAMUSCULAR; INTRAVENOUS EVERY 4 HOURS PRN
Status: DISCONTINUED | OUTPATIENT
Start: 2024-03-08 | End: 2024-03-13 | Stop reason: HOSPADM

## 2024-03-08 RX ORDER — ACETAMINOPHEN 325 MG/1
650 TABLET ORAL EVERY 4 HOURS PRN
Status: ON HOLD | COMMUNITY

## 2024-03-08 RX ORDER — FUROSEMIDE 10 MG/ML
40 INJECTION INTRAMUSCULAR; INTRAVENOUS EVERY 8 HOURS
Status: DISCONTINUED | OUTPATIENT
Start: 2024-03-09 | End: 2024-03-10

## 2024-03-08 RX ORDER — SPIRONOLACTONE 25 MG/1
12.5 TABLET ORAL DAILY
Status: DISCONTINUED | OUTPATIENT
Start: 2024-03-09 | End: 2024-03-13 | Stop reason: HOSPADM

## 2024-03-08 RX ORDER — CITALOPRAM 20 MG/1
TABLET ORAL
Status: SHIPPED | COMMUNITY
Start: 2024-02-12 | End: 2024-03-08

## 2024-03-08 RX ORDER — FUROSEMIDE 10 MG/ML
20 INJECTION INTRAMUSCULAR; INTRAVENOUS ONCE
Status: COMPLETED | OUTPATIENT
Start: 2024-03-08 | End: 2024-03-08

## 2024-03-08 RX ADMIN — FUROSEMIDE 20 MG: 10 INJECTION INTRAMUSCULAR; INTRAVENOUS at 19:53

## 2024-03-08 RX ADMIN — IOHEXOL 90 ML: 350 INJECTION, SOLUTION INTRAVENOUS at 21:30

## 2024-03-08 RX ADMIN — CALCIUM GLUCONATE 2 G: 20 INJECTION, SOLUTION INTRAVENOUS at 19:38

## 2024-03-08 ASSESSMENT — ENCOUNTER SYMPTOMS
FEVER: 0
PALPITATIONS: 0
HEARTBURN: 0
SHORTNESS OF BREATH: 1
NECK PAIN: 0
ORTHOPNEA: 0
STRIDOR: 0
HALLUCINATIONS: 0
CONSTIPATION: 0
SPUTUM PRODUCTION: 0
WEAKNESS: 0
PHOTOPHOBIA: 0
MYALGIAS: 0
PND: 0
DIZZINESS: 0
CHILLS: 0
DIARRHEA: 0
ABDOMINAL PAIN: 0
NAUSEA: 0
BRUISES/BLEEDS EASILY: 0
SINUS PAIN: 0
BLOOD IN STOOL: 0
COUGH: 1
DOUBLE VISION: 0
BLURRED VISION: 0
VOMITING: 0
WHEEZING: 0
SORE THROAT: 0
HEMOPTYSIS: 0
BACK PAIN: 0
FALLS: 0
FLANK PAIN: 0
TINGLING: 0
CLAUDICATION: 0
POLYDIPSIA: 0
DEPRESSION: 0
DIAPHORESIS: 0
TREMORS: 0
HEADACHES: 1
EYE PAIN: 0

## 2024-03-08 ASSESSMENT — LIFESTYLE VARIABLES
AVERAGE NUMBER OF DAYS PER WEEK YOU HAVE A DRINK CONTAINING ALCOHOL: 0
ON A TYPICAL DAY WHEN YOU DRINK ALCOHOL HOW MANY DRINKS DO YOU HAVE: 0
SUBSTANCE_ABUSE: 0
TOTAL SCORE: 0
TOTAL SCORE: 0
DO YOU DRINK ALCOHOL: NO
HAVE YOU EVER FELT YOU SHOULD CUT DOWN ON YOUR DRINKING: NO
HOW MANY TIMES IN THE PAST YEAR HAVE YOU HAD 5 OR MORE DRINKS IN A DAY: 0
TOTAL SCORE: 0
DOES PATIENT WANT TO STOP DRINKING: NO
EVER HAD A DRINK FIRST THING IN THE MORNING TO STEADY YOUR NERVES TO GET RID OF A HANGOVER: NO
EVER FELT BAD OR GUILTY ABOUT YOUR DRINKING: NO
HAVE PEOPLE ANNOYED YOU BY CRITICIZING YOUR DRINKING: NO
CONSUMPTION TOTAL: NEGATIVE

## 2024-03-08 ASSESSMENT — PAIN DESCRIPTION - PAIN TYPE: TYPE: ACUTE PAIN

## 2024-03-08 ASSESSMENT — FIBROSIS 4 INDEX: FIB4 SCORE: 3.74

## 2024-03-09 LAB
ALBUMIN SERPL BCP-MCNC: 3.6 G/DL (ref 3.2–4.9)
ALBUMIN SERPL BCP-MCNC: 3.6 G/DL (ref 3.2–4.9)
ALBUMIN/GLOB SERPL: 1.1 G/DL
ALBUMIN/GLOB SERPL: 1.2 G/DL
ALP SERPL-CCNC: 85 U/L (ref 30–99)
ALP SERPL-CCNC: 88 U/L (ref 30–99)
ALT SERPL-CCNC: 8 U/L (ref 2–50)
ALT SERPL-CCNC: 9 U/L (ref 2–50)
ANION GAP SERPL CALC-SCNC: 11 MMOL/L (ref 7–16)
ANION GAP SERPL CALC-SCNC: 11 MMOL/L (ref 7–16)
AST SERPL-CCNC: 15 U/L (ref 12–45)
AST SERPL-CCNC: 17 U/L (ref 12–45)
BASOPHILS # BLD AUTO: 1.2 % (ref 0–1.8)
BASOPHILS # BLD: 0.06 K/UL (ref 0–0.12)
BILIRUB SERPL-MCNC: 0.7 MG/DL (ref 0.1–1.5)
BILIRUB SERPL-MCNC: 0.7 MG/DL (ref 0.1–1.5)
BUN SERPL-MCNC: 13 MG/DL (ref 8–22)
BUN SERPL-MCNC: 13 MG/DL (ref 8–22)
CALCIUM ALBUM COR SERPL-MCNC: 10.1 MG/DL (ref 8.5–10.5)
CALCIUM ALBUM COR SERPL-MCNC: 9.6 MG/DL (ref 8.5–10.5)
CALCIUM SERPL-MCNC: 9.3 MG/DL (ref 8.5–10.5)
CALCIUM SERPL-MCNC: 9.8 MG/DL (ref 8.5–10.5)
CHLORIDE SERPL-SCNC: 99 MMOL/L (ref 96–112)
CHLORIDE SERPL-SCNC: 99 MMOL/L (ref 96–112)
CO2 SERPL-SCNC: 28 MMOL/L (ref 20–33)
CO2 SERPL-SCNC: 28 MMOL/L (ref 20–33)
CREAT SERPL-MCNC: 0.81 MG/DL (ref 0.5–1.4)
CREAT SERPL-MCNC: 0.82 MG/DL (ref 0.5–1.4)
EOSINOPHIL # BLD AUTO: 0.15 K/UL (ref 0–0.51)
EOSINOPHIL NFR BLD: 3.1 % (ref 0–6.9)
ERYTHROCYTE [DISTWIDTH] IN BLOOD BY AUTOMATED COUNT: 56.2 FL (ref 35.9–50)
FERRITIN SERPL-MCNC: 62.4 NG/ML (ref 10–291)
GFR SERPLBLD CREATININE-BSD FMLA CKD-EPI: 69 ML/MIN/1.73 M 2
GFR SERPLBLD CREATININE-BSD FMLA CKD-EPI: 70 ML/MIN/1.73 M 2
GLOBULIN SER CALC-MCNC: 3.1 G/DL (ref 1.9–3.5)
GLOBULIN SER CALC-MCNC: 3.4 G/DL (ref 1.9–3.5)
GLUCOSE SERPL-MCNC: 81 MG/DL (ref 65–99)
GLUCOSE SERPL-MCNC: 83 MG/DL (ref 65–99)
HCT VFR BLD AUTO: 39.2 % (ref 37–47)
HGB BLD-MCNC: 12.2 G/DL (ref 12–16)
HGB RETIC QN AUTO: 29.1 PG/CELL (ref 29–35)
IMM GRANULOCYTES # BLD AUTO: 0.02 K/UL (ref 0–0.11)
IMM GRANULOCYTES NFR BLD AUTO: 0.4 % (ref 0–0.9)
IMM RETICS NFR: 15.3 % (ref 2.6–16.1)
IRON SATN MFR SERPL: 16 % (ref 15–55)
IRON SATN MFR SERPL: 16 % (ref 15–55)
IRON SERPL-MCNC: 60 UG/DL (ref 40–170)
IRON SERPL-MCNC: 65 UG/DL (ref 40–170)
LYMPHOCYTES # BLD AUTO: 1.37 K/UL (ref 1–4.8)
LYMPHOCYTES NFR BLD: 28.3 % (ref 22–41)
MAGNESIUM SERPL-MCNC: 1.8 MG/DL (ref 1.5–2.5)
MAGNESIUM SERPL-MCNC: 1.9 MG/DL (ref 1.5–2.5)
MCH RBC QN AUTO: 26.2 PG (ref 27–33)
MCHC RBC AUTO-ENTMCNC: 31.1 G/DL (ref 32.2–35.5)
MCV RBC AUTO: 84.1 FL (ref 81.4–97.8)
MONOCYTES # BLD AUTO: 0.53 K/UL (ref 0–0.85)
MONOCYTES NFR BLD AUTO: 11 % (ref 0–13.4)
NEUTROPHILS # BLD AUTO: 2.71 K/UL (ref 1.82–7.42)
NEUTROPHILS NFR BLD: 56 % (ref 44–72)
NRBC # BLD AUTO: 0 K/UL
NRBC BLD-RTO: 0 /100 WBC (ref 0–0.2)
NT-PROBNP SERPL IA-MCNC: 4034 PG/ML (ref 0–125)
NT-PROBNP SERPL IA-MCNC: 4070 PG/ML (ref 0–125)
PHOSPHATE SERPL-MCNC: 2.8 MG/DL (ref 2.5–4.5)
PHOSPHATE SERPL-MCNC: 2.9 MG/DL (ref 2.5–4.5)
PLATELET # BLD AUTO: 270 K/UL (ref 164–446)
PMV BLD AUTO: 10.2 FL (ref 9–12.9)
POTASSIUM SERPL-SCNC: 4.3 MMOL/L (ref 3.6–5.5)
POTASSIUM SERPL-SCNC: 5.1 MMOL/L (ref 3.6–5.5)
PROT SERPL-MCNC: 6.7 G/DL (ref 6–8.2)
PROT SERPL-MCNC: 7 G/DL (ref 6–8.2)
RBC # BLD AUTO: 4.66 M/UL (ref 4.2–5.4)
RETICS # AUTO: 0.05 M/UL (ref 0.04–0.12)
RETICS/RBC NFR: 1.1 % (ref 0.8–2.6)
SODIUM SERPL-SCNC: 138 MMOL/L (ref 135–145)
SODIUM SERPL-SCNC: 138 MMOL/L (ref 135–145)
TIBC SERPL-MCNC: 372 UG/DL (ref 250–450)
TIBC SERPL-MCNC: 394 UG/DL (ref 250–450)
UIBC SERPL-MCNC: 312 UG/DL (ref 110–370)
UIBC SERPL-MCNC: 329 UG/DL (ref 110–370)
WBC # BLD AUTO: 4.8 K/UL (ref 4.8–10.8)

## 2024-03-09 PROCEDURE — 99233 SBSQ HOSP IP/OBS HIGH 50: CPT | Performed by: STUDENT IN AN ORGANIZED HEALTH CARE EDUCATION/TRAINING PROGRAM

## 2024-03-09 PROCEDURE — 84100 ASSAY OF PHOSPHORUS: CPT | Mod: 91

## 2024-03-09 PROCEDURE — 700102 HCHG RX REV CODE 250 W/ 637 OVERRIDE(OP)

## 2024-03-09 PROCEDURE — 83540 ASSAY OF IRON: CPT | Mod: 91

## 2024-03-09 PROCEDURE — 36415 COLL VENOUS BLD VENIPUNCTURE: CPT

## 2024-03-09 PROCEDURE — 700102 HCHG RX REV CODE 250 W/ 637 OVERRIDE(OP): Performed by: HOSPITALIST

## 2024-03-09 PROCEDURE — A9270 NON-COVERED ITEM OR SERVICE: HCPCS | Performed by: HOSPITALIST

## 2024-03-09 PROCEDURE — 85025 COMPLETE CBC W/AUTO DIFF WBC: CPT

## 2024-03-09 PROCEDURE — 82728 ASSAY OF FERRITIN: CPT

## 2024-03-09 PROCEDURE — 99222 1ST HOSP IP/OBS MODERATE 55: CPT | Performed by: STUDENT IN AN ORGANIZED HEALTH CARE EDUCATION/TRAINING PROGRAM

## 2024-03-09 PROCEDURE — 700111 HCHG RX REV CODE 636 W/ 250 OVERRIDE (IP): Mod: JZ | Performed by: HOSPITALIST

## 2024-03-09 PROCEDURE — 770020 HCHG ROOM/CARE - TELE (206)

## 2024-03-09 PROCEDURE — 83735 ASSAY OF MAGNESIUM: CPT | Mod: 91

## 2024-03-09 PROCEDURE — 80053 COMPREHEN METABOLIC PANEL: CPT

## 2024-03-09 PROCEDURE — 83880 ASSAY OF NATRIURETIC PEPTIDE: CPT

## 2024-03-09 PROCEDURE — 85046 RETICYTE/HGB CONCENTRATE: CPT

## 2024-03-09 PROCEDURE — A9270 NON-COVERED ITEM OR SERVICE: HCPCS

## 2024-03-09 PROCEDURE — 83550 IRON BINDING TEST: CPT

## 2024-03-09 RX ORDER — OXYCODONE HYDROCHLORIDE 5 MG/1
5 TABLET ORAL EVERY 4 HOURS PRN
Status: DISCONTINUED | OUTPATIENT
Start: 2024-03-09 | End: 2024-03-13 | Stop reason: HOSPADM

## 2024-03-09 RX ADMIN — ACETAMINOPHEN 650 MG: 325 TABLET, FILM COATED ORAL at 23:08

## 2024-03-09 RX ADMIN — SPIRONOLACTONE 12.5 MG: 25 TABLET ORAL at 05:23

## 2024-03-09 RX ADMIN — LEVOTHYROXINE SODIUM 125 MCG: 0.12 TABLET ORAL at 05:24

## 2024-03-09 RX ADMIN — OXYCODONE 5 MG: 5 TABLET ORAL at 19:55

## 2024-03-09 RX ADMIN — FUROSEMIDE 40 MG: 10 INJECTION INTRAMUSCULAR; INTRAVENOUS at 05:23

## 2024-03-09 RX ADMIN — LOSARTAN POTASSIUM 12.5 MG: 25 TABLET, FILM COATED ORAL at 05:24

## 2024-03-09 RX ADMIN — DULOXETINE HYDROCHLORIDE 60 MG: 60 CAPSULE, DELAYED RELEASE ORAL at 05:23

## 2024-03-09 RX ADMIN — CLOPIDOGREL BISULFATE 75 MG: 75 TABLET ORAL at 05:24

## 2024-03-09 RX ADMIN — OXYCODONE 5 MG: 5 TABLET ORAL at 23:08

## 2024-03-09 RX ADMIN — ACETAMINOPHEN 650 MG: 325 TABLET, FILM COATED ORAL at 15:07

## 2024-03-09 ASSESSMENT — LIFESTYLE VARIABLES
EVER FELT BAD OR GUILTY ABOUT YOUR DRINKING: NO
AVERAGE NUMBER OF DAYS PER WEEK YOU HAVE A DRINK CONTAINING ALCOHOL: 0
HOW MANY TIMES IN THE PAST YEAR HAVE YOU HAD 5 OR MORE DRINKS IN A DAY: 0
HAVE PEOPLE ANNOYED YOU BY CRITICIZING YOUR DRINKING: NO
HAVE YOU EVER FELT YOU SHOULD CUT DOWN ON YOUR DRINKING: NO
TOTAL SCORE: 0
CONSUMPTION TOTAL: NEGATIVE
ON A TYPICAL DAY WHEN YOU DRINK ALCOHOL HOW MANY DRINKS DO YOU HAVE: 0
ALCOHOL_USE: NO
DOES PATIENT WANT TO STOP DRINKING: NO
EVER HAD A DRINK FIRST THING IN THE MORNING TO STEADY YOUR NERVES TO GET RID OF A HANGOVER: NO
TOTAL SCORE: 0
TOTAL SCORE: 0

## 2024-03-09 ASSESSMENT — PAIN DESCRIPTION - PAIN TYPE
TYPE: ACUTE PAIN

## 2024-03-09 ASSESSMENT — FIBROSIS 4 INDEX
FIB4 SCORE: 2.17
FIB4 SCORE: 2.17
FIB4 SCORE: 1.73

## 2024-03-09 ASSESSMENT — COGNITIVE AND FUNCTIONAL STATUS - GENERAL
WALKING IN HOSPITAL ROOM: A LITTLE
MOBILITY SCORE: 18
PERSONAL GROOMING: A LITTLE
DRESSING REGULAR LOWER BODY CLOTHING: A LOT
TOILETING: A LOT
MOVING FROM LYING ON BACK TO SITTING ON SIDE OF FLAT BED: A LITTLE
MOVING TO AND FROM BED TO CHAIR: A LITTLE
MOBILITY SCORE: 18
MOVING FROM LYING ON BACK TO SITTING ON SIDE OF FLAT BED: A LITTLE
SUGGESTED CMS G CODE MODIFIER MOBILITY: CK
CLIMB 3 TO 5 STEPS WITH RAILING: A LITTLE
CLIMB 3 TO 5 STEPS WITH RAILING: A LITTLE
STANDING UP FROM CHAIR USING ARMS: A LITTLE
DRESSING REGULAR UPPER BODY CLOTHING: A LOT
MOVING TO AND FROM BED TO CHAIR: A LITTLE
STANDING UP FROM CHAIR USING ARMS: A LITTLE
WALKING IN HOSPITAL ROOM: A LITTLE
SUGGESTED CMS G CODE MODIFIER DAILY ACTIVITY: CK
HELP NEEDED FOR BATHING: A LOT
TURNING FROM BACK TO SIDE WHILE IN FLAT BAD: A LITTLE
SUGGESTED CMS G CODE MODIFIER MOBILITY: CK
TURNING FROM BACK TO SIDE WHILE IN FLAT BAD: A LITTLE
DAILY ACTIVITIY SCORE: 15

## 2024-03-09 ASSESSMENT — ENCOUNTER SYMPTOMS: SHORTNESS OF BREATH: 0

## 2024-03-09 NOTE — PROGRESS NOTES
Hospital Medicine Daily Progress Note    Date of Service  3/9/2024    Chief Complaint  Davie Montejo is a 89 y.o. female admitted 3/8/2024 with shortness of breath and hypoxia    Hospital Course  89 female with past medical history of CAD s/p stent , CABG 2008, dementia hypertension, hyperlipidemia,HFrEF 20-25% referred here for shortness of breath.  Subsequent chest x-ray noted with CHF with interstitial pulmonary edema, a small bilateral pleural effusion, CT abdomen noted with moderate bilateral pleural effusion.  Admitted for acute on chronic systolic heart failure.      Interval Problem Update    3/9/2024    Patient seen and examined bedside in morning around  AOx3  vitals remained stable, requiring 2 L which is her baseline    Labs reviewed normal white count, renal function is stable BUN/creatinine 13/0.81, BNP around 4000, normal troponin.  chest x-ray reviewed noted with CHF with interstitial pulmonary edema, a small bilateral pleural effusion, CT abdomen noted with moderate bilateral pleural effusion.  Recent echocardiogram reviewed from 2/27 noted with ejection present 20-25%.  Reduced right ventricular systolic function.   Case discussed with cardiologist Dr. Lerner .    Telemetry monitoring  Lasix 40mg BID, monitor potassium and magnesium level   Supplement O2 as needed  Fluid restriction   Daily strict input and output  Repeat x-ray  Repeat BMP in a.m. to monitor electrolytes and toxicity while on  high-dose of IV  diuretics  Need close monitoring of blood counts, electrolytes and renal function due to potential of organ dysfunction due to acute CHF   High risk of deterioration into arrhythmia, need closely Tele monitoring        I have discussed this patient's plan of care and discharge plan at IDT rounds today with Case Management, Nursing, Nursing leadership, and other members of the IDT team.    Consultants/Specialty  cardiology    Code Status  DNAR/DNI    Disposition  The patient is not  medically cleared for discharge to home or a post-acute facility.  Anticipate discharge to: skilled nursing facility    I have placed the appropriate orders for post-discharge needs.    Review of Systems  Review of Systems   Respiratory:  Negative for shortness of breath.    Cardiovascular:  Negative for chest pain.        Physical Exam  Temp:  [36.1 °C (97 °F)-36.4 °C (97.5 °F)] 36.4 °C (97.5 °F)  Pulse:  [72-92] 79  Resp:  [14-20] 18  BP: (106-140)/(55-70) 108/63  SpO2:  [94 %-99 %] 94 %    Physical Exam  Constitutional:       Appearance: Normal appearance.   Cardiovascular:      Rate and Rhythm: Normal rate and regular rhythm.      Pulses: Normal pulses.      Heart sounds: Normal heart sounds.   Pulmonary:      Effort: Pulmonary effort is normal.      Breath sounds: Rales present.   Musculoskeletal:      Right lower leg: No edema.      Left lower leg: No edema.   Neurological:      General: No focal deficit present.      Mental Status: She is alert and oriented to person, place, and time.   Psychiatric:         Mood and Affect: Mood normal.         Fluids    Intake/Output Summary (Last 24 hours) at 3/9/2024 1114  Last data filed at 3/9/2024 0800  Gross per 24 hour   Intake 100 ml   Output 1100 ml   Net -1000 ml       Laboratory  Recent Labs     03/08/24  1755 03/09/24  0219   WBC 4.1* 4.8   RBC 3.38* 4.66   HEMOGLOBIN 8.8* 12.2   HEMATOCRIT 28.5* 39.2   MCV 84.3 84.1   MCH 26.0* 26.2*   MCHC 30.9* 31.1*   RDW 56.7* 56.2*   PLATELETCT 215 270   MPV 11.1 10.2     Recent Labs     03/08/24  1755 03/09/24  0407 03/09/24  0528   SODIUM 143 138 138   POTASSIUM 3.6 5.1 4.3   CHLORIDE 114* 99 99   CO2 20 28 28   GLUCOSE 73 83 81   BUN 11 13 13   CREATININE 0.59 0.82 0.81   CALCIUM 6.2* 9.8 9.3                   Imaging  CT-ABDOMEN-PELVIS WITH   Final Result      1.  Increased colonic stool.   2.  No bowel obstruction or perforation.   3.  Moderate bilateral pleural effusions with associated atelectasis.       DX-CHEST-PORTABLE (1 VIEW)   Final Result      Findings suggesting CHF with interstitial pulmonary edema, small pleural effusions and atelectasis.           Assessment/Plan  * Acute systolic congestive heart failure (HCC)- (present on admission)  Assessment & Plan  Telemetry monitoring  Lasix 40mg BID, monitor potassium and magnesium level   Continue on metoprolol, losartan, spironolactone  Supplement O2 as needed  Fluid restriction   Daily strict input and output  Repeat x-ray  Repeat BMP in a.m. to monitor electrolytes and toxicity while on  high-dose of IV  diuretics  Cardiology consulted    Severe protein-calorie malnutrition ([place]: less than 60% of standard weight) ([facility])  Assessment & Plan  Dietary consult  Monitor electrolytes    Acute respiratory failure with hypoxia ([facility])  Assessment & Plan  2 L of O2 above her baseline    Pleural effusion- (present on admission)  Assessment & Plan  Likely transudate  I will start IV Lasix for now and if the pleural effusions have not resolved she should get a thoracentesis    Vascular dementia with paranoia (HCC)- (present on admission)  Assessment & Plan  Frequent reorientation  Patient was found to be lethargic on examination which is likely due to polypharmacy.  I will discontinue her gabapentin.  Avoid sedative medications.    Anemia- (present on admission)  Assessment & Plan  Unknown etiology  Check iron panel, ferritin, B12, reticulocyte count    Hypothyroid- (present on admission)  Assessment & Plan  Continue thyroid medications    Essential hypertension- (present on admission)  Assessment & Plan  controlled  Continue current home medications  IV as needed medications have been ordered      Coronary artery disease involving coronary bypass graft of native heart- (present on admission)  Assessment & Plan  Continue Plavix and statins         VTE prophylaxis: lovenox    I have performed a physical exam and reviewed and updated ROS and Plan today (3/9/2024). In review  of yesterday's note (3/8/2024), there are no changes except as documented above.

## 2024-03-09 NOTE — ASSESSMENT & PLAN NOTE
3/12/2024  Telemetry monitoring  Supplement O2 as needed  Fluid restriction   Daily strict input and output  Repeat x-ray  Repeat BMP in a.m. to monitor electrolytes   Cardiology evaluated    She is unable to tolerate IV Lasix.  She is near euvolemic.  Started on oral Lasix, metoprolol  Holding Entresto, spironolactone

## 2024-03-09 NOTE — ASSESSMENT & PLAN NOTE
No active signs of bleeding.    Will continue to trend with CBC  Transfuse to maintain hemoglobin greater than 7.  Results from last 7 days   Lab Units 03/09/24  0219 03/08/24  1755   HGB 1503 g/dL 12.2 8.8*   HCT 1504 % 39.2 28.5*   MCV 1505 fL 84.1 84.3       Reticulocyte Count   Date Value Ref Range Status   03/09/2024 1.1 0.8 - 2.6 % Final     Comment:     Please note new reference range effective 05/22/2023.     Retic, Absolute   Date Value Ref Range Status   03/09/2024 0.05 0.04 - 0.12 M/uL Final     Comment:     Please note new reference range effective 05/22/2023.     Imm. Reticulocyte Fraction   Date Value Ref Range Status   03/09/2024 15.3 2.6 - 16.1 % Final     Comment:     Please note new reference range effective 05/22/2023.     Retic Hgb Equivalent   Date Value Ref Range Status   03/09/2024 29.1 29.0 - 35.0 pg/cell Final      Ferritin   Date Value Ref Range Status   03/09/2024 62.4 10.0 - 291.0 ng/mL Final     Iron   Date Value Ref Range Status   03/09/2024 60 40 - 170 ug/dL Final     Total Iron Binding   Date Value Ref Range Status   03/09/2024 372 250 - 450 ug/dL Final     % Saturation   Date Value Ref Range Status   03/09/2024 16 15 - 55 % Final

## 2024-03-09 NOTE — PROGRESS NOTES
4 Eyes Skin Assessment Completed by ESTEFANY Clark and ESTEFANY Louis.    Head WDL  Ears WDL  Nose WDL  Mouth WDL  Neck WDL  Breast/Chest Scar  Shoulder Blades WDL  Spine WDL  (R) Arm/Elbow/Hand WDL  (L) Arm/Elbow/Hand WDL  Abdomen WDL  Groin WDL  Scrotum/Coccyx/Buttocks Redness and Blanching  (R) Leg WDL  (L) Leg WDL  (R) Heel/Foot/Toe Redness and Blanching  (L) Heel/Foot/Toe Redness and Blanching          Devices In Places Tele Box and Pulse Ox      Interventions In Place Heel Mepilex, Sacral Mepilex, Pillows, and Pressure Redistribution Mattress    Possible Skin Injury No    Pictures Uploaded Into Epic N/A  Wound Consult Placed N/A  RN Wound Prevention Protocol Ordered No      Bilateral heela are red and slow to hal. Mepilex applied to heels and sacrum

## 2024-03-09 NOTE — ED PROVIDER NOTES
CHIEF COMPLAINT  Chief Complaint   Patient presents with    Syncope     Pt was laying in bed when she had syncopal episode per staff, pts oxygen went down to 64% per staff on baseline 2L of oxygen. Pt has hx of dementia, but can state the year, person and place       LIMITATION TO HISTORY   Select: None    HPI    Davie Montejo is a 89 y.o. female who presents to the Emergency Department evaluation of a syncopal episode.  Patient was laying in bed when she had a witnessed syncopal episode noted at the nursing's staff at her skilled nursing facility.  Was also noted to be hypoxic with an O2 saturation of 64% on that time.  She does have a history of dementia a new diagnosis of CHF, is on 2 L at baseline.  Also has an underlying history of coronary artery disease.  Currently the patient denies any chest pain denies any feelings of shortness of breath abdominal pain nausea vomiting or diarrhea    OUTSIDE HISTORIAN(S):  Select: EMS reports that patient had a witnessed syncopal episode by staff    EXTERNAL RECORDS REVIEWED  Select: Other reviewed her discharge summary, she was recently diagnosed with heart failure her most recent echo during that admission showed a ejection fraction of 25% she also had a pleural effusion requiring a thoracentesis approximately 2 L removed at that time      PAST MEDICAL HISTORY  Past Medical History:   Diagnosis Date    Acute blood loss anemia 10/21/2021    Acute on chronic systolic heart failure (HCC) 8/24/2020    Acute perforated gastric ulcer with hemorrhage ([facility]) 10/21/2021    Anemia 2/14/2022    Angina decubitus 6/18/2010    CAD (coronary artery disease)     Cannabis use, unspecified with intoxication, uncomplicated (HCC) 2/14/2022    Fibromyalgia     Generalized abdominal pain 12/5/2021    Heart attack ([facility]) 2008    Hypotension due to medication 7/18/2022    Intractable nausea and vomiting 12/4/2021    Pain in the chest 11/15/2020     .    SURGICAL HISTORY  Past Surgical  History:   Procedure Laterality Date    CORONARY ARTERY BYPAS, 4  2008    CORONARY ARTERY BYPASS, 1  2008         FAMILY HISTORY  No family history on file.       SOCIAL HISTORY  Social History     Socioeconomic History    Marital status:      Spouse name: Not on file    Number of children: Not on file    Years of education: Not on file    Highest education level: Not on file   Occupational History    Not on file   Tobacco Use    Smoking status: Former    Smokeless tobacco: Never   Vaping Use    Vaping Use: Never used   Substance and Sexual Activity    Alcohol use: Never    Drug use: Not Currently    Sexual activity: Not Currently   Other Topics Concern    Not on file   Social History Narrative    Not on file     Social Determinants of Health     Financial Resource Strain: Low Risk  (12/15/2021)    Overall Financial Resource Strain (CARDIA)     Difficulty of Paying Living Expenses: Not very hard   Food Insecurity: No Food Insecurity (12/15/2021)    Hunger Vital Sign     Worried About Running Out of Food in the Last Year: Never true     Ran Out of Food in the Last Year: Never true   Transportation Needs: Unmet Transportation Needs (1/12/2021)    PRAPARE - Transportation     Lack of Transportation (Medical): Yes     Lack of Transportation (Non-Medical): Not on file   Physical Activity: Not on file   Stress: Not on file   Social Connections: Not on file   Intimate Partner Violence: Not on file   Housing Stability: Not on file         CURRENT MEDICATIONS  No current facility-administered medications on file prior to encounter.     Current Outpatient Medications on File Prior to Encounter   Medication Sig Dispense Refill    DULoxetine (CYMBALTA) 60 MG Cap DR Particles delayed-release capsule Take 60 mg by mouth every morning.      losartan (COZAAR) 25 MG Tab Take 12.5 mg by mouth every morning. 1/2 tablet=12.5mg      spironolactone (ALDACTONE) 25 MG Tab Take 12.5 mg by mouth every day. 1/2  tablet= 12.5mg       "sucralfate (CARAFATE) 1 GM Tab Take 1 g by mouth 2 times a day.      acetaminophen (TYLENOL) 325 MG Tab Take 650 mg by mouth every four hours as needed for Mild Pain.      oxyCODONE immediate-release (ROXICODONE) 5 MG Tab Take 5 mg by mouth every four hours as needed for Severe Pain.      metoprolol SR (TOPROL XL) 25 MG TABLET SR 24 HR Take 0.5 Tablets by mouth every evening. 30 Tablet 0    potassium chloride SA (K-DUR) 10 MEQ Tab CR Take 1 Tablet by mouth every day. 60 Tablet 0    estradiol (ESTRACE) 1 MG Tab TAKE 1 TABLET BY MOUTH EVERY DAY. 90 Tablet 3    levothyroxine (SYNTHROID) 125 MCG Tab TAKE 1 TABLET BY MOUTH EVERY MORNING ON AN EMPTY STOMACH. 30 Tablet 11    omeprazole (PRILOSEC) 20 MG delayed-release capsule TAKE 1 CAPSULE BY MOUTH EVERY DAY. 30 Capsule 11    clopidogrel (PLAVIX) 75 MG Tab TAKE 1 TABLET BY MOUTH EVERY DAY. 30 Tablet 11    loratadine (CLARITIN) 10 MG Tab Take 1 Tablet by mouth every day. 90 Tablet 3    gabapentin (NEURONTIN) 600 MG tablet Take 1 Tablet by mouth 2 times a day. 180 Tablet 1    atorvastatin (LIPITOR) 10 MG Tab Take 1 Tablet by mouth every evening. FOR CHOLESTEROL. 90 Tablet 3           ALLERGIES  Allergies   Allergen Reactions    Latex Unspecified          Tramadol Rash     Itchy red in nature    Codeine Unspecified     Unknown reaction      Lisinopril Unspecified     Unknown reaction    Penicillin G Unspecified     Unknown reaction      Pregabalin Unspecified     Lyrica affects patients vision.    Simvastatin Unspecified     Unknown reaction      Sulfa Drugs Unspecified     Patient states \"I can't remember what this does to me\" but recalls and allergy.        PHYSICAL EXAM  VITAL SIGNS:/70   Pulse 89   Temp 36.3 °C (97.3 °F) (Temporal)   Resp 14   Ht 1.702 m (5' 7\")   Wt 67.1 kg (148 lb)   LMP  (LMP Unknown)   SpO2 99%   BMI 23.18 kg/m²       VITALS - vital signs documented prior to this note have been reviewed and noted,  GENERAL - awake, alert, oriented, GCS " 15, no apparent distress, non-toxic  appearing  HEENT - normocephalic, atraumatic, pupils equal, sclera anicteric, mucus  membranes moist  NECK - supple, no meningismus, full active range of motion, trachea midline  CARDIOVASCULAR - regular rate/rhythm, no murmurs/gallops/rubs  PULMONARY - no respiratory distress, speaking in full sentences, clear to  auscultation bilaterally, no wheezing/ronchi/rales, no accessory muscle use  GASTROINTESTINAL -left lower quadrant tenderness otherwise performed with female nurse chaperone present in the room at all times there is no stool in the rectal vault stool guaiac is negative soft, non-tender, non-distended, no rebound, guarding,  or peritonitis  GENITOURINARY -   NEUROLOGIC - Awake alert, normal mental status, speech fluid, cognition  normal, moves all extremities  MUSCULOSKELETAL - no obvious asymmetry or deformities present  EXTREMITIES - warm, well-perfused, no cyanosis or significant edema  DERMATOLOGIC - warm, dry, no rashes, no jaundice  PSYCHIATRIC - normal affect, normal insight, normal concentration    DIAGNOSTIC STUDIES / PROCEDURES  EKG  I have independently interpreted this EKG  Interpreted below by myself as EKG demonstrates sinus rhythm with first-degree AV block a left bundle branch block left axis deviation first-degree AV block appears new, though otherwise unchanged when compared to prior no acute ischemic changes.    LABS  Labs Reviewed   CBC WITH DIFFERENTIAL - Abnormal; Notable for the following components:       Result Value    WBC 4.1 (*)     RBC 3.38 (*)     Hemoglobin 8.8 (*)     Hematocrit 28.5 (*)     MCH 26.0 (*)     MCHC 30.9 (*)     RDW 56.7 (*)     All other components within normal limits    Narrative:     Biotin intake of greater than 5 mg per day may interfere with                  troponin levels, causing false low values.   COMP METABOLIC PANEL - Abnormal; Notable for the following components:    Chloride 114 (*)     Calcium 6.2 (*)      Correct Calcium 7.4 (*)     Albumin 2.5 (*)     Total Protein 4.5 (*)     All other components within normal limits    Narrative:     Biotin intake of greater than 5 mg per day may interfere with                  troponin levels, causing false low values.   PROBRAIN NATRIURETIC PEPTIDE, NT - Abnormal; Notable for the following components:    NT-proBNP 1975 (*)     All other components within normal limits    Narrative:     Biotin intake of greater than 5 mg per day may interfere with                  troponin levels, causing false low values.   TROPONIN    Narrative:     Biotin intake of greater than 5 mg per day may interfere with                  troponin levels, causing false low values.   ESTIMATED GFR    Narrative:     Biotin intake of greater than 5 mg per day may interfere with                  troponin levels, causing false low values.   PROBRAIN NATRIURETIC PEPTIDE, NT   MAGNESIUM   PHOSPHORUS   IRON/TOTAL IRON BIND   FERRITIN   RETICULOCYTES COUNT   CBC WITH DIFFERENTIAL   COMP METABOLIC PANEL   PROBRAIN NATRIURETIC PEPTIDE, NT       CBC does show a new hypochromic normocytic anemia as well as a hypocalcemia  RADIOLOGY  I have independently interpreted the diagnostic imaging associated with this visit and am waiting the final reading from the radiologist.   My preliminary interpretation is as follows: Fluid congestion as well as a recurrence of a right pleural effusion      Radiologist interpretation:   CT-ABDOMEN-PELVIS WITH   Final Result      1.  Increased colonic stool.   2.  No bowel obstruction or perforation.   3.  Moderate bilateral pleural effusions with associated atelectasis.      DX-CHEST-PORTABLE (1 VIEW)   Final Result      Findings suggesting CHF with interstitial pulmonary edema, small pleural effusions and atelectasis.           COURSE & MEDICAL DECISION MAKING    ED COURSE:        INTERVENTIONS BY ME:  Medications   acetaminophen (Tylenol) tablet 650 mg (has no administration in time  range)   hydrALAZINE (Apresoline) injection 10 mg (has no administration in time range)   clopidogrel (Plavix) tablet 75 mg (has no administration in time range)   DULoxetine (Cymbalta) capsule 60 mg (has no administration in time range)   levothyroxine (Synthroid) tablet 125 mcg (has no administration in time range)   losartan (Cozaar) tablet 12.5 mg (has no administration in time range)   metoprolol SR (Toprol XL) tablet 12.5 mg (has no administration in time range)   spironolactone (Aldactone) tablet 12.5 mg (has no administration in time range)   furosemide (Lasix) injection 40 mg (has no administration in time range)   calcium GLUConate 2 g in NaCl IVPB premix ( Intravenous Stopped 3/8/24 2045)   furosemide (Lasix) injection 20 mg (20 mg Intravenous Given 3/8/24 1953)   iohexol (OMNIPAQUE) 350 mg/mL (IV) (90 mL Intravenous Given 3/8/24 2130)         Critical care    Critical Care Procedure Note    Total critical care time: Approximately 36 minutes    Upon my assess due to a high probability of clinically significant, life threatening deterioration, secondary to acute on chronic CHF exacerbation requiring Lasix and hypoxic respiratory failure the patient required my direct attention and intervention. This critical care time included obtaining a history; examining the patient; pulse oximetry; ordering and review of studies; arranging urgent treatment with development of a management plan; evaluation of patient's response to treatment; frequent reassessment; and, discussions with other providers.    was exclusive of separately billable procedures and treating other patients and teaching time.        INITIAL ASSESSMENT, COURSE AND PLAN  Care Narrative: Patient presented for evaluation of shortness of breath a syncopal episode while in her skilled nursing facility.  She has a new diagnosis of CHF was noted to have a pleural effusion which was drained on her last admission.  Upon arrival, the patient is resting  comfortably no acute distress she does have a history of dementia is alert to person place not time or event, does have some mild to moderate left lower quadrant tenderness on examination.  Labs are remarkable for a new anemia, down to 8.8 from 11.57 days ago.  Did perform a rectal exam, stool guaiac was negative.  There is no obvious melena or hematochezia on exam.  Chest x-ray does show recurrent pleural effusion as well as pulmonary edema, was given a dose of Lasix for this.  Obtained a CT abdomen pelvis given her left lower quadrant tenderness which did not show any acute intra-abdominal pathology.  Her CMP did show a hypocalcemia this was repleted in the emergency department, given her recurrent pleural effusion with shortness of breath and syncopal episode as well as downtrending anemia I believe she warrants admission at this time.  Will give her dose of Lasix given the pleural effusion and pulmonary edema noted on chest x-ray.  She is admitted to medicine in serious but stable condition for further care and evaluation         ADDITIONAL PROBLEM LIST    DISPOSITION AND DISCUSSIONS  I have discussed management of the patient with the following physicians and LEYLA's: Hospitalist      Decision tools and prescription drugs considered including, but not limited to: HEART Score 6 .    FINAL DIAGNOSIS  1 acute on chronic CHF exacerbation  2.  Recurrent pleural effusion  3.  Constipation  4.  Hypocalcemia  5.  Anemia           Electronically signed by: Ko Mercado DO ,11:52 PM 03/08/24

## 2024-03-09 NOTE — ASSESSMENT & PLAN NOTE
3/12/2024  2 L of O2 above her baseline  Secondary to pulmonary edema, pleural effusions, heart failure

## 2024-03-09 NOTE — CARE PLAN
Problem: Fall Risk  Goal: Patient will remain free from falls  Description: Target End Date:  Prior to discharge or change in level of care    Document interventions on the [facility] Fall Risk Assessment    1.  Assess for fall risk factors  2.  Implement fall precautions  Outcome: Progressing   The patient is Watcher - Medium risk of patient condition declining or worsening         Progress made toward(s) clinical / shift goals:  PT educated to contact staff for assistance.  Pt up to bathroom with hand held assist, tolerates well    Patient is not progressing towards the following goals:

## 2024-03-09 NOTE — ED NOTES
Bedside report received from off going RN/tech: Fatou ALLISON, assumed care of patient.  POC discussed with patient. Call light within reach, all needs addressed at this time.       Fall risk interventions in place: Move the patient closer to the nurse's station, Patient's personal possessions are with in their safe reach, Place socks on patient, Place fall risk sign on patient's door, Keep floor surfaces clean and dry, and Accompanied to restroom (all applicable per [place] Fall risk assessment)   Continuous monitoring: Cardiac Leads, Pulse Ox, or Blood Pressure  IVF/IV medications: Not Applicable   Oxygen: How many liters 2L  Bedside sitter: Not Applicable   Isolation: Not Applicable

## 2024-03-09 NOTE — CONSULTS
Reason for Consult:  Asked by Dr Dioni Terry M.D. to see this patient with new HFrEF  Patient's PCP: PERLITA Lew    CC:   Chief Complaint   Patient presents with    Syncope     Pt was laying in bed when she had syncopal episode per staff, pts oxygen went down to 64% per staff on baseline 2L of oxygen. Pt has hx of dementia, but can state the year, person and place       HPI:  Davie Montejo is an 87-year-old woman with history of CAD status post CABG in 2008, hypertension, and dyslipidemia who presented with shortness of breath, found to have new onset HFrEF.  The patient did have a chart diagnosis of heart failure, but LVEF from 2021 was normal.    The patient reports feeling okay currently.  She continues to have some shortness of breath.  She denies any orthopnea PND.  Does have some leg swelling.  No palpitations.  No syncope or presyncopal episodes.    Medications / Drug list prior to admission:  No current facility-administered medications on file prior to encounter.     Current Outpatient Medications on File Prior to Encounter   Medication Sig Dispense Refill    DULoxetine (CYMBALTA) 60 MG Cap DR Particles delayed-release capsule Take 60 mg by mouth every morning.      losartan (COZAAR) 25 MG Tab Take 12.5 mg by mouth every morning. 1/2 tablet=12.5mg      spironolactone (ALDACTONE) 25 MG Tab Take 12.5 mg by mouth every day. 1/2  tablet= 12.5mg      sucralfate (CARAFATE) 1 GM Tab Take 1 g by mouth 2 times a day.      acetaminophen (TYLENOL) 325 MG Tab Take 650 mg by mouth every four hours as needed for Mild Pain.      oxyCODONE immediate-release (ROXICODONE) 5 MG Tab Take 5 mg by mouth every four hours as needed for Severe Pain.      metoprolol SR (TOPROL XL) 25 MG TABLET SR 24 HR Take 0.5 Tablets by mouth every evening. 30 Tablet 0    potassium chloride SA (K-DUR) 10 MEQ Tab CR Take 1 Tablet by mouth every day. 60 Tablet 0    estradiol (ESTRACE) 1 MG Tab TAKE 1 TABLET BY MOUTH EVERY DAY. 90  Tablet 3    levothyroxine (SYNTHROID) 125 MCG Tab TAKE 1 TABLET BY MOUTH EVERY MORNING ON AN EMPTY STOMACH. 30 Tablet 11    omeprazole (PRILOSEC) 20 MG delayed-release capsule TAKE 1 CAPSULE BY MOUTH EVERY DAY. 30 Capsule 11    clopidogrel (PLAVIX) 75 MG Tab TAKE 1 TABLET BY MOUTH EVERY DAY. 30 Tablet 11    loratadine (CLARITIN) 10 MG Tab Take 1 Tablet by mouth every day. 90 Tablet 3    gabapentin (NEURONTIN) 600 MG tablet Take 1 Tablet by mouth 2 times a day. 180 Tablet 1    atorvastatin (LIPITOR) 10 MG Tab Take 1 Tablet by mouth every evening. FOR CHOLESTEROL. 90 Tablet 3       Current list of administered Medications:    Current Facility-Administered Medications:     acetaminophen (Tylenol) tablet 650 mg, 650 mg, Oral, Q6HRS PRN, Tom Carrera M.D.    hydrALAZINE (Apresoline) injection 10 mg, 10 mg, Intravenous, Q4HRS PRN, Tom Carrera M.D.    clopidogrel (Plavix) tablet 75 mg, 75 mg, Oral, DAILY, Tom Carrera M.D., 75 mg at 03/09/24 0524    DULoxetine (Cymbalta) capsule 60 mg, 60 mg, Oral, DAILY, Tom Carrera M.D., 60 mg at 03/09/24 0523    levothyroxine (Synthroid) tablet 125 mcg, 125 mcg, Oral, AM ES, Tom Carrera M.D., 125 mcg at 03/09/24 0524    losartan (Cozaar) tablet 12.5 mg, 12.5 mg, Oral, DAILY, Tom Carrera M.D., 12.5 mg at 03/09/24 0524    metoprolol SR (Toprol XL) tablet 12.5 mg, 12.5 mg, Oral, Q EVENING, Tom Carrera M.D.    spironolactone (Aldactone) tablet 12.5 mg, 12.5 mg, Oral, DAILY, Tom Carrera M.D., 12.5 mg at 03/09/24 0523    furosemide (Lasix) injection 40 mg, 40 mg, Intravenous, Q8HRS, Tom Carrera M.D., 40 mg at 03/09/24 0523    Past Medical History:   Diagnosis Date    Acute blood loss anemia 10/21/2021    Acute on chronic systolic heart failure (HCC) 8/24/2020    Acute perforated gastric ulcer with hemorrhage (HCC) 10/21/2021    Anemia 2/14/2022    Angina decubitus 6/18/2010    CAD (coronary artery disease)     Cannabis use, unspecified with intoxication, uncomplicated ([facility])  "2/14/2022    Fibromyalgia     Generalized abdominal pain 12/5/2021    Heart attack (HCC) 2008    Hypotension due to medication 7/18/2022    Intractable nausea and vomiting 12/4/2021    Pain in the chest 11/15/2020       Past Surgical History:   Procedure Laterality Date    CORONARY ARTERY BYPAS, 4  2008    CORONARY ARTERY BYPASS, 1  2008       No family history on file.  Patient family history was personally reviewed, no pertinent family history to current presentation    Social History     Tobacco Use    Smoking status: Former    Smokeless tobacco: Never   Vaping Use    Vaping Use: Never used   Substance Use Topics    Alcohol use: Never    Drug use: Not Currently       ALLERGIES:  Allergies   Allergen Reactions    Latex Unspecified          Tramadol Rash     Itchy red in nature    Codeine Unspecified     Unknown reaction      Lisinopril Unspecified     Unknown reaction    Penicillin G Unspecified     Unknown reaction      Pregabalin Unspecified     Lyrica affects patients vision.    Simvastatin Unspecified     Unknown reaction      Sulfa Drugs Unspecified     Patient states \"I can't remember what this does to me\" but recalls and allergy.        Review of systems:  A complete review of symptoms was reviewed with patient. This is reviewed in H&P and PMH. ALL OTHERS reviewed and negative    Physical exam:  Patient Vitals for the past 24 hrs:   BP Temp Temp src Pulse Resp SpO2 Height Weight   03/09/24 0755 108/63 36.9 °C (98.5 °F) Temporal 73 18 94 % -- --   03/09/24 0410 (!) 140/68 36.4 °C (97.5 °F) Temporal 79 18 97 % -- 64.7 kg (142 lb 10.2 oz)   03/09/24 0304 -- -- -- -- -- -- -- 64.7 kg (142 lb 10.2 oz)   03/09/24 0146 114/67 36.4 °C (97.5 °F) Temporal 73 18 97 % -- 64.7 kg (142 lb 10.2 oz)   03/09/24 0100 134/69 -- -- 72 20 95 % -- --   03/09/24 0000 -- -- -- -- 16 98 % -- --   03/08/24 2200 -- -- -- 89 14 99 % -- --   03/08/24 2100 114/70 -- -- 83 20 97 % -- --   03/08/24 2000 123/66 -- -- 86 19 99 % -- -- " "  03/08/24 1956 106/55 36.3 °C (97.3 °F) Temporal 84 15 98 % -- --   03/08/24 1926 -- 36.3 °C (97.3 °F) Temporal -- -- -- -- --   03/08/24 1900 109/62 -- -- 90 20 95 % -- --   03/08/24 1800 125/64 -- -- 92 18 94 % -- --   03/08/24 1700 107/56 -- -- 90 16 95 % -- --   03/08/24 1658 112/63 36.1 °C (97 °F) Temporal 90 18 94 % -- --   03/08/24 1656 -- -- -- -- -- -- 1.702 m (5' 7\") 67.1 kg (148 lb)     General: No acute distress.   EYES: no jaundice  HEENT: OP clear   Neck: No bruits No JVD.   CVS: RRR. S1 + S2. No M/R/G  Resp: Decreased breath sounds at bases bilaterally  Abdomen: Soft, NT, ND,  Skin: Grossly nothing acute no obvious rashes  Neurological: Alert, Moves all extremities, no cranial nerve defects on limited exam  Extremities: Pulse 2+ in b/l LE.  1+ edema. No cyanosis.       Data:  Laboratory studies personally reviewed by me:  Recent Results (from the past 24 hour(s))   CBC WITH DIFFERENTIAL    Collection Time: 03/08/24  5:55 PM   Result Value Ref Range    WBC 4.1 (L) 4.8 - 10.8 K/uL    RBC 3.38 (L) 4.20 - 5.40 M/uL    Hemoglobin 8.8 (L) 12.0 - 16.0 g/dL    Hematocrit 28.5 (L) 37.0 - 47.0 %    MCV 84.3 81.4 - 97.8 fL    MCH 26.0 (L) 27.0 - 33.0 pg    MCHC 30.9 (L) 32.2 - 35.5 g/dL    RDW 56.7 (H) 35.9 - 50.0 fL    Platelet Count 215 164 - 446 K/uL    MPV 11.1 9.0 - 12.9 fL    Neutrophils-Polys 53.40 44.00 - 72.00 %    Lymphocytes 31.10 22.00 - 41.00 %    Monocytes 11.70 0.00 - 13.40 %    Eosinophils 2.40 0.00 - 6.90 %    Basophils 1.20 0.00 - 1.80 %    Immature Granulocytes 0.20 0.00 - 0.90 %    Nucleated RBC 0.00 0.00 - 0.20 /100 WBC    Neutrophils (Absolute) 2.19 1.82 - 7.42 K/uL    Lymphs (Absolute) 1.28 1.00 - 4.80 K/uL    Monos (Absolute) 0.48 0.00 - 0.85 K/uL    Eos (Absolute) 0.10 0.00 - 0.51 K/uL    Baso (Absolute) 0.05 0.00 - 0.12 K/uL    Immature Granulocytes (abs) 0.01 0.00 - 0.11 K/uL    NRBC (Absolute) 0.00 K/uL   CMP    Collection Time: 03/08/24  5:55 PM   Result Value Ref Range    Sodium " 143 135 - 145 mmol/L    Potassium 3.6 3.6 - 5.5 mmol/L    Chloride 114 (H) 96 - 112 mmol/L    Co2 20 20 - 33 mmol/L    Anion Gap 9.0 7.0 - 16.0    Glucose 73 65 - 99 mg/dL    Bun 11 8 - 22 mg/dL    Creatinine 0.59 0.50 - 1.40 mg/dL    Calcium 6.2 (LL) 8.5 - 10.5 mg/dL    Correct Calcium 7.4 (L) 8.5 - 10.5 mg/dL    AST(SGOT) 12 12 - 45 U/L    ALT(SGPT) 5 2 - 50 U/L    Alkaline Phosphatase 55 30 - 99 U/L    Total Bilirubin 0.3 0.1 - 1.5 mg/dL    Albumin 2.5 (L) 3.2 - 4.9 g/dL    Total Protein 4.5 (L) 6.0 - 8.2 g/dL    Globulin 2.0 1.9 - 3.5 g/dL    A-G Ratio 1.3 g/dL   TROPONIN    Collection Time: 24  5:55 PM   Result Value Ref Range    Troponin T 17 6 - 19 ng/L   proBrain Natriuretic Peptide, NT    Collection Time: 24  5:55 PM   Result Value Ref Range    NT-proBNP 1975 (H) 0 - 125 pg/mL   ESTIMATED GFR    Collection Time: 24  5:55 PM   Result Value Ref Range    GFR (CKD-EPI) 87 >60 mL/min/1.73 m 2   EKG    Collection Time: 24  6:00 PM   Result Value Ref Range    Report       [facility]    Test Date:  2024  Pt Name:    JUNIOR CHIU            Department: ER  MRN:        [id number]                      Room:        06  Gender:     Female                       Technician: [id number]  :        [id number]                   Requested By:HUI VERGARA  Order #:    [id number]                    Reading MD: Hui Vergara    Measurements  Intervals                                Axis  Rate:       90                           P:          75  AR:         217                          QRS:        -51  QRSD:       146                          T:          118  QT:         389  QTc:        476    Interpretive Statements  Sinus rhythm  Borderline prolonged AR interval  Left bundle branch block  Compared to ECG 2024 19:04:51  Atrial premature complex(es) no longer present  Electronically Signed On 2024 18:08:49 PST by Hui Vergara     CBC with  Differential    Collection Time: 03/09/24  2:19 AM   Result Value Ref Range    WBC 4.8 4.8 - 10.8 K/uL    RBC 4.66 4.20 - 5.40 M/uL    Hemoglobin 12.2 12.0 - 16.0 g/dL    Hematocrit 39.2 37.0 - 47.0 %    MCV 84.1 81.4 - 97.8 fL    MCH 26.2 (L) 27.0 - 33.0 pg    MCHC 31.1 (L) 32.2 - 35.5 g/dL    RDW 56.2 (H) 35.9 - 50.0 fL    Platelet Count 270 164 - 446 K/uL    MPV 10.2 9.0 - 12.9 fL    Neutrophils-Polys 56.00 44.00 - 72.00 %    Lymphocytes 28.30 22.00 - 41.00 %    Monocytes 11.00 0.00 - 13.40 %    Eosinophils 3.10 0.00 - 6.90 %    Basophils 1.20 0.00 - 1.80 %    Immature Granulocytes 0.40 0.00 - 0.90 %    Nucleated RBC 0.00 0.00 - 0.20 /100 WBC    Neutrophils (Absolute) 2.71 1.82 - 7.42 K/uL    Lymphs (Absolute) 1.37 1.00 - 4.80 K/uL    Monos (Absolute) 0.53 0.00 - 0.85 K/uL    Eos (Absolute) 0.15 0.00 - 0.51 K/uL    Baso (Absolute) 0.06 0.00 - 0.12 K/uL    Immature Granulocytes (abs) 0.02 0.00 - 0.11 K/uL    NRBC (Absolute) 0.00 K/uL   FERRITIN    Collection Time: 03/09/24  2:19 AM   Result Value Ref Range    Ferritin 62.4 10.0 - 291.0 ng/mL   RETICULOCYTES COUNT    Collection Time: 03/09/24  2:19 AM   Result Value Ref Range    Reticulocyte Count 1.1 0.8 - 2.6 %    Retic, Absolute 0.05 0.04 - 0.12 M/uL    Imm. Reticulocyte Fraction 15.3 2.6 - 16.1 %    Retic Hgb Equivalent 29.1 29.0 - 35.0 pg/cell   Comp Metabolic Panel    Collection Time: 03/09/24  4:07 AM   Result Value Ref Range    Sodium 138 135 - 145 mmol/L    Potassium 5.1 3.6 - 5.5 mmol/L    Chloride 99 96 - 112 mmol/L    Co2 28 20 - 33 mmol/L    Anion Gap 11.0 7.0 - 16.0    Glucose 83 65 - 99 mg/dL    Bun 13 8 - 22 mg/dL    Creatinine 0.82 0.50 - 1.40 mg/dL    Calcium 9.8 8.5 - 10.5 mg/dL    Correct Calcium 10.1 8.5 - 10.5 mg/dL    AST(SGOT) 17 12 - 45 U/L    ALT(SGPT) 8 2 - 50 U/L    Alkaline Phosphatase 88 30 - 99 U/L    Total Bilirubin 0.7 0.1 - 1.5 mg/dL    Albumin 3.6 3.2 - 4.9 g/dL    Total Protein 7.0 6.0 - 8.2 g/dL    Globulin 3.4 1.9 - 3.5 g/dL     A-G Ratio 1.1 g/dL   MAGNESIUM    Collection Time: 03/09/24  4:07 AM   Result Value Ref Range    Magnesium 1.9 1.5 - 2.5 mg/dL   PHOSPHORUS    Collection Time: 03/09/24  4:07 AM   Result Value Ref Range    Phosphorus 2.9 2.5 - 4.5 mg/dL   IRON/TOTAL IRON BIND    Collection Time: 03/09/24  4:07 AM   Result Value Ref Range    Iron 65 40 - 170 ug/dL    Total Iron Binding 394 250 - 450 ug/dL    Unsat Iron Binding 329 110 - 370 ug/dL    % Saturation 16 15 - 55 %   proBrain Natriuretic Peptide, NT    Collection Time: 03/09/24  4:07 AM   Result Value Ref Range    NT-proBNP 4034 (H) 0 - 125 pg/mL   ESTIMATED GFR    Collection Time: 03/09/24  4:07 AM   Result Value Ref Range    GFR (CKD-EPI) 69 >60 mL/min/1.73 m 2   Comp Metabolic Panel    Collection Time: 03/09/24  5:28 AM   Result Value Ref Range    Sodium 138 135 - 145 mmol/L    Potassium 4.3 3.6 - 5.5 mmol/L    Chloride 99 96 - 112 mmol/L    Co2 28 20 - 33 mmol/L    Anion Gap 11.0 7.0 - 16.0    Glucose 81 65 - 99 mg/dL    Bun 13 8 - 22 mg/dL    Creatinine 0.81 0.50 - 1.40 mg/dL    Calcium 9.3 8.5 - 10.5 mg/dL    Correct Calcium 9.6 8.5 - 10.5 mg/dL    AST(SGOT) 15 12 - 45 U/L    ALT(SGPT) 9 2 - 50 U/L    Alkaline Phosphatase 85 30 - 99 U/L    Total Bilirubin 0.7 0.1 - 1.5 mg/dL    Albumin 3.6 3.2 - 4.9 g/dL    Total Protein 6.7 6.0 - 8.2 g/dL    Globulin 3.1 1.9 - 3.5 g/dL    A-G Ratio 1.2 g/dL   IRON/TOTAL IRON BIND    Collection Time: 03/09/24  5:28 AM   Result Value Ref Range    Iron 60 40 - 170 ug/dL    Total Iron Binding 372 250 - 450 ug/dL    Unsat Iron Binding 312 110 - 370 ug/dL    % Saturation 16 15 - 55 %   MAGNESIUM    Collection Time: 03/09/24  5:28 AM   Result Value Ref Range    Magnesium 1.8 1.5 - 2.5 mg/dL   PHOSPHORUS    Collection Time: 03/09/24  5:28 AM   Result Value Ref Range    Phosphorus 2.8 2.5 - 4.5 mg/dL   proBrain Natriuretic Peptide, NT    Collection Time: 03/09/24  5:28 AM   Result Value Ref Range    NT-proBNP 4070 (H) 0 - 125 pg/mL    ESTIMATED GFR    Collection Time: 03/09/24  5:28 AM   Result Value Ref Range    GFR (CKD-EPI) 70 >60 mL/min/1.73 m 2       Imaging:  CT-ABDOMEN-PELVIS WITH   Final Result      1.  Increased colonic stool.   2.  No bowel obstruction or perforation.   3.  Moderate bilateral pleural effusions with associated atelectasis.      DX-CHEST-PORTABLE (1 VIEW)   Final Result      Findings suggesting CHF with interstitial pulmonary edema, small pleural effusions and atelectasis.              EKG 3/8/2024: personally reviewed by me sinus rhythm, borderline prolonged IN interval, left bundle branch block    Echocardiogram 2/27/2024  CONCLUSIONS  Severely reduced left ventricular systolic function.  The left ventricular ejection fraction is visually estimated to be 20-  25%.  Normal right ventricular size. Reduced right ventricular systolic   function.  Moderately dilated left atrium  Moderate mitral annular calcification.  Moderate mitral regurgitation.  Moderate tricuspid regurgitation.  Right heart pressures are consistent with moderate pulmonary   hypertension.     Compared to the prior study on 10/22/2021, there is now severely   reduced LV systolic function. There has also been progression in mitral   and tricuspid regurgitation.     Primary team notifed of the above findings.       Echocardiogram 10/22/2021  CONCLUSIONS  The left ventricular ejection fraction is visually estimated to be 65%.  Estimated right ventricular systolic pressure is 38 mmHg.     No prior study is available for comparison.     All pertinent features of laboratory and imaging reviewed including primary images where applicable      Principal Problem:    Acute systolic congestive heart failure (HCC) (POA: Yes)  Active Problems:    Coronary artery disease involving coronary bypass graft of native heart (Chronic) (POA: Yes)      Overview: CABG June 2009    Essential hypertension (Chronic) (POA: Yes)    Hypothyroid (POA: Yes)    Anemia (POA: Yes)     Vascular dementia with paranoia (HCC) (POA: Yes)    Pleural effusion (POA: Yes)    Acute respiratory failure with hypoxia (HCC) (POA: Unknown)    Severe protein-calorie malnutrition (Cordero: less than 60% of standard weight) (HCC) (POA: Unknown)  Resolved Problems:    * No resolved hospital problems. *      Assessment / Plan:  Acute HFrEF  New onset HFrEF  CAD  Status post CABG  Volume up on exam.  Patient had prior diagnosis of heart failure, but LVEF from 2021 was normal.  No recent ischemic workup.  -Continue IV diuresis  -Based on the overall clinical history and profile, patient is a good candidate for Entresto therapy (with superiority over ARB therapy in terms of survival benefits and prevention of future hospitalization). We will stop ARB therapy.  Will start Entresto therapy at 24/26 mg po bid.  -Check BMP tomorrow  -Continue metoprolol  XL 12.5 mg daily  -Continue Aldactone 12.5 mg daily  -Based on recent data on SGLT2 and heart failure with reduced ejection fraction, patient will be benefited from Farxiga 10 mg p.o. once a day for further reduction in mortality and hospitalization with absolute risk reduction of 4.9%.  Therefore, I will start patient on Farxiga 10 mg p.o. once a day.  Risks and benefits were explained to patient and patient has agreed to proceed.  -Patient without symptoms of ACS.  Will plan for ischemic workup outpatient if in line with patient's goals of care.  Will need to check LVEF on optimal medical therapy in 3 months, and will need to consider ICD placement if LVEF remains below 35%, if in line with patient's goals of care at that point.    We will arrange a close follow-up in heart failure clinic.    Cardiology will sign off.    I personally discussed her case with  Dr Dioni Terry M.D.    Future Appointments   Date Time Provider Department Center   3/14/2024  3:15 PM PERLITA Kowalski None       It is my pleasure to participate in the care of Ms. Montejo.  Please  do not hesitate to contact me with questions or concerns.    Jackie Lerner MD   Cardiologist [facility]    3/9/2024    Please note that this dictation was created using voice recognition software. There may be errors I did not discover before finalizing the note.

## 2024-03-09 NOTE — ED NOTES
Medication history reviewed with MAR provided by [facility] ([phone]).   Med rec is complete  Allergies reviewed.   Patient has not had any outpatient antibiotics in the last 30 days.   Anticoagulants : No    Oswaldo Johnston

## 2024-03-09 NOTE — PROGRESS NOTES
Bedside report received from off going RN/tech: dennys assumed care of patient.     Fall Risk Score: MODERATE RISK  Fall risk interventions in place: Provide patient/family education based on risk assessment, Educate patient/family to call staff for assistance when getting out of bed, Place fall precaution signage outside patient door, Place patient in room close to nursing station, and Utilize bed/chair fall alarm  Bed type: Regular (Regan Score less than 17 interventions in place)  Patient on cardiac monitor: Yes  IVF/IV medications: Not Applicable   Oxygen: How many liters 3L  Bedside sitter: Not Applicable   Isolation: Not applicable    Pt in bed satting at 86% on 2LPM, O2 increased to 3-4LPM and satting 94%. Bed alarm not on but placed and working appropriately.

## 2024-03-09 NOTE — ED TRIAGE NOTES
"Chief Complaint   Patient presents with    Syncope     Pt was laying in bed when she had syncopal episode per staff, pts oxygen went down to 64% per staff on baseline 2L of oxygen. Pt has hx of dementia, but can state the year, person and place     Pt BIB EMS from [facility] for above. Pt is on her baseline 2L of oxygen. Pt is DNR-DNI with no POLSt sent with pt.       /63   Pulse 90   Temp 36.1 °C (97 °F) (Temporal)   Resp 18   Ht 1.702 m (5' 7\")   Wt 67.1 kg (148 lb)   LMP  (LMP Unknown)   SpO2 94%   BMI 23.18 kg/m²     "

## 2024-03-09 NOTE — ASSESSMENT & PLAN NOTE
3/12/2024  Frequent reorientation  Patient was found to be lethargic on examination which is likely due to polypharmacy.  I will discontinue her gabapentin.  Avoid sedative medications.  Improved

## 2024-03-10 ENCOUNTER — APPOINTMENT (OUTPATIENT)
Dept: RADIOLOGY | Facility: MEDICAL CENTER | Age: 89
DRG: 291 | End: 2024-03-10
Attending: STUDENT IN AN ORGANIZED HEALTH CARE EDUCATION/TRAINING PROGRAM
Payer: MEDICARE

## 2024-03-10 LAB
ANION GAP SERPL CALC-SCNC: 9 MMOL/L (ref 7–16)
BUN SERPL-MCNC: 15 MG/DL (ref 8–22)
CALCIUM SERPL-MCNC: 9 MG/DL (ref 8.5–10.5)
CHLORIDE SERPL-SCNC: 97 MMOL/L (ref 96–112)
CO2 SERPL-SCNC: 29 MMOL/L (ref 20–33)
CREAT SERPL-MCNC: 1.04 MG/DL (ref 0.5–1.4)
GFR SERPLBLD CREATININE-BSD FMLA CKD-EPI: 52 ML/MIN/1.73 M 2
GLUCOSE SERPL-MCNC: 91 MG/DL (ref 65–99)
LACTATE SERPL-SCNC: 0.8 MMOL/L (ref 0.5–2)
LACTATE SERPL-SCNC: 1.2 MMOL/L (ref 0.5–2)
LACTATE SERPL-SCNC: 1.4 MMOL/L (ref 0.5–2)
MAGNESIUM SERPL-MCNC: 1.7 MG/DL (ref 1.5–2.5)
PHOSPHATE SERPL-MCNC: 3 MG/DL (ref 2.5–4.5)
POTASSIUM SERPL-SCNC: 4.7 MMOL/L (ref 3.6–5.5)
SODIUM SERPL-SCNC: 135 MMOL/L (ref 135–145)

## 2024-03-10 PROCEDURE — 99233 SBSQ HOSP IP/OBS HIGH 50: CPT | Performed by: STUDENT IN AN ORGANIZED HEALTH CARE EDUCATION/TRAINING PROGRAM

## 2024-03-10 PROCEDURE — 83605 ASSAY OF LACTIC ACID: CPT

## 2024-03-10 PROCEDURE — 36415 COLL VENOUS BLD VENIPUNCTURE: CPT

## 2024-03-10 PROCEDURE — 770020 HCHG ROOM/CARE - TELE (206)

## 2024-03-10 PROCEDURE — 700102 HCHG RX REV CODE 250 W/ 637 OVERRIDE(OP): Performed by: HOSPITALIST

## 2024-03-10 PROCEDURE — 83735 ASSAY OF MAGNESIUM: CPT

## 2024-03-10 PROCEDURE — 80048 BASIC METABOLIC PNL TOTAL CA: CPT

## 2024-03-10 PROCEDURE — 97163 PT EVAL HIGH COMPLEX 45 MIN: CPT

## 2024-03-10 PROCEDURE — A9270 NON-COVERED ITEM OR SERVICE: HCPCS

## 2024-03-10 PROCEDURE — A9270 NON-COVERED ITEM OR SERVICE: HCPCS | Performed by: HOSPITALIST

## 2024-03-10 PROCEDURE — 700102 HCHG RX REV CODE 250 W/ 637 OVERRIDE(OP)

## 2024-03-10 PROCEDURE — 84100 ASSAY OF PHOSPHORUS: CPT

## 2024-03-10 PROCEDURE — 71045 X-RAY EXAM CHEST 1 VIEW: CPT

## 2024-03-10 PROCEDURE — 700105 HCHG RX REV CODE 258

## 2024-03-10 RX ORDER — SODIUM CHLORIDE, SODIUM LACTATE, POTASSIUM CHLORIDE, AND CALCIUM CHLORIDE .6; .31; .03; .02 G/100ML; G/100ML; G/100ML; G/100ML
500 INJECTION, SOLUTION INTRAVENOUS ONCE
Status: COMPLETED | OUTPATIENT
Start: 2024-03-10 | End: 2024-03-10

## 2024-03-10 RX ORDER — FUROSEMIDE 10 MG/ML
20 INJECTION INTRAMUSCULAR; INTRAVENOUS
Status: DISCONTINUED | OUTPATIENT
Start: 2024-03-10 | End: 2024-03-10

## 2024-03-10 RX ADMIN — OXYCODONE 5 MG: 5 TABLET ORAL at 20:00

## 2024-03-10 RX ADMIN — LEVOTHYROXINE SODIUM 125 MCG: 0.12 TABLET ORAL at 05:14

## 2024-03-10 RX ADMIN — CLOPIDOGREL BISULFATE 75 MG: 75 TABLET ORAL at 06:35

## 2024-03-10 RX ADMIN — SODIUM CHLORIDE, POTASSIUM CHLORIDE, SODIUM LACTATE AND CALCIUM CHLORIDE 500 ML: 600; 310; 30; 20 INJECTION, SOLUTION INTRAVENOUS at 04:33

## 2024-03-10 RX ADMIN — ACETAMINOPHEN 650 MG: 325 TABLET, FILM COATED ORAL at 11:50

## 2024-03-10 RX ADMIN — OXYCODONE 5 MG: 5 TABLET ORAL at 11:50

## 2024-03-10 RX ADMIN — DULOXETINE HYDROCHLORIDE 60 MG: 60 CAPSULE, DELAYED RELEASE ORAL at 06:36

## 2024-03-10 ASSESSMENT — COGNITIVE AND FUNCTIONAL STATUS - GENERAL
STANDING UP FROM CHAIR USING ARMS: A LITTLE
TURNING FROM BACK TO SIDE WHILE IN FLAT BAD: A LITTLE
CLIMB 3 TO 5 STEPS WITH RAILING: A LOT
SUGGESTED CMS G CODE MODIFIER MOBILITY: CK
MOVING FROM LYING ON BACK TO SITTING ON SIDE OF FLAT BED: A LITTLE
WALKING IN HOSPITAL ROOM: A LOT
MOBILITY SCORE: 16
MOVING TO AND FROM BED TO CHAIR: A LITTLE

## 2024-03-10 ASSESSMENT — ENCOUNTER SYMPTOMS: SHORTNESS OF BREATH: 0

## 2024-03-10 ASSESSMENT — FIBROSIS 4 INDEX: FIB4 SCORE: 1.611111111111111111

## 2024-03-10 ASSESSMENT — PAIN DESCRIPTION - PAIN TYPE
TYPE: ACUTE PAIN
TYPE: ACUTE PAIN
TYPE: CHRONIC PAIN

## 2024-03-10 ASSESSMENT — GAIT ASSESSMENTS: GAIT LEVEL OF ASSIST: UNABLE TO PARTICIPATE

## 2024-03-10 NOTE — THERAPY
"Physical Therapy   Initial Evaluation     Patient Name: Davie Montejo  Age:  89 y.o., Sex:  female  Medical Record #: [id number]  Today's Date: 3/10/2024     Precautions  Precautions: Fall Risk  Comments: labile orthostatic BPs    Assessment  Patient is 89 y.o. female with syncopal episode at SNF, found to have SPO2 64%. Dx'd with Acute systolic congestive heart failure, Pleural effusion. History of dementia, heart failure with reduced EF, coronary disease with history of CABG in 2008, hypertension, hyperlipidemia.    Pt able to perform bed mobility with SBA, CGA with transfers. Pt with significant decrease in BP with standing at W, pt became fatigued and min dizziness and required to sit down. Assisted pt back to bed and notified RN of low BP with standing. Patient with decreased activity tolerance, high fall risk, decreased standing balance, gait deviations, decreased sitting balance, and pain and will continue to benefit from Acute Care Physical Therapy to assist towards established goals. Anticipate pt will benefit from post acute placement upon DC from hospital.       Plan    Physical Therapy Initial Treatment Plan   Treatment Plan : Bed Mobility, Equipment, Gait Training, Neuro Re-Education / Balance, Stair Training, Therapeutic Activities, Therapeutic Exercise  Treatment Frequency: 4 Times per Week  Duration: Until Therapy Goals Met    DC Equipment Recommendations: Unable to determine at this time  Discharge Recommendations: Recommend post-acute placement for additional physical therapy services prior to discharge home       Subjective    Pt resting in bed, \"I can't find my purse of my glasses.\" Notified pt that her personal items may be still at the SNF. Pt agreed to PT.      Objective       03/10/24 0684   Initial Contact Note    Initial Contact Note Order Received and Verified, Physical Therapy Evaluation in Progress with Full Report to Follow.   Precautions   Precautions Fall Risk   Comments " labile orthostatic BPs   Vitals   Vitals Comments supine 85/44, Hr 72, sitting 1 minute 70/51, HR 77. sitting 3 minutes 99/50 HR 78. Standing 63/47 HR 84. Supine 96/43 HR 74. Pt c/o min dizziness and UE weakness due to pushing on walker. Notified RN of results   Pain 0 - 10 Group   Location Back;Buttock   Location Orientation Posterior   Therapist Pain Assessment During Activity;Post Activity Pain Same as Prior to Activity   Prior Living Situation   Housing / Facility Skilled Nursing Facility   Prior Level of Functional Mobility   Ambulation Required Assist   Ambulation [facility] halls at [facility]   Assistive Devices Used Front-Wheel Walker   Cognition    Cognition / Consciousness WDL   Level of Consciousness Alert   Comments pleasant and cooperative   Active ROM Lower Body    Active ROM Lower Body  WDL   Strength Lower Body   Lower Body Strength  X   Gross Strength Generalized Weakness, Equal Bilaterally   Coordination Lower Body    Coordination Lower Body  WDL   Vision   Vision Comments pt does not have glasses with her, occassional squinting of eyes   Balance Assessment   Sitting Balance (Static) Fair +   Sitting Balance (Dynamic) Fair   Standing Balance (Static) Fair   Standing Balance (Dynamic) Fair -   Weight Shift Sitting Good   Weight Shift Standing Fair   Comments standing assessed with FWW   Bed Mobility    Supine to Sit Standby Assist   Sit to Supine Standby Assist   Scooting Standby Assist   Rolling Standby Assist   Comments HOB elevated 40 degrees, use of bedrail   Gait Analysis   Gait Level Of Assist Unable to Participate   Functional Mobility   Sit to Stand Contact Guard Assist   Bed, Chair, Wheelchair Transfer Contact Guard Assist   6 Clicks Assessment - How much HELP from from another person do you currently need... (If the patient hasn't done an activity recently, how much help from another person do you think he/she would need if he/she tried?)   Turning from your back to your side while in a flat bed  without using bedrails? 3   Moving from lying on your back to sitting on the side of a flat bed without using bedrails? 3   Moving to and from a bed to a chair (including a wheelchair)? 3   Standing up from a chair using your arms (e.g., wheelchair, or bedside chair)? 3   Walking in hospital room? 2   Climbing 3-5 steps with a railing? 2   6 clicks Mobility Score 16   Activity Tolerance   Standing less than one minute due to fatigue   Comments limited by positive orthostatic hypotension   Short Term Goals    Short Term Goal # 1 supine to/from sit flat bed supervised in 6 visits to progress with bed mobility   Short Term Goal # 2 sit to/from stand EOB with FWW supervised in 6 visits to progress with transfers   Short Term Goal # 3 amb 150ft with FWW supervised in 6 visits for functional distances   Short Term Goal # 4 pt will be able to navigate 10 steps with SBA in 6 visits to do steps at her home   Education Group   Education Provided Role of Physical Therapist;Use of Assistive Device   Role of Physical Therapist Patient Response Patient;Acceptance;Explanation;Verbal Demonstration   Use of Assistive Device Patient Response Patient;Acceptance;Explanation;Action Demonstration   Additional Comments Fall prevention   Physical Therapy Initial Treatment Plan    Treatment Plan  Bed Mobility;Equipment;Gait Training;Neuro Re-Education / Balance;Stair Training;Therapeutic Activities;Therapeutic Exercise   Treatment Frequency 4 Times per Week   Duration Until Therapy Goals Met   Problem List    Problems Pain;Impaired Bed Mobility;Impaired Transfers;Impaired Ambulation;Impaired Balance;Decreased Activity Tolerance   Anticipated Discharge Equipment and Recommendations   DC Equipment Recommendations Unable to determine at this time   Discharge Recommendations Recommend post-acute placement for additional physical therapy services prior to discharge home   Interdisciplinary Plan of Care Collaboration   IDT Collaboration with   Nursing   Patient Position at End of Therapy In Bed;Bed Alarm On;Call Light within Reach;Tray Table within Reach;Phone within Reach   Collaboration Comments RN updated   Session Information   Date / Session Number  3/10 1(1/4, 3/16)

## 2024-03-10 NOTE — PROGRESS NOTES
Bedside report received from off going RN/tech: dennys, assumed care of patient.     Fall Risk Score: HIGH RISK  Fall risk interventions in place: Place yellow fall risk ID band on patient, Provide patient/family education based on risk assessment, Educate patient/family to call staff for assistance when getting out of bed, Place fall precaution signage outside patient door, and Utilize bed/chair fall alarm  Bed type: Regular and Waffle cushion (Regan Score less than 17 interventions in place)  Patient on cardiac monitor: Yes  IVF/IV medications: Not Applicable   Oxygen: How many liters 2L  Bedside sitter: Not Applicable   Isolation: Not applicable    Pt awake and looking for glasses and purse. She states she might have left them at the skilled nursing facility.  BP has been low and pt has had low appetite.

## 2024-03-10 NOTE — PROGRESS NOTES
Monitor Summary  Rhythm: Sinus Rhythm with BBB  Rate: 65-90  Ectopy: PAC, PVC, COUP  .0.2 / .14 / .51      7 beats vtach

## 2024-03-10 NOTE — CARE PLAN
The patient is Stable - Low risk of patient condition declining or worsening    Shift Goals  Clinical Goals: monitor labs,maintain BP, diuresis  Patient Goals: pain control, find by glasses and purse  Family Goals: N/A    Progress made toward(s) clinical / shift goals: Plan of care and medications discussed and reviewed over with pt. All fall precaution in place. Bed alarm on. Pt on 3L of NC maintaining O2 sats. Pt BP been low. Currently trending at this time. Notified APRN of pt's low BP. 500ml LR bolus ordered to be run over 1 hr.    Patient is not progressing towards the following goals:      Problem: Pain - Standard  Goal: Alleviation of pain or a reduction in pain to the patient’s comfort goal  Outcome: Progressing     Problem: Care Map:  Admission Optimal Outcome for the Heart Failure Patient  Goal: Admission:  Optimal Care of the heart failure patient  Outcome: Progressing     Problem: Care Map:  Day 1 Optimal Outcome for the Heart Failure Patient  Goal: Day 1:  Optimal Care of the heart failure patient  Outcome: Progressing     Problem: Care Map:  Day 2 Optimal Outcome for the Heart Failure Patient  Goal: Day 2:  Optimal Care of the heart failure patient  Outcome: Progressing     Problem: Knowledge Deficit - Standard  Goal: Patient and family/care givers will demonstrate understanding of plan of care, disease process/condition, diagnostic tests and medications  Outcome: Progressing     Problem: Fall Risk  Goal: Patient will remain free from falls  Outcome: Progressing     Problem: Hemodynamics  Goal: Patient's hemodynamics, fluid balance and neurologic status will be stable or improve  Outcome: Progressing

## 2024-03-10 NOTE — PROGRESS NOTES
Monitor Summary:    Rhythm: SR (68-91)  Interval: .21/.13/.39  Ectopy: occasional pac, rare pvc

## 2024-03-10 NOTE — PROGRESS NOTES
Hospital Medicine Daily Progress Note    Date of Service  3/10/2024    Chief Complaint  Davie Montejo is a 89 y.o. female admitted 3/8/2024 with shortness of breath and hypoxia    Hospital Course  89 female with past medical history of CAD s/p stent , CABG 2008, dementia hypertension, hyperlipidemia,HFrEF 20-25% referred here for shortness of breath.  Subsequent chest x-ray noted with CHF with interstitial pulmonary edema, a small bilateral pleural effusion, CT abdomen noted with moderate bilateral pleural effusion.  Admitted for acute on chronic systolic heart failure.  Initiated on IV Lasix.  Cardiology evaluated and started on Entresto,, Aldactone and recommended to follow-up with outpatient for ischemic workup    Interval Problem Update    3/10/2024  Blood pressure borderline low  Remain asymptomatic  Labs reviewed normal white count, chemistry with sodium 135, potassium 4.7, creatinine 1    X-ray reviewed noted with initial edema.    Telemetry monitoring  Decrease Lasix to 20 mg Iv daily  Supplement O2 as needed  Fluid restriction   Daily strict input and output  Repeat BMP in a.m. to monitor electrolytes and toxicity while on  IV  diuretics  Need close monitoring of blood counts, electrolytes and renal function due to potential of organ dysfunction due to acute CHF       High risk of deterioration into arrhythmia, need close telemetry monitoring  Patient blood pressure remain low.  Need close monitoring for hemodynamic stability.    I have discussed this patient's plan of care and discharge plan at IDT rounds today with Case Management, Nursing, Nursing leadership, and other members of the IDT team.    Consultants/Specialty  cardiology    Code Status  DNAR/DNI    Disposition  The patient is not medically cleared for discharge to home or a post-acute facility.  Anticipate discharge to: home with close outpatient follow-up    I have placed the appropriate orders for post-discharge needs.    Review of  Systems  Review of Systems   Respiratory:  Negative for shortness of breath.    Cardiovascular:  Negative for chest pain.        Physical Exam  Temp:  [36.1 °C (97 °F)-37.6 °C (99.6 °F)] 36.5 °C (97.7 °F)  Pulse:  [64-78] 64  Resp:  [17-19] 18  BP: ()/(42-56) 86/48  SpO2:  [92 %-96 %] 92 %    Physical Exam  Constitutional:       Appearance: Normal appearance.   Cardiovascular:      Rate and Rhythm: Normal rate and regular rhythm.      Pulses: Normal pulses.      Heart sounds: Normal heart sounds.   Pulmonary:      Effort: Pulmonary effort is normal.      Breath sounds: Rales present.   Musculoskeletal:      Right lower leg: No edema.      Left lower leg: No edema.   Neurological:      General: No focal deficit present.      Mental Status: She is alert and oriented to person, place, and time.   Psychiatric:         Mood and Affect: Mood normal.         Fluids    Intake/Output Summary (Last 24 hours) at 3/10/2024 1424  Last data filed at 3/10/2024 0920  Gross per 24 hour   Intake 360 ml   Output --   Net 360 ml       Laboratory  Recent Labs     03/08/24  1755 03/09/24  0219   WBC 4.1* 4.8   RBC 3.38* 4.66   HEMOGLOBIN 8.8* 12.2   HEMATOCRIT 28.5* 39.2   MCV 84.3 84.1   MCH 26.0* 26.2*   MCHC 30.9* 31.1*   RDW 56.7* 56.2*   PLATELETCT 215 270   MPV 11.1 10.2     Recent Labs     03/09/24  0407 03/09/24  0528 03/10/24  0535   SODIUM 138 138 135   POTASSIUM 5.1 4.3 4.7   CHLORIDE 99 99 97   CO2 28 28 29   GLUCOSE 83 81 91   BUN 13 13 15   CREATININE 0.82 0.81 1.04   CALCIUM 9.8 9.3 9.0                   Imaging  DX-CHEST-LIMITED (1 VIEW)   Final Result      1.  Stable cardiomegaly and interstitial edema.      2.  Stable bibasilar atelectasis and pleural effusions.      CT-ABDOMEN-PELVIS WITH   Final Result      1.  Increased colonic stool.   2.  No bowel obstruction or perforation.   3.  Moderate bilateral pleural effusions with associated atelectasis.      DX-CHEST-PORTABLE (1 VIEW)   Final Result      Findings  suggesting CHF with interstitial pulmonary edema, small pleural effusions and atelectasis.           Assessment/Plan  * Acute systolic congestive heart failure (HCC)- (present on admission)  Assessment & Plan  Telemetry monitoring  Lasix 40mg BID, monitor potassium and magnesium level   Supplement O2 as needed  Fluid restriction   Daily strict input and output  Repeat x-ray  Repeat BMP in a.m. to monitor electrolytes and toxicity while on  high-dose of IV  diuretics  Cardiology consulted    Severe protein-calorie malnutrition ([place]: less than 60% of standard weight) (HCC)  Assessment & Plan  Dietary consult  Monitor electrolytes    Acute respiratory failure with hypoxia (HCC)  Assessment & Plan  2 L of O2 above her baseline    Pleural effusion- (present on admission)  Assessment & Plan  Likely transudate  I will start IV Lasix for now and if the pleural effusions have not resolved she should get a thoracentesis    Vascular dementia with paranoia (HCC)- (present on admission)  Assessment & Plan  Frequent reorientation  Patient was found to be lethargic on examination which is likely due to polypharmacy.  I will discontinue her gabapentin.  Avoid sedative medications.    Anemia- (present on admission)  Assessment & Plan  Unknown etiology  Check iron panel, ferritin, B12, reticulocyte count    Hypothyroid- (present on admission)  Assessment & Plan  Continue thyroid medications    Essential hypertension- (present on admission)  Assessment & Plan  Hypertension, holding antihypertensive      Coronary artery disease involving coronary bypass graft of native heart- (present on admission)  Assessment & Plan  Continue Plavix and statins         VTE prophylaxis: lovenox    I have performed a physical exam and reviewed and updated ROS and Plan today (3/10/2024). In review of yesterday's note (3/9/2024), there are no changes except as documented above.

## 2024-03-10 NOTE — DIETARY
"Nutrition services: Day 2 of admit.  Davie Montejo is an 89 y.o. female with admitting DX of pleural effusion.    Consult received for failure to thrive, unsure wt loss per admit screen; severe protein-calorie malnutrition per problems list. Visited pt at bedside. Pt was sleeping, did not rouse to name. She appeared adequately nourished. Lunch tray at bedside had yet to be eaten. RD will add order for oral nutrition supplements and adjust menu accordingly. Magic cup or Ensure Plus BID to bolster intake and optimize nutrition.     Assessment:  Height: 170.2 cm (5' 7\")  Weight: 64 kg (141 lb 1.5 oz)  Body mass index is 22.1 kg/m²., BMI classification: normal  Diet/Intake: 2 gram sodium; PO 25-50% for one meal thus far    Evaluation:   Admitted from SNF with syncope.   Hx of vascular dementia with paranoia; PMH: Acute blood loss anemia, Acute on chronic systolic heart failure, Acute perforated gastric ulcer with hemorrhage, Anemia, Angina decubitus, CAD (coronary artery disease), Cannabis use, unspecified with intoxication, uncomplicated, Fibromyalgia, Generalized abdominal pain, Heart attack, Hypotension due to medication, Intractable nausea and vomiting, and Pain in the chest.  Wt stable per chart review.  Labs and meds reviewed. Albumin 2.5 (3/8), has since normalized.    Malnutrition Risk: No criteria met.     Recommendations/Plan:  Oral supplements BID.  Encourage intake of meals and supplements.  Document intake of all meals and supplements as % taken in ADLs to provide interdisciplinary communication across all shifts.   Monitor weight.  Nutrition rep will continue to see patient for ongoing meal and snack preferences.     RD will follow.         "

## 2024-03-10 NOTE — PROGRESS NOTES
Pt's BP been trending down. All medications that lowers her BP were held tonight. This AM pt's BP been on the low SBP of the 70s. Pt reported of dizziness and a ANTONIO. Notified JLUIS Vitale bolus 500 ml to be given over 1 hr ordered for pt.     New BP after bolus: 103/46, HR: 65 with MAP of 65. Pt reported relief of symptoms

## 2024-03-11 PROBLEM — I95.9 HYPOTENSION: Status: ACTIVE | Noted: 2024-03-11

## 2024-03-11 LAB
ANION GAP SERPL CALC-SCNC: 9 MMOL/L (ref 7–16)
BUN SERPL-MCNC: 12 MG/DL (ref 8–22)
CALCIUM SERPL-MCNC: 9.2 MG/DL (ref 8.5–10.5)
CHLORIDE SERPL-SCNC: 99 MMOL/L (ref 96–112)
CO2 SERPL-SCNC: 30 MMOL/L (ref 20–33)
CORTIS SERPL-MCNC: 6.3 UG/DL (ref 0–23)
CREAT SERPL-MCNC: 0.9 MG/DL (ref 0.5–1.4)
GFR SERPLBLD CREATININE-BSD FMLA CKD-EPI: 62 ML/MIN/1.73 M 2
GLUCOSE SERPL-MCNC: 106 MG/DL (ref 65–99)
LACTATE SERPL-SCNC: 0.9 MMOL/L (ref 0.5–2)
LACTATE SERPL-SCNC: 1.2 MMOL/L (ref 0.5–2)
MAGNESIUM SERPL-MCNC: 1.8 MG/DL (ref 1.5–2.5)
POTASSIUM SERPL-SCNC: 3.9 MMOL/L (ref 3.6–5.5)
PROCALCITONIN SERPL-MCNC: <0.05 NG/ML
SODIUM SERPL-SCNC: 138 MMOL/L (ref 135–145)

## 2024-03-11 PROCEDURE — A9270 NON-COVERED ITEM OR SERVICE: HCPCS

## 2024-03-11 PROCEDURE — 83735 ASSAY OF MAGNESIUM: CPT

## 2024-03-11 PROCEDURE — 84145 PROCALCITONIN (PCT): CPT

## 2024-03-11 PROCEDURE — 99233 SBSQ HOSP IP/OBS HIGH 50: CPT | Performed by: STUDENT IN AN ORGANIZED HEALTH CARE EDUCATION/TRAINING PROGRAM

## 2024-03-11 PROCEDURE — 36415 COLL VENOUS BLD VENIPUNCTURE: CPT

## 2024-03-11 PROCEDURE — 97166 OT EVAL MOD COMPLEX 45 MIN: CPT

## 2024-03-11 PROCEDURE — 700102 HCHG RX REV CODE 250 W/ 637 OVERRIDE(OP): Performed by: HOSPITALIST

## 2024-03-11 PROCEDURE — 770020 HCHG ROOM/CARE - TELE (206)

## 2024-03-11 PROCEDURE — 700102 HCHG RX REV CODE 250 W/ 637 OVERRIDE(OP)

## 2024-03-11 PROCEDURE — 82533 TOTAL CORTISOL: CPT

## 2024-03-11 PROCEDURE — 700111 HCHG RX REV CODE 636 W/ 250 OVERRIDE (IP): Mod: JZ | Performed by: STUDENT IN AN ORGANIZED HEALTH CARE EDUCATION/TRAINING PROGRAM

## 2024-03-11 PROCEDURE — 80048 BASIC METABOLIC PNL TOTAL CA: CPT

## 2024-03-11 PROCEDURE — A9270 NON-COVERED ITEM OR SERVICE: HCPCS | Performed by: STUDENT IN AN ORGANIZED HEALTH CARE EDUCATION/TRAINING PROGRAM

## 2024-03-11 PROCEDURE — 700102 HCHG RX REV CODE 250 W/ 637 OVERRIDE(OP): Performed by: STUDENT IN AN ORGANIZED HEALTH CARE EDUCATION/TRAINING PROGRAM

## 2024-03-11 PROCEDURE — 83605 ASSAY OF LACTIC ACID: CPT | Mod: 91

## 2024-03-11 PROCEDURE — A9270 NON-COVERED ITEM OR SERVICE: HCPCS | Performed by: HOSPITALIST

## 2024-03-11 RX ORDER — OMEPRAZOLE 20 MG/1
20 CAPSULE, DELAYED RELEASE ORAL DAILY
Status: DISCONTINUED | OUTPATIENT
Start: 2024-03-11 | End: 2024-03-13 | Stop reason: HOSPADM

## 2024-03-11 RX ORDER — FUROSEMIDE 20 MG/1
20 TABLET ORAL
Status: DISCONTINUED | OUTPATIENT
Start: 2024-03-11 | End: 2024-03-13 | Stop reason: HOSPADM

## 2024-03-11 RX ORDER — METOPROLOL SUCCINATE 25 MG/1
12.5 TABLET, EXTENDED RELEASE ORAL EVERY EVENING
Qty: 30 TABLET | Refills: 0 | Status: ON HOLD | OUTPATIENT
Start: 2024-03-11 | End: 2024-03-28

## 2024-03-11 RX ADMIN — OMEPRAZOLE 20 MG: 20 CAPSULE, DELAYED RELEASE ORAL at 16:03

## 2024-03-11 RX ADMIN — HYDROCORTISONE SODIUM SUCCINATE 50 MG: 100 INJECTION, POWDER, FOR SOLUTION INTRAMUSCULAR; INTRAVENOUS at 16:02

## 2024-03-11 RX ADMIN — LEVOTHYROXINE SODIUM 125 MCG: 0.12 TABLET ORAL at 05:27

## 2024-03-11 RX ADMIN — HYDROCORTISONE SODIUM SUCCINATE 50 MG: 100 INJECTION, POWDER, FOR SOLUTION INTRAMUSCULAR; INTRAVENOUS at 23:35

## 2024-03-11 RX ADMIN — OXYCODONE 5 MG: 5 TABLET ORAL at 22:56

## 2024-03-11 RX ADMIN — DULOXETINE HYDROCHLORIDE 60 MG: 60 CAPSULE, DELAYED RELEASE ORAL at 05:27

## 2024-03-11 RX ADMIN — OXYCODONE 5 MG: 5 TABLET ORAL at 02:09

## 2024-03-11 RX ADMIN — CLOPIDOGREL BISULFATE 75 MG: 75 TABLET ORAL at 05:27

## 2024-03-11 RX ADMIN — OXYCODONE 5 MG: 5 TABLET ORAL at 10:04

## 2024-03-11 ASSESSMENT — PAIN DESCRIPTION - PAIN TYPE
TYPE: ACUTE PAIN
TYPE: CHRONIC PAIN
TYPE: ACUTE PAIN

## 2024-03-11 ASSESSMENT — COGNITIVE AND FUNCTIONAL STATUS - GENERAL
DRESSING REGULAR UPPER BODY CLOTHING: A LITTLE
TOILETING: A LITTLE
HELP NEEDED FOR BATHING: A LITTLE
DAILY ACTIVITIY SCORE: 20
DRESSING REGULAR LOWER BODY CLOTHING: A LITTLE
SUGGESTED CMS G CODE MODIFIER DAILY ACTIVITY: CJ

## 2024-03-11 ASSESSMENT — FIBROSIS 4 INDEX: FIB4 SCORE: 1.611111111111111111

## 2024-03-11 ASSESSMENT — ACTIVITIES OF DAILY LIVING (ADL): TOILETING: INDEPENDENT

## 2024-03-11 ASSESSMENT — ENCOUNTER SYMPTOMS: SHORTNESS OF BREATH: 0

## 2024-03-11 ASSESSMENT — PATIENT HEALTH QUESTIONNAIRE - PHQ9
1. LITTLE INTEREST OR PLEASURE IN DOING THINGS: NOT AT ALL
2. FEELING DOWN, DEPRESSED, IRRITABLE, OR HOPELESS: NOT AT ALL
SUM OF ALL RESPONSES TO PHQ9 QUESTIONS 1 AND 2: 0

## 2024-03-11 NOTE — HOSPITAL COURSE
89 female with past medical history of CAD s/p stent , CABG 2008, dementia hypertension, hyperlipidemia,HFrEF 20-25% referred here for shortness of breath.  Subsequent chest x-ray noted with CHF with interstitial pulmonary edema, a small bilateral pleural effusion, CT abdomen noted with moderate bilateral pleural effusion.  Admitted for acute on chronic systolic heart failure.  Initiated on IV Lasix.  Cardiology evaluated and started on Entresto,, Aldactone and recommended to follow-up with outpatient for ischemic workup.Started her on hydrocortisone for suspected adrenal insufficiency in context of persistent hypotension.She has appointment with cardiology on 3/14 2024.  If discharged tomorrow she will need to follow-up with cardiology for initiating other medication if BP remained stable.

## 2024-03-11 NOTE — DISCHARGE PLANNING
LMSW submitted for Ride line request for transportation back to [place] for 1600. LMSW spoke with pt on DC plan and transportation back to [place]. Pt said that was okay. Informed her of IMM received verbal.     1208 LMSW spoke with MD and he stated that pt can not go to [place] SNF today due to being Hypotensive.

## 2024-03-11 NOTE — PROGRESS NOTES
Bedside report received from off going RN/tech: Clarissa, assumed care of patient.     Fall Risk Score: HIGH RISK  Fall risk interventions in place: Place yellow fall risk ID band on patient, Provide patient/family education based on risk assessment, Place patient in room close to nursing station, and Utilize bed/chair fall alarm  Bed type: Regular (Regan Score less than 17 interventions in place)  Patient on cardiac monitor: Yes  IVF/IV medications: Not Applicable   Oxygen: How many liters 3L  Bedside sitter: Not Applicable   Isolation: Not applicable

## 2024-03-11 NOTE — DISCHARGE PLANNING
Case Management Discharge Planning    Admission Date: 3/8/2024  GMLOS: 3.9  ALOS: 3    6-Clicks ADL Score: 20  6-Clicks Mobility Score: 16  PT and/or OT Eval ordered: Yes  PT/OT: Recommending PA  Post-acute Referrals Ordered: Yes  Post-acute Choice Obtained: Yes  Has referral(s) been sent to post-acute provider:  Yes      Anticipated Discharge Dispo: Discharge Disposition: D/T to SNF with Medicare cert in anticipation of skilled care (03)  Discharge Address: [address] [place] [id number]  Discharge Contact Phone Number: [phone]  Per chart review pt resides in [place]    Family support:    Name Relation Home Work Mobile   Sana Reyes Other  [phone]    Current Insurance on file:Medicare and Medicaid FFS    DME Needed: pending hospital course    Action(s) Taken: DC Assessment Complete (See below), Choice obtained, Referral(s) sent, and Transport Arranged     LMSW had submitted ride line request back to [facility]. Though MD stated that she is not [facility] anymore due to being hypotensive.     REMSA Transportation ON WILL CALL TO [facility] for when pt is [facility].       Escalations Completed: None    Medically Clear: No    Next Steps: F/U when pt is [facility] to call ride line and take the transportation off of will call.     Barriers to Discharge: Medical clearance    Is the patient up for discharge tomorrow: Possibly

## 2024-03-11 NOTE — PROGRESS NOTES
Hospital Medicine Daily Progress Note    Date of Service  3/11/2024    Chief Complaint  Davie Montejo is a 89 y.o. female admitted 3/8/2024 with shortness of breath and hypoxia    Hospital Course  89 female with past medical history of CAD s/p stent , CABG 2008, dementia hypertension, hyperlipidemia,HFrEF 20-25% referred here for shortness of breath.  Subsequent chest x-ray noted with CHF with interstitial pulmonary edema, a small bilateral pleural effusion, CT abdomen noted with moderate bilateral pleural effusion.  Admitted for acute on chronic systolic heart failure.  Initiated on IV Lasix.  Cardiology evaluated and started on Entresto,, Aldactone and recommended to follow-up with outpatient for ischemic workup.Started her on hydrocortisone for suspected adrenal insufficiency in context of persistent hypotension.She has appointment with cardiology on 3/14 2024.  If discharged tomorrow she will need to follow-up with cardiology for initiating other medication if BP remained stable.    Interval Problem Update      3/11/2024  Her blood pressure remained low.  MAP around low 59  Remain asymptomatic  Labs reviewed BMP unremarkable sodium 138, lactic acid 0.9, magnesium 1.8, cortisol borderline low 6        Telemetry monitoring  Started her on hydrocortisone for suspected adrenal insufficiency.  Can switch to hydrocortisone 20 mg on discharge, ACTH in process  Switch IV Lasix to oral 20 mg daily  Continue metoprolol  Hold Entresto, spironolactone.  She has appointment with cardiology on 3/14 2024.  If discharged tomorrow she will need to follow-up with cardiology for initiating other medication if BP remained stable  Monitor oxygen requirement  Fluid restriction   Daily strict input and output  Repeat BMP in a.m. to monitor electrolytes and toxicity while on  IV  diuretics  Need close monitoring of blood counts, electrolytes and renal function due to potential of organ dysfunction due to acute CHF       High  risk of deterioration into arrhythmia, need close telemetry monitoring  Patient blood pressure remain low.  Need close monitoring for hemodynamic stability.    I have discussed this patient's plan of care and discharge plan at IDT rounds today with Case Management, Nursing, Nursing leadership, and other members of the IDT team.    Consultants/Specialty  cardiology    Code Status  DNAR/DNI    Disposition  The patient is not medically cleared for discharge to home or a post-acute facility.  Anticipate discharge to: skilled nursing facility    I have placed the appropriate orders for post-discharge needs.    Review of Systems  Review of Systems   Respiratory:  Negative for shortness of breath.    Cardiovascular:  Negative for chest pain.        Physical Exam  Temp:  [36.2 °C (97.2 °F)-36.7 °C (98.1 °F)] 36.2 °C (97.2 °F)  Pulse:  [65-92] 91  Resp:  [16-18] 17  BP: ()/(43-67) 109/66  SpO2:  [90 %-96 %] 92 %    Physical Exam  Constitutional:       Appearance: Normal appearance.   Cardiovascular:      Rate and Rhythm: Normal rate and regular rhythm.      Pulses: Normal pulses.      Heart sounds: Normal heart sounds.   Pulmonary:      Effort: Pulmonary effort is normal.      Breath sounds: Rales present.   Musculoskeletal:      Right lower leg: No edema.      Left lower leg: No edema.   Neurological:      General: No focal deficit present.      Mental Status: She is alert and oriented to person, place, and time.   Psychiatric:         Mood and Affect: Mood normal.         Fluids    Intake/Output Summary (Last 24 hours) at 3/11/2024 1631  Last data filed at 3/11/2024 0945  Gross per 24 hour   Intake 370 ml   Output 450 ml   Net -80 ml       Laboratory  Recent Labs     03/08/24  1755 03/09/24  0219   WBC 4.1* 4.8   RBC 3.38* 4.66   HEMOGLOBIN 8.8* 12.2   HEMATOCRIT 28.5* 39.2   MCV 84.3 84.1   MCH 26.0* 26.2*   MCHC 30.9* 31.1*   RDW 56.7* 56.2*   PLATELETCT 215 270   MPV 11.1 10.2     Recent Labs     03/09/24  0553  03/10/24  0535 03/11/24  1229   SODIUM 138 135 138   POTASSIUM 4.3 4.7 3.9   CHLORIDE 99 97 99   CO2 28 29 30   GLUCOSE 81 91 106*   BUN 13 15 12   CREATININE 0.81 1.04 0.90   CALCIUM 9.3 9.0 9.2                   Imaging  DX-CHEST-LIMITED (1 VIEW)   Final Result      1.  Stable cardiomegaly and interstitial edema.      2.  Stable bibasilar atelectasis and pleural effusions.      CT-ABDOMEN-PELVIS WITH   Final Result      1.  Increased colonic stool.   2.  No bowel obstruction or perforation.   3.  Moderate bilateral pleural effusions with associated atelectasis.      DX-CHEST-PORTABLE (1 VIEW)   Final Result      Findings suggesting CHF with interstitial pulmonary edema, small pleural effusions and atelectasis.           Assessment/Plan  * Acute systolic congestive heart failure (HCC)- (present on admission)  Assessment & Plan  Telemetry monitoring  Supplement O2 as needed  Fluid restriction   Daily strict input and output  Repeat x-ray  Repeat BMP in a.m. to monitor electrolytes   Cardiology evaluated    She is unable to tolerate IV Lasix.  She is near euvolemic.  Started on oral Lasix, metoprolol  Holding Entresto, spironolactone    Hypotension  Assessment & Plan  Persistent hypotension despite holding medication  Cortisol level borderline low  Started on IV hydrocortisone  Can be discharged on oral hydrocortisone on discharge  ACTH level in process      Severe protein-calorie malnutrition ([place]: less than 60% of standard weight) ([facility])  Assessment & Plan  Dietary consult  Monitor electrolytes    Acute respiratory failure with hypoxia ([facility])  Assessment & Plan  2 L of O2 above her baseline    Pleural effusion- (present on admission)  Assessment & Plan  Likely transudate  I will start IV Lasix for now and if the pleural effusions have not resolved she should get a thoracentesis    Vascular dementia with paranoia (HCC)- (present on admission)  Assessment & Plan  Frequent reorientation  Patient was found to be  lethargic on examination which is likely due to polypharmacy.  I will discontinue her gabapentin.  Avoid sedative medications.    Anemia- (present on admission)  Assessment & Plan  Unknown etiology  Check iron panel, ferritin, B12, reticulocyte count    Hypothyroid- (present on admission)  Assessment & Plan  Continue thyroid medications    Essential hypertension- (present on admission)  Assessment & Plan  Hypertension, holding antihypertensive      Coronary artery disease involving coronary bypass graft of native heart- (present on admission)  Assessment & Plan  Continue Plavix and statins         VTE prophylaxis: lovenox    I have performed a physical exam and reviewed and updated ROS and Plan today (3/11/2024). In review of yesterday's note (3/10/2024), there are no changes except as documented above.         Greater than 51 minutes spent preparing to see patient (e.g. review of tests) obtaining and/or reviewing separately obtained history. Performing a medically appropriate examination and/ evaluation.  Counseling and educating the patient/family/caregiver.  Ordering medications, tests, or procedures.  Referring and communicating with other health care professionals.  Documenting clinical information in EPIC.  Independently interpreting results and communicating results to patient/family/caregiver.  Care coordination.

## 2024-03-11 NOTE — CARE PLAN
Problem: Pain - Standard  Goal: Alleviation of pain or a reduction in pain to the patient’s comfort goal  Outcome: Progressing     Problem: Care Map:  Day 2 Optimal Outcome for the Heart Failure Patient  Goal: Day 2:  Optimal Care of the heart failure patient  Outcome: Progressing   The patient is Stable - Low risk of patient condition declining or worsening    Shift Goals  Clinical Goals: monitor BP, safety  Patient Goals: rest, find purse  Family Goals: na    Progress made toward(s) clinical / shift goals:  BP    Patient is not progressing towards the following goals:

## 2024-03-11 NOTE — THERAPY
Occupational Therapy   Initial Evaluation     Patient Name: Davie Montejo  Age:  89 y.o., Sex:  female  Medical Record #: [id number]  Today's Date: 3/11/2024     Precautions: Fall Risk  Comments: labile orthostatic BPs    Assessment  Patient is 89 y.o. female admitted from SNF d/t syncopal episode. Pt has been at SNF since 2/29, she was previous admitted here for  SOB dx w/acute hypoxic respiratory failure d/t acute HF exacerbation and pleural effusion, pt has an EF of 25%. Additional medical hx includes: CAD, hypothyroid, vascular dementia/memory impairment, MDD, hx of CABG and polypharmacy. Per chart prior to her Feb admit pt was independent w/ADL's.   This admission pt is dx w/acute systolic CHF, severe protein-calorie malnutrition, acute respiratory failure, pleural effusion and anemia. Pt presents w/decreased activity tolerance, decreased balance/safety awareness and impaired cognition. At this time pt would not be able to safely manager her ADL's at home therefore recommend post acute placement however pt may need a more long term dc option given underlying chronic medical issues and cognitive deficits.     Plan  Occupational Therapy Initial Treatment Plan   Treatment Interventions: Self Care / Activities of Daily Living, Adaptive Equipment, Cognitive Skill Development, Community Re-Integration, Manual Therapy Techniques, Neuro Re-Education / Balance, Therapeutic Exercises, Therapeutic Activity  Treatment Frequency: 3 Times per Week  Duration: Until Therapy Goals Met    DC Equipment Recommendations: Unable to determine at this time  Discharge Recommendations: Recommend post-acute placement for additional occupational therapy services prior to discharge home (has potential to progress to home w/HH but may need more in home assistance see note)     Subjective  Agreeable to therapy      Objective   03/11/24 0901   Charge Group   OT Evaluation OT Evaluation Mod   Total Time Spent   OT Time Spent Yes   OT  "Evaluation (Minutes) 14   OT Total Time Spent (Calculated) 14   Initial Contact Note    Initial Contact Note Order Received and Verified, Occupational Therapy Evaluation in Progress with Full Report to Follow.   Prior Living Situation   Prior Services Continuous (24 Hour) Care Giving Per Service;Other (Comments)   Housing / Facility Skilled Nursing Facility   Equipment Owned Single Point Cane   Lives with - Patient's Self Care Capacity Other (Comments)   Comments pt txf back here from SNF; prior to Feb admit pt was residing alone in her 2nd story apt   Prior Level of ADL Function   Self Feeding Independent   Grooming / Hygiene Independent   Bathing Independent   Dressing Independent   Toileting Independent   Comments per chart prior to Feb admit pt was independnet w/ADL's   Prior Level of IADL Function   Medication Management Unable To Determine At This Time   Comments per chart prior to Feb admit pt was independnet w/most IADL's but has been at SNF most recently   History of Falls   History of Falls Yes   Precautions   Precautions Fall Risk   Comments labile orthostatic BPs   Pain 0 - 10 Group   Therapist Pain Assessment During Activity;Nurse Notified;0   Cognition    Cognition / Consciousness X   Level of Consciousness Alert   Ability To Follow Commands 1 Step   Safety Awareness Impaired;Impulsive   New Learning Impaired   Comments Pleasant and cooperative tends to repeat the question \"where are my glasses and purse\"... \"when can I go home\" per chart hx of dementia/memory deficit   Passive ROM Upper Body   Passive ROM Upper Body WDL   Active ROM Upper Body   Active ROM Upper Body  WDL   Strength Upper Body   Upper Body Strength  WDL   Upper Body Muscle Tone   Upper Body Muscle Tone  WDL   Coordination Upper Body   Coordination WDL   Balance Assessment   Sitting Balance (Static) Fair +   Sitting Balance (Dynamic) Fair   Standing Balance (Static) Fair -   Standing Balance (Dynamic) Fair   Weight Shift Sitting Fair "   Weight Shift Standing Fair   Comments w/fww   Bed Mobility    Supine to Sit Supervised   ADL Assessment   Grooming Standby Assist;Standing   Upper Body Dressing Supervision   Lower Body Dressing Contact Guard Assist   Toileting Standby Assist   Comments cues for safe placement of fww at sink and for toilet txf limtied carryover   How much help from another person does the patient currently need...   6 Clicks Daily Activity Score 20   Functional Mobility   Sit to Stand Standby Assist   Bed, Chair, Wheelchair Transfer Standby Assist   Toilet Transfers Standby Assist   Mobility walking in room w/fww   Activity Tolerance   Comments c/o fatigue   Patient / Family Goals   Patient / Family Goal #1 to get my glassess and purse   Short Term Goals   Short Term Goal # 1 pt will complete FB dressing w/spv   Short Term Goal # 2 pt will complete toilet txf and hygiene w/spv   Short Term Goal # 3 pt will complete grooming standing at sink w/spv   Education Group   Role of Occupational Therapist Patient Response Patient;Acceptance;Explanation;Demonstration;No Learning Evidence;Reinforcement Needed   Occupational Therapy Initial Treatment Plan    Treatment Interventions Self Care / Activities of Daily Living;Adaptive Equipment;Cognitive Skill Development;Community Re-Integration;Manual Therapy Techniques;Neuro Re-Education / Balance;Therapeutic Exercises;Therapeutic Activity   Treatment Frequency 3 Times per Week   Duration Until Therapy Goals Met   Problem List   Problem List Decreased Active Daily Living Skills;Decreased Functional Mobility;Decreased Activity Tolerance;Safety Awareness Deficits / Cognition;Impaired Postural Control / Balance   Anticipated Discharge Equipment and Recommendations   DC Equipment Recommendations Unable to determine at this time   Discharge Recommendations Recommend post-acute placement for additional occupational therapy services prior to discharge home  (has potential to progress to home w/HH but  may need more in home assistance see note)   Interdisciplinary Plan of Care Collaboration   IDT Collaboration with  Nursing;Therapy Tech   Patient Position at End of Therapy Seated;Chair Alarm On;Call Light within Reach;Tray Table within Reach;Phone within Reach   Collaboration Comments RN aware of OT eval and pts efforts   Session Information   Date / Session Number  3/11#1 (1/3, 3/17)

## 2024-03-11 NOTE — ASSESSMENT & PLAN NOTE
3/12/2024  Persistent hypotension despite holding medication  Cortisol level borderline low  Started on IV hydrocortisone  Can be discharged on oral hydrocortisone on discharge  ACTH level in process    Recheck a.m. cortisol

## 2024-03-11 NOTE — PROGRESS NOTES
Monitor Summary  Rhythm: Sinus Rhythm with 1st degree HB with prolonged QT  Rate: 65-85  Ectopy: PVC, PAC  .21 / .14 / .51

## 2024-03-11 NOTE — DISCHARGE PLANNING
Care Transition Team Assessment  LMSW conducted a chart review due to pt being a readmit from 2/26/24 and was discharged on 2/29/24. Pt was DC last admission to [facility]. Pt lives alone in a 2 story apartment and does not use any DME at [facility]. Pt's emergency contact is pt's  and support. Prior to last admission pt was independent with ADLS and IADLS. Pt's PCP is Jeff TRIMBLE. Pt's preferred pharmacy is [facility]. Pt receives $900 monthly SSI. Pt's insurance is Medicare and Medicaid. Upon DC plan is DC back to [facility].    Information Source  Orientation Level: Oriented to place, Oriented to time, Oriented to person, Disoriented to situation  Information Given By: Other (Comments) (Chart review)  Who is responsible for making decisions for patient? : Patient    Readmission Evaluation  Is this a readmission?: Yes - unplanned readmission    Elopement Risk  Legal Hold: No  Ambulatory or Self Mobile in Wheelchair: Yes  Disoriented: No  Psychiatric Symptoms: None  History of Wandering: No  Elopement this Admit: No  Vocalizing Wanting to Leave: No  Displays Behaviors, Body Language Wanting to Leave: No-Not at Risk for Elopement  Elopement Risk: Not at Risk for Elopement    Interdisciplinary Discharge Planning  Lives with - Patient's Self Care Capacity: Other (Comments) (came from a SNF)  Patient or legal guardian wants to designate a caregiver: No  Support Systems: Family Member(s)  Housing / Facility: Skilled Nursing Facility  Name of Care Facility: [place]  Durable Medical Equipment: Home Oxygen, Walker    Discharge Preparedness  What is your plan after discharge?: Skilled nursing facility  What are your discharge supports?: Other (comment) ( and Niece)  Prior Functional Level: Independent with Activities of Daily Living  Difficulity with ADLs: Walking  Difficulity with IADLs: None    Functional Assesment  Prior Functional Level: Independent with Activities of Daily Living    Finances  Financial  Barriers to Discharge: No    Vision / Hearing Impairment  Right Eye Vision: Impaired, Wears Glasses  Left Eye Vision: Impaired, Wears Glasses  Hearing Impairment: Both Ears    Advance Directive  Advance Directive?: None    Domestic Abuse  Have you ever been the victim of abuse or violence?: No  Physical Abuse or Sexual Abuse: No  Verbal Abuse or Emotional Abuse: No  Possible Abuse/Neglect Reported to:: Not Applicable    Psychological Assessment  History of Substance Abuse: None  History of Psychiatric Problems: No  Non-compliant with Treatment: No  Newly Diagnosed Illness: No    Discharge Risks or Barriers  Discharge risks or barriers?: No    Anticipated Discharge Information  Discharge Disposition: D/T to SNF with Medicare cert in anticipation of skilled care (03)  Discharge Address: [facility] [place] [address]  Discharge Contact Phone Number: [phone]

## 2024-03-11 NOTE — PROGRESS NOTES
Monitor Summary:    Rhythm: SR w/ 1st degree and BBB  Rate: 67-85  Ectopy: (R) PVC, (R) PAC  Measurement : .22/.13/.42

## 2024-03-11 NOTE — PROGRESS NOTES
Bedside report received from off going RN: Chris, assumed care of patient. Patient is A&O X 3, disoriented to situation. Pain 9/10. On tele monitor, SR 67.    Fall Risk Score: HIGH RISK  Fall risk interventions in place: Place yellow fall risk ID band on patient, Provide patient/family education based on risk assessment, Educate patient/family to call staff for assistance when getting out of bed, Place fall precaution signage outside patient door, Utilize bed/chair fall alarm, and Bed alarm connected correctly  Bed type: Regular (Regan Score less than 17 interventions in place)  Patient on cardiac monitor: Yes  IVF/IV medications: Not Applicable   Oxygen: How many liters 3L  Bedside sitter: Not Applicable   Isolation: Not applicable

## 2024-03-11 NOTE — DISCHARGE PLANNING
DC Transport Scheduled    Transport Company Scheduled:  [employer]  Spoke with Coby at [facility] to schedule transport.    Scheduled Date: 3/12/2024  Scheduled Time: Will Call     Destination:   [facility]   Destination address: [address] [place] [address] [place] [id number]    Notified care team of scheduled transport via [employer].     If there are any changes needed to the DC transportation scheduled, please contact [facility] at ext. [phone] between the hours of 8033-1744 Mon-Fri. If outside those hours, contact the ED Case Manager at ext. [phone].

## 2024-03-11 NOTE — CARE PLAN
The patient is Watcher - Medium risk of patient condition declining or worsening    Shift Goals  Clinical Goals: monitor BP, safety  Patient Goals: rest, find purse  Family Goals: na    Progress made toward(s) clinical / shift goals:      Patient is not progressing towards the following goals:      Problem: Care Map:  Day 2 Optimal Outcome for the Heart Failure Patient  Goal: Day 2:  Optimal Care of the heart failure patient  Outcome: Not Progressing  Note: Diuretics currently on hold due to blood pressure, BNP is increasing. Patient does not appear to have edema, blood pressures remain soft, patient is down to 3L.

## 2024-03-11 NOTE — DISCHARGE PLANNING
-[phone]  Per LMSW, DPA sent SNF referral to [facility].     -[phone]  Sofi from [facility] informed DPA that pt can be accepted to facility today. Transport tentatively set for 1600.

## 2024-03-12 LAB
ANION GAP SERPL CALC-SCNC: 13 MMOL/L (ref 7–16)
BUN SERPL-MCNC: 14 MG/DL (ref 8–22)
CALCIUM SERPL-MCNC: 9.3 MG/DL (ref 8.5–10.5)
CHLORIDE SERPL-SCNC: 97 MMOL/L (ref 96–112)
CO2 SERPL-SCNC: 25 MMOL/L (ref 20–33)
CREAT SERPL-MCNC: 0.92 MG/DL (ref 0.5–1.4)
GFR SERPLBLD CREATININE-BSD FMLA CKD-EPI: 60 ML/MIN/1.73 M 2
GLUCOSE SERPL-MCNC: 193 MG/DL (ref 65–99)
MAGNESIUM SERPL-MCNC: 1.8 MG/DL (ref 1.5–2.5)
POTASSIUM SERPL-SCNC: 4.1 MMOL/L (ref 3.6–5.5)
SODIUM SERPL-SCNC: 135 MMOL/L (ref 135–145)

## 2024-03-12 PROCEDURE — 700102 HCHG RX REV CODE 250 W/ 637 OVERRIDE(OP): Performed by: INTERNAL MEDICINE

## 2024-03-12 PROCEDURE — A9270 NON-COVERED ITEM OR SERVICE: HCPCS | Performed by: INTERNAL MEDICINE

## 2024-03-12 PROCEDURE — 700102 HCHG RX REV CODE 250 W/ 637 OVERRIDE(OP)

## 2024-03-12 PROCEDURE — 700102 HCHG RX REV CODE 250 W/ 637 OVERRIDE(OP): Performed by: STUDENT IN AN ORGANIZED HEALTH CARE EDUCATION/TRAINING PROGRAM

## 2024-03-12 PROCEDURE — 99233 SBSQ HOSP IP/OBS HIGH 50: CPT | Performed by: INTERNAL MEDICINE

## 2024-03-12 PROCEDURE — 770020 HCHG ROOM/CARE - TELE (206)

## 2024-03-12 PROCEDURE — A9270 NON-COVERED ITEM OR SERVICE: HCPCS | Performed by: STUDENT IN AN ORGANIZED HEALTH CARE EDUCATION/TRAINING PROGRAM

## 2024-03-12 PROCEDURE — 82024 ASSAY OF ACTH: CPT

## 2024-03-12 PROCEDURE — A9270 NON-COVERED ITEM OR SERVICE: HCPCS

## 2024-03-12 PROCEDURE — A9270 NON-COVERED ITEM OR SERVICE: HCPCS | Performed by: HOSPITALIST

## 2024-03-12 PROCEDURE — 80048 BASIC METABOLIC PNL TOTAL CA: CPT

## 2024-03-12 PROCEDURE — 83735 ASSAY OF MAGNESIUM: CPT

## 2024-03-12 PROCEDURE — 700111 HCHG RX REV CODE 636 W/ 250 OVERRIDE (IP): Mod: JZ | Performed by: STUDENT IN AN ORGANIZED HEALTH CARE EDUCATION/TRAINING PROGRAM

## 2024-03-12 PROCEDURE — 700102 HCHG RX REV CODE 250 W/ 637 OVERRIDE(OP): Performed by: HOSPITALIST

## 2024-03-12 PROCEDURE — 36415 COLL VENOUS BLD VENIPUNCTURE: CPT

## 2024-03-12 RX ORDER — LANOLIN ALCOHOL/MO/W.PET/CERES
400 CREAM (GRAM) TOPICAL 2 TIMES DAILY
Status: COMPLETED | OUTPATIENT
Start: 2024-03-12 | End: 2024-03-13

## 2024-03-12 RX ADMIN — FUROSEMIDE 20 MG: 20 TABLET ORAL at 06:00

## 2024-03-12 RX ADMIN — OXYCODONE 5 MG: 5 TABLET ORAL at 19:47

## 2024-03-12 RX ADMIN — CLOPIDOGREL BISULFATE 75 MG: 75 TABLET ORAL at 05:33

## 2024-03-12 RX ADMIN — OXYCODONE 5 MG: 5 TABLET ORAL at 13:19

## 2024-03-12 RX ADMIN — DULOXETINE HYDROCHLORIDE 60 MG: 60 CAPSULE, DELAYED RELEASE ORAL at 05:35

## 2024-03-12 RX ADMIN — Medication 400 MG: at 17:02

## 2024-03-12 RX ADMIN — HYDROCORTISONE SODIUM SUCCINATE 50 MG: 100 INJECTION, POWDER, FOR SOLUTION INTRAMUSCULAR; INTRAVENOUS at 05:34

## 2024-03-12 RX ADMIN — LEVOTHYROXINE SODIUM 125 MCG: 0.12 TABLET ORAL at 05:35

## 2024-03-12 RX ADMIN — METOPROLOL SUCCINATE 12.5 MG: 25 TABLET, EXTENDED RELEASE ORAL at 18:00

## 2024-03-12 RX ADMIN — OMEPRAZOLE 20 MG: 20 CAPSULE, DELAYED RELEASE ORAL at 05:33

## 2024-03-12 ASSESSMENT — PAIN DESCRIPTION - PAIN TYPE
TYPE: ACUTE PAIN;CHRONIC PAIN
TYPE: ACUTE PAIN
TYPE: ACUTE PAIN

## 2024-03-12 ASSESSMENT — ENCOUNTER SYMPTOMS: SHORTNESS OF BREATH: 0

## 2024-03-12 ASSESSMENT — FIBROSIS 4 INDEX: FIB4 SCORE: 1.611111111111111111

## 2024-03-12 NOTE — DOCUMENTATION QUERY
[facility]                                                                       Query Response Note      PATIENT:               JUNIOR CHIU  ACCT #:                  [id number]  MRN:                     [id number]  :                      1935  ADMIT DATE:       3/8/2024 4:52 PM  DISCH DATE:          RESPONDING  PROVIDER #:        [id number]           QUERY TEXT:    Severe protein-calorie malnutrition is documented in the Problem List.  Per Dietary Eval, no malnutrition risk criteria met.   After further study, can the diagnosis of severe-protein calorie malnutrition be clarified with supportive clinical indicators?      The patient's Clinical Indicators include:  3/10 Dietary Eval:  malnutrition risk: no criteria met.  Wt stable per chart review. BMI 22.1.  Appears adequately nourished.   H&P and Progress Notes:  Severe-protein calorie malnutrition   Risk Factors: dementia  Treatment: Dietary Eval, Magic cup or Ensure Plus BID, encourage/ document PO intake, monitor wt, monitor electrolytes.     Thank you,  Gabriela Vidal RN, BSN, CCDS  Clinical   Connect via [facility]  Options provided:   -- Severe protein-calorie malnutrition is ruled out   -- Severe protein-calorie malnutrition exists (please specify supportive clinical indicators), (please document additional clinical indicators)   -- Other explanation, (please specify other explanation)      Query created by: Gabriela Vidal on 3/11/2024 8:50 AM    RESPONSE TEXT:    Severe protein-calorie malnutrition is ruled out          Electronically signed by:  CHELSEA ESTRADA MD 3/12/2024 10:54 AM

## 2024-03-12 NOTE — CARE PLAN
The patient is Stable - Low risk of patient condition declining or worsening    Shift Goals  Clinical Goals: Tele monitoring, trend labs, and safety  Patient Goals: Sleep  Family Goals: No family at bedside    Progress made toward(s) clinical / shift goals:    Problem: Fall Risk  Goal: Patient will remain free from falls  Description: Target End Date:  Prior to discharge or change in level of care    Document interventions on the [place] Bernard Fall Risk Assessment    1.  Assess for fall risk factors  2.  Implement fall precautions  Outcome: Progressing     Problem: Hemodynamics  Goal: Patient's hemodynamics, fluid balance and neurologic status will be stable or improve  Description: Target End Date:  Prior to discharge or change in level of care    Document on Assessment and I/O flowsheet templates    1.  Monitor vital signs, pulse oximetry and cardiac monitor per provider order and/or policy  2.  Maintain blood pressure per provider order  3.  Hemodynamic monitoring per provider order  4.  Manage IV fluids and IV infusions  5.  Monitor intake and output  6.  Daily weights per unit policy or provider order  7.  Assess peripheral pulses and capillary refill  8.  Assess color and body temperature  9.  Position patient for maximum circulation/cardiac output  10. Monitor for signs/symptoms of excessive bleeding  11. Assess mental status, restlessness and changes in level of consciousness  12. Monitor temperature and report fever or hypothermia to provider immediately. Consideration of targeted temperature management.  Outcome: Progressing       Patient is not progressing towards the following goals:

## 2024-03-12 NOTE — PROGRESS NOTES
Hospital Medicine Daily Progress Note    Date of Service  3/12/2024    Chief Complaint  Davie Montejo is a 89 y.o. female admitted 3/8/2024 with shortness of breath and hypoxia    Hospital Course  89 female with past medical history of CAD s/p stent, CABG 2008, dementia hypertension, hyperlipidemia, HFrEF 20-25% referred here for shortness of breath.  Subsequent chest x-ray noted with CHF with interstitial pulmonary edema, a small bilateral pleural effusion.  CT abdomen noted with moderate bilateral pleural effusion.  Admitted for acute on chronic systolic heart failure.  Initiated on diuresis with IV Lasix.  Cardiology evaluated and started on Entresto, Aldactone and recommended to follow-up with outpatient for ischemic workup.      On 3/11 patient became hypotensive.  Random cortisol was borderline low at 6.3 concerning for adrenal insufficiency.  Patient was started on stress dose steroids and her blood pressure medications were held.    Started her on hydrocortisone for suspected adrenal insufficiency in context of persistent hypotension.She has appointment with cardiology on 3/14 2024.  If discharged tomorrow she will need to follow-up with cardiology for initiating other medication if BP remained stable.    Patient does have outpatient follow-up with cardiology 3/14.    Interval Problem Update      3/11/2024  Her blood pressure remained low.  MAP around low 59  Remain asymptomatic  Labs reviewed BMP unremarkable sodium 138, lactic acid 0.9, magnesium 1.8, cortisol borderline low 6        Telemetry monitoring  Started her on hydrocortisone for suspected adrenal insufficiency.  Can switch to hydrocortisone 20 mg on discharge, ACTH in process  Switch IV Lasix to oral 20 mg daily  Continue metoprolol  Hold Entresto, spironolactone.  She has appointment with cardiology on 3/14 2024.  If discharged tomorrow she will need to follow-up with cardiology for initiating other medication if BP remained  stable  Monitor oxygen requirement  Fluid restriction   Daily strict input and output  Repeat BMP in a.m. to monitor electrolytes and toxicity while on  IV  diuretics  Need close monitoring of blood counts, electrolytes and renal function due to potential of organ dysfunction due to acute CHF       High risk of deterioration into arrhythmia, need close telemetry monitoring  Patient blood pressure remain low.  Need close monitoring for hemodynamic stability.    Patient was seen and examined at bedside.  I have personally reviewed and interpreted vitals, labs, and imaging.    3/12.  Afebrile.  Hypotension is improved.  On 2-3 L nasal cannula.  Replete mag.  Denies any fever, chills, chest pains, shortness of breath, dizziness/lightheadedness, palpitations.  Will recheck a.m. cortisol to screen for adrenal insufficiency.  Discontinue stress dose steroids and monitor blood pressure.  Restart metoprolol as telemetry shows episodes of SVT.  Patient tells me this morning that she does not want to go back to [place] or any SNF.  Feels like she can care for herself at home.  Counseled her extensively that she has been going in and out of a skilled nursing facility and PT/OT are recommending postacute placement.  Patient states that she has 2 sons but unfortunately does not have their contact information.  I did leave a message for her  listed on demographics.  Patient does have dementia and orientation waxes and wanes.  Will continue to titrate blood pressure medications and monitor overnight.  If cannot talk patient into going to skilled nursing facility may have to discharge home with home health.  I did very heavily recommend postacute placement.    I have discussed this patient's plan of care and discharge plan at IDT rounds today with Case Management, Nursing, Nursing leadership, and other members of the IDT team.    Consultants/Specialty  cardiology    Code Status  DNAR/DNI    Disposition  Medically  Cleared  I have placed the appropriate orders for post-discharge needs.    Review of Systems  Review of Systems   Respiratory:  Negative for shortness of breath.    Cardiovascular:  Negative for chest pain.        Physical Exam  Temp:  [36.2 °C (97.2 °F)-36.8 °C (98.3 °F)] 36.5 °C (97.7 °F)  Pulse:  [] 84  Resp:  [16-17] 16  BP: (101-149)/(49-83) 112/55  SpO2:  [91 %-96 %] 91 %    Physical Exam  Constitutional:       Appearance: Normal appearance.   Cardiovascular:      Rate and Rhythm: Normal rate and regular rhythm.      Pulses: Normal pulses.      Heart sounds: Normal heart sounds.   Pulmonary:      Effort: Pulmonary effort is normal.      Breath sounds: Rales present.   Musculoskeletal:      Right lower leg: No edema.      Left lower leg: No edema.   Neurological:      General: No focal deficit present.      Mental Status: She is alert and oriented to person, place, and time.   Psychiatric:         Mood and Affect: Mood normal.         Fluids    Intake/Output Summary (Last 24 hours) at 3/12/2024 1135  Last data filed at 3/11/2024 2200  Gross per 24 hour   Intake 120 ml   Output 350 ml   Net -230 ml       Laboratory        Recent Labs     03/10/24  0535 03/11/24  1229 03/12/24  0243   SODIUM 135 138 135   POTASSIUM 4.7 3.9 4.1   CHLORIDE 97 99 97   CO2 29 30 25   GLUCOSE 91 106* 193*   BUN 15 12 14   CREATININE 1.04 0.90 0.92   CALCIUM 9.0 9.2 9.3                   Imaging  DX-CHEST-LIMITED (1 VIEW)   Final Result      1.  Stable cardiomegaly and interstitial edema.      2.  Stable bibasilar atelectasis and pleural effusions.      CT-ABDOMEN-PELVIS WITH   Final Result      1.  Increased colonic stool.   2.  No bowel obstruction or perforation.   3.  Moderate bilateral pleural effusions with associated atelectasis.      DX-CHEST-PORTABLE (1 VIEW)   Final Result      Findings suggesting CHF with interstitial pulmonary edema, small pleural effusions and atelectasis.           Assessment/Plan  * Acute systolic  congestive heart failure (HCC)- (present on admission)  Assessment & Plan  3/12/2024  Telemetry monitoring  Supplement O2 as needed  Fluid restriction   Daily strict input and output  Repeat x-ray  Repeat BMP in a.m. to monitor electrolytes   Cardiology evaluated    She is unable to tolerate IV Lasix.  She is near euvolemic.  Started on oral Lasix, metoprolol  Holding Entresto, spironolactone    Hypotension  Assessment & Plan  3/12/2024  Persistent hypotension despite holding medication  Cortisol level borderline low  Started on IV hydrocortisone  Can be discharged on oral hydrocortisone on discharge  ACTH level in process    Recheck a.m. cortisol    Severe protein-calorie malnutrition ([place]: less than 60% of standard weight) ([facility])  Assessment & Plan  3/12/2024  Dietary consult  Monitor electrolytes    Acute respiratory failure with hypoxia ([facility])  Assessment & Plan  3/12/2024  2 L of O2 above her baseline  Secondary to pulmonary edema, pleural effusions, heart failure    Pleural effusion- (present on admission)  Assessment & Plan  3/12/2024  Continue diuresis    Vascular dementia with paranoia ([facility])- (present on admission)  Assessment & Plan  3/12/2024  Frequent reorientation  Patient was found to be lethargic on examination which is likely due to polypharmacy.  I will discontinue her gabapentin.  Avoid sedative medications.  Improved    Anemia- (present on admission)  Assessment & Plan  No active signs of bleeding.    Will continue to trend with CBC  Transfuse to maintain hemoglobin greater than 7.  Results from last 7 days   Lab Units 03/09/24  0219 03/08/24  1755   HGB 1503 g/dL 12.2 8.8*   HCT 1504 % 39.2 28.5*   MCV 1505 fL 84.1 84.3       Reticulocyte Count   Date Value Ref Range Status   03/09/2024 1.1 0.8 - 2.6 % Final     Comment:     Please note new reference range effective 05/22/2023.     Retic, Absolute   Date Value Ref Range Status   03/09/2024 0.05 0.04 - 0.12 M/uL Final     Comment:     Please note  new reference range effective 05/22/2023.     Imm. Reticulocyte Fraction   Date Value Ref Range Status   03/09/2024 15.3 2.6 - 16.1 % Final     Comment:     Please note new reference range effective 05/22/2023.     Retic Hgb Equivalent   Date Value Ref Range Status   03/09/2024 29.1 29.0 - 35.0 pg/cell Final      Ferritin   Date Value Ref Range Status   03/09/2024 62.4 10.0 - 291.0 ng/mL Final     Iron   Date Value Ref Range Status   03/09/2024 60 40 - 170 ug/dL Final     Total Iron Binding   Date Value Ref Range Status   03/09/2024 372 250 - 450 ug/dL Final     % Saturation   Date Value Ref Range Status   03/09/2024 16 15 - 55 % Final       Hypothyroid- (present on admission)  Assessment & Plan  3/12/2024  Continue thyroid medications    Essential hypertension- (present on admission)  Assessment & Plan  3/12/2024  Hypotension, holding antihypertensive    Coronary artery disease involving coronary bypass graft of native heart- (present on admission)  Assessment & Plan  3/12/2024  Continue Plavix and statins         VTE prophylaxis: lovenox    I have performed a physical exam and reviewed and updated ROS and Plan today (3/12/2024). In review of yesterday's note (3/11/2024), there are no changes except as documented above.    Greater than 52 minutes spent prepping to see patient (e.g. review of tests) obtaining and/or reviewing separately obtained history. Performing a medically appropriate examination and/ evaluation.  Counseling and educating the patient/family/caregiver.  Ordering medications, tests, or procedures.  Referring and communicating with other health care professionals.  Documenting clinical information in EPIC.  Independently interpreting results and communicating results to patient/family/caregiver.  Care coordination.

## 2024-03-12 NOTE — PROGRESS NOTES
Bedside report received, assumed care of patient. pt is A&Ox 2, mentation waxes and wanes, pt is impulsive, bed alarm on. VS'S. SR on monitor.   Overnight Events: no overnight events per night RN.  SBP Ranged: low 100's-140's  Fall Risk Score: high fall risk  Fall risk interventions in place: Place yellow fall risk ID band on patient, Provide patient/family education based on risk assessment, Educate patient/family to call staff for assistance when getting out of bed, Place fall precaution signage outside patient door, Place patient in room close to nursing station, Utilize bed/chair fall alarm, Notify charge of high risk for huddle, and Bed alarm connected correctly  Bed type: Regular (Regan Score less than 17 interventions in place)  Patient on cardiac monitor: Yes SR- ST 70's- low 100's.  IVF/IV medications: Not Applicable   Oxygen: How many liters 3L which is baseline.  Bedside sitter: Not Applicable   Isolation: Not applicable

## 2024-03-13 VITALS
TEMPERATURE: 97.3 F | HEIGHT: 67 IN | DIASTOLIC BLOOD PRESSURE: 50 MMHG | BODY MASS INDEX: 22.08 KG/M2 | OXYGEN SATURATION: 95 % | HEART RATE: 77 BPM | RESPIRATION RATE: 18 BRPM | SYSTOLIC BLOOD PRESSURE: 114 MMHG | WEIGHT: 140.65 LBS

## 2024-03-13 PROBLEM — I95.9 HYPOTENSION: Status: RESOLVED | Noted: 2024-03-11 | Resolved: 2024-03-13

## 2024-03-13 LAB
ACTH PLAS-MCNC: <1.5 PG/ML (ref 7.2–63.3)
ANION GAP SERPL CALC-SCNC: 12 MMOL/L (ref 7–16)
BUN SERPL-MCNC: 18 MG/DL (ref 8–22)
CALCIUM SERPL-MCNC: 9.1 MG/DL (ref 8.5–10.5)
CHLORIDE SERPL-SCNC: 99 MMOL/L (ref 96–112)
CO2 SERPL-SCNC: 25 MMOL/L (ref 20–33)
CORTIS SERPL-MCNC: 5.2 UG/DL (ref 0–23)
CREAT SERPL-MCNC: 1.1 MG/DL (ref 0.5–1.4)
ERYTHROCYTE [DISTWIDTH] IN BLOOD BY AUTOMATED COUNT: 55.7 FL (ref 35.9–50)
GFR SERPLBLD CREATININE-BSD FMLA CKD-EPI: 49 ML/MIN/1.73 M 2
GLUCOSE SERPL-MCNC: 96 MG/DL (ref 65–99)
HCT VFR BLD AUTO: 34.7 % (ref 37–47)
HGB BLD-MCNC: 11.1 G/DL (ref 12–16)
MAGNESIUM SERPL-MCNC: 1.9 MG/DL (ref 1.5–2.5)
MCH RBC QN AUTO: 26.1 PG (ref 27–33)
MCHC RBC AUTO-ENTMCNC: 32 G/DL (ref 32.2–35.5)
MCV RBC AUTO: 81.5 FL (ref 81.4–97.8)
PHOSPHATE SERPL-MCNC: 2.4 MG/DL (ref 2.5–4.5)
PLATELET # BLD AUTO: 279 K/UL (ref 164–446)
PMV BLD AUTO: 10.2 FL (ref 9–12.9)
POTASSIUM SERPL-SCNC: 3.7 MMOL/L (ref 3.6–5.5)
RBC # BLD AUTO: 4.26 M/UL (ref 4.2–5.4)
SODIUM SERPL-SCNC: 136 MMOL/L (ref 135–145)
WBC # BLD AUTO: 7.8 K/UL (ref 4.8–10.8)

## 2024-03-13 PROCEDURE — 36415 COLL VENOUS BLD VENIPUNCTURE: CPT

## 2024-03-13 PROCEDURE — 700102 HCHG RX REV CODE 250 W/ 637 OVERRIDE(OP)

## 2024-03-13 PROCEDURE — 85027 COMPLETE CBC AUTOMATED: CPT

## 2024-03-13 PROCEDURE — 84100 ASSAY OF PHOSPHORUS: CPT

## 2024-03-13 PROCEDURE — A9270 NON-COVERED ITEM OR SERVICE: HCPCS

## 2024-03-13 PROCEDURE — A9270 NON-COVERED ITEM OR SERVICE: HCPCS | Performed by: INTERNAL MEDICINE

## 2024-03-13 PROCEDURE — 83735 ASSAY OF MAGNESIUM: CPT

## 2024-03-13 PROCEDURE — 80048 BASIC METABOLIC PNL TOTAL CA: CPT

## 2024-03-13 PROCEDURE — 99239 HOSP IP/OBS DSCHRG MGMT >30: CPT | Performed by: INTERNAL MEDICINE

## 2024-03-13 PROCEDURE — 82533 TOTAL CORTISOL: CPT

## 2024-03-13 PROCEDURE — 700102 HCHG RX REV CODE 250 W/ 637 OVERRIDE(OP): Performed by: HOSPITALIST

## 2024-03-13 PROCEDURE — A9270 NON-COVERED ITEM OR SERVICE: HCPCS | Performed by: STUDENT IN AN ORGANIZED HEALTH CARE EDUCATION/TRAINING PROGRAM

## 2024-03-13 PROCEDURE — 700102 HCHG RX REV CODE 250 W/ 637 OVERRIDE(OP): Performed by: STUDENT IN AN ORGANIZED HEALTH CARE EDUCATION/TRAINING PROGRAM

## 2024-03-13 PROCEDURE — A9270 NON-COVERED ITEM OR SERVICE: HCPCS | Performed by: HOSPITALIST

## 2024-03-13 PROCEDURE — 700102 HCHG RX REV CODE 250 W/ 637 OVERRIDE(OP): Performed by: INTERNAL MEDICINE

## 2024-03-13 RX ORDER — POTASSIUM CHLORIDE 20 MEQ/1
20 TABLET, EXTENDED RELEASE ORAL ONCE
Status: COMPLETED | OUTPATIENT
Start: 2024-03-13 | End: 2024-03-13

## 2024-03-13 RX ORDER — OXYCODONE HYDROCHLORIDE 5 MG/1
5 TABLET ORAL EVERY 6 HOURS PRN
Qty: 15 TABLET | Refills: 0 | Status: ON HOLD | OUTPATIENT
Start: 2024-03-13 | End: 2024-03-20

## 2024-03-13 RX ORDER — LANOLIN ALCOHOL/MO/W.PET/CERES
400 CREAM (GRAM) TOPICAL 2 TIMES DAILY
Status: DISCONTINUED | OUTPATIENT
Start: 2024-03-13 | End: 2024-03-13 | Stop reason: HOSPADM

## 2024-03-13 RX ORDER — POTASSIUM CHLORIDE 20 MEQ/1
TABLET, EXTENDED RELEASE ORAL
Status: DISCONTINUED
Start: 2024-03-13 | End: 2024-03-13 | Stop reason: HOSPADM

## 2024-03-13 RX ORDER — FUROSEMIDE 20 MG/1
20 TABLET ORAL DAILY
Status: ON HOLD
Start: 2024-03-13 | End: 2024-03-28

## 2024-03-13 RX ADMIN — OXYCODONE 5 MG: 5 TABLET ORAL at 03:12

## 2024-03-13 RX ADMIN — POTASSIUM CHLORIDE 20 MEQ: 1500 TABLET, EXTENDED RELEASE ORAL at 06:30

## 2024-03-13 RX ADMIN — CLOPIDOGREL BISULFATE 75 MG: 75 TABLET ORAL at 05:38

## 2024-03-13 RX ADMIN — OMEPRAZOLE 20 MG: 20 CAPSULE, DELAYED RELEASE ORAL at 05:37

## 2024-03-13 RX ADMIN — DULOXETINE HYDROCHLORIDE 60 MG: 60 CAPSULE, DELAYED RELEASE ORAL at 05:38

## 2024-03-13 RX ADMIN — Medication 400 MG: at 05:38

## 2024-03-13 RX ADMIN — DIBASIC SODIUM PHOSPHATE, MONOBASIC POTASSIUM PHOSPHATE AND MONOBASIC SODIUM PHOSPHATE 500 MG: 852; 155; 130 TABLET ORAL at 06:30

## 2024-03-13 RX ADMIN — LEVOTHYROXINE SODIUM 125 MCG: 0.12 TABLET ORAL at 05:38

## 2024-03-13 RX ADMIN — FUROSEMIDE 20 MG: 20 TABLET ORAL at 05:37

## 2024-03-13 ASSESSMENT — FIBROSIS 4 INDEX
FIB4 SCORE: 1.56
FIB4 SCORE: 1.56

## 2024-03-13 NOTE — DISCHARGE INSTRUCTIONS
Discharge Instructions per Dr. Marques Parker D.O.    DIET: Diet Order Diet: 2 Gram Sodium; Second Modifier: (optional): Cardiac    ACTIVITY: As tolerated    A proper diet that is low in grease, fat, and salt, along with 30 minutes of exercise per day will lead to weight loss, and better controlled blood sugar and blood pressure.    DIAGNOSIS: Acute systolic congestive heart failure (HCC)    Follow up with your Primary Care Provider PERLITA Lew as scheduled or sooner if your symptoms persist or worsen.  Return to Emergency Room for sever chest pain, shortness of breath, signs of a stroke, or any other emergencies.            HF Patient Discharge Instructions  Monitor your weight daily, and maintain a weight chart, to track your weight changes.   Activity as tolerated, unless your Doctor has ordered otherwise. Other activity order: as tolerated.  Follow a low fat, low cholesterol, low salt diet unless instructed otherwise by your Doctor. Read the labels on the back of food products and track your intake of fat, cholesterol and salt.   Fluid Restriction No. If a Fluid Restriction has been ordered by your Doctor, measure fluids with a measuring cup to ensure that you are not exceeding the restriction.   No smoking.  Oxygen No. If your Doctor has ordered that you wear Oxygen at home, it is important to wear it as ordered.  Did you receive an explanation from staff on the importance of taking each of your medications and why it is necessary to keep taking them unless your doctor says to stop? Yes  Were all of your questions answered about how to manage your heart failure and what to do if you have increased signs and symptoms after you go home? Yes  Do you feel like your heart failure care team involved you in the care treatment plan and allowed you to make decisions regarding your care while in the hospital and addressed any discharge needs you might have? Yes    See the educational handout provided at  discharge for more information on monitoring your daily weight, activity and diet. This also explains more about Heart Failure, symptoms of a flare-up and some of the tests that you have undergone.     Warning Signs of a Flare-Up include:  Swelling in the ankles or lower legs.  Shortness of breath, while at rest, or while doing normal activities.   Shortness of breath at night when in bed, or coughing in bed.   Requiring more pillows to sleep at night, or needing to sit up at night to sleep.  Feeling weak, dizzy or fatigued.     When to call your Doctor:  Call your Primary Care Physician or Cardiologist if:   You experience any pain radiating to your jaw or neck.  You have any difficulty breathing.  You experience weight gain of 3 lbs in a day or 5 lbs in a week.   You feel any palpitations or irregular heartbeats.  You become dizzy or lose consciousness.   If you have had an angiogram or had a pacemaker or AICD placed, and experience:  Bleeding, drainage or swelling at the surgical / puncture site.  Fever greater than 100.0 F  Shock from internal defibrillator.  Cool and / or numb extremities.     Please access the [facility] My HF Guide/Heart Failure Interactive Workbook:   [email or web address]

## 2024-03-13 NOTE — CARE PLAN
The patient is Stable - Low risk of patient condition declining or worsening    Shift Goals  Clinical Goals: I/O's, labs, maintain hemodynamics  Patient Goals: go home, pain control  Family Goals: na    Progress made toward(s) clinical / shift goals: Please refer to flowsheets and edcation for additional details   Problem: Care Map:  Day Before Discharge Optimal Outcome for the Heart Failure Patient  Goal: Day Before Discharge:  Optimal Care of the heart failure patient  Outcome: Progressing     Problem: Knowledge Deficit - Standard  Goal: Patient and family/care givers will demonstrate understanding of plan of care, disease process/condition, diagnostic tests and medications  Outcome: Progressing       Patient is not progressing towards the following goals:

## 2024-03-13 NOTE — PROGRESS NOTES
Monitor Summary: .21/.15/.37 SR-ST 1st degree HB w/ BBB   Short burst of 's ( asymptomatic)  Rare PAC, couplet  Frequent PVC

## 2024-03-13 NOTE — DISCHARGE PLANNING
Called Jose at [facility] to see if there is a bed available for today and she confirmed 1200, she requested DC summary prior. Called per request from RN CM.

## 2024-03-13 NOTE — DISCHARGE PLANNING
Case Management Discharge Planning    Admission Date: 3/8/2024  GMLOS: 3.9  ALOS: 5    6-Clicks ADL Score: 20  6-Clicks Mobility Score: 16  PT and/or OT Eval ordered: Yes  Post-acute Referrals Ordered: Yes  Post-acute Choice Obtained: Yes  Has referral(s) been sent to post-acute provider:  Yes      Anticipated Discharge Dispo: Discharge Disposition: D/T to SNF with Medicare cert in anticipation of skilled care (03)  Discharge Address: [facility] [place] [address]  Discharge Contact Phone Number: [phone]    DME Needed: No    Action(s) Taken: Patient discussed in rounds, she is medically cleared to discharge to [facility] today per MD. DPA called and spoke with Jose at [facility] who confirmed they have a bed today and can accept patient at noon.     RN LUKAS notified ride line to request transport to [facility] at noon. RN LUKAS requested discharge summary from MD.     Ride line confirmed ride for 1210 to [facility] today. [place] packet completed and given to bedside nurse.      Escalations Completed: None    Medically Clear: Yes    Next Steps:  by Juan at 1210, CM will continue to assist with DCP.    Barriers to Discharge: Transportation    Is the patient up for discharge tomorrow: No

## 2024-03-13 NOTE — PROGRESS NOTES
Pt. being discharged. Pt. educated on discharge instructions and new prescriptions. Pt verbalizes understanding. Follow up appointment made with accepting facility and cariology 3/14. PIV removed, monitor checked in. Pt. Going home [facility] to [facility]. This RN attempted multiple times to call in report, unfortunately was unable to get though

## 2024-03-13 NOTE — PROGRESS NOTES
Report received from outgoing nurse, care transferred at 1900. Patient currently in  on the monitor and A&Ox 2, disoriented to place and time. Whiteboard updated with plan for the day and names of staff. No other questions or concerns at this time from the patient. Call light within reach, fall precautions in place.     Bedside report received from off going RN: Rosalio, assumed care of patient.     Fall Risk Score: HIGH RISK  Fall risk interventions in place: Place yellow fall risk ID band on patient, Provide patient/family education based on risk assessment, Educate patient/family to call staff for assistance when getting out of bed, Place fall precaution signage outside patient door, Utilize bed/chair fall alarm, Notify charge of high risk for huddle, and Bed alarm connected correctly  Bed type: Regular (Regan Score less than 17 interventions in place)  Patient on cardiac monitor: Yes  IVF/IV medications: Not Applicable   Oxygen: How many liters 3L  Bedside sitter: Not Applicable   Isolation: Not applicable

## 2024-03-13 NOTE — PROGRESS NOTES
Bedside report received from off going RN/tech: Carlo, assumed care of patient.   Pt is currently A&Ox 2, mentation waxes and wanes, no complaints of pain at this time.  Overnight Events: NO significant overnight events   SBP Ranged: low 100's-120's  Fall Risk Score: HIGH FALL RISK   Fall risk interventions in place: Place yellow fall risk ID band on patient, Provide patient/family education based on risk assessment, Educate patient/family to call staff for assistance when getting out of bed, Place fall precaution signage outside patient door, Place patient in room close to nursing station, Utilize bed/chair fall alarm, Notify charge of high risk for huddle, and Bed alarm connected correctly  Bed type: Regular (Regan Score less than 17 interventions in place)  Patient on cardiac monitor: Yes SR/ST 80's-low 100's  IVF/IV medications: Not Applicable   Oxygen: How many liters 3L via [place] which is baseline.  Bedside sitter: Not Applicable   Isolation: Not applicable

## 2024-03-13 NOTE — DISCHARGE SUMMARY
Discharge Summary    CHIEF COMPLAINT ON ADMISSION  Chief Complaint   Patient presents with    Syncope     Pt was laying in bed when she had syncopal episode per staff, pts oxygen went down to 64% per staff on baseline 2L of oxygen. Pt has hx of dementia, but can state the year, person and place       Reason for Admission  ems     Admission Date  3/8/2024    CODE STATUS  DNAR/DNI    HPI & HOSPITAL COURSE  89 female with past medical history of CAD s/p stent, CABG 2008, vascular dementia, hypertension, hyperlipidemia, HFrEF 20-25% referred here for shortness of breath.  Subsequent chest x-ray noted with CHF with interstitial pulmonary edema, a small bilateral pleural effusion.  CT abdomen noted with moderate bilateral pleural effusion.  Admitted for acute on chronic systolic heart failure.  Initiated on diuresis with IV Lasix.  Cardiology evaluated and started on Entresto, Aldactone and recommended to follow-up with outpatient for ischemic workup.       On 3/11 patient became hypotensive.  Random cortisol was borderline low at 6.3 concerning for adrenal insufficiency.  Patient was started on stress dose steroids and her blood pressure medications were held.  ACTH also came back low concerning for secondary adrenal insufficiency.  However patients hypotension improved and blood pressure remained stable off of steroids.       Patient was restarted on low-dose metoprolol and oral furosemide.  Entresto and spironolactone are being held and can be titrated as outpatient.  Patient does have outpatient follow-up with cardiology 3/14.     Patient is medically stable to discharge back to [facility].  Follow-up with primary care, cardiology as outpatient.      Therefore, she is discharged in fair and stable condition to skilled nursing facility.    The patient met 2-midnight criteria for an inpatient stay at the time of discharge.    Discharge Date  3/13/2024      FOLLOW UP ITEMS POST DISCHARGE  None    DISCHARGE DIAGNOSES  Principal  Problem:    Acute systolic congestive heart failure (HCC) (POA: Yes)  Active Problems:    Coronary artery disease involving coronary bypass graft of native heart (Chronic) (POA: Yes)      Overview: CABG June 2009    Essential hypertension (Chronic) (POA: Yes)    Hypothyroid (POA: Yes)    Anemia (POA: Yes)    Vascular dementia with paranoia (HCC) (POA: Yes)    Pleural effusion (POA: Yes)    Acute respiratory failure with hypoxia (HCC) (POA: Yes)    Severe protein-calorie malnutrition ([place]: less than 60% of standard weight) (HCC) (POA: Yes)  Resolved Problems:    Hypotension (POA: Yes)      FOLLOW UP  Future Appointments   Date Time Provider Department Center   3/14/2024  3:15 PM PERLITA Kowalski [facility] None   4/8/2024 11:15 AM Wesley Davidson M.D. [facility] None     PERLITA Lew  [address] [facility] [address]  [phone]    Follow up        MEDICATIONS ON DISCHARGE     Medication List        START taking these medications        Instructions   furosemide 20 MG Tabs  Commonly known as: Lasix   Take 1 Tablet by mouth every day.  Dose: 20 mg            CHANGE how you take these medications        Instructions   oxyCODONE immediate-release 5 MG Tabs  What changed: when to take this  Commonly known as: Roxicodone   Take 1 Tablet by mouth every 6 hours as needed for Severe Pain for up to 5 days.  Dose: 5 mg            CONTINUE taking these medications        Instructions   acetaminophen 325 MG Tabs  Commonly known as: Tylenol   Take 650 mg by mouth every four hours as needed for Mild Pain.  Dose: 650 mg     atorvastatin 10 MG Tabs  Commonly known as: Lipitor   TAKE 1 TABLET BY MOUTH EVERY EVENING. FOR CHOLESTEROL.  Dose: 10 mg     Carafate 1 GM Tabs  Generic drug: sucralfate   Take 1 g by mouth 2 times a day.  Dose: 1 g     clopidogrel 75 MG Tabs  Commonly known as: Plavix   TAKE 1 TABLET BY MOUTH EVERY DAY.  Dose: 75 mg     DULoxetine 60 MG Cpep delayed-release capsule  Commonly known as: Cymbalta    "Take 60 mg by mouth every morning.  Dose: 60 mg     estradiol 1 MG Tabs  Commonly known as: Estrace   TAKE 1 TABLET BY MOUTH EVERY DAY.  Dose: 1 mg     gabapentin 600 MG tablet  Commonly known as: Neurontin   Take 1 Tablet by mouth 2 times a day.  Dose: 600 mg     levothyroxine 125 MCG Tabs  Commonly known as: Synthroid   TAKE 1 TABLET BY MOUTH EVERY MORNING ON AN EMPTY STOMACH.  Dose: 125 mcg     loratadine 10 MG Tabs  Commonly known as: Claritin   Take 1 Tablet by mouth every day.  Dose: 10 mg     metoprolol SR 25 MG Tb24  Commonly known as: Toprol XL   Take 0.5 Tablets by mouth every evening.  Dose: 12.5 mg     omeprazole 20 MG delayed-release capsule  Commonly known as: PriLOSEC   TAKE 1 CAPSULE BY MOUTH EVERY DAY.  Dose: 20 mg            STOP taking these medications      losartan 25 MG Tabs  Commonly known as: Cozaar     potassium chloride SA 10 MEQ Tbcr  Commonly known as: K-Dur     spironolactone 25 MG Tabs  Commonly known as: Aldactone              Allergies  Allergies   Allergen Reactions    Latex Unspecified          Tramadol Rash     Itchy red in nature    Codeine Unspecified     Unknown reaction      Lisinopril Unspecified     Unknown reaction    Penicillin G Unspecified     Unknown reaction      Pregabalin Unspecified     Lyrica affects patients vision.    Simvastatin Unspecified     Unknown reaction      Sulfa Drugs Unspecified     Patient states \"I can't remember what this does to me\" but recalls and allergy.        DIET  Orders Placed This Encounter   Procedures    Diet Order Diet: 2 Gram Sodium; Second Modifier: (optional): Cardiac     Standing Status:   Standing     Number of Occurrences:   1     Order Specific Question:   Diet:     Answer:   2 Gram Sodium [7]     Order Specific Question:   Second Modifier: (optional)     Answer:   Cardiac [6]       ACTIVITY  As tolerated.  Weight bearing as tolerated    CONSULTATIONS  Cards    PROCEDURES  None    LABORATORY  Lab Results   Component Value Date    " SODIUM 136 03/13/2024    POTASSIUM 3.7 03/13/2024    CHLORIDE 99 03/13/2024    CO2 25 03/13/2024    GLUCOSE 96 03/13/2024    BUN 18 03/13/2024    CREATININE 1.10 03/13/2024        Lab Results   Component Value Date    WBC 7.8 03/13/2024    HEMOGLOBIN 11.1 (L) 03/13/2024    HEMATOCRIT 34.7 (L) 03/13/2024    PLATELETCT 279 03/13/2024        I discussed medications and side effects with the patient.  I discussed prognosis and importance of medical compliance with the patient.  I counseled the patient about diet, exercise, weight loss, smoking cessation, and life style modifications.  All questions and concerns have been addressed.  Total time of the discharge process was 37 minutes.

## 2024-03-14 ENCOUNTER — HOSPITAL ENCOUNTER (OUTPATIENT)
Facility: MEDICAL CENTER | Age: 89
End: 2024-03-14
Attending: INTERNAL MEDICINE
Payer: MEDICARE

## 2024-03-14 PROBLEM — I50.20 HFREF (HEART FAILURE WITH REDUCED EJECTION FRACTION) (HCC): Status: ACTIVE | Noted: 2024-03-14

## 2024-03-14 PROBLEM — I50.43 ACUTE ON CHRONIC COMBINED SYSTOLIC AND DIASTOLIC CONGESTIVE HEART FAILURE (HCC): Status: ACTIVE | Noted: 2024-03-14

## 2024-03-14 PROCEDURE — 87186 SC STD MICRODIL/AGAR DIL: CPT

## 2024-03-14 PROCEDURE — 87077 CULTURE AEROBIC IDENTIFY: CPT

## 2024-03-14 PROCEDURE — 87086 URINE CULTURE/COLONY COUNT: CPT

## 2024-03-16 ENCOUNTER — APPOINTMENT (OUTPATIENT)
Dept: RADIOLOGY | Facility: MEDICAL CENTER | Age: 89
DRG: 308 | End: 2024-03-16
Attending: STUDENT IN AN ORGANIZED HEALTH CARE EDUCATION/TRAINING PROGRAM
Payer: MEDICARE

## 2024-03-16 ENCOUNTER — HOSPITAL ENCOUNTER (INPATIENT)
Facility: MEDICAL CENTER | Age: 89
LOS: 4 days | DRG: 308 | End: 2024-03-20
Attending: STUDENT IN AN ORGANIZED HEALTH CARE EDUCATION/TRAINING PROGRAM | Admitting: STUDENT IN AN ORGANIZED HEALTH CARE EDUCATION/TRAINING PROGRAM
Payer: MEDICARE

## 2024-03-16 DIAGNOSIS — I50.9 ACUTE ON CHRONIC CONGESTIVE HEART FAILURE, UNSPECIFIED HEART FAILURE TYPE (HCC): ICD-10-CM

## 2024-03-16 DIAGNOSIS — M79.7 FIBROMYALGIA: ICD-10-CM

## 2024-03-16 DIAGNOSIS — I48.91 ATRIAL FIBRILLATION WITH RVR (HCC): ICD-10-CM

## 2024-03-16 DIAGNOSIS — J96.01 ACUTE HYPOXIC RESPIRATORY FAILURE (HCC): ICD-10-CM

## 2024-03-16 DIAGNOSIS — I48.91 ATRIAL FIBRILLATION, UNSPECIFIED TYPE (HCC): ICD-10-CM

## 2024-03-16 DIAGNOSIS — Z91.89 AT RISK FOR CONSTIPATION: ICD-10-CM

## 2024-03-16 DIAGNOSIS — I34.0 NONRHEUMATIC MITRAL VALVE REGURGITATION: ICD-10-CM

## 2024-03-16 DIAGNOSIS — I95.9 HYPOTENSION, UNSPECIFIED HYPOTENSION TYPE: ICD-10-CM

## 2024-03-16 PROBLEM — R79.89 ELEVATED TROPONIN: Status: ACTIVE | Noted: 2024-03-16

## 2024-03-16 PROBLEM — I50.23 ACUTE ON CHRONIC HFREF (HEART FAILURE WITH REDUCED EJECTION FRACTION) (HCC): Status: ACTIVE | Noted: 2024-03-16

## 2024-03-16 PROBLEM — R54 AGE-RELATED PHYSICAL DEBILITY: Status: ACTIVE | Noted: 2024-03-16

## 2024-03-16 LAB
ALBUMIN SERPL BCP-MCNC: 3.7 G/DL (ref 3.2–4.9)
ALBUMIN/GLOB SERPL: 1.3 G/DL
ALP SERPL-CCNC: 85 U/L (ref 30–99)
ALT SERPL-CCNC: 11 U/L (ref 2–50)
ANION GAP SERPL CALC-SCNC: 13 MMOL/L (ref 7–16)
ANISOCYTOSIS BLD QL SMEAR: ABNORMAL
AST SERPL-CCNC: 26 U/L (ref 12–45)
BACTERIA UR CULT: ABNORMAL
BACTERIA UR CULT: ABNORMAL
BASOPHILS # BLD AUTO: 2.6 % (ref 0–1.8)
BASOPHILS # BLD: 0.18 K/UL (ref 0–0.12)
BILIRUB SERPL-MCNC: 0.5 MG/DL (ref 0.1–1.5)
BUN SERPL-MCNC: 21 MG/DL (ref 8–22)
CALCIUM ALBUM COR SERPL-MCNC: 9.1 MG/DL (ref 8.5–10.5)
CALCIUM SERPL-MCNC: 8.9 MG/DL (ref 8.5–10.5)
CHLORIDE SERPL-SCNC: 101 MMOL/L (ref 96–112)
CO2 SERPL-SCNC: 22 MMOL/L (ref 20–33)
CREAT SERPL-MCNC: 0.98 MG/DL (ref 0.5–1.4)
DACRYOCYTES BLD QL SMEAR: NORMAL
EKG IMPRESSION: NORMAL
EOSINOPHIL # BLD AUTO: 0.3 K/UL (ref 0–0.51)
EOSINOPHIL NFR BLD: 4.3 % (ref 0–6.9)
ERYTHROCYTE [DISTWIDTH] IN BLOOD BY AUTOMATED COUNT: 58.3 FL (ref 35.9–50)
GFR SERPLBLD CREATININE-BSD FMLA CKD-EPI: 56 ML/MIN/1.73 M 2
GLOBULIN SER CALC-MCNC: 2.9 G/DL (ref 1.9–3.5)
GLUCOSE SERPL-MCNC: 107 MG/DL (ref 65–99)
HCT VFR BLD AUTO: 34.7 % (ref 37–47)
HGB BLD-MCNC: 11.5 G/DL (ref 12–16)
HYPOCHROMIA BLD QL SMEAR: ABNORMAL
LYMPHOCYTES # BLD AUTO: 2.56 K/UL (ref 1–4.8)
LYMPHOCYTES NFR BLD: 36.5 % (ref 22–41)
MANUAL DIFF BLD: NORMAL
MCH RBC QN AUTO: 26.8 PG (ref 27–33)
MCHC RBC AUTO-ENTMCNC: 33.1 G/DL (ref 32.2–35.5)
MCV RBC AUTO: 80.9 FL (ref 81.4–97.8)
MICROCYTES BLD QL SMEAR: ABNORMAL
MONOCYTES # BLD AUTO: 0.67 K/UL (ref 0–0.85)
MONOCYTES NFR BLD AUTO: 9.6 % (ref 0–13.4)
MORPHOLOGY BLD-IMP: NORMAL
NEUTROPHILS # BLD AUTO: 3.29 K/UL (ref 1.82–7.42)
NEUTROPHILS NFR BLD: 47 % (ref 44–72)
NRBC # BLD AUTO: 0 K/UL
NRBC BLD-RTO: 0 /100 WBC (ref 0–0.2)
NT-PROBNP SERPL IA-MCNC: ABNORMAL PG/ML (ref 0–125)
OVALOCYTES BLD QL SMEAR: NORMAL
PLATELET # BLD AUTO: 302 K/UL (ref 164–446)
PLATELET BLD QL SMEAR: NORMAL
PMV BLD AUTO: 11 FL (ref 9–12.9)
POIKILOCYTOSIS BLD QL SMEAR: NORMAL
POTASSIUM SERPL-SCNC: 4 MMOL/L (ref 3.6–5.5)
PROT SERPL-MCNC: 6.6 G/DL (ref 6–8.2)
RBC # BLD AUTO: 4.29 M/UL (ref 4.2–5.4)
RBC BLD AUTO: PRESENT
SCHISTOCYTES BLD QL SMEAR: NORMAL
SIGNIFICANT IND 70042: ABNORMAL
SITE SITE: ABNORMAL
SODIUM SERPL-SCNC: 136 MMOL/L (ref 135–145)
SOURCE SOURCE: ABNORMAL
TROPONIN T SERPL-MCNC: 35 NG/L (ref 6–19)
TROPONIN T SERPL-MCNC: 36 NG/L (ref 6–19)
WBC # BLD AUTO: 7 K/UL (ref 4.8–10.8)

## 2024-03-16 PROCEDURE — 71045 X-RAY EXAM CHEST 1 VIEW: CPT

## 2024-03-16 PROCEDURE — 700102 HCHG RX REV CODE 250 W/ 637 OVERRIDE(OP): Mod: UD | Performed by: STUDENT IN AN ORGANIZED HEALTH CARE EDUCATION/TRAINING PROGRAM

## 2024-03-16 PROCEDURE — 84484 ASSAY OF TROPONIN QUANT: CPT

## 2024-03-16 PROCEDURE — 36415 COLL VENOUS BLD VENIPUNCTURE: CPT

## 2024-03-16 PROCEDURE — A9270 NON-COVERED ITEM OR SERVICE: HCPCS | Mod: UD | Performed by: STUDENT IN AN ORGANIZED HEALTH CARE EDUCATION/TRAINING PROGRAM

## 2024-03-16 PROCEDURE — 93005 ELECTROCARDIOGRAM TRACING: CPT | Performed by: STUDENT IN AN ORGANIZED HEALTH CARE EDUCATION/TRAINING PROGRAM

## 2024-03-16 PROCEDURE — 93005 ELECTROCARDIOGRAM TRACING: CPT

## 2024-03-16 PROCEDURE — 99497 ADVNCD CARE PLAN 30 MIN: CPT | Performed by: STUDENT IN AN ORGANIZED HEALTH CARE EDUCATION/TRAINING PROGRAM

## 2024-03-16 PROCEDURE — 99285 EMERGENCY DEPT VISIT HI MDM: CPT

## 2024-03-16 PROCEDURE — 85007 BL SMEAR W/DIFF WBC COUNT: CPT

## 2024-03-16 PROCEDURE — 700117 HCHG RX CONTRAST REV CODE 255: Mod: UD | Performed by: STUDENT IN AN ORGANIZED HEALTH CARE EDUCATION/TRAINING PROGRAM

## 2024-03-16 PROCEDURE — 80053 COMPREHEN METABOLIC PANEL: CPT

## 2024-03-16 PROCEDURE — 770020 HCHG ROOM/CARE - TELE (206)

## 2024-03-16 PROCEDURE — 99223 1ST HOSP IP/OBS HIGH 75: CPT | Mod: 25 | Performed by: STUDENT IN AN ORGANIZED HEALTH CARE EDUCATION/TRAINING PROGRAM

## 2024-03-16 PROCEDURE — 83880 ASSAY OF NATRIURETIC PEPTIDE: CPT

## 2024-03-16 PROCEDURE — 85027 COMPLETE CBC AUTOMATED: CPT

## 2024-03-16 PROCEDURE — 74177 CT ABD & PELVIS W/CONTRAST: CPT

## 2024-03-16 RX ORDER — ACETAMINOPHEN 325 MG/1
650 TABLET ORAL EVERY 6 HOURS PRN
Status: DISCONTINUED | OUTPATIENT
Start: 2024-03-16 | End: 2024-03-20 | Stop reason: HOSPADM

## 2024-03-16 RX ORDER — DOXYCYCLINE 100 MG/1
100 TABLET ORAL EVERY 12 HOURS
Status: DISCONTINUED | OUTPATIENT
Start: 2024-03-17 | End: 2024-03-18

## 2024-03-16 RX ORDER — METOPROLOL TARTRATE 1 MG/ML
5 INJECTION, SOLUTION INTRAVENOUS
Status: DISCONTINUED | OUTPATIENT
Start: 2024-03-16 | End: 2024-03-16

## 2024-03-16 RX ORDER — DIGOXIN 0.25 MG/ML
125 INJECTION INTRAMUSCULAR; INTRAVENOUS EVERY 6 HOURS
Status: COMPLETED | OUTPATIENT
Start: 2024-03-17 | End: 2024-03-17

## 2024-03-16 RX ORDER — ACETAMINOPHEN 500 MG
1000 TABLET ORAL ONCE
Status: COMPLETED | OUTPATIENT
Start: 2024-03-16 | End: 2024-03-16

## 2024-03-16 RX ORDER — CLOPIDOGREL BISULFATE 75 MG/1
75 TABLET ORAL DAILY
Status: DISCONTINUED | OUTPATIENT
Start: 2024-03-17 | End: 2024-03-20

## 2024-03-16 RX ORDER — OXYCODONE HYDROCHLORIDE 5 MG/1
5 TABLET ORAL EVERY 6 HOURS PRN
Status: DISCONTINUED | OUTPATIENT
Start: 2024-03-16 | End: 2024-03-20 | Stop reason: HOSPADM

## 2024-03-16 RX ORDER — MAGNESIUM SULFATE HEPTAHYDRATE 40 MG/ML
2 INJECTION, SOLUTION INTRAVENOUS ONCE
Status: COMPLETED | OUTPATIENT
Start: 2024-03-16 | End: 2024-03-17

## 2024-03-16 RX ORDER — ONDANSETRON 2 MG/ML
4 INJECTION INTRAMUSCULAR; INTRAVENOUS EVERY 4 HOURS PRN
Status: DISCONTINUED | OUTPATIENT
Start: 2024-03-16 | End: 2024-03-20 | Stop reason: HOSPADM

## 2024-03-16 RX ORDER — ONDANSETRON 4 MG/1
4 TABLET, ORALLY DISINTEGRATING ORAL EVERY 4 HOURS PRN
Status: DISCONTINUED | OUTPATIENT
Start: 2024-03-16 | End: 2024-03-20 | Stop reason: HOSPADM

## 2024-03-16 RX ORDER — ASPIRIN 325 MG
325 TABLET ORAL ONCE
Status: DISCONTINUED | OUTPATIENT
Start: 2024-03-16 | End: 2024-03-16

## 2024-03-16 RX ORDER — ATORVASTATIN CALCIUM 20 MG/1
20 TABLET, FILM COATED ORAL NIGHTLY
Status: DISCONTINUED | OUTPATIENT
Start: 2024-03-16 | End: 2024-03-20 | Stop reason: HOSPADM

## 2024-03-16 RX ORDER — AMOXICILLIN 250 MG
2 CAPSULE ORAL EVERY EVENING
Status: DISCONTINUED | OUTPATIENT
Start: 2024-03-17 | End: 2024-03-20 | Stop reason: HOSPADM

## 2024-03-16 RX ORDER — DULOXETIN HYDROCHLORIDE 60 MG/1
60 CAPSULE, DELAYED RELEASE ORAL EVERY MORNING
Status: DISCONTINUED | OUTPATIENT
Start: 2024-03-17 | End: 2024-03-20 | Stop reason: HOSPADM

## 2024-03-16 RX ORDER — OMEPRAZOLE 20 MG/1
20 CAPSULE, DELAYED RELEASE ORAL 2 TIMES DAILY
Status: DISCONTINUED | OUTPATIENT
Start: 2024-03-16 | End: 2024-03-20 | Stop reason: HOSPADM

## 2024-03-16 RX ORDER — FUROSEMIDE 10 MG/ML
40 INJECTION INTRAMUSCULAR; INTRAVENOUS ONCE
Status: COMPLETED | OUTPATIENT
Start: 2024-03-16 | End: 2024-03-17

## 2024-03-16 RX ORDER — DIGOXIN 125 MCG
125 TABLET ORAL EVERY MORNING
Status: DISCONTINUED | OUTPATIENT
Start: 2024-03-18 | End: 2024-03-18

## 2024-03-16 RX ORDER — LABETALOL HYDROCHLORIDE 5 MG/ML
10 INJECTION, SOLUTION INTRAVENOUS EVERY 4 HOURS PRN
Status: DISCONTINUED | OUTPATIENT
Start: 2024-03-16 | End: 2024-03-20 | Stop reason: HOSPADM

## 2024-03-16 RX ORDER — METOPROLOL TARTRATE 1 MG/ML
5 INJECTION, SOLUTION INTRAVENOUS
Status: DISCONTINUED | OUTPATIENT
Start: 2024-03-16 | End: 2024-03-20 | Stop reason: HOSPADM

## 2024-03-16 RX ORDER — POLYETHYLENE GLYCOL 3350 17 G/17G
1 POWDER, FOR SOLUTION ORAL
Status: DISCONTINUED | OUTPATIENT
Start: 2024-03-16 | End: 2024-03-20 | Stop reason: HOSPADM

## 2024-03-16 RX ORDER — DIGOXIN 0.25 MG/ML
125 INJECTION INTRAMUSCULAR; INTRAVENOUS EVERY 6 HOURS
Status: COMPLETED | OUTPATIENT
Start: 2024-03-16 | End: 2024-03-17

## 2024-03-16 RX ORDER — POTASSIUM CHLORIDE 20 MEQ/1
40 TABLET, EXTENDED RELEASE ORAL ONCE
Status: COMPLETED | OUTPATIENT
Start: 2024-03-16 | End: 2024-03-17

## 2024-03-16 RX ORDER — FUROSEMIDE 10 MG/ML
20 INJECTION INTRAMUSCULAR; INTRAVENOUS
Status: DISCONTINUED | OUTPATIENT
Start: 2024-03-17 | End: 2024-03-19

## 2024-03-16 RX ORDER — GABAPENTIN 300 MG/1
600 CAPSULE ORAL 2 TIMES DAILY
Status: DISCONTINUED | OUTPATIENT
Start: 2024-03-17 | End: 2024-03-20 | Stop reason: HOSPADM

## 2024-03-16 RX ORDER — LEVOTHYROXINE SODIUM 0.12 MG/1
125 TABLET ORAL
Status: DISCONTINUED | OUTPATIENT
Start: 2024-03-17 | End: 2024-03-20 | Stop reason: HOSPADM

## 2024-03-16 RX ORDER — ENOXAPARIN SODIUM 100 MG/ML
1 INJECTION SUBCUTANEOUS EVERY 12 HOURS
Status: DISCONTINUED | OUTPATIENT
Start: 2024-03-16 | End: 2024-03-20

## 2024-03-16 RX ADMIN — ACETAMINOPHEN 1000 MG: 500 TABLET, FILM COATED ORAL at 21:38

## 2024-03-16 RX ADMIN — IOHEXOL 90 ML: 350 INJECTION, SOLUTION INTRAVENOUS at 21:30

## 2024-03-16 ASSESSMENT — FIBROSIS 4 INDEX
FIB4 SCORE: 2.26
FIB4 SCORE: 1.56
FIB4 SCORE: 2.26

## 2024-03-16 ASSESSMENT — COGNITIVE AND FUNCTIONAL STATUS - GENERAL
DAILY ACTIVITIY SCORE: 20
SUGGESTED CMS G CODE MODIFIER DAILY ACTIVITY: CJ
DRESSING REGULAR UPPER BODY CLOTHING: A LITTLE
CLIMB 3 TO 5 STEPS WITH RAILING: A LITTLE
WALKING IN HOSPITAL ROOM: A LITTLE
DRESSING REGULAR LOWER BODY CLOTHING: A LITTLE
TOILETING: A LITTLE
MOBILITY SCORE: 22
HELP NEEDED FOR BATHING: A LITTLE
SUGGESTED CMS G CODE MODIFIER MOBILITY: CJ

## 2024-03-16 ASSESSMENT — LIFESTYLE VARIABLES
TOTAL SCORE: 0
HAVE YOU EVER FELT YOU SHOULD CUT DOWN ON YOUR DRINKING: NO
CONSUMPTION TOTAL: NEGATIVE
SUBSTANCE_ABUSE: 0
EVER FELT BAD OR GUILTY ABOUT YOUR DRINKING: NO
TOTAL SCORE: 0
ON A TYPICAL DAY WHEN YOU DRINK ALCOHOL HOW MANY DRINKS DO YOU HAVE: 0
TOTAL SCORE: 0
DOES PATIENT WANT TO STOP DRINKING: NO
ALCOHOL_USE: NO
EVER HAD A DRINK FIRST THING IN THE MORNING TO STEADY YOUR NERVES TO GET RID OF A HANGOVER: NO
AVERAGE NUMBER OF DAYS PER WEEK YOU HAVE A DRINK CONTAINING ALCOHOL: 0
HAVE PEOPLE ANNOYED YOU BY CRITICIZING YOUR DRINKING: NO
HOW MANY TIMES IN THE PAST YEAR HAVE YOU HAD 5 OR MORE DRINKS IN A DAY: 0

## 2024-03-16 ASSESSMENT — ENCOUNTER SYMPTOMS
ABDOMINAL PAIN: 0
BRUISES/BLEEDS EASILY: 0
PALPITATIONS: 1
NAUSEA: 0
DEPRESSION: 0
HEARTBURN: 1
VOMITING: 0
FEVER: 0
DIZZINESS: 0
BLURRED VISION: 0
SHORTNESS OF BREATH: 1
CHILLS: 0
HEADACHES: 0
MYALGIAS: 0
DOUBLE VISION: 0
COUGH: 1
WEAKNESS: 1

## 2024-03-16 ASSESSMENT — PAIN DESCRIPTION - PAIN TYPE
TYPE: ACUTE PAIN
TYPE: ACUTE PAIN;CHRONIC PAIN

## 2024-03-16 ASSESSMENT — PATIENT HEALTH QUESTIONNAIRE - PHQ9
1. LITTLE INTEREST OR PLEASURE IN DOING THINGS: NOT AT ALL
SUM OF ALL RESPONSES TO PHQ9 QUESTIONS 1 AND 2: 0
2. FEELING DOWN, DEPRESSED, IRRITABLE, OR HOPELESS: NOT AT ALL

## 2024-03-17 ENCOUNTER — APPOINTMENT (OUTPATIENT)
Dept: CARDIOLOGY | Facility: MEDICAL CENTER | Age: 89
DRG: 308 | End: 2024-03-17
Attending: STUDENT IN AN ORGANIZED HEALTH CARE EDUCATION/TRAINING PROGRAM
Payer: MEDICARE

## 2024-03-17 LAB
ALBUMIN SERPL BCP-MCNC: 3.6 G/DL (ref 3.2–4.9)
ALBUMIN/GLOB SERPL: 1.1 G/DL
ALP SERPL-CCNC: 101 U/L (ref 30–99)
ALT SERPL-CCNC: 41 U/L (ref 2–50)
ANION GAP SERPL CALC-SCNC: 13 MMOL/L (ref 7–16)
APPEARANCE UR: CLEAR
AST SERPL-CCNC: 78 U/L (ref 12–45)
BASOPHILS # BLD AUTO: 1 % (ref 0–1.8)
BASOPHILS # BLD: 0.06 K/UL (ref 0–0.12)
BILIRUB SERPL-MCNC: 0.6 MG/DL (ref 0.1–1.5)
BILIRUB UR QL STRIP.AUTO: NEGATIVE
BUN SERPL-MCNC: 22 MG/DL (ref 8–22)
CALCIUM ALBUM COR SERPL-MCNC: 9.2 MG/DL (ref 8.5–10.5)
CALCIUM SERPL-MCNC: 8.9 MG/DL (ref 8.5–10.5)
CHLORIDE SERPL-SCNC: 100 MMOL/L (ref 96–112)
CO2 SERPL-SCNC: 22 MMOL/L (ref 20–33)
COLOR UR: YELLOW
CORTIS SERPL-MCNC: 108 UG/DL (ref 0–23)
CREAT SERPL-MCNC: 1.08 MG/DL (ref 0.5–1.4)
CRP SERPL HS-MCNC: 0.98 MG/DL (ref 0–0.75)
EKG IMPRESSION: NORMAL
EOSINOPHIL # BLD AUTO: 0.08 K/UL (ref 0–0.51)
EOSINOPHIL NFR BLD: 1.3 % (ref 0–6.9)
ERYTHROCYTE [DISTWIDTH] IN BLOOD BY AUTOMATED COUNT: 59.7 FL (ref 35.9–50)
ERYTHROCYTE [SEDIMENTATION RATE] IN BLOOD BY WESTERGREN METHOD: 15 MM/HOUR (ref 0–25)
GFR SERPLBLD CREATININE-BSD FMLA CKD-EPI: 50 ML/MIN/1.73 M 2
GLOBULIN SER CALC-MCNC: 3.4 G/DL (ref 1.9–3.5)
GLUCOSE SERPL-MCNC: 123 MG/DL (ref 65–99)
GLUCOSE UR STRIP.AUTO-MCNC: NEGATIVE MG/DL
HCT VFR BLD AUTO: 40 % (ref 37–47)
HGB BLD-MCNC: 12.4 G/DL (ref 12–16)
IMM GRANULOCYTES # BLD AUTO: 0.04 K/UL (ref 0–0.11)
IMM GRANULOCYTES NFR BLD AUTO: 0.7 % (ref 0–0.9)
KETONES UR STRIP.AUTO-MCNC: NEGATIVE MG/DL
LACTATE SERPL-SCNC: 2.4 MMOL/L (ref 0.5–2)
LEUKOCYTE ESTERASE UR QL STRIP.AUTO: NEGATIVE
LYMPHOCYTES # BLD AUTO: 1.09 K/UL (ref 1–4.8)
LYMPHOCYTES NFR BLD: 18.2 % (ref 22–41)
MAGNESIUM SERPL-MCNC: 2.7 MG/DL (ref 1.5–2.5)
MCH RBC QN AUTO: 26 PG (ref 27–33)
MCHC RBC AUTO-ENTMCNC: 31 G/DL (ref 32.2–35.5)
MCV RBC AUTO: 83.9 FL (ref 81.4–97.8)
MICRO URNS: ABNORMAL
MONOCYTES # BLD AUTO: 0.32 K/UL (ref 0–0.85)
MONOCYTES NFR BLD AUTO: 5.3 % (ref 0–13.4)
NEUTROPHILS # BLD AUTO: 4.41 K/UL (ref 1.82–7.42)
NEUTROPHILS NFR BLD: 73.5 % (ref 44–72)
NITRITE UR QL STRIP.AUTO: NEGATIVE
NRBC # BLD AUTO: 0 K/UL
NRBC BLD-RTO: 0 /100 WBC (ref 0–0.2)
PH UR STRIP.AUTO: 5.5 [PH] (ref 5–8)
PHOSPHATE SERPL-MCNC: 3.8 MG/DL (ref 2.5–4.5)
PLATELET # BLD AUTO: 303 K/UL (ref 164–446)
PMV BLD AUTO: 10.6 FL (ref 9–12.9)
POTASSIUM SERPL-SCNC: 4.4 MMOL/L (ref 3.6–5.5)
PROCALCITONIN SERPL-MCNC: 0.07 NG/ML
PROT SERPL-MCNC: 7 G/DL (ref 6–8.2)
PROT UR QL STRIP: NEGATIVE MG/DL
RBC # BLD AUTO: 4.77 M/UL (ref 4.2–5.4)
RBC UR QL AUTO: NEGATIVE
SCCMEC + MECA PNL NOSE NAA+PROBE: NEGATIVE
SODIUM SERPL-SCNC: 135 MMOL/L (ref 135–145)
SP GR UR STRIP.AUTO: >=1.045
TROPONIN T SERPL-MCNC: 31 NG/L (ref 6–19)
UROBILINOGEN UR STRIP.AUTO-MCNC: 0.2 MG/DL
WBC # BLD AUTO: 6 K/UL (ref 4.8–10.8)

## 2024-03-17 PROCEDURE — 87641 MR-STAPH DNA AMP PROBE: CPT

## 2024-03-17 PROCEDURE — 99222 1ST HOSP IP/OBS MODERATE 55: CPT | Performed by: INTERNAL MEDICINE

## 2024-03-17 PROCEDURE — A9270 NON-COVERED ITEM OR SERVICE: HCPCS | Performed by: STUDENT IN AN ORGANIZED HEALTH CARE EDUCATION/TRAINING PROGRAM

## 2024-03-17 PROCEDURE — 97166 OT EVAL MOD COMPLEX 45 MIN: CPT

## 2024-03-17 PROCEDURE — 93005 ELECTROCARDIOGRAM TRACING: CPT | Performed by: STUDENT IN AN ORGANIZED HEALTH CARE EDUCATION/TRAINING PROGRAM

## 2024-03-17 PROCEDURE — 83735 ASSAY OF MAGNESIUM: CPT

## 2024-03-17 PROCEDURE — 92610 EVALUATE SWALLOWING FUNCTION: CPT

## 2024-03-17 PROCEDURE — 81003 URINALYSIS AUTO W/O SCOPE: CPT

## 2024-03-17 PROCEDURE — 93010 ELECTROCARDIOGRAM REPORT: CPT | Performed by: INTERNAL MEDICINE

## 2024-03-17 PROCEDURE — 84484 ASSAY OF TROPONIN QUANT: CPT

## 2024-03-17 PROCEDURE — 770020 HCHG ROOM/CARE - TELE (206)

## 2024-03-17 PROCEDURE — 87086 URINE CULTURE/COLONY COUNT: CPT

## 2024-03-17 PROCEDURE — 80053 COMPREHEN METABOLIC PANEL: CPT

## 2024-03-17 PROCEDURE — 84100 ASSAY OF PHOSPHORUS: CPT

## 2024-03-17 PROCEDURE — A9270 NON-COVERED ITEM OR SERVICE: HCPCS | Performed by: INTERNAL MEDICINE

## 2024-03-17 PROCEDURE — 700111 HCHG RX REV CODE 636 W/ 250 OVERRIDE (IP): Performed by: STUDENT IN AN ORGANIZED HEALTH CARE EDUCATION/TRAINING PROGRAM

## 2024-03-17 PROCEDURE — 700111 HCHG RX REV CODE 636 W/ 250 OVERRIDE (IP): Performed by: INTERNAL MEDICINE

## 2024-03-17 PROCEDURE — 83605 ASSAY OF LACTIC ACID: CPT

## 2024-03-17 PROCEDURE — 700117 HCHG RX CONTRAST REV CODE 255: Performed by: STUDENT IN AN ORGANIZED HEALTH CARE EDUCATION/TRAINING PROGRAM

## 2024-03-17 PROCEDURE — 93308 TTE F-UP OR LMTD: CPT

## 2024-03-17 PROCEDURE — 700111 HCHG RX REV CODE 636 W/ 250 OVERRIDE (IP): Mod: JZ | Performed by: STUDENT IN AN ORGANIZED HEALTH CARE EDUCATION/TRAINING PROGRAM

## 2024-03-17 PROCEDURE — 700102 HCHG RX REV CODE 250 W/ 637 OVERRIDE(OP): Performed by: INTERNAL MEDICINE

## 2024-03-17 PROCEDURE — 700101 HCHG RX REV CODE 250: Performed by: STUDENT IN AN ORGANIZED HEALTH CARE EDUCATION/TRAINING PROGRAM

## 2024-03-17 PROCEDURE — 86140 C-REACTIVE PROTEIN: CPT

## 2024-03-17 PROCEDURE — 85025 COMPLETE CBC W/AUTO DIFF WBC: CPT

## 2024-03-17 PROCEDURE — 82533 TOTAL CORTISOL: CPT

## 2024-03-17 PROCEDURE — 99233 SBSQ HOSP IP/OBS HIGH 50: CPT | Performed by: INTERNAL MEDICINE

## 2024-03-17 PROCEDURE — 85652 RBC SED RATE AUTOMATED: CPT

## 2024-03-17 PROCEDURE — 36415 COLL VENOUS BLD VENIPUNCTURE: CPT

## 2024-03-17 PROCEDURE — 84145 PROCALCITONIN (PCT): CPT

## 2024-03-17 PROCEDURE — 700102 HCHG RX REV CODE 250 W/ 637 OVERRIDE(OP): Performed by: STUDENT IN AN ORGANIZED HEALTH CARE EDUCATION/TRAINING PROGRAM

## 2024-03-17 RX ORDER — MIDODRINE HYDROCHLORIDE 5 MG/1
5 TABLET ORAL
Status: DISCONTINUED | OUTPATIENT
Start: 2024-03-17 | End: 2024-03-19

## 2024-03-17 RX ORDER — METOPROLOL SUCCINATE 25 MG/1
12.5 TABLET, EXTENDED RELEASE ORAL EVERY EVENING
Status: DISCONTINUED | OUTPATIENT
Start: 2024-03-17 | End: 2024-03-17

## 2024-03-17 RX ORDER — AMOXICILLIN AND CLAVULANATE POTASSIUM 875; 125 MG/1; MG/1
1 TABLET, FILM COATED ORAL ONCE
Status: COMPLETED | OUTPATIENT
Start: 2024-03-17 | End: 2024-03-17

## 2024-03-17 RX ADMIN — OMEPRAZOLE 20 MG: 20 CAPSULE, DELAYED RELEASE ORAL at 18:02

## 2024-03-17 RX ADMIN — METOPROLOL TARTRATE 12.5 MG: 25 TABLET, FILM COATED ORAL at 15:13

## 2024-03-17 RX ADMIN — ATORVASTATIN CALCIUM 20 MG: 20 TABLET, FILM COATED ORAL at 20:08

## 2024-03-17 RX ADMIN — HUMAN ALBUMIN MICROSPHERES AND PERFLUTREN 3 ML: 10; .22 INJECTION, SOLUTION INTRAVENOUS at 19:15

## 2024-03-17 RX ADMIN — GABAPENTIN 600 MG: 300 CAPSULE ORAL at 18:02

## 2024-03-17 RX ADMIN — OMEPRAZOLE 20 MG: 20 CAPSULE, DELAYED RELEASE ORAL at 00:10

## 2024-03-17 RX ADMIN — ENOXAPARIN SODIUM 60 MG: 100 INJECTION SUBCUTANEOUS at 00:10

## 2024-03-17 RX ADMIN — GABAPENTIN 600 MG: 300 CAPSULE ORAL at 05:14

## 2024-03-17 RX ADMIN — ENOXAPARIN SODIUM 60 MG: 100 INJECTION SUBCUTANEOUS at 18:03

## 2024-03-17 RX ADMIN — DIGOXIN 125 MCG: 250 INJECTION, SOLUTION INTRAMUSCULAR; INTRAVENOUS; PARENTERAL at 05:20

## 2024-03-17 RX ADMIN — HYDROCORTISONE SODIUM SUCCINATE 100 MG: 100 INJECTION, POWDER, FOR SOLUTION INTRAMUSCULAR; INTRAVENOUS at 05:14

## 2024-03-17 RX ADMIN — AMOXICILLIN AND CLAVULANATE POTASSIUM 1 TABLET: 875; 125 TABLET, FILM COATED ORAL at 13:39

## 2024-03-17 RX ADMIN — HYDROCORTISONE SODIUM SUCCINATE 100 MG: 100 INJECTION, POWDER, FOR SOLUTION INTRAMUSCULAR; INTRAVENOUS at 00:10

## 2024-03-17 RX ADMIN — FUROSEMIDE 40 MG: 10 INJECTION INTRAMUSCULAR; INTRAVENOUS at 00:09

## 2024-03-17 RX ADMIN — CLOPIDOGREL BISULFATE 75 MG: 75 TABLET ORAL at 05:14

## 2024-03-17 RX ADMIN — METOPROLOL TARTRATE 12.5 MG: 25 TABLET, FILM COATED ORAL at 20:08

## 2024-03-17 RX ADMIN — ENOXAPARIN SODIUM 60 MG: 100 INJECTION SUBCUTANEOUS at 05:13

## 2024-03-17 RX ADMIN — FUROSEMIDE 20 MG: 10 INJECTION INTRAMUSCULAR; INTRAVENOUS at 16:29

## 2024-03-17 RX ADMIN — DOCUSATE SODIUM 50 MG AND SENNOSIDES 8.6 MG 2 TABLET: 8.6; 5 TABLET, FILM COATED ORAL at 18:02

## 2024-03-17 RX ADMIN — MIDODRINE HYDROCHLORIDE 5 MG: 5 TABLET ORAL at 12:00

## 2024-03-17 RX ADMIN — OXYCODONE 5 MG: 5 TABLET ORAL at 03:33

## 2024-03-17 RX ADMIN — ACETAMINOPHEN 650 MG: 325 TABLET, FILM COATED ORAL at 05:13

## 2024-03-17 RX ADMIN — ATORVASTATIN CALCIUM 20 MG: 20 TABLET, FILM COATED ORAL at 00:10

## 2024-03-17 RX ADMIN — DOXYCYCLINE 100 MG: 100 TABLET, FILM COATED ORAL at 18:02

## 2024-03-17 RX ADMIN — OMEPRAZOLE 20 MG: 20 CAPSULE, DELAYED RELEASE ORAL at 05:14

## 2024-03-17 RX ADMIN — DOXYCYCLINE 100 MG: 100 TABLET, FILM COATED ORAL at 05:13

## 2024-03-17 RX ADMIN — MIDODRINE HYDROCHLORIDE 5 MG: 5 TABLET ORAL at 18:03

## 2024-03-17 RX ADMIN — DIGOXIN 125 MCG: 0.25 INJECTION INTRAMUSCULAR; INTRAVENOUS at 11:26

## 2024-03-17 RX ADMIN — CEFTRIAXONE SODIUM 2000 MG: 10 INJECTION, POWDER, FOR SOLUTION INTRAVENOUS at 09:02

## 2024-03-17 RX ADMIN — DIGOXIN 125 MCG: 0.25 INJECTION INTRAMUSCULAR; INTRAVENOUS at 00:14

## 2024-03-17 RX ADMIN — POTASSIUM CHLORIDE 40 MEQ: 1500 TABLET, EXTENDED RELEASE ORAL at 00:10

## 2024-03-17 RX ADMIN — DULOXETINE HYDROCHLORIDE 60 MG: 60 CAPSULE, DELAYED RELEASE ORAL at 05:14

## 2024-03-17 RX ADMIN — LEVOTHYROXINE SODIUM 125 MCG: 0.12 TABLET ORAL at 05:14

## 2024-03-17 RX ADMIN — MAGNESIUM SULFATE HEPTAHYDRATE 2 G: 2 INJECTION, SOLUTION INTRAVENOUS at 00:05

## 2024-03-17 ASSESSMENT — COGNITIVE AND FUNCTIONAL STATUS - GENERAL
DRESSING REGULAR LOWER BODY CLOTHING: A LITTLE
TOILETING: A LITTLE
HELP NEEDED FOR BATHING: A LITTLE
SUGGESTED CMS G CODE MODIFIER DAILY ACTIVITY: CJ
DRESSING REGULAR UPPER BODY CLOTHING: A LITTLE
DAILY ACTIVITIY SCORE: 20

## 2024-03-17 ASSESSMENT — ENCOUNTER SYMPTOMS
DIAPHORESIS: 0
DIZZINESS: 1
RESPIRATORY NEGATIVE: 1
HEMATOCHEZIA: 0
PALPITATIONS: 0
WHEEZING: 0
FEVER: 0
HEMOPTYSIS: 0
COUGH: 0
SHORTNESS OF BREATH: 1

## 2024-03-17 ASSESSMENT — PAIN DESCRIPTION - PAIN TYPE
TYPE: ACUTE PAIN
TYPE: ACUTE PAIN

## 2024-03-17 ASSESSMENT — FIBROSIS 4 INDEX: FIB4 SCORE: 2.25

## 2024-03-17 ASSESSMENT — ACTIVITIES OF DAILY LIVING (ADL): TOILETING: INDEPENDENT

## 2024-03-17 ASSESSMENT — COPD QUESTIONNAIRES
DO YOU EVER COUGH UP ANY MUCUS OR PHLEGM?: NO/ONLY WITH OCCASIONAL COLDS OR INFECTIONS
HAVE YOU SMOKED AT LEAST 100 CIGARETTES IN YOUR ENTIRE LIFE: YES
DURING THE PAST 4 WEEKS HOW MUCH DID YOU FEEL SHORT OF BREATH: NONE/LITTLE OF THE TIME
COPD SCREENING SCORE: 4

## 2024-03-17 NOTE — ASSESSMENT & PLAN NOTE
3/18/2024  ?  New onset  Maintain potassium above 4, magnesium above 2  Treat underlying CHF exacerbation    Borderline hypotension  --Load with IV digoxin  --Continue metoprolol  Therapeutic Lovenox  Vitals:    03/19/24 1509   BP: 108/52   Pulse: 60   Resp: 16   Temp: 36.4 °C (97.5 °F)   SpO2: 94%     HR stable    Status post cardioversion.  Cards following.  Switch from Digoxin to Amio

## 2024-03-17 NOTE — ASSESSMENT & PLAN NOTE
3/18/2024  On 3 to 4 L-wean off as tolerated  Multifactorial: CHF exacerbation, A-fib with RVR, pneumonia, cardiogenic shock  Diuresis  Pulmonary hygiene  Procalcitonin negative.  DC antibiotics.  Patient did tolerated Augmentin despite reported PCN allergy  O2 eval

## 2024-03-17 NOTE — THERAPY
Occupational Therapy   Initial Evaluation     Patient Name: Davie Montejo  Age:  89 y.o., Sex:  female  Medical Record #: [id number]  Today's Date: 3/17/2024     Precautions  Precautions: (P) Fall Risk, Swallow Precautions    Assessment    Patient is 89 y.o. female admitted for chest pain/palpitations, CHF exacerbation, afib with RVR. Pt had been at SNF prior to admission, prior to that she was independent living in a 2nd floor apartment alone. Pt able to complete functional mobility and ADLs with CGA and no AD (pt refused), pt had episode of chest discomfort post activity that resolved with rest ~1 min. Pt would benefit from continued skilled therapy while admitted as well as recommend post-acute placement, pt adamant about discharging home.     Plan    Occupational Therapy Initial Treatment Plan   Treatment Interventions: (P) Self Care / Activities of Daily Living, Adaptive Equipment, Manual Therapy Techniques, Neuro Re-Education / Balance, Therapeutic Exercises, Therapeutic Activity  Treatment Frequency: (P) 3 Times per Week  Duration: (P) Until Therapy Goals Met    DC Equipment Recommendations: (P) Unable to determine at this time  Discharge Recommendations: (P) Recommend post-acute placement for additional occupational therapy services prior to discharge home     Objective       03/17/24 1434   Prior Living Situation   Prior Services Continuous (24 Hour) Care Giving Per Service;Other (Comments)   Housing / Facility 2 Story Apartment / Condo   Rail Both Rail (Steps into Home)   Bathroom Set up Bathtub / Shower Combination   Equipment Owned None   Lives with - Patient's Self Care Capacity Alone and Able to Care For Self   Prior Level of ADL Function   Self Feeding Independent   Grooming / Hygiene Independent   Bathing Independent   Dressing Independent   Toileting Independent   Comments prior to feb admission   Prior Level of IADL Function   Medication Management Independent   Laundry Independent    Kitchen Mobility Independent   Finances Independent   Home Management Independent   Shopping Independent   Prior Level Of Mobility Independent Without Device in Home   History of Falls   History of Falls Yes   Precautions   Precautions Fall Risk;Swallow Precautions   Pain 0 - 10 Group   Therapist Pain Assessment Post Activity Pain Same as Prior to Activity;Nurse Notified   Cognition    Cognition / Consciousness X   Speech/ Communication Incomprehensible Words   Level of Consciousness Alert   Ability To Follow Commands 1 Step   Safety Awareness Impaired;Impulsive   New Learning Impaired   Active ROM Upper Body   Active ROM Upper Body  WDL   Strength Upper Body   Upper Body Strength  WDL   Sensation Upper Body   Upper Extremity Sensation  WDL   Upper Body Muscle Tone   Upper Body Muscle Tone  WDL   Coordination Upper Body   Coordination WDL   Balance Assessment   Sitting Balance (Static) Fair +   Sitting Balance (Dynamic) Fair   Standing Balance (Static) Fair -   Standing Balance (Dynamic) Poor +   Weight Shift Sitting Fair   Weight Shift Standing Fair   Comments w/ FWW   Bed Mobility    Supine to Sit Supervised   Scooting Supervised   Rolling Supervised   ADL Assessment   Grooming Contact Guard Assist;Standing   Upper Body Dressing Supervision   Lower Body Dressing Contact Guard Assist   Toileting Supervision   How much help from another person does the patient currently need...   Putting on and taking off regular lower body clothing? 3   Bathing (including washing, rinsing, and drying)? 3   Toileting, which includes using a toilet, bedpan, or urinal? 3   Putting on and taking off regular upper body clothing? 3   Taking care of personal grooming such as brushing teeth? 4   Eating meals? 4   6 Clicks Daily Activity Score 20   Functional Mobility   Sit to Stand Contact Guard Assist   Bed, Chair, Wheelchair Transfer Contact Guard Assist   Toilet Transfers Contact Guard Assist   Transfer Method Stand Step   Mobility  bed mobility, bathroom mobility, hallway, up to chair   Comments no AD   Activity Tolerance   Sitting in Chair left seated in chair   Standing 15 min total   Short Term Goals   Short Term Goal # 1 Pt will complete ADL transfers with supervision   Short Term Goal # 2 Pt will complete LB dressing with supervision   Short Term Goal # 3 Pt will complete standing G/H with supervision   Education Group   Education Provided Role of Occupational Therapist   Role of Occupational Therapist Patient Response Patient;Acceptance;Explanation;Demonstration;No Learning Evidence;Reinforcement Needed   Occupational Therapy Initial Treatment Plan    Treatment Interventions Self Care / Activities of Daily Living;Adaptive Equipment;Manual Therapy Techniques;Neuro Re-Education / Balance;Therapeutic Exercises;Therapeutic Activity   Treatment Frequency 3 Times per Week   Duration Until Therapy Goals Met   Problem List   Problem List Decreased Active Daily Living Skills;Decreased Homemaking Skills;Decreased Functional Mobility;Decreased Activity Tolerance;Safety Awareness Deficits / Cognition;Impaired Postural Control / Balance;Impaired Cognitive Function   Anticipated Discharge Equipment and Recommendations   DC Equipment Recommendations Unable to determine at this time   Discharge Recommendations Recommend post-acute placement for additional occupational therapy services prior to discharge home   Interdisciplinary Plan of Care Collaboration   IDT Collaboration with  Nursing   Patient Position at End of Therapy Seated;Chair Alarm On;Call Light within Reach;Tray Table within Reach;Phone within Reach   Collaboration Comments RN updated

## 2024-03-17 NOTE — ED TRIAGE NOTES
"Chief Complaint   Patient presents with    Chest Pain     Pt BIBA from [facility] for intermittent chest pain with palpitations that started last night and continued through this morning. Pt reports pain is now more of a \"discomfort.\" Pt hypotensive in route- 100 mL of NS given prior to arrival. Pt is on 4 L baseline oxygen.          BP (!) 89/62   Pulse (!) 143   Temp 36.1 °C (97 °F) (Temporal)   Resp 18   Ht 1.702 m (5' 7\")   Wt 63.5 kg (140 lb)   SpO2 99%     "

## 2024-03-17 NOTE — ASSESSMENT & PLAN NOTE
3/18/2024  Monitor  Possibly secondary to uncontrolled afib -- control  Continue midodrine  Resolved, off midodrine

## 2024-03-17 NOTE — ED PROVIDER NOTES
"ED Provider Note    CHIEF COMPLAINT  Chief Complaint   Patient presents with    Chest Pain     Pt BIBA from [facility] for intermittent chest pain with palpitations that started last night and continued through this morning. Pt reports pain is now more of a \"discomfort.\" Pt hypotensive in route- 100 mL of NS given prior to arrival. Pt is on 4 L baseline oxygen.        EXTERNAL RECORDS REVIEWED  Inpatient Notes Discharge summary on 3/13/2024, patient has a history of coronary artery disease, CABG, vascular dementia, hypertension and hyperlipidemia, heart failure with reduced ejection fraction of 20/25%    HPI/ROS  LIMITATION TO HISTORY   Select: : None  OUTSIDE HISTORIAN(S):  EMS transported the patient from [facility] for intermittent chest pain with palpitations    Davie Montejo is a 89 y.o. female who presents from a rehab facility [facility] after discharge from the hospital on 3/13/2024 for heart failure exacerbation.  Patient is on 4 L at baseline.  Patient was hypotensive and received 100 cc of IV fluid.  Patient has a severely depressed ejection fraction.  Patient endorses substernal dull chest pain radiating to the jaw.    PAST MEDICAL HISTORY   has a past medical history of Acute blood loss anemia (10/21/2021), Acute on chronic systolic heart failure (HCC) (8/24/2020), Acute perforated gastric ulcer with hemorrhage ([facility]) (10/21/2021), Anemia (2/14/2022), Angina decubitus (6/18/2010), CAD (coronary artery disease), Cannabis use, unspecified with intoxication, uncomplicated ([facility]) (2/14/2022), Fibromyalgia, Generalized abdominal pain (12/5/2021), Heart attack ([facility]) (2008), Hypotension due to medication (7/18/2022), Intractable nausea and vomiting (12/4/2021), and Pain in the chest (11/15/2020).    SURGICAL HISTORY   has a past surgical history that includes coronary artery bypas, 4 (2008) and coronary artery bypass, 1 (2008).    FAMILY HISTORY  History reviewed. No pertinent family history.    SOCIAL HISTORY  Social " "History     Tobacco Use    Smoking status: Former    Smokeless tobacco: Never   Vaping Use    Vaping Use: Never used   Substance and Sexual Activity    Alcohol use: Never    Drug use: Not Currently    Sexual activity: Not Currently       CURRENT MEDICATIONS  Home Medications       Reviewed by Jennifer Mtz R.N. (Registered Nurse) on 03/16/24 at 1823  Med List Status: Partial     Medication Last Dose Status   acetaminophen (TYLENOL) 325 MG Tab  Active   atorvastatin (LIPITOR) 10 MG Tab  Active   clopidogrel (PLAVIX) 75 MG Tab  Active   DULoxetine (CYMBALTA) 60 MG Cap DR Particles delayed-release capsule  Active   estradiol (ESTRACE) 1 MG Tab  Active   furosemide (LASIX) 20 MG Tab  Active   gabapentin (NEURONTIN) 600 MG tablet  Active   levothyroxine (SYNTHROID) 125 MCG Tab  Active   loratadine (CLARITIN) 10 MG Tab  Active   metoprolol SR (TOPROL XL) 25 MG TABLET SR 24 HR  Active   omeprazole (PRILOSEC) 20 MG delayed-release capsule  Active   oxyCODONE immediate-release (ROXICODONE) 5 MG Tab  Active   sucralfate (CARAFATE) 1 GM Tab  Active                    ALLERGIES  Allergies   Allergen Reactions    Latex Unspecified          Tramadol Rash     Itchy red in nature    Codeine Unspecified     Unknown reaction      Lisinopril Unspecified     Unknown reaction    Penicillin G Unspecified     Unknown reaction      Pregabalin Unspecified     Lyrica affects patients vision.    Simvastatin Unspecified     Unknown reaction      Sulfa Drugs Unspecified     Patient states \"I can't remember what this does to me\" but recalls and allergy.        PHYSICAL EXAM  VITAL SIGNS: BP 98/65   Pulse (!) 124   Temp 36.1 °C (97 °F) (Temporal)   Resp (!) 22   Ht 1.702 m (5' 7\")   Wt 63.5 kg (140 lb)   LMP  (LMP Unknown)   SpO2 99%   BMI 21.93 kg/m²    Vitals and nursing note reviewed.   Constitutional:       Comments: Patient is lying in bed supine, pleasant, conversant, speaking in complete sentences   HENT:      Head: " Normocephalic and atraumatic.   Eyes:      Extraocular Movements: Extraocular movements intact.      Conjunctiva/sclera: Conjunctivae normal.      Pupils: Pupils are equal, round, and reactive to light.   Cardiovascular:      Pulses: Normal pulses.      Comments:   Pulmonary:      Effort: Pulmonary effort is normal. No respiratory distress.   4 L nasal cannula  Abdominal:      Comments: Abdomen is soft, epigastric and diffuse upper abdominal tenderness to palpation  Musculoskeletal:         General: No swelling. Normal range of motion.      Cervical back: Normal range of motion. No rigidity.   Skin:     General: Skin is warm and dry.      Capillary Refill: Capillary refill takes less than 2 seconds.   Neurological:      Mental Status: Alert.       DIAGNOSTIC STUDIES / PROCEDURES  EKG  I have independently interpreted this EKG  Atrial fibrillation    LABS  Baseline anemia unchanged    RADIOLOGY  I have independently interpreted the diagnostic imaging associated with this visit and am waiting the final reading from the radiologist.   My preliminary interpretation is as follows: Pulmonary edema  Radiologist interpretation:   Possible slight decrease in small right pleural effusion. No other significant change in bilateral interstitial opacities, bibasilar atelectasis and small left pleural effusion.     COURSE & MEDICAL DECISION MAKING    INITIAL ASSESSMENT, COURSE AND PLAN  Care Narrative: Patient was discharged on 3/13/2024 to a rehab facility due to increased oxygen requirements.  Patient had 2 L nasal cannula baseline oxygen requirements prior to presentation and admission on 3/8/2024.  Patient now requires 4 L and so was discharged to rehab facility.  Patient complains abdominal pain, tenderness is present, CT abdomen pelvis pending.  I am concerned about acute coronary syndrome, troponin pending, no evidence of STEMI on EKG.  New onset atrial fibrillation identified at this time, unclear if there is some  driving factor, infection or otherwise causing the atrial fibrillation.  Will hold on rate controlling medication at this time.  CBC to evaluate for acute anemia and leukocytosis.  CMP to evaluate for acute electrolyte abnormality, acute kidney injury, acute liver failure or dysfunction.  Chest x-ray with persistent interstitial opacities, likely pulmonary edema.  Disposition pending workup.    Electronically signed by: Reynold Gaspar M.D., 3/16/2024 7:33 PM    Patient BNP high elevated, mildly elevated troponin, presentation consistent with CHF exacerbation.  IV furosemide has been ordered.  Metoprolol also ordered as well due to atrial fibrillation with RVR.  This is likely new onset.  Patient requires a conversation regarding anticoagulation as an inpatient.  H&H and pain from prior.  Patient admitted to hospital medicine service.    This dictation has been created using voice recognition software. I am continuously working with the software to minimize the number of voice recognition errors and I have made every attempt to manually correct the errors within my dictation. However errors  related to this voice recognition software may still exist and should be interpreted within the appropriate context.     Electronically signed by: Reynold Gaspar M.D., 3/16/2024 11:30 PM      CRITICAL CARE  The very real possibilty of a deterioration of this patient's condition required the highest level of my preparedness for sudden, emergent intervention.  I provided critical care services, which included medication orders, frequent reevaluations of the patient's condition and response to treatment, ordering and reviewing test results, and discussing the case with various consultants.  The critical care time associated with the care of the patient was 32 minutes. Review chart for interventions. This time is exclusive of any other billable procedures.     DISPOSITION AND DISCUSSIONS      Decision tools and  prescription drugs considered including, but not limited to: HEART Score 7 .    FINAL DIAGNOSIS  1. Acute on chronic congestive heart failure, unspecified heart failure type (HCC)    2. Atrial fibrillation with RVR (HCC)    3. Hypotension, unspecified hypotension type    4. Acute hypoxic respiratory failure (HCC)           Electronically signed by: Reynold Gaspar M.D., 3/16/2024 7:28 PM

## 2024-03-17 NOTE — H&P
"Hospital Medicine History & Physical Note    Date of Service  3/16/2024    Primary Care Physician  JEAN-PAUL Lew.    Consultants  None    Code Status  DNAR/DNI    Chief Complaint  Chief Complaint   Patient presents with    Chest Pain     Pt BIBA from [place] for intermittent chest pain with palpitations that started last night and continued through this morning. Pt reports pain is now more of a \"discomfort.\" Pt hypotensive in route- 100 mL of NS given prior to arrival. Pt is on 4 L baseline oxygen.        History of Presenting Illness  Davie Montejo is a 89 y.o. female from SNF with history of HFrEF 20%, CAD status post CABG 2008, vascular dementia, hypertension, hyperlipidemia, recent hospitalization for CHF exacerbation and just discharged 3/13/2020 for SNF who presented 3/16/2024 with palpitation, chest pain.  In ER, she was found to be in a state of A-fib with RVR, rate in 130s.  She received 100 cc NS bolus prior to ER arrival, on 4 L nasal cannula support.  She has notable elevated troponin T and proBNP of 15,000.  Admission requested by ERP for suspected CHF exacerbation and A-fib with RVR.  Admitted to medicine service for further evaluation and treatment.    I discussed the plan of care with patient, bedside RN, and pharmacy.    Review of Systems  Review of Systems   Constitutional:  Positive for malaise/fatigue. Negative for chills and fever.   HENT:  Positive for hearing loss. Negative for tinnitus.    Eyes:  Negative for blurred vision and double vision.   Respiratory:  Positive for cough and shortness of breath.    Cardiovascular:  Positive for chest pain and palpitations.   Gastrointestinal:  Positive for heartburn. Negative for abdominal pain, nausea and vomiting.   Genitourinary:  Negative for dysuria and urgency.   Musculoskeletal:  Negative for joint pain and myalgias.   Skin:  Negative for itching and rash.   Neurological:  Positive for weakness. Negative for dizziness and headaches. " "  Endo/Heme/Allergies:  Does not bruise/bleed easily.   Psychiatric/Behavioral:  Negative for depression and substance abuse.    All other systems reviewed and are negative.      Past Medical History   has a past medical history of Acute blood loss anemia (10/21/2021), Acute on chronic systolic heart failure (HCC) (8/24/2020), Acute perforated gastric ulcer with hemorrhage (HCC) (10/21/2021), Anemia (2/14/2022), Angina decubitus (6/18/2010), CAD (coronary artery disease), Cannabis use, unspecified with intoxication, uncomplicated (HCC) (2/14/2022), Fibromyalgia, Generalized abdominal pain (12/5/2021), Heart attack ([facility]) (2008), Hypotension due to medication (7/18/2022), Intractable nausea and vomiting (12/4/2021), and Pain in the chest (11/15/2020).    Surgical History   has a past surgical history that includes coronary artery bypas, 4 (2008) and coronary artery bypass, 1 (2008).     Family History  family history is not on file.   Family history reviewed with patient. There is no family history that is pertinent to the chief complaint.     Social History   reports that she has quit smoking. She has never used smokeless tobacco. She reports that she does not currently use drugs. She reports that she does not drink alcohol.    Allergies  Allergies   Allergen Reactions    Latex Unspecified          Tramadol Rash     Itchy red in nature    Codeine Unspecified     Unknown reaction      Lisinopril Unspecified     Unknown reaction    Penicillin G Unspecified     Unknown reaction      Pregabalin Unspecified     Lyrica affects patients vision.    Simvastatin Unspecified     Unknown reaction      Sulfa Drugs Unspecified     Patient states \"I can't remember what this does to me\" but recalls and allergy.        Medications  Prior to Admission Medications   Prescriptions Last Dose Informant Patient Reported? Taking?   DULoxetine (CYMBALTA) 60 MG Cap DR Particles delayed-release capsule  MAR from Other Facility Yes No   Sig: " Take 60 mg by mouth every morning.   acetaminophen (TYLENOL) 325 MG Tab  MAR from Other Facility Yes No   Sig: Take 650 mg by mouth every four hours as needed for Mild Pain.   atorvastatin (LIPITOR) 10 MG Tab   No No   Sig: TAKE 1 TABLET BY MOUTH EVERY EVENING. FOR CHOLESTEROL.   clopidogrel (PLAVIX) 75 MG Tab  MAR from Other Facility No No   Sig: TAKE 1 TABLET BY MOUTH EVERY DAY.   estradiol (ESTRACE) 1 MG Tab  MAR from Other Facility No No   Sig: TAKE 1 TABLET BY MOUTH EVERY DAY.   furosemide (LASIX) 20 MG Tab   No No   Sig: Take 1 Tablet by mouth every day.   gabapentin (NEURONTIN) 600 MG tablet  MAR from Other Facility No No   Sig: Take 1 Tablet by mouth 2 times a day.   levothyroxine (SYNTHROID) 125 MCG Tab  MAR from Other Facility No No   Sig: TAKE 1 TABLET BY MOUTH EVERY MORNING ON AN EMPTY STOMACH.   loratadine (CLARITIN) 10 MG Tab  MAR from Other Facility No No   Sig: Take 1 Tablet by mouth every day.   metoprolol SR (TOPROL XL) 25 MG TABLET SR 24 HR   No No   Sig: Take 0.5 Tablets by mouth every evening.   omeprazole (PRILOSEC) 20 MG delayed-release capsule  MAR from Other Facility No No   Sig: TAKE 1 CAPSULE BY MOUTH EVERY DAY.   oxyCODONE immediate-release (ROXICODONE) 5 MG Tab   No No   Sig: Take 1 Tablet by mouth every 6 hours as needed for Severe Pain for up to 5 days.   sucralfate (CARAFATE) 1 GM Tab  MAR from Other Facility Yes No   Sig: Take 1 g by mouth 2 times a day.      Facility-Administered Medications: None       Physical Exam  Temp:  [36.1 °C (97 °F)-36.2 °C (97.2 °F)] 36.2 °C (97.2 °F)  Pulse:  [124-143] 133  Resp:  [18-48] 19  BP: ()/(58-69) 89/59  SpO2:  [94 %-100 %] 100 %  Blood Pressure : 118/58   Temperature: 36.1 °C (97 °F)   Pulse: (!) 130   Respiration: 19   Pulse Oximetry: 94 %       Physical Exam  Vitals and nursing note reviewed.   Constitutional:       General: She is not in acute distress.     Appearance: She is ill-appearing.   HENT:      Head: Normocephalic and  atraumatic.      Ears:      Comments: Presbycusis     Nose: Nose normal.      Mouth/Throat:      Mouth: Mucous membranes are moist.      Pharynx: Oropharynx is clear.   Eyes:      General: No scleral icterus.     Extraocular Movements: Extraocular movements intact.   Cardiovascular:      Rate and Rhythm: Tachycardia present. Rhythm irregular.      Pulses: Normal pulses.      Heart sounds:      No friction rub.   Pulmonary:      Effort: No respiratory distress.      Breath sounds: No stridor. Rales present. No wheezing.      Comments: Crackles  Chest:      Chest wall: No tenderness.   Abdominal:      General: There is distension.      Tenderness: There is no abdominal tenderness. There is no guarding or rebound.   Musculoskeletal:         General: Normal range of motion.      Cervical back: Neck supple. No tenderness.      Comments: Trace LE edema   Skin:     General: Skin is warm.      Capillary Refill: Capillary refill takes less than 2 seconds.      Coloration: Skin is not jaundiced.   Neurological:      General: No focal deficit present.      Mental Status: She is alert and oriented to person, place, and time.         Laboratory:  Recent Labs     03/16/24  1835   WBC 7.0   RBC 4.29   HEMOGLOBIN 11.5*   HEMATOCRIT 34.7*   MCV 80.9*   MCH 26.8*   MCHC 33.1   RDW 58.3*   PLATELETCT 302   MPV 11.0     Recent Labs     03/16/24  1907   SODIUM 136   POTASSIUM 4.0   CHLORIDE 101   CO2 22   GLUCOSE 107*   BUN 21   CREATININE 0.98   CALCIUM 8.9     Recent Labs     03/16/24  1907   ALTSGPT 11   ASTSGOT 26   ALKPHOSPHAT 85   TBILIRUBIN 0.5   GLUCOSE 107*         Recent Labs     03/16/24  1907   NTPROBNP 59910*         Recent Labs     03/16/24  1907 03/16/24  2145   TROPONINT 36* 35*       Imaging:  CT-ABDOMEN-PELVIS WITH   Final Result      1.  No evidence of bowel obstruction or focal inflammatory change.      2.  Renal cortical thinning and multiple areas of cortical scarring.      3.  Sigmoid diverticulosis.      4.   Small amount of free fluid dependently within the pelvis.      5.  Stable bilateral pleural effusions and bibasilar atelectasis.      6.  Extensive atherosclerotic vascular calcification.      7.  Prior cholecystectomy.      8.  Fatty change of the liver.      DX-CHEST-PORTABLE (1 VIEW)   Final Result      Possible slight decrease in small right pleural effusion. No other significant change in bilateral interstitial opacities, bibasilar atelectasis and small left pleural effusion.      EC-ECHOCARDIOGRAM LTD W/O CONT    (Results Pending)             X-Ray:  I have personally reviewed the images and compared with prior images.  EKG:  I have personally reviewed the images and compared with prior images.    Assessment/Plan:  Justification for Admission Status  I anticipate this patient  will require at least 2 midnights hospitalization, therefore appropriate for inpatient status.      * Acute on chronic HFrEF (heart failure with reduced ejection fraction) ([facility])- (present on admission)  Assessment & Plan  EF 20 to 25% from prior admission  Was discharged on Entresto    Borderline hypotension, unable to tolerate optimize HF meds  Gentle diuresis as tolerated-Lasix 20 mg IV twice daily  Repeat echo    Atrial fibrillation with RVR ([facility])  Assessment & Plan  ?  New onset  Maintain potassium above 4, magnesium above 2  Treat underlying CHF exacerbation    Borderline hypotension  --Load with IV digoxin  --Continue metoprolol  Therapeutic Lovenox    Elevated troponin  Assessment & Plan  Probable demand ischemia in setting of CHF exacerbation and uncontrolled A-fib  Continue home Plavix, statin  Also on therapeutic Lovenox    Hypotension- (present on admission)  Assessment & Plan  Monitor  Possibly secondary to uncontrolled afib -- control  Possible shock  Stressed dose hydrocortisone    Acute respiratory failure with hypoxia ([facility])- (present on admission)  Assessment & Plan  On 3 to 4 L-wean off as tolerated  Multifactorial: CHF  exacerbation, A-fib with RVR, pneumonia, cardiogenic shock  Diuresis  Pulmonary hygiene  Antibiotic  Stress dose hydrocortisone    Age-related physical debility  Assessment & Plan  PT/OT    ACP (advance care planning)- (present on admission)  Assessment & Plan  Goal care discussed with patient in ER.  She stated she does not wish to have aggressive/heroic life-sustaining measures-no CPR/defibrillation/intubation or mechanical ventilation.  She is however agreeable for minimally invasive medical management as clinically warranted to treat for her CHF exacerbation atrial fibrillation with RVR.  Diagnosis, prognosis, questions or concerns.  DNR/DNI status confirmed.  ACP: 16 minutes        VTE prophylaxis: therapeutic anticoagulation with Lovenox

## 2024-03-17 NOTE — ED NOTES
Bedside report received from off going RN/tech: Nola ALLISON, assumed care of patient.  POC discussed with patient. Call light within reach, all needs addressed at this time.       Fall risk interventions in place: Move the patient closer to the nurse's station, Patient's personal possessions are with in their safe reach, Place socks on patient, Place fall risk sign on patient's door, Keep floor surfaces clean and dry, and Bed Alarm in use (all applicable per [place] Fall risk assessment)   Continuous monitoring: Cardiac Leads, Pulse Ox, or Blood Pressure  IVF/IV medications: Not Applicable   Oxygen: How many liters 4L  Bedside sitter: Not Applicable   Isolation: Not Applicable

## 2024-03-17 NOTE — ASSESSMENT & PLAN NOTE
3/18/2024  EF 20 to 25% from prior admission  Was discharged on Entresto    Borderline hypotension, unable to tolerate optimize HF meds  Gentle diuresis as tolerated-Lasix 20 mg IV twice daily. Switch to PO Lasix. Monitor K+, renal function and blood pressures.

## 2024-03-17 NOTE — THERAPY
"Speech Language Pathology   Clinical Swallow Evaluation     Patient Name: Davie Montejo  AGE:  89 y.o., SEX:  female  Medical Record #: [id number]  Date of Service: 3/17/2024      History of Present Illness  89 y/o F admit from [facility] 3/16 w/ CHF exacerbation and Afib w/ RVR.     PMHx: CABG, CAD, seizure disorder, fibromyalgia, peptic ulcer disease, dementia, alcohol abuse, polypharmacy     No previous ST notes, though it appears pt was seen by PT and OT during a hospitalization this past February.     CXR 3/16 : \"Possible slight decrease in small right pleural effusion. No other significant change in bilateral interstitial opacities, bibasilar atelectasis and small left pleural effusion\"    General Information:  Vitals  O2 Delivery Device: None - Room Air  Level of Consciousness: Alert, Awake  Patient Behaviors: Confused  Orientation: Self, General place  Follows Directives: Yes - simple commands only    Prior Living Situation & Level of Function:  Prior Services: Continuous (24 Hour) Care Giving Per Service, Other (Comments)  Housing / Facility: 2 Story Apartment / Condo  Comments: Pt admitted from [facility]; prior to Feb admit pt was residing alone in her 2nd story apt     Communication: pt w/ hx of dementia  Swallowing: Pt denies hx of dysphagia; no previous ST notes     Oral Mechanism Evaluation:  Dentition: Upper denture, Lower denture (Pt was unable to tolerate wearing her lower dentures d/t pain. ST to f/u on ordering denture adhesive)   Facial Symmetry: Equal  Facial Sensation: Equal     Labial Observations: WFL   Lingual Observations: Midline  Motor Speech: WNL       Laryngeal Function:  Secretion Management: Adequate  Voice Quality: WFL     Cough: Perceptually WNL     Subjective  Pt seen A&Ox2 sitting at the edge of bed saturating on room air. RN reporting that pt has no evidenced any difficulty w/ PO intake. Pt denying any symptoms of dysphagia    Assessment  Current Method of Nutrition: Oral " diet (regular solids, thin/all liquids)  Positioning: Sitting EOB  Bolus Administration: Patient    O2 Delivery Device: None - Room Air  Factor(s) Affecting Performance: None     Swallowing Trials:  Swallowing Trials  Thin Liquid (TN0): Impaired  Pureed (PU4): WFL  Soft & Bite Sized (SB6): WFL  Easy to Chew (EC7): Impaired    Comments: Pt self fed w/ appropriate rate and volume of intake. She donned her upper dentures for the exam, though was unable to tolerate wearing her lower dentures d/t gum pain. Dental adhesive order was placed by SLP; consider repeat trial using dentures w/ adhesive during subsequent session. Oral stage was most functional for semi solids w/ no gross oral residues; mastication remained mildly prolonged. Pt denied any globus sensation. With liquids trials, noted inconsistent dry throat clearing across the trials in the absence of any other overt s/s of aspiration (e.g., upper airway wetness, coughing). Unclear whether this was r/t PO intake.     Clinical Impressions  Signs of mild oral dysphagia in the context of deconditioning and impacted by gum pain + mentation; diet modification is indicated d/t same. Inconsistent throat clearing was noted at bedside today with thin liquids- in the absence of any other gross signs of dysphagia, it remains unclear if this is related to PO intake. Recommend further f/u and consideration of instrumentation if symptoms persist.     Recommendations  Diet Consistency: Thin/all Liquids, Soft & bites Solids  Instrumentation: None indicated at this time  Medication: As tolerated  Supervision: Assist with meal tray set up  Positioning: Fully upright and midline during oral intake, Meals sitting upright in a chair, as tolerated  Risk Management : Small bites/sips, Slow rate of intake  Oral Care: BID    SLP Treatment Plan  Treatment Plan: Dysphagia Treatment  SLP Frequency: 3x Per Week  Estimated Duration: Until Therapy Goals Met    Anticipated Discharge  "Needs  Discharge Recommendations: Recommend post-acute placement for additional speech therapy services prior to discharge home (Pt came from [facility])   Therapy Recommendations Upon DC: Dysphagia Training      Patient / Family Goals  Patient / Family Goal #1: \"I swallow just fine\"  Short Term Goals  Short Term Goal # 1: Pt will tolerate a soft & bites diet with thin/all liquids w/ no signs of aspiration related pulmonary complications    Melyssa Cordoba, SLP   "

## 2024-03-17 NOTE — ASSESSMENT & PLAN NOTE
Goal care discussed with patient in ER.  She stated she does not wish to have aggressive/heroic life-sustaining measures-no CPR/defibrillation/intubation or mechanical ventilation.  She is however agreeable for minimally invasive medical management as clinically warranted to treat for her CHF exacerbation atrial fibrillation with RVR.  Diagnosis, prognosis, questions or concerns.  DNR/DNI status confirmed.  ACP: 16 minutes

## 2024-03-17 NOTE — ASSESSMENT & PLAN NOTE
3/18/2024  Probable demand ischemia in setting of CHF exacerbation and uncontrolled A-fib  Continue home Plavix, statin  Also on therapeutic Lovenox

## 2024-03-17 NOTE — PROGRESS NOTES
4 Eyes Skin Assessment Completed by ESTEFANY Bucio and PARTH Kang.    Head WDL  Ears WDL  Nose WDL  Mouth WDL  Neck WDL  Breast/Chest WDL  Shoulder Blades WDL  Spine WDL  (R) Arm/Elbow/Hand WDL  (L) Arm/Elbow/Hand WDL  Abdomen WDL  Groin WDL  Scrotum/Coccyx/Buttocks Redness and Blanching  (R) Leg WDL  (L) Leg WDL  (R) Heel/Foot/Toe WDL  (L) Heel/Foot/Toe WDL          Devices In Places Tele Box, Blood Pressure Cuff, and Pulse Ox, NC      Interventions In Place Waffle Overlay    Possible Skin Injury No    Pictures Uploaded Into Epic N/A  Wound Consult Placed N/A  RN Wound Prevention Protocol Ordered No

## 2024-03-17 NOTE — CARE PLAN
The patient is Stable - Low risk of patient condition declining or worsening         Progress made toward(s) clinical / shift goals:  Receiving abx for pneumonia   Problem: Pain - Standard  Goal: Alleviation of pain or a reduction in pain to the patient’s comfort goal  Description: Target End Date:  Prior to discharge or change in level of care    Document on Vitals flowsheet    1.  Document pain using the appropriate pain scale per order or unit policy  2.  Educate and implement non-pharmacologic comfort measures (i.e. relaxation, distraction, massage, cold/heat therapy, etc.)  3.  Pain management medications as ordered  4.  Reassess pain after pain med administration per policy  5.  If opiods administered assess patient's response to pain medication is appropriate per POSS sedation scale  6.  Follow pain management plan developed in collaboration with patient and interdisciplinary team (including palliative care or pain specialists if applicable)  Flowsheets (Taken 3/17/2024 0231)  Pain Rating Scale (NPRS): 3  Note: Pain scale and medications reviewed with patient. Patient reports some chronic back pain at a 3/10     Problem: Fall Risk  Goal: Patient will remain free from falls  Description: Target End Date:  Prior to discharge or change in level of care    Document interventions on the [place] Fall Risk Assessment    1.  Assess for fall risk factors  2.  Implement fall precautions  Outcome: Progressing  Note: Patient informed to call for assistance when getting up to go to the bathroom as she is a fall risk. Bed alarm in place and connected        Patient is not progressing towards the following goals:

## 2024-03-17 NOTE — PROGRESS NOTES
Patient Brought up from the ED by [facility]. Patient alert and oriented but has some confusion in conversation when answering questions. On 2 L NC, Reports no pain at this time.     Bedside report received from off going RN/tech: trinity Collado care of patient.     Fall Risk Score: HIGH RISK  Fall risk interventions in place: Place yellow fall risk ID band on patient, Provide patient/family education based on risk assessment, Educate patient/family to call staff for assistance when getting out of bed, Place fall precaution signage outside patient door, Place patient in room close to nursing station, Utilize bed/chair fall alarm, and Bed alarm connected correctly  Bed type: Regular (Regan Score less than 17 interventions in place)  Patient on cardiac monitor: Yes  IVF/IV medications: Not Applicable   Oxygen: How many liters 2L  Bedside sitter: Not Applicable   Isolation: Not applicable

## 2024-03-17 NOTE — PROGRESS NOTES
"[facility] Medicine Daily Progress Note    Date of Service  3/17/2024    Chief Complaint  Davie Montejo is a 89 y.o. female admitted 3/16/2024 with   Chief Complaint   Patient presents with    Chest Pain     Pt BIBA from [place] for intermittent chest pain with palpitations that started last night and continued through this morning. Pt reports pain is now more of a \"discomfort.\" Pt hypotensive in route- 100 mL of NS given prior to arrival. Pt is on 4 L baseline oxygen.          Hospital Course  No notes on file    Davie Montejo is a 89 y.o. female from SNF with history of HFrEF 20%, CAD status post CABG 2008, vascular dementia, hypertension, hyperlipidemia, recent hospitalization for CHF exacerbation and just discharged 3/13/2020 for SNF who presented 3/16/2024 with palpitation, chest pain.  In ER, she was found to be in a state of A-fib with RVR, rate in 130s.  She received 100 cc NS bolus prior to ER arrival, on 4 L nasal cannula support.  She has notable elevated troponin T and proBNP of 15,000.  Admission requested by ERP for suspected CHF exacerbation and A-fib with RVR.  Admitted to medicine service for further evaluation and treatment.     Interval Problem Update  Patient was seen and examined at bedside.  I have personally reviewed and interpreted vitals, labs, and imaging.    3/17.  Afebrile.  Has been tachycardic, tachypneic, hypertensive.  On 3-4 L nasal cannula.  Denies fevers, chills, chest pains.  Shortness of breath is resolved.  Denies palpitations  She does report some epigastric pain going around to her back.  She states this is chronic.  Once a while she does get orthostatic symptoms.  Consulted to cardiology.  DC stress dose steroids.  Start midodrine and metoprolol for rate control.  Continue digoxin.  Plan for MARGO tomorrow.  Continue Lovenox therapeutic dose.    I have discussed this patient's plan of care and discharge plan at IDT rounds today with Case Management, Nursing, " Nursing leadership, and other members of the IDT team.    Consultants/Specialty  cardiology    Code Status  DNAR/DNI    Disposition  The patient is not medically cleared for discharge to home or a post-acute facility.  Anticipate discharge to: skilled nursing facility    I have placed the appropriate orders for post-discharge needs.    Review of Systems  Review of Systems   Constitutional:  Negative for malaise/fatigue.   Respiratory:  Positive for shortness of breath.    Cardiovascular:  Positive for chest pain. Negative for palpitations.   Neurological:  Positive for dizziness.        Physical Exam  Temp:  [36 °C (96.8 °F)-36.2 °C (97.2 °F)] 36 °C (96.8 °F)  Pulse:  [124-143] 128  Resp:  [18-48] 18  BP: ()/(58-75) 110/75  SpO2:  [94 %-100 %] 99 %    Physical Exam  Vitals and nursing note reviewed.   Constitutional:       Appearance: Normal appearance. She is ill-appearing.   HENT:      Head: Normocephalic and atraumatic.      Right Ear: External ear normal.      Left Ear: External ear normal.      Nose: Nose normal.      Mouth/Throat:      Mouth: Mucous membranes are moist.      Pharynx: Oropharynx is clear. No oropharyngeal exudate or posterior oropharyngeal erythema.   Eyes:      Extraocular Movements: Extraocular movements intact.      Conjunctiva/sclera: Conjunctivae normal.   Cardiovascular:      Rate and Rhythm: Tachycardia present. Rhythm irregular.      Pulses: Normal pulses.      Heart sounds: Normal heart sounds. No murmur heard.  Pulmonary:      Effort: Pulmonary effort is normal. No respiratory distress.      Breath sounds: No stridor. Rales present. No wheezing.   Abdominal:      General: Abdomen is flat. Bowel sounds are normal. There is no distension.      Palpations: Abdomen is soft. There is no mass.      Tenderness: There is no abdominal tenderness.   Musculoskeletal:      Cervical back: Normal range of motion.      Right lower leg: Edema present.      Left lower leg: Edema present.    Skin:     General: Skin is warm.      Capillary Refill: Capillary refill takes less than 2 seconds.   Neurological:      General: No focal deficit present.      Mental Status: She is alert and oriented to person, place, and time. Mental status is at baseline.      Cranial Nerves: No cranial nerve deficit.   Psychiatric:         Mood and Affect: Mood normal.         Behavior: Behavior normal.         Fluids    Intake/Output Summary (Last 24 hours) at 3/17/2024 0721  Last data filed at 3/17/2024 0205  Gross per 24 hour   Intake 100 ml   Output 550 ml   Net -450 ml       Laboratory  Recent Labs     03/16/24  1835 03/17/24  0306   WBC 7.0 6.0   RBC 4.29 4.77   HEMOGLOBIN 11.5* 12.4   HEMATOCRIT 34.7* 40.0   MCV 80.9* 83.9   MCH 26.8* 26.0*   MCHC 33.1 31.0*   RDW 58.3* 59.7*   PLATELETCT 302 303   MPV 11.0 10.6     Recent Labs     03/16/24  1907 03/17/24  0306   SODIUM 136 135   POTASSIUM 4.0 4.4   CHLORIDE 101 100   CO2 22 22   GLUCOSE 107* 123*   BUN 21 22   CREATININE 0.98 1.08   CALCIUM 8.9 8.9                   Imaging  EC-ECHOCARDIOGRAM LTD W/ CONT         CT-ABDOMEN-PELVIS WITH   Final Result      1.  No evidence of bowel obstruction or focal inflammatory change.      2.  Renal cortical thinning and multiple areas of cortical scarring.      3.  Sigmoid diverticulosis.      4.  Small amount of free fluid dependently within the pelvis.      5.  Stable bilateral pleural effusions and bibasilar atelectasis.      6.  Extensive atherosclerotic vascular calcification.      7.  Prior cholecystectomy.      8.  Fatty change of the liver.      DX-CHEST-PORTABLE (1 VIEW)   Final Result      Possible slight decrease in small right pleural effusion. No other significant change in bilateral interstitial opacities, bibasilar atelectasis and small left pleural effusion.      EC-MARGO W/O CONT    (Results Pending)   CL-CARDIOVERSION    (Results Pending)        Assessment/Plan  * Acute on chronic HFrEF (heart failure with reduced  ejection fraction) (HCC)- (present on admission)  Assessment & Plan  3/17/2024  EF 20 to 25% from prior admission  Was discharged on Entresto    Borderline hypotension, unable to tolerate optimize HF meds  Gentle diuresis as tolerated-Lasix 20 mg IV twice daily  Repeat echo     Age-related physical debility  Assessment & Plan  PT/OT    Elevated troponin  Assessment & Plan  3/17/2024  Probable demand ischemia in setting of CHF exacerbation and uncontrolled A-fib  Continue home Plavix, statin  Also on therapeutic Lovenox    Atrial fibrillation with RVR (HCC)  Assessment & Plan  3/17/2024  ?  New onset  Maintain potassium above 4, magnesium above 2  Treat underlying CHF exacerbation    Borderline hypotension  --Load with IV digoxin  --Continue metoprolol  Therapeutic Lovenox    Hypotension- (present on admission)  Assessment & Plan  3/17/2024  Monitor  Possibly secondary to uncontrolled afib -- control  Possible shock  Stressed dose hydrocortisone    Acute respiratory failure with hypoxia (HCC)- (present on admission)  Assessment & Plan  3/17/2024  On 3 to 4 L-wean off as tolerated  Multifactorial: CHF exacerbation, A-fib with RVR, pneumonia, cardiogenic shock  Diuresis  Pulmonary hygiene  Antibiotic  Stress dose hydrocortisone    ACP (advance care planning)- (present on admission)  Assessment & Plan  Goal care discussed with patient in ER.  She stated she does not wish to have aggressive/heroic life-sustaining measures-no CPR/defibrillation/intubation or mechanical ventilation.  She is however agreeable for minimally invasive medical management as clinically warranted to treat for her CHF exacerbation atrial fibrillation with RVR.  Diagnosis, prognosis, questions or concerns.  DNR/DNI status confirmed.  ACP: 16 minutes         VTE prophylaxis: Lovenox    I have performed a physical exam and reviewed and updated ROS and Plan today (3/17/2024). In review of yesterday's note (3/16/2024), there are no changes except as  documented above.    Greater than 51 minutes spent prepping to see patient (e.g. review of tests) obtaining and/or reviewing separately obtained history. Performing a medically appropriate examination and/ evaluation.  Counseling and educating the patient/family/caregiver.  Ordering medications, tests, or procedures.  Referring and communicating with other health care professionals.  Documenting clinical information in EPIC.  Independently interpreting results and communicating results to patient/family/caregiver.  Care coordination.

## 2024-03-17 NOTE — PROGRESS NOTES
Bedside report received from off going RN/tech: Fortunato, assumed care of patient. POC updated.   Pt denies any pain at this time mor any other needs.     Fall Risk Score: HIGH RISK  Fall risk interventions in place: Place yellow fall risk ID band on patient, Provide patient/family education based on risk assessment, Educate patient/family to call staff for assistance when getting out of bed, Place fall precaution signage outside patient door, and Utilize bed/chair fall alarm  Bed type: Regular (Regan Score less than 17 interventions in place)  Patient on cardiac monitor: Yes  IVF/IV medications: Not Applicable   Oxygen: Room Air and How many liters 3L  Bedside sitter: Not Applicable   Isolation: Not applicable

## 2024-03-17 NOTE — CONSULTS
Cardiology Initial Consult Note    Date of note:    3/17/2024      Consulting Physician: Marques Parker D.O.    Name:   Davie Montejo   YOB: 1935  Age:   89 y.o.  female   MRN:   [id number]      Reason for Consultation: Heart failure with reduced ejection fraction    HPI:  Davie Montejo is a 89 y.o. female with history of coronary artery disease, status post bypass surgery, status post stent to the left circumflex artery around 2018, hypertension, hyperlipidemia, presents with from cardiac rehab with complaints of chest pain and palpitations.  Patient was found to be in atrial fibrillation with rapid ventricular response.    Patient was discharged on 3/13/2024 for syncope found hypoxic O2 of 64%.  During this hospitalization patient was hypotensive and felt possibly had an adrenal insufficiency, but it was not clear and patient's blood pressure normalized after holding her medications and steroids.  Patient was restarted on a low-dose metoprolol and oral Lasix.  Entresto and small lactone were held.    Patient reports she has epigastric pain that goes around her left breast area.  Last less then 1 second, not associated with exertion.  Had some early this morning.  She had her CABG in 2008 and PCI LCX 2018.  She does no recall what symptoms she had with those.     Problem list:  Principal Problem:    Acute on chronic HFrEF (heart failure with reduced ejection fraction) (HCC) (POA: Yes)  Active Problems:    ACP (advance care planning) (POA: Yes)    Acute respiratory failure with hypoxia (HCC) (POA: Yes)    Hypotension (POA: Yes)    Atrial fibrillation with RVR (HCC) (POA: Unknown)    Elevated troponin (POA: Unknown)    Age-related physical debility (POA: Unknown)  Resolved Problems:    * No resolved hospital problems. *      Past Medical History:   Diagnosis Date    Acute blood loss anemia 10/21/2021    Acute on chronic systolic heart failure (HCC) 8/24/2020    Acute perforated  gastric ulcer with hemorrhage (HCC) 10/21/2021    Anemia 2/14/2022    Angina decubitus 6/18/2010    CAD (coronary artery disease)     Cannabis use, unspecified with intoxication, uncomplicated ([facility]) 2/14/2022    Fibromyalgia     Generalized abdominal pain 12/5/2021    Heart attack ([facility]) 2008    Hypotension due to medication 7/18/2022    Intractable nausea and vomiting 12/4/2021    Pain in the chest 11/15/2020       Past Surgical History:   Procedure Laterality Date    CORONARY ARTERY BYPAS, 4  2008    CORONARY ARTERY BYPASS, 1  2008         Medications Prior to Admission   Medication Sig Dispense Refill Last Dose    furosemide (LASIX) 20 MG Tab Take 1 Tablet by mouth every day.       oxyCODONE immediate-release (ROXICODONE) 5 MG Tab Take 1 Tablet by mouth every 6 hours as needed for Severe Pain for up to 5 days. 15 Tablet 0     atorvastatin (LIPITOR) 10 MG Tab TAKE 1 TABLET BY MOUTH EVERY EVENING. FOR CHOLESTEROL. 100 Tablet 3     metoprolol SR (TOPROL XL) 25 MG TABLET SR 24 HR Take 0.5 Tablets by mouth every evening. 30 Tablet 0     DULoxetine (CYMBALTA) 60 MG Cap DR Particles delayed-release capsule Take 60 mg by mouth every morning.       sucralfate (CARAFATE) 1 GM Tab Take 1 g by mouth 2 times a day.       acetaminophen (TYLENOL) 325 MG Tab Take 650 mg by mouth every four hours as needed for Mild Pain.       estradiol (ESTRACE) 1 MG Tab TAKE 1 TABLET BY MOUTH EVERY DAY. 90 Tablet 3     levothyroxine (SYNTHROID) 125 MCG Tab TAKE 1 TABLET BY MOUTH EVERY MORNING ON AN EMPTY STOMACH. 30 Tablet 11     omeprazole (PRILOSEC) 20 MG delayed-release capsule TAKE 1 CAPSULE BY MOUTH EVERY DAY. 30 Capsule 11     clopidogrel (PLAVIX) 75 MG Tab TAKE 1 TABLET BY MOUTH EVERY DAY. 30 Tablet 11     loratadine (CLARITIN) 10 MG Tab Take 1 Tablet by mouth every day. 90 Tablet 3     gabapentin (NEURONTIN) 600 MG tablet Take 1 Tablet by mouth 2 times a day. 180 Tablet 1      Current Facility-Administered Medications   Medication Dose  Route Frequency Provider Last Rate Last Admin    metoprolol SR (Toprol XL) tablet 12.5 mg  12.5 mg Oral Q EVENING Marques Parker D.O.        amoxicillin-clavulanate (Augmentin) 875-125 MG per tablet 1 Tablet  1 Tablet Oral Once Marques Parker D.O.        midodrine (Proamatine) tablet 5 mg  5 mg Oral TID WITH MEALS Marques Parker D.O.        Respiratory Therapy Consult   Nebulization Continuous RT Tyshawn Sorto M.D.        acetaminophen (Tylenol) tablet 650 mg  650 mg Oral Q6HRS PRN Tyhsawn Sorto M.D.   650 mg at 03/17/24 0513    labetalol (Normodyne/Trandate) injection 10 mg  10 mg Intravenous Q4HRS PRN Tyshawn Sorto M.D.        Metoprolol Tartrate (Lopressor) injection 5 mg  5 mg Intravenous Q5 MIN PRN Tyshawn Sorto M.D.        [START ON 3/18/2024] digoxin (Lanoxin) tablet 125 mcg  125 mcg Oral QAJOSEMANUEL Sorto M.D.        ondansetron (Zofran) syringe/vial injection 4 mg  4 mg Intravenous Q4HRS PRN Tyshawn Sorto M.D.        ondansetron (Zofran ODT) dispertab 4 mg  4 mg Oral Q4HRS PRN Tyshawn Sorto M.D.        senna-docusate (Pericolace Or Senokot S) 8.6-50 MG per tablet 2 Tablet  2 Tablet Oral Q EVENING Tyshawn Sorto M.D.        And    polyethylene glycol/lytes (Miralax) Packet 1 Packet  1 Packet Oral QDAY PRN Tyshawn Sorto M.D.        atorvastatin (Lipitor) tablet 20 mg  20 mg Oral Nightly Tyshawn Sorto M.D.   20 mg at 03/17/24 0010    clopidogrel (Plavix) tablet 75 mg  75 mg Oral DAILY Tyshawn Sorto M.D.   75 mg at 03/17/24 0514    DULoxetine (Cymbalta) capsule 60 mg  60 mg Oral QAM Tyshawn Sorto M.D.   60 mg at 03/17/24 0514    gabapentin (Neurontin) capsule 600 mg  600 mg Oral BID Tyshawn Sorto M.D.   600 mg at 03/17/24 0514    levothyroxine (Synthroid) tablet 125 mcg  125 mcg Oral AM ES Tyshawn Sorto M.D.   125 mcg at 03/17/24 0514    omeprazole (PriLOSEC) capsule 20 mg  20 mg Oral BID Tyshawn Sorto M.D.   20 mg at 03/17/24 0514    oxyCODONE immediate-release (Roxicodone) tablet 5 mg  5 mg Oral Q6HRS  "PRN Tyshawn Sorto M.D.   5 mg at 03/17/24 0333    enoxaparin (Lovenox) inj 60 mg  1 mg/kg Subcutaneous Q12HRS Tyshawn Sorto M.D.   60 mg at 03/17/24 0513    furosemide (Lasix) injection 20 mg  20 mg Intravenous BID DIURETIC Marques Parker D.O.        doxycycline monohydrate (Adoxa) tablet 100 mg  100 mg Oral Q12HRS Tyshawn Sorto M.D.   100 mg at 03/17/24 0513       Allergies   Allergen Reactions    Latex Unspecified          Tramadol Rash     Itchy red in nature    Codeine Unspecified     Unknown reaction      Lisinopril Unspecified     Unknown reaction    Penicillin G Unspecified     Unknown reaction      Pregabalin Unspecified     Lyrica affects patients vision.    Simvastatin Unspecified     Unknown reaction      Sulfa Drugs Unspecified     Patient states \"I can't remember what this does to me\" but recalls and allergy.        History reviewed. No pertinent family history.    Social History     Socioeconomic History    Marital status:      Spouse name: Not on file    Number of children: Not on file    Years of education: Not on file    Highest education level: Not on file   Occupational History    Not on file   Tobacco Use    Smoking status: Former    Smokeless tobacco: Never   Vaping Use    Vaping Use: Never used   Substance and Sexual Activity    Alcohol use: Never    Drug use: Not Currently    Sexual activity: Not Currently   Other Topics Concern    Not on file   Social History Narrative    Not on file     Social Determinants of Health     Financial Resource Strain: Low Risk  (12/15/2021)    Overall Financial Resource Strain (CARDIA)     Difficulty of Paying Living Expenses: Not very hard   Food Insecurity: No Food Insecurity (12/15/2021)    Hunger Vital Sign     Worried About Running Out of Food in the Last Year: Never true     Ran Out of Food in the Last Year: Never true   Transportation Needs: Unmet Transportation Needs (1/12/2021)    PRAPARE - Transportation     Lack of Transportation (Medical): " "Yes     Lack of Transportation (Non-Medical): Not on file   Physical Activity: Not on file   Stress: Not on file   Social Connections: Not on file   Intimate Partner Violence: Not on file   Housing Stability: Not on file         Intake/Output Summary (Last 24 hours) at 3/17/2024 1319  Last data filed at 3/17/2024 0400  Gross per 24 hour   Intake 500 ml   Output 750 ml   Net -250 ml         Review of Systems   Constitutional: Negative for diaphoresis and fever.   Respiratory: Negative.  Negative for cough, hemoptysis and wheezing.    Gastrointestinal:  Negative for hematochezia and melena.   Genitourinary:  Negative for hematuria.        Physical Exam  Body mass index is 22.93 kg/m².  /75   Pulse (!) 128   Temp 36 °C (96.8 °F) (Temporal)   Resp 18   Ht 1.702 m (5' 7\")   Wt 66.4 kg (146 lb 6.2 oz)   SpO2 99%   Vitals:    03/16/24 2200 03/16/24 2332 03/16/24 2339 03/17/24 0419   BP: 98/65 (!) 89/59  110/75   Pulse: (!) 124 (!) 133  (!) 128   Resp: (!) 22 19  18   Temp:  36.2 °C (97.2 °F)  36 °C (96.8 °F)   TempSrc:  Temporal  Temporal   SpO2: 99% 100%  99%   Weight:  66.4 kg (146 lb 6.2 oz) 66.4 kg (146 lb 6.2 oz) 66.4 kg (146 lb 6.2 oz)   Height:  1.702 m (5' 7\")       Oxygen Therapy:  Pulse Oximetry: 99 %, O2 (LPM): 3, O2 Delivery Device: None - Room Air    Physical Exam  Constitutional:       Appearance: Normal appearance.   Eyes:      Extraocular Movements: Extraocular movements intact.      Conjunctiva/sclera: Conjunctivae normal.   Neck:      Vascular: No carotid bruit or JVD.   Cardiovascular:      Rate and Rhythm: Tachycardia present. Rhythm irregular.      Pulses:           Radial pulses are 2+ on the right side and 2+ on the left side.        Posterior tibial pulses are 1+ on the right side and 1+ on the left side.      Heart sounds: Normal heart sounds. No murmur heard.     No friction rub. No gallop.   Pulmonary:      Effort: Pulmonary effort is normal. No respiratory distress.      Breath " sounds: Normal breath sounds. No wheezing.   Abdominal:      General: Bowel sounds are normal.      Palpations: Abdomen is soft.   Musculoskeletal:      Cervical back: Neck supple.      Right lower leg: No edema.      Left lower leg: No edema.   Skin:     General: Skin is warm and dry.   Neurological:      Mental Status: She is alert and oriented to person, place, and time. Mental status is at baseline.   Psychiatric:         Mood and Affect: Mood normal.          Labs (personally reviewed and notable for):   Lab Results   Component Value Date/Time    SODIUM 135 03/17/2024 03:06 AM    POTASSIUM 4.4 03/17/2024 03:06 AM    CHLORIDE 100 03/17/2024 03:06 AM    CO2 22 03/17/2024 03:06 AM    GLUCOSE 123 (H) 03/17/2024 03:06 AM    BUN 22 03/17/2024 03:06 AM    CREATININE 1.08 03/17/2024 03:06 AM    BUNCREATRAT 24.5 10/20/2021 05:05 AM      Lab Results   Component Value Date/Time    WBC 6.0 03/17/2024 03:06 AM    RBC 4.77 03/17/2024 03:06 AM    HEMOGLOBIN 12.4 03/17/2024 03:06 AM    HEMATOCRIT 40.0 03/17/2024 03:06 AM    MCV 83.9 03/17/2024 03:06 AM    MCH 26.0 (L) 03/17/2024 03:06 AM    MCHC 31.0 (L) 03/17/2024 03:06 AM    MPV 10.6 03/17/2024 03:06 AM    NEUTSPOLYS 73.50 (H) 03/17/2024 03:06 AM    LYMPHOCYTES 18.20 (L) 03/17/2024 03:06 AM    MONOCYTES 5.30 03/17/2024 03:06 AM    EOSINOPHILS 1.30 03/17/2024 03:06 AM    BASOPHILS 1.00 03/17/2024 03:06 AM    HYPOCHROMIA 1+ 03/16/2024 06:35 PM    ANISOCYTOSIS 1+ 03/16/2024 06:35 PM    INR 1.06 10/21/2021 10:38 AM      Lab Results   Component Value Date/Time    CHOLSTRLTOT 145 02/28/2024 03:54 AM    LDL 83 02/28/2024 03:54 AM    HDL 46 02/28/2024 03:54 AM    TRIGLYCERIDE 82 02/28/2024 03:54 AM       Lab Results   Component Value Date/Time    TROPONINT 31 (H) 03/17/2024 0306     Lab Results   Component Value Date/Time    NTPROBNP 94021 (H) 03/16/2024 1907     Lab Results   Component Value Date/Time    HBA1C 5.5 08/01/2023 1116     Cardiac Imaging and Procedures Review:    EKG  dated 3/17/2024: My personal interpretation is atrial fibrillation, 132 bpm, anterior infarct age undetermined, nonspecific interventricular conduction delay versus atypical left bundle branch block, left axis deviation, compared to prior ECG of 3/8/2024, atrial fibrillation with rapid ventricular response is now present    Echo dated  for: Severely decreased left ventricular systolic function with ejection fraction of 20 to 25%.  Preserved right ventricular cavity size with decreased systolic function.  Moderate left atrial enlargement.  Moderate mitral and tricuspid regurgitation.  RV systolic pressure estimated at 48 mmHg    Telemetry (last 24 hours): Atrial fibrillation, 120s to 130s    Radiology test Review:  CT-ABDOMEN-PELVIS WITH   Final Result      1.  No evidence of bowel obstruction or focal inflammatory change.      2.  Renal cortical thinning and multiple areas of cortical scarring.      3.  Sigmoid diverticulosis.      4.  Small amount of free fluid dependently within the pelvis.      5.  Stable bilateral pleural effusions and bibasilar atelectasis.      6.  Extensive atherosclerotic vascular calcification.      7.  Prior cholecystectomy.      8.  Fatty change of the liver.      DX-CHEST-PORTABLE (1 VIEW)   Final Result      Possible slight decrease in small right pleural effusion. No other significant change in bilateral interstitial opacities, bibasilar atelectasis and small left pleural effusion.      EC-ECHOCARDIOGRAM LTD W/O CONT    (Results Pending)     Troponin 36, 35, 31  ECG 3/8/24 sinus  Got total of 0.5 mcg IV of digoxin  On lovenox 60 mg IV q12h  Plavix 75 mg po qd  Got midodrine also for low BP    Impression and Medical Decision Makin.  Atrial fibrillation with rapid ventricular response.  Possibly her epigastric pain could be from that.  Troponins have been flat here.  Blood pressure is borderline which makes rate control somewhat difficult.  She did get digoxin already.  Will  try low-dose metoprolol to tartrate along with midodrine.  If needed we can always use amiodarone as a 2B indication for rate control if failing everything else.  With her decreased LVEF, she might benefit more from sinus rhythm with the atrial kick.  Discussed recommendation for transesophageal echo guided electrical cardioversion.The risks, benefits, and alternatives to transesophageal echocardiogram (MARGO) with IV sedation were discussed with the patient in specific detail, including but not limited respiratory depression, aspiration pneumonia, dental damage, oropharyngeal and esophageal traumas including hoarseness and dysphagia after the procedure. Rare cases demonstrating serious or fatal complications associated with MARGO have been reported in the adult population, including cardiac, pulmonary and bleeding complications in less than 1% of people. Patients with an identified intracardiac thrombus are at increased risk for embolic events (absolute risk uncertain, range 0%-38%), and this appears to be reduced with anticoagulation therapy (absolute risk reduction uncertain).  As with any procedures, very rare risk of death.  The patient verbalized understanding about these possible complications, and wishes to proceed with this procedure.  -Try metoprolol to tartrate 12.5 mg p.o. 3 times daily  - N.p.o. after midnight for possible MARGO guided cardioversion    2.  Patient should be continued on 1 mg/kg of subcu every 12 hours of Lovenox.  Transition over to DOAC after cardioversion.    3.  History of coronary artery disease status post coronary bypass surgery and stent in 2018.  I think her epigastric pain is more GI versus symptoms from the A-fib.  Troponins have been flat.  No indication for with further ischemia workup at this time.    4.  Heart failure with reduced ejection fraction.  This is from an echo in February.  Get her on guideline directed medical therapy once she is back in sinus and see if her blood  pressure will tolerate it.      Thank you for allowing me to participate in the care of this patient.  Please contact me with any questions.      Melisa Gamez M.D.  Cardiologist, [facility] [place]     This note was generated using voice recognition software which has a small chance of producing errors of grammar and possibly content. I have made every reasonable attempt to find and correct any obvious errors, but expect that some may not be found prior to finalization of this note.

## 2024-03-18 ENCOUNTER — ANESTHESIA EVENT (OUTPATIENT)
Dept: CARDIOLOGY | Facility: MEDICAL CENTER | Age: 89
DRG: 308 | End: 2024-03-18
Payer: MEDICARE

## 2024-03-18 ENCOUNTER — APPOINTMENT (OUTPATIENT)
Dept: CARDIOLOGY | Facility: MEDICAL CENTER | Age: 89
DRG: 308 | End: 2024-03-18
Attending: INTERNAL MEDICINE
Payer: MEDICARE

## 2024-03-18 ENCOUNTER — ANESTHESIA (OUTPATIENT)
Dept: CARDIOLOGY | Facility: MEDICAL CENTER | Age: 89
DRG: 308 | End: 2024-03-18
Payer: MEDICARE

## 2024-03-18 LAB
ANION GAP SERPL CALC-SCNC: 17 MMOL/L (ref 7–16)
BUN SERPL-MCNC: 30 MG/DL (ref 8–22)
CALCIUM SERPL-MCNC: 9.1 MG/DL (ref 8.5–10.5)
CHLORIDE SERPL-SCNC: 96 MMOL/L (ref 96–112)
CO2 SERPL-SCNC: 22 MMOL/L (ref 20–33)
CREAT SERPL-MCNC: 1.25 MG/DL (ref 0.5–1.4)
EKG IMPRESSION: NORMAL
ERYTHROCYTE [DISTWIDTH] IN BLOOD BY AUTOMATED COUNT: 57.1 FL (ref 35.9–50)
GFR SERPLBLD CREATININE-BSD FMLA CKD-EPI: 42 ML/MIN/1.73 M 2
GLUCOSE SERPL-MCNC: 138 MG/DL (ref 65–99)
HCT VFR BLD AUTO: 33.7 % (ref 37–47)
HGB BLD-MCNC: 11 G/DL (ref 12–16)
LV EJECT FRACT  99904: 25
LV EJECT FRACT MOD 2C 99903: 12.89
LV EJECT FRACT MOD 4C 99902: 21.21
LV EJECT FRACT MOD BP 99901: 18.11
MAGNESIUM SERPL-MCNC: 2 MG/DL (ref 1.5–2.5)
MCH RBC QN AUTO: 26.2 PG (ref 27–33)
MCHC RBC AUTO-ENTMCNC: 32.6 G/DL (ref 32.2–35.5)
MCV RBC AUTO: 80.2 FL (ref 81.4–97.8)
NT-PROBNP SERPL IA-MCNC: ABNORMAL PG/ML (ref 0–125)
PHOSPHATE SERPL-MCNC: 3 MG/DL (ref 2.5–4.5)
PLATELET # BLD AUTO: 294 K/UL (ref 164–446)
PMV BLD AUTO: 11.2 FL (ref 9–12.9)
POTASSIUM SERPL-SCNC: 3.9 MMOL/L (ref 3.6–5.5)
RBC # BLD AUTO: 4.2 M/UL (ref 4.2–5.4)
SODIUM SERPL-SCNC: 135 MMOL/L (ref 135–145)
WBC # BLD AUTO: 9.1 K/UL (ref 4.8–10.8)

## 2024-03-18 PROCEDURE — 700102 HCHG RX REV CODE 250 W/ 637 OVERRIDE(OP): Performed by: STUDENT IN AN ORGANIZED HEALTH CARE EDUCATION/TRAINING PROGRAM

## 2024-03-18 PROCEDURE — 85027 COMPLETE CBC AUTOMATED: CPT

## 2024-03-18 PROCEDURE — 80048 BASIC METABOLIC PNL TOTAL CA: CPT

## 2024-03-18 PROCEDURE — 93010 ELECTROCARDIOGRAM REPORT: CPT | Performed by: INTERNAL MEDICINE

## 2024-03-18 PROCEDURE — 700111 HCHG RX REV CODE 636 W/ 250 OVERRIDE (IP): Performed by: INTERNAL MEDICINE

## 2024-03-18 PROCEDURE — 4410588 CL-CARDIOVERSION

## 2024-03-18 PROCEDURE — A9270 NON-COVERED ITEM OR SERVICE: HCPCS | Performed by: INTERNAL MEDICINE

## 2024-03-18 PROCEDURE — 160002 HCHG RECOVERY MINUTES (STAT)

## 2024-03-18 PROCEDURE — 700102 HCHG RX REV CODE 250 W/ 637 OVERRIDE(OP): Performed by: PHYSICIAN ASSISTANT

## 2024-03-18 PROCEDURE — 84100 ASSAY OF PHOSPHORUS: CPT

## 2024-03-18 PROCEDURE — 160035 HCHG PACU - 1ST 60 MINS PHASE I

## 2024-03-18 PROCEDURE — 93312 ECHO TRANSESOPHAGEAL: CPT | Mod: 26 | Performed by: INTERNAL MEDICINE

## 2024-03-18 PROCEDURE — 700102 HCHG RX REV CODE 250 W/ 637 OVERRIDE(OP): Performed by: INTERNAL MEDICINE

## 2024-03-18 PROCEDURE — 93005 ELECTROCARDIOGRAM TRACING: CPT | Mod: XE | Performed by: INTERNAL MEDICINE

## 2024-03-18 PROCEDURE — 92960 CARDIOVERSION ELECTRIC EXT: CPT | Performed by: INTERNAL MEDICINE

## 2024-03-18 PROCEDURE — A9270 NON-COVERED ITEM OR SERVICE: HCPCS | Performed by: PHYSICIAN ASSISTANT

## 2024-03-18 PROCEDURE — 36415 COLL VENOUS BLD VENIPUNCTURE: CPT

## 2024-03-18 PROCEDURE — B24BZZ4 ULTRASONOGRAPHY OF HEART WITH AORTA, TRANSESOPHAGEAL: ICD-10-PCS | Performed by: INTERNAL MEDICINE

## 2024-03-18 PROCEDURE — 83880 ASSAY OF NATRIURETIC PEPTIDE: CPT

## 2024-03-18 PROCEDURE — 700101 HCHG RX REV CODE 250: Performed by: ANESTHESIOLOGY

## 2024-03-18 PROCEDURE — 99233 SBSQ HOSP IP/OBS HIGH 50: CPT | Performed by: INTERNAL MEDICINE

## 2024-03-18 PROCEDURE — 700111 HCHG RX REV CODE 636 W/ 250 OVERRIDE (IP): Performed by: ANESTHESIOLOGY

## 2024-03-18 PROCEDURE — 700111 HCHG RX REV CODE 636 W/ 250 OVERRIDE (IP): Performed by: STUDENT IN AN ORGANIZED HEALTH CARE EDUCATION/TRAINING PROGRAM

## 2024-03-18 PROCEDURE — 700105 HCHG RX REV CODE 258: Performed by: ANESTHESIOLOGY

## 2024-03-18 PROCEDURE — 99233 SBSQ HOSP IP/OBS HIGH 50: CPT | Mod: 25 | Performed by: INTERNAL MEDICINE

## 2024-03-18 PROCEDURE — 83735 ASSAY OF MAGNESIUM: CPT

## 2024-03-18 PROCEDURE — A9270 NON-COVERED ITEM OR SERVICE: HCPCS | Performed by: STUDENT IN AN ORGANIZED HEALTH CARE EDUCATION/TRAINING PROGRAM

## 2024-03-18 PROCEDURE — 770020 HCHG ROOM/CARE - TELE (206)

## 2024-03-18 PROCEDURE — 93312 ECHO TRANSESOPHAGEAL: CPT

## 2024-03-18 PROCEDURE — 5A2204Z RESTORATION OF CARDIAC RHYTHM, SINGLE: ICD-10-PCS | Performed by: INTERNAL MEDICINE

## 2024-03-18 RX ORDER — ONDANSETRON 2 MG/ML
4 INJECTION INTRAMUSCULAR; INTRAVENOUS
Status: DISCONTINUED | OUTPATIENT
Start: 2024-03-18 | End: 2024-03-18 | Stop reason: HOSPADM

## 2024-03-18 RX ORDER — AMIODARONE HYDROCHLORIDE 200 MG/1
400 TABLET ORAL DAILY
Status: DISCONTINUED | OUTPATIENT
Start: 2024-03-18 | End: 2024-03-20 | Stop reason: HOSPADM

## 2024-03-18 RX ORDER — HALOPERIDOL 5 MG/ML
1 INJECTION INTRAMUSCULAR
Status: DISCONTINUED | OUTPATIENT
Start: 2024-03-18 | End: 2024-03-18 | Stop reason: HOSPADM

## 2024-03-18 RX ORDER — DIPHENHYDRAMINE HYDROCHLORIDE 50 MG/ML
12.5 INJECTION INTRAMUSCULAR; INTRAVENOUS
Status: DISCONTINUED | OUTPATIENT
Start: 2024-03-18 | End: 2024-03-18 | Stop reason: HOSPADM

## 2024-03-18 RX ORDER — EPHEDRINE SULFATE 50 MG/ML
INJECTION, SOLUTION INTRAVENOUS PRN
Status: DISCONTINUED | OUTPATIENT
Start: 2024-03-18 | End: 2024-03-18 | Stop reason: SURG

## 2024-03-18 RX ORDER — SODIUM CHLORIDE, SODIUM LACTATE, POTASSIUM CHLORIDE, CALCIUM CHLORIDE 600; 310; 30; 20 MG/100ML; MG/100ML; MG/100ML; MG/100ML
INJECTION, SOLUTION INTRAVENOUS
Status: DISCONTINUED | OUTPATIENT
Start: 2024-03-18 | End: 2024-03-18 | Stop reason: SURG

## 2024-03-18 RX ORDER — LIDOCAINE HYDROCHLORIDE 20 MG/ML
INJECTION, SOLUTION EPIDURAL; INFILTRATION; INTRACAUDAL; PERINEURAL PRN
Status: DISCONTINUED | OUTPATIENT
Start: 2024-03-18 | End: 2024-03-18 | Stop reason: SURG

## 2024-03-18 RX ORDER — FUROSEMIDE 10 MG/ML
20 INJECTION INTRAMUSCULAR; INTRAVENOUS ONCE
Status: DISCONTINUED | OUTPATIENT
Start: 2024-03-18 | End: 2024-03-19

## 2024-03-18 RX ADMIN — OMEPRAZOLE 20 MG: 20 CAPSULE, DELAYED RELEASE ORAL at 05:40

## 2024-03-18 RX ADMIN — ENOXAPARIN SODIUM 60 MG: 100 INJECTION SUBCUTANEOUS at 05:40

## 2024-03-18 RX ADMIN — METOPROLOL TARTRATE 12.5 MG: 25 TABLET, FILM COATED ORAL at 21:11

## 2024-03-18 RX ADMIN — CLOPIDOGREL BISULFATE 75 MG: 75 TABLET ORAL at 05:40

## 2024-03-18 RX ADMIN — METOPROLOL TARTRATE 12.5 MG: 25 TABLET, FILM COATED ORAL at 16:05

## 2024-03-18 RX ADMIN — DIGOXIN 125 MCG: 0.25 TABLET ORAL at 05:53

## 2024-03-18 RX ADMIN — FUROSEMIDE 20 MG: 10 INJECTION INTRAMUSCULAR; INTRAVENOUS at 05:40

## 2024-03-18 RX ADMIN — MIDODRINE HYDROCHLORIDE 5 MG: 5 TABLET ORAL at 08:53

## 2024-03-18 RX ADMIN — LEVOTHYROXINE SODIUM 125 MCG: 0.12 TABLET ORAL at 05:40

## 2024-03-18 RX ADMIN — GABAPENTIN 600 MG: 300 CAPSULE ORAL at 16:54

## 2024-03-18 RX ADMIN — GABAPENTIN 600 MG: 300 CAPSULE ORAL at 05:40

## 2024-03-18 RX ADMIN — ATORVASTATIN CALCIUM 20 MG: 20 TABLET, FILM COATED ORAL at 21:09

## 2024-03-18 RX ADMIN — MIDODRINE HYDROCHLORIDE 5 MG: 5 TABLET ORAL at 17:30

## 2024-03-18 RX ADMIN — AMIODARONE HYDROCHLORIDE 400 MG: 200 TABLET ORAL at 14:03

## 2024-03-18 RX ADMIN — OXYCODONE 5 MG: 5 TABLET ORAL at 02:08

## 2024-03-18 RX ADMIN — PROPOFOL 100 MG: 10 INJECTION, EMULSION INTRAVENOUS at 10:52

## 2024-03-18 RX ADMIN — METOPROLOL TARTRATE 12.5 MG: 25 TABLET, FILM COATED ORAL at 08:53

## 2024-03-18 RX ADMIN — LIDOCAINE HYDROCHLORIDE 100 MG: 20 INJECTION, SOLUTION EPIDURAL; INFILTRATION; INTRACAUDAL at 10:52

## 2024-03-18 RX ADMIN — EPHEDRINE SULFATE 10 MG: 50 INJECTION, SOLUTION INTRAVENOUS at 10:55

## 2024-03-18 RX ADMIN — OMEPRAZOLE 20 MG: 20 CAPSULE, DELAYED RELEASE ORAL at 16:54

## 2024-03-18 RX ADMIN — DOCUSATE SODIUM 50 MG AND SENNOSIDES 8.6 MG 2 TABLET: 8.6; 5 TABLET, FILM COATED ORAL at 16:54

## 2024-03-18 RX ADMIN — DULOXETINE HYDROCHLORIDE 60 MG: 60 CAPSULE, DELAYED RELEASE ORAL at 05:40

## 2024-03-18 RX ADMIN — ENOXAPARIN SODIUM 60 MG: 100 INJECTION SUBCUTANEOUS at 16:54

## 2024-03-18 RX ADMIN — OXYCODONE 5 MG: 5 TABLET ORAL at 08:56

## 2024-03-18 RX ADMIN — SODIUM CHLORIDE, POTASSIUM CHLORIDE, SODIUM LACTATE AND CALCIUM CHLORIDE: 600; 310; 30; 20 INJECTION, SOLUTION INTRAVENOUS at 10:49

## 2024-03-18 RX ADMIN — DOXYCYCLINE 100 MG: 100 TABLET, FILM COATED ORAL at 05:40

## 2024-03-18 ASSESSMENT — PAIN DESCRIPTION - PAIN TYPE
TYPE: ACUTE PAIN;CHRONIC PAIN
TYPE: ACUTE PAIN;CHRONIC PAIN

## 2024-03-18 ASSESSMENT — PAIN SCALES - GENERAL: PAIN_LEVEL: 0

## 2024-03-18 ASSESSMENT — ENCOUNTER SYMPTOMS
SHORTNESS OF BREATH: 1
DIZZINESS: 1
PALPITATIONS: 0

## 2024-03-18 ASSESSMENT — FIBROSIS 4 INDEX: FIB4 SCORE: 3.6

## 2024-03-18 NOTE — PROGRESS NOTES
"[facility] Medicine Daily Progress Note    Date of Service  3/18/2024    Chief Complaint  Davie Montejo is a 89 y.o. female admitted 3/16/2024 with   Chief Complaint   Patient presents with    Chest Pain     Pt BIBA from [place] for intermittent chest pain with palpitations that started last night and continued through this morning. Pt reports pain is now more of a \"discomfort.\" Pt hypotensive in route- 100 mL of NS given prior to arrival. Pt is on 4 L baseline oxygen.          Hospital Course  No notes on file    Davie Montejo is a 89 y.o. female from SNF with history of HFrEF 20%, CAD status post CABG 2008, vascular dementia, hypertension, hyperlipidemia, recent hospitalization for CHF exacerbation and just discharged 3/13/2020 for SNF who presented 3/16/2024 with palpitation, chest pain.  In ER, she was found to be in a state of A-fib with RVR, rate in 130s.  She received 100 cc NS bolus prior to ER arrival, on 4 L nasal cannula support.  She has notable elevated troponin T and proBNP of 15,000.  Admission requested by ERP for suspected CHF exacerbation and A-fib with RVR.  Admitted to medicine service for further evaluation and treatment.     Interval Problem Update  Patient was seen and examined at bedside.  I have personally reviewed and interpreted vitals, labs, and imaging.    3/17.  Afebrile.  Has been tachycardic, tachypneic, hypertensive.  On 3-4 L nasal cannula.  Denies fevers, chills, chest pains.  Shortness of breath is resolved.  Denies palpitations  She does report some epigastric pain going around to her back.  She states this is chronic.  Once a while she does get orthostatic symptoms.  Consulted to cardiology.  DC stress dose steroids.  Start midodrine and metoprolol for rate control.  Continue digoxin.  Plan for MARGO tomorrow.  Continue Lovenox therapeutic dose.  3/18.  Afebrile.  Tachycardia is improved.  On 0-3L NC.  Patient tolerated MARGO with cardioversion this morning.  " Patient states that she feels good.  Denies any chest pain, shortness of breath, palpitations, dizziness/lightheadedness.  Discussed with cardiology.  Patient with multiple readmissions in the past month going back and forth between SNF for heart failure.  Having trouble initiating guideline-directed medical therapy because of hypotension.  Continue midodrine and metoprolol.    I have discussed this patient's plan of care and discharge plan at IDT rounds today with Case Management, Nursing, Nursing leadership, and other members of the IDT team.    Consultants/Specialty  cardiology    Code Status  DNAR/DNI    Disposition  The patient is not medically cleared for discharge to home or a post-acute facility.  Anticipate discharge to: skilled nursing facility    I have placed the appropriate orders for post-discharge needs.    Review of Systems  Review of Systems   Constitutional:  Negative for malaise/fatigue.   Respiratory:  Positive for shortness of breath.    Cardiovascular:  Positive for chest pain. Negative for palpitations.   Neurological:  Positive for dizziness.        Physical Exam  Temp:  [36.2 °C (97.2 °F)-36.5 °C (97.7 °F)] 36.2 °C (97.2 °F)  Pulse:  [] 98  Resp:  [16-19] 18  BP: ()/(55-75) 93/57  SpO2:  [92 %-95 %] 93 %    Physical Exam  Vitals and nursing note reviewed.   Constitutional:       Appearance: Normal appearance. She is ill-appearing.   HENT:      Head: Normocephalic and atraumatic.      Right Ear: External ear normal.      Left Ear: External ear normal.      Nose: Nose normal.      Mouth/Throat:      Mouth: Mucous membranes are moist.      Pharynx: Oropharynx is clear. No oropharyngeal exudate or posterior oropharyngeal erythema.   Eyes:      Extraocular Movements: Extraocular movements intact.      Conjunctiva/sclera: Conjunctivae normal.   Cardiovascular:      Rate and Rhythm: Tachycardia present. Rhythm irregular.      Pulses: Normal pulses.      Heart sounds: Normal heart  sounds. No murmur heard.  Pulmonary:      Effort: Pulmonary effort is normal. No respiratory distress.      Breath sounds: No stridor. Rales present. No wheezing.   Abdominal:      General: Abdomen is flat. Bowel sounds are normal. There is no distension.      Palpations: Abdomen is soft. There is no mass.      Tenderness: There is no abdominal tenderness.   Musculoskeletal:      Cervical back: Normal range of motion.      Right lower leg: Edema present.      Left lower leg: Edema present.   Skin:     General: Skin is warm.      Capillary Refill: Capillary refill takes less than 2 seconds.   Neurological:      General: No focal deficit present.      Mental Status: She is alert and oriented to person, place, and time. Mental status is at baseline.      Cranial Nerves: No cranial nerve deficit.   Psychiatric:         Mood and Affect: Mood normal.         Behavior: Behavior normal.         Fluids    Intake/Output Summary (Last 24 hours) at 3/18/2024 0632  Last data filed at 3/17/2024 1920  Gross per 24 hour   Intake 240 ml   Output --   Net 240 ml       Laboratory  Recent Labs     03/16/24  1835 03/17/24  0306 03/18/24  0059   WBC 7.0 6.0 9.1   RBC 4.29 4.77 4.20   HEMOGLOBIN 11.5* 12.4 11.0*   HEMATOCRIT 34.7* 40.0 33.7*   MCV 80.9* 83.9 80.2*   MCH 26.8* 26.0* 26.2*   MCHC 33.1 31.0* 32.6   RDW 58.3* 59.7* 57.1*   PLATELETCT 302 303 294   MPV 11.0 10.6 11.2     Recent Labs     03/16/24  1907 03/17/24  0306 03/18/24  0059   SODIUM 136 135 135   POTASSIUM 4.0 4.4 3.9   CHLORIDE 101 100 96   CO2 22 22 22   GLUCOSE 107* 123* 138*   BUN 21 22 30*   CREATININE 0.98 1.08 1.25   CALCIUM 8.9 8.9 9.1                   Imaging  EC-ECHOCARDIOGRAM LTD W/ CONT   Final Result      CT-ABDOMEN-PELVIS WITH   Final Result      1.  No evidence of bowel obstruction or focal inflammatory change.      2.  Renal cortical thinning and multiple areas of cortical scarring.      3.  Sigmoid diverticulosis.      4.  Small amount of free fluid  dependently within the pelvis.      5.  Stable bilateral pleural effusions and bibasilar atelectasis.      6.  Extensive atherosclerotic vascular calcification.      7.  Prior cholecystectomy.      8.  Fatty change of the liver.      DX-CHEST-PORTABLE (1 VIEW)   Final Result      Possible slight decrease in small right pleural effusion. No other significant change in bilateral interstitial opacities, bibasilar atelectasis and small left pleural effusion.      EC-MARGO W/O CONT    (Results Pending)   CL-CARDIOVERSION    (Results Pending)        Assessment/Plan  * Acute on chronic HFrEF (heart failure with reduced ejection fraction) (HCC)- (present on admission)  Assessment & Plan  3/18/2024  EF 20 to 25% from prior admission  Was discharged on Entresto    Borderline hypotension, unable to tolerate optimize HF meds  Gentle diuresis as tolerated-Lasix 20 mg IV twice daily  Repeat echo     Age-related physical debility  Assessment & Plan  PT/OT    Elevated troponin  Assessment & Plan  3/18/2024  Probable demand ischemia in setting of CHF exacerbation and uncontrolled A-fib  Continue home Plavix, statin  Also on therapeutic Lovenox    Atrial fibrillation with RVR (HCC)  Assessment & Plan  3/18/2024  ?  New onset  Maintain potassium above 4, magnesium above 2  Treat underlying CHF exacerbation    Borderline hypotension  --Load with IV digoxin  --Continue metoprolol  Therapeutic Lovenox    Status post cardioversion.  Cards following.  Switch from Digoxin to Amio    Hypotension- (present on admission)  Assessment & Plan  3/18/2024  Monitor  Possibly secondary to uncontrolled afib -- control  Continue midodrine    Acute respiratory failure with hypoxia (HCC)- (present on admission)  Assessment & Plan  3/18/2024  On 3 to 4 L-wean off as tolerated  Multifactorial: CHF exacerbation, A-fib with RVR, pneumonia, cardiogenic shock  Diuresis  Pulmonary hygiene  Procalcitonin negative.  DC antibiotics.  Patient did tolerated Augmentin  despite reported PCN allergy    ACP (advance care planning)- (present on admission)  Assessment & Plan  Goal care discussed with patient in ER.  She stated she does not wish to have aggressive/heroic life-sustaining measures-no CPR/defibrillation/intubation or mechanical ventilation.  She is however agreeable for minimally invasive medical management as clinically warranted to treat for her CHF exacerbation atrial fibrillation with RVR.  Diagnosis, prognosis, questions or concerns.  DNR/DNI status confirmed.  ACP: 16 minutes         VTE prophylaxis: Lovenox    I have performed a physical exam and reviewed and updated ROS and Plan today (3/18/2024). In review of yesterday's note (3/17/2024), there are no changes except as documented above.    Greater than 52 minutes spent prepping to see patient (e.g. review of tests) obtaining and/or reviewing separately obtained history. Performing a medically appropriate examination and/ evaluation.  Counseling and educating the patient/family/caregiver.  Ordering medications, tests, or procedures.  Referring and communicating with other health care professionals.  Documenting clinical information in EPIC.  Independently interpreting results and communicating results to patient/family/caregiver.  Care coordination.

## 2024-03-18 NOTE — PROGRESS NOTES
NO HARD CHART:      Patient to procedure Room for MARGO/CV with no hard chart, only loose papers. Per Floor RN, no hard chart available. Procedure RN completed Pre-Procedure Consent paperwork packet, including: WHO Yellow Sheet signed by RN and MD, Informed consent for MARGO/CV procedure signed by RN, patient, and Cardiology, and Informed consent for Anesthesia signed by Anesthesia MD and patient. Procedure Packet secured to loose papers and hand delivered to receiving PACU RN who acknowledged receipt and no hard lyudmila    Dentures x2 given to PACU RN.

## 2024-03-18 NOTE — PROGRESS NOTES
Assumed care of patient at bedside report from NOC RN. Updated on POC. Patient currently A & O x 3; up X1 assist; without complaints of acute pain. Call light within reach. Whiteboard updated. Fall precautions in place. Bed locked and in lowest position. All questions answered. No other needs indicated at this time.    Bedside report received from off going RN/tech: Dinora, assumed care of patient.     Fall Risk Score: HIGH RISK  Fall risk interventions in place: Place yellow fall risk ID band on patient, Provide patient/family education based on risk assessment, Educate patient/family to call staff for assistance when getting out of bed, Place fall precaution signage outside patient door, Place patient in room close to nursing station, Utilize bed/chair fall alarm, and Bed alarm connected correctly  Bed type: Regular (Regan Score less than 17 interventions in place)  Patient on cardiac monitor: Yes  IVF/IV medications: Not Applicable   Oxygen: Room Air  Bedside sitter: Not Applicable   Isolation: Not applicable

## 2024-03-18 NOTE — OR NURSING
Report called to Dinora ALLISON. Plan of care discussed. Patient resting with even and unlabored respirations. No s/s of distress noted. Upper and lower dentures in place. EKG completed. VSS.

## 2024-03-18 NOTE — ANESTHESIA TIME REPORT
Anesthesia Start and Stop Event Times       Date Time Event    3/18/2024 1049 Ready for Procedure     1049 Anesthesia Start     1115 Anesthesia Stop          Responsible Staff  03/18/24      Name Role Begin End    Aleksander Mayer M.D. Anesth 1049 1115          Overtime Reason:  no overtime (within assigned shift)    Comments:

## 2024-03-18 NOTE — ANESTHESIA PREPROCEDURE EVALUATION
Date/Time: 03/18/24 1400    Scheduled providers: Papo NICOLE M.D.; Aleksander Mayer M.D.    Procedures:       EC-MARGO W/O CONT      CL-CARDIOVERSION    Diagnosis:       Acute on chronic HFrEF (heart failure with reduced ejection fraction) (HCC) [I50.23]      Acute on chronic HFrEF (heart failure with reduced ejection fraction) (HCC) [I50.23]      Acute on chronic HFrEF (heart failure with reduced ejection fraction) (HCC) [I50.23]    Indications: See Assoicated Dx    Location: [facility] - Echocardiology [facility]            Relevant Problems   NEURO   (positive) Seizure disorder (HCC)      CARDIAC   (positive) Acute on chronic combined systolic and diastolic congestive heart failure (HCC)   (positive) Acute systolic congestive heart failure (HCC)   (positive) Aortic ectasia, abdominal (HCC)   (positive) Arteriosclerosis of coronary artery   (positive) Atrial fibrillation with RVR (HCC)   (positive) Coronary artery disease involving coronary bypass graft of native heart   (positive) Essential hypertension   (positive) Hot flashes      GI   (positive) Chronic gastric ulcer with perforation (HCC)   (positive) Peptic ulcer disease      ENDO   (positive) Hypothyroid       Physical Exam    Airway   Mallampati: II  TM distance: >3 FB  Neck ROM: full       Cardiovascular - normal exam  Rhythm: regular  Rate: normal  (-) murmur     Dental - normal exam           Pulmonary - normal exam  Breath sounds clear to auscultation     Abdominal    Neurological - normal exam                   Anesthesia Plan    ASA 3   ASA physical status 3 criteria: MI or angina - history (> 3 months)    Plan - general       Airway plan will be natural airway          Induction: intravenous    Postoperative Plan: Postoperative administration of opioids is intended.    Pertinent diagnostic labs and testing reviewed    Informed Consent:    Anesthetic plan and risks discussed with patient.    Use of blood products discussed with:  patient whom consented to blood products.

## 2024-03-18 NOTE — PROGRESS NOTES
Cardiology Progress Note    Interval History:  Patient is being seen in cardiac follow-up for A-fib with RVR.    No acute events overnight.  Evaluated bedside this morning.  She has no complaints of chest pain.  Remains in atrial fibrillation with ventricular rates ranging from 100 to 130 bpm despite AV suri blockers.    Telemetry: A-fib with RVR    Medications / Drug list prior to admission:  No current facility-administered medications on file prior to encounter.     Current Outpatient Medications on File Prior to Encounter   Medication Sig Dispense Refill    furosemide (LASIX) 20 MG Tab Take 1 Tablet by mouth every day.      oxyCODONE immediate-release (ROXICODONE) 5 MG Tab Take 1 Tablet by mouth every 6 hours as needed for Severe Pain for up to 5 days. 15 Tablet 0    atorvastatin (LIPITOR) 10 MG Tab TAKE 1 TABLET BY MOUTH EVERY EVENING. FOR CHOLESTEROL. 100 Tablet 3    metoprolol SR (TOPROL XL) 25 MG TABLET SR 24 HR Take 0.5 Tablets by mouth every evening. 30 Tablet 0    DULoxetine (CYMBALTA) 60 MG Cap DR Particles delayed-release capsule Take 60 mg by mouth every morning.      sucralfate (CARAFATE) 1 GM Tab Take 1 g by mouth 2 times a day.      acetaminophen (TYLENOL) 325 MG Tab Take 650 mg by mouth every four hours as needed for Mild Pain.      estradiol (ESTRACE) 1 MG Tab TAKE 1 TABLET BY MOUTH EVERY DAY. 90 Tablet 3    levothyroxine (SYNTHROID) 125 MCG Tab TAKE 1 TABLET BY MOUTH EVERY MORNING ON AN EMPTY STOMACH. 30 Tablet 11    omeprazole (PRILOSEC) 20 MG delayed-release capsule TAKE 1 CAPSULE BY MOUTH EVERY DAY. 30 Capsule 11    clopidogrel (PLAVIX) 75 MG Tab TAKE 1 TABLET BY MOUTH EVERY DAY. 30 Tablet 11    loratadine (CLARITIN) 10 MG Tab Take 1 Tablet by mouth every day. 90 Tablet 3    gabapentin (NEURONTIN) 600 MG tablet Take 1 Tablet by mouth 2 times a day. 180 Tablet 1       Current list of administered Medications:    Current Facility-Administered Medications:     midodrine (Proamatine) tablet 5 mg,  5 mg, Oral, TID WITH MEALS, Marques Parker D.O., 5 mg at 03/18/24 0853    metoprolol tartrate (Lopressor) tablet 12.5 mg, 12.5 mg, Oral, TID, Melisa Gamez M.D., 12.5 mg at 03/18/24 0853    Respiratory Therapy Consult, , Nebulization, Continuous RT, Tyshawn Sorto M.D.    acetaminophen (Tylenol) tablet 650 mg, 650 mg, Oral, Q6HRS PRN, Tyshawn Sorto M.D., 650 mg at 03/17/24 0513    labetalol (Normodyne/Trandate) injection 10 mg, 10 mg, Intravenous, Q4HRS PRN, Tyshawn Sorto M.D.    Metoprolol Tartrate (Lopressor) injection 5 mg, 5 mg, Intravenous, Q5 MIN PRN, Tyshawn Sorto M.D.    [COMPLETED] digoxin (Lanoxin) injection 125 mcg, 125 mcg, Intravenous, Q6HR, 125 mcg at 03/17/24 0014 **FOLLOWED BY** [COMPLETED] digoxin (Lanoxin) injection 125 mcg, 125 mcg, Intravenous, Q6HR, 125 mcg at 03/17/24 0520 **FOLLOWED BY** [COMPLETED] digoxin (Lanoxin) injection 125 mcg, 125 mcg, Intravenous, Q6HR, 125 mcg at 03/17/24 1126 **FOLLOWED BY** digoxin (Lanoxin) tablet 125 mcg, 125 mcg, Oral, QAM, Tyshawn Sorto M.D., 125 mcg at 03/18/24 0553    ondansetron (Zofran) syringe/vial injection 4 mg, 4 mg, Intravenous, Q4HRS PRN, Tyshawn Sorto M.D.    ondansetron (Zofran ODT) dispertab 4 mg, 4 mg, Oral, Q4HRS PRN, Tyshawn Sorto M.D.    senna-docusate (Pericolace Or Senokot S) 8.6-50 MG per tablet 2 Tablet, 2 Tablet, Oral, Q EVENING, 2 Tablet at 03/17/24 1802 **AND** polyethylene glycol/lytes (Miralax) Packet 1 Packet, 1 Packet, Oral, QDAY PRN, Tyshawn Sorto M.D.    atorvastatin (Lipitor) tablet 20 mg, 20 mg, Oral, Nightly, Tyshawn Sorto M.D., 20 mg at 03/17/24 2008    clopidogrel (Plavix) tablet 75 mg, 75 mg, Oral, DAILY, Tyshawn Sorto M.D., 75 mg at 03/18/24 0540    DULoxetine (Cymbalta) capsule 60 mg, 60 mg, Oral, QAM, Tyshawn Sorto M.D., 60 mg at 03/18/24 0540    gabapentin (Neurontin) capsule 600 mg, 600 mg, Oral, BID, Tyshawn Sorto M.D., 600 mg at 03/18/24 0540    levothyroxine (Synthroid) tablet 125 mcg, 125 mcg, Oral, AM ES, Tyshawn  LIU Sorto, 125 mcg at 03/18/24 0540    omeprazole (PriLOSEC) capsule 20 mg, 20 mg, Oral, BID, Tyshawn Sorto M.D., 20 mg at 03/18/24 0540    oxyCODONE immediate-release (Roxicodone) tablet 5 mg, 5 mg, Oral, Q6HRS PRN, Tyshawn Sorto M.D., 5 mg at 03/18/24 0856    enoxaparin (Lovenox) inj 60 mg, 1 mg/kg, Subcutaneous, Q12HRS, Tyshawn Sorto M.D., 60 mg at 03/18/24 0540    furosemide (Lasix) injection 20 mg, 20 mg, Intravenous, BID DIURETIC, Marques Parker D.O., 20 mg at 03/18/24 0540    doxycycline monohydrate (Adoxa) tablet 100 mg, 100 mg, Oral, Q12HRS, Tyshawn Sorto M.D., 100 mg at 03/18/24 0540    Past Medical History:   Diagnosis Date    Acute blood loss anemia 10/21/2021    Acute on chronic systolic heart failure (HCC) 8/24/2020    Acute perforated gastric ulcer with hemorrhage ([facility]) 10/21/2021    Anemia 2/14/2022    Angina decubitus 6/18/2010    CAD (coronary artery disease)     Cannabis use, unspecified with intoxication, uncomplicated ([facility]) 2/14/2022    Fibromyalgia     Generalized abdominal pain 12/5/2021    Heart attack ([facility]) 2008    Hypotension due to medication 7/18/2022    Intractable nausea and vomiting 12/4/2021    Pain in the chest 11/15/2020       Past Surgical History:   Procedure Laterality Date    CORONARY ARTERY BYPAS, 4  2008    CORONARY ARTERY BYPASS, 1  2008       History reviewed. No pertinent family history.  Patient family history was personally reviewed, no pertinent family history to current presentation    Social History     Tobacco Use    Smoking status: Former    Smokeless tobacco: Never   Vaping Use    Vaping Use: Never used   Substance Use Topics    Alcohol use: Never    Drug use: Not Currently       ALLERGIES:  Allergies   Allergen Reactions    Latex Unspecified          Tramadol Rash     Itchy red in nature    Codeine Unspecified     Unknown reaction      Lisinopril Unspecified     Unknown reaction    Penicillin G Unspecified     Unknown reaction      Pregabalin Unspecified      "Lyrica affects patients vision.    Simvastatin Unspecified     Unknown reaction      Sulfa Drugs Unspecified     Patient states \"I can't remember what this does to me\" but recalls and allergy.        Review of systems:  A complete review of symptoms was reviewed with the patient. This is reviewed in H&P and PMH. ALL OTHERS reviewed and negative    Physical exam:  Patient Vitals for the past 24 hrs:   BP Temp Temp src Pulse Resp SpO2 Weight   03/18/24 0813 105/70 36.3 °C (97.3 °F) Temporal (!) 130 18 90 % --   03/18/24 0345 93/57 -- Temporal 98 18 93 % 66.2 kg (145 lb 15.1 oz)   03/17/24 2347 107/55 -- Temporal (!) 130 18 92 % --   03/17/24 1920 108/59 -- Temporal (!) 102 16 92 % --   03/17/24 1627 102/65 36.2 °C (97.2 °F) Temporal (!) 129 18 94 % --   03/17/24 1359 120/75 36.5 °C (97.7 °F) Temporal (!) 135 19 95 % --         General: Not in acute distress  HEENT: OP clear   Neck:  No carotid bruits, No JVD appreciated  CVS: Irregularly irregular, Normal S1, S2. No murmurs, rubs or gallops  Resp: Normal respiratory effort, lungs CTA bilaterally. No rales or rhonchi  Abdomen: Soft, non-distended, non-tender to palpation  Skin: no cyanosis  Neurological: Alert and oriented x3, moves all extremities, no focal neurologic deficits  Extremities:   Extremities warm, pulses intact, no lower extremity edema bilaterally      Data:  Laboratory studies personally reviewed by me:  Recent Results (from the past 24 hour(s))   Lactic Acid    Collection Time: 03/17/24 10:03 AM   Result Value Ref Range    Lactic Acid 2.4 (H) 0.5 - 2.0 mmol/L   MRSA By PCR (Amp)    Collection Time: 03/17/24  1:50 PM    Specimen: Nares; Respirate   Result Value Ref Range    MRSA by PCR Negative Negative   EC-ECHOCARDIOGRAM LTD W/ CONT    Collection Time: 03/17/24  6:53 PM   Result Value Ref Range    Eject.Frac. MOD BP 18.11     Eject.Frac. MOD 4C 21.21     Eject.Frac. MOD 2C 12.89     Left Ventrical Ejection Fraction 25    CBC WITHOUT DIFFERENTIAL    " Collection Time: 03/18/24 12:59 AM   Result Value Ref Range    WBC 9.1 4.8 - 10.8 K/uL    RBC 4.20 4.20 - 5.40 M/uL    Hemoglobin 11.0 (L) 12.0 - 16.0 g/dL    Hematocrit 33.7 (L) 37.0 - 47.0 %    MCV 80.2 (L) 81.4 - 97.8 fL    MCH 26.2 (L) 27.0 - 33.0 pg    MCHC 32.6 32.2 - 35.5 g/dL    RDW 57.1 (H) 35.9 - 50.0 fL    Platelet Count 294 164 - 446 K/uL    MPV 11.2 9.0 - 12.9 fL   Basic Metabolic Panel    Collection Time: 03/18/24 12:59 AM   Result Value Ref Range    Sodium 135 135 - 145 mmol/L    Potassium 3.9 3.6 - 5.5 mmol/L    Chloride 96 96 - 112 mmol/L    Co2 22 20 - 33 mmol/L    Glucose 138 (H) 65 - 99 mg/dL    Bun 30 (H) 8 - 22 mg/dL    Creatinine 1.25 0.50 - 1.40 mg/dL    Calcium 9.1 8.5 - 10.5 mg/dL    Anion Gap 17.0 (H) 7.0 - 16.0   MAGNESIUM    Collection Time: 03/18/24 12:59 AM   Result Value Ref Range    Magnesium 2.0 1.5 - 2.5 mg/dL   PHOSPHORUS    Collection Time: 03/18/24 12:59 AM   Result Value Ref Range    Phosphorus 3.0 2.5 - 4.5 mg/dL   ESTIMATED GFR    Collection Time: 03/18/24 12:59 AM   Result Value Ref Range    GFR (CKD-EPI) 42 (A) >60 mL/min/1.73 m 2       Imaging:  EC-ECHOCARDIOGRAM LTD W/ CONT   Final Result      CT-ABDOMEN-PELVIS WITH   Final Result      1.  No evidence of bowel obstruction or focal inflammatory change.      2.  Renal cortical thinning and multiple areas of cortical scarring.      3.  Sigmoid diverticulosis.      4.  Small amount of free fluid dependently within the pelvis.      5.  Stable bilateral pleural effusions and bibasilar atelectasis.      6.  Extensive atherosclerotic vascular calcification.      7.  Prior cholecystectomy.      8.  Fatty change of the liver.      DX-CHEST-PORTABLE (1 VIEW)   Final Result      Possible slight decrease in small right pleural effusion. No other significant change in bilateral interstitial opacities, bibasilar atelectasis and small left pleural effusion.      EC-MARGO W/O CONT    (Results Pending)   CL-CARDIOVERSION    (Results Pending)        EKG tracings dated 3/17/2024 personally reviewed by me atrial fibrillation with rapid ventricular response.    Echo 3/17/2024:  Limited directed echo to assess LVEF,  Enlarged left ventricular cavity size with severely reduced systolic   function.  Visually estimated LVEF of 20-25%.  Global hypokinesis.  No   obvious apical thrombus seen.   Prior echocardiogram 2/27/2024, no significant interval changes.    Echo 2/27/2024:  Severely reduced left ventricular systolic function.  The left ventricular ejection fraction is visually estimated to be 20-25%.  Normal right ventricular size. Reduced right ventricular systolic function.  Moderately dilated left atrium  Moderate mitral annular calcification.  Moderate mitral regurgitation.  Moderate tricuspid regurgitation.  Right heart pressures are consistent with moderate pulmonary   hypertension.    All pertinent features of laboratory and imaging reviewed including primary images where applicable      Principal Problem:    Acute on chronic HFrEF (heart failure with reduced ejection fraction) (HCC) (POA: Yes)  Active Problems:    ACP (advance care planning) (POA: Yes)    Acute respiratory failure with hypoxia (HCC) (POA: Yes)    Hypotension (POA: Yes)    Atrial fibrillation with RVR (HCC) (POA: Unknown)    Elevated troponin (POA: Unknown)    Age-related physical debility (POA: Unknown)  Resolved Problems:    * No resolved hospital problems. *      Assessment / Plan:  89-year-old woman with past medical history significant for CAD status post CABG in 2018, hypertension, hyperlipidemia, LV dysfunction with HFrEF LVEF 20 to 25% who was admitted with acute on chronic systolic heart failure and A-fib with RVR.    A-fib with RVR  Acute on chronic HFrEF  CAD status post CABG  Hypertension  Hyperlipidemia    Recommendations:  Admitted with Acute HFrEF in the setting of AF with RVR  Remains in AF despite AV suri blockers - continue low dose metoprolol and digoxin for rate  control  Will plan for MARGO guided cardioversion for restoration of sinus rhythm this afternoon.  Keep NPO  Continue IV diuretics for volume optimization.  Continue therapeutic Lovenox for anticoagulation.  Will eventually need to be transition to OAC prior to discharge  Regarding CAD history, she has a known history of CABG. continue beta-blocker therapy.  Troponins minimally elevated and remained flat on repeat.  Continue atorvastatin 20 mg daily    The risks, benefits, and alternatives to transesophageal echocardiogram (MARGO) with IV sedation were discussed with the patient in specific detail, including oropharyngeal and esophageal traumas including hoarseness and dysphagia after the procedure. Rare cases demonstrating serious or fatal complications associated with MARGO have been reported in the adult population, including cardiac, pulmonary and bleeding complications in less than 1% of people. Patients with an identified intracardiac thrombus are at increased risk for embolic events (absolute risk uncertain, range 0%-38%), and this appears to be reduced with anticoagulation therapy (absolute risk reduction uncertain). The patient verbalized understanding about these possible complications, and wishes to proceed with this procedure.    The risks, benefits, and alternatives to electrical cardioversion were discussed in great detail. We discussed that conversion of atrial fibrillation to normal rhythm, at least transiently, is successful in 90 to 95% of patients. However, maintaining a normal rhythm depends on a number of factors, including underlying heart disease and antiarrhythmic medications. Atrial fibrillation often recurs with time and other treatments may be necessary. Risks of cardioversion are low as long as anticoagulation issues are handled appropriately. There is a small (less than 1%) risk of embolic events, including stroke. Risks of electrical shock include mild muscle soreness and mild skin burning at  the site of electrode placement. There is also a risk that cardioversion can stimulate more dangerous arrhythmias. The patient verbalized understanding of these potential complications and wishes to proceed with this procedure.      It is my pleasure to participate in the care of Ms. Montejo.  Please do not hesitate to contact me with questions or concerns.    Papo Veloz MD  Cardiologist, [facility]    3/18/2024    Please note that this dictation was created using voice recognition software. I have made every reasonable attempt to correct obvious errors, but it is possible there are errors of grammar and possibly content that I did not discover before finalizing the note.

## 2024-03-18 NOTE — CARE PLAN
The patient is Stable - Low risk of patient condition declining or worsening    Shift Goals  Clinical Goals: Diurese, control heart rate  Patient Goals: Sleep  Family Goals: DEE DEE    Progress made toward(s) clinical / shift goals:  Continuing to diurese   Problem: Fall Risk  Goal: Patient will remain free from falls  Description: Target End Date:  Prior to discharge or change in level of care    Document interventions on the Reny Wagner Fall Risk Assessment    1.  Assess for fall risk factors  2.  Implement fall precautions  Note: Fall risk discussed with patient. Informed patient to utilize the call light when needing to use the bathroom. Bed alarm on      Problem: Pain - Standard  Goal: Alleviation of pain or a reduction in pain to the patient’s comfort goal  Description: Target End Date:  Prior to discharge or change in level of care    Document on Vitals flowsheet    1.  Document pain using the appropriate pain scale per order or unit policy  2.  Educate and implement non-pharmacologic comfort measures (i.e. relaxation, distraction, massage, cold/heat therapy, etc.)  3.  Pain management medications as ordered  4.  Reassess pain after pain med administration per policy  5.  If opiods administered assess patient's response to pain medication is appropriate per POSS sedation scale  6.  Follow pain management plan developed in collaboration with patient and interdisciplinary team (including palliative care or pain specialists if applicable)  3/18/2024 0053 by Fortunato Juárez R.N.  Outcome: Progressing  Note: Patient reports generalized systemic pain. Pain scale and PRN pain meds discussed with patient along with non-pharm pain control methods.   3/18/2024 0018 by Fortunato Juárez R.N.  Outcome: Progressing  Note: Patient reports generalized systemic pain. Pain scale and PRN pain meds discussed with patient.        Patient is not progressing towards the following goals:

## 2024-03-18 NOTE — PROGRESS NOTES
Bedside report received from off going RN/tech: Fortunato, assumed care of patient.     Fall Risk Score: HIGH RISK  Fall risk interventions in place: Place yellow fall risk ID band on patient, Provide patient/family education based on risk assessment, Educate patient/family to call staff for assistance when getting out of bed, Place fall precaution signage outside patient door, Utilize bed/chair fall alarm, and Bed alarm connected correctly  Bed type: Regular (Regan Score less than 17 interventions in place)  Patient on cardiac monitor: Yes  IVF/IV medications: Not Applicable   Oxygen: Room Air  Bedside sitter: Not Applicable   Isolation: Not applicable

## 2024-03-18 NOTE — PROCEDURES
Procedure performed: External DC Cardioversion    : Papo Veloz MD    Assistant: None    Anesthesia: Per anesthesia services    Indication: Atrial fibrillation    Preprocedural Diagnosis:   Atrial Fibrillation  Postprocedural Diagnosis: Sinus Rhythm    Description of procedure:    Ms. Montejo was brought to the pre/post procedure area of the cath lab. Informed consent was obtained. Defibrillator pads were placed in the anterior and posterior position.  A TIME-OUT was performed. Adequate sedation was obtained with assistance of anesthesia. A MARGO was performed and confirmed that there was NO left atrial thrombus. Patient was successfully cardioverted with 200 J synchronized biphasic energy into sinus rhythm. She was monitored in the recovery area until criteria met.    Conclusion: Successful DC cardioversion    EBL: None    Complications: None apparent      Electronically signed: Papo Veloz MD, FACC  Cardiologist, [facility]

## 2024-03-18 NOTE — ANESTHESIA POSTPROCEDURE EVALUATION
Patient: Davie Montejo    Procedure Summary       Date: 03/18/24 Room / Location: [facility] Echocardiology [facility]    Anesthesia Start: 1049 Anesthesia Stop: 1115    Procedures:       EC-MARGO W/O CONT      CL-CARDIOVERSION Diagnosis:       Acute on chronic HFrEF (heart failure with reduced ejection fraction) (HCC)      Acute on chronic HFrEF (heart failure with reduced ejection fraction) (HCC)      Acute on chronic HFrEF (heart failure with reduced ejection fraction) (HCC)      (See Assoicated Dx)    Scheduled Providers: Papo NICOLE M.D.; Aleksander Mayer M.D. Responsible Provider: Aleksander Mayer M.D.    Anesthesia Type: general ASA Status: 3            Final Anesthesia Type: general  Last vitals  BP   Blood Pressure : (!) 149/108    Temp   36.4 °C (97.5 °F)    Pulse   66   Resp   16    SpO2   96 %      Anesthesia Post Evaluation    Patient location during evaluation: PACU  Patient participation: complete - patient participated  Level of consciousness: awake and alert  Pain score: 0    Airway patency: patent  Anesthetic complications: no  Cardiovascular status: hemodynamically stable  Respiratory status: acceptable  Hydration status: euvolemic    PONV: none          There were no known notable events for this encounter.     Nurse Pain Score: 0 (NPRS)

## 2024-03-18 NOTE — CARE PLAN
The patient is Watcher - Medium risk of patient condition declining or worsening    Shift Goals  Clinical Goals: monitor HR, cardioversion  Patient Goals: rest  Family Goals: jonah    Progress made toward(s) clinical / shift goals:    Problem: Pain - Standard  Goal: Alleviation of pain or a reduction in pain to the patient’s comfort goal  Outcome: Progressing  Note: Assess and monitor for pain. Provide pharmacological and non-pharmacological interventions as appropriate. Re-evaluate and continue to monitor.        Problem: Care Map:  Day of Discharge Optimal Outcome for the Heart Failure Patient  Goal: Day of Discharge:  Optimal Care of the heart failure patient  Outcome: Progressing  Note: Heart failure booklet and education provided, daily weights obtained, I/Os monitored       Patient is not progressing towards the following goals:

## 2024-03-18 NOTE — DISCHARGE PLANNING
Care Transition Team Assessment  LMSW conducted a chart review due to pt being a readmit from 3/8/24 and was discharged on 3/13/24. Pt was DC last admission to [facility]. Pt lives alone in a 2 story apartment and does not use any DME at [facility]. Pt's emergency contact is pt's  and support. Prior to last admission pt was independent with ADLS and IADLS. Pt's PCP is Jeff TRIMBLE. Pt denies any SA or MH concerns. Pt's preferred pharmacy is [email or web address]. Pt receives $900 monthly SSI. Pt's insurance is Medicare and Medicaid. Upon DC plan is DC back to [facility] [place].    Information Source  Orientation Level: Oriented X4  Information Given By: Other (Comments) (Chart review)  Who is responsible for making decisions for patient? : Patient    Readmission Evaluation  Is this a readmission?: Yes - unplanned readmission    Elopement Risk  Legal Hold: No  Ambulatory or Self Mobile in Wheelchair: Yes  Disoriented: No  Psychiatric Symptoms: None  History of Wandering: No  Elopement this Admit: No  Vocalizing Wanting to Leave: No  Displays Behaviors, Body Language Wanting to Leave: No-Not at Risk for Elopement  Elopement Risk: Not at Risk for Elopement  Picture of Patient on Inside Chart Front Cover: Yes  Purple Armband on Patient: Yes    Interdisciplinary Discharge Planning  Lives with - Patient's Self Care Capacity: Alone and Able to Care For Self  Patient or legal guardian wants to designate a caregiver: No  Support Systems: Family Member(s)  Housing / Facility: 2 Story Apartment / Condo  Prior Services: Continuous (24 Hour) Care Giving Per Service, Other (Comments)  Durable Medical Equipment: Not Applicable    Discharge Preparedness  What is your plan after discharge?: Skilled nursing facility  What are your discharge supports?: Other (comment) ()  Prior Functional Level: Independent with Activities of Daily Living  Difficulity with ADLs: Walking  Difficulity with IADLs: None    Functional Assesment  Prior  Functional Level: Independent with Activities of Daily Living    Finances  Financial Barriers to Discharge: No  Prescription Coverage: Yes    Vision / Hearing Impairment  Vision Impairment : Yes  Right Eye Vision: Impaired, Wears Glasses  Left Eye Vision: Impaired, Wears Glasses  Hearing Impairment: Both Ears    Advance Directive  Advance Directive?: None    Domestic Abuse  Have you ever been the victim of abuse or violence?: No  Physical Abuse or Sexual Abuse: No  Verbal Abuse or Emotional Abuse: No  Possible Abuse/Neglect Reported to:: Not Applicable    Psychological Assessment  History of Substance Abuse: None  History of Psychiatric Problems: No  Non-compliant with Treatment: No  Newly Diagnosed Illness: No    Anticipated Discharge Information  Discharge Disposition: D/T to SNF with Medicare cert in anticipation of skilled care (03)  Discharge Address: [address] [place] [address], [place] [id number]  Discharge Contact Phone Number: [phone]

## 2024-03-19 LAB
ANION GAP SERPL CALC-SCNC: 13 MMOL/L (ref 7–16)
BACTERIA UR CULT: NORMAL
BUN SERPL-MCNC: 34 MG/DL (ref 8–22)
CALCIUM SERPL-MCNC: 8.8 MG/DL (ref 8.5–10.5)
CHLORIDE SERPL-SCNC: 100 MMOL/L (ref 96–112)
CO2 SERPL-SCNC: 22 MMOL/L (ref 20–33)
CREAT SERPL-MCNC: 1.48 MG/DL (ref 0.5–1.4)
ERYTHROCYTE [DISTWIDTH] IN BLOOD BY AUTOMATED COUNT: 58.4 FL (ref 35.9–50)
GFR SERPLBLD CREATININE-BSD FMLA CKD-EPI: 34 ML/MIN/1.73 M 2
GLUCOSE SERPL-MCNC: 90 MG/DL (ref 65–99)
HCT VFR BLD AUTO: 35.5 % (ref 37–47)
HGB BLD-MCNC: 11.2 G/DL (ref 12–16)
MAGNESIUM SERPL-MCNC: 2.1 MG/DL (ref 1.5–2.5)
MCH RBC QN AUTO: 26 PG (ref 27–33)
MCHC RBC AUTO-ENTMCNC: 31.5 G/DL (ref 32.2–35.5)
MCV RBC AUTO: 82.6 FL (ref 81.4–97.8)
PHOSPHATE SERPL-MCNC: 3.9 MG/DL (ref 2.5–4.5)
PLATELET # BLD AUTO: 294 K/UL (ref 164–446)
PMV BLD AUTO: 11.1 FL (ref 9–12.9)
POTASSIUM SERPL-SCNC: 4.1 MMOL/L (ref 3.6–5.5)
PROCALCITONIN SERPL-MCNC: 0.12 NG/ML
RBC # BLD AUTO: 4.3 M/UL (ref 4.2–5.4)
SIGNIFICANT IND 70042: NORMAL
SITE SITE: NORMAL
SODIUM SERPL-SCNC: 135 MMOL/L (ref 135–145)
SOURCE SOURCE: NORMAL
WBC # BLD AUTO: 8.5 K/UL (ref 4.8–10.8)

## 2024-03-19 PROCEDURE — 700102 HCHG RX REV CODE 250 W/ 637 OVERRIDE(OP): Performed by: INTERNAL MEDICINE

## 2024-03-19 PROCEDURE — A9270 NON-COVERED ITEM OR SERVICE: HCPCS | Performed by: STUDENT IN AN ORGANIZED HEALTH CARE EDUCATION/TRAINING PROGRAM

## 2024-03-19 PROCEDURE — 700111 HCHG RX REV CODE 636 W/ 250 OVERRIDE (IP): Performed by: INTERNAL MEDICINE

## 2024-03-19 PROCEDURE — A9270 NON-COVERED ITEM OR SERVICE: HCPCS | Performed by: INTERNAL MEDICINE

## 2024-03-19 PROCEDURE — 97162 PT EVAL MOD COMPLEX 30 MIN: CPT

## 2024-03-19 PROCEDURE — 700102 HCHG RX REV CODE 250 W/ 637 OVERRIDE(OP): Performed by: STUDENT IN AN ORGANIZED HEALTH CARE EDUCATION/TRAINING PROGRAM

## 2024-03-19 PROCEDURE — 84145 PROCALCITONIN (PCT): CPT

## 2024-03-19 PROCEDURE — 36415 COLL VENOUS BLD VENIPUNCTURE: CPT

## 2024-03-19 PROCEDURE — 84100 ASSAY OF PHOSPHORUS: CPT

## 2024-03-19 PROCEDURE — 85027 COMPLETE CBC AUTOMATED: CPT

## 2024-03-19 PROCEDURE — 83735 ASSAY OF MAGNESIUM: CPT

## 2024-03-19 PROCEDURE — 99233 SBSQ HOSP IP/OBS HIGH 50: CPT | Performed by: INTERNAL MEDICINE

## 2024-03-19 PROCEDURE — 700102 HCHG RX REV CODE 250 W/ 637 OVERRIDE(OP): Performed by: PHYSICIAN ASSISTANT

## 2024-03-19 PROCEDURE — 97535 SELF CARE MNGMENT TRAINING: CPT

## 2024-03-19 PROCEDURE — 700111 HCHG RX REV CODE 636 W/ 250 OVERRIDE (IP): Performed by: STUDENT IN AN ORGANIZED HEALTH CARE EDUCATION/TRAINING PROGRAM

## 2024-03-19 PROCEDURE — A9270 NON-COVERED ITEM OR SERVICE: HCPCS | Performed by: PHYSICIAN ASSISTANT

## 2024-03-19 PROCEDURE — 99232 SBSQ HOSP IP/OBS MODERATE 35: CPT | Mod: FS | Performed by: INTERNAL MEDICINE

## 2024-03-19 PROCEDURE — 770020 HCHG ROOM/CARE - TELE (206)

## 2024-03-19 PROCEDURE — 80048 BASIC METABOLIC PNL TOTAL CA: CPT

## 2024-03-19 RX ORDER — FUROSEMIDE 40 MG/1
40 TABLET ORAL
Status: DISCONTINUED | OUTPATIENT
Start: 2024-03-20 | End: 2024-03-20 | Stop reason: HOSPADM

## 2024-03-19 RX ORDER — METOPROLOL SUCCINATE 25 MG/1
25 TABLET, EXTENDED RELEASE ORAL
Status: DISCONTINUED | OUTPATIENT
Start: 2024-03-19 | End: 2024-03-20 | Stop reason: HOSPADM

## 2024-03-19 RX ADMIN — GABAPENTIN 600 MG: 300 CAPSULE ORAL at 04:53

## 2024-03-19 RX ADMIN — OXYCODONE 5 MG: 5 TABLET ORAL at 23:43

## 2024-03-19 RX ADMIN — METOPROLOL SUCCINATE 25 MG: 25 TABLET, EXTENDED RELEASE ORAL at 08:52

## 2024-03-19 RX ADMIN — OXYCODONE 5 MG: 5 TABLET ORAL at 01:03

## 2024-03-19 RX ADMIN — DOCUSATE SODIUM 50 MG AND SENNOSIDES 8.6 MG 2 TABLET: 8.6; 5 TABLET, FILM COATED ORAL at 17:04

## 2024-03-19 RX ADMIN — DULOXETINE HYDROCHLORIDE 60 MG: 60 CAPSULE, DELAYED RELEASE ORAL at 04:53

## 2024-03-19 RX ADMIN — ENOXAPARIN SODIUM 60 MG: 100 INJECTION SUBCUTANEOUS at 17:09

## 2024-03-19 RX ADMIN — FUROSEMIDE 20 MG: 10 INJECTION INTRAMUSCULAR; INTRAVENOUS at 05:01

## 2024-03-19 RX ADMIN — LEVOTHYROXINE SODIUM 125 MCG: 0.12 TABLET ORAL at 04:53

## 2024-03-19 RX ADMIN — ENOXAPARIN SODIUM 60 MG: 100 INJECTION SUBCUTANEOUS at 04:52

## 2024-03-19 RX ADMIN — AMIODARONE HYDROCHLORIDE 400 MG: 200 TABLET ORAL at 04:53

## 2024-03-19 RX ADMIN — CLOPIDOGREL BISULFATE 75 MG: 75 TABLET ORAL at 04:53

## 2024-03-19 RX ADMIN — GABAPENTIN 600 MG: 300 CAPSULE ORAL at 17:04

## 2024-03-19 RX ADMIN — OMEPRAZOLE 20 MG: 20 CAPSULE, DELAYED RELEASE ORAL at 17:04

## 2024-03-19 RX ADMIN — OMEPRAZOLE 20 MG: 20 CAPSULE, DELAYED RELEASE ORAL at 04:53

## 2024-03-19 RX ADMIN — ATORVASTATIN CALCIUM 20 MG: 20 TABLET, FILM COATED ORAL at 20:52

## 2024-03-19 ASSESSMENT — COGNITIVE AND FUNCTIONAL STATUS - GENERAL
MOBILITY SCORE: 22
SUGGESTED CMS G CODE MODIFIER MOBILITY: CJ
CLIMB 3 TO 5 STEPS WITH RAILING: A LITTLE
WALKING IN HOSPITAL ROOM: A LITTLE

## 2024-03-19 ASSESSMENT — ENCOUNTER SYMPTOMS
VOMITING: 0
ALLERGIC/IMMUNOLOGIC NEGATIVE: 1
COUGH: 0
CONSTITUTIONAL NEGATIVE: 1
LIGHT-HEADEDNESS: 0
DIZZINESS: 1
DIZZINESS: 0
FEVER: 0
DIAPHORESIS: 0
HEMATOLOGIC/LYMPHATIC NEGATIVE: 1
NEUROLOGICAL NEGATIVE: 1
MUSCULOSKELETAL NEGATIVE: 1
NAUSEA: 0
GASTROINTESTINAL NEGATIVE: 1
CHILLS: 0
CONSTIPATION: 0
PALPITATIONS: 0
CARDIOVASCULAR NEGATIVE: 1
SHORTNESS OF BREATH: 0
DIARRHEA: 0
SHORTNESS OF BREATH: 1
FATIGUE: 0
BRUISES/BLEEDS EASILY: 0
CHEST TIGHTNESS: 0
PSYCHIATRIC NEGATIVE: 1
ENDOCRINE NEGATIVE: 1
EYES NEGATIVE: 1
RESPIRATORY NEGATIVE: 1

## 2024-03-19 ASSESSMENT — GAIT ASSESSMENTS
DISTANCE (FEET): 15
ASSISTIVE DEVICE: HAND HELD ASSIST
GAIT LEVEL OF ASSIST: MINIMAL ASSIST

## 2024-03-19 ASSESSMENT — FIBROSIS 4 INDEX: FIB4 SCORE: 3.6

## 2024-03-19 ASSESSMENT — PAIN DESCRIPTION - PAIN TYPE: TYPE: ACUTE PAIN

## 2024-03-19 NOTE — PROGRESS NOTES
Cardiology Follow Up Progress Note    Date of Service  3/19/2024    Attending Physician  Patricio Harp M.D.    Chief Complaint   Palpitations and chest pain    HPI  Davie Montejo is a 89 y.o. female with history of HFrEF 20%, CAD status post CABG 2008, vascular dementia, hypertension, hyperlipidemia, recent hospitalization for CHF exacerbation  admitted 3/16/2024 with A-fib with RVR and CHF exacerbation.    Interim Events  3/19/2024: She underwent successful MARGO/DCCV yesterday with return to sinus rhythm.  Sinus rhythm to sinus bradycardia on telemetry.  Vital signs stable.  SpO2 97 to 99% on 2 to 3 L nasal cannula. She reports she is feeling better. No chest pain or palpitations. No shortness of breath, dyspnea on exertion, orthopnea or PND. No lower extremity edema. No dizziness or lightheadedness. No syncope or presyncope.      Review of Systems  Review of Systems   Constitutional: Negative.  Negative for chills, diaphoresis, fatigue and fever.   HENT: Negative.     Eyes: Negative.    Respiratory: Negative.  Negative for cough, chest tightness and shortness of breath.    Cardiovascular: Negative.  Negative for chest pain, palpitations and leg swelling.   Gastrointestinal: Negative.  Negative for constipation, diarrhea, nausea and vomiting.   Endocrine: Negative.    Genitourinary: Negative.  Negative for decreased urine volume, difficulty urinating, dysuria and frequency.   Musculoskeletal: Negative.    Skin: Negative.    Allergic/Immunologic: Negative.    Neurological: Negative.  Negative for dizziness and light-headedness.   Hematological: Negative.  Does not bruise/bleed easily.   Psychiatric/Behavioral: Negative.         Vital signs in last 24 hours  Temp:  [35.9 °C (96.6 °F)-36.4 °C (97.5 °F)] 35.9 °C (96.6 °F)  Pulse:  [] 53  Resp:  [12-20] 18  BP: ()/() 116/49  SpO2:  [90 %-100 %] 97 %    Physical Exam  Physical Exam  Vitals and nursing note reviewed.   Constitutional:        General: She is not in acute distress.     Appearance: Normal appearance. She is not toxic-appearing.   HENT:      Head: Normocephalic and atraumatic.      Right Ear: External ear normal.      Left Ear: External ear normal.      Nose: Nose normal.      Mouth/Throat:      Mouth: Mucous membranes are moist.      Pharynx: Oropharynx is clear.   Eyes:      General: No scleral icterus.     Extraocular Movements: Extraocular movements intact.      Conjunctiva/sclera: Conjunctivae normal.      Pupils: Pupils are equal, round, and reactive to light.   Neck:      Vascular: No JVD.   Cardiovascular:      Rate and Rhythm: Normal rate and regular rhythm.      Pulses: Normal pulses.      Heart sounds: Murmur heard.      No friction rub. No gallop.   Pulmonary:      Effort: Pulmonary effort is normal.      Breath sounds: Normal breath sounds.   Abdominal:      General: Abdomen is flat. Bowel sounds are normal. There is no distension.      Palpations: Abdomen is soft.   Musculoskeletal:         General: Normal range of motion.      Cervical back: Normal range of motion and neck supple.      Right lower leg: No edema.      Left lower leg: No edema.   Skin:     General: Skin is warm and dry.      Capillary Refill: Capillary refill takes less than 2 seconds.      Coloration: Skin is not jaundiced.   Neurological:      General: No focal deficit present.      Mental Status: She is alert and oriented to person, place, and time.   Psychiatric:         Mood and Affect: Mood normal.         Behavior: Behavior normal.         Lab Review  Lab Results   Component Value Date/Time    WBC 8.5 03/19/2024 04:32 AM    RBC 4.30 03/19/2024 04:32 AM    HEMOGLOBIN 11.2 (L) 03/19/2024 04:32 AM    HEMATOCRIT 35.5 (L) 03/19/2024 04:32 AM    MCV 82.6 03/19/2024 04:32 AM    MCH 26.0 (L) 03/19/2024 04:32 AM    MCHC 31.5 (L) 03/19/2024 04:32 AM    MPV 11.1 03/19/2024 04:32 AM      Lab Results   Component Value Date/Time    SODIUM 135 03/19/2024 04:32 AM     POTASSIUM 4.1 03/19/2024 04:32 AM    CHLORIDE 100 03/19/2024 04:32 AM    CO2 22 03/19/2024 04:32 AM    GLUCOSE 90 03/19/2024 04:32 AM    BUN 34 (H) 03/19/2024 04:32 AM    CREATININE 1.48 (H) 03/19/2024 04:32 AM    BUNCREATRAT 24.5 10/20/2021 05:05 AM      Lab Results   Component Value Date/Time    ASTSGOT 78 (H) 03/17/2024 03:06 AM    ALTSGPT 41 03/17/2024 03:06 AM     Lab Results   Component Value Date/Time    CHOLSTRLTOT 145 02/28/2024 03:54 AM    LDL 83 02/28/2024 03:54 AM    HDL 46 02/28/2024 03:54 AM    TRIGLYCERIDE 82 02/28/2024 03:54 AM    TROPONINT 31 (H) 03/17/2024 03:06 AM       Recent Labs     03/16/24  1907 03/18/24  0059   NTPROBNP [id number]* [id number]*       Cardiac Imaging and Procedures Review  Echocardiogram:  3/17/2024  CONCLUSIONS  Limited directed echo to assess LVEF,  Enlarged left ventricular cavity size with severely reduced systolic   function.  Visually estimated LVEF of 20-25%.  Global hypokinesis.  No   obvious apical thrombus seen.   Prior echocardiogram 2/27/2024, no significant interval changes.    Imaging  Chest X-Ray:  3/16/2024   FINDINGS:  Cardiomediastinal silhouette is stable. Aortic calcified atherosclerotic plaque. Postsurgical changes status post CABG.  No significant interval change in bilateral interstitial opacities, small pleural effusions and bibasilar atelectasis except for possible slight decrease in small right pleural effusion. Correlate clinically for infection. No pneumothorax  No acute osseous abnormality.  IMPRESSION:  Possible slight decrease in small right pleural effusion. No other significant change in bilateral interstitial opacities, bibasilar atelectasis and small left pleural effusion.      Assessment/Plan  #Atrial fibrillation with RVR s/p MARGO/DCCV 3/19/2024  -Asymptomatic  -She remains in sinus rhythm on telemetry.  -Continue anticoagulation with Lovenox and transition to a OAC prior to discharge.  -Continue slow amiodarone load with 400 mg daily.    #Acute on  chronic HFrEF AHA C NYHA III  -Asymptomatic though still on oxygen.  Continue to attempt to wean.  -She does appear euvolemic on exam.  -ACE-I/ARB/ARNI: Contraindicated due to concerns of hypotension overnight.  -Evidence Based Beta-blocker: Continue metoprolol 25 mg daily.  -Aldosterone Antagonist: Contraindicated due to renal function.  -Diuretic: Transition to p.o. Lasix 40 mg daily.  -SGLT2i: Contraindicated due to renal function.  -Patient is not a candidate for ICD placement at this time due to less than 3 months of GDMT.    -Labs: Daily BMP while inpatient.  -Continue Daily weights; Strict I+O’s:   -PNA and Flu vaccine: Per pharmacy.  -Meds-to-beds and HF instructions on discharge   -FU in HF clinic within 7 days of discharge.    #Moderate to severe mitral regurgitation seen on MARGO  -Asymptomatic  -Placed outpatient referral to structural heart for consideration of MitraClip.    #CAD s/p CABG  Hypertension  Hyperlipidemia  -No angina.  -Troponin was mildly elevated upon admission but remained flat.  -Continue anticoagulation with Lovenox and Plavix 75 mg daily.  -Continue atorvastatin 20 mg daily.  -Continue metoprolol.    Cardiology will sign off.    Please contact me with any questions.    Thank you for allowing me to participate in the care of Davie Marybrigido Montejo .    Gardenia Eduardo PA-C, Cardiology  [facility], [facility]  [address] [address]  Phone: [phone]  Fax: [phone]    PLEASE NOTE: This note was created using voice recognition software. I have made every reasonable attempt to correct obvious errors, but I expect that there are errors of grammar and possibly content that I did not discover before finalizing the note.     I personally spent a total of 15 minutes which includes face-to-face time and non-face-to-face time spent on preparing to see the patient, reviewing hospital notes and tests,  obtaining history from the patient, performing a medically appropriate exam, counseling and educating the patient, ordering medications/tests/procedures/referrals as clinically indicated, and documenting information in the electronic medical record.

## 2024-03-19 NOTE — THERAPY
Physical Therapy Contact Note    Patient Name: Davie Montejo  Age:  89 y.o., Sex:  female  Medical Record #: [id number]  Today's Date: 3/18/2024         03/18/24 1058   Initial Contact Note    Initial Contact Note Order Received and Verified, Physical Therapy Evaluation in Progress with Full Report to Follow.   Interdisciplinary Plan of Care Collaboration   Collaboration Comments PT orders received. Attempted to see pt, pt off floor in procedure. Will re-attempt as able/appropriate.   Session Information   Date / Session Number  3/18 - hold  (EVAL)     Yadi Lozoya, PT, DPT

## 2024-03-19 NOTE — PROGRESS NOTES
Monitor Summary  Rhythm: Afib- SR after cardioversion   Rate:   Ectopy: rPVC, PAC, Coup  - / .15 / -

## 2024-03-19 NOTE — PROGRESS NOTES
Bedside report received from off going RN/tech: Ju assumed care of patient.     Fall Risk Score: HIGH RISK  Fall risk interventions in place: Place yellow fall risk ID band on patient, Provide patient/family education based on risk assessment, Educate patient/family to call staff for assistance when getting out of bed, Place fall precaution signage outside patient door, Utilize bed/chair fall alarm, and Bed alarm connected correctly  Bed type: Regular (Regan Score less than 17 interventions in place)  Patient on cardiac monitor: Yes  IVF/IV medications: Not Applicable   Oxygen: How many liters 3L  Bedside sitter: Not Applicable   Isolation: Not applicable

## 2024-03-19 NOTE — CARE PLAN
The patient is Stable - Low risk of patient condition declining or worsening    Shift Goals  Clinical Goals: monitor HR and rhythm,  Patient Goals: rest, get better  Family Goals: jonah    Progress made toward(s) clinical / shift goals:    Problem: Pain - Standard  Goal: Alleviation of pain or a reduction in pain to the patient’s comfort goal  Outcome: Progressing  Note: Pt complaints of generalized body aches. Current pain medication orders are effective.      Problem: Care Map:  Day 3 Optimal Outcome for the Heart Failure Patient  Goal: Day 3:  Optimal Care of the heart failure patient  Outcome: Progressing  Intervention: Start Heart Failure education booklet, provide writing utensils and notepad for questions  Note: HF booklet at bedside and pt given writing utensils for questions.   Intervention: For patient's with heart failure exacerbation, identify precipitant (diet, med compliance, etc.) and direct education towards lifestyle changes to prevent exacerbations.  Note: Pt educated on HF exacerbation and precipitants. Pt verbalizes understanding and all questions answered.   Intervention: Instruct patient to write down weights on symptom tracker daily  Note: Pt educated on importance of daily weights.   Intervention: Ensure daily BMP is ordered by provider  Note: Daily labs ordered and drawn.   Intervention: Daily weight documented.  Use stand up scale if patient able. Compare to previous weight  Note: Daily weight documented under flowsheets.   Intervention: Assess and document 2 hour post diuretic output  Note: Output documented post administration of diuretic.   Intervention: Document intake and output per shift.  Communicate with care team if volume status is improving, stalled or worsening.  Note: Intake and output documented throughout the shift.   Intervention: Document ambulation tolerance (functional assessment) in ADL flowsheet daily  Note: Pt tolerates ambulation well. Pt ambulated from bed to bathroom  multiple times throughout the night.   Intervention: Assess edema every shift (peripheral, sacral, periorbital, perineal/scrotal, and abdominal)  Note: Edema assessed and documented in the flowsheet.

## 2024-03-19 NOTE — DISCHARGE PLANNING
Case Management Discharge Planning    Admission Date: 3/16/2024  GMLOS: 3.9  ALOS: 3    6-Clicks ADL Score: 20  6-Clicks Mobility Score: 22      Anticipated Discharge Dispo: Discharge Disposition: D/T to SNF with Medicare cert in anticipation of skilled care (03)  Discharge Address: [address] [place] [address]  Discharge Contact Phone Number: [phone]    DME Needed: No    Action(s) Taken: RN CM received Voalte message from Dr. Harp stating patient is not clear to discharge to [place] [facility] today. Pending Cardiology clearance. Possible D/C to SNF tomorrow.    Escalations Completed: None    Medically Clear: No    Next Steps: Pending Medical clearance. CM will follow and assist with DCP.    Barriers to Discharge: Medical clearance    Is the patient up for discharge tomorrow: No

## 2024-03-19 NOTE — CARE PLAN
The patient is Stable - Low risk of patient condition declining or worsening    Shift Goals  Clinical Goals: monitor HR  Patient Goals: go home  Family Goals: jonah    Progress made toward(s) clinical / shift goals:    Problem: Care Map:  Day of Discharge Optimal Outcome for the Heart Failure Patient  Goal: Day of Discharge:  Optimal Care of the heart failure patient  Outcome: Progressing  Note: Heart failure education provided, signs and symptoms of exacerbation explained     Problem: Skin Integrity  Goal: Skin integrity is maintained or improved  Outcome: Progressing  Note: Assess skin and monitor for skin breakdown. Alleviate pressure to bony prominences and provide assistance with turning, repositioning, ROM and mobility as appropriate. Use of barrier wipes as needed. Continue to monitor.        Problem: Fall Risk  Goal: Patient will remain free from falls  Outcome: Progressing  Note: Treaded socks and bed/strip alarm on, side rails up x 3. Call light within reach. Pt educated to call for assistance. Reinforce as needed. Continue to monitor.           Patient is not progressing towards the following goals:

## 2024-03-19 NOTE — PROGRESS NOTES
Bedside report received from off going RN/tech: Dinora, assumed care of patient.     Fall Risk Score: HIGH RISK  Fall risk interventions in place: Place yellow fall risk ID band on patient, Provide patient/family education based on risk assessment, Educate patient/family to call staff for assistance when getting out of bed, Place fall precaution signage outside patient door, Place patient in room close to nursing station, Utilize bed/chair fall alarm, Notify charge of high risk for huddle, and Bed alarm connected correctly  Bed type: Regular (Regan Score less than 17 interventions in place)  Patient on cardiac monitor: Yes  IVF/IV medications: Not Applicable   Oxygen: How many liters 3L  Bedside sitter: Not Applicable   Isolation: Not applicable    2000: Bedside report received from day RN, pt care assumed, assessment completed. Pt is A&Ox4, pain 0/10, SR on the monitor. Updated on POC, questions answered. Bed in lowest, locked position, treaded socks on, call light and belongings within reach. Fall precautions in place.

## 2024-03-19 NOTE — THERAPY
"Physical Therapy   Initial Evaluation     Patient Name: Davie Montejo  Age:  89 y.o., Sex:  female  Medical Record #: [id number]  Today's Date: 3/19/2024     Precautions  Precautions: Fall Risk;Swallow Precautions    Assessment  Patient is 89 y.o. female admitted from [facility] for chest pain and palpitations found to have acute on chronic HFrEF and a-fib with RVR. Pt seen s/p external DC cardioversion.  PMH includes CAD status post CABG 2008, vascular dementia, hypertension, hyperlipidemia, alcohol abuse, fibromyalgia.     Patient received in bed and agreeable to PT session. Pt was able to ambulate in room with Rogers and HHA; pt declining use of FWW despite education. Extensive time taken to discuss DC planning, use of an AD, and importance of frequent mobility. Pt is primarily limited by decreased safety awareness, strength, balance, and activity tolerance. Recommend post acute placement; pt adamant about returning home. Will follow for acute care PT needs.     Plan    Physical Therapy Initial Treatment Plan   Treatment Plan : Bed Mobility, Group Therapy, Neuro Re-Education / Balance, Self Care / Home Evaluation, Stair Training, Therapeutic Activities, Therapeutic Exercise  Treatment Frequency: 4 Times per Week  Duration: Until Therapy Goals Met    DC Equipment Recommendations: Unable to determine at this time (likely FWW)  Discharge Recommendations: Recommend post-acute placement for additional physical therapy services prior to discharge home       Subjective    \"I want to go home, I can exercise at home\".      Objective       03/19/24 1016   Initial Contact Note    Initial Contact Note Order Received and Verified, Physical Therapy Evaluation in Progress with Full Report to Follow.   Precautions   Precautions Fall Risk;Swallow Precautions   Vitals   O2 (LPM) 3   O2 Delivery Device Silicone Nasal Cannula   Pain 0 - 10 Group   Therapist Pain Assessment Post Activity Pain Same as Prior to Activity;Nurse " Notified  (pain not rated)   Prior Living Situation   Prior Services Continuous (24 Hour) Care Giving Per Service   Equipment Owned None   Comments Pt admitted from [facility]. Prior to Fed admit, pt was living alone in 2 story apartment   Prior Level of Functional Mobility   Bed Mobility Independent   Transfer Status Independent   Ambulation Required Assist   Ambulation Distance   (eng at SNF)   Assistive Devices Used Front-Wheel Walker   Cognition    Cognition / Consciousness X   Speech/ Communication Incomprehensible Words   Level of Consciousness Alert   Ability To Follow Commands 2 Step   Safety Awareness Impaired;Impulsive   New Learning Impaired   Comments pleasant and cooperative, per RN and chart review pt has hx dementia/memory deficit   Strength Lower Body   Lower Body Strength  X   Gross Strength Generalized Weakness, Equal Bilaterally   Comments functional for short distance ambulation   Lower Body Muscle Tone   Lower Body Muscle Tone  WDL   Balance Assessment   Sitting Balance (Static) Fair +   Sitting Balance (Dynamic) Fair   Standing Balance (Static) Fair -   Standing Balance (Dynamic) Fair -   Weight Shift Sitting Fair   Weight Shift Standing Fair   Comments no AD   Bed Mobility    Supine to Sit Standby Assist   Scooting Standby Assist   Comments HOB slightly elevated   Gait Analysis   Gait Level Of Assist Minimal Assist   Assistive Device Hand Held Assist  (pt declining use of FWW)   Distance (Feet) 15   # of Times Distance was Traveled 1   Deviation Increased Base Of Support;Bradykinetic;Decreased Heel Strike;Shuffled Gait;Decreased Toe Off   Weight Bearing Status no restrictions   Functional Mobility   Sit to Stand Contact Guard Assist   Bed, Chair, Wheelchair Transfer Contact Guard Assist   Mobility bed > ambualte in room > chair   6 Clicks Assessment - How much HELP from from another person do you currently need... (If the patient hasn't done an activity recently, how much help from another  person do you think he/she would need if he/she tried?)   Turning from your back to your side while in a flat bed without using bedrails? 4   Moving from lying on your back to sitting on the side of a flat bed without using bedrails? 4   Moving to and from a bed to a chair (including a wheelchair)? 4   Standing up from a chair using your arms (e.g., wheelchair, or bedside chair)? 4   Walking in hospital room? 3   Climbing 3-5 steps with a railing? 3   6 clicks Mobility Score 22   Short Term Goals    Short Term Goal # 1 Pt will be able to perform supine <> sit with bed flat and SPV in 6 visits to progress bed mobility   Short Term Goal # 2 Pt will be able to perform STS with LRAD and SPV in 6 visits to progress functional transfers   Short Term Goal # 3 Pt will be able to ambulate 150ft with LRAD and SPV in 6 visits to progress functional gait   Short Term Goal # 4 Pt will be able to ascend/descend 10 steps with unilateral UE support and SPV in 6 visits in order to safely navigate her home   Education Group   Education Provided Role of Physical Therapist   Role of Physical Therapist Patient Response Patient;Acceptance;Explanation;Verbal Demonstration   Physical Therapy Initial Treatment Plan    Treatment Plan  Bed Mobility;Group Therapy;Neuro Re-Education / Balance;Self Care / Home Evaluation;Stair Training;Therapeutic Activities;Therapeutic Exercise   Treatment Frequency 4 Times per Week   Duration Until Therapy Goals Met   Problem List    Problems Impaired Transfers;Impaired Ambulation;Functional Strength Deficit;Impaired Balance;Decreased Activity Tolerance;Safety Awareness Deficits / Cognition   Anticipated Discharge Equipment and Recommendations   DC Equipment Recommendations Unable to determine at this time  (likely FWW)   Discharge Recommendations Recommend post-acute placement for additional physical therapy services prior to discharge home   Interdisciplinary Plan of Care Collaboration   IDT Collaboration  with  Nursing;   Patient Position at End of Therapy Seated;Chair Alarm On;Call Light within Reach;Tray Table within Reach;Phone within Reach   Collaboration Comments RN updated   Session Information   Date / Session Number  3/19 - 1 (1/4, 3/25)

## 2024-03-19 NOTE — DIETARY
Nutrition Update:    Day 3 of admit.  Davie Montejo is a 89 y.o. female with admitting DX of Acute on chronic HFrEF (heart failure with reduced ejection fraction) ([facility], [facility]) [I50.23].  Patient being followed to optimize nutrition.    Current Diet: SB6, TN0, Cardiac, Ensure Plus TID    Per ADLs, pt's PO intake this admit has been % of all meals recorded. No supplement intake recorded.    As pt is adequately meeting daily nutritional needs through meals alone, RD to decrease frequency of meals from TID to QD.    Problem: Nutritional:  Goal: Achieve adequate nutritional intake  Description: Patient will consume >50% of meals  Outcome: met      RD available PRN

## 2024-03-19 NOTE — PROGRESS NOTES
"Hospital Medicine Daily Progress Note    Date of Service  3/19/2024    Chief Complaint  aDvie Montejo is a 89 y.o. female admitted 3/16/2024 with   Chief Complaint   Patient presents with    Chest Pain     Pt BIBA from [place] for intermittent chest pain with palpitations that started last night and continued through this morning. Pt reports pain is now more of a \"discomfort.\" Pt hypotensive in route- 100 mL of NS given prior to arrival. Pt is on 4 L baseline oxygen.          Hospital Course  No notes on file    Interval Problem Update  Patient was seen and examined at bedside.  I have personally reviewed and interpreted vitals, labs, and imaging.    I reviewed the chart along with vitals, labs, imaging, test (both pending and resulted) and recommendations from specialists and interdisciplinary team.  88yo thin female with history of HFrEF 20%, CAD status post CABG 2008, vascular dementia, hypertension, hyperlipidemia, recent hospitalization for CHF exacerbation discharged to SNF who presented 3/16/2024 with palpitation, chest pain.     Managing for CHF exacerbation, new Afib w/ RVR now placed on anticoagulation. S/p successful DC cardioversion by Dr. Veloz on 3/18.   On IV Lasix. I spoke with Cardiology. Wants more diuresis. I switched to PO.    She is otherwise stable but deconditioned. Cognitive deficits.  Return to SNF when Cardiology clears probably TOMORROW    I have discussed this patient's plan of care and discharge plan at IDT rounds today with Case Management, Nursing, Nursing leadership, and other members of the IDT team.    Consultants/Specialty  cardiology    Code Status  DNAR/DNI    Disposition  Medically Cleared  I have placed the appropriate orders for post-discharge needs.    Review of Systems  Review of Systems   Constitutional:  Negative for malaise/fatigue.   Respiratory:  Positive for shortness of breath.    Cardiovascular:  Positive for chest pain. Negative for palpitations. "   Neurological:  Positive for dizziness.        Physical Exam  Temp:  [35.9 °C (96.6 °F)-36.4 °C (97.5 °F)] 36.4 °C (97.5 °F)  Pulse:  [53-60] 60  Resp:  [16-18] 16  BP: ()/(41-61) 108/52  SpO2:  [91 %-99 %] 94 %    Physical Exam  Vitals and nursing note reviewed.   Constitutional:       Appearance: Normal appearance. She is ill-appearing.   HENT:      Head: Normocephalic and atraumatic.      Right Ear: External ear normal.      Left Ear: External ear normal.      Nose: Nose normal.      Mouth/Throat:      Mouth: Mucous membranes are moist.      Pharynx: Oropharynx is clear. No oropharyngeal exudate or posterior oropharyngeal erythema.   Eyes:      Extraocular Movements: Extraocular movements intact.      Conjunctiva/sclera: Conjunctivae normal.   Cardiovascular:      Rate and Rhythm: Tachycardia present. Rhythm irregular.      Pulses: Normal pulses.      Heart sounds: Normal heart sounds. No murmur heard.  Pulmonary:      Effort: Pulmonary effort is normal. No respiratory distress.      Breath sounds: No stridor. Rales (bibasilar crackles) present. No wheezing.   Abdominal:      General: Abdomen is flat. Bowel sounds are normal. There is no distension.      Palpations: Abdomen is soft. There is no mass.      Tenderness: There is no abdominal tenderness.   Musculoskeletal:      Cervical back: Normal range of motion.      Right lower leg: Edema present.      Left lower leg: Edema present.   Skin:     General: Skin is warm.      Capillary Refill: Capillary refill takes less than 2 seconds.   Neurological:      General: No focal deficit present.      Mental Status: She is alert and oriented to person, place, and time. Mental status is at baseline.      Cranial Nerves: No cranial nerve deficit.   Psychiatric:         Mood and Affect: Mood normal.         Behavior: Behavior normal.         Fluids    Intake/Output Summary (Last 24 hours) at 3/19/2024 1634  Last data filed at 3/19/2024 1505  Gross per 24 hour    Intake 1300 ml   Output 1100 ml   Net 200 ml       Laboratory  Recent Labs     03/17/24  0306 03/18/24  0059 03/19/24  0432   WBC 6.0 9.1 8.5   RBC 4.77 4.20 4.30   HEMOGLOBIN 12.4 11.0* 11.2*   HEMATOCRIT 40.0 33.7* 35.5*   MCV 83.9 80.2* 82.6   MCH 26.0* 26.2* 26.0*   MCHC 31.0* 32.6 31.5*   RDW 59.7* 57.1* 58.4*   PLATELETCT 303 294 294   MPV 10.6 11.2 11.1     Recent Labs     03/17/24  0306 03/18/24  0059 03/19/24  0432   SODIUM 135 135 135   POTASSIUM 4.4 3.9 4.1   CHLORIDE 100 96 100   CO2 22 22 22   GLUCOSE 123* 138* 90   BUN 22 30* 34*   CREATININE 1.08 1.25 1.48*   CALCIUM 8.9 9.1 8.8                   Imaging  EC-MARGO W/O CONT         EC-ECHOCARDIOGRAM LTD W/ CONT   Final Result      CT-ABDOMEN-PELVIS WITH   Final Result      1.  No evidence of bowel obstruction or focal inflammatory change.      2.  Renal cortical thinning and multiple areas of cortical scarring.      3.  Sigmoid diverticulosis.      4.  Small amount of free fluid dependently within the pelvis.      5.  Stable bilateral pleural effusions and bibasilar atelectasis.      6.  Extensive atherosclerotic vascular calcification.      7.  Prior cholecystectomy.      8.  Fatty change of the liver.      DX-CHEST-PORTABLE (1 VIEW)   Final Result      Possible slight decrease in small right pleural effusion. No other significant change in bilateral interstitial opacities, bibasilar atelectasis and small left pleural effusion.      CL-CARDIOVERSION    (Results Pending)        Assessment/Plan  * Acute on chronic HFrEF (heart failure with reduced ejection fraction) (HCC)- (present on admission)  Assessment & Plan  3/18/2024  EF 20 to 25% from prior admission  Was discharged on Entresto    Borderline hypotension, unable to tolerate optimize HF meds  Gentle diuresis as tolerated-Lasix 20 mg IV twice daily. Switch to PO Lasix. Monitor K+, renal function and blood pressures.    Age-related physical debility  Assessment & Plan  PT/OT    Elevated  troponin  Assessment & Plan  3/18/2024  Probable demand ischemia in setting of CHF exacerbation and uncontrolled A-fib  Continue home Plavix, statin  Also on therapeutic Lovenox    Atrial fibrillation with RVR (HCC)  Assessment & Plan  3/18/2024  ?  New onset  Maintain potassium above 4, magnesium above 2  Treat underlying CHF exacerbation    Borderline hypotension  --Load with IV digoxin  --Continue metoprolol  Therapeutic Lovenox  Vitals:    03/19/24 1509   BP: 108/52   Pulse: 60   Resp: 16   Temp: 36.4 °C (97.5 °F)   SpO2: 94%     HR stable    Status post cardioversion.  Cards following.  Switch from Digoxin to Amio    Hypotension- (present on admission)  Assessment & Plan  3/18/2024  Monitor  Possibly secondary to uncontrolled afib -- control  Continue midodrine  Resolved, off midodrine    Acute respiratory failure with hypoxia (HCC)- (present on admission)  Assessment & Plan  3/18/2024  On 3 to 4 L-wean off as tolerated  Multifactorial: CHF exacerbation, A-fib with RVR, pneumonia, cardiogenic shock  Diuresis  Pulmonary hygiene  Procalcitonin negative.  DC antibiotics.  Patient did tolerated Augmentin despite reported PCN allergy  O2 eval    ACP (advance care planning)- (present on admission)  Assessment & Plan  Goal care discussed with patient in ER.  She stated she does not wish to have aggressive/heroic life-sustaining measures-no CPR/defibrillation/intubation or mechanical ventilation.  She is however agreeable for minimally invasive medical management as clinically warranted to treat for her CHF exacerbation atrial fibrillation with RVR.  Diagnosis, prognosis, questions or concerns.  DNR/DNI status confirmed.  ACP: 16 minutes         VTE prophylaxis: Lovenox    I have performed a physical exam and reviewed and updated ROS and Plan today (3/19/2024). In review of yesterday's note (3/18/2024), there are no changes except as documented above.

## 2024-03-20 VITALS
HEART RATE: 54 BPM | DIASTOLIC BLOOD PRESSURE: 44 MMHG | WEIGHT: 143.3 LBS | BODY MASS INDEX: 22.49 KG/M2 | OXYGEN SATURATION: 94 % | TEMPERATURE: 98.1 F | SYSTOLIC BLOOD PRESSURE: 101 MMHG | RESPIRATION RATE: 18 BRPM | HEIGHT: 67 IN

## 2024-03-20 LAB
ALBUMIN SERPL BCP-MCNC: 3.5 G/DL (ref 3.2–4.9)
BUN SERPL-MCNC: 29 MG/DL (ref 8–22)
CALCIUM ALBUM COR SERPL-MCNC: 9.1 MG/DL (ref 8.5–10.5)
CALCIUM SERPL-MCNC: 8.7 MG/DL (ref 8.5–10.5)
CHLORIDE SERPL-SCNC: 100 MMOL/L (ref 96–112)
CO2 SERPL-SCNC: 26 MMOL/L (ref 20–33)
CREAT SERPL-MCNC: 1.02 MG/DL (ref 0.5–1.4)
ERYTHROCYTE [DISTWIDTH] IN BLOOD BY AUTOMATED COUNT: 58 FL (ref 35.9–50)
GFR SERPLBLD CREATININE-BSD FMLA CKD-EPI: 53 ML/MIN/1.73 M 2
GLUCOSE SERPL-MCNC: 93 MG/DL (ref 65–99)
HCT VFR BLD AUTO: 34.1 % (ref 37–47)
HGB BLD-MCNC: 10.7 G/DL (ref 12–16)
MCH RBC QN AUTO: 26 PG (ref 27–33)
MCHC RBC AUTO-ENTMCNC: 31.4 G/DL (ref 32.2–35.5)
MCV RBC AUTO: 82.8 FL (ref 81.4–97.8)
PHOSPHATE SERPL-MCNC: 3.3 MG/DL (ref 2.5–4.5)
PLATELET # BLD AUTO: 295 K/UL (ref 164–446)
PMV BLD AUTO: 11.4 FL (ref 9–12.9)
POTASSIUM SERPL-SCNC: 3.8 MMOL/L (ref 3.6–5.5)
RBC # BLD AUTO: 4.12 M/UL (ref 4.2–5.4)
SODIUM SERPL-SCNC: 138 MMOL/L (ref 135–145)
WBC # BLD AUTO: 7.3 K/UL (ref 4.8–10.8)

## 2024-03-20 PROCEDURE — 700102 HCHG RX REV CODE 250 W/ 637 OVERRIDE(OP): Performed by: STUDENT IN AN ORGANIZED HEALTH CARE EDUCATION/TRAINING PROGRAM

## 2024-03-20 PROCEDURE — 97530 THERAPEUTIC ACTIVITIES: CPT

## 2024-03-20 PROCEDURE — A9270 NON-COVERED ITEM OR SERVICE: HCPCS | Performed by: INTERNAL MEDICINE

## 2024-03-20 PROCEDURE — 36415 COLL VENOUS BLD VENIPUNCTURE: CPT

## 2024-03-20 PROCEDURE — A9270 NON-COVERED ITEM OR SERVICE: HCPCS | Performed by: PHYSICIAN ASSISTANT

## 2024-03-20 PROCEDURE — A9270 NON-COVERED ITEM OR SERVICE: HCPCS | Performed by: STUDENT IN AN ORGANIZED HEALTH CARE EDUCATION/TRAINING PROGRAM

## 2024-03-20 PROCEDURE — 80069 RENAL FUNCTION PANEL: CPT

## 2024-03-20 PROCEDURE — 99239 HOSP IP/OBS DSCHRG MGMT >30: CPT | Performed by: INTERNAL MEDICINE

## 2024-03-20 PROCEDURE — 700102 HCHG RX REV CODE 250 W/ 637 OVERRIDE(OP): Performed by: INTERNAL MEDICINE

## 2024-03-20 PROCEDURE — 700111 HCHG RX REV CODE 636 W/ 250 OVERRIDE (IP): Performed by: STUDENT IN AN ORGANIZED HEALTH CARE EDUCATION/TRAINING PROGRAM

## 2024-03-20 PROCEDURE — 97116 GAIT TRAINING THERAPY: CPT

## 2024-03-20 PROCEDURE — 85027 COMPLETE CBC AUTOMATED: CPT

## 2024-03-20 PROCEDURE — 700102 HCHG RX REV CODE 250 W/ 637 OVERRIDE(OP): Performed by: PHYSICIAN ASSISTANT

## 2024-03-20 RX ORDER — AMIODARONE HYDROCHLORIDE 400 MG/1
400 TABLET ORAL DAILY
Qty: 30 TABLET | Refills: 0 | Status: SHIPPED
Start: 2024-03-21 | End: 2024-03-20

## 2024-03-20 RX ORDER — OXYCODONE HYDROCHLORIDE 5 MG/1
5 TABLET ORAL EVERY 6 HOURS PRN
Qty: 12 TABLET | Refills: 0 | Status: SHIPPED | OUTPATIENT
Start: 2024-03-20 | End: 2024-03-23

## 2024-03-20 RX ORDER — ASPIRIN 81 MG/1
81 TABLET ORAL DAILY
Status: DISCONTINUED | OUTPATIENT
Start: 2024-03-20 | End: 2024-03-20 | Stop reason: HOSPADM

## 2024-03-20 RX ORDER — AMIODARONE HYDROCHLORIDE 400 MG/1
TABLET ORAL
Status: ON HOLD
Start: 2024-03-21 | End: 2024-03-28

## 2024-03-20 RX ORDER — AMIODARONE HYDROCHLORIDE 400 MG/1
TABLET ORAL
Status: SHIPPED
Start: 2024-03-21 | End: 2024-03-20

## 2024-03-20 RX ORDER — POLYETHYLENE GLYCOL 3350 17 G/17G
17 POWDER, FOR SOLUTION ORAL
Status: SHIPPED
Start: 2024-03-20 | End: 2024-03-26

## 2024-03-20 RX ORDER — AMOXICILLIN 250 MG
2 CAPSULE ORAL EVERY EVENING
Status: ON HOLD
Start: 2024-03-20

## 2024-03-20 RX ORDER — ASPIRIN 81 MG/1
81 TABLET ORAL DAILY
Status: ON HOLD
Start: 2024-03-20 | End: 2024-03-28

## 2024-03-20 RX ADMIN — FUROSEMIDE 40 MG: 40 TABLET ORAL at 05:24

## 2024-03-20 RX ADMIN — ENOXAPARIN SODIUM 60 MG: 100 INJECTION SUBCUTANEOUS at 05:25

## 2024-03-20 RX ADMIN — METOPROLOL SUCCINATE 25 MG: 25 TABLET, EXTENDED RELEASE ORAL at 05:24

## 2024-03-20 RX ADMIN — GABAPENTIN 600 MG: 300 CAPSULE ORAL at 05:24

## 2024-03-20 RX ADMIN — OXYCODONE 5 MG: 5 TABLET ORAL at 08:22

## 2024-03-20 RX ADMIN — DULOXETINE HYDROCHLORIDE 60 MG: 60 CAPSULE, DELAYED RELEASE ORAL at 05:24

## 2024-03-20 RX ADMIN — CLOPIDOGREL BISULFATE 75 MG: 75 TABLET ORAL at 05:24

## 2024-03-20 RX ADMIN — LEVOTHYROXINE SODIUM 125 MCG: 0.12 TABLET ORAL at 05:24

## 2024-03-20 RX ADMIN — OMEPRAZOLE 20 MG: 20 CAPSULE, DELAYED RELEASE ORAL at 05:24

## 2024-03-20 RX ADMIN — AMIODARONE HYDROCHLORIDE 400 MG: 200 TABLET ORAL at 05:24

## 2024-03-20 ASSESSMENT — COGNITIVE AND FUNCTIONAL STATUS - GENERAL
TURNING FROM BACK TO SIDE WHILE IN FLAT BAD: A LITTLE
CLIMB 3 TO 5 STEPS WITH RAILING: A LITTLE
WALKING IN HOSPITAL ROOM: A LITTLE
SUGGESTED CMS G CODE MODIFIER MOBILITY: CK
MOVING FROM LYING ON BACK TO SITTING ON SIDE OF FLAT BED: A LITTLE
STANDING UP FROM CHAIR USING ARMS: A LITTLE
MOBILITY SCORE: 18
MOVING TO AND FROM BED TO CHAIR: A LITTLE

## 2024-03-20 ASSESSMENT — GAIT ASSESSMENTS
DISTANCE (FEET): 60
GAIT LEVEL OF ASSIST: CONTACT GUARD ASSIST

## 2024-03-20 ASSESSMENT — FIBROSIS 4 INDEX: FIB4 SCORE: 3.59

## 2024-03-20 ASSESSMENT — PAIN DESCRIPTION - PAIN TYPE: TYPE: ACUTE PAIN

## 2024-03-20 NOTE — PROGRESS NOTES
Monitor Summary  Rhythm: SB-SR  Rate: 55-60  Ectopy: oPVC, oPAC, rCoup, 7beats vtach  Measurements not avaliable

## 2024-03-20 NOTE — DISCHARGE PLANNING
Case Management Discharge Planning    Admission Date: 3/16/2024  GMLOS: 3.5  ALOS: 4    6-Clicks ADL Score: 20  6-Clicks Mobility Score: 22      Anticipated Discharge Dispo: Discharge Disposition: D/T to SNF with Medicare cert in anticipation of skilled care (03)  Discharge Address: [address] [place] [address]  Discharge Contact Phone Number: [phone]    DME Needed: No    Action(s) Taken: Transport Arranged     RN CM received Voalte from Dr. Harp stating this patient is now medically cleared to discharge back to [facility].     RN LUKAS met with patient to obtain signature for [facility] packet. She is agreeable to transfer back to [facility].     RN LUKAS requested DPA call [facility] for bed availability. Per DPA, [facility] can take patient today at noon.     RN LUKAS requested transport to [facility] for 12:30 PM via Ride line. Awaiting confirmation.     [facility] packet prepared and given to bedside nurse. Transport confirmed via [facility] to [facility] at 12:30 PM.      Escalations Completed: None    Medically Clear: Yes    Next Steps: Pick-up by [facility] for D/C to [facility].    Barriers to Discharge: Transportation    Is the patient up for discharge tomorrow: No

## 2024-03-20 NOTE — DISCHARGE INSTRUCTIONS
HF Patient Discharge Instructions  Monitor your weight daily, and maintain a weight chart, to track your weight changes.   Activity as tolerated, unless your Doctor has ordered otherwise.  Follow a low fat, low cholesterol, low salt diet unless instructed otherwise by your Doctor. Read the labels on the back of food products and track your intake of fat, cholesterol and salt.   Fluid Restriction No. If a Fluid Restriction has been ordered by your Doctor, measure fluids with a measuring cup to ensure that you are not exceeding the restriction.   No smoking.  Oxygen Yes. If your Doctor has ordered that you wear Oxygen at home, it is important to wear it as ordered.  Did you receive an explanation from staff on the importance of taking each of your medications and why it is necessary to keep taking them unless your doctor says to stop? Yes  Were all of your questions answered about how to manage your heart failure and what to do if you have increased signs and symptoms after you go home? Yes  Do you feel like your heart failure care team involved you in the care treatment plan and allowed you to make decisions regarding your care while in the hospital and addressed any discharge needs you might have? Yes    See the educational handout provided at discharge for more information on monitoring your daily weight, activity and diet. This also explains more about Heart Failure, symptoms of a flare-up and some of the tests that you have undergone.     Warning Signs of a Flare-Up include:  Swelling in the ankles or lower legs.  Shortness of breath, while at rest, or while doing normal activities.   Shortness of breath at night when in bed, or coughing in bed.   Requiring more pillows to sleep at night, or needing to sit up at night to sleep.  Feeling weak, dizzy or fatigued.     When to call your Doctor:  Call your Primary Care Physician or Cardiologist if:   You experience any pain radiating to your jaw or neck.  You have  any difficulty breathing.  You experience weight gain of 3 lbs in a day or 5 lbs in a week.   You feel any palpitations or irregular heartbeats.  You become dizzy or lose consciousness.   If you have had an angiogram or had a pacemaker or AICD placed, and experience:  Bleeding, drainage or swelling at the surgical / puncture site.  Fever greater than 100.0 F  Shock from internal defibrillator.  Cool and / or numb extremities.     Please access the [facility] My HF Guide/Heart Failure Interactive Workbook:   [email or web address]

## 2024-03-20 NOTE — DISCHARGE SUMMARY
"Discharge Summary    CHIEF COMPLAINT ON ADMISSION  Chief Complaint   Patient presents with    Chest Pain     Pt BIBA from [place] for intermittent chest pain with palpitations that started last night and continued through this morning. Pt reports pain is now more of a \"discomfort.\" Pt hypotensive in route- 100 mL of NS given prior to arrival. Pt is on 4 L baseline oxygen.        Reason for Admission  EMS    Admission Date  3/16/2024     CODE STATUS  DNAR/DNI    HPI & HOSPITAL COURSE  This is a 89 y.o. female here with Chest Pain (Pt BIBA from [place] for intermittent chest pain with palpitations that started last night and continued through this morning. Pt reports pain is now more of a \"discomfort.\" Pt hypotensive in route- 100 mL of NS given prior to arrival. Pt is on 4 L baseline oxygen. )  Please review Dr. Tyshawn Sorto M.D. notes for further details of history of present illness, past medical/social/family histories, allergies and medications. Please review Dr. Gamez, Cardiology consultation notes.  89yo thin female with history of HFrEF 20%, CAD status post CABG 2008, vascular dementia, hypertension, hyperlipidemia, recent hospitalization for CHF exacerbation discharged to SNF who presented 3/16/2024 with palpitation, chest pain. AT the ED, she was found to also be tachycardiac. EKG revealed atrial fibrillation. HSe was hypoxic and placed on O2. CXR showed small pleural effusion, possible pulmonary vascular congestion. Echo revealed EF 20-25%. Cardiology felt it is tachycardia mediated heart failure. She underwent MARGO cardioversion successfully on 3/18 by Dr. Veloz. She remained sinus and will be on amiodarone taper, BB, ELiquis. Cannot do ACEI, ARB, spironolactone, Lasix, Farxiga or Entresto because of renal insufficiency.     She will be discharging to a skilled facility.     At discharge date, Davie Montejo afebrile and hemodynamically stable.  Davie Montejo wanted to be discharged " today.    Imaging  EC-MARGO W/O CONT         EC-ECHOCARDIOGRAM LTD W/ CONT   Final Result      CT-ABDOMEN-PELVIS WITH   Final Result      1.  No evidence of bowel obstruction or focal inflammatory change.      2.  Renal cortical thinning and multiple areas of cortical scarring.      3.  Sigmoid diverticulosis.      4.  Small amount of free fluid dependently within the pelvis.      5.  Stable bilateral pleural effusions and bibasilar atelectasis.      6.  Extensive atherosclerotic vascular calcification.      7.  Prior cholecystectomy.      8.  Fatty change of the liver.      DX-CHEST-PORTABLE (1 VIEW)   Final Result      Possible slight decrease in small right pleural effusion. No other significant change in bilateral interstitial opacities, bibasilar atelectasis and small left pleural effusion.      CL-CARDIOVERSION    (Results Pending)             Therefore, she is discharged in fair and stable condition to skilled nursing facility.    The patient met 2-midnight criteria for an inpatient stay at the time of discharge.      FOLLOW UP ITEMS POST DISCHARGE      DISCHARGE DIAGNOSES  Principal Problem:    Acute on chronic HFrEF (heart failure with reduced ejection fraction) (HCC) (POA: Yes)  Active Problems:    ACP (advance care planning) (POA: Yes)    Acute respiratory failure with hypoxia (HCC) (POA: Yes)    Hypotension (POA: Yes)    Atrial fibrillation with RVR (HCC) (POA: Unknown)    Elevated troponin (POA: Unknown)    Age-related physical debility (POA: Unknown)        FOLLOW UP  Future Appointments   Date Time Provider Department Center   4/8/2024 11:15 AM Wesley Davidson M.D. [facility] None     No follow-up provider specified.  Follow up with PERLITA Lew in 1 week FOllow up with Dr. Veloz, Cardiology in 1 week Follow up with Pulmonolgy in 1-2 weeks for hypoxia NURSING provide resources/pamphlets for CHF (please weights daily and log in to your dry weight (best weight where you aren't short of breath or  swollen) for primary provider to titrate diuretics, respiratory status, volume status, log/monitor blood pressures at least twice a day and HR (keep /70 or below and HR 60-80), take your cardioprotective and blood pressure medications, healthy low salt cardiac diet with no more than 2000L fluid per day, see your primary provider as you will be on diuretics as they will need to check renal function and potassium levels. Defer to Cardiology for starting Entresto and Farxiga), oxygen use, check and record blood pressures at home at least twice a day for primary provider to review), Afib/anticoag (monitor blood pressure, heart rate, monitor for bleeding, melena and renal function with your doctor)  MEDICATIONS ON DISCHARGE     Medication List        START taking these medications        Instructions   amiodarone 400 MG tablet  Start taking on: March 21, 2024  Commonly known as: Pacerone   Take 1 Tablet by mouth every day for 12 days, THEN 0.5 Tablets every day for 30 days.     apixaban 5mg Tabs  Commonly known as: Eliquis   Take 1 Tablet by mouth 2 times a day. Indications: Thromboembolism secondary to Atrial Fibrillation  Dose: 5 mg     aspirin 81 MG EC tablet   Take 1 Tablet by mouth every day.  Dose: 81 mg     polyethylene glycol/lytes Pack  Commonly known as: Miralax   Take 1 Packet by mouth 1 time a day as needed (if no bowel movement in last 2 days).  Dose: 17 g     senna-docusate 8.6-50 MG Tabs  Commonly known as: Pericolace Or Senokot S   Take 2 Tablets by mouth every evening.  Dose: 2 Tablet            CONTINUE taking these medications        Instructions   acetaminophen 325 MG Tabs  Commonly known as: Tylenol   Take 650 mg by mouth every four hours as needed for Mild Pain.  Dose: 650 mg     atorvastatin 10 MG Tabs  Commonly known as: Lipitor   TAKE 1 TABLET BY MOUTH EVERY EVENING. FOR CHOLESTEROL.  Dose: 10 mg     Carafate 1 GM Tabs  Generic drug: sucralfate   Take 1 g by mouth 2 times a day.  Dose: 1  "g     DULoxetine 60 MG Cpep delayed-release capsule  Commonly known as: Cymbalta   Take 60 mg by mouth every morning.  Dose: 60 mg     estradiol 1 MG Tabs  Commonly known as: Estrace   TAKE 1 TABLET BY MOUTH EVERY DAY.  Dose: 1 mg     furosemide 20 MG Tabs  Commonly known as: Lasix   Take 1 Tablet by mouth every day.  Dose: 20 mg     gabapentin 600 MG tablet  Commonly known as: Neurontin   Take 1 Tablet by mouth 2 times a day.  Dose: 600 mg     levothyroxine 125 MCG Tabs  Commonly known as: Synthroid   TAKE 1 TABLET BY MOUTH EVERY MORNING ON AN EMPTY STOMACH.  Dose: 125 mcg     loratadine 10 MG Tabs  Commonly known as: Claritin   Take 1 Tablet by mouth every day.  Dose: 10 mg     metoprolol SR 25 MG Tb24  Commonly known as: Toprol XL   Take 0.5 Tablets by mouth every evening.  Dose: 12.5 mg     omeprazole 20 MG delayed-release capsule  Commonly known as: PriLOSEC   TAKE 1 CAPSULE BY MOUTH EVERY DAY.  Dose: 20 mg     oxyCODONE immediate-release 5 MG Tabs  Commonly known as: Roxicodone   Take 1 Tablet by mouth every 6 hours as needed for Severe Pain for up to 3 days.  Dose: 5 mg            STOP taking these medications      clopidogrel 75 MG Tabs  Commonly known as: Plavix              Allergies  Allergies   Allergen Reactions    Latex Unspecified          Tramadol Rash     Itchy red in nature    Codeine Unspecified     Unknown reaction      Lisinopril Unspecified     Unknown reaction    Penicillin G Unspecified     Unknown reaction      Pregabalin Unspecified     Lyrica affects patients vision.    Simvastatin Unspecified     Unknown reaction      Sulfa Drugs Unspecified     Patient states \"I can't remember what this does to me\" but recalls and allergy.        DIET  Orders Placed This Encounter   Procedures    Diet Order Diet: Level 6 - Soft and Bite Sized (Assist w/ set up); Liquid level: Level 0 - Thin; Second Modifier: (optional): Cardiac     Standing Status:   Standing     Number of Occurrences:   1     Order " Specific Question:   Diet:     Answer:   Level 6 - Soft and Bite Sized [23]     Comments:   Assist w/ set up     Order Specific Question:   Liquid level     Answer:   Level 0 - Thin     Order Specific Question:   Second Modifier: (optional)     Answer:   Cardiac [6]       ACTIVITY  Avoid heavy lifting or strenuous activity      LINES, DRAINS, AND WOUNDS  This is an automated list. Peripheral IVs will be removed prior to discharge.  Peripheral IV 03/16/24 20 G Right Antecubital (Active)   Site Assessment Clean;Dry;Intact 03/19/24 2000   Dressing Type Transparent Film 03/19/24 2000   Line Status Scrubbed the hub prior to access;Flushed;Saline locked 03/19/24 2000   Dressing Status Clean;Dry;Intact 03/19/24 2000   Dressing Intervention N/A 03/19/24 2000   Infiltration Grading ([facility], [facility]) 0 03/19/24 2000   Phlebitis Scale ([facility] Only) 0 03/19/24 2000          Peripheral IV 03/16/24 20 G Right Antecubital (Active)   Site Assessment Clean;Dry;Intact 03/19/24 2000   Dressing Type Transparent Film 03/19/24 2000   Line Status Scrubbed the hub prior to access;Flushed;Saline locked 03/19/24 2000   Dressing Status Clean;Dry;Intact 03/19/24 2000   Dressing Intervention N/A 03/19/24 2000   Infiltration Grading ([facility], [facility]) 0 03/19/24 2000   Phlebitis Scale ([facility] Only) 0 03/19/24 2000               MENTAL STATUS ON TRANSFER             CONSULTATIONS  Cardiology    PROCEDURES  See Dr. Veloz's PROC NOTE    LABORATORY  Lab Results   Component Value Date    SODIUM 138 03/20/2024    POTASSIUM 3.8 03/20/2024    CHLORIDE 100 03/20/2024    CO2 26 03/20/2024    GLUCOSE 93 03/20/2024    BUN 29 (H) 03/20/2024    CREATININE 1.02 03/20/2024        Lab Results   Component Value Date    WBC 7.3 03/20/2024    HEMOGLOBIN 10.7 (L) 03/20/2024    HEMATOCRIT 34.1 (L) 03/20/2024    PLATELETCT 295 03/20/2024        Total time of the discharge process exceeds 45 minutes.

## 2024-03-20 NOTE — DISCHARGE PLANNING
DC Transport Scheduled    Transport Company Scheduled:  [employer]  Spoke with Cirilo at [facility] to schedule transport.    Scheduled Date: 3/20/2024  Scheduled Time: 1230    Destination: [facility] at [address] [place] [address] Vicente NAVA     Notified care team of scheduled transport via Voalte.     If there are any changes needed to the DC transportation scheduled, please contact [facility] Line at ext. [phone] between the hours of 3305-5477 Mon-Fri. If outside those hours, contact the ED Case Manager at ext. [phone].

## 2024-03-20 NOTE — PROGRESS NOTES
Monitor Summary  Rhythm: SB w/ BBB  Rate: 54-59  Ectopy: rPAC, rPVC, oPAC  .20 / .14 / .46

## 2024-03-20 NOTE — THERAPY
"Physical Therapy   Daily Treatment     Patient Name: Davie Montejo  Age:  89 y.o., Sex:  female  Medical Record #: [id number]  Today's Date: 3/20/2024     Precautions  Precautions: Fall Risk    Assessment    Pt demonstrates progress with PT increasing ambulation to 60', but reports SOB with O2 dropping to 88%.  She demonstrates decrease balance with ambulation, initially scissoring without an AD, but refused to use the walker.  Activity tolerance remains decreased with amb distance 60' and 2 30:30 intervals tolerated before desaturating.  Pt required cueing to rest and catch her breath instead of trying to push through.  She will benefit from post acute PT services to increase independence and safety.  PT will continue to follow in the hospital.    Plan    Treatment Plan Status: Continue Current Treatment Plan  Type of Treatment: Bed Mobility, Group Therapy, Neuro Re-Education / Balance, Self Care / Home Evaluation, Stair Training, Therapeutic Activities, Therapeutic Exercise  Treatment Frequency: 4 Times per Week  Treatment Duration: Until Therapy Goals Met    DC Equipment Recommendations: Unable to determine at this time (likely FWW)  Discharge Recommendations: Recommend post-acute placement for additional physical therapy services prior to discharge home      Subjective    \"This is bologna.  This is mot me.  I don't believe this.\" (Re: SOB with activity)     Objective       03/20/24 1200   Precautions   Precautions Fall Risk   Vitals   Pulse (!) 54  (at rest, 72 /p ambulation)   Patient BP Position Reddy's Position   Blood Pressure  101/44   Pulse Oximetry 94 %  (at rest, 88 /p ambulation with recovery to 94% /p 1 min)   O2 (LPM) 2   O2 Delivery Device Silicone Nasal Cannula   Pain 0 - 10 Group   Therapist Pain Assessment During Activity;Post Activity  (B aching knee pain with activity)   Cognition    Cognition / Consciousness X   Level of Consciousness Alert   Ability To Follow Commands 2 Step   Safety " "Awareness Impaired;Impulsive  (during activity pt reported she was being \"worked hard\", then /p session pt states That's it?  I thought we would do more work.\"  Pt reminded of drop in O2 during walking and intervals multiple times.)   New Learning Impaired   Comments pleasant and cooperative   Passive ROM Lower Body   Passive ROM Lower Body WDL   Balance Assessment   Sitting Balance (Static) Fair +   Sitting Balance (Dynamic) Fair   Standing Balance (Static) Fair -   Standing Balance (Dynamic) Fair -   Weight Shift Sitting Fair   Weight Shift Standing Fair   Bed Mobility    Supine to Sit Supervised   Scooting Supervised   Rolling Supervised   Comments HOB elevated, rail   Gait Analysis   Gait Level Of Assist Contact Guard Assist   Assistive Device None   Distance (Feet) 60   # of Times Distance was Traveled 1   Deviation   (initial scissoring, decreased step length)   # of Stairs Climbed 6  (with L rail)   Level of Assist with Stairs Contact Guard Assist   Weight Bearing Status FWB   Functional Mobility   Sit to Stand Standby Assist   Bed, Chair, Wheelchair Transfer Contact Guard Assist   Transfer Method Stand Step   Activity Tolerance   Sitting in Chair post session   Sitting Edge of Bed 4 min   Short Term Goals    Short Term Goal # 1 Pt will be able to perform supine <> sit with bed flat and SPV in 6 visits to progress bed mobility   Goal Outcome # 1 Progressing as expected   Short Term Goal # 2 Pt will be able to perform STS with LRAD and SPV in 6 visits to progress functional transfers   Goal Outcome # 2 Progressing as expected   Short Term Goal # 3 Pt will be able to ambulate 150ft with LRAD and SPV in 6 visits to progress functional gait   Goal Outcome # 3 Progressing as expected   Short Term Goal # 4 Pt will be able to ascend/descend 10 steps with unilateral UE support and SPV in 6 visits in order to safely navigate her home   Goal Outcome # 4 Goal not met   Education Group   Education Provided Stair Training "   Stair Training Patient Response Patient;Acceptance;Explanation;Verbal Demonstration;Action Demonstration;Reinforcement Needed   Anticipated Discharge Equipment and Recommendations   DC Equipment Recommendations Unable to determine at this time  (likely FWW)   Discharge Recommendations Recommend post-acute placement for additional physical therapy services prior to discharge home   Interdisciplinary Plan of Care Collaboration   IDT Collaboration with  Nursing   Patient Position at End of Therapy Seated;Chair Alarm On;Call Light within Reach;Tray Table within Reach;Phone within Reach   Collaboration Comments RN updated   Session Information   Date / Session Number  3/20 -2 (2/4, 3/25)     Addemdum: Treatment time 10:34 - 10:58

## 2024-03-20 NOTE — DISCHARGE PLANNING
-0915  DPA left VM for Jose at [facility] asking for a call back regarding bed availability for pt today.     -0940  DPA spoke with Jose at [facility], bed is abailable today. Preferable transport around [id number], RN CM notified. DPA to follow up with Jose once transport time is confirmed.     -9140  DPA updated Jose at [facility] on 1230 transport time.

## 2024-03-20 NOTE — PROGRESS NOTES
Bedside report received from off going RN/tech: Dinora, assumed care of patient.     Fall Risk Score: HIGH RISK  Fall risk interventions in place: Place yellow fall risk ID band on patient, Provide patient/family education based on risk assessment, Educate patient/family to call staff for assistance when getting out of bed, Place fall precaution signage outside patient door, Place patient in room close to nursing station, Utilize bed/chair fall alarm, Notify charge of high risk for huddle, and Bed alarm connected correctly  Bed type: Regular (Regan Score less than 17 interventions in place)  Patient on cardiac monitor: Yes  IVF/IV medications: Not Applicable   Oxygen: How many liters 3L  Bedside sitter: Not Applicable   Isolation: Not applicable    1930: Bedside report received from day RN, pt care assumed, assessment completed. Pt is A&Ox4, pain 0/10, SB/SR on the monitor. Updated on POC, questions answered. Bed in lowest, locked position, treaded socks on, call light and belongings within reach. Fall precautions in place.

## 2024-03-26 ENCOUNTER — APPOINTMENT (OUTPATIENT)
Dept: RADIOLOGY | Facility: MEDICAL CENTER | Age: 89
End: 2024-03-26
Attending: EMERGENCY MEDICINE
Payer: MEDICARE

## 2024-03-26 ENCOUNTER — HOSPITAL ENCOUNTER (INPATIENT)
Facility: MEDICAL CENTER | Age: 89
LOS: 2 days | End: 2024-03-28
Attending: EMERGENCY MEDICINE | Admitting: INTERNAL MEDICINE
Payer: MEDICARE

## 2024-03-26 DIAGNOSIS — Z95.1 HX OF CABG: ICD-10-CM

## 2024-03-26 DIAGNOSIS — R10.84 GENERALIZED ABDOMINAL PAIN: ICD-10-CM

## 2024-03-26 DIAGNOSIS — I48.19 PERSISTENT ATRIAL FIBRILLATION (HCC): ICD-10-CM

## 2024-03-26 DIAGNOSIS — I50.21 ACUTE SYSTOLIC CONGESTIVE HEART FAILURE (HCC): ICD-10-CM

## 2024-03-26 DIAGNOSIS — I48.91 ATRIAL FIBRILLATION WITH RAPID VENTRICULAR RESPONSE (HCC): ICD-10-CM

## 2024-03-26 PROBLEM — N18.32 STAGE 3B CHRONIC KIDNEY DISEASE (CKD): Status: ACTIVE | Noted: 2024-03-26

## 2024-03-26 PROBLEM — I77.1 CELIAC ARTERY STENOSIS (HCC): Status: ACTIVE | Noted: 2024-03-26

## 2024-03-26 PROBLEM — F11.20 OPIOID DEPENDENCE (HCC): Status: ACTIVE | Noted: 2024-03-26

## 2024-03-26 PROBLEM — I77.4 CELIAC ARTERY STENOSIS (HCC): Status: ACTIVE | Noted: 2024-03-26

## 2024-03-26 PROBLEM — I27.20 PULMONARY HYPERTENSION (HCC): Status: ACTIVE | Noted: 2024-03-26

## 2024-03-26 PROBLEM — I50.82 BIVENTRICULAR HEART FAILURE, NYHA CLASS 3 (HCC): Status: ACTIVE | Noted: 2024-03-26

## 2024-03-26 PROBLEM — R94.31 LONG QT INTERVAL: Status: ACTIVE | Noted: 2024-03-26

## 2024-03-26 PROBLEM — N39.0 UTI (URINARY TRACT INFECTION) DUE TO ENTEROCOCCUS: Status: ACTIVE | Noted: 2024-03-26

## 2024-03-26 PROBLEM — B95.2 UTI (URINARY TRACT INFECTION) DUE TO ENTEROCOCCUS: Status: ACTIVE | Noted: 2024-03-26

## 2024-03-26 PROBLEM — K59.03 DRUG-INDUCED CONSTIPATION: Status: ACTIVE | Noted: 2024-03-26

## 2024-03-26 LAB
ALBUMIN SERPL BCP-MCNC: 3.6 G/DL (ref 3.2–4.9)
ALBUMIN/GLOB SERPL: 1.2 G/DL
ALP SERPL-CCNC: 78 U/L (ref 30–99)
ALT SERPL-CCNC: 14 U/L (ref 2–50)
ANION GAP SERPL CALC-SCNC: 8 MMOL/L (ref 7–16)
APPEARANCE UR: CLEAR
APTT PPP: 40.2 SEC (ref 24.7–36)
AST SERPL-CCNC: 24 U/L (ref 12–45)
BACTERIA #/AREA URNS HPF: ABNORMAL /HPF
BASOPHILS # BLD AUTO: 1 % (ref 0–1.8)
BASOPHILS # BLD: 0.07 K/UL (ref 0–0.12)
BILIRUB SERPL-MCNC: 0.4 MG/DL (ref 0.1–1.5)
BILIRUB UR QL STRIP.AUTO: NEGATIVE
BUN SERPL-MCNC: 24 MG/DL (ref 8–22)
CALCIUM ALBUM COR SERPL-MCNC: 9.4 MG/DL (ref 8.5–10.5)
CALCIUM SERPL-MCNC: 9.1 MG/DL (ref 8.5–10.5)
CHLORIDE SERPL-SCNC: 102 MMOL/L (ref 96–112)
CO2 SERPL-SCNC: 28 MMOL/L (ref 20–33)
COLOR UR: YELLOW
CREAT SERPL-MCNC: 1.1 MG/DL (ref 0.5–1.4)
EKG IMPRESSION: NORMAL
EOSINOPHIL # BLD AUTO: 0.28 K/UL (ref 0–0.51)
EOSINOPHIL NFR BLD: 4.1 % (ref 0–6.9)
EPI CELLS #/AREA URNS HPF: ABNORMAL /HPF
ERYTHROCYTE [DISTWIDTH] IN BLOOD BY AUTOMATED COUNT: 56.9 FL (ref 35.9–50)
GFR SERPLBLD CREATININE-BSD FMLA CKD-EPI: 49 ML/MIN/1.73 M 2
GLOBULIN SER CALC-MCNC: 3.1 G/DL (ref 1.9–3.5)
GLUCOSE SERPL-MCNC: 92 MG/DL (ref 65–99)
GLUCOSE UR STRIP.AUTO-MCNC: NEGATIVE MG/DL
HCT VFR BLD AUTO: 34.9 % (ref 37–47)
HGB BLD-MCNC: 11 G/DL (ref 12–16)
HYALINE CASTS #/AREA URNS LPF: ABNORMAL /LPF
IMM GRANULOCYTES # BLD AUTO: 0.03 K/UL (ref 0–0.11)
IMM GRANULOCYTES NFR BLD AUTO: 0.4 % (ref 0–0.9)
INR PPP: 1.15 (ref 0.87–1.13)
KETONES UR STRIP.AUTO-MCNC: NEGATIVE MG/DL
LACTATE SERPL-SCNC: 0.7 MMOL/L (ref 0.5–2)
LACTATE SERPL-SCNC: 0.9 MMOL/L (ref 0.5–2)
LEUKOCYTE ESTERASE UR QL STRIP.AUTO: ABNORMAL
LIPASE SERPL-CCNC: 55 U/L (ref 11–82)
LYMPHOCYTES # BLD AUTO: 1.8 K/UL (ref 1–4.8)
LYMPHOCYTES NFR BLD: 26.5 % (ref 22–41)
MCH RBC QN AUTO: 25.9 PG (ref 27–33)
MCHC RBC AUTO-ENTMCNC: 31.5 G/DL (ref 32.2–35.5)
MCV RBC AUTO: 82.3 FL (ref 81.4–97.8)
MICRO URNS: ABNORMAL
MONOCYTES # BLD AUTO: 0.79 K/UL (ref 0–0.85)
MONOCYTES NFR BLD AUTO: 11.6 % (ref 0–13.4)
NEUTROPHILS # BLD AUTO: 3.83 K/UL (ref 1.82–7.42)
NEUTROPHILS NFR BLD: 56.4 % (ref 44–72)
NITRITE UR QL STRIP.AUTO: NEGATIVE
NRBC # BLD AUTO: 0 K/UL
NRBC BLD-RTO: 0 /100 WBC (ref 0–0.2)
PH UR STRIP.AUTO: 7 [PH] (ref 5–8)
PLATELET # BLD AUTO: 286 K/UL (ref 164–446)
PMV BLD AUTO: 10.9 FL (ref 9–12.9)
POTASSIUM SERPL-SCNC: 4.7 MMOL/L (ref 3.6–5.5)
PROT SERPL-MCNC: 6.7 G/DL (ref 6–8.2)
PROT UR QL STRIP: NEGATIVE MG/DL
PROTHROMBIN TIME: 14.9 SEC (ref 12–14.6)
RBC # BLD AUTO: 4.24 M/UL (ref 4.2–5.4)
RBC # URNS HPF: ABNORMAL /HPF
RBC UR QL AUTO: NEGATIVE
SODIUM SERPL-SCNC: 138 MMOL/L (ref 135–145)
SP GR UR STRIP.AUTO: 1.03
TROPONIN T SERPL-MCNC: 23 NG/L (ref 6–19)
UROBILINOGEN UR STRIP.AUTO-MCNC: 0.2 MG/DL
WBC # BLD AUTO: 6.8 K/UL (ref 4.8–10.8)
WBC #/AREA URNS HPF: ABNORMAL /HPF

## 2024-03-26 PROCEDURE — 93005 ELECTROCARDIOGRAM TRACING: CPT | Performed by: EMERGENCY MEDICINE

## 2024-03-26 PROCEDURE — 85025 COMPLETE CBC W/AUTO DIFF WBC: CPT

## 2024-03-26 PROCEDURE — 36415 COLL VENOUS BLD VENIPUNCTURE: CPT

## 2024-03-26 PROCEDURE — 96374 THER/PROPH/DIAG INJ IV PUSH: CPT

## 2024-03-26 PROCEDURE — 87086 URINE CULTURE/COLONY COUNT: CPT

## 2024-03-26 PROCEDURE — 83605 ASSAY OF LACTIC ACID: CPT

## 2024-03-26 PROCEDURE — 87040 BLOOD CULTURE FOR BACTERIA: CPT | Mod: 91

## 2024-03-26 PROCEDURE — 81001 URINALYSIS AUTO W/SCOPE: CPT

## 2024-03-26 PROCEDURE — 84484 ASSAY OF TROPONIN QUANT: CPT

## 2024-03-26 PROCEDURE — 700102 HCHG RX REV CODE 250 W/ 637 OVERRIDE(OP): Performed by: INTERNAL MEDICINE

## 2024-03-26 PROCEDURE — 74174 CTA ABD&PLVS W/CONTRAST: CPT

## 2024-03-26 PROCEDURE — 80053 COMPREHEN METABOLIC PANEL: CPT

## 2024-03-26 PROCEDURE — 85730 THROMBOPLASTIN TIME PARTIAL: CPT

## 2024-03-26 PROCEDURE — 700105 HCHG RX REV CODE 258: Mod: UD | Performed by: EMERGENCY MEDICINE

## 2024-03-26 PROCEDURE — 700111 HCHG RX REV CODE 636 W/ 250 OVERRIDE (IP): Mod: UD | Performed by: EMERGENCY MEDICINE

## 2024-03-26 PROCEDURE — 85610 PROTHROMBIN TIME: CPT

## 2024-03-26 PROCEDURE — 93005 ELECTROCARDIOGRAM TRACING: CPT

## 2024-03-26 PROCEDURE — 71045 X-RAY EXAM CHEST 1 VIEW: CPT

## 2024-03-26 PROCEDURE — 700117 HCHG RX CONTRAST REV CODE 255: Performed by: EMERGENCY MEDICINE

## 2024-03-26 PROCEDURE — A9270 NON-COVERED ITEM OR SERVICE: HCPCS | Performed by: INTERNAL MEDICINE

## 2024-03-26 PROCEDURE — 99285 EMERGENCY DEPT VISIT HI MDM: CPT

## 2024-03-26 PROCEDURE — 770020 HCHG ROOM/CARE - TELE (206)

## 2024-03-26 PROCEDURE — 83690 ASSAY OF LIPASE: CPT

## 2024-03-26 PROCEDURE — 99223 1ST HOSP IP/OBS HIGH 75: CPT | Mod: AI | Performed by: INTERNAL MEDICINE

## 2024-03-26 RX ORDER — OMEPRAZOLE 20 MG/1
20 CAPSULE, DELAYED RELEASE ORAL DAILY
Status: DISCONTINUED | OUTPATIENT
Start: 2024-03-27 | End: 2024-03-28 | Stop reason: HOSPADM

## 2024-03-26 RX ORDER — POLYETHYLENE GLYCOL 3350 17 G/17G
1 POWDER, FOR SOLUTION ORAL 2 TIMES DAILY
Status: DISCONTINUED | OUTPATIENT
Start: 2024-03-26 | End: 2024-03-28 | Stop reason: HOSPADM

## 2024-03-26 RX ORDER — LEVOTHYROXINE SODIUM 0.12 MG/1
125 TABLET ORAL
Status: DISCONTINUED | OUTPATIENT
Start: 2024-03-27 | End: 2024-03-28 | Stop reason: HOSPADM

## 2024-03-26 RX ORDER — AMIODARONE HYDROCHLORIDE 200 MG/1
200 TABLET ORAL TWICE DAILY
Qty: 6 TABLET | Refills: 0 | Status: DISCONTINUED | OUTPATIENT
Start: 2024-03-26 | End: 2024-03-28 | Stop reason: HOSPADM

## 2024-03-26 RX ORDER — SODIUM CHLORIDE 9 MG/ML
500 INJECTION, SOLUTION INTRAVENOUS ONCE
Status: COMPLETED | OUTPATIENT
Start: 2024-03-26 | End: 2024-03-26

## 2024-03-26 RX ORDER — ATORVASTATIN CALCIUM 10 MG/1
10 TABLET, FILM COATED ORAL NIGHTLY
Status: DISCONTINUED | OUTPATIENT
Start: 2024-03-26 | End: 2024-03-28 | Stop reason: HOSPADM

## 2024-03-26 RX ORDER — ASPIRIN 81 MG/1
81 TABLET ORAL DAILY
Status: DISCONTINUED | OUTPATIENT
Start: 2024-03-26 | End: 2024-03-27

## 2024-03-26 RX ORDER — LORATADINE 10 MG/1
10 TABLET ORAL DAILY
Status: DISCONTINUED | OUTPATIENT
Start: 2024-03-26 | End: 2024-03-28 | Stop reason: HOSPADM

## 2024-03-26 RX ORDER — AMIODARONE HYDROCHLORIDE 200 MG/1
200 TABLET ORAL DAILY
Status: DISCONTINUED | OUTPATIENT
Start: 2024-03-30 | End: 2024-03-28 | Stop reason: HOSPADM

## 2024-03-26 RX ORDER — METOPROLOL SUCCINATE 25 MG/1
12.5 TABLET, EXTENDED RELEASE ORAL EVERY EVENING
Status: DISCONTINUED | OUTPATIENT
Start: 2024-03-26 | End: 2024-03-28 | Stop reason: HOSPADM

## 2024-03-26 RX ORDER — OXYCODONE HYDROCHLORIDE 5 MG/1
5 TABLET ORAL EVERY 6 HOURS PRN
Status: ON HOLD | COMMUNITY
End: 2024-03-28

## 2024-03-26 RX ORDER — GABAPENTIN 300 MG/1
600 CAPSULE ORAL 2 TIMES DAILY
Status: DISCONTINUED | OUTPATIENT
Start: 2024-03-26 | End: 2024-03-28 | Stop reason: HOSPADM

## 2024-03-26 RX ORDER — OXYCODONE HYDROCHLORIDE 5 MG/1
5 TABLET ORAL EVERY 6 HOURS PRN
Status: DISCONTINUED | OUTPATIENT
Start: 2024-03-26 | End: 2024-03-28 | Stop reason: HOSPADM

## 2024-03-26 RX ORDER — ESTRADIOL 1 MG/1
1 TABLET ORAL DAILY
Status: DISCONTINUED | OUTPATIENT
Start: 2024-03-26 | End: 2024-03-28 | Stop reason: HOSPADM

## 2024-03-26 RX ORDER — DULOXETIN HYDROCHLORIDE 60 MG/1
60 CAPSULE, DELAYED RELEASE ORAL EVERY MORNING
Status: DISCONTINUED | OUTPATIENT
Start: 2024-03-27 | End: 2024-03-28 | Stop reason: HOSPADM

## 2024-03-26 RX ORDER — AMOXICILLIN 500 MG/1
500 CAPSULE ORAL EVERY 8 HOURS
Status: DISCONTINUED | OUTPATIENT
Start: 2024-03-26 | End: 2024-03-28 | Stop reason: HOSPADM

## 2024-03-26 RX ORDER — ACETAMINOPHEN 325 MG/1
650 TABLET ORAL EVERY 6 HOURS PRN
Status: DISCONTINUED | OUTPATIENT
Start: 2024-03-26 | End: 2024-03-28 | Stop reason: HOSPADM

## 2024-03-26 RX ORDER — FUROSEMIDE 10 MG/ML
40 INJECTION INTRAMUSCULAR; INTRAVENOUS
Status: DISCONTINUED | OUTPATIENT
Start: 2024-03-26 | End: 2024-03-28 | Stop reason: HOSPADM

## 2024-03-26 RX ORDER — SUCRALFATE 1 G/1
1 TABLET ORAL EVERY 6 HOURS
Status: DISCONTINUED | OUTPATIENT
Start: 2024-03-26 | End: 2024-03-28 | Stop reason: HOSPADM

## 2024-03-26 RX ADMIN — SUCRALFATE 1 G: 1 TABLET ORAL at 23:22

## 2024-03-26 RX ADMIN — APIXABAN 5 MG: 5 TABLET, FILM COATED ORAL at 17:52

## 2024-03-26 RX ADMIN — OXYCODONE 5 MG: 5 TABLET ORAL at 18:01

## 2024-03-26 RX ADMIN — IOHEXOL 80 ML: 350 INJECTION, SOLUTION INTRAVENOUS at 10:28

## 2024-03-26 RX ADMIN — ATORVASTATIN CALCIUM 10 MG: 10 TABLET, FILM COATED ORAL at 20:49

## 2024-03-26 RX ADMIN — AMOXICILLIN 500 MG: 500 CAPSULE ORAL at 20:49

## 2024-03-26 RX ADMIN — GABAPENTIN 600 MG: 300 CAPSULE ORAL at 17:52

## 2024-03-26 RX ADMIN — SUCRALFATE 1 G: 1 TABLET ORAL at 17:53

## 2024-03-26 RX ADMIN — METOPROLOL SUCCINATE 12.5 MG: 25 TABLET, EXTENDED RELEASE ORAL at 17:52

## 2024-03-26 RX ADMIN — SUCRALFATE 1 G: 1 TABLET ORAL at 14:47

## 2024-03-26 RX ADMIN — SODIUM CHLORIDE 500 ML: 9 INJECTION, SOLUTION INTRAVENOUS at 08:38

## 2024-03-26 RX ADMIN — LORATADINE 10 MG: 10 TABLET ORAL at 17:52

## 2024-03-26 RX ADMIN — ACETAMINOPHEN 650 MG: 325 TABLET, FILM COATED ORAL at 20:57

## 2024-03-26 RX ADMIN — AMIODARONE HYDROCHLORIDE 150 MG: 1.5 INJECTION, SOLUTION INTRAVENOUS at 09:01

## 2024-03-26 RX ADMIN — ASPIRIN 81 MG: 81 TABLET, COATED ORAL at 14:48

## 2024-03-26 RX ADMIN — AMOXICILLIN 500 MG: 500 CAPSULE ORAL at 14:47

## 2024-03-26 RX ADMIN — AMIODARONE HYDROCHLORIDE 200 MG: 200 TABLET ORAL at 17:54

## 2024-03-26 RX ADMIN — POLYETHYLENE GLYCOL 3350 1 PACKET: 17 POWDER, FOR SOLUTION ORAL at 17:53

## 2024-03-26 RX ADMIN — ESTRADIOL 1 MG: 1 TABLET ORAL at 17:52

## 2024-03-26 ASSESSMENT — ENCOUNTER SYMPTOMS
FEVER: 0
COUGH: 0
ABDOMINAL PAIN: 1
SHORTNESS OF BREATH: 1
CONSTIPATION: 1
CHILLS: 0
MEMORY LOSS: 1
WEAKNESS: 1

## 2024-03-26 ASSESSMENT — LIFESTYLE VARIABLES
EVER FELT BAD OR GUILTY ABOUT YOUR DRINKING: NO
DO YOU DRINK ALCOHOL: NO
ON A TYPICAL DAY WHEN YOU DRINK ALCOHOL HOW MANY DRINKS DO YOU HAVE: 0
TOTAL SCORE: 0
TOTAL SCORE: 0
HAVE PEOPLE ANNOYED YOU BY CRITICIZING YOUR DRINKING: NO
HAVE YOU EVER FELT YOU SHOULD CUT DOWN ON YOUR DRINKING: NO
TOTAL SCORE: 0
EVER HAD A DRINK FIRST THING IN THE MORNING TO STEADY YOUR NERVES TO GET RID OF A HANGOVER: NO
ALCOHOL_USE: NO
CONSUMPTION TOTAL: NEGATIVE
AVERAGE NUMBER OF DAYS PER WEEK YOU HAVE A DRINK CONTAINING ALCOHOL: 0
HOW MANY TIMES IN THE PAST YEAR HAVE YOU HAD 5 OR MORE DRINKS IN A DAY: 0

## 2024-03-26 ASSESSMENT — PAIN DESCRIPTION - PAIN TYPE
TYPE: ACUTE PAIN

## 2024-03-26 ASSESSMENT — CHA2DS2 SCORE
PRIOR STROKE OR TIA OR THROMBOEMBOLISM: NO
AGE 65 TO 74: NO
SEX: FEMALE
VASCULAR DISEASE: NO
HYPERTENSION: YES
AGE 75 OR GREATER: YES
DIABETES: NO
CHF OR LEFT VENTRICULAR DYSFUNCTION: YES
CHA2DS2 VASC SCORE: 5

## 2024-03-26 ASSESSMENT — FIBROSIS 4 INDEX
FIB4 SCORE: 1.95
FIB4 SCORE: 3.59

## 2024-03-26 NOTE — PROGRESS NOTES
"Pt arrived to floor from ED. Pt A+Ox4, able to make needs known, PIV to right AC, Patent. Pain 0/10.   CSMT's and KIRSTEN's WNL, SCD\"s inplace,  Educated pt on call light and TV remote.  "

## 2024-03-26 NOTE — ASSESSMENT & PLAN NOTE
Patient is hemodynamically stable and overall asymptomatic, cardiology feels this is likely due to her discomfort and other issues and do not wish to cardiovert her at this time we will continue her on her amiodarone loading she has a few more doses at 200 twice daily and then goes to 200 daily  She is been fully anticoagulated with Eliquis

## 2024-03-26 NOTE — ED TRIAGE NOTES
Chief Complaint   Patient presents with    Abdominal Pain     Left upper quad abd pain since 0300.  Pt. Received 5mg oxycodone for the pain at that time per EMS report.       Nausea     Denies vomiting    Constipation     Pt. States she can not recall when her last BM was.        BIB EMS From [facility] for above complaints.  Pt. In a-fib with rate of 90's-130's en route and on arrival.  Pt. Has history of a-fib.

## 2024-03-26 NOTE — PROGRESS NOTES
4 Eyes Skin Assessment Completed by ESTEFANY Savage and ESTEFANY Stoddard.    Head WDL  Ears WDL  Nose WDL  Mouth WDL  Neck WDL  Breast/Chest WDL  Shoulder Blades WDL  Spine WDL  (R) Arm/Elbow/Hand WDL  (L) Arm/Elbow/Hand WDL  Abdomen WDL  Groin Excoriation  Scrotum/Coccyx/Buttocks Redness, Non-Blanching, Excoriation, and Moisture Fissure  (R) Leg WDL  (L) Leg WDL  (R) Heel/Foot/Toe Dry and calloused  (L) Heel/Foot/Toe Dry and calloused    Devices In Places Tele Box, Pulse Ox, and Nasal Cannula    Interventions In Place Gray Ear Foams, NC W/Ear Foams, Pillows, and Barrier Cream    Possible Skin Injury Yes    Pictures Uploaded Into Epic Yes  Wound Consult Placed Yes  RN Wound Prevention Protocol Ordered Yes

## 2024-03-26 NOTE — ED NOTES
Pt. Ate all of her meal tray that was provided.  Pt. Continues watching T.V. and denies needs at this time.  Call light remains in reach.  Safety measures maintained.

## 2024-03-26 NOTE — ED PROVIDER NOTES
ED Provider Note    CHIEF COMPLAINT  Chief Complaint   Patient presents with    Abdominal Pain     Left upper quad abd pain since 0300.  Pt. Received 5mg oxycodone for the pain at that time per EMS report.       Nausea     Denies vomiting    Constipation     Pt. States she can not recall when her last BM was.          EXTERNAL RECORDS REVIEWED  Reviewed recent admission and discharge summary, cardiology notes laboratory and imaging studies    HPI/ROS  LIMITATION TO HISTORY   Patient with mild dementia  OUTSIDE HISTORIAN(S):  EMS provided additional history    Davie Montejo is a 89 y.o. female who presents for evaluation of a constellation of symptoms including abdominal discomfort mild distention palpitations.  Patient has a complex medical history as listed below.  She was just recently hospitalized at this facility for atrial fibrillation with rapid ventricular response.  She underwent successful electrical cardioversion with Dr. VAZQUEZ with cardiology.  She was discharged home on her usual regimen including oral anticoagulant.  At [facility] they have been treating her with her oxycodone as well as stool softeners.  They noted that she developed some abdominal pain and her heart rate was elevated and erratic and called 911.  No associated hematemesis hematochezia no high fevers or chills    PAST MEDICAL HISTORY   has a past medical history of Acute blood loss anemia (10/21/2021), Acute on chronic systolic heart failure (HCC) (8/24/2020), Acute perforated gastric ulcer with hemorrhage ([facility]) (10/21/2021), Anemia (2/14/2022), Angina decubitus (6/18/2010), CAD (coronary artery disease), Cannabis use, unspecified with intoxication, uncomplicated ([facility]) (2/14/2022), Fibromyalgia, Generalized abdominal pain (12/5/2021), Heart attack ([facility]) (2008), Hypotension due to medication (7/18/2022), Intractable nausea and vomiting (12/4/2021), and Pain in the chest (11/15/2020).    SURGICAL HISTORY   has a past  surgical history that includes coronary artery bypas, 4 (2008) and coronary artery bypass, 1 (2008).    FAMILY HISTORY  History reviewed. No pertinent family history.    SOCIAL HISTORY  Social History     Tobacco Use    Smoking status: Former    Smokeless tobacco: Never   Vaping Use    Vaping Use: Never used   Substance and Sexual Activity    Alcohol use: Never    Drug use: Not Currently    Sexual activity: Not Currently       CURRENT MEDICATIONS  Home Medications       Reviewed by Oswaldo Serrato (Pharmacy Tech) on 03/26/24 at 0916  Med List Status: Complete     Medication Last Dose Status   acetaminophen (TYLENOL) 325 MG Tab 3/26/2024 Active   amiodarone (PACERONE) 400 MG tablet 3/25/2024 Active   apixaban (ELIQUIS) 5mg Tab 3/25/2024 Active   aspirin 81 MG EC tablet 3/25/2024 Active   atorvastatin (LIPITOR) 10 MG Tab 3/25/2024 Active   DULoxetine (CYMBALTA) 60 MG Cap DR Particles delayed-release capsule 3/25/2024 Active   estradiol (ESTRACE) 1 MG Tab 3/25/2024 Active   furosemide (LASIX) 20 MG Tab 3/25/2024 Active   gabapentin (NEURONTIN) 600 MG tablet 3/25/2024 Active   levothyroxine (SYNTHROID) 125 MCG Tab 3/26/2024 Active   loratadine (CLARITIN) 10 MG Tab 3/25/2024 Active   metoprolol SR (TOPROL XL) 25 MG TABLET SR 24 HR 3/25/2024 Active   omeprazole (PRILOSEC) 20 MG delayed-release capsule 3/26/2024 Active   oxyCODONE immediate-release (ROXICODONE) 5 MG Tab 3/26/2024 Active   senna-docusate (PERICOLACE OR SENOKOT S) 8.6-50 MG Tab 3/25/2024 Active   sucralfate (CARAFATE) 1 GM Tab 3/25/2024 Active                    ALLERGIES  Allergies   Allergen Reactions    Latex Unspecified          Tramadol Rash     Itchy red in nature    Codeine Unspecified     Unknown reaction      Lisinopril Unspecified     Unknown reaction    Penicillin G Unspecified     Unknown reaction      Pregabalin Unspecified     Lyrica affects patients vision.    Simvastatin Unspecified     Unknown reaction      Sulfa Drugs Unspecified      "Patient states \"I can't remember what this does to me\" but recalls and allergy.        PHYSICAL EXAM  VITAL SIGNS: /60   Pulse (!) 123   Temp 36.9 °C (98.4 °F) (Temporal)   Resp 16   Ht 1.702 m (5' 7\")   Wt 64.9 kg (143 lb)   LMP  (LMP Unknown)   SpO2 95%   BMI 22.40 kg/m²    Pulse ox interpretation: I interpret this pulse ox as normal.  Constitutional: Alert and oriented x 3, no acute distress, patient appears chronically ill  HEENT: Atraumatic normocephalic, pupils are equal round reactive to light extraocular movements are intact. The nares is clear, external ears are normal, mouth shows moist mucous membranes normal dentition for age  Neck: Supple, no JVD no tracheal deviation  Cardiovascular: Tachycardic irregularly irregular no murmur rub or gallop 2+ pulses peripherally x4  Thorax & Lungs: No respiratory distress, no wheezes rales or rhonchi, No chest tenderness.   GI: Soft diffuse moderate tenderness no rebound guarding or rigidity  Skin: Warm dry no acute rash or lesion  Musculoskeletal: Moving all extremities with full range and 5 of 5 strength no acute  deformity  Neurologic: Cranial nerves III through XII are grossly intact no sensory deficit no cerebellar dysfunction   Psychiatric: Anxious          DIAGNOSTIC STUDIES / PROCEDURES    LABS  Results for orders placed or performed during the hospital encounter of 03/26/24   Lipase   Result Value Ref Range    Lipase 55 11 - 82 U/L   Troponin   Result Value Ref Range    Troponin T 23 (H) 6 - 19 ng/L   LACTIC ACID   Result Value Ref Range    Lactic Acid 0.9 0.5 - 2.0 mmol/L   Lactic Acid   Result Value Ref Range    Lactic Acid 0.7 0.5 - 2.0 mmol/L   CBC with Differential   Result Value Ref Range    WBC 6.8 4.8 - 10.8 K/uL    RBC 4.24 4.20 - 5.40 M/uL    Hemoglobin 11.0 (L) 12.0 - 16.0 g/dL    Hematocrit 34.9 (L) 37.0 - 47.0 %    MCV 82.3 81.4 - 97.8 fL    MCH 25.9 (L) 27.0 - 33.0 pg    MCHC 31.5 (L) 32.2 - 35.5 g/dL    RDW 56.9 (H) 35.9 - 50.0 fL "    Platelet Count 286 164 - 446 K/uL    MPV 10.9 9.0 - 12.9 fL    Neutrophils-Polys 56.40 44.00 - 72.00 %    Lymphocytes 26.50 22.00 - 41.00 %    Monocytes 11.60 0.00 - 13.40 %    Eosinophils 4.10 0.00 - 6.90 %    Basophils 1.00 0.00 - 1.80 %    Immature Granulocytes 0.40 0.00 - 0.90 %    Nucleated RBC 0.00 0.00 - 0.20 /100 WBC    Neutrophils (Absolute) 3.83 1.82 - 7.42 K/uL    Lymphs (Absolute) 1.80 1.00 - 4.80 K/uL    Monos (Absolute) 0.79 0.00 - 0.85 K/uL    Eos (Absolute) 0.28 0.00 - 0.51 K/uL    Baso (Absolute) 0.07 0.00 - 0.12 K/uL    Immature Granulocytes (abs) 0.03 0.00 - 0.11 K/uL    NRBC (Absolute) 0.00 K/uL   Complete Metabolic Panel   Result Value Ref Range    Sodium 138 135 - 145 mmol/L    Potassium 4.7 3.6 - 5.5 mmol/L    Chloride 102 96 - 112 mmol/L    Co2 28 20 - 33 mmol/L    Anion Gap 8.0 7.0 - 16.0    Glucose 92 65 - 99 mg/dL    Bun 24 (H) 8 - 22 mg/dL    Creatinine 1.10 0.50 - 1.40 mg/dL    Calcium 9.1 8.5 - 10.5 mg/dL    Correct Calcium 9.4 8.5 - 10.5 mg/dL    AST(SGOT) 24 12 - 45 U/L    ALT(SGPT) 14 2 - 50 U/L    Alkaline Phosphatase 78 30 - 99 U/L    Total Bilirubin 0.4 0.1 - 1.5 mg/dL    Albumin 3.6 3.2 - 4.9 g/dL    Total Protein 6.7 6.0 - 8.2 g/dL    Globulin 3.1 1.9 - 3.5 g/dL    A-G Ratio 1.2 g/dL   Prothrombin Time   Result Value Ref Range    PT 14.9 (H) 12.0 - 14.6 sec    INR 1.15 (H) 0.87 - 1.13   APTT   Result Value Ref Range    APTT 40.2 (H) 24.7 - 36.0 sec   ESTIMATED GFR   Result Value Ref Range    GFR (CKD-EPI) 49 (A) >60 mL/min/1.73 m 2   EKG   Result Value Ref Range    Report       [facility] Emergency Dept.    Test Date:  2024  Pt Name:    JUNIOR CHIU            Department: ER  MRN:        [id number]                      Room:       [facility]  Gender:     Female                       Technician: [id number]  :        [id number]                   Requested By:ER TRIAGE PROTOCOL  Order #:    [id number]                    Hunter MD:    Measurements  Intervals                                 Axis  Rate:       127                          P:          0  ME:         0                            QRS:        -54  QRSD:       141                          T:          141  QT:         358  QTc:        521    Interpretive Statements  Atrial flutter with predominant 2:1 AV block  Left bundle branch block  Compared to ECG 03/18/2024 11:32:37  2:1 AV block now present  Left bundle-branch block now present  Sinus bradycardia no longer present  Intraventricular conduction delay no longer present  Left ventricular hypertrophy no longer present  Early repolari zation no longer present  Myocardial infarct finding no longer present          RADIOLOGY  I have independently interpreted the diagnostic imaging associated with this visit and am waiting the final reading from the radiologist.   My preliminary interpretation is as follows: No evidence of free air or obvious vascular occlusion  Radiologist interpretation: No evidence of obstruction or pneumatosis intestinalis  CTA ABDOMEN PELVIS W & W/O POST PROCESS   Final Result      1.  Moderate narrowing of the celiac trunk plaque proximally, without occlusion.   2.  Superior and inferior mesenteric arteries are patent.  No occlusion or other evidence of thrombus.   3.  Small infrarenal abdominal aortic aneurysm measuring 2 cm.   4.  Bilateral pleural effusions with associated atelectasis.         DX-CHEST-PORTABLE (1 VIEW)   Final Result      1.  Enlarged cardiac silhouette with interstitial opacities and small amounts of pleural fluid consistent with edema.   2.  Left lower lobe airspace disease could be atelectasis or pneumonitis.          COURSE & MEDICAL DECISION MAKING    ED Observation Status? No; Patient does not meet criteria for ED Observation.     INITIAL ASSESSMENT, COURSE AND PLAN  Care Narrative:     This is a very ill-appearing 89-year-old female presents here with abdominal pain palpitations and not feeling well.  She was  recently hospitalized at this facility for atrial fibrillation but had concomitant abdominal pain with some volume overload.  She was apparently started on blood thinners, electrically cardioverted by cardiology and sent home on blood thinners and apparently amiodarone.  She arrives here slightly hypotensive with atrial fibrillation/flutter with rapid ventricular spots.  Given her history of a very poor ejection fraction of 20% did not aggressively volume resuscitated but gave her 2, 250 cc normal saline boluses which did improve her pressure.  I did load her with 150 mg of amiodarone.  I was concerned about possible thrombosis or mesenteric ischemia but her lactic acid and white blood cell count are reassuring and normal and CT angiogram of the abdomen pelvis did not demonstrate any obvious vascular occlusion or pneumatosis intestinalis.  She did not have what I would consider pain out of portion to exam.  Consultation with cardiology was obtained.  They recommended hospitalist admission they will provide recommendations as to whether continued rate control or possible repeat attempts at cardioversion are indicated.  Patient will be admitted to the hospitalist service      ADDITIONAL PROBLEM LIST    DISPOSITION AND DISCUSSIONS  I have discussed management of the patient with the following physicians and LEYLA's: Discussed plan of care with cardiology as well as hospitalist    Discussion of management with other QHP or appropriate source(s): None    Escalation of care considered, and ultimately not performed: Considered cardioversion    Barriers to care at this time, including but not limited to: None.     Decision tools and prescription drugs considered including, but not limited to: None.    FINAL DIAGNOSIS  1. Generalized abdominal pain    2. Persistent atrial fibrillation (HCC)           Electronically signed by: Caleb Mchugh M.D., 3/26/2024 8:50 AM

## 2024-03-26 NOTE — ED NOTES
Med rec completed per pt's MAR   Allergies reviewed     Pt takes Eliquis 5 mg twice a day   Last dose 2/25/2024 PM

## 2024-03-26 NOTE — ED NOTES
PT. Returned from CT.  Lab drawn and sent.  PT. Denies needs at this time.  Call light in reach.  Safety measures maintained.  No distress noted.

## 2024-03-26 NOTE — ASSESSMENT & PLAN NOTE
Patient does take chronic opioids, and is noted to have moderate constipation on her last 2 CTs  Will start on twice daily MiraLAX as the stools appear to be very firm and hard, most recent CT she is also noted to have extensive diverticulosis with only a small amount of stool in the sigmoid and rectum, most of the stool is in the right colon and proximal transverse colon

## 2024-03-26 NOTE — ED NOTES
Bedside report received from Yaneth ALLISON. Pt on cardiac monitor, pulse ox, and automatic BP. Fall precautions in place. Call light within reach. Pt connected to 2lpm O2 via NC (baseline use)

## 2024-03-26 NOTE — ED NOTES
Reported called to floor.  Room still dirty at this time.  Pt. Was given water and a snack per request.   Pt. Mentioning that she would like to speak with a SW while in the hospital, will endorse to floor RN.

## 2024-03-26 NOTE — H&P
Hospital Medicine History & Physical Note    Date of Service  3/26/2024    Primary Care Physician  JEAN-PAUL Lew.    Consultants  cardiology and      Specialist Names: Called by ERP    Code Status  DNAR/DNI    Chief Complaint  Chief Complaint   Patient presents with    Abdominal Pain     Left upper quad abd pain since 0300.  Pt. Received 5mg oxycodone for the pain at that time per EMS report.       Nausea     Denies vomiting    Constipation     Pt. States she can not recall when her last BM was.          History of Presenting Illness  Davie Montejo is a 89 y.o. female who presented 3/26/2024 with abdominal pain, palpitations, she was recently hospitalized after presenting with similar complaints last month underwent cardioversion and was discharged to subacute rehab on Eliquis and amiodarone.  At the time she was noted to have bilateral pleural effusions she did have thoracentesis removing 1900 cc but again is noted to have significant bilateral pleural effusions on presentation today she is comfortable on nasal cannula oxygen and is no longer complaining of palpitations or shortness of breath however she is not able to tell me how long she has been on oxygen.  She denies any chest pain, she denied cough but was noted to cough a couple of times when I was in the room, she denies that it produces any sputum.  I did review images from the CTA of the abdomen as well as the CT of the abdomen done on her prior admission and she is noted to have a mild to moderate amount of pebble-like stool throughout her colon and rectum on the previous CT and a fairly significant amount in her right colon today with less present in her rectum, she does admit to having chronic constipation and at very difficult time having BMs and only able to pass a couple mable at a time.  On her last admissions she had negative leukocyte Estrace but 20-50 WBCs in her urine and did grow Enterococcus greater than 100 CFU which was  pansensitive.  It does not appear this Enterococcus was treated, she was on ceftriaxone and doxycycline during that admission probably for respiratory causes.  For unclear reasons she did receive 1 dose of Augmentin during her stay as well but this would be inadequate to treat any UTI.  Although she denies dysuria at that time this is her second presentation with abdominal pain, although could be due to constipation she is not distended so we will go ahead and treat her with amoxicillin.  Per pharmacy she tolerated the dose of Augmentin which she was given previously.    Apparently the ER physician did speak with the cardiologist to did not wish to cardiovert the patient at the present time and felt that her recurrent RVR was likely secondary to her somatic discomforts.    I discussed the plan of care with patient.    Review of Systems  Review of Systems   Constitutional:  Negative for chills, fever and malaise/fatigue.   Respiratory:  Positive for shortness of breath. Negative for cough.    Cardiovascular:  Negative for chest pain.   Gastrointestinal:  Positive for abdominal pain and constipation.   Genitourinary:  Negative for dysuria and frequency.   Neurological:  Positive for weakness (recovery 2/2 recent events).   Psychiatric/Behavioral:  Positive for memory loss.        Past Medical History   has a past medical history of Acute blood loss anemia (10/21/2021), Acute on chronic systolic heart failure (HCC) (8/24/2020), Acute perforated gastric ulcer with hemorrhage ([facility]) (10/21/2021), Anemia (2/14/2022), Angina decubitus (6/18/2010), CAD (coronary artery disease), Cannabis use, unspecified with intoxication, uncomplicated ([facility]) (2/14/2022), Fibromyalgia, Generalized abdominal pain (12/5/2021), Heart attack ([facility]) (2008), Hypotension due to medication (7/18/2022), Intractable nausea and vomiting (12/4/2021), and Pain in the chest (11/15/2020).    Surgical History   has a past surgical history that includes  "coronary artery bypas, 4 (2008) and coronary artery bypass, 1 (2008).     Family History  family history is not on file.   Family history reviewed with patient. There is no family history that is pertinent to the chief complaint.     Social History   reports that she has quit smoking. She has never used smokeless tobacco. She reports that she does not currently use drugs. She reports that she does not drink alcohol.    Allergies  Allergies   Allergen Reactions    Latex Unspecified          Tramadol Rash     Itchy red in nature    Codeine Unspecified     Unknown reaction      Lisinopril Unspecified     Unknown reaction    Penicillin G Unspecified     Unknown reaction  Tolerated Augmentin 3/2024      Pregabalin Unspecified     Lyrica affects patients vision.    Simvastatin Unspecified     Unknown reaction      Sulfa Drugs Unspecified     Patient states \"I can't remember what this does to me\" but recalls and allergy.        Medications  Prior to Admission Medications   Prescriptions Last Dose Informant Patient Reported? Taking?   DULoxetine (CYMBALTA) 60 MG Cap DR Particles delayed-release capsule 3/25/2024 at AM MAR from Other Facility Yes Yes   Sig: Take 60 mg by mouth every morning.   acetaminophen (TYLENOL) 325 MG Tab 3/26/2024 at 0717 MAR from Other Facility Yes Yes   Sig: Take 650 mg by mouth every four hours as needed for Mild Pain.   amiodarone (PACERONE) 400 MG tablet 3/25/2024 at AM MAR from Other Facility No Yes   Sig: Take 1 Tablet by mouth every day for 12 days, THEN 0.5 Tablets every day for 30 days.   apixaban (ELIQUIS) 5mg Tab 3/25/2024 at PM MAR from Other Facility No Yes   Sig: Take 1 Tablet by mouth 2 times a day. Indications: Thromboembolism secondary to Atrial Fibrillation   aspirin 81 MG EC tablet 3/25/2024 at AM MAR from Other Facility No Yes   Sig: Take 1 Tablet by mouth every day.   atorvastatin (LIPITOR) 10 MG Tab 3/25/2024 at PM MAR from Other Facility No Yes   Sig: TAKE 1 TABLET BY MOUTH " EVERY EVENING. FOR CHOLESTEROL.   estradiol (ESTRACE) 1 MG Tab 3/25/2024 at AM MAR from Other Facility No Yes   Sig: TAKE 1 TABLET BY MOUTH EVERY DAY.   furosemide (LASIX) 20 MG Tab 3/25/2024 at AM MAR from Other Facility No Yes   Sig: Take 1 Tablet by mouth every day.   gabapentin (NEURONTIN) 600 MG tablet 3/25/2024 at PM MAR from Other Facility No Yes   Sig: Take 1 Tablet by mouth 2 times a day.   levothyroxine (SYNTHROID) 125 MCG Tab 3/26/2024 at AM MAR from Other Facility No Yes   Sig: TAKE 1 TABLET BY MOUTH EVERY MORNING ON AN EMPTY STOMACH.   loratadine (CLARITIN) 10 MG Tab 3/25/2024 at AM MAR from Other Facility No Yes   Sig: Take 1 Tablet by mouth every day.   metoprolol SR (TOPROL XL) 25 MG TABLET SR 24 HR 3/25/2024 at PM MAR from Other Facility No Yes   Sig: Take 0.5 Tablets by mouth every evening.   omeprazole (PRILOSEC) 20 MG delayed-release capsule 3/26/2024 at AM MAR from Other Facility No Yes   Sig: TAKE 1 CAPSULE BY MOUTH EVERY DAY.   oxyCODONE immediate-release (ROXICODONE) 5 MG Tab 3/26/2024 at 0350 MAR from Other Facility Yes Yes   Sig: Take 5 mg by mouth every 6 hours as needed for Severe Pain.   senna-docusate (PERICOLACE OR SENOKOT S) 8.6-50 MG Tab 3/25/2024 at PM MAR from Other Facility No Yes   Sig: Take 2 Tablets by mouth every evening.   sucralfate (CARAFATE) 1 GM Tab 3/25/2024 at PM MAR from Other Facility Yes Yes   Sig: Take 1 g by mouth 2 times a day.      Facility-Administered Medications: None       Physical Exam  Temp:  [36.9 °C (98.4 °F)] 36.9 °C (98.4 °F)  Pulse:  [107-128] 128  Resp:  [10-19] 13  BP: ()/(44-75) 107/61  SpO2:  [94 %-98 %] 96 %  Blood Pressure : 107/61   Temperature: 36.9 °C (98.4 °F)   Pulse: (!) 128   Respiration: 13   Pulse Oximetry: 96 %       Physical Exam  Vitals and nursing note reviewed.   Constitutional:       General: She is not in acute distress.     Appearance: She is not ill-appearing.   HENT:      Head: Normocephalic and atraumatic.      Nose:  "Nose normal.      Mouth/Throat:      Mouth: Mucous membranes are dry.   Eyes:      General:         Right eye: No discharge.         Left eye: No discharge.      Extraocular Movements: Extraocular movements intact.      Pupils: Pupils are equal, round, and reactive to light.   Cardiovascular:      Rate and Rhythm: Tachycardia present. Rhythm irregular.   Pulmonary:      Effort: Pulmonary effort is normal. No respiratory distress.      Comments: Markedly diminished posterior breath sounds clear anteriorly, minimal cough  Cough is coarse but not overtly rhonchorous  Abdominal:      General: Bowel sounds are normal. There is no distension.      Palpations: Abdomen is soft.      Tenderness: There is no abdominal tenderness.   Musculoskeletal:      Right lower leg: No edema.      Left lower leg: No edema.   Skin:     General: Skin is warm and dry.   Neurological:      General: No focal deficit present.      Mental Status: She is alert. Mental status is at baseline.      Cranial Nerves: No cranial nerve deficit.      Motor: No weakness.      Comments: She knows she is at the hospital she is overall oriented but she is very confused as far as timelines and cannot give a lot of details about things she is very vague when she cannot come out with the details.   Psychiatric:         Mood and Affect: Mood normal.         Laboratory:  Recent Labs     03/26/24  0823   WBC 6.8   RBC 4.24   HEMOGLOBIN 11.0*   HEMATOCRIT 34.9*   MCV 82.3   MCH 25.9*   MCHC 31.5*   RDW 56.9*   PLATELETCT 286   MPV 10.9     Recent Labs     03/26/24  0823   SODIUM 138   POTASSIUM 4.7   CHLORIDE 102   CO2 28   GLUCOSE 92   BUN 24*   CREATININE 1.10   CALCIUM 9.1     Recent Labs     03/26/24  0823   ALTSGPT 14   ASTSGOT 24   ALKPHOSPHAT 78   TBILIRUBIN 0.4   LIPASE 55   GLUCOSE 92     Recent Labs     03/26/24  0823   APTT 40.2*   INR 1.15*     No results for input(s): \"NTPROBNP\" in the last 72 hours.      Recent Labs     03/26/24  0823   TROPONINT 23* "       Imaging:  CTA ABDOMEN PELVIS W & W/O POST PROCESS   Final Result      1.  Moderate narrowing of the celiac trunk plaque proximally, without occlusion.   2.  Superior and inferior mesenteric arteries are patent.  No occlusion or other evidence of thrombus.   3.  Small infrarenal abdominal aortic aneurysm measuring 2 cm.   4.  Bilateral pleural effusions with associated atelectasis.         DX-CHEST-PORTABLE (1 VIEW)   Final Result      1.  Enlarged cardiac silhouette with interstitial opacities and small amounts of pleural fluid consistent with edema.   2.  Left lower lobe airspace disease could be atelectasis or pneumonitis.          X-Ray:  I have personally reviewed the images and compared with prior images.  She continues to have pebble-like stool throughout the colon and in the rectum she is not overtly impacted but her stool appeared to be very hard.  There is slightly more stool in the right colon than noted on her prior imaging      Assessment/Plan:  Justification for Admission Status  I anticipate this patient will require at least two midnights for appropriate medical management, necessitating inpatient admission because she requires cardiac monitoring , IV diuresis, possible cardioversion    Patient will need a Telemetry bed on MEDICAL service .  The need is secondary to RVR. Resp failure.    * Atrial fibrillation with rapid ventricular response (HCC)- (present on admission)  Assessment & Plan  Patient is hemodynamically stable and overall asymptomatic, cardiology feels this is likely due to her discomfort and other issues and do not wish to cardiovert her at this time we will continue her on her amiodarone loading she has a few more doses at 200 twice daily and then goes to 200 daily  She is been fully anticoagulated with Eliquis and has only missed her morning dose because of her transfer to the emergency room.    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 admission)  Assessment & Plan  Moderate on CT, does not likely explain her recurring abdominal pain    Drug-induced constipation- (present on admission)  Assessment & Plan  Patient does take chronic opioids, and is noted to have moderate constipation on her last 2 CTs  Will start on twice daily MiraLAX as the stools appear to be very firm and hard, most recent CT she is also noted to have extensive diverticulosis with only a small amount of stool in the sigmoid and rectum, most of the stool is in the right colon and proximal transverse colon    Opioid dependence ([facility])- (present on admission)  Assessment & Plan  Continue chronic medication    Stage 3b chronic kidney disease (CKD) ([facility])- (present on admission)  Assessment & Plan  At baseline, patient did receive contrast in the ER, monitor renal function    Long QT interval- (present on admission)  Assessment & Plan  Avoid QT prolonging agents (other than her amiodarone), optimize potassium and magnesium levels  Recheck EKG tomorrow, if remains above 500 discussed with cardiology regarding change from amiodarone    UTI (urinary tract infection) due to Enterococcus- (present on admission)  Assessment & Plan  Patient received only 1 dose Augmentin during her last hospitalization, she apparently had no adverse reaction to it  Repeat culture has been sent but given her recurring abdominal pain would want to make sure that her Enterococcus is treated.    Biventricular heart failure, NYHA class 3 ([facility])- (present on admission)  Assessment & Plan  Previous echo shows biventricular failure with EF about 25%, will diurese her as tolerated, monitor renal function and magnesium    Acute respiratory failure with hypoxia ([facility])- (present on admission)  Assessment & Plan  Due to bilateral pleural effusions, it is unknown if patient was on oxygen prior to her last hospitalization and she cannot remember  We will try to diurese  off the pleural effusions, she uses had a thoracentesis on 27 February and they removed 1900 cc    ACP (advance care planning)- (present on admission)  Assessment & Plan  Patient states she has been thinking about that since her last hospitalization and seem to be thinking that she may want to be resuscitated however when I discussed with her the outcome of CPR in someone with her comorbidities she decided to remain DNR/DNI    Bilateral pleural effusion- (present on admission)  Assessment & Plan  See above    Cognitive impairment- (present on admission)  Assessment & Plan  Appears to be very mild she is just a poor historian        VTE prophylaxis: therapeutic anticoagulation with eliquis

## 2024-03-26 NOTE — ASSESSMENT & PLAN NOTE
Previous echo shows biventricular failure with EF about 25%, will diurese her as tolerated, monitor renal function and magnesium

## 2024-03-26 NOTE — ED NOTES
Pt. Was able to ambulate to bathroom with standby assist.  Urine sample collected and sent.  PT. Back go gurney and all monitors replaced.  Pt. Denies other needs.  Call light in reach, pt. Watching T.V.  Has demonstrated proper use of call light.  All safety measures maintained.

## 2024-03-26 NOTE — ASSESSMENT & PLAN NOTE
Avoid QT prolonging agents (other than her amiodarone), optimize potassium and magnesium levels  Recheck EKG tomorrow, if remains above 500 discussed with cardiology regarding change from amiodarone

## 2024-03-27 LAB
ANION GAP SERPL CALC-SCNC: 10 MMOL/L (ref 7–16)
BUN SERPL-MCNC: 26 MG/DL (ref 8–22)
CALCIUM SERPL-MCNC: 8.5 MG/DL (ref 8.5–10.5)
CHLORIDE SERPL-SCNC: 98 MMOL/L (ref 96–112)
CO2 SERPL-SCNC: 24 MMOL/L (ref 20–33)
CREAT SERPL-MCNC: 1.21 MG/DL (ref 0.5–1.4)
GFR SERPLBLD CREATININE-BSD FMLA CKD-EPI: 43 ML/MIN/1.73 M 2
GLUCOSE SERPL-MCNC: 100 MG/DL (ref 65–99)
MAGNESIUM SERPL-MCNC: 2 MG/DL (ref 1.5–2.5)
POTASSIUM SERPL-SCNC: 4.4 MMOL/L (ref 3.6–5.5)
SODIUM SERPL-SCNC: 132 MMOL/L (ref 135–145)

## 2024-03-27 PROCEDURE — 770020 HCHG ROOM/CARE - TELE (206)

## 2024-03-27 PROCEDURE — A9270 NON-COVERED ITEM OR SERVICE: HCPCS | Performed by: INTERNAL MEDICINE

## 2024-03-27 PROCEDURE — 83735 ASSAY OF MAGNESIUM: CPT

## 2024-03-27 PROCEDURE — 700111 HCHG RX REV CODE 636 W/ 250 OVERRIDE (IP): Mod: JZ | Performed by: INTERNAL MEDICINE

## 2024-03-27 PROCEDURE — 93005 ELECTROCARDIOGRAM TRACING: CPT | Performed by: INTERNAL MEDICINE

## 2024-03-27 PROCEDURE — 700111 HCHG RX REV CODE 636 W/ 250 OVERRIDE (IP): Performed by: STUDENT IN AN ORGANIZED HEALTH CARE EDUCATION/TRAINING PROGRAM

## 2024-03-27 PROCEDURE — 80048 BASIC METABOLIC PNL TOTAL CA: CPT

## 2024-03-27 PROCEDURE — 36415 COLL VENOUS BLD VENIPUNCTURE: CPT

## 2024-03-27 PROCEDURE — 700102 HCHG RX REV CODE 250 W/ 637 OVERRIDE(OP): Performed by: INTERNAL MEDICINE

## 2024-03-27 PROCEDURE — 99233 SBSQ HOSP IP/OBS HIGH 50: CPT | Performed by: STUDENT IN AN ORGANIZED HEALTH CARE EDUCATION/TRAINING PROGRAM

## 2024-03-27 RX ORDER — ONDANSETRON 4 MG/1
4 TABLET, ORALLY DISINTEGRATING ORAL EVERY 4 HOURS PRN
Status: DISCONTINUED | OUTPATIENT
Start: 2024-03-27 | End: 2024-03-28 | Stop reason: HOSPADM

## 2024-03-27 RX ORDER — BISACODYL 10 MG
10 SUPPOSITORY, RECTAL RECTAL
Status: DISCONTINUED | OUTPATIENT
Start: 2024-03-27 | End: 2024-03-28 | Stop reason: HOSPADM

## 2024-03-27 RX ORDER — CLOPIDOGREL BISULFATE 75 MG/1
75 TABLET ORAL DAILY
Status: DISCONTINUED | OUTPATIENT
Start: 2024-03-28 | End: 2024-03-28 | Stop reason: HOSPADM

## 2024-03-27 RX ADMIN — POLYETHYLENE GLYCOL 3350 1 PACKET: 17 POWDER, FOR SOLUTION ORAL at 17:50

## 2024-03-27 RX ADMIN — ONDANSETRON 4 MG: 4 TABLET, ORALLY DISINTEGRATING ORAL at 10:20

## 2024-03-27 RX ADMIN — SUCRALFATE 1 G: 1 TABLET ORAL at 17:50

## 2024-03-27 RX ADMIN — POLYETHYLENE GLYCOL 3350 1 PACKET: 17 POWDER, FOR SOLUTION ORAL at 05:10

## 2024-03-27 RX ADMIN — OXYCODONE 5 MG: 5 TABLET ORAL at 00:06

## 2024-03-27 RX ADMIN — APIXABAN 5 MG: 5 TABLET, FILM COATED ORAL at 05:10

## 2024-03-27 RX ADMIN — AMOXICILLIN 500 MG: 500 CAPSULE ORAL at 14:34

## 2024-03-27 RX ADMIN — LORATADINE 10 MG: 10 TABLET ORAL at 05:10

## 2024-03-27 RX ADMIN — ASPIRIN 81 MG: 81 TABLET, COATED ORAL at 05:10

## 2024-03-27 RX ADMIN — ESTRADIOL 1 MG: 1 TABLET ORAL at 05:10

## 2024-03-27 RX ADMIN — LEVOTHYROXINE SODIUM 125 MCG: 0.12 TABLET ORAL at 05:10

## 2024-03-27 RX ADMIN — AMIODARONE HYDROCHLORIDE 200 MG: 200 TABLET ORAL at 08:51

## 2024-03-27 RX ADMIN — APIXABAN 5 MG: 5 TABLET, FILM COATED ORAL at 17:50

## 2024-03-27 RX ADMIN — OMEPRAZOLE 20 MG: 20 CAPSULE, DELAYED RELEASE ORAL at 05:09

## 2024-03-27 RX ADMIN — DULOXETINE HYDROCHLORIDE 60 MG: 60 CAPSULE, DELAYED RELEASE ORAL at 05:09

## 2024-03-27 RX ADMIN — GABAPENTIN 600 MG: 300 CAPSULE ORAL at 05:10

## 2024-03-27 RX ADMIN — SUCRALFATE 1 G: 1 TABLET ORAL at 05:09

## 2024-03-27 RX ADMIN — AMOXICILLIN 500 MG: 500 CAPSULE ORAL at 05:10

## 2024-03-27 RX ADMIN — GABAPENTIN 600 MG: 300 CAPSULE ORAL at 17:50

## 2024-03-27 RX ADMIN — FUROSEMIDE 40 MG: 10 INJECTION INTRAMUSCULAR; INTRAVENOUS at 05:09

## 2024-03-27 RX ADMIN — AMIODARONE HYDROCHLORIDE 200 MG: 200 TABLET ORAL at 17:56

## 2024-03-27 RX ADMIN — OXYCODONE 5 MG: 5 TABLET ORAL at 19:21

## 2024-03-27 RX ADMIN — ACETAMINOPHEN 650 MG: 325 TABLET, FILM COATED ORAL at 18:00

## 2024-03-27 RX ADMIN — SUCRALFATE 1 G: 1 TABLET ORAL at 11:53

## 2024-03-27 RX ADMIN — ATORVASTATIN CALCIUM 10 MG: 10 TABLET, FILM COATED ORAL at 21:14

## 2024-03-27 RX ADMIN — AMOXICILLIN 500 MG: 500 CAPSULE ORAL at 21:14

## 2024-03-27 ASSESSMENT — COGNITIVE AND FUNCTIONAL STATUS - GENERAL
DRESSING REGULAR UPPER BODY CLOTHING: A LITTLE
MOVING TO AND FROM BED TO CHAIR: A LITTLE
SUGGESTED CMS G CODE MODIFIER DAILY ACTIVITY: CJ
MOVING FROM LYING ON BACK TO SITTING ON SIDE OF FLAT BED: A LITTLE
STANDING UP FROM CHAIR USING ARMS: A LITTLE
TURNING FROM BACK TO SIDE WHILE IN FLAT BAD: A LITTLE
CLIMB 3 TO 5 STEPS WITH RAILING: A LITTLE
HELP NEEDED FOR BATHING: A LITTLE
DAILY ACTIVITIY SCORE: 20
DRESSING REGULAR LOWER BODY CLOTHING: A LITTLE
MOBILITY SCORE: 18
TOILETING: A LITTLE
WALKING IN HOSPITAL ROOM: A LITTLE
SUGGESTED CMS G CODE MODIFIER MOBILITY: CK

## 2024-03-27 ASSESSMENT — ENCOUNTER SYMPTOMS
ABDOMINAL PAIN: 1
VOMITING: 1
NAUSEA: 1

## 2024-03-27 ASSESSMENT — PAIN DESCRIPTION - PAIN TYPE
TYPE: CHRONIC PAIN
TYPE: ACUTE PAIN
TYPE: CHRONIC PAIN
TYPE: ACUTE PAIN

## 2024-03-27 ASSESSMENT — FIBROSIS 4 INDEX: FIB4 SCORE: 1.95

## 2024-03-27 NOTE — CARE PLAN
The patient is Stable - Low risk of patient condition declining or worsening    Shift Goals  Clinical Goals: Monitor heart rhythm  Patient Goals: Rest  Family Goals: DEE DEE    Progress made toward(s) clinical / shift goals:      Problem: Pain - Standard  Goal: Alleviation of pain or a reduction in pain to the patient’s comfort goal  Description: Target End Date:  Prior to discharge or change in level of care    Document on Vitals flowsheet    1.  Document pain using the appropriate pain scale per order or unit policy  2.  Educate and implement non-pharmacologic comfort measures (i.e. relaxation, distraction, massage, cold/heat therapy, etc.)  3.  Pain management medications as ordered  4.  Reassess pain after pain med administration per policy  5.  If opiods administered assess patient's response to pain medication is appropriate per POSS sedation scale  6.  Follow pain management plan developed in collaboration with patient and interdisciplinary team (including palliative care or pain specialists if applicable)  Outcome: Progressing     Problem: Knowledge Deficit - Standard  Goal: Patient and family/care givers will demonstrate understanding of plan of care, disease process/condition, diagnostic tests and medications  Description: Target End Date:  1-3 days or as soon as patient condition allows    Document in Patient Education    1.  Patient and family/caregiver oriented to unit, equipment, visitation policy and means for communicating concern  2.  Complete/review Learning Assessment  3.  Assess knowledge level of disease process/condition, treatment plan, diagnostic tests and medications  4.  Explain disease process/condition, treatment plan, diagnostic tests and medications  Outcome: Progressing     Problem: Fall Risk  Goal: Patient will remain free from falls  Description: Target End Date:  Prior to discharge or change in level of care    Document interventions on the Reny Wagner Fall Risk Assessment    1.   Assess for fall risk factors  2.  Implement fall precautions  Outcome: Progressing

## 2024-03-27 NOTE — PROGRESS NOTES
Hospital Medicine Daily Progress Note    Date of Service  3/27/2024    Chief Complaint  Davie Montejo is a 89 y.o. female admitted 3/26/2024 with abd pain    Hospital Course  89 y.o. female who presented 3/26/2024 with abdominal pain, palpitations, she was recently hospitalized after presenting with similar complaints last month underwent cardioversion and was discharged to subacute rehab on Eliquis and amiodarone.  At the time she was noted to have bilateral pleural effusions she did have thoracentesis removing 1900 cc.   She reports no bowel movements for one week  CTA abdomen no acute findings  For Afib RVR, cards rec continue amiodarone    Interval Problem Update  Seen patient at bedside  She is on 2L O2, which is her baseline. Denies chest pain or sob  I have reviewed CTA, no acute findings. Noted bilateral pleural effusion.   Continue amiodarone  I have reviewed previous cardiology note, patient was on Plavix and Eliquis, however now on ASA and Eliquis. I will dc ASA and resume Plavix  Bowel regimen    I have discussed this patient's plan of care and discharge plan at IDT rounds today with Case Management, Nursing, Nursing leadership, and other members of the IDT team.    Consultants/Specialty  na    Code Status  DNAR/DNI    Disposition  The patient is not medically cleared for discharge to home or a post-acute facility.      I have placed the appropriate orders for post-discharge needs.    Review of Systems  Review of Systems   Gastrointestinal:  Positive for abdominal pain, nausea and vomiting.   All other systems reviewed and are negative.       Physical Exam  Temp:  [36 °C (96.8 °F)-36.4 °C (97.5 °F)] 36.4 °C (97.5 °F)  Pulse:  [] 54  Resp:  [16-21] 16  BP: ()/(41-71) 98/41  SpO2:  [93 %-99 %] 94 %    Physical Exam  Vitals and nursing note reviewed.   Constitutional:       Appearance: Normal appearance.   HENT:      Head: Normocephalic and atraumatic.      Nose: Nose normal.       Mouth/Throat:      Pharynx: Oropharynx is clear.   Eyes:      Extraocular Movements: Extraocular movements intact.      Conjunctiva/sclera: Conjunctivae normal.      Pupils: Pupils are equal, round, and reactive to light.   Cardiovascular:      Rate and Rhythm: Normal rate. Rhythm irregular.      Pulses: Normal pulses.      Heart sounds: Normal heart sounds.   Pulmonary:      Effort: Pulmonary effort is normal.      Breath sounds: Normal breath sounds.   Abdominal:      General: Abdomen is flat. Bowel sounds are normal.      Palpations: Abdomen is soft.   Musculoskeletal:         General: Normal range of motion.      Cervical back: Normal range of motion and neck supple.   Skin:     General: Skin is warm and dry.   Neurological:      General: No focal deficit present.      Mental Status: She is alert.      Comments: Aox2, following commands   Psychiatric:         Mood and Affect: Mood normal.         Behavior: Behavior normal.         Fluids  No intake or output data in the 24 hours ending 03/27/24 1541    Laboratory  Recent Labs     03/26/24  0823   WBC 6.8   RBC 4.24   HEMOGLOBIN 11.0*   HEMATOCRIT 34.9*   MCV 82.3   MCH 25.9*   MCHC 31.5*   RDW 56.9*   PLATELETCT 286   MPV 10.9     Recent Labs     03/26/24  0823 03/27/24  0053   SODIUM 138 132*   POTASSIUM 4.7 4.4   CHLORIDE 102 98   CO2 28 24   GLUCOSE 92 100*   BUN 24* 26*   CREATININE 1.10 1.21   CALCIUM 9.1 8.5     Recent Labs     03/26/24  0823   APTT 40.2*   INR 1.15*               Imaging  CTA ABDOMEN PELVIS W & W/O POST PROCESS   Final Result      1.  Moderate narrowing of the celiac trunk plaque proximally, without occlusion.   2.  Superior and inferior mesenteric arteries are patent.  No occlusion or other evidence of thrombus.   3.  Small infrarenal abdominal aortic aneurysm measuring 2 cm.   4.  Bilateral pleural effusions with associated atelectasis.         DX-CHEST-PORTABLE (1 VIEW)   Final Result      1.  Enlarged cardiac silhouette with  interstitial opacities and small amounts of pleural fluid consistent with edema.   2.  Left lower lobe airspace disease could be atelectasis or pneumonitis.           Assessment/Plan  * Atrial fibrillation with rapid ventricular response (HCC)- (present on admission)  Assessment & Plan  Patient is hemodynamically stable and overall asymptomatic, cardiology feels this is likely due to her discomfort and other issues and do not wish to cardiovert her at this time we will continue her on her amiodarone loading she has a few more doses at 200 twice daily and then goes to 200 daily  She is been fully anticoagulated with Eliquis     Pulmonary hypertension (HCC)- (present on admission)  Assessment & Plan  Noted on previous echo    Celiac artery stenosis (HCC)- (present on admission)  Assessment & Plan  Moderate on CT, does not likely explain her recurring abdominal pain    Drug-induced constipation- (present on admission)  Assessment & Plan  Patient does take chronic opioids, and is noted to have moderate constipation on her last 2 CTs  Will start on twice daily MiraLAX as the stools appear to be very firm and hard, most recent CT she is also noted to have extensive diverticulosis with only a small amount of stool in the sigmoid and rectum, most of the stool is in the right colon and proximal transverse colon    Opioid dependence (HCC)- (present on admission)  Assessment & Plan  Continue chronic medication    Stage 3b chronic kidney disease (CKD) (HCC)- (present on admission)  Assessment & Plan  At baseline, patient did receive contrast in the ER, monitor renal function    Long QT interval- (present on admission)  Assessment & Plan  Avoid QT prolonging agents (other than her amiodarone), optimize potassium and magnesium levels  Recheck EKG tomorrow, if remains above 500 discussed with cardiology regarding change from amiodarone    UTI (urinary tract infection) due to Enterococcus- (present on admission)  Assessment &  Plan  Patient received only 1 dose Augmentin during her last hospitalization, she apparently had no adverse reaction to it  Repeat culture has been sent but given her recurring abdominal pain would want to make sure that her Enterococcus is treated.    Biventricular heart failure, NYHA class 3 (HCC)- (present on admission)  Assessment & Plan  Previous echo shows biventricular failure with EF about 25%, will diurese her as tolerated, monitor renal function and magnesium    Acute respiratory failure with hypoxia (HCC)- (present on admission)  Assessment & Plan  Continue wean O2 as tolerated  Continue iv laisx     ACP (advance care planning)- (present on admission)  Assessment & Plan  Patient states she has been thinking about that since her last hospitalization and seem to be thinking that she may want to be resuscitated however when I discussed with her the outcome of CPR in someone with her comorbidities she decided to remain DNR/DNI    Bilateral pleural effusion- (present on admission)  Assessment & Plan  See above    Cognitive impairment- (present on admission)  Assessment & Plan  Appears to be very mild she is just a poor historian         VTE prophylaxis:    therapeutic anticoagulation with eliquis 5 mg BID      I have performed a physical exam and reviewed and updated ROS and Plan today (3/27/2024). In review of yesterday's note (3/26/2024), there are no changes except as documented above.    Patient is has a high medical complexity, complex decision making and is at high risk for complication, morbidity, and mortality.  I spent 51 minutes, reviewing the chart, obtaining and/or reviewing separately obtained history. Performing a medically appropriate examination and evaluation.  Counseling and educating the patient. Ordering and reviewing medications, tests, or procedures. Documenting clinical information in EPIC. Independently interpreting results and communicating results to patient. Discussing future  disposition of care with patient, RN and case management.  This does not include time spent on separately billable procedures/tests.

## 2024-03-27 NOTE — CARE PLAN
Problem: Pain - Standard  Goal: Alleviation of pain or a reduction in pain to the patient’s comfort goal  Outcome: Not Progressing  Note: PRN Oxi, tylenol and heat packs applied for chronic pain relief     Problem: Knowledge Deficit - Standard  Goal: Patient and family/care givers will demonstrate understanding of plan of care, disease process/condition, diagnostic tests and medications  Outcome: Not Progressing  Note: Patient somewhat understanding of why she is in the hospital but confused      Problem: Fall Risk  Goal: Patient will remain free from falls  Outcome: Progressing

## 2024-03-27 NOTE — ASSESSMENT & PLAN NOTE
Patient received only 1 dose Augmentin during her last hospitalization, she apparently had no adverse reaction to it  Repeat culture has been sent but given her recurring abdominal pain would want to make sure that her Enterococcus is treated.

## 2024-03-27 NOTE — ASSESSMENT & PLAN NOTE
Patient states she has been thinking about that since her last hospitalization and seem to be thinking that she may want to be resuscitated however when I discussed with her the outcome of CPR in someone with her comorbidities she decided to remain DNR/DNI

## 2024-03-28 VITALS
WEIGHT: 154.32 LBS | OXYGEN SATURATION: 93 % | BODY MASS INDEX: 24.22 KG/M2 | DIASTOLIC BLOOD PRESSURE: 46 MMHG | SYSTOLIC BLOOD PRESSURE: 104 MMHG | RESPIRATION RATE: 16 BRPM | HEART RATE: 64 BPM | TEMPERATURE: 97.3 F | HEIGHT: 67 IN

## 2024-03-28 LAB
ANION GAP SERPL CALC-SCNC: 11 MMOL/L (ref 7–16)
BACTERIA UR CULT: NORMAL
BUN SERPL-MCNC: 33 MG/DL (ref 8–22)
CALCIUM SERPL-MCNC: 8.7 MG/DL (ref 8.5–10.5)
CHLORIDE SERPL-SCNC: 97 MMOL/L (ref 96–112)
CO2 SERPL-SCNC: 25 MMOL/L (ref 20–33)
CREAT SERPL-MCNC: 1.22 MG/DL (ref 0.5–1.4)
EKG IMPRESSION: NORMAL
ERYTHROCYTE [DISTWIDTH] IN BLOOD BY AUTOMATED COUNT: 56.6 FL (ref 35.9–50)
GFR SERPLBLD CREATININE-BSD FMLA CKD-EPI: 43 ML/MIN/1.73 M 2
GLUCOSE SERPL-MCNC: 102 MG/DL (ref 65–99)
HCT VFR BLD AUTO: 30.7 % (ref 37–47)
HGB BLD-MCNC: 9.8 G/DL (ref 12–16)
MAGNESIUM SERPL-MCNC: 2.1 MG/DL (ref 1.5–2.5)
MCH RBC QN AUTO: 26.2 PG (ref 27–33)
MCHC RBC AUTO-ENTMCNC: 31.9 G/DL (ref 32.2–35.5)
MCV RBC AUTO: 82.1 FL (ref 81.4–97.8)
NT-PROBNP SERPL IA-MCNC: 3461 PG/ML (ref 0–125)
PHOSPHATE SERPL-MCNC: 3.8 MG/DL (ref 2.5–4.5)
PLATELET # BLD AUTO: 243 K/UL (ref 164–446)
PMV BLD AUTO: 10.4 FL (ref 9–12.9)
POTASSIUM SERPL-SCNC: 4.5 MMOL/L (ref 3.6–5.5)
RBC # BLD AUTO: 3.74 M/UL (ref 4.2–5.4)
SIGNIFICANT IND 70042: NORMAL
SITE SITE: NORMAL
SODIUM SERPL-SCNC: 133 MMOL/L (ref 135–145)
SOURCE SOURCE: NORMAL
WBC # BLD AUTO: 7.5 K/UL (ref 4.8–10.8)

## 2024-03-28 PROCEDURE — 85027 COMPLETE CBC AUTOMATED: CPT

## 2024-03-28 PROCEDURE — 80048 BASIC METABOLIC PNL TOTAL CA: CPT

## 2024-03-28 PROCEDURE — 36415 COLL VENOUS BLD VENIPUNCTURE: CPT

## 2024-03-28 PROCEDURE — A9270 NON-COVERED ITEM OR SERVICE: HCPCS | Performed by: INTERNAL MEDICINE

## 2024-03-28 PROCEDURE — 700111 HCHG RX REV CODE 636 W/ 250 OVERRIDE (IP): Mod: JZ | Performed by: INTERNAL MEDICINE

## 2024-03-28 PROCEDURE — 99239 HOSP IP/OBS DSCHRG MGMT >30: CPT | Performed by: STUDENT IN AN ORGANIZED HEALTH CARE EDUCATION/TRAINING PROGRAM

## 2024-03-28 PROCEDURE — 700102 HCHG RX REV CODE 250 W/ 637 OVERRIDE(OP): Performed by: INTERNAL MEDICINE

## 2024-03-28 PROCEDURE — 700102 HCHG RX REV CODE 250 W/ 637 OVERRIDE(OP): Performed by: STUDENT IN AN ORGANIZED HEALTH CARE EDUCATION/TRAINING PROGRAM

## 2024-03-28 PROCEDURE — 83735 ASSAY OF MAGNESIUM: CPT

## 2024-03-28 PROCEDURE — 83880 ASSAY OF NATRIURETIC PEPTIDE: CPT

## 2024-03-28 PROCEDURE — 93010 ELECTROCARDIOGRAM REPORT: CPT | Performed by: INTERNAL MEDICINE

## 2024-03-28 PROCEDURE — A9270 NON-COVERED ITEM OR SERVICE: HCPCS | Performed by: STUDENT IN AN ORGANIZED HEALTH CARE EDUCATION/TRAINING PROGRAM

## 2024-03-28 PROCEDURE — 84100 ASSAY OF PHOSPHORUS: CPT

## 2024-03-28 RX ORDER — METOPROLOL SUCCINATE 25 MG/1
12.5 TABLET, EXTENDED RELEASE ORAL EVERY EVENING
Qty: 30 TABLET | Refills: 0 | Status: ON HOLD | OUTPATIENT
Start: 2024-03-28 | End: 2024-04-09

## 2024-03-28 RX ORDER — CLOPIDOGREL BISULFATE 75 MG/1
75 TABLET ORAL DAILY
Status: SHIPPED
Start: 2024-03-29

## 2024-03-28 RX ORDER — FUROSEMIDE 20 MG/1
40 TABLET ORAL DAILY
Status: ON HOLD
Start: 2024-03-28 | End: 2024-04-09

## 2024-03-28 RX ORDER — OXYCODONE HYDROCHLORIDE 5 MG/1
5 TABLET ORAL EVERY 6 HOURS PRN
Qty: 8 TABLET | Refills: 0 | Status: SHIPPED | OUTPATIENT
Start: 2024-03-28 | End: 2024-03-28

## 2024-03-28 RX ORDER — POLYETHYLENE GLYCOL 3350 17 G/17G
17 POWDER, FOR SOLUTION ORAL DAILY
Status: SHIPPED
Start: 2024-03-28

## 2024-03-28 RX ORDER — OXYCODONE HYDROCHLORIDE 5 MG/1
5 TABLET ORAL EVERY 6 HOURS PRN
Qty: 8 TABLET | Refills: 0 | Status: ON HOLD | OUTPATIENT
Start: 2024-03-28 | End: 2024-04-09

## 2024-03-28 RX ORDER — AMIODARONE HYDROCHLORIDE 200 MG/1
TABLET ORAL
Status: ON HOLD
Start: 2024-03-28 | End: 2024-04-09

## 2024-03-28 RX ADMIN — LORATADINE 10 MG: 10 TABLET ORAL at 06:29

## 2024-03-28 RX ADMIN — LEVOTHYROXINE SODIUM 125 MCG: 0.12 TABLET ORAL at 06:28

## 2024-03-28 RX ADMIN — GABAPENTIN 600 MG: 300 CAPSULE ORAL at 06:28

## 2024-03-28 RX ADMIN — OXYCODONE 5 MG: 5 TABLET ORAL at 01:44

## 2024-03-28 RX ADMIN — AMOXICILLIN 500 MG: 500 CAPSULE ORAL at 06:28

## 2024-03-28 RX ADMIN — CLOPIDOGREL BISULFATE 75 MG: 75 TABLET ORAL at 06:29

## 2024-03-28 RX ADMIN — SUCRALFATE 1 G: 1 TABLET ORAL at 06:30

## 2024-03-28 RX ADMIN — FUROSEMIDE 40 MG: 10 INJECTION INTRAMUSCULAR; INTRAVENOUS at 06:29

## 2024-03-28 RX ADMIN — POLYETHYLENE GLYCOL 3350 1 PACKET: 17 POWDER, FOR SOLUTION ORAL at 06:28

## 2024-03-28 RX ADMIN — ACETAMINOPHEN 650 MG: 325 TABLET, FILM COATED ORAL at 02:50

## 2024-03-28 RX ADMIN — APIXABAN 5 MG: 5 TABLET, FILM COATED ORAL at 06:28

## 2024-03-28 RX ADMIN — ESTRADIOL 1 MG: 1 TABLET ORAL at 06:28

## 2024-03-28 RX ADMIN — AMIODARONE HYDROCHLORIDE 200 MG: 200 TABLET ORAL at 06:29

## 2024-03-28 RX ADMIN — OMEPRAZOLE 20 MG: 20 CAPSULE, DELAYED RELEASE ORAL at 06:28

## 2024-03-28 RX ADMIN — DULOXETINE HYDROCHLORIDE 60 MG: 60 CAPSULE, DELAYED RELEASE ORAL at 06:29

## 2024-03-28 ASSESSMENT — PAIN DESCRIPTION - PAIN TYPE
TYPE: ACUTE PAIN

## 2024-03-28 ASSESSMENT — FIBROSIS 4 INDEX: FIB4 SCORE: 2.3

## 2024-03-28 NOTE — DOCUMENTATION QUERY
[facility]                                                                       Query Response Note      PATIENT:               JUNIOR CHIU  ACCT #:                  [id number]  MRN:                     [id number]  :                      1935  ADMIT DATE:       3/26/2024 8:13 AM  DISCH DATE:          RESPONDING  PROVIDER #:        [id number]           QUERY TEXT:    Biventricular heart failure is documented in the Medical Record.  Can the diagnosis of heart failure be further clarified based on the clinical indicators and treatment?    The patient's Clinical Indicators include:  H&P: Biventricular heart failure, NYHA class 3: previous echo shows EF 25%. Bilateral pleural effusion POA  3/17/24 Echo: EF 20-25%.  Enlarged left ventricular cavity size with severely reduced systolic function.  Global hypokinesis.    Risk factors: atrial fib, pulmonary htn  Treatment: IV Lasix, Metoprolol     Thank you,  Gabriela Vidal RN, BSN, CCDS  Clinical   Connect via [employer]  Options provided:   -- Acute on Chronic Systolic Congestive Heart Failure   -- Chronic Systolic Congestive Heart Failure   -- Acute on Chronic Systolic & Diastolic Congestive Heart Failure   -- Chronic Systolic & Diastolic Congestive Heart Failure   -- Other explanation, (please specify other explanation)   -- Unable to Determine      Query created by: Gabriela Vidal on 3/28/2024 7:36 AM    RESPONSE TEXT:    Chronic Systolic & Diastolic Congestive Heart Failure          Electronically signed by:  LC PALACIOS MD 3/28/2024 7:47 AM

## 2024-03-28 NOTE — DISCHARGE SUMMARY
Discharge Summary    CHIEF COMPLAINT ON ADMISSION  Chief Complaint   Patient presents with    Abdominal Pain     Left upper quad abd pain since 0300.  Pt. Received 5mg oxycodone for the pain at that time per EMS report.       Nausea     Denies vomiting    Constipation     Pt. States she can not recall when her last BM was.          Reason for Admission  EMS    Admission Date  3/26/2024     CODE STATUS  DNAR/DNI    HPI & HOSPITAL COURSE  This is a 89 y.o. female with history of Afib s/p DCCV, HFrEF EF 25%, moderate to severe mitral regurgitation, CAD s/p CABG, HTN, HLD, who presented 3/26/2024 with abdominal pain, palpitations from [facility]. She was recently hospitalized after presenting with similar complaints last month underwent cardioversion and was discharged to [facility] on Eliquis and amiodarone.  At the time she was noted to have bilateral pleural effusions she did have thoracentesis removing 1900 cc.   In the ER, she was noted Afib RVR. CTA abdomen obtained which showed no acute abnormalities. Patient reports no bowel movements for one week, likely contributing her abdominal pain. She was started on daily miralax and patient had bowel movement on 3/27. She has been tolerating diet, no nausea or vomiting.    She was found to have bilateral pleural effusion likely due to underlying CHF. No sign of acute exacerbation. She is on 2L O2, which is her baseline. She is on lasix 40mg daily.      Patient has hx of CAD s/p CABG. She has Afib. Per cardiology note, she is on Eliquis and Plavix.     For Afib RVR, cardiology feels this is likely due to her discomfort and other issues and do not wish to cardiovert her at this time and she is continued with amiodarone loading 200 twice daily until 3/29 and then 200 daily thereafter. She is been fully anticoagulated with Eliquis. Patient's HR improved. She is continued with metoprolol. Currently her heart rate around 55-65.     Therefore, she is discharged in fair and stable  condition to skilled nursing facility.    The patient met 2-midnight criteria for an inpatient stay at the time of discharge.      FOLLOW UP ITEMS POST DISCHARGE  PCP in one week  Cardiology in one week    DISCHARGE DIAGNOSES  Principal Problem:    Atrial fibrillation with rapid ventricular response (HCC) (POA: Yes)  Active Problems:    Cognitive impairment (POA: Yes)    Bilateral pleural effusion (POA: Yes)    ACP (advance care planning) (POA: Yes)    Acute respiratory failure with hypoxia (HCC) (POA: Yes)    Biventricular heart failure, NYHA class 3 (HCC) (POA: Yes)    UTI (urinary tract infection) due to Enterococcus (POA: Yes)    Long QT interval (POA: Yes)    Stage 3b chronic kidney disease (CKD) (HCC) (POA: Yes)    Opioid dependence (HCC) (POA: Yes)    Drug-induced constipation (POA: Yes)    Celiac artery stenosis (HCC) (POA: Yes)    Pulmonary hypertension (HCC) (POA: Yes)  Resolved Problems:    * No resolved hospital problems. *      FOLLOW UP  Future Appointments   Date Time Provider Department Center   4/8/2024 11:15 AM Wesley Davidson M.D. [facility]  [address]  [facility] [address]  [phone]  Follow up in 1 week(s)      PERLITA Lew  [address] [facility] [address]  [phone]    Follow up in 1 week(s)        MEDICATIONS ON DISCHARGE     Medication List        START taking these medications        Instructions   clopidogrel 75 MG Tabs  Start taking on: March 29, 2024  Commonly known as: Plavix   Take 1 Tablet by mouth every day.  Dose: 75 mg     polyethylene glycol/lytes Pack  Commonly known as: Miralax   Take 1 Packet by mouth every day.  Dose: 17 g            CHANGE how you take these medications        Instructions   amiodarone 200 MG Tabs  Start taking on: March 28, 2024  What changed:   medication strength  See the new instructions.  Commonly known as: Cordarone  Notes to patient: Switch to 1 tablet a day starting 3/30 in the  AM   Take 1 Tablet by mouth 2 times a day for 2 days, THEN 1 Tablet every day for 30 days.     furosemide 20 MG Tabs  What changed: how much to take  Commonly known as: Lasix   Take 2 Tablets by mouth every day.  Dose: 40 mg            CONTINUE taking these medications        Instructions   acetaminophen 325 MG Tabs  Commonly known as: Tylenol   Take 650 mg by mouth every four hours as needed for Mild Pain.  Dose: 650 mg     apixaban 5mg Tabs  Commonly known as: Eliquis   Take 1 Tablet by mouth 2 times a day. Indications: Thromboembolism secondary to Atrial Fibrillation  Dose: 5 mg     atorvastatin 10 MG Tabs  Commonly known as: Lipitor   TAKE 1 TABLET BY MOUTH EVERY EVENING. FOR CHOLESTEROL.  Dose: 10 mg     Carafate 1 GM Tabs  Generic drug: sucralfate   Take 1 g by mouth 2 times a day.  Dose: 1 g     DULoxetine 60 MG Cpep delayed-release capsule  Commonly known as: Cymbalta   Take 60 mg by mouth every morning.  Dose: 60 mg     estradiol 1 MG Tabs  Commonly known as: Estrace   TAKE 1 TABLET BY MOUTH EVERY DAY.  Dose: 1 mg     gabapentin 600 MG tablet  Commonly known as: Neurontin   Take 1 Tablet by mouth 2 times a day.  Dose: 600 mg     levothyroxine 125 MCG Tabs  Commonly known as: Synthroid   TAKE 1 TABLET BY MOUTH EVERY MORNING ON AN EMPTY STOMACH.  Dose: 125 mcg     loratadine 10 MG Tabs  Commonly known as: Claritin   Take 1 Tablet by mouth every day.  Dose: 10 mg     metoprolol SR 25 MG Tb24  Commonly known as: Toprol XL   Doctor's comments: Hold if HR<60  Take 0.5 Tablets by mouth every evening.  Dose: 12.5 mg     omeprazole 20 MG delayed-release capsule  Commonly known as: PriLOSEC   TAKE 1 CAPSULE BY MOUTH EVERY DAY.  Dose: 20 mg     oxyCODONE immediate-release 5 MG Tabs  Commonly known as: Roxicodone   Take 1 Tablet by mouth every 6 hours as needed for Severe Pain for up to 3 days.  Dose: 5 mg     senna-docusate 8.6-50 MG Tabs  Commonly known as: Pericolace Or Senokot S   Take 2 Tablets by mouth every  "evening.  Dose: 2 Tablet            STOP taking these medications      aspirin 81 MG EC tablet              Allergies  Allergies   Allergen Reactions    Latex Unspecified          Tramadol Rash     Itchy red in nature    Codeine Unspecified     Unknown reaction      Lisinopril Unspecified     Unknown reaction    Penicillin G Unspecified     Unknown reaction  Tolerated Augmentin 3/2024      Pregabalin Unspecified     Lyrica affects patients vision.    Simvastatin Unspecified     Unknown reaction      Sulfa Drugs Unspecified     Patient states \"I can't remember what this does to me\" but recalls and allergy.        DIET  Orders Placed This Encounter   Procedures    Diet Order Diet: Cardiac; Fluid modifications: (optional): 1200 ml Fluid Restriction     Standing Status:   Standing     Number of Occurrences:   1     Order Specific Question:   Diet:     Answer:   Cardiac [6]     Order Specific Question:   Fluid modifications: (optional)     Answer:   1200 ml Fluid Restriction [8]       ACTIVITY  As tolerated.  Weight bearing as tolerated    LINES, DRAINS, AND WOUNDS  This is an automated list. Peripheral IVs will be removed prior to discharge.  Peripheral IV 03/26/24 20 G Right Antecubital (Active)   Site Assessment Clean;Dry;Intact 03/27/24 2000   Dressing Type Transparent 03/27/24 2000   Line Status Scrubbed the hub prior to access;Flushed;Saline locked 03/27/24 2000   Dressing Status Clean;Dry;Intact 03/27/24 2000   Dressing Intervention N/A 03/27/24 2000   Infiltration Grading ([facility]) 0 03/27/24 2000   Phlebitis Scale ([facility] Only) 0 03/27/24 2000          Peripheral IV 03/26/24 20 G Right Antecubital (Active)   Site Assessment Clean;Dry;Intact 03/27/24 2000   Dressing Type Transparent 03/27/24 2000   Line Status Scrubbed the hub prior to access;Flushed;Saline locked 03/27/24 2000   Dressing Status Clean;Dry;Intact 03/27/24 2000   Dressing Intervention N/A 03/27/24 2000   Infiltration Grading ([facility]) 0 " 03/27/24 2000   Phlebitis Scale ([facility] Only) 0 03/27/24 2000               MENTAL STATUS ON TRANSFER             CONSULTATIONS  na    PROCEDURES  na    LABORATORY  Lab Results   Component Value Date    SODIUM 133 (L) 03/28/2024    POTASSIUM 4.5 03/28/2024    CHLORIDE 97 03/28/2024    CO2 25 03/28/2024    GLUCOSE 102 (H) 03/28/2024    BUN 33 (H) 03/28/2024    CREATININE 1.22 03/28/2024        Lab Results   Component Value Date    WBC 7.5 03/28/2024    HEMOGLOBIN 9.8 (L) 03/28/2024    HEMATOCRIT 30.7 (L) 03/28/2024    PLATELETCT 243 03/28/2024      CTA ABDOMEN PELVIS W & W/O POST PROCESS   Final Result      1.  Moderate narrowing of the celiac trunk plaque proximally, without occlusion.   2.  Superior and inferior mesenteric arteries are patent.  No occlusion or other evidence of thrombus.   3.  Small infrarenal abdominal aortic aneurysm measuring 2 cm.   4.  Bilateral pleural effusions with associated atelectasis.         DX-CHEST-PORTABLE (1 VIEW)   Final Result      1.  Enlarged cardiac silhouette with interstitial opacities and small amounts of pleural fluid consistent with edema.   2.  Left lower lobe airspace disease could be atelectasis or pneumonitis.          Total time of the discharge process 33 minutes.

## 2024-03-28 NOTE — PROGRESS NOTES
Attempted to call report to [facility]. No response, left voicemail with name and return phone number.

## 2024-03-28 NOTE — PROGRESS NOTES
Bedside report received from off going RN/tech: Laura ALLISON, assumed care of patient.     Fall Risk Score: HIGH RISK  Fall risk interventions in place: Place yellow fall risk ID band on patient, Provide patient/family education based on risk assessment, Educate patient/family to call staff for assistance when getting out of bed, Place fall precaution signage outside patient door, Place patient in room close to nursing station, Utilize bed/chair fall alarm, and Bed alarm connected correctly  Bed type: Regular (Regan Score less than 17 interventions in place)  Patient on cardiac monitor: Yes  IVF/IV medications: Not Applicable   Oxygen: How many liters 2L and Traced the line to wall oxygen  Bedside sitter: Not Applicable   Isolation: Not applicable    Pt up to the restroom, no needs expressed at this time. Bed in low and locked position, call light within reach, will continue to monitor .

## 2024-03-28 NOTE — WOUND TEAM
[facility]  Inpatient Services  Wound and Skin Care Brief Evaluation    Admission Date: 3/26/2024     Last order of IP CONSULT TO WOUND CARE was found on 3/26/2024 from Hospital Encounter on 3/26/2024     HPI, PMH, SH: Reviewed    Chief Complaint   Patient presents with    Abdominal Pain     Left upper quad abd pain since 0300.  Pt. Received 5mg oxycodone for the pain at that time per EMS report.       Nausea     Denies vomiting    Constipation     Pt. States she can not recall when her last BM was.        Diagnosis: Atrial fibrillation with rapid ventricular response (HCC) [I48.91]    Unit where seen by Wound Team: T831/02     Wound consult placed regarding sacrum. Chart and images reviewed. This discussed with bedside RN. This clinician in to assess patient. Patient pleasant and agreeable. Bilateral heels intact and blanching. Sacrum assessed, and all areas pink, intact, and blanching. Non-selectively debrided with N/A.     No pressure injuries or advanced wound care needs identified. Wound consult completed. No further follow up unless indicated and consulted.       L heel     R heel     Sacrum      PREVENTATIVE INTERVENTIONS:    Q shift Regan - performed per nursing policy  Q shift pressure point assessments - performed per nursing policy    Surface/Positioning  Standard/trauma mattress - Currently in Place

## 2024-03-28 NOTE — CARE PLAN
The patient is Stable - Low risk of patient condition declining or worsening    Shift Goals  Clinical Goals: Monitor for nausea, vomitting  Patient Goals: Pain control  Family Goals: DEE DEE    Progress made toward(s) clinical / shift goals:      Problem: Pain - Standard  Goal: Alleviation of pain or a reduction in pain to the patient’s comfort goal  Description: Target End Date:  Prior to discharge or change in level of care    Document on Vitals flowsheet    1.  Document pain using the appropriate pain scale per order or unit policy  2.  Educate and implement non-pharmacologic comfort measures (i.e. relaxation, distraction, massage, cold/heat therapy, etc.)  3.  Pain management medications as ordered  4.  Reassess pain after pain med administration per policy  5.  If opiods administered assess patient's response to pain medication is appropriate per POSS sedation scale  6.  Follow pain management plan developed in collaboration with patient and interdisciplinary team (including palliative care or pain specialists if applicable)  Outcome: Met     Problem: Knowledge Deficit - Standard  Goal: Patient and family/care givers will demonstrate understanding of plan of care, disease process/condition, diagnostic tests and medications  Description: Target End Date:  1-3 days or as soon as patient condition allows    Document in Patient Education    1.  Patient and family/caregiver oriented to unit, equipment, visitation policy and means for communicating concern  2.  Complete/review Learning Assessment  3.  Assess knowledge level of disease process/condition, treatment plan, diagnostic tests and medications  4.  Explain disease process/condition, treatment plan, diagnostic tests and medications  Outcome: Met     Problem: Fall Risk  Goal: Patient will remain free from falls  Description: Target End Date:  Prior to discharge or change in level of care    Document interventions on the Reny Wagner Fall Risk Assessment    1.   Assess for fall risk factors  2.  Implement fall precautions  Outcome: Met     Problem: Skin Integrity  Goal: Skin integrity is maintained or improved  Description: Target End Date:  Prior to discharge or change in level of care    Document interventions on Skin Risk/Regan flowsheet groups and corresponding LDA    1.  Assess and monitor skin integrity, appearance and/or temperature  2.  Assess risk factors for impaired skin integrity and/or pressures ulcers  3.  Implement precautions to protect skin integrity in collaboration with interdisciplinary team  4.  Implement pressure ulcer prevention protocol if at risk for skin breakdown  5.  Confirm wound care consult if at risk for skin breakdown  6.  Ensure patient use of pressure relieving devices  (Low air loss bed, waffle overlay, heel protectors, ROHO cushion, etc)  Outcome: Met

## 2024-03-28 NOTE — CARE PLAN
The patient is Stable - Low risk of patient condition declining or worsening    Shift Goals  Clinical Goals: Tele monitoring  Patient Goals: Pain control  Family Goals: DEE DEE    Problem: Pain - Standard  Goal: Alleviation of pain or a reduction in pain to the patient’s comfort goal  Description: Target End Date:  Prior to discharge or change in level of care    Document on Vitals flowsheet    1.  Document pain using the appropriate pain scale per order or unit policy  2.  Educate and implement non-pharmacologic comfort measures (i.e. relaxation, distraction, massage, cold/heat therapy, etc.)  3.  Pain management medications as ordered  4.  Reassess pain after pain med administration per policy  5.  If opiods administered assess patient's response to pain medication is appropriate per POSS sedation scale  6.  Follow pain management plan developed in collaboration with patient and interdisciplinary team (including palliative care or pain specialists if applicable)  Outcome: Progressing     Problem: Fall Risk  Goal: Patient will remain free from falls  Description: Target End Date:  Prior to discharge or change in level of care    Document interventions on the Reny Bernard Fall Risk Assessment    1.  Assess for fall risk factors  2.  Implement fall precautions  Outcome: Progressing

## 2024-03-28 NOTE — DISCHARGE PLANNING
-[id number]  DPA sent SNF referral to [facility], likely cleared today to go back if accepted.     -[phone]  DPA informed Jose at [facility] of 1230 scheduled transport for pt to head to facility.

## 2024-03-28 NOTE — PROGRESS NOTES
Monitor Summary  Rhythm: SB-SR, 1st degree, BBB  Rate: 52-62  Ectopy: (R) PVC  .21 / .14 / .48

## 2024-03-28 NOTE — DISCHARGE INSTRUCTIONS
Discharge Instructions per Kathleen Aguilar M.D.    Please follow-up with PCP as outpatient.  Take medications as prescribed  Take miralax daily to avoid constipations       Return to ER in the event of new or worsening symptoms. Please note importance of compliance and the patient has agreed to proceed with all medical recommendations and follow up plan indicated above. All medications come with benefits and risks. Risks may include permanent injury or death and these risks can be minimized with close reassessment and monitoring. Please make it to your scheduled follow ups with PCP and cardiology      HF Patient Discharge Instructions  Monitor your weight daily, and maintain a weight chart, to track your weight changes.   Activity as tolerated, unless your Doctor has ordered otherwise. Other activity order: as tolerated.  Follow a low fat, low cholesterol, low salt diet unless instructed otherwise by your Doctor. Read the labels on the back of food products and track your intake of fat, cholesterol and salt.   Fluid Restriction No. If a Fluid Restriction has been ordered by your Doctor, measure fluids with a measuring cup to ensure that you are not exceeding the restriction.   No smoking.  Oxygen No. If your Doctor has ordered that you wear Oxygen at home, it is important to wear it as ordered.  Did you receive an explanation from staff on the importance of taking each of your medications and why it is necessary to keep taking them unless your doctor says to stop? Yes  Were all of your questions answered about how to manage your heart failure and what to do if you have increased signs and symptoms after you go home? Yes  Do you feel like your heart failure care team involved you in the care treatment plan and allowed you to make decisions regarding your care while in the hospital and addressed any discharge needs you might have? Yes    See the educational handout provided at discharge for more information on  monitoring your daily weight, activity and diet. This also explains more about Heart Failure, symptoms of a flare-up and some of the tests that you have undergone.     Warning Signs of a Flare-Up include:  Swelling in the ankles or lower legs.  Shortness of breath, while at rest, or while doing normal activities.   Shortness of breath at night when in bed, or coughing in bed.   Requiring more pillows to sleep at night, or needing to sit up at night to sleep.  Feeling weak, dizzy or fatigued.     When to call your Doctor:  Call your Primary Care Physician or Cardiologist if:   You experience any pain radiating to your jaw or neck.  You have any difficulty breathing.  You experience weight gain of 3 lbs in a day or 5 lbs in a week.   You feel any palpitations or irregular heartbeats.  You become dizzy or lose consciousness.   If you have had an angiogram or had a pacemaker or AICD placed, and experience:  Bleeding, drainage or swelling at the surgical / puncture site.  Fever greater than 100.0 F  Shock from internal defibrillator.  Cool and / or numb extremities.     Please access the AHA My HF Guide/Heart Failure Interactive Workbook:   [email or web address]

## 2024-03-28 NOTE — DISCHARGE PLANNING
DC Transport Scheduled    Transport Company Scheduled:  [employer]  Spoke with Liz at [facility] to schedule transport.    Scheduled Date: 3/28/2024  Scheduled Time: 1230    Destination: [facility]   Destination address: [address], [place] [address]    Notified care team of scheduled transport via Voalte.     If there are any changes needed to the DC transportation scheduled, please contact [facility] at ext. [phone] between the hours of 9059-4824 Mon-Fri. If outside those hours, contact the ED Case Manager at ext. [phone].

## 2024-03-28 NOTE — DISCHARGE PLANNING
Case Management Discharge Planning    Admission Date: 3/26/2024  GMLOS: 3.5  ALOS: 2    6-Clicks ADL Score: 20  6-Clicks Mobility Score: 18      Anticipated Discharge Dispo: Discharge Disposition: D/T to SNF with Medicare cert in anticipation of skilled care (03)  Discharge Address: [place] [address]    DME Needed: No    Action(s) Taken: Patient is clear for discharge to SNF.  RN LUKAS met with the patient at bedside to discuss, patient consented to returning to [facility].  Patient provided signatures for SNF choice form and IMM.  Transfer packet completed and handed off to bedside ESTEFANY Meléndez.  Prescriptions for controlled substances included with the transfer packet.  Transport is scheduled with [facility] for 1230 today.    Escalations Completed: Pending Discharge Destination and Ride Line    Medically Clear: Yes    Next Steps: Care coordination available to discuss any further discharge barriers.    Barriers to Discharge: None    Is the patient up for discharge tomorrow: Discharging today

## 2024-03-29 ENCOUNTER — TELEPHONE (OUTPATIENT)
Dept: CARDIOLOGY | Facility: MEDICAL CENTER | Age: 89
End: 2024-03-29
Payer: MEDICARE

## 2024-03-29 NOTE — TELEPHONE ENCOUNTER
Referral from: [facility] PAC for mod MR/TR. Patient DCd to [facility] 3/28/2024.    Called [facility] [phone] on 3/29/2024 and spoke with  Randal. Scheduled visit with Dr. Kee 4/17/2024. Randal states she will notify the patient.

## 2024-03-30 ENCOUNTER — HOSPITAL ENCOUNTER (INPATIENT)
Facility: MEDICAL CENTER | Age: 89
DRG: 377 | End: 2024-03-30
Attending: STUDENT IN AN ORGANIZED HEALTH CARE EDUCATION/TRAINING PROGRAM | Admitting: HOSPITALIST
Payer: MEDICARE

## 2024-03-30 ENCOUNTER — APPOINTMENT (OUTPATIENT)
Dept: RADIOLOGY | Facility: MEDICAL CENTER | Age: 89
DRG: 377 | End: 2024-03-30
Attending: STUDENT IN AN ORGANIZED HEALTH CARE EDUCATION/TRAINING PROGRAM
Payer: MEDICARE

## 2024-03-30 DIAGNOSIS — I50.23 ACUTE ON CHRONIC HFREF (HEART FAILURE WITH REDUCED EJECTION FRACTION) (HCC): ICD-10-CM

## 2024-03-30 DIAGNOSIS — R94.31 LONG QT INTERVAL: ICD-10-CM

## 2024-03-30 DIAGNOSIS — E03.9 ACQUIRED HYPOTHYROIDISM: ICD-10-CM

## 2024-03-30 DIAGNOSIS — R00.1 BRADYCARDIA: ICD-10-CM

## 2024-03-30 DIAGNOSIS — I25.700 CORONARY ARTERY DISEASE INVOLVING CORONARY BYPASS GRAFT OF NATIVE HEART WITH UNSTABLE ANGINA PECTORIS (HCC): Chronic | ICD-10-CM

## 2024-03-30 DIAGNOSIS — I50.43 ACUTE ON CHRONIC COMBINED SYSTOLIC AND DIASTOLIC CONGESTIVE HEART FAILURE (HCC): ICD-10-CM

## 2024-03-30 DIAGNOSIS — I48.91 ATRIAL FIBRILLATION WITH RVR (HCC): ICD-10-CM

## 2024-03-30 DIAGNOSIS — I50.9 CONGESTIVE HEART FAILURE WITH CARDIOMYOPATHY (HCC): ICD-10-CM

## 2024-03-30 DIAGNOSIS — N18.32 STAGE 3B CHRONIC KIDNEY DISEASE (CKD): ICD-10-CM

## 2024-03-30 DIAGNOSIS — I27.20 PULMONARY HYPERTENSION (HCC): ICD-10-CM

## 2024-03-30 DIAGNOSIS — D64.9 NORMOCYTIC ANEMIA: ICD-10-CM

## 2024-03-30 DIAGNOSIS — I48.92 ATRIAL FLUTTER WITH RAPID VENTRICULAR RESPONSE (HCC): ICD-10-CM

## 2024-03-30 DIAGNOSIS — R57.0 CARDIOGENIC SHOCK (HCC): ICD-10-CM

## 2024-03-30 DIAGNOSIS — I42.9 CONGESTIVE HEART FAILURE WITH CARDIOMYOPATHY (HCC): ICD-10-CM

## 2024-03-30 DIAGNOSIS — R07.9 CHEST PAIN, UNSPECIFIED TYPE: ICD-10-CM

## 2024-03-30 DIAGNOSIS — Z71.89 ACP (ADVANCE CARE PLANNING): ICD-10-CM

## 2024-03-30 DIAGNOSIS — D62 ACUTE BLOOD LOSS ANEMIA: ICD-10-CM

## 2024-03-30 DIAGNOSIS — I50.20 HFREF (HEART FAILURE WITH REDUCED EJECTION FRACTION) (HCC): ICD-10-CM

## 2024-03-30 DIAGNOSIS — F51.01 PRIMARY INSOMNIA: ICD-10-CM

## 2024-03-30 DIAGNOSIS — K92.2 LOWER GI BLEED: ICD-10-CM

## 2024-03-30 DIAGNOSIS — M79.7 FIBROMYALGIA: Chronic | ICD-10-CM

## 2024-03-30 LAB
ABO GROUP BLD: NORMAL
ALBUMIN SERPL BCP-MCNC: 3.2 G/DL (ref 3.2–4.9)
ALBUMIN/GLOB SERPL: 1.2 G/DL
ALP SERPL-CCNC: 78 U/L (ref 30–99)
ALT SERPL-CCNC: 9 U/L (ref 2–50)
ANION GAP SERPL CALC-SCNC: 10 MMOL/L (ref 7–16)
APTT PPP: 48.3 SEC (ref 24.7–36)
AST SERPL-CCNC: 22 U/L (ref 12–45)
BASOPHILS # BLD AUTO: 0.7 % (ref 0–1.8)
BASOPHILS # BLD: 0.04 K/UL (ref 0–0.12)
BILIRUB SERPL-MCNC: 0.3 MG/DL (ref 0.1–1.5)
BLD GP AB SCN SERPL QL: NORMAL
BUN SERPL-MCNC: 29 MG/DL (ref 8–22)
CALCIUM ALBUM COR SERPL-MCNC: 9 MG/DL (ref 8.5–10.5)
CALCIUM SERPL-MCNC: 8.4 MG/DL (ref 8.5–10.5)
CHLORIDE SERPL-SCNC: 104 MMOL/L (ref 96–112)
CO2 SERPL-SCNC: 25 MMOL/L (ref 20–33)
CREAT SERPL-MCNC: 1.18 MG/DL (ref 0.5–1.4)
EKG IMPRESSION: NORMAL
EOSINOPHIL # BLD AUTO: 0.18 K/UL (ref 0–0.51)
EOSINOPHIL NFR BLD: 3.4 % (ref 0–6.9)
ERYTHROCYTE [DISTWIDTH] IN BLOOD BY AUTOMATED COUNT: 56.9 FL (ref 35.9–50)
FLUAV RNA SPEC QL NAA+PROBE: NEGATIVE
FLUBV RNA SPEC QL NAA+PROBE: NEGATIVE
GFR SERPLBLD CREATININE-BSD FMLA CKD-EPI: 45 ML/MIN/1.73 M 2
GLOBULIN SER CALC-MCNC: 2.6 G/DL (ref 1.9–3.5)
GLUCOSE SERPL-MCNC: 90 MG/DL (ref 65–99)
HCT VFR BLD AUTO: 26.9 % (ref 37–47)
HCT VFR BLD AUTO: 32.3 % (ref 37–47)
HCT VFR BLD AUTO: 33.2 % (ref 37–47)
HGB BLD-MCNC: 10.4 G/DL (ref 12–16)
HGB BLD-MCNC: 8.5 G/DL (ref 12–16)
HGB BLD-MCNC: 9.8 G/DL (ref 12–16)
IMM GRANULOCYTES # BLD AUTO: 0.02 K/UL (ref 0–0.11)
IMM GRANULOCYTES NFR BLD AUTO: 0.4 % (ref 0–0.9)
INR PPP: 1.51 (ref 0.87–1.13)
LACTATE SERPL-SCNC: 1 MMOL/L (ref 0.5–2)
LYMPHOCYTES # BLD AUTO: 1.61 K/UL (ref 1–4.8)
LYMPHOCYTES NFR BLD: 30 % (ref 22–41)
MCH RBC QN AUTO: 26.2 PG (ref 27–33)
MCHC RBC AUTO-ENTMCNC: 31.6 G/DL (ref 32.2–35.5)
MCV RBC AUTO: 82.8 FL (ref 81.4–97.8)
MONOCYTES # BLD AUTO: 0.67 K/UL (ref 0–0.85)
MONOCYTES NFR BLD AUTO: 12.5 % (ref 0–13.4)
NEUTROPHILS # BLD AUTO: 2.85 K/UL (ref 1.82–7.42)
NEUTROPHILS NFR BLD: 53 % (ref 44–72)
NRBC # BLD AUTO: 0 K/UL
NRBC BLD-RTO: 0 /100 WBC (ref 0–0.2)
PLATELET # BLD AUTO: 208 K/UL (ref 164–446)
PMV BLD AUTO: 10.7 FL (ref 9–12.9)
POTASSIUM SERPL-SCNC: 4.4 MMOL/L (ref 3.6–5.5)
PROCALCITONIN SERPL-MCNC: 0.07 NG/ML
PROT SERPL-MCNC: 5.8 G/DL (ref 6–8.2)
PROTHROMBIN TIME: 18.4 SEC (ref 12–14.6)
RBC # BLD AUTO: 3.25 M/UL (ref 4.2–5.4)
RH BLD: NORMAL
RSV RNA SPEC QL NAA+PROBE: NEGATIVE
SARS-COV-2 RNA RESP QL NAA+PROBE: NOTDETECTED
SODIUM SERPL-SCNC: 139 MMOL/L (ref 135–145)
SPECIMEN SOURCE: NORMAL
TROPONIN T SERPL-MCNC: 22 NG/L (ref 6–19)
TROPONIN T SERPL-MCNC: 24 NG/L (ref 6–19)
WBC # BLD AUTO: 5.4 K/UL (ref 4.8–10.8)

## 2024-03-30 PROCEDURE — 99223 1ST HOSP IP/OBS HIGH 75: CPT | Mod: AI | Performed by: HOSPITALIST

## 2024-03-30 PROCEDURE — 85610 PROTHROMBIN TIME: CPT

## 2024-03-30 PROCEDURE — 700102 HCHG RX REV CODE 250 W/ 637 OVERRIDE(OP): Performed by: HOSPITALIST

## 2024-03-30 PROCEDURE — 700111 HCHG RX REV CODE 636 W/ 250 OVERRIDE (IP): Performed by: HOSPITALIST

## 2024-03-30 PROCEDURE — 80053 COMPREHEN METABOLIC PANEL: CPT

## 2024-03-30 PROCEDURE — A9270 NON-COVERED ITEM OR SERVICE: HCPCS | Performed by: HOSPITALIST

## 2024-03-30 PROCEDURE — 85025 COMPLETE CBC W/AUTO DIFF WBC: CPT

## 2024-03-30 PROCEDURE — 770020 HCHG ROOM/CARE - TELE (206)

## 2024-03-30 PROCEDURE — 84484 ASSAY OF TROPONIN QUANT: CPT

## 2024-03-30 PROCEDURE — 36415 COLL VENOUS BLD VENIPUNCTURE: CPT

## 2024-03-30 PROCEDURE — 0241U HCHG SARS-COV-2 COVID-19 NFCT DS RESP RNA 4 TRGT MIC: CPT

## 2024-03-30 PROCEDURE — 71045 X-RAY EXAM CHEST 1 VIEW: CPT

## 2024-03-30 PROCEDURE — 99285 EMERGENCY DEPT VISIT HI MDM: CPT

## 2024-03-30 PROCEDURE — 93005 ELECTROCARDIOGRAM TRACING: CPT | Performed by: STUDENT IN AN ORGANIZED HEALTH CARE EDUCATION/TRAINING PROGRAM

## 2024-03-30 PROCEDURE — 700102 HCHG RX REV CODE 250 W/ 637 OVERRIDE(OP)

## 2024-03-30 PROCEDURE — 86850 RBC ANTIBODY SCREEN: CPT

## 2024-03-30 PROCEDURE — 85014 HEMATOCRIT: CPT

## 2024-03-30 PROCEDURE — A9270 NON-COVERED ITEM OR SERVICE: HCPCS

## 2024-03-30 PROCEDURE — 86901 BLOOD TYPING SEROLOGIC RH(D): CPT

## 2024-03-30 PROCEDURE — 700102 HCHG RX REV CODE 250 W/ 637 OVERRIDE(OP): Performed by: NURSE PRACTITIONER

## 2024-03-30 PROCEDURE — A9270 NON-COVERED ITEM OR SERVICE: HCPCS | Performed by: NURSE PRACTITIONER

## 2024-03-30 PROCEDURE — 99222 1ST HOSP IP/OBS MODERATE 55: CPT | Performed by: INTERNAL MEDICINE

## 2024-03-30 PROCEDURE — 83605 ASSAY OF LACTIC ACID: CPT

## 2024-03-30 PROCEDURE — C9113 INJ PANTOPRAZOLE SODIUM, VIA: HCPCS | Performed by: HOSPITALIST

## 2024-03-30 PROCEDURE — 85730 THROMBOPLASTIN TIME PARTIAL: CPT

## 2024-03-30 PROCEDURE — 86900 BLOOD TYPING SEROLOGIC ABO: CPT

## 2024-03-30 PROCEDURE — 84145 PROCALCITONIN (PCT): CPT

## 2024-03-30 PROCEDURE — 85018 HEMOGLOBIN: CPT | Mod: 91

## 2024-03-30 RX ORDER — IBUPROFEN 600 MG/1
600 TABLET ORAL ONCE
Status: DISCONTINUED | OUTPATIENT
Start: 2024-03-30 | End: 2024-03-30

## 2024-03-30 RX ORDER — POLYETHYLENE GLYCOL 3350 17 G/17G
1 POWDER, FOR SOLUTION ORAL DAILY
Status: DISCONTINUED | OUTPATIENT
Start: 2024-03-30 | End: 2024-04-09 | Stop reason: HOSPADM

## 2024-03-30 RX ORDER — METOPROLOL SUCCINATE 25 MG/1
12.5 TABLET, EXTENDED RELEASE ORAL EVERY EVENING
Status: DISCONTINUED | OUTPATIENT
Start: 2024-03-30 | End: 2024-04-05

## 2024-03-30 RX ORDER — ATORVASTATIN CALCIUM 10 MG/1
10 TABLET, FILM COATED ORAL NIGHTLY
Status: DISCONTINUED | OUTPATIENT
Start: 2024-03-30 | End: 2024-04-09 | Stop reason: HOSPADM

## 2024-03-30 RX ORDER — LEVOTHYROXINE SODIUM 0.12 MG/1
125 TABLET ORAL
Status: DISCONTINUED | OUTPATIENT
Start: 2024-03-30 | End: 2024-04-09 | Stop reason: HOSPADM

## 2024-03-30 RX ORDER — BISACODYL 10 MG
10 SUPPOSITORY, RECTAL RECTAL
Status: DISCONTINUED | OUTPATIENT
Start: 2024-03-30 | End: 2024-04-09 | Stop reason: HOSPADM

## 2024-03-30 RX ORDER — PANTOPRAZOLE SODIUM 40 MG/10ML
40 INJECTION, POWDER, LYOPHILIZED, FOR SOLUTION INTRAVENOUS 2 TIMES DAILY
Status: DISCONTINUED | OUTPATIENT
Start: 2024-03-30 | End: 2024-04-04

## 2024-03-30 RX ORDER — ACETAMINOPHEN 325 MG/1
650 TABLET ORAL ONCE
Status: COMPLETED | OUTPATIENT
Start: 2024-03-30 | End: 2024-03-30

## 2024-03-30 RX ORDER — AMOXICILLIN 250 MG
2 CAPSULE ORAL EVERY EVENING
Status: DISCONTINUED | OUTPATIENT
Start: 2024-03-30 | End: 2024-04-09 | Stop reason: HOSPADM

## 2024-03-30 RX ORDER — OXYCODONE HYDROCHLORIDE 5 MG/1
5 TABLET ORAL EVERY 4 HOURS PRN
Status: DISCONTINUED | OUTPATIENT
Start: 2024-03-30 | End: 2024-04-02

## 2024-03-30 RX ORDER — AMIODARONE HYDROCHLORIDE 200 MG/1
200 TABLET ORAL DAILY
Status: DISCONTINUED | OUTPATIENT
Start: 2024-03-30 | End: 2024-04-07

## 2024-03-30 RX ADMIN — PANTOPRAZOLE SODIUM 40 MG: 40 INJECTION, POWDER, LYOPHILIZED, FOR SOLUTION INTRAVENOUS at 05:57

## 2024-03-30 RX ADMIN — LEVOTHYROXINE SODIUM 125 MCG: 0.12 TABLET ORAL at 05:57

## 2024-03-30 RX ADMIN — ATORVASTATIN CALCIUM 10 MG: 10 TABLET, FILM COATED ORAL at 21:37

## 2024-03-30 RX ADMIN — OXYCODONE 5 MG: 5 TABLET ORAL at 23:19

## 2024-03-30 RX ADMIN — ACETAMINOPHEN 650 MG: 325 TABLET, FILM COATED ORAL at 22:28

## 2024-03-30 RX ADMIN — PANTOPRAZOLE SODIUM 40 MG: 40 INJECTION, POWDER, LYOPHILIZED, FOR SOLUTION INTRAVENOUS at 17:30

## 2024-03-30 ASSESSMENT — LIFESTYLE VARIABLES
AVERAGE NUMBER OF DAYS PER WEEK YOU HAVE A DRINK CONTAINING ALCOHOL: 0
EVER HAD A DRINK FIRST THING IN THE MORNING TO STEADY YOUR NERVES TO GET RID OF A HANGOVER: NO
TOTAL SCORE: 0
DO YOU DRINK ALCOHOL: NO
DOES PATIENT WANT TO STOP DRINKING: NO
HAVE PEOPLE ANNOYED YOU BY CRITICIZING YOUR DRINKING: NO
EVER FELT BAD OR GUILTY ABOUT YOUR DRINKING: NO
HOW MANY TIMES IN THE PAST YEAR HAVE YOU HAD 5 OR MORE DRINKS IN A DAY: 0
TOTAL SCORE: 0
TOTAL SCORE: 0
SUBSTANCE_ABUSE: 0
HAVE YOU EVER FELT YOU SHOULD CUT DOWN ON YOUR DRINKING: NO
CONSUMPTION TOTAL: NEGATIVE
ALCOHOL_USE: NO
ON A TYPICAL DAY WHEN YOU DRINK ALCOHOL HOW MANY DRINKS DO YOU HAVE: 0

## 2024-03-30 ASSESSMENT — ENCOUNTER SYMPTOMS
MYALGIAS: 0
CONSTIPATION: 0
COUGH: 0
BACK PAIN: 0
POLYDIPSIA: 0
VOMITING: 0
DIARRHEA: 0
FEVER: 0
SINUS PAIN: 0
ORTHOPNEA: 0
HEMOPTYSIS: 0
BLURRED VISION: 0
PALPITATIONS: 0
DIZZINESS: 0
HALLUCINATIONS: 0
DEPRESSION: 0
BLOOD IN STOOL: 0
TINGLING: 0
HEARTBURN: 0
HEADACHES: 0
EYE PAIN: 0
BRUISES/BLEEDS EASILY: 0
NAUSEA: 0
STRIDOR: 0
DOUBLE VISION: 0
CLAUDICATION: 0
WHEEZING: 0
SORE THROAT: 0
FALLS: 0
WEAKNESS: 0
PND: 0
SHORTNESS OF BREATH: 0
CHILLS: 0
SPUTUM PRODUCTION: 0
ABDOMINAL PAIN: 0
NECK PAIN: 0
TREMORS: 0
PHOTOPHOBIA: 0
FLANK PAIN: 0
DIAPHORESIS: 0

## 2024-03-30 ASSESSMENT — COGNITIVE AND FUNCTIONAL STATUS - GENERAL
DAILY ACTIVITIY SCORE: 20
CLIMB 3 TO 5 STEPS WITH RAILING: A LITTLE
MOVING FROM LYING ON BACK TO SITTING ON SIDE OF FLAT BED: A LITTLE
STANDING UP FROM CHAIR USING ARMS: A LITTLE
TOILETING: A LITTLE
SUGGESTED CMS G CODE MODIFIER MOBILITY: CK
HELP NEEDED FOR BATHING: A LITTLE
WALKING IN HOSPITAL ROOM: A LITTLE
TURNING FROM BACK TO SIDE WHILE IN FLAT BAD: A LITTLE
DRESSING REGULAR LOWER BODY CLOTHING: A LITTLE
MOVING TO AND FROM BED TO CHAIR: A LITTLE
MOBILITY SCORE: 18
DRESSING REGULAR UPPER BODY CLOTHING: A LITTLE
SUGGESTED CMS G CODE MODIFIER DAILY ACTIVITY: CJ

## 2024-03-30 ASSESSMENT — FIBROSIS 4 INDEX: FIB4 SCORE: 2.3

## 2024-03-30 ASSESSMENT — PAIN DESCRIPTION - PAIN TYPE
TYPE: CHRONIC PAIN
TYPE: ACUTE PAIN
TYPE: ACUTE PAIN

## 2024-03-30 NOTE — ASSESSMENT & PLAN NOTE
Presents with bradycardia  Monitor for sinus pauses  Continue metoprolol with holding parameters  Continue amiodarone  Hold Eliquis

## 2024-03-30 NOTE — PROGRESS NOTES
Bedside report received from off going RN/tech: Frances ALLISON, assumed care of patient.     Fall Risk Score:    Fall risk interventions in place: Place yellow fall risk ID band on patient, Provide patient/family education based on risk assessment, Educate patient/family to call staff for assistance when getting out of bed, Place fall precaution signage outside patient door, Place patient in room close to nursing station, Utilize bed/chair fall alarm, and Bed alarm connected correctly  Bed type: Regular (Regan Score less than 17 interventions in place)  Patient on cardiac monitor: Yes  IVF/IV medications: Not Applicable   Oxygen: How many liters 2L and Traced the line to wall oxygen  Bedside sitter: Not Applicable   Isolation: Not applicable    Pt lying in bed. No needs expressed at this time. Bed in low and locked position, call light within reach, will continue to monitor.

## 2024-03-30 NOTE — ED PROVIDER NOTES
ED Provider Note    CHIEF COMPLAINT  Chief Complaint   Patient presents with    Chest Pain     Right sided        EXTERNAL RECORDS REVIEWED  Inpatient Notes admission 3/26 for abdominal pain and constipation.  Patient was admitted for 2 days and ultimately discharged after resolution of her symptoms.  Prior admission 3/16/2024 for chest pain.  Patient has significantly reduced ejection fraction, valvular regurgitation.  History of A-fib she is on Eliquis    HPI/ROS  LIMITATION TO HISTORY   Select: Baseline dementia  OUTSIDE HISTORIAN(S):  EMS EMS was called by the [facility] for complaint of chest pain.  She was stable in route.  She received 50 mcg of fentanyl and route.    Davie Montejo is a 89 y.o. female who presents due to complaint of chest pain at nursing facility.  She notes the pain was more persistent today but she has been having it off and on for several days.  She notes no shortness of breath or diaphoresis.  She notes she had gone several days without a bowel movement until recently she has been having normal bowel movements.  She is unaware if she has blood in her stool.  After fentanyl by EMS she reports no pain at this time.  Limited history given her dementia but no further report from EMS.    PAST MEDICAL HISTORY   has a past medical history of Acute blood loss anemia (10/21/2021), Acute on chronic systolic heart failure (HCC) (8/24/2020), Acute perforated gastric ulcer with hemorrhage (HCC) (10/21/2021), Anemia (2/14/2022), Angina decubitus (6/18/2010), CAD (coronary artery disease), Cannabis use, unspecified with intoxication, uncomplicated ([facility]) (2/14/2022), Fibromyalgia, Generalized abdominal pain (12/5/2021), Heart attack ([facility]) (2008), Hypotension due to medication (7/18/2022), Intractable nausea and vomiting (12/4/2021), and Pain in the chest (11/15/2020).    SURGICAL HISTORY   has a past surgical history that includes coronary artery Radha coburn (2008) and coronary  "artery bypass, 1 (2008).    FAMILY HISTORY  No family history on file.    SOCIAL HISTORY  Social History     Tobacco Use    Smoking status: Former    Smokeless tobacco: Never   Vaping Use    Vaping Use: Never used   Substance and Sexual Activity    Alcohol use: Never    Drug use: Not Currently    Sexual activity: Not Currently       CURRENT MEDICATIONS  Home Medications    **Home medications have not yet been reviewed for this encounter**         ALLERGIES  Allergies   Allergen Reactions    Latex Unspecified          Tramadol Rash     Itchy red in nature    Codeine Unspecified     Unknown reaction      Lisinopril Unspecified     Unknown reaction    Penicillin G Unspecified     Unknown reaction  Tolerated Augmentin 3/2024      Pregabalin Unspecified     Lyrica affects patients vision.    Simvastatin Unspecified     Unknown reaction      Sulfa Drugs Unspecified     Patient states \"I can't remember what this does to me\" but recalls and allergy.        PHYSICAL EXAM  VITAL SIGNS: BP 98/50   Pulse (!) 49   Temp 36.8 °C (98.2 °F) (Temporal)   Resp 16   Ht 1.737 m (5' 8.4\")   Wt 70 kg (154 lb 5.2 oz)   LMP  (LMP Unknown)   SpO2 98%   BMI 23.19 kg/m²    Constitutional: Awake and alert. No acute distress.  Head: NCAT.  HEENT: Normal Conjunctiva. PERRLA.  Neck: Grossly normal range of motion. Airway midline.  Cardiovascular: Normal heart rate, Normal rhythm.  Thorax & Lungs: No respiratory distress. Clear to Auscultation bilaterally.  Abdomen: Normal inspection. Nontender. Nondistended.  Hemoccult test is positive for blood on rectal exam.  Skin: No obvious rash.  Back: No tenderness, No CVA tenderness.   Musculoskeletal: No obvious deformity. Moves all extremities Well.  Neurologic: A&Ox1.  Nonfocal neurologic exam  Psychiatric: Mood and affect are appropriate for situation.    EKG/LABS  Results for orders placed or performed during the hospital encounter of 03/30/24   CBC with Differential   Result Value Ref Range "    WBC 5.4 4.8 - 10.8 K/uL    RBC 3.25 (L) 4.20 - 5.40 M/uL    Hemoglobin 8.5 (L) 12.0 - 16.0 g/dL    Hematocrit 26.9 (L) 37.0 - 47.0 %    MCV 82.8 81.4 - 97.8 fL    MCH 26.2 (L) 27.0 - 33.0 pg    MCHC 31.6 (L) 32.2 - 35.5 g/dL    RDW 56.9 (H) 35.9 - 50.0 fL    Platelet Count 208 164 - 446 K/uL    MPV 10.7 9.0 - 12.9 fL    Neutrophils-Polys 53.00 44.00 - 72.00 %    Lymphocytes 30.00 22.00 - 41.00 %    Monocytes 12.50 0.00 - 13.40 %    Eosinophils 3.40 0.00 - 6.90 %    Basophils 0.70 0.00 - 1.80 %    Immature Granulocytes 0.40 0.00 - 0.90 %    Nucleated RBC 0.00 0.00 - 0.20 /100 WBC    Neutrophils (Absolute) 2.85 1.82 - 7.42 K/uL    Lymphs (Absolute) 1.61 1.00 - 4.80 K/uL    Monos (Absolute) 0.67 0.00 - 0.85 K/uL    Eos (Absolute) 0.18 0.00 - 0.51 K/uL    Baso (Absolute) 0.04 0.00 - 0.12 K/uL    Immature Granulocytes (abs) 0.02 0.00 - 0.11 K/uL    NRBC (Absolute) 0.00 K/uL   Complete Metabolic Panel (CMP)   Result Value Ref Range    Sodium 139 135 - 145 mmol/L    Potassium 4.4 3.6 - 5.5 mmol/L    Chloride 104 96 - 112 mmol/L    Co2 25 20 - 33 mmol/L    Anion Gap 10.0 7.0 - 16.0    Glucose 90 65 - 99 mg/dL    Bun 29 (H) 8 - 22 mg/dL    Creatinine 1.18 0.50 - 1.40 mg/dL    Calcium 8.4 (L) 8.5 - 10.5 mg/dL    Correct Calcium 9.0 8.5 - 10.5 mg/dL    AST(SGOT) 22 12 - 45 U/L    ALT(SGPT) 9 2 - 50 U/L    Alkaline Phosphatase 78 30 - 99 U/L    Total Bilirubin 0.3 0.1 - 1.5 mg/dL    Albumin 3.2 3.2 - 4.9 g/dL    Total Protein 5.8 (L) 6.0 - 8.2 g/dL    Globulin 2.6 1.9 - 3.5 g/dL    A-G Ratio 1.2 g/dL   Troponins NOW   Result Value Ref Range    Troponin T 24 (H) 6 - 19 ng/L   ESTIMATED GFR   Result Value Ref Range    GFR (CKD-EPI) 45 (A) >60 mL/min/1.73 m 2   EKG   Result Value Ref Range    Report       [facility]    Test Date:  2024-03-30  Pt Name:    JUNIOR CHIU            Department: ER  MRN:        [id number]                      Room:       RD 11  Gender:     Female                        Technician: [id number]  :        [id number]                   Requested By:ER TRIAGE PROTOCOL  Order #:    [id number]                    Reading MD:    Measurements  Intervals                                Axis  Rate:       50                           P:          4  PA:         232                          QRS:        -45  QRSD:       152                          T:          123  QT:         526  QTc:        480    Interpretive Statements  Sinus bradycardia  Prolonged PA interval  Left bundle branch block  Compared to ECG 2024 07:47:53  Sinus rhythm no longer present       I have independently interpreted this EKG    RADIOLOGY  I have independently interpreted the diagnostic imaging associated with this visit and am waiting the final reading from the radiologist.   My preliminary interpretation is as follows: No acute opacity, stable effusions when compared to prior EKG    Radiologist interpretation:  DX-CHEST-PORTABLE (1 VIEW)   Final Result         1.  Hazy bilateral pulmonary infiltrates, similar to prior study.   2.  Small bilateral pleural effusions, stable   3.  Cardiomegaly          COURSE & MEDICAL DECISION MAKING    ASSESSMENT, COURSE AND PLAN  Care Narrative:   This is an 89-year-old female history of A-fib on Eliquis, CAD, EF of 25% and global hypokinesis presents due to chest pain this evening at  nursing home.  Afebrile, bradycardic, normotensive  On exam no murmur, clear lungs, no distress at this time.  Guaiac is Hemoccult positive concerning for lower GI bleed.  EKG with sinus bradycardia  Chest x-ray with stable findings  Her BUN is elevated, the trend of her hemoglobin is down 3-1/2 points from 4 days ago with her hemoglobin today 8.5.  Her troponin is at her baseline.  We will admit her to the hospitalist for concerns of lower GI bleed on Eliquis, on antiplatelet drug with hemoglobin dropped 3.5 in the last 4 days.  Hospitalist consulted for admission accepted patient.    Chest Pain:   heart score 6          ADDITIONAL PROBLEMS MANAGED    Lower GI bloeed  Normocytic anemia  Elevated troponin   CKD II    DISPOSITION AND DISCUSSIONS  I have discussed management of the patient with the following physicians and LEYLA's:  Dr. Carrera - admission    Discussion of management with other Q or appropriate source(s): None     Barriers to care at this time, including but not limited to: Patient lacks transportation .     Decision tools and prescription drugs considered including, but not limited to:  none .    FINAL DIAGNOSIS  1. Lower GI bleed    2. Chest pain, unspecified type    3. Normocytic anemia    4. Bradycardia           Electronically signed by: Galdino Gaona D.O., 3/30/2024 1:58 AM

## 2024-03-30 NOTE — PROGRESS NOTES
Patient was admitted earlier this morning.  Please refer H&P for details.    89 y.o. female with history of Afib s/p DCCV on Eliquis, HFrEF EF 25%, moderate to severe mitral regurgitation, CAD s/p CABG on plavix, HTN, HLD, just discharged on 3/28 for abdominal pain, who presented 3/30/2024 again from [facility] for chest pain, shortness of breath and generalized weakness and chest pain.     In the ER she was found Hb 9.8 which was down from 11 2 days prior. Her Hemoccult positive. CXR stable, small pleural effusion. Trop 24.     GI was consulted. Home meds Elquis and Plavix on hold    Plans  - I have consulted and discussed GI  - continue hold Eliquis and plavix  - PPI  - hold diuretics due to hypotension and GI bleeding  - wean O2 as tolerated

## 2024-03-30 NOTE — ED NOTES
Troponin x 2 blood draw completed. SARS COVID collected and sent to lab for analysis. Redraw of blue top completed, sent to lab for analysis.   Report provided to ADM floor RN. Awaiting RN  at bedside

## 2024-03-30 NOTE — PROGRESS NOTES
4 Eyes Skin Assessment Completed by ESTEFANY Louis and NATHAN FARMER.    Head WDL  Ears WDL  Nose WDL  Mouth WDL  Neck WDL  Breast/Chest WDL  Shoulder Blades WDL  Spine WDL  (R) Arm/Elbow/Hand WDL  (L) Arm/Elbow/Hand WDL  Abdomen WDL  Groin WDL  Scrotum/Coccyx/Buttocks WDL  (R) Leg WDL  (L) Leg WDL  (R) Heel/Foot/Toe WDL  (L) Heel/Foot/Toe WDL          Devices In Places Tele Box, Blood Pressure Cuff, and Pulse Ox      Interventions In Place Gray Ear Foams and Pillows    Possible Skin Injury No    Pictures Uploaded Into Epic N/A  Wound Consult Placed N/A  RN Wound Prevention Protocol Ordered No

## 2024-03-30 NOTE — H&P
Hospital Medicine History & Physical Note    Date of Service  3/30/2024    Primary Care Physician  JEAN-PAUL Lew.    Consultants  Consult GI in the AM     Specialist Names:     Code Status  DNAR/DNI    Chief Complaint  Chief Complaint   Patient presents with    Chest Pain     Right sided        History of Presenting Illness  Davie Montejo is a 89 y.o. female who presented 3/30/2024 with with a past medical history of dementia, atrial fibrillation, systolic heart failure with a EF of 25%, severe MR, coronary disease status post CABG who presents to the hospital for chest pain, shortness of breath and generalized weakness.  Patient states that she been having frequent night sweats.  Patient states that at [place] she was placed on 2 L nasal cannula but does not know why.  She denies any cough or hemoptysis.  Patient was recently discharged from the hospital on 3/26 where she was noted to have pleural effusions and underwent a thoracentesis.  During the hospital stay she also had atrial fibrillation with RVR.  The patient is taking Plavix and Eliquis.  Patient denies any bloody stools or abdominal pain.    In the ER she was found to be Hemoccult positive.    Chest x-ray interpreted by me found bilateral pulmonary edema and bilateral pleural effusions    EKG interpreted by me found sinus bradycardia    I discussed the plan of care with patient.    Review of Systems  Review of Systems   Constitutional:  Negative for chills, diaphoresis, fever and malaise/fatigue.   HENT:  Negative for congestion, ear discharge, ear pain, hearing loss, nosebleeds, sinus pain, sore throat and tinnitus.    Eyes:  Negative for blurred vision, double vision, photophobia and pain.   Respiratory:  Negative for cough, hemoptysis, sputum production, shortness of breath, wheezing and stridor.    Cardiovascular:  Positive for chest pain. Negative for palpitations, orthopnea, claudication, leg swelling and PND.   Gastrointestinal:   Negative for abdominal pain, blood in stool, constipation, diarrhea, heartburn, melena, nausea and vomiting.   Genitourinary:  Negative for dysuria, flank pain, frequency, hematuria and urgency.   Musculoskeletal:  Negative for back pain, falls, joint pain, myalgias and neck pain.   Skin:  Negative for itching and rash.   Neurological:  Negative for dizziness, tingling, tremors, weakness and headaches.   Endo/Heme/Allergies:  Negative for environmental allergies and polydipsia. Does not bruise/bleed easily.   Psychiatric/Behavioral:  Negative for depression, hallucinations, substance abuse and suicidal ideas.        Past Medical History   has a past medical history of Acute blood loss anemia (10/21/2021), Acute on chronic systolic heart failure ([facility]) (8/24/2020), Acute perforated gastric ulcer with hemorrhage ([facility]) (10/21/2021), Anemia (2/14/2022), Angina decubitus (6/18/2010), CAD (coronary artery disease), Cannabis use, unspecified with intoxication, uncomplicated ([facility]) (2/14/2022), Fibromyalgia, Generalized abdominal pain (12/5/2021), Heart attack ([facility]) (2008), Hypotension due to medication (7/18/2022), Intractable nausea and vomiting (12/4/2021), and Pain in the chest (11/15/2020).    Surgical History   has a past surgical history that includes coronary artery bypas, 4 (2008) and coronary artery bypass, 1 (2008).     Family History  Family history reviewed with patient. There is no family history that is pertinent to the chief complaint.     Social History   reports that she has quit smoking. She has never used smokeless tobacco. She reports that she does not currently use drugs. She reports that she does not drink alcohol.    Allergies  Allergies   Allergen Reactions    Latex Unspecified          Tramadol Rash     Itchy red in nature    Codeine Unspecified     Unknown reaction      Lisinopril Unspecified     Unknown reaction    Penicillin G Unspecified     Unknown reaction  Tolerated Augmentin 3/2024       "Pregabalin Unspecified     Lyrica affects patients vision.    Simvastatin Unspecified     Unknown reaction      Sulfa Drugs Unspecified     Patient states \"I can't remember what this does to me\" but recalls and allergy.        Medications  Prior to Admission Medications   Prescriptions Last Dose Informant Patient Reported? Taking?   DULoxetine (CYMBALTA) 60 MG Cap DR Particles delayed-release capsule UNK at [facility] Other Facility Yes No   Sig: Take 60 mg by mouth every morning.   acetaminophen (TYLENOL) 325 MG Tab UNK at [facility] Other Facility Yes No   Sig: Take 650 mg by mouth every four hours as needed for Mild Pain.   amiodarone (CORDARONE) 200 MG Tab UNK at [facility] Other Facility No No   Sig: Take 1 Tablet by mouth 2 times a day for 2 days, THEN 1 Tablet every day for 30 days.   apixaban (ELIQUIS) 5mg Tab UNK at [facility] Other [facility] No No   Sig: Take 1 Tablet by mouth 2 times a day. Indications: Thromboembolism secondary to Atrial Fibrillation   atorvastatin (LIPITOR) 10 MG Tab UNK at [facility] No No   Sig: TAKE 1 TABLET BY MOUTH EVERY EVENING. FOR CHOLESTEROL.   clopidogrel (PLAVIX) 75 MG Tab UNK at [facility] Other [facility] No No   Sig: Take 1 Tablet by mouth every day.   estradiol (ESTRACE) 1 MG Tab UNK at [facility] No No   Sig: TAKE 1 TABLET BY MOUTH EVERY DAY.   furosemide (LASIX) 20 MG Tab UNK at [facility] Facility No No   Sig: Take 2 Tablets by mouth every day.   gabapentin (NEURONTIN) 600 MG tablet UNK at [facility] No No   Sig: Take 1 Tablet by mouth 2 times a day.   levothyroxine (SYNTHROID) 125 MCG Tab UNK at [facility] Other Facility No No   Sig: TAKE 1 TABLET BY MOUTH EVERY MORNING ON AN EMPTY STOMACH.   loratadine (CLARITIN) 10 MG Tab UNK at [facility] No No   Sig: Take 1 Tablet by mouth every day.   metoprolol SR (TOPROL XL) 25 MG TABLET SR 24 HR UNK at [facility] No No   Sig: Take 0.5 Tablets by mouth every evening.   omeprazole (PRILOSEC) 20 MG delayed-release capsule UNK at [facility] " Other Facility No No   Sig: TAKE 1 CAPSULE BY MOUTH EVERY DAY.   oxyCODONE immediate-release (ROXICODONE) 5 MG Tab UNK at [facility] Other Facility No No   Sig: Take 1 Tablet by mouth every 6 hours as needed for Severe Pain for up to 3 days.   polyethylene glycol/lytes (MIRALAX) Pack UNK at [facility] Other Facility No No   Sig: Take 1 Packet by mouth every day.   senna-docusate (PERICOLACE OR SENOKOT S) 8.6-50 MG Tab UNK at [facility] Other Facility No No   Sig: Take 2 Tablets by mouth every evening.   sucralfate (CARAFATE) 1 GM Tab UNK at [facility] Other Facility Yes No   Sig: Take 1 g by mouth 2 times a day.      Facility-Administered Medications: None       Physical Exam  Temp:  [36.8 °C (98.2 °F)] 36.8 °C (98.2 °F)  Pulse:  [49-51] 51  Resp:  [16-18] 18  BP: ()/(46-59) 120/59  SpO2:  [96 %-98 %] 96 %  Blood Pressure : 120/59   Temperature: 36.8 °C (98.2 °F)   Pulse: (!) 51   Respiration: 18   Pulse Oximetry: 96 %       Physical Exam  Vitals and nursing note reviewed.   Constitutional:       General: She is not in acute distress.     Appearance: Normal appearance. She is not ill-appearing, toxic-appearing or diaphoretic.   HENT:      Head: Normocephalic and atraumatic.      Nose: No congestion or rhinorrhea.      Mouth/Throat:      Pharynx: No oropharyngeal exudate or posterior oropharyngeal erythema.   Eyes:      General: No scleral icterus.  Neck:      Vascular: JVD present. No carotid bruit.   Cardiovascular:      Rate and Rhythm: Normal rate and regular rhythm.      Pulses: Normal pulses.      Heart sounds: Normal heart sounds. No murmur heard.     No friction rub. No gallop.   Pulmonary:      Effort: Pulmonary effort is normal. No respiratory distress.      Breath sounds: No stridor. Rales present. No wheezing or rhonchi.   Abdominal:      General: Abdomen is flat. There is no distension.      Palpations: There is no mass.      Tenderness: There is no abdominal tenderness. There is no left CVA tenderness, guarding or rebound.       Hernia: No hernia is present.   Musculoskeletal:         General: No swelling. Normal range of motion.      Cervical back: No rigidity. No muscular tenderness.      Right lower leg: Edema present.      Left lower leg: Edema present.   Lymphadenopathy:      Cervical: No cervical adenopathy.   Skin:     General: Skin is warm and dry.      Capillary Refill: Capillary refill takes more than 3 seconds.      Coloration: Skin is not jaundiced or pale.      Findings: No bruising or erythema.   Neurological:      Mental Status: She is alert.         Laboratory:  Recent Labs     03/28/24  0044 03/30/24  0135   WBC 7.5 5.4   RBC 3.74* 3.25*   HEMOGLOBIN 9.8* 8.5*   HEMATOCRIT 30.7* 26.9*   MCV 82.1 82.8   MCH 26.2* 26.2*   MCHC 31.9* 31.6*   RDW 56.6* 56.9*   PLATELETCT 243 208   MPV 10.4 10.7     Recent Labs     03/28/24  0044 03/30/24  0135   SODIUM 133* 139   POTASSIUM 4.5 4.4   CHLORIDE 97 104   CO2 25 25   GLUCOSE 102* 90   BUN 33* 29*   CREATININE 1.22 1.18   CALCIUM 8.7 8.4*     Recent Labs     03/28/24  0044 03/30/24  0135   ALTSGPT  --  9   ASTSGOT  --  22   ALKPHOSPHAT  --  78   TBILIRUBIN  --  0.3   GLUCOSE 102* 90         Recent Labs     03/28/24  0044   NTPROBNP 3461*         Recent Labs     03/30/24  0135   TROPONINT 24*       Imaging:  DX-CHEST-PORTABLE (1 VIEW)   Final Result         1.  Hazy bilateral pulmonary infiltrates, similar to prior study.   2.  Small bilateral pleural effusions, stable   3.  Cardiomegaly          X-Ray:  I have personally reviewed the images and compared with prior images.  EKG:  I have personally reviewed the images and compared with prior images.    Assessment/Plan:  Justification for Admission Status  I anticipate this patient will require at least two midnights for appropriate medical management, necessitating inpatient admission because GI bleed    Patient will need a Telemetry bed on MEDICAL service .  The need is secondary to GI bleed.    * GI bleed- (present on  admission)  Assessment & Plan  Hold Plavix and Eliquis  Continuous cardiac monitoring  NPO  Patient is started on IV Protonix  Monitor H&H every 8 hours, transfuse for hemoglobin less than 7  We will consult GI in the morning for endoscopic evaluation     Bilateral pleural effusion- (present on admission)  Assessment & Plan  Start diuresis once GI bleed has resolved    Acute blood loss anemia  Assessment & Plan  Monitor hemoglobin every 8 hours and transfuse less than 7    AF (atrial fibrillation) (HCC)- (present on admission)  Assessment & Plan  Presents with bradycardia  Monitor for sinus pauses  Continue metoprolol with holding parameters  Continue amiodarone  Hold Eliquis    Stage 3b chronic kidney disease (CKD) (HCC)- (present on admission)  Assessment & Plan  Monitor BMP and assess response  Avoid IV contrast/nephrotoxins/NSAIDs  Dose adjust meds for decreased GFR      Acute on chronic combined systolic and diastolic congestive heart failure (HCC)- (present on admission)  Assessment & Plan  Decompensated  Hold Lasix until GI bleed has resolved  Continue metoprolol    Acute respiratory failure with hypoxia (HCC)- (present on admission)  Assessment & Plan  On 2 L of O2 above baseline    Hx of CABG- (present on admission)  Assessment & Plan  Continue statins  Hold Plavix  Patient does present with chest pain and will follow troponins        VTE prophylaxis: SCDs/TEDs

## 2024-03-30 NOTE — ED NOTES
Pharmacy Medication Reconciliation      ~Medication reconciliation updated and complete per patient medication summary sheet. Unable to reach facility at this time for last doses of  medications. Last Med Rec was completed w/MAR from [facility] on 3/26  ~Allergies have been verified and updated   ~No oral ABX within the last 30 days      ~Anticoagulants (rivaroxaban, apixaban, edoxaban, dabigatran, warfarin, enoxaparin) taken in the last 14 days? YES  ~Anticoagulant: ELIQUIS Last dose: UNK

## 2024-03-30 NOTE — ASSESSMENT & PLAN NOTE
Hold Plavix and Eliquis  Continuous cardiac monitoring  NPO  Patient is started on IV Protonix  Monitor H&H every 8 hours, transfuse for hemoglobin less than 7  We will consult GI in the morning for endoscopic evaluation

## 2024-03-30 NOTE — CONSULTS
Gastroenterology Initial Consult Note               Author:  Lexi Sethi M.D. Date & Time Created: 3/30/2024 8:51 AM       Patient ID:  Name:             Davie Montejo  YOB: 1935  Age:                 89 y.o.  female  MRN:               [id number]      Referring Provider:  Kathleen Aguilar MD        Presenting Chief Complaint:  Anemia and heme positive stool      History of Present Illness:    This is a very pleasant 89 y.o. female with dementia, atrial fibrillation on apixaban, CAD s/p CABG and EF 35% complaining of chest pain over last month BIB [facility] early this am from SNF.  Also with recent constipation but started stooling again.  Patient unaware of blood in the stool.  She was hypotensive and bradycardic in ER.  On BALBINA she was described to have heme positive stool, unknown color of stool. Hgb 8.5, Hgb 9.8 on 3/28, and Hgb 11 on 3/26.  On apixaban and clodiprogel.  Recently discharged on omeprazole and sucralfate.    Chart review:  March 2024:  MARGO cardioversion  October 2021: contained perforated gastric ulcer, conservative management      Review of Systems:  Review of Systems   Unable to perform ROS: Dementia             Past Medical History:  Past Medical History:   Diagnosis Date    Acute blood loss anemia 10/21/2021    Acute on chronic systolic heart failure (HCC) 8/24/2020    Acute perforated gastric ulcer with hemorrhage ([facility]) 10/21/2021    Anemia 2/14/2022    Angina decubitus 6/18/2010    CAD (coronary artery disease)     Cannabis use, unspecified with intoxication, uncomplicated ([facility]) 2/14/2022    Fibromyalgia     Generalized abdominal pain 12/5/2021    Heart attack ([facility]) 2008    Hypotension due to medication 7/18/2022    Intractable nausea and vomiting 12/4/2021    Pain in the chest 11/15/2020     Active Hospital Problems    Diagnosis     GI bleed [K92.2]     Acute blood loss anemia [D62]     Stage 3b chronic kidney disease (CKD) ([facility]) [N18.32]     AF (atrial  fibrillation) ([facility]) [I48.91]     Acute on chronic combined systolic and diastolic congestive heart failure (HCC) [I50.43]     Acute respiratory failure with hypoxia ([facility]) [J96.01]     Bilateral pleural effusion [J90]     Hx of CABG [Z95.1]          Past Surgical History:  Past Surgical History:   Procedure Laterality Date    CORONARY ARTERY BYPAS, 4  2008    CORONARY ARTERY BYPASS, 1  2008           Hospital Medications:  Current Facility-Administered Medications   Medication Dose Frequency Provider Last Rate Last Admin    amiodarone (Cordarone) tablet 200 mg  200 mg DAILY Tom Carrera M.D.        atorvastatin (Lipitor) tablet 10 mg  10 mg Nightly Tom Carrera M.D.        levothyroxine (Synthroid) tablet 125 mcg  125 mcg AM ES Tom Carrera M.D.   125 mcg at 03/30/24 0557    metoprolol SR (Toprol XL) tablet 12.5 mg  12.5 mg Q EVENING Tom Carrera M.D.        pantoprazole (Protonix) injection 40 mg  40 mg BID Tom Carrera M.D.   40 mg at 03/30/24 0557   Last reviewed on 3/30/2024  3:10 AM by Oswaldo Murphy       Current Outpatient Medications:  Medications Prior to Admission   Medication Sig Dispense Refill Last Dose    clopidogrel (PLAVIX) 75 MG Tab Take 1 Tablet by mouth every day.   UNK at UNK    furosemide (LASIX) 20 MG Tab Take 2 Tablets by mouth every day.   UNK at UNK    metoprolol SR (TOPROL XL) 25 MG TABLET SR 24 HR Take 0.5 Tablets by mouth every evening. 30 Tablet 0 UNK at UNK    polyethylene glycol/lytes (MIRALAX) Pack Take 1 Packet by mouth every day.   UNK at UNK    amiodarone (CORDARONE) 200 MG Tab Take 1 Tablet by mouth 2 times a day for 2 days, THEN 1 Tablet every day for 30 days.   UNK at UNK    oxyCODONE immediate-release (ROXICODONE) 5 MG Tab Take 1 Tablet by mouth every 6 hours as needed for Severe Pain for up to 3 days. 8 Tablet 0 UNK at UNK    senna-docusate (PERICOLACE OR SENOKOT S) 8.6-50 MG Tab Take 2 Tablets by mouth every evening.   UNK at UNK    apixaban (ELIQUIS) 5mg Tab Take  "1 Tablet by mouth 2 times a day. Indications: Thromboembolism secondary to Atrial Fibrillation   UNK at UNK    atorvastatin (LIPITOR) 10 MG Tab TAKE 1 TABLET BY MOUTH EVERY EVENING. FOR CHOLESTEROL. 100 Tablet 3 UNK at UNK    DULoxetine (CYMBALTA) 60 MG Cap DR Particles delayed-release capsule Take 60 mg by mouth every morning.   UNK at UNK    sucralfate (CARAFATE) 1 GM Tab Take 1 g by mouth 2 times a day.   UNK at UNK    acetaminophen (TYLENOL) 325 MG Tab Take 650 mg by mouth every four hours as needed for Mild Pain.   UNK at UNK    estradiol (ESTRACE) 1 MG Tab TAKE 1 TABLET BY MOUTH EVERY DAY. 90 Tablet 3 UNK at UNK    levothyroxine (SYNTHROID) 125 MCG Tab TAKE 1 TABLET BY MOUTH EVERY MORNING ON AN EMPTY STOMACH. 30 Tablet 11 UNK at UNK    omeprazole (PRILOSEC) 20 MG delayed-release capsule TAKE 1 CAPSULE BY MOUTH EVERY DAY. 30 Capsule 11 UNK at UNK    loratadine (CLARITIN) 10 MG Tab Take 1 Tablet by mouth every day. 90 Tablet 3 UNK at UNK    gabapentin (NEURONTIN) 600 MG tablet Take 1 Tablet by mouth 2 times a day. 180 Tablet 1 UNK at UNK         Medication Allergies:  Allergies   Allergen Reactions    Latex Unspecified          Tramadol Rash     Itchy red in nature    Codeine Unspecified     Unknown reaction      Lisinopril Unspecified     Unknown reaction    Penicillin G Unspecified     Unknown reaction  Tolerated Augmentin 3/2024      Pregabalin Unspecified     Lyrica affects patients vision.    Simvastatin Unspecified     Unknown reaction      Sulfa Drugs Unspecified     Patient states \"I can't remember what this does to me\" but recalls and allergy.          Family Medical History:  No family history on file.      Social History:  Social History     Socioeconomic History    Marital status:      Spouse name: Not on file    Number of children: Not on file    Years of education: Not on file    Highest education level: Not on file   Occupational History    Not on file   Tobacco Use    Smoking status: " "Former    Smokeless tobacco: Never   Vaping Use    Vaping Use: Never used   Substance and Sexual Activity    Alcohol use: Never    Drug use: Not Currently    Sexual activity: Not Currently   Other Topics Concern    Not on file   Social History Narrative    Not on file     Social Determinants of Health     Financial Resource Strain: Low Risk  (12/15/2021)    Overall Financial Resource Strain (CARDIA)     Difficulty of Paying Living Expenses: Not very hard   Food Insecurity: No Food Insecurity (12/15/2021)    Hunger Vital Sign     Worried About Running Out of Food in the Last Year: Never true     Ran Out of Food in the Last Year: Never true   Transportation Needs: Unmet Transportation Needs (1/12/2021)    PRAPARE - Transportation     Lack of Transportation (Medical): Yes     Lack of Transportation (Non-Medical): Not on file   Physical Activity: Not on file   Stress: Not on file   Social Connections: Not on file   Intimate Partner Violence: Not on file   Housing Stability: Not on file         Vital signs:  Weight/BMI: Body mass index is 23.19 kg/m².  BP 97/46   Pulse (!) 55   Temp 36.8 °C (98.2 °F) (Temporal)   Resp 15   Ht 1.737 m (5' 8.4\")   Wt 70 kg (154 lb 5.2 oz)   SpO2 96%   Vitals:    03/30/24 0300 03/30/24 0342 03/30/24 0400 03/30/24 0755   BP: 120/59  97/46    Pulse: (!) 51 (!) 55 (!) 55 (!) (P) 52   Resp: 18 15 15 (P) 16   Temp:    (P) 36.3 °C (97.3 °F)   TempSrc:    (P) Temporal   SpO2: 96% 96%  (P) 98%   Weight:       Height:         Oxygen Therapy:  Pulse Oximetry: (P) 98 %, O2 (LPM): (P) 2, O2 Delivery Device: (P) Nasal Cannula  No intake or output data in the 24 hours ending 03/30/24 0851      Physical Exam:  Physical Exam  Constitutional:       Appearance: Normal appearance.   HENT:      Head: Normocephalic and atraumatic.      Nose: Nose normal.      Mouth/Throat:      Mouth: Mucous membranes are moist.   Eyes:      General: No scleral icterus.     Pupils: Pupils are equal, round, and reactive to " light.   Cardiovascular:      Rate and Rhythm: Regular rhythm.      Heart sounds: Normal heart sounds.   Pulmonary:      Breath sounds: Normal breath sounds.   Abdominal:      General: Bowel sounds are normal.      Palpations: Abdomen is soft.      Comments: Tender epigastrium and LUQ with mild tenderness throughout   Musculoskeletal:         General: Normal range of motion.      Cervical back: Neck supple.   Skin:     General: Skin is warm and dry.   Neurological:      Mental Status: She is alert. She is disoriented.                 Labs:  Recent Labs     03/28/24 0044 03/30/24 0135   SODIUM 133* 139   POTASSIUM 4.5 4.4   CHLORIDE 97 104   CO2 25 25   BUN 33* 29*   CREATININE 1.22 1.18   MAGNESIUM 2.1  --    PHOSPHORUS 3.8  --    CALCIUM 8.7 8.4*     Recent Labs     03/28/24 0044 03/30/24 0135   ALTSGPT  --  9   ASTSGOT  --  22   ALKPHOSPHAT  --  78   TBILIRUBIN  --  0.3   GLUCOSE 102* 90     Recent Labs     03/28/24 0044 03/30/24 0135   WBC 7.5 5.4   NEUTSPOLYS  --  53.00   LYMPHOCYTES  --  30.00   MONOCYTES  --  12.50   EOSINOPHILS  --  3.40   BASOPHILS  --  0.70   ASTSGOT  --  22   ALTSGPT  --  9   ALKPHOSPHAT  --  78   TBILIRUBIN  --  0.3     Recent Labs     03/28/24 0044 03/30/24 0135 03/30/24  0432   RBC 3.74* 3.25*  --    HEMOGLOBIN 9.8* 8.5*  --    HEMATOCRIT 30.7* 26.9*  --    PLATELETCT 243 208  --    PROTHROMBTM  --   --  18.4*   APTT  --   --  48.3*   INR  --   --  1.51*     Recent Results (from the past 24 hour(s))   CBC with Differential    Collection Time: 03/30/24  1:35 AM   Result Value Ref Range    WBC 5.4 4.8 - 10.8 K/uL    RBC 3.25 (L) 4.20 - 5.40 M/uL    Hemoglobin 8.5 (L) 12.0 - 16.0 g/dL    Hematocrit 26.9 (L) 37.0 - 47.0 %    MCV 82.8 81.4 - 97.8 fL    MCH 26.2 (L) 27.0 - 33.0 pg    MCHC 31.6 (L) 32.2 - 35.5 g/dL    RDW 56.9 (H) 35.9 - 50.0 fL    Platelet Count 208 164 - 446 K/uL    MPV 10.7 9.0 - 12.9 fL    Neutrophils-Polys 53.00 44.00 - 72.00 %    Lymphocytes 30.00 22.00 - 41.00 %     Monocytes 12.50 0.00 - 13.40 %    Eosinophils 3.40 0.00 - 6.90 %    Basophils 0.70 0.00 - 1.80 %    Immature Granulocytes 0.40 0.00 - 0.90 %    Nucleated RBC 0.00 0.00 - 0.20 /100 WBC    Neutrophils (Absolute) 2.85 1.82 - 7.42 K/uL    Lymphs (Absolute) 1.61 1.00 - 4.80 K/uL    Monos (Absolute) 0.67 0.00 - 0.85 K/uL    Eos (Absolute) 0.18 0.00 - 0.51 K/uL    Baso (Absolute) 0.04 0.00 - 0.12 K/uL    Immature Granulocytes (abs) 0.02 0.00 - 0.11 K/uL    NRBC (Absolute) 0.00 K/uL   Complete Metabolic Panel (CMP)    Collection Time: 24  1:35 AM   Result Value Ref Range    Sodium 139 135 - 145 mmol/L    Potassium 4.4 3.6 - 5.5 mmol/L    Chloride 104 96 - 112 mmol/L    Co2 25 20 - 33 mmol/L    Anion Gap 10.0 7.0 - 16.0    Glucose 90 65 - 99 mg/dL    Bun 29 (H) 8 - 22 mg/dL    Creatinine 1.18 0.50 - 1.40 mg/dL    Calcium 8.4 (L) 8.5 - 10.5 mg/dL    Correct Calcium 9.0 8.5 - 10.5 mg/dL    AST(SGOT) 22 12 - 45 U/L    ALT(SGPT) 9 2 - 50 U/L    Alkaline Phosphatase 78 30 - 99 U/L    Total Bilirubin 0.3 0.1 - 1.5 mg/dL    Albumin 3.2 3.2 - 4.9 g/dL    Total Protein 5.8 (L) 6.0 - 8.2 g/dL    Globulin 2.6 1.9 - 3.5 g/dL    A-G Ratio 1.2 g/dL   Troponins NOW    Collection Time: 24  1:35 AM   Result Value Ref Range    Troponin T 24 (H) 6 - 19 ng/L   ESTIMATED GFR    Collection Time: 24  1:35 AM   Result Value Ref Range    GFR (CKD-EPI) 45 (A) >60 mL/min/1.73 m 2   EKG    Collection Time: 03/30/24  2:01 AM   Result Value Ref Range    Report       [facility]    Test Date:  2024  Pt Name:    JUNIOR CHIU            Department: ER  MRN:        [id number]                      Room:       RD 11  Gender:     Female                       Technician: [id number]  :        [id number]                   Requested By:ER TRIAGE PROTOCOL  Order #:    [id number]                    Reading MD:    Measurements  Intervals                                Axis  Rate:       50                            P:          4  MN:         232                          QRS:        -45  QRSD:       152                          T:          123  QT:         526  QTc:        480    Interpretive Statements  Sinus bradycardia  Prolonged MN interval  Left bundle branch block  Compared to ECG 03/27/2024 07:47:53  Sinus rhythm no longer present     COD (ADULT)    Collection Time: 03/30/24  2:30 AM   Result Value Ref Range    ABO Grouping Only A     Rh Grouping Only POS     Antibody Screen-Cod NEG    Troponins in two (2) hours    Collection Time: 03/30/24  3:22 AM   Result Value Ref Range    Troponin T 22 (H) 6 - 19 ng/L   PROCALCITONIN    Collection Time: 03/30/24  3:22 AM   Result Value Ref Range    Procalcitonin 0.07 <0.25 ng/mL   CoV-2, Flu A/B, And RSV by PCR ([facility])    Collection Time: 03/30/24  3:23 AM    Specimen: Respirate   Result Value Ref Range    Influenza virus A RNA Negative Negative    Influenza virus B, PCR Negative Negative    RSV, PCR Negative Negative    SARS-CoV-2 by PCR NotDetected     SARS-CoV-2 Source NP Swab    APTT    Collection Time: 03/30/24  4:32 AM   Result Value Ref Range    APTT 48.3 (H) 24.7 - 36.0 sec   Prothrombin Time    Collection Time: 03/30/24  4:32 AM   Result Value Ref Range    PT 18.4 (H) 12.0 - 14.6 sec    INR 1.51 (H) 0.87 - 1.13   LACTIC ACID    Collection Time: 03/30/24  4:32 AM   Result Value Ref Range    Lactic Acid 1.0 0.5 - 2.0 mmol/L         Radiology Review:  DX-CHEST-PORTABLE (1 VIEW)   Final Result         1.  Hazy bilateral pulmonary infiltrates, similar to prior study.   2.  Small bilateral pleural effusions, stable   3.  Cardiomegaly            MDM (Data Review):   -Records reviewed and summarized in current documentation  -I personally reviewed and interpreted the laboratory results  -I personally reviewed the radiology images    Assessment/Recommendations:    iMPRESSION:   Anemia   Heme positive stool   History of perforated gastric ulcer, contained, 2021.  Unknown if  ever had f/u EGD   Chronic anticoagulation   Atrial fibrillation, s/p cardioversion 2 weeks ago   Acute on chronic heart failure   Bilateral pleural effusion   CKD IIIb   Dementia   Colon diverticulosis    Recs:  She is unable to give history but no record of melena or hematochezia  She is tender and past hx of  perforation that may never have been evaluated endoscopically  She is higher risk for anesthesia  No urgent need for endoscopy this weekend.  Need to address who will sign her consent and endo when medically better  Continue pantoprazole  Full liq diet  Thank you.        Lexi Sethi M.D.          Core Quality Measures   Reviewed items:  Labs, Medications and Radiology reports reviewed

## 2024-03-30 NOTE — ED TRIAGE NOTES
"89 y.o. female Davie Montejo    Chief Complaint   Patient presents with    Chest Pain     Right sided    BIB EMS to [facility]   Picked up from [facility]   EMS called by staff.    Patient presented to ED with complaint of right sided chest pain for a month on and off, increased tonight. Denies any shortness of breath or diaphoresis. Patient oriented to situation and person disoriented to place and time, patient has dementia and wears 2 liters O2 with nasal canula as baseline. Respirations even and unlabored, afebrile at this time.     ED RN protocol initiated for Chest pain      Medications given en route:  Aspirin 324 mg PO   Fentanyl 50 Mcg IV     BP 90/46   Pulse (!) 50   Temp 36.8 °C (98.2 °F) (Temporal)   Resp 18   Ht 1.737 m (5' 8.4\")   Wt 70 kg (154 lb 5.2 oz)   LMP  (LMP Unknown)   SpO2 96%   BMI 23.19 kg/m²     Past Medical History:   Diagnosis Date    Acute blood loss anemia 10/21/2021    Acute on chronic systolic heart failure (HCC) 8/24/2020    Acute perforated gastric ulcer with hemorrhage (HCC) 10/21/2021    Anemia 2/14/2022    Angina decubitus 6/18/2010    CAD (coronary artery disease)     Cannabis use, unspecified with intoxication, uncomplicated (HCC) 2/14/2022    Fibromyalgia     Generalized abdominal pain 12/5/2021    Heart attack (HCC) 2008    Hypotension due to medication 7/18/2022    Intractable nausea and vomiting 12/4/2021    Pain in the chest 11/15/2020       Past Surgical History:   Procedure Laterality Date    CORONARY ARTERY BYPAS, 4  2008    CORONARY ARTERY BYPASS, 1  2008         "

## 2024-03-31 ENCOUNTER — APPOINTMENT (OUTPATIENT)
Dept: RADIOLOGY | Facility: MEDICAL CENTER | Age: 89
DRG: 377 | End: 2024-03-31
Payer: MEDICARE

## 2024-03-31 ENCOUNTER — APPOINTMENT (OUTPATIENT)
Dept: RADIOLOGY | Facility: MEDICAL CENTER | Age: 89
DRG: 377 | End: 2024-03-31
Attending: STUDENT IN AN ORGANIZED HEALTH CARE EDUCATION/TRAINING PROGRAM
Payer: MEDICARE

## 2024-03-31 VITALS
WEIGHT: 146.83 LBS | BODY MASS INDEX: 22.25 KG/M2 | RESPIRATION RATE: 25 BRPM | HEIGHT: 68 IN | HEART RATE: 49 BPM | TEMPERATURE: 100.2 F | SYSTOLIC BLOOD PRESSURE: 121 MMHG | DIASTOLIC BLOOD PRESSURE: 52 MMHG | OXYGEN SATURATION: 98 %

## 2024-03-31 PROBLEM — R00.1 BRADYCARDIA: Status: ACTIVE | Noted: 2024-03-31

## 2024-03-31 PROBLEM — R57.0 CARDIOGENIC SHOCK (HCC): Status: ACTIVE | Noted: 2024-03-31

## 2024-03-31 LAB
ALBUMIN SERPL BCP-MCNC: 3.3 G/DL (ref 3.2–4.9)
ALBUMIN SERPL BCP-MCNC: 4 G/DL (ref 3.2–4.9)
ALBUMIN/GLOB SERPL: 1.1 G/DL
ALBUMIN/GLOB SERPL: 1.2 G/DL
ALP SERPL-CCNC: 116 U/L (ref 30–99)
ALP SERPL-CCNC: 89 U/L (ref 30–99)
ALT SERPL-CCNC: 11 U/L (ref 2–50)
ALT SERPL-CCNC: 13 U/L (ref 2–50)
ANION GAP SERPL CALC-SCNC: 11 MMOL/L (ref 7–16)
ANION GAP SERPL CALC-SCNC: 15 MMOL/L (ref 7–16)
AST SERPL-CCNC: 20 U/L (ref 12–45)
AST SERPL-CCNC: 29 U/L (ref 12–45)
BACTERIA BLD CULT: NORMAL
BACTERIA BLD CULT: NORMAL
BASE EXCESS BLDA CALC-SCNC: -3 MMOL/L (ref -4–3)
BILIRUB SERPL-MCNC: 0.6 MG/DL (ref 0.1–1.5)
BILIRUB SERPL-MCNC: 0.7 MG/DL (ref 0.1–1.5)
BODY TEMPERATURE: 37.9 CENTIGRADE
BUN SERPL-MCNC: 24 MG/DL (ref 8–22)
BUN SERPL-MCNC: 25 MG/DL (ref 8–22)
CALCIUM ALBUM COR SERPL-MCNC: 9.1 MG/DL (ref 8.5–10.5)
CALCIUM ALBUM COR SERPL-MCNC: 9.4 MG/DL (ref 8.5–10.5)
CALCIUM SERPL-MCNC: 8.5 MG/DL (ref 8.5–10.5)
CALCIUM SERPL-MCNC: 9.4 MG/DL (ref 8.5–10.5)
CFT BLD TEG: 4.6 MIN (ref 4.6–9.1)
CFT P HPASE BLD TEG: 3.9 MIN (ref 4.3–8.3)
CHLORIDE SERPL-SCNC: 101 MMOL/L (ref 96–112)
CHLORIDE SERPL-SCNC: 102 MMOL/L (ref 96–112)
CLOT ANGLE BLD TEG: 78.1 DEGREES (ref 63–78)
CLOT LYSIS 30M P MA LENFR BLD TEG: 0.3 % (ref 0–2.6)
CO2 SERPL-SCNC: 22 MMOL/L (ref 20–33)
CO2 SERPL-SCNC: 25 MMOL/L (ref 20–33)
CORTIS SERPL-MCNC: 28.4 UG/DL (ref 0–23)
CREAT SERPL-MCNC: 1.08 MG/DL (ref 0.5–1.4)
CREAT SERPL-MCNC: 1.1 MG/DL (ref 0.5–1.4)
CT.EXTRINSIC BLD ROTEM: 0.8 MIN (ref 0.8–2.1)
EKG IMPRESSION: NORMAL
EKG IMPRESSION: NORMAL
ERYTHROCYTE [DISTWIDTH] IN BLOOD BY AUTOMATED COUNT: 57.4 FL (ref 35.9–50)
ERYTHROCYTE [DISTWIDTH] IN BLOOD BY AUTOMATED COUNT: 58.8 FL (ref 35.9–50)
EXTRA TUBE BLU BLU: NORMAL
GFR SERPLBLD CREATININE-BSD FMLA CKD-EPI: 49 ML/MIN/1.73 M 2
GFR SERPLBLD CREATININE-BSD FMLA CKD-EPI: 50 ML/MIN/1.73 M 2
GLOBULIN SER CALC-MCNC: 2.9 G/DL (ref 1.9–3.5)
GLOBULIN SER CALC-MCNC: 3.4 G/DL (ref 1.9–3.5)
GLUCOSE BLD STRIP.AUTO-MCNC: 116 MG/DL (ref 65–99)
GLUCOSE SERPL-MCNC: 137 MG/DL (ref 65–99)
GLUCOSE SERPL-MCNC: 177 MG/DL (ref 65–99)
HCO3 BLDA-SCNC: 24 MMOL/L (ref 17–25)
HCT VFR BLD AUTO: 28.2 % (ref 37–47)
HCT VFR BLD AUTO: 30.4 % (ref 37–47)
HCT VFR BLD AUTO: 30.7 % (ref 37–47)
HCT VFR BLD AUTO: 39 % (ref 37–47)
HGB BLD-MCNC: 12 G/DL (ref 12–16)
HGB BLD-MCNC: 9.1 G/DL (ref 12–16)
HGB BLD-MCNC: 9.5 G/DL (ref 12–16)
HGB BLD-MCNC: 9.8 G/DL (ref 12–16)
INHALED O2 FLOW RATE: ABNORMAL L/MIN
LACTATE SERPL-SCNC: 1.2 MMOL/L (ref 0.5–2)
LACTATE SERPL-SCNC: 1.3 MMOL/L (ref 0.5–2)
LACTATE SERPL-SCNC: 3.6 MMOL/L (ref 0.5–2)
MAGNESIUM SERPL-MCNC: 2.3 MG/DL (ref 1.5–2.5)
MCF BLD TEG: 68.1 MM (ref 52–69)
MCF.PLATELET INHIB BLD ROTEM: 33.7 MM (ref 15–32)
MCH RBC QN AUTO: 26.2 PG (ref 27–33)
MCH RBC QN AUTO: 26.3 PG (ref 27–33)
MCHC RBC AUTO-ENTMCNC: 30.8 G/DL (ref 32.2–35.5)
MCHC RBC AUTO-ENTMCNC: 30.9 G/DL (ref 32.2–35.5)
MCV RBC AUTO: 84.8 FL (ref 81.4–97.8)
MCV RBC AUTO: 85.3 FL (ref 81.4–97.8)
PA AA BLD-ACNC: 58.2 % (ref 0–11)
PA ADP BLD-ACNC: 17 % (ref 0–17)
PCO2 BLDA: 54.3 MMHG (ref 26–37)
PCO2 TEMP ADJ BLDA: 56.5 MMHG (ref 26–37)
PH BLDA: 7.27 [PH] (ref 7.4–7.5)
PH TEMP ADJ BLDA: 7.26 [PH] (ref 7.4–7.5)
PHOSPHATE SERPL-MCNC: 3.6 MG/DL (ref 2.5–4.5)
PLATELET # BLD AUTO: 212 K/UL (ref 164–446)
PLATELET # BLD AUTO: 298 K/UL (ref 164–446)
PMV BLD AUTO: 10.3 FL (ref 9–12.9)
PMV BLD AUTO: 11.2 FL (ref 9–12.9)
PO2 BLDA: 125.9 MMHG (ref 64–87)
PO2 TEMP ADJ BLDA: 131.6 MMHG (ref 64–87)
POTASSIUM SERPL-SCNC: 3.9 MMOL/L (ref 3.6–5.5)
POTASSIUM SERPL-SCNC: 4.5 MMOL/L (ref 3.6–5.5)
PROCALCITONIN SERPL-MCNC: 0.67 NG/ML
PROT SERPL-MCNC: 6.2 G/DL (ref 6–8.2)
PROT SERPL-MCNC: 7.4 G/DL (ref 6–8.2)
RBC # BLD AUTO: 3.62 M/UL (ref 4.2–5.4)
RBC # BLD AUTO: 4.57 M/UL (ref 4.2–5.4)
SAO2 % BLDA: 97.5 % (ref 93–99)
SIGNIFICANT IND 70042: NORMAL
SIGNIFICANT IND 70042: NORMAL
SITE SITE: NORMAL
SITE SITE: NORMAL
SODIUM SERPL-SCNC: 138 MMOL/L (ref 135–145)
SODIUM SERPL-SCNC: 138 MMOL/L (ref 135–145)
SOURCE SOURCE: NORMAL
SOURCE SOURCE: NORMAL
TEG ALGORITHM TGALG: ABNORMAL
TSH SERPL DL<=0.005 MIU/L-ACNC: 1.36 UIU/ML (ref 0.38–5.33)
WBC # BLD AUTO: 10.1 K/UL (ref 4.8–10.8)
WBC # BLD AUTO: 17.1 K/UL (ref 4.8–10.8)

## 2024-03-31 PROCEDURE — 02HV33Z INSERTION OF INFUSION DEVICE INTO SUPERIOR VENA CAVA, PERCUTANEOUS APPROACH: ICD-10-PCS | Performed by: STUDENT IN AN ORGANIZED HEALTH CARE EDUCATION/TRAINING PROGRAM

## 2024-03-31 PROCEDURE — 36556 INSERT NON-TUNNEL CV CATH: CPT | Performed by: NURSE PRACTITIONER

## 2024-03-31 PROCEDURE — 84145 PROCALCITONIN (PCT): CPT

## 2024-03-31 PROCEDURE — 85018 HEMOGLOBIN: CPT | Mod: 91

## 2024-03-31 PROCEDURE — 93005 ELECTROCARDIOGRAM TRACING: CPT | Mod: XE | Performed by: EMERGENCY MEDICINE

## 2024-03-31 PROCEDURE — 770022 HCHG ROOM/CARE - ICU (200)

## 2024-03-31 PROCEDURE — 87150 DNA/RNA AMPLIFIED PROBE: CPT

## 2024-03-31 PROCEDURE — 700111 HCHG RX REV CODE 636 W/ 250 OVERRIDE (IP)

## 2024-03-31 PROCEDURE — 99291 CRITICAL CARE FIRST HOUR: CPT | Mod: 25 | Performed by: STUDENT IN AN ORGANIZED HEALTH CARE EDUCATION/TRAINING PROGRAM

## 2024-03-31 PROCEDURE — 82803 BLOOD GASES ANY COMBINATION: CPT

## 2024-03-31 PROCEDURE — 700111 HCHG RX REV CODE 636 W/ 250 OVERRIDE (IP): Mod: JZ | Performed by: HOSPITALIST

## 2024-03-31 PROCEDURE — 700111 HCHG RX REV CODE 636 W/ 250 OVERRIDE (IP): Performed by: STUDENT IN AN ORGANIZED HEALTH CARE EDUCATION/TRAINING PROGRAM

## 2024-03-31 PROCEDURE — 84443 ASSAY THYROID STIM HORMONE: CPT

## 2024-03-31 PROCEDURE — C9113 INJ PANTOPRAZOLE SODIUM, VIA: HCPCS | Mod: JZ | Performed by: HOSPITALIST

## 2024-03-31 PROCEDURE — 85347 COAGULATION TIME ACTIVATED: CPT

## 2024-03-31 PROCEDURE — 36556 INSERT NON-TUNNEL CV CATH: CPT

## 2024-03-31 PROCEDURE — 700102 HCHG RX REV CODE 250 W/ 637 OVERRIDE(OP): Performed by: HOSPITALIST

## 2024-03-31 PROCEDURE — 80053 COMPREHEN METABOLIC PANEL: CPT

## 2024-03-31 PROCEDURE — 87040 BLOOD CULTURE FOR BACTERIA: CPT | Mod: 91

## 2024-03-31 PROCEDURE — 84100 ASSAY OF PHOSPHORUS: CPT

## 2024-03-31 PROCEDURE — 700111 HCHG RX REV CODE 636 W/ 250 OVERRIDE (IP): Mod: JZ

## 2024-03-31 PROCEDURE — 94660 CPAP INITIATION&MGMT: CPT

## 2024-03-31 PROCEDURE — 71045 X-RAY EXAM CHEST 1 VIEW: CPT

## 2024-03-31 PROCEDURE — 93010 ELECTROCARDIOGRAM REPORT: CPT | Mod: 77 | Performed by: STUDENT IN AN ORGANIZED HEALTH CARE EDUCATION/TRAINING PROGRAM

## 2024-03-31 PROCEDURE — 93005 ELECTROCARDIOGRAM TRACING: CPT | Performed by: STUDENT IN AN ORGANIZED HEALTH CARE EDUCATION/TRAINING PROGRAM

## 2024-03-31 PROCEDURE — 700111 HCHG RX REV CODE 636 W/ 250 OVERRIDE (IP): Performed by: EMERGENCY MEDICINE

## 2024-03-31 PROCEDURE — 700101 HCHG RX REV CODE 250: Performed by: STUDENT IN AN ORGANIZED HEALTH CARE EDUCATION/TRAINING PROGRAM

## 2024-03-31 PROCEDURE — 85384 FIBRINOGEN ACTIVITY: CPT

## 2024-03-31 PROCEDURE — 82533 TOTAL CORTISOL: CPT

## 2024-03-31 PROCEDURE — A9270 NON-COVERED ITEM OR SERVICE: HCPCS | Performed by: HOSPITALIST

## 2024-03-31 PROCEDURE — 85576 BLOOD PLATELET AGGREGATION: CPT | Mod: 91

## 2024-03-31 PROCEDURE — 94640 AIRWAY INHALATION TREATMENT: CPT

## 2024-03-31 PROCEDURE — 82962 GLUCOSE BLOOD TEST: CPT

## 2024-03-31 PROCEDURE — C1751 CATH, INF, PER/CENT/MIDLINE: HCPCS

## 2024-03-31 PROCEDURE — 87077 CULTURE AEROBIC IDENTIFY: CPT

## 2024-03-31 PROCEDURE — 99232 SBSQ HOSP IP/OBS MODERATE 35: CPT | Performed by: NURSE PRACTITIONER

## 2024-03-31 PROCEDURE — 83605 ASSAY OF LACTIC ACID: CPT | Mod: 91

## 2024-03-31 PROCEDURE — 85027 COMPLETE CBC AUTOMATED: CPT

## 2024-03-31 PROCEDURE — 93010 ELECTROCARDIOGRAM REPORT: CPT | Performed by: STUDENT IN AN ORGANIZED HEALTH CARE EDUCATION/TRAINING PROGRAM

## 2024-03-31 PROCEDURE — 700105 HCHG RX REV CODE 258: Performed by: STUDENT IN AN ORGANIZED HEALTH CARE EDUCATION/TRAINING PROGRAM

## 2024-03-31 PROCEDURE — 700111 HCHG RX REV CODE 636 W/ 250 OVERRIDE (IP): Mod: JZ | Performed by: EMERGENCY MEDICINE

## 2024-03-31 PROCEDURE — 85014 HEMATOCRIT: CPT | Mod: 91

## 2024-03-31 PROCEDURE — 83735 ASSAY OF MAGNESIUM: CPT

## 2024-03-31 PROCEDURE — 700105 HCHG RX REV CODE 258: Performed by: EMERGENCY MEDICINE

## 2024-03-31 PROCEDURE — 99292 CRITICAL CARE ADDL 30 MIN: CPT | Mod: 25 | Performed by: EMERGENCY MEDICINE

## 2024-03-31 RX ORDER — FUROSEMIDE 10 MG/ML
100 INJECTION INTRAMUSCULAR; INTRAVENOUS ONCE
Status: COMPLETED | OUTPATIENT
Start: 2024-03-31 | End: 2024-03-31

## 2024-03-31 RX ORDER — ONDANSETRON 2 MG/ML
4 INJECTION INTRAMUSCULAR; INTRAVENOUS ONCE
Status: COMPLETED | OUTPATIENT
Start: 2024-03-31 | End: 2024-03-31

## 2024-03-31 RX ORDER — DOBUTAMINE HYDROCHLORIDE 100 MG/100ML
5 INJECTION INTRAVENOUS CONTINUOUS
Status: DISCONTINUED | OUTPATIENT
Start: 2024-03-31 | End: 2024-04-02

## 2024-03-31 RX ORDER — SODIUM CHLORIDE, SODIUM LACTATE, POTASSIUM CHLORIDE, AND CALCIUM CHLORIDE .6; .31; .03; .02 G/100ML; G/100ML; G/100ML; G/100ML
500 INJECTION, SOLUTION INTRAVENOUS ONCE
Status: COMPLETED | OUTPATIENT
Start: 2024-03-31 | End: 2024-03-31

## 2024-03-31 RX ORDER — ONDANSETRON 2 MG/ML
4 INJECTION INTRAMUSCULAR; INTRAVENOUS EVERY 4 HOURS PRN
Status: DISCONTINUED | OUTPATIENT
Start: 2024-03-31 | End: 2024-04-05

## 2024-03-31 RX ORDER — DEXMEDETOMIDINE HYDROCHLORIDE 4 UG/ML
.1-1.5 INJECTION, SOLUTION INTRAVENOUS CONTINUOUS
Status: DISCONTINUED | OUTPATIENT
Start: 2024-03-31 | End: 2024-04-03

## 2024-03-31 RX ORDER — NOREPINEPHRINE BITARTRATE 0.03 MG/ML
0-1 INJECTION, SOLUTION INTRAVENOUS CONTINUOUS
Status: DISCONTINUED | OUTPATIENT
Start: 2024-03-31 | End: 2024-04-03

## 2024-03-31 RX ORDER — DOBUTAMINE HYDROCHLORIDE 100 MG/100ML
INJECTION INTRAVENOUS
Status: COMPLETED
Start: 2024-03-31 | End: 2024-03-31

## 2024-03-31 RX ORDER — IPRATROPIUM BROMIDE AND ALBUTEROL SULFATE 2.5; .5 MG/3ML; MG/3ML
3 SOLUTION RESPIRATORY (INHALATION)
Status: DISCONTINUED | OUTPATIENT
Start: 2024-03-31 | End: 2024-04-06

## 2024-03-31 RX ORDER — FUROSEMIDE 10 MG/ML
80 INJECTION INTRAMUSCULAR; INTRAVENOUS
Status: DISCONTINUED | OUTPATIENT
Start: 2024-03-31 | End: 2024-04-02

## 2024-03-31 RX ADMIN — SODIUM CHLORIDE, POTASSIUM CHLORIDE, SODIUM LACTATE AND CALCIUM CHLORIDE 500 ML: 600; 310; 30; 20 INJECTION, SOLUTION INTRAVENOUS at 16:14

## 2024-03-31 RX ADMIN — FUROSEMIDE 80 MG: 10 INJECTION, SOLUTION INTRAMUSCULAR; INTRAVENOUS at 10:40

## 2024-03-31 RX ADMIN — DOBUTAMINE HYDROCHLORIDE 5 MCG/KG/MIN: 100 INJECTION INTRAVENOUS at 10:40

## 2024-03-31 RX ADMIN — LEVOTHYROXINE SODIUM 125 MCG: 0.12 TABLET ORAL at 05:48

## 2024-03-31 RX ADMIN — FUROSEMIDE 80 MG: 10 INJECTION, SOLUTION INTRAMUSCULAR; INTRAVENOUS at 05:47

## 2024-03-31 RX ADMIN — DOBUTAMINE HYDROCHLORIDE 250000 MCG: 100 INJECTION INTRAVENOUS at 01:40

## 2024-03-31 RX ADMIN — FUROSEMIDE 80 MG: 10 INJECTION, SOLUTION INTRAMUSCULAR; INTRAVENOUS at 16:16

## 2024-03-31 RX ADMIN — FUROSEMIDE 100 MG: 10 INJECTION INTRAMUSCULAR; INTRAVENOUS at 02:00

## 2024-03-31 RX ADMIN — DOBUTAMINE HYDROCHLORIDE 5 MCG/KG/MIN: 100 INJECTION INTRAVENOUS at 02:12

## 2024-03-31 RX ADMIN — ONDANSETRON 4 MG: 2 INJECTION INTRAMUSCULAR; INTRAVENOUS at 01:38

## 2024-03-31 RX ADMIN — SODIUM CHLORIDE, POTASSIUM CHLORIDE, SODIUM LACTATE AND CALCIUM CHLORIDE 500 ML: 600; 310; 30; 20 INJECTION, SOLUTION INTRAVENOUS at 14:20

## 2024-03-31 RX ADMIN — PANTOPRAZOLE SODIUM 40 MG: 40 INJECTION, POWDER, LYOPHILIZED, FOR SOLUTION INTRAVENOUS at 05:47

## 2024-03-31 RX ADMIN — HYDROCORTISONE SODIUM SUCCINATE 100 MG: 100 INJECTION, POWDER, FOR SOLUTION INTRAMUSCULAR; INTRAVENOUS at 17:32

## 2024-03-31 RX ADMIN — AMIODARONE HYDROCHLORIDE 200 MG: 200 TABLET ORAL at 05:48

## 2024-03-31 RX ADMIN — DEXMEDETOMIDINE HYDROCHLORIDE 0.4 MCG/KG/HR: 100 INJECTION, SOLUTION INTRAVENOUS at 16:20

## 2024-03-31 RX ADMIN — SODIUM CHLORIDE, POTASSIUM CHLORIDE, SODIUM LACTATE AND CALCIUM CHLORIDE 500 ML: 600; 310; 30; 20 INJECTION, SOLUTION INTRAVENOUS at 13:06

## 2024-03-31 RX ADMIN — PANTOPRAZOLE SODIUM 40 MG: 40 INJECTION, POWDER, LYOPHILIZED, FOR SOLUTION INTRAVENOUS at 17:32

## 2024-03-31 RX ADMIN — ATORVASTATIN CALCIUM 10 MG: 10 TABLET, FILM COATED ORAL at 20:14

## 2024-03-31 RX ADMIN — DEXMEDETOMIDINE HYDROCHLORIDE 0.2 MCG/KG/HR: 100 INJECTION, SOLUTION INTRAVENOUS at 02:52

## 2024-03-31 RX ADMIN — NOREPINEPHRINE BITARTRATE 0.1 MCG/KG/MIN: 1 INJECTION, SOLUTION, CONCENTRATE INTRAVENOUS at 04:38

## 2024-03-31 ASSESSMENT — PULMONARY FUNCTION TESTS
EPAP_CMH2O: 5
EPAP_CMH2O: 5
EPAP_CMH2O: 8

## 2024-03-31 ASSESSMENT — PAIN DESCRIPTION - PAIN TYPE
TYPE: CHRONIC PAIN
TYPE: CHRONIC PAIN
TYPE: ACUTE PAIN
TYPE: CHRONIC PAIN
TYPE: ACUTE PAIN

## 2024-03-31 ASSESSMENT — FIBROSIS 4 INDEX: FIB4 SCORE: 2.35

## 2024-03-31 NOTE — ASSESSMENT & PLAN NOTE
Initial concern for low output state with congestion, lasix,  and NE  CXR with edema and effusions    Lung US with large effusions bilaterally, will likely need these drained to improve shunt physiology if within GOC  Patient told me she did not want thora or pigtails    Gentle diruesis, difficult given physiology and low right sided filling pressures iso hypotension ? Vasoplegia  Cortisol 28, will repeat in AM    Monitor urine output, lactate, skin temperature and mottling    Hold the metoprolol and other GDMT  BiPAP

## 2024-03-31 NOTE — PROGRESS NOTES
Monitor Summary  Rhythm: SB  Rate: 48 - 57  Ectopy: Rare PVC  Measurement:  .20 / .15 / .52

## 2024-03-31 NOTE — ASSESSMENT & PLAN NOTE
Slow down diuresis today given hypotension and low right atrial pressure    Gentle spot diuresis  Wean dobutamine  Trend lactate/urine output  Hold GDMT as she is now in shock

## 2024-03-31 NOTE — CARE PLAN
Problem: Knowledge Deficit - Standard  Goal: Patient and family/care givers will demonstrate understanding of plan of care, disease process/condition, diagnostic tests and medications  Outcome: Progressing     Problem: Pain - Standard  Goal: Alleviation of pain or a reduction in pain to the patient’s comfort goal  Outcome: Progressing   The patient is Watcher - Medium risk of patient condition declining or worsening    Shift Goals  Clinical Goals: Improved respiratory status, MAP >65  Patient Goals: Sleep  Family Goals: DEE DEE

## 2024-03-31 NOTE — PROGRESS NOTES
Pt complaints of SOB and difficulty catching her breath. Pt appears to have increased work of breathing. Pt on 4L nasal cannula with SpO2 ranging from 90-94. Lungs sounds crackled in the bases. ATILIO Baez notified. Orders received for CXR, duonebs, and labs.     0052: Attempted to call RT with no response. Messaged RT to come to bedside.     0012: Bed alarm sounding. ESTEFANY Tejeda in room along with this RN. Pt appeared mottled, diaphoretic, restless and anxious.     0014: Rapid response called.     0147: Transfer orders to T909. Report given to ESTEFANY Spencer.

## 2024-03-31 NOTE — ASSESSMENT & PLAN NOTE
Possible med effect from beta blockade, typically on metoprolol    Sinus  TSH WNL  K normal    No evidence of heart block  Continue NE for beta and for MAP

## 2024-03-31 NOTE — PROCEDURES
Central Line Insertion    Date/Time: 3/31/2024 2:25 AM    Performed by: Janelle Garcia  Authorized by: Janelle Garcia    Consent:     Consent obtained:  Emergent situation and verbal    Consent given by:  Patient    Risks discussed:  Arterial puncture, incorrect placement, nerve damage, pneumothorax, infection and bleeding    Alternatives discussed:  No treatment and delayed treatment  Universal protocol:     Procedure explained and questions answered to patient or proxy's satisfaction: yes      Relevant documents present and verified: yes      Test results available and properly labeled: yes      Imaging studies available: yes      Required blood products, implants, devices, and special equipment available: yes      Site/side marked: yes      Immediately prior to procedure, a time out was called: yes      Patient identity confirmed:  Arm band and hospital-assigned identification number  Pre-procedure details:     Hand hygiene: Hand hygiene performed prior to insertion      Sterile barrier technique: All elements of maximal sterile technique followed      Skin preparation:  ChloraPrep    Skin preparation agent: Skin preparation agent completely dried prior to procedure    Sedation:     Sedation type:  None  Anesthesia:     Anesthesia method:  Local infiltration    Local anesthetic:  Lidocaine 1% w/o epi  Procedure details:     Location:  R internal jugular    Patient position:  Flat    Procedural supplies:  Triple lumen    Catheter size:  7 Fr    Landmarks identified: yes      Ultrasound guidance: yes      Sterile ultrasound techniques: Sterile gel and sterile probe covers were used      Number of attempts:  1    Successful placement: yes    Post-procedure details:     Post-procedure:  Dressing applied and line sutured    Guidewire: guidewire removal confirmed      Assessment:  Blood return through all ports, placement verified by x-ray, no pneumothorax on x-ray and free fluid flow    Patient tolerance of  procedure:  Tolerated well, no immediate complications  Comments:      Patient c/o pain w/ Lidocaine injection and CVC insertion but tolerated the procedure well overall. The wire is accounted for and the line is ok to use.

## 2024-03-31 NOTE — CONSULTS
Critical Care History & Physical      Date of consult: 03/31/24    Referring Physician  Noé Loyd M.D.    Reason for Consultation  Chief Complaint   Patient presents with    Chest Pain     Right sided        History of Presenting Illness  89 y.o. female with a history of HFrEF (LVEF 20-25%, normal RV size but reduced function, moderate MR/TR), CAD (s/p CABG), HTN, HLD, chronic atrial fibrillation on Eliquis and Plavix, and prior perforated gastric ulcer who was admitted from [place] with chest pain and shortness of breath and found to have drop in hemoglobin of 1 point as well as heme positive stools.  Her Eliquis and Plavix were held and she was evaluated by GI who is planning on observation over the weekend given small drop in hemoglobin and no evidence of hemorrhage.      ICU was consulted early morning on 3/31 for acute hypoxic respiratory failure, cyanosis and tripoding.  CXR with pulmonary edema and small bilateral pleural effusions.  She is DNR/DNI.  Will move to ICU for dobutamine, lasix and BiPAP.  Of note, she was just admitted from 3/26 - 3/28 for abdominal pain, CTA done at the time without abnormalities, it was thought to be secondary to constipation.    Code Status  DNAR/DNI    Review of Systems  Review of Systems   Unable to perform ROS: Critical illness       Past Medical History   has a past medical history of Acute blood loss anemia (10/21/2021), Acute on chronic systolic heart failure (HCC) (8/24/2020), Acute perforated gastric ulcer with hemorrhage ([facility]) (10/21/2021), Anemia (2/14/2022), Angina decubitus (6/18/2010), CAD (coronary artery disease), Cannabis use, unspecified with intoxication, uncomplicated ([facility]) (2/14/2022), Fibromyalgia, Generalized abdominal pain (12/5/2021), Heart attack ([facility]) (2008), Hypotension due to medication (7/18/2022), Intractable nausea and vomiting (12/4/2021), and Pain in the chest (11/15/2020).    Surgical History   has a past surgical history that includes  "coronary artery bypas, 4 (2008) and coronary artery bypass, 1 (2008).    Family History  Reviewed and not pertinent    Social History   reports that she has quit smoking. She has never used smokeless tobacco. She reports that she does not currently use drugs. She reports that she does not drink alcohol.    Medications  Home Medications       Reviewed by Oswaldo Murphy (Pharmacy Tech) on 03/30/24 at 0310  Med List Status: Complete     Medication Last Dose Status   acetaminophen (TYLENOL) 325 MG Tab UNK Active   amiodarone (CORDARONE) 200 MG Tab UNK Active   apixaban (ELIQUIS) 5mg Tab UNK Active   atorvastatin (LIPITOR) 10 MG Tab UNK Active   clopidogrel (PLAVIX) 75 MG Tab UNK Active   DULoxetine (CYMBALTA) 60 MG Cap DR Particles delayed-release capsule UNK Active   estradiol (ESTRACE) 1 MG Tab UNK Active   furosemide (LASIX) 20 MG Tab UNK Active   gabapentin (NEURONTIN) 600 MG tablet UNK Active   levothyroxine (SYNTHROID) 125 MCG Tab UNK Active   loratadine (CLARITIN) 10 MG Tab UNK Active   metoprolol SR (TOPROL XL) 25 MG TABLET SR 24 HR UNK Active   omeprazole (PRILOSEC) 20 MG delayed-release capsule UNK Active   oxyCODONE immediate-release (ROXICODONE) 5 MG Tab UNK Active   polyethylene glycol/lytes (MIRALAX) Pack UNK Active   senna-docusate (PERICOLACE OR SENOKOT S) 8.6-50 MG Tab UNK Active   sucralfate (CARAFATE) 1 GM Tab UNK Active                    Allergies  Allergies   Allergen Reactions    Latex Unspecified          Tramadol Rash     Itchy red in nature    Codeine Unspecified     Unknown reaction      Lisinopril Unspecified     Unknown reaction    Penicillin G Unspecified     Unknown reaction  Tolerated Augmentin 3/2024      Pregabalin Unspecified     Lyrica affects patients vision.    Simvastatin Unspecified     Unknown reaction      Sulfa Drugs Unspecified     Patient states \"I can't remember what this does to me\" but recalls and allergy.          Vital Signs last 24 hours  Temp:  [34.8 °C (94.6 " °F)-37.9 °C (100.2 °F)] 37.9 °C (100.2 °F)  Pulse:  [48-92] 88  Resp:  [15-27] 26  BP: ()/(46-91) 152/68  SpO2:  [91 %-99 %] 91 %      Physical Exam  Physical Exam  Vitals and nursing note reviewed. Exam conducted with a chaperone present.   Constitutional:       General: She is awake. She is in acute distress.      Appearance: She is ill-appearing and diaphoretic.   HENT:      Head: Normocephalic.      Mouth/Throat:      Mouth: Mucous membranes are moist.   Eyes:      Pupils: Pupils are equal, round, and reactive to light.   Cardiovascular:      Rate and Rhythm: Regular rhythm. Tachycardia present.      Pulses: Normal pulses.   Pulmonary:      Effort: Tachypnea, accessory muscle usage and respiratory distress present.      Breath sounds: Rhonchi present.   Abdominal:      General: There is no distension.      Palpations: Abdomen is soft.      Tenderness: There is abdominal tenderness. There is no guarding or rebound.   Musculoskeletal:         General: Normal range of motion.      Cervical back: Normal range of motion and neck supple.      Right lower leg: No edema.      Left lower leg: No edema.   Skin:     General: Skin is cool.      Capillary Refill: Capillary refill takes more than 3 seconds.      Coloration: Skin is mottled (Significant mottling from feet to abdomen).   Neurological:      General: No focal deficit present.           Fluids    Intake/Output Summary (Last 24 hours) at 3/31/2024 0204  Last data filed at 3/30/2024 1945  Gross per 24 hour   Intake 200 ml   Output --   Net 200 ml         Laboratory  Recent Results (from the past 48 hour(s))   CBC with Differential    Collection Time: 03/30/24  1:35 AM   Result Value Ref Range    WBC 5.4 4.8 - 10.8 K/uL    RBC 3.25 (L) 4.20 - 5.40 M/uL    Hemoglobin 8.5 (L) 12.0 - 16.0 g/dL    Hematocrit 26.9 (L) 37.0 - 47.0 %    MCV 82.8 81.4 - 97.8 fL    MCH 26.2 (L) 27.0 - 33.0 pg    MCHC 31.6 (L) 32.2 - 35.5 g/dL    RDW 56.9 (H) 35.9 - 50.0 fL    Platelet  Count 208 164 - 446 K/uL    MPV 10.7 9.0 - 12.9 fL    Neutrophils-Polys 53.00 44.00 - 72.00 %    Lymphocytes 30.00 22.00 - 41.00 %    Monocytes 12.50 0.00 - 13.40 %    Eosinophils 3.40 0.00 - 6.90 %    Basophils 0.70 0.00 - 1.80 %    Immature Granulocytes 0.40 0.00 - 0.90 %    Nucleated RBC 0.00 0.00 - 0.20 /100 WBC    Neutrophils (Absolute) 2.85 1.82 - 7.42 K/uL    Lymphs (Absolute) 1.61 1.00 - 4.80 K/uL    Monos (Absolute) 0.67 0.00 - 0.85 K/uL    Eos (Absolute) 0.18 0.00 - 0.51 K/uL    Baso (Absolute) 0.04 0.00 - 0.12 K/uL    Immature Granulocytes (abs) 0.02 0.00 - 0.11 K/uL    NRBC (Absolute) 0.00 K/uL   Complete Metabolic Panel (CMP)    Collection Time: 24  1:35 AM   Result Value Ref Range    Sodium 139 135 - 145 mmol/L    Potassium 4.4 3.6 - 5.5 mmol/L    Chloride 104 96 - 112 mmol/L    Co2 25 20 - 33 mmol/L    Anion Gap 10.0 7.0 - 16.0    Glucose 90 65 - 99 mg/dL    Bun 29 (H) 8 - 22 mg/dL    Creatinine 1.18 0.50 - 1.40 mg/dL    Calcium 8.4 (L) 8.5 - 10.5 mg/dL    Correct Calcium 9.0 8.5 - 10.5 mg/dL    AST(SGOT) 22 12 - 45 U/L    ALT(SGPT) 9 2 - 50 U/L    Alkaline Phosphatase 78 30 - 99 U/L    Total Bilirubin 0.3 0.1 - 1.5 mg/dL    Albumin 3.2 3.2 - 4.9 g/dL    Total Protein 5.8 (L) 6.0 - 8.2 g/dL    Globulin 2.6 1.9 - 3.5 g/dL    A-G Ratio 1.2 g/dL   Troponins NOW    Collection Time: 24  1:35 AM   Result Value Ref Range    Troponin T 24 (H) 6 - 19 ng/L   ESTIMATED GFR    Collection Time: 24  1:35 AM   Result Value Ref Range    GFR (CKD-EPI) 45 (A) >60 mL/min/1.73 m 2   EKG    Collection Time: 24  2:01 AM   Result Value Ref Range    Report       [facility] Emergency Dept.    Test Date:  2024  Pt Name:    JUNIOR CHIU            Department: ER  MRN:        [id number]                      Room:        11  Gender:     Female                       Technician: [id number]  :        [id number]                   Requested By:ER TRIAGE PROTOCOL  Order #:     [id number]                    Reading MD:    Measurements  Intervals                                Axis  Rate:       50                           P:          4  HI:         232                          QRS:        -45  QRSD:       152                          T:          123  QT:         526  QTc:        480    Interpretive Statements  Sinus bradycardia  Prolonged HI interval  Left bundle branch block  Compared to ECG 03/27/2024 07:47:53  Sinus rhythm no longer present     COD (ADULT)    Collection Time: 03/30/24  2:30 AM   Result Value Ref Range    ABO Grouping Only A     Rh Grouping Only POS     Antibody Screen-Cod NEG    Troponins in two (2) hours    Collection Time: 03/30/24  3:22 AM   Result Value Ref Range    Troponin T 22 (H) 6 - 19 ng/L   PROCALCITONIN    Collection Time: 03/30/24  3:22 AM   Result Value Ref Range    Procalcitonin 0.07 <0.25 ng/mL   CoV-2, Flu A/B, And RSV by PCR ([employer])    Collection Time: 03/30/24  3:23 AM    Specimen: Respirate   Result Value Ref Range    Influenza virus A RNA Negative Negative    Influenza virus B, PCR Negative Negative    RSV, PCR Negative Negative    SARS-CoV-2 by PCR NotDetected     SARS-CoV-2 Source NP Swab    APTT    Collection Time: 03/30/24  4:32 AM   Result Value Ref Range    APTT 48.3 (H) 24.7 - 36.0 sec   Prothrombin Time    Collection Time: 03/30/24  4:32 AM   Result Value Ref Range    PT 18.4 (H) 12.0 - 14.6 sec    INR 1.51 (H) 0.87 - 1.13   LACTIC ACID    Collection Time: 03/30/24  4:32 AM   Result Value Ref Range    Lactic Acid 1.0 0.5 - 2.0 mmol/L   HEMOGLOBIN AND HEMATOCRIT    Collection Time: 03/30/24  9:55 AM   Result Value Ref Range    Hemoglobin 9.8 (L) 12.0 - 16.0 g/dL    Hematocrit 32.3 (L) 37.0 - 47.0 %   HEMOGLOBIN AND HEMATOCRIT    Collection Time: 03/30/24  4:41 PM   Result Value Ref Range    Hemoglobin 10.4 (L) 12.0 - 16.0 g/dL    Hematocrit 33.2 (L) 37.0 - 47.0 %   POCT glucose device results    Collection Time: 03/31/24  1:17 AM   Result  Value Ref Range    POC Glucose, Blood 116 (H) 65 - 99 mg/dL   EKG    Collection Time: 24  1:23 AM   Result Value Ref Range    Report       [facility]    Test Date:  2024  Pt Name:    JUNIOR CHIU            Department: 183  MRN:        [id number]                      Room:       22  Gender:     Female                       Technician: SAKINA  :        [id number]                   Requested By:LC PALACIOS  Order #:    [id number]                    Reading MD:    Measurements  Intervals                                Axis  Rate:       81                           P:          43  NH:         193                          QRS:        -47  QRSD:       156                          T:          117  QT:         405  QTc:        470    Interpretive Statements  Sinus rhythm  Left bundle branch block  Compared to ECG 2024 02:01:51  Sinus bradycardia no longer present  First degree AV block no longer present     ABG - LAB    Collection Time: 24  1:45 AM   Result Value Ref Range    Body Temp 37.9 Centigrade    O2 Therapy 50L          Imaging  DX-CHEST-PORTABLE (1 VIEW)   Final Result         1.  There is some interval increase in poorly defined opacifications in each lung including the lower lungs.      2.  Left pleural effusion is again identified.      DX-CHEST-PORTABLE (1 VIEW)   Final Result         1.  Hazy bilateral pulmonary infiltrates, similar to prior study.   2.  Small bilateral pleural effusions, stable   3.  Cardiomegaly            Assessment/Plan  * Acute respiratory failure with hypoxia (HCC)- (present on admission)  Assessment & Plan  Concern for heart failure exacerbation, likely progressing into cardiogenic shock  Lasix 100 mg emergently, followed by aggressive diuresis  Dobutamine at fixed rate  Monitor urine output, lactate, skin temperature and mottling    Hold the metoprolol and other GDMT  BiPAP    Cardiogenic shock (HCC)  Assessment & Plan  Aggressive  diuresis  Dobutamine  BiPAP  Hold GDMT  I am actively titrating vasopressors for MAP >65     Acute blood loss anemia  Assessment & Plan  Mild  Trend H/H  Cautious transfusion given cardiogenic shock and heart failure exacerbation    GI bleed- (present on admission)  Assessment & Plan  No real evidece of this yet, 1pt Hgb drop is minimal and hemoccult positivity does not necessarily point to GIB  - agree to hold NOAC + plavix (stent is old)  - PPI BID  - H/H q8  - GI following    NPO  TEG pending    AF (atrial fibrillation) ([facility])- (present on admission)  Assessment & Plan  Rate control as able  Digoxin would be an option  Hold metoprolol given cardiogenic shock  Hold anticoagulation given potential GI bleeding    Acute on chronic combined systolic and diastolic congestive heart failure ([facility])- (present on admission)  Assessment & Plan  Aggressive diuresis  Dobutamine  Trend lactate/urine output  Hold GDMT as she is now in cardiogenic shock    Bilateral pleural effusion- (present on admission)  Assessment & Plan  Not significantly large enough to cause her symptoms, respiratory distress likely caused by cardiogenic shock and heart failure    Aggressive diuresis    Stage 3b chronic kidney disease (CKD) ([facility])- (present on admission)  Assessment & Plan  Strict I/Os  Renally dosed medications  Avoid nephrotoxic agents as able  Trend    Hx of CABG- (present on admission)  Assessment & Plan  Hold Plavix for the moment  Hold beta-blocker for cardiogenic shock        DVT prophylaxis: Contraindicated  PUD prophylaxis: PPI twice daily  Glycemic control: SSI if needed  Nutrition: NPO  Lines: None  Rizo: We will place for accurate I/O    Discussed patient condition and risk of morbidity and/or mortality with Hospitalist, RN, RT, Pharmacy, Code status disscussed, Charge nurse / hot rounds, and Patient.      The patient remains critically ill.  Critical care time = 71 minutes in directly providing and coordinating critical care and  extensive data review.  No time overlap and excludes procedures.

## 2024-03-31 NOTE — PROGRESS NOTES
Attempted to call only contact in pt's chart, Sana Reyes, to see who is pt's next of kin. No answer, but Sana's voicemail stated that she is with [facility]. Will attempt to call again this afternoon.

## 2024-03-31 NOTE — ASSESSMENT & PLAN NOTE
Fairly large on ultrasound, however seems to have enough FRC for oxygenation  Would likely benefit from thoras but patient tells me she doesn't want ant more procedures    Not tolerant of aggressive diuresis given hemodynamics  Can gently diurese but may need vasopressors to support

## 2024-03-31 NOTE — CARE PLAN
The patient is Stable - Low risk of patient condition declining or worsening    Shift Goals  Clinical Goals: monitor VS, monitor labs, NPO  Patient Goals: rest  Family Goals: DEED EE    Progress made toward(s) clinical / shift goals:  \    Problem: Knowledge Deficit - Standard  Goal: Patient and family/care givers will demonstrate understanding of plan of care, disease process/condition, diagnostic tests and medications  Description: Target End Date:  1-3 days or as soon as patient condition allows    Document in Patient Education    1.  Patient and family/caregiver oriented to unit, equipment, visitation policy and means for communicating concern  2.  Complete/review Learning Assessment  3.  Assess knowledge level of disease process/condition, treatment plan, diagnostic tests and medications  4.  Explain disease process/condition, treatment plan, diagnostic tests and medications  Outcome: Progressing     Problem: Fall Risk  Goal: Patient will remain free from falls  Description: Target End Date:  Prior to discharge or change in level of care    Document interventions on the Reny Wagner Fall Risk Assessment    1.  Assess for fall risk factors  2.  Implement fall precautions  Outcome: Progressing     Problem: Skin Integrity  Goal: Skin integrity is maintained or improved  Description: Target End Date:  Prior to discharge or change in level of care    Document interventions on Skin Risk/Regan flowsheet groups and corresponding LDA    1.  Assess and monitor skin integrity, appearance and/or temperature  2.  Assess risk factors for impaired skin integrity and/or pressures ulcers  3.  Implement precautions to protect skin integrity in collaboration with interdisciplinary team  4.  Implement pressure ulcer prevention protocol if at risk for skin breakdown  5.  Confirm wound care consult if at risk for skin breakdown  6.  Ensure patient use of pressure relieving devices  (Low air loss bed, waffle overlay, heel protectors,  SANTOSHO cushion, etc)  Outcome: Progressing

## 2024-03-31 NOTE — ASSESSMENT & PLAN NOTE
Hold Plavix iso og potential GI losses of blood  Trend HB    Hold beta-blocker for shock and bradycardia

## 2024-03-31 NOTE — CARE PLAN
Problem: Aerosol Therapy  Goal: Improved hydration/ability to mobilize secretions and/or decreased airway edema  Description: Target End Date:  resolve prior to discharge or when underlying condition is resolved/stabilized    1.  Implement heated or cool aerosol therapy  2.  Assessed for optimal hydration, decreased edema and/or improved ability to mobilize secretions  Outcome: Not Progressing    PT on 35L 50% HHFNC

## 2024-03-31 NOTE — ASSESSMENT & PLAN NOTE
Unclear etiology  Hb has been stable  Lactate cleared with diuresis and     Physiology at this point seems vasoplegic with high VTI and low right sided filling pressures  Cortisol 28 overnight but that was in a crisis    Hypotension vasoplegia, warm and relatively wide PP, and relative bradycardia with limited differential (adrenal, thryroid, drug, beta block, hyperK, opiods etc)    No apparent infeciton  Bcxs and PCT sent  Im going to give a trial dose of hydrocort to see if it has an effect on her physiology can continue tomorrow if felt to be useful

## 2024-03-31 NOTE — PROGRESS NOTES
Bedside report received from off going RN/tech: Rika, assumed care of patient.     Fall Risk Score: HIGH RISK  Fall risk interventions in place: Place yellow fall risk ID band on patient, Provide patient/family education based on risk assessment, Educate patient/family to call staff for assistance when getting out of bed, Place fall precaution signage outside patient door, Place patient in room close to nursing station, Utilize bed/chair fall alarm, Notify charge of high risk for huddle, and Bed alarm connected correctly  Bed type: Regular (Regan Score less than 17 interventions in place)  Patient on cardiac monitor: Yes  IVF/IV medications: Not Applicable   Oxygen: How many liters 1L  Bedside sitter: Not Applicable   Isolation: Not applicable    1945: Bedside report received from day RN, pt care assumed, assessment completed. Pt is A&Ox3, pain 5/10, SB on the monitor. Updated on POC, questions answered. Bed in lowest, locked position, treaded socks on, call light and belongings within reach. Fall precautions in place.

## 2024-03-31 NOTE — PROGRESS NOTES
0225 APRN Janelle bedside for central line placement.   0230 Time out performed all agree.  0231- procedure started.  0233- Guide wire out, chest x-ray ordered.

## 2024-03-31 NOTE — PROGRESS NOTES
"Critical Care Progress Note    Date of admission  3/30/2024    Chief Complaint  89 y.o. female with a history of HFrEF (LVEF 20-25%, normal RV size but reduced function, moderate MR/TR), CAD (s/p CABG), HTN, HLD, chronic atrial fibrillation on Eliquis and Plavix, and prior perforated gastric ulcer who was admitted from [place] with chest pain and shortness of breath and found to have drop in hemoglobin of 1 point as well as heme positive stools.  Her Eliquis and Plavix were held and she was evaluated by GI who is planning on observation over the weekend given small drop in hemoglobin and no evidence of hemorrhage.       ICU was consulted early morning on 3/31 for acute hypoxic respiratory failure, cyanosis and tripoding.  CXR with pulmonary edema and small bilateral pleural effusions.  She is DNR/DNI.  Will move to ICU for dobutamine, lasix and BiPAP.  Of note, she was just admitted from 3/26 - 3/28 for abdominal pain, CTA done at the time without abnormalities, it was thought to be secondary to constipation.\" H&P    Hospital Course  No notes on file    Interval Problem Update  Reviewed last 24 hour events:    Third admission this month  CP SOB, weakness    Neuro:   Dex0.04  Impulsive restless  Sinus camron    CV:  HR 50-70    SBP    @ 7.5  NE @ 0.08      Resp:   HFNO 50/70%  CXR CM, interstitial edema, effusions  7.27/54/125/97%    GI: BM x6    I/O: +60  UOP:     Tmax: AF  Heme: WBC 17    Abx:   None    Micro:   PCT negative      Endo: BG WTR     LDA: PIVs, CVC, Rizo  SUP: PPI  VTE: HELD  Diet: Liquid      Review of Systems  Review of Systems   Unable to perform ROS: Dementia        Vital Signs for last 24 hours   Temp:  [34.8 °C (94.6 °F)-37.9 °C (100.2 °F)] 37.9 °C (100.2 °F)  Pulse:  [41-92] 46  Resp:  [11-42] 24  BP: ()/(34-91) 85/42  SpO2:  [86 %-99 %] 93 %    Hemodynamic parameters for last 24 hours       Respiratory Information for the last 24 hours       Physical Exam   Physical " Exam  Vitals and nursing note reviewed. Exam conducted with a chaperone present.   Constitutional:       General: She is awake. She is not in acute distress.     Appearance: She is ill-appearing and diaphoretic.   HENT:      Head: Normocephalic.      Mouth/Throat:      Mouth: Mucous membranes are moist.   Eyes:      Pupils: Pupils are equal, round, and reactive to light.   Cardiovascular:      Rate and Rhythm: Regular rhythm. Tachycardia present.      Pulses: Normal pulses.   Pulmonary:      Effort: Tachypnea, accessory muscle usage and respiratory distress present.      Breath sounds: Rhonchi present.   Abdominal:      General: There is no distension.      Palpations: Abdomen is soft.      Tenderness: There is abdominal tenderness. There is no guarding or rebound.   Musculoskeletal:         General: Normal range of motion.      Cervical back: Normal range of motion and neck supple.      Right lower leg: No edema.      Left lower leg: No edema.   Skin:     General: Skin is cool.      Capillary Refill: Capillary refill takes 2 to 3 seconds.      Coloration: Skin is mottled (Significant mottling from feet to abdomen).   Neurological:      General: No focal deficit present.      Comments: Alert and conversant but disoriented  W[place]NN, no defecits           Medications  Current Facility-Administered Medications   Medication Dose Route Frequency Provider Last Rate Last Admin    ipratropium-albuterol (DUONEB) nebulizer solution  3 mL Nebulization Q4H PRN (RT) CRISTOBAL GonzalezPFelicia        Respiratory Therapy Consult   Nebulization Continuous RT Kathleen Aguilar M.D.        DOBUTamine (Dobutrex) 1 mg/1 mL premix infusion  5 mcg/kg/min (Ideal) Intravenous Continuous Hany Aguilar M.D.   Stopped at 03/31/24 1402    MD Alert...ICU Electrolyte Replacement per Pharmacy   Other PHARMACY TO DOSE Noé Loyd M.D.        norepinephrine (Levophed) 8 mg in 250 mL NS infusion (premix)  0-1 mcg/kg/min (Ideal) Intravenous Continuous  Noé Loyd M.D. 2.4 mL/hr at 03/31/24 1601 0.02 mcg/kg/min at 03/31/24 1601    dexmedetomidine (PRECEDEX) 400 mcg/100mL NS premix infusion  0.1-1.5 mcg/kg/hr (Ideal) Intravenous Continuous Noé Loyd M.D. 6.5 mL/hr at 03/31/24 1620 0.4 mcg/kg/hr at 03/31/24 1620    furosemide (Lasix) injection 80 mg  80 mg Intravenous TID DIURETIC Noé Loyd M.D.   80 mg at 03/31/24 1616    ondansetron (Zofran) syringe/vial injection 4 mg  4 mg Intravenous Q4HRS PRN Noé Loyd M.D.        hydrocortisone sodium succinate PF (Solu-CORTEF) 100 MG injection 100 mg  100 mg Intravenous Once Hany Aguilar M.D.        amiodarone (Cordarone) tablet 200 mg  200 mg Oral DAILY Tom Carrera M.D.   200 mg at 03/31/24 0548    atorvastatin (Lipitor) tablet 10 mg  10 mg Oral Nightly Tom Carrera M.D.   10 mg at 03/30/24 2137    levothyroxine (Synthroid) tablet 125 mcg  125 mcg Oral AM ES Tom Carrera M.D.   125 mcg at 03/31/24 0548    [Held by provider] metoprolol SR (Toprol XL) tablet 12.5 mg  12.5 mg Oral Q EVENING Tom Carrera M.D.        pantoprazole (Protonix) injection 40 mg  40 mg Intravenous BID Tom Carrera M.D.   40 mg at 03/31/24 0547    polyethylene glycol/lytes (Miralax) Packet 1 Packet  1 Packet Oral DAILY Kathleen Aguilar M.D.        senna-docusate (Pericolace Or Senokot S) 8.6-50 MG per tablet 2 Tablet  2 Tablet Oral Q EVENING Kathleen Aguilar M.D.        bisacodyl (Dulcolax) suppository 10 mg  10 mg Rectal QDAY PRN Kathleen Aguilar M.D.        oxyCODONE immediate-release (Roxicodone) tablet 5 mg  5 mg Oral Q4HRS PRN Edyta M Rife, D.N.P.   5 mg at 03/30/24 2319       Fluids    Intake/Output Summary (Last 24 hours) at 3/31/2024 1624  Last data filed at 3/31/2024 1600  Gross per 24 hour   Intake 1883.27 ml   Output 2900 ml   Net -1016.73 ml       Laboratory  Recent Labs     03/31/24  0145   [id number] 7.27*   [id number] 54.3*   [id number] 125.9*   [id number] 97.5   ARTHCO3 24   [id number] 50L   ARTBE -3         Recent Labs      03/30/24  0135 03/31/24  0143 03/31/24  0600   SODIUM 139 138 138   POTASSIUM 4.4 4.5 3.9   CHLORIDE 104 101 102   CO2 25 22 25   BUN 29* 24* 25*   CREATININE 1.18 1.08 1.10   MAGNESIUM  --  2.3  --    PHOSPHORUS  --  3.6  --    CALCIUM 8.4* 9.4 8.5     Recent Labs     03/30/24  0135 03/31/24  0143 03/31/24  0600   ALTSGPT 9 13 11   ASTSGOT 22 29 20   ALKPHOSPHAT 78 116* 89   TBILIRUBIN 0.3 0.7 0.6   GLUCOSE 90 177* 137*     Recent Labs     03/30/24 0135 03/31/24  0143 03/31/24  0600   WBC 5.4 17.1* 10.1   NEUTSPOLYS 53.00  --   --    LYMPHOCYTES 30.00  --   --    MONOCYTES 12.50  --   --    EOSINOPHILS 3.40  --   --    BASOPHILS 0.70  --   --    ASTSGOT 22 29 20   ALTSGPT 9 13 11   ALKPHOSPHAT 78 116* 89   TBILIRUBIN 0.3 0.7 0.6     Recent Labs     03/30/24  0135 03/30/24  0432 03/30/24  0955 03/31/24  0143 03/31/24  0600 03/31/24  1310   RBC 3.25*  --   --  4.57 3.62*  --    HEMOGLOBIN 8.5*  --    < > 12.0 9.5* 9.1*   HEMATOCRIT 26.9*  --    < > 39.0 30.7* 28.2*   PLATELETCT 208  --   --  298 212  --    PROTHROMBTM  --  18.4*  --   --   --   --    APTT  --  48.3*  --   --   --   --    INR  --  1.51*  --   --   --   --     < > = values in this interval not displayed.       Imaging  X-Ray:  I have personally reviewed the images and compared with prior images.  EKG:  I have personally reviewed the images and compared with prior images.    Assessment/Plan  * Acute respiratory failure with hypoxia (HCC)- (present on admission)  Assessment & Plan  Initial concern for low output state with congestion, lasix,  and NE  CXR with edema and effusions    Lung US with large effusions bilaterally, will likely need these drained to improve shunt physiology if within GOC  Patient told me she did not want thora or pigtails    Gentle diruesis, difficult given physiology and low right sided filling pressures iso hypotension ? Vasoplegia  Cortisol 28, will repeat in AM    Monitor urine output, lactate, skin temperature and  mottling    Hold the metoprolol and other GDMT  BiPAP    Bradycardia  Assessment & Plan  Possible med effect from beta blockade, typically on metoprolol    Sinus  TSH WNL  K normal    No evidence of heart block  Continue NE for beta and for MAP    Cardiogenic shock ([facility])  Assessment & Plan   and lasix overnight    POCUS suggests adequate/high SV and low RAP at this time    Wean dobut  Give fluid bolus and wean NE as tolerated    BiPAP PRN and Noc  Hold GDMT  I am actively titrating vasopressors for MAP >65     Acute blood loss anemia  Assessment & Plan  Mild  Trend H/H  Cautious transfusion given cardiogenic shock and heart failure exacerbation    GI bleed- (present on admission)  Assessment & Plan  No real evidece of this yet, Hb remains stable  Physiology remains hyperdynamic with low RAP pressure so blood loss possible  Trend Hb    Agree to hold NOAC + plavix (stent is old) for now, restart as soon as feasible  PPI BID  H/H q8  GI following, no scope    NPO    AF (atrial fibrillation) ([facility])- (present on admission)  Assessment & Plan  Rate controlled    Hold metoprolol given shock  Hold anticoagulation given potential GI bleeding    Stage 3b chronic kidney disease (CKD) ([facility])- (present on admission)  Assessment & Plan  Strict I/Os  Renally dosed medications  Avoid nephrotoxic agents as able  Trend    Acute on chronic combined systolic and diastolic congestive heart failure (HCC)- (present on admission)  Assessment & Plan  Slow down diuresis today given hypotension and low right atrial pressure    Gentle spot diuresis  Wean dobutamine  Trend lactate/urine output  Hold GDMT as she is now in shock    Hypotension- (present on admission)  Assessment & Plan  Unclear etiology  Hb has been stable  Lactate cleared with diuresis and     Physiology at this point seems vasoplegic with high VTI and low right sided filling pressures  Cortisol 28 overnight but that was in a crisis    Hypotension vasoplegia, warm and  relatively wide PP, and relative bradycardia with limited differential (adrenal, thryroid, drug, beta block, hyperK, opiods etc)    No apparent infeciton  Bcxs and PCT sent  Im going to give a trial dose of hydrocort to see if it has an effect on her physiology can continue tomorrow if felt to be useful    Bilateral pleural effusion- (present on admission)  Assessment & Plan  Fairly large on ultrasound, however seems to have enough FRC for oxygenation  Would likely benefit from thoras but patient tells me she doesn't want ant more procedures    Not tolerant of aggressive diuresis given hemodynamics  Can gently diurese but may need vasopressors to support    Hx of CABG- (present on admission)  Assessment & Plan  Hold Plavix iso og potential GI losses of blood  Trend HB    Hold beta-blocker for shock and bradycardia         I have performed a physical exam and reviewed and updated ROS and Plan today (3/31/2024). In review of yesterday's note (3/30/2024), there are no changes except as documented above.     Discussed patient condition and risk of morbidity and/or mortality with RN, RT, Therapies, Pharmacy, Dietary, and Patient  The patient remains critically ill.  Critical care time = 85 minutes in directly providing and coordinating critical care and extensive data review.  No time overlap and excludes procedures.

## 2024-03-31 NOTE — PROGRESS NOTES
She looks much more comfortable now.     Has made comments about being too old for this, but is now asleep and I don't want to wake her for further [facility] discussion right now.    Would be worth pursuing conversation regarding goals +/- hospice or comfort care in the morning as it may be more in line with her goals.    Noé Loyd M.D.

## 2024-03-31 NOTE — PROCEDURES
Focused Point of Care Cardiac Ultrasound:    Left Ventricle:  LV volume dilated with mildly reduced systolic function and EF  LV systolic function only mildly depressed    LVOT VTI ~20    Right Ventricle:  RV volume normal.  RV function normal  TR vmax ~2.5 m/s    Pericardium:  No sign effusion     IVC:  Max diameter is midrange with significant, nearly 100%  respirophasic collapse RAP estimated to be approximately 5.    Conclusions:  High VTI, low RAP, will give bolus and reduce dobutamine, is bradycardic

## 2024-03-31 NOTE — ASSESSMENT & PLAN NOTE
No real evidece of this yet, Hb remains stable  Physiology remains hyperdynamic with low RAP pressure so blood loss possible  Trend Hb    Agree to hold NOAC + plavix (stent is old) for now, restart as soon as feasible  PPI BID  H/H q8  GI following, no scope    NPO

## 2024-03-31 NOTE — CARE PLAN
Problem: Pain - Standard  Goal: Alleviation of pain or a reduction in pain to the patient’s comfort goal  Outcome: Progressing  Patient does not complain of pain at this time.      The patient is Unstable - High likelihood or risk of patient condition declining or worsening    Shift Goals  Clinical Goals: Improved respiratory status, MAP >65  Patient Goals: Sleep  Family Goals: DEE DEE    Progress made toward(s) clinical / shift goals:  Progressing    Patient is not progressing towards the following goals: N/A

## 2024-03-31 NOTE — PROGRESS NOTES
..Gastroenterology Progress Note               Author:  Sandie Acosta, DNP,  APRN Date & Time Created: 3/31/2024 8:00 AM       Patient ID:  Name:             Davie Montejo  YOB: 1935  Age:                 89 y.o.  female  MRN:               [id number]        Medical Decision Making, by Problem:  Active Hospital Problems    Diagnosis     Cardiogenic shock (HCC) [R57.0]     GI bleed [K92.2]     Acute blood loss anemia [D62]     Stage 3b chronic kidney disease (CKD) (HCC) [N18.32]     AF (atrial fibrillation) (HCC) [I48.91]     Acute on chronic combined systolic and diastolic congestive heart failure (HCC) [I50.43]     Acute respiratory failure with hypoxia (HCC) [J96.01]     Bilateral pleural effusion [J90]     Hx of CABG [Z95.1]            Presenting Chief Complaint:  Anemia and heme positive stool    History of Present Illness:    This is a very pleasant 89 y.o. female with dementia, atrial fibrillation on apixaban, CAD s/p CABG and EF 35% complaining of chest pain over last month BIB [facility] early this am from [facility].  Also with recent constipation but started stooling again.  Patient unaware of blood in the stool.  She was hypotensive and bradycardic in ER.  On BALBINA she was described to have heme positive stool, unknown color of stool. Hgb 8.5, Hgb 9.8 on 3/28, and Hgb 11 on 3/26.  On apixaban and clodiprogel.  Recently discharged on omeprazole and sucralfate.    Overnight, patient developed acute hypoxic respiratory failure, cyanosis, and tripoding and was transferred to the ICU.  Chest x-ray revealed pulmonary edema and small bilateral pleural effusions.  There is concern for heart failure exacerbation progressing into cardiogenic shock and patient was started on BiPAP, dobutamine and aggressive diuresis.    Interval History:  3/31/2024: Patient seen.  On BiPAP, minimally interactive and mumbles with questioning.  Per RN, no evidence of GI bleeding.  Hemoglobin 12 with BUN 25.   Remains hypotensive      Hospital Medications:  Current Facility-Administered Medications   Medication Dose Frequency Provider Last Rate Last Admin    ipratropium-albuterol (DUONEB) nebulizer solution  3 mL Q4H PRN (RT) Edyta Baez D.N.P.        Respiratory Therapy Consult   Continuous RT Kathleen Aguilar M.D.        DOBUTamine (Dobutrex) 1 mg/1 mL premix infusion  7.5 mcg/kg/min (Ideal) Continuous Noé Loyd M.D. 29.2 mL/hr at 03/31/24 0402 7.5 mcg/kg/min at 03/31/24 0402    MD Alert...ICU Electrolyte Replacement per Pharmacy   PHARMACY TO DOSE Noé Loyd M.D.        norepinephrine (Levophed) 8 mg in 250 mL NS infusion (premix)  0-1 mcg/kg/min (Ideal) Continuous Noé Loyd M.D. 6.1 mL/hr at 03/31/24 0535 0.05 mcg/kg/min at 03/31/24 0535    dexmedetomidine (PRECEDEX) 400 mcg/100mL NS premix infusion  0.1-1.5 mcg/kg/hr (Ideal) Continuous Noé Loyd M.D. 4.9 mL/hr at 03/31/24 0542 0.3 mcg/kg/hr at 03/31/24 0542    furosemide (Lasix) injection 80 mg  80 mg TID DIURETIC Noé Loyd M.D.   80 mg at 03/31/24 0547    ondansetron (Zofran) syringe/vial injection 4 mg  4 mg Q4HRS PRN Noé Loyd M.D.        amiodarone (Cordarone) tablet 200 mg  200 mg DAILY Tom Carrera M.D.   200 mg at 03/31/24 0548    atorvastatin (Lipitor) tablet 10 mg  10 mg Nightly Tom Carrera M.D.   10 mg at 03/30/24 2137    levothyroxine (Synthroid) tablet 125 mcg  125 mcg AM ES Tom Carrera M.D.   125 mcg at 03/31/24 0548    [Held by provider] metoprolol SR (Toprol XL) tablet 12.5 mg  12.5 mg Q EVENING Tom Carrera M.D.        pantoprazole (Protonix) injection 40 mg  40 mg BID Tom Carrera M.D.   40 mg at 03/31/24 0547    polyethylene glycol/lytes (Miralax) Packet 1 Packet  1 Packet DAILY Kathleen Aguilar M.D.        senna-docusate (Pericolace Or Senokot S) 8.6-50 MG per tablet 2 Tablet  2 Tablet Q EVENING Kathleen Aguilar M.D.        bisacodyl (Dulcolax) suppository 10 mg  10 mg QDAY PRN Kathleen Aguilar M.D.        oxyCODONE  "immediate-release (Roxicodone) tablet 5 mg  5 mg Q4HRS PRN OSMANI GonzalezNFeliciaP.   5 mg at 03/30/24 2319   Last reviewed on 3/30/2024  3:10 AM by Shawanda Ashley T       Review of Systems:  Review of Systems   Unable to perform ROS: Medical condition         Vital signs:  Weight/BMI: Body mass index is 22.06 kg/m².  BP 98/48   Pulse (!) 57   Temp 37.9 °C (100.2 °F) (Rectal)   Resp 14   Ht 1.737 m (5' 8.4\")   Wt 66.6 kg (146 lb 13.2 oz)   SpO2 92%   Vitals:    03/31/24 0700 03/31/24 0715 03/31/24 0730 03/31/24 0745   BP: 92/43 94/45 95/46 98/48   Pulse: 61 62 62 (!) 57   Resp: 13 13 14 14   Temp:       TempSrc:       SpO2: 91% 90% 90% 92%   Weight:       Height:         Oxygen Therapy:  Pulse Oximetry: 92 %, O2 (LPM): 35, FiO2%: 55 %, O2 Delivery Device: Heated High Flow Nasal Cannula    Intake/Output Summary (Last 24 hours) at 3/31/2024 0800  Last data filed at 3/31/2024 0600  Gross per 24 hour   Intake 320.67 ml   Output 450 ml   Net -129.33 ml         Physical Exam:  Physical Exam  Vitals and nursing note reviewed.   Constitutional:       General: She is not in acute distress.     Appearance: She is ill-appearing.   HENT:      Head: Normocephalic and atraumatic.      Right Ear: External ear normal.      Left Ear: External ear normal.      Nose: Nose normal.      Mouth/Throat:      Mouth: Mucous membranes are dry.      Pharynx: Oropharynx is clear.   Eyes:      General: No scleral icterus.  Cardiovascular:      Rate and Rhythm: Normal rate and regular rhythm.      Pulses: Normal pulses.      Heart sounds: Normal heart sounds.   Pulmonary:      Effort: Pulmonary effort is normal.      Breath sounds: Rhonchi present.      Comments: On BiPap  Abdominal:      General: Abdomen is flat. Bowel sounds are normal. There is no distension.      Palpations: Abdomen is soft.      Tenderness: There is abdominal tenderness.   Skin:     General: Skin is dry.      Coloration: Skin is pale.   Neurological:      Mental " Status: Mental status is at baseline.             Labs:  Recent Labs     03/30/24 0135 03/31/24 0143 03/31/24  0600   SODIUM 139 138 138   POTASSIUM 4.4 4.5 3.9   CHLORIDE 104 101 102   CO2 25 22 25   BUN 29* 24* 25*   CREATININE 1.18 1.08 1.10   MAGNESIUM  --  2.3  --    PHOSPHORUS  --  3.6  --    CALCIUM 8.4* 9.4 8.5     Recent Labs     03/30/24 0135 03/31/24  0143 03/31/24  0600   ALTSGPT 9 13 11   ASTSGOT 22 29 20   ALKPHOSPHAT 78 116* 89   TBILIRUBIN 0.3 0.7 0.6   GLUCOSE 90 177* 137*     Recent Labs     03/30/24 0135 03/31/24 0143 03/31/24  0600   WBC 5.4 17.1*  --    NEUTSPOLYS 53.00  --   --    LYMPHOCYTES 30.00  --   --    MONOCYTES 12.50  --   --    EOSINOPHILS 3.40  --   --    BASOPHILS 0.70  --   --    ASTSGOT 22 29 20   ALTSGPT 9 13 11   ALKPHOSPHAT 78 116* 89   TBILIRUBIN 0.3 0.7 0.6     Recent Labs     03/30/24 0135 03/30/24  0432 03/30/24  0955 03/30/24  1641 03/31/24  0143   RBC 3.25*  --   --   --  4.57   HEMOGLOBIN 8.5*  --  9.8* 10.4* 12.0   HEMATOCRIT 26.9*  --  32.3* 33.2* 39.0   PLATELETCT 208  --   --   --  298   PROTHROMBTM  --  18.4*  --   --   --    APTT  --  48.3*  --   --   --    INR  --  1.51*  --   --   --      Recent Results (from the past 24 hour(s))   HEMOGLOBIN AND HEMATOCRIT    Collection Time: 03/30/24  9:55 AM   Result Value Ref Range    Hemoglobin 9.8 (L) 12.0 - 16.0 g/dL    Hematocrit 32.3 (L) 37.0 - 47.0 %   HEMOGLOBIN AND HEMATOCRIT    Collection Time: 03/30/24  4:41 PM   Result Value Ref Range    Hemoglobin 10.4 (L) 12.0 - 16.0 g/dL    Hematocrit 33.2 (L) 37.0 - 47.0 %   POCT glucose device results    Collection Time: 03/31/24  1:17 AM   Result Value Ref Range    POC Glucose, Blood 116 (H) 65 - 99 mg/dL   EKG    Collection Time: 03/31/24  1:23 AM   Result Value Ref Range    Report       [facility]    Test Date:  2024-03-31  Pt Name:    JUNOIR KAMARAWELL            Department: 183  MRN:        [id number]                      Room:       T822  Gender:     Female                        Technician: SAKINA  :        [id number]                   Requested By:LC PALACIOS  Order #:    [id number]                    Reading MD:    Measurements  Intervals                                Axis  Rate:       81                           P:          43  KY:         193                          QRS:        -47  QRSD:       156                          T:          117  QT:         405  QTc:        470    Interpretive Statements  Sinus rhythm  Left bundle branch block  Compared to ECG 2024 02:01:51  Sinus bradycardia no longer present  First degree AV block no longer present     CBC WITHOUT DIFFERENTIAL    Collection Time: 24  1:43 AM   Result Value Ref Range    WBC 17.1 (H) 4.8 - 10.8 K/uL    RBC 4.57 4.20 - 5.40 M/uL    Hemoglobin 12.0 12.0 - 16.0 g/dL    Hematocrit 39.0 37.0 - 47.0 %    MCV 85.3 81.4 - 97.8 fL    MCH 26.3 (L) 27.0 - 33.0 pg    MCHC 30.8 (L) 32.2 - 35.5 g/dL    RDW 58.8 (H) 35.9 - 50.0 fL    Platelet Count 298 164 - 446 K/uL    MPV 11.2 9.0 - 12.9 fL   Comp Metabolic Panel (CMP)    Collection Time: 24  1:43 AM   Result Value Ref Range    Sodium 138 135 - 145 mmol/L    Potassium 4.5 3.6 - 5.5 mmol/L    Chloride 101 96 - 112 mmol/L    Co2 22 20 - 33 mmol/L    Anion Gap 15.0 7.0 - 16.0    Glucose 177 (H) 65 - 99 mg/dL    Bun 24 (H) 8 - 22 mg/dL    Creatinine 1.08 0.50 - 1.40 mg/dL    Calcium 9.4 8.5 - 10.5 mg/dL    Correct Calcium 9.4 8.5 - 10.5 mg/dL    AST(SGOT) 29 12 - 45 U/L    ALT(SGPT) 13 2 - 50 U/L    Alkaline Phosphatase 116 (H) 30 - 99 U/L    Total Bilirubin 0.7 0.1 - 1.5 mg/dL    Albumin 4.0 3.2 - 4.9 g/dL    Total Protein 7.4 6.0 - 8.2 g/dL    Globulin 3.4 1.9 - 3.5 g/dL    A-G Ratio 1.2 g/dL   LACTIC ACID    Collection Time: 24  1:43 AM   Result Value Ref Range    Lactic Acid 3.6 (H) 0.5 - 2.0 mmol/L   EXTRA TUBE,GENET    Collection Time: 24  1:43 AM   Result Value Ref Range    Extra Tube, Blue SORTED    ESTIMATED GFR    Collection Time:  03/31/24  1:43 AM   Result Value Ref Range    GFR (CKD-EPI) 50 (A) >60 mL/min/1.73 m 2   MAGNESIUM    Collection Time: 03/31/24  1:43 AM   Result Value Ref Range    Magnesium 2.3 1.5 - 2.5 mg/dL   PHOSPHORUS    Collection Time: 03/31/24  1:43 AM   Result Value Ref Range    Phosphorus 3.6 2.5 - 4.5 mg/dL   ABG - LAB    Collection Time: 03/31/24  1:45 AM   Result Value Ref Range    Ph 7.27 (LL) 7.40 - 7.50    Pco2 54.3 (HH) 26.0 - 37.0 mmHg    Po2 125.9 (H) 64.0 - 87.0 mmHg    O2 Saturation 97.5 93.0 - 99.0 %    Hco3 24 17 - 25 mmol/L    Base Excess -3 -4 - 3 mmol/L    Body Temp 37.9 Centigrade    O2 Therapy 50L     Ph -TC 7.26 (LL) 7.40 - 7.50    Pco2 -TC 56.5 (HH) 26.0 - 37.0 mmHg    Po2 -.6 (H) 64.0 - 87.0 mmHg   CORTISOL    Collection Time: 03/31/24  2:56 AM   Result Value Ref Range    Cortisol 28.4 (H) 0.0 - 23.0 ug/dL   PLATELET MAPPING WITH BASIC TEG    Collection Time: 03/31/24  2:56 AM   Result Value Ref Range    Reaction Time Initial-R 4.6 4.6 - 9.1 min    React Time Initial Hep 3.9 (L) 4.3 - 8.3 min    Clot Kinetics-K 0.8 0.8 - 2.1 min    Clot Angle-Angle 78.1 (H) 63.0 - 78.0 degrees    Maximum Clot Strength-MA 68.1 52.0 - 69.0 mm    TEG Functional Fibrinogen(MA) 33.7 (H) 15.0 - 32.0 mm    Lysis 30 minutes-LY30 0.3 0.0 - 2.6 %    % Inhibition ADP 17.0 0.0 - 17.0 %    % Inhibition AA 58.2 (H) 0.0 - 11.0 %    TEG Algorithm Link Algorithm    LACTIC ACID    Collection Time: 03/31/24  6:00 AM   Result Value Ref Range    Lactic Acid 1.3 0.5 - 2.0 mmol/L   Comp Metabolic Panel    Collection Time: 03/31/24  6:00 AM   Result Value Ref Range    Sodium 138 135 - 145 mmol/L    Potassium 3.9 3.6 - 5.5 mmol/L    Chloride 102 96 - 112 mmol/L    Co2 25 20 - 33 mmol/L    Anion Gap 11.0 7.0 - 16.0    Glucose 137 (H) 65 - 99 mg/dL    Bun 25 (H) 8 - 22 mg/dL    Creatinine 1.10 0.50 - 1.40 mg/dL    Calcium 8.5 8.5 - 10.5 mg/dL    Correct Calcium 9.1 8.5 - 10.5 mg/dL    AST(SGOT) 20 12 - 45 U/L    ALT(SGPT) 11 2 - 50 U/L     Alkaline Phosphatase 89 30 - 99 U/L    Total Bilirubin 0.6 0.1 - 1.5 mg/dL    Albumin 3.3 3.2 - 4.9 g/dL    Total Protein 6.2 6.0 - 8.2 g/dL    Globulin 2.9 1.9 - 3.5 g/dL    A-G Ratio 1.1 g/dL   ESTIMATED GFR    Collection Time: 03/31/24  6:00 AM   Result Value Ref Range    GFR (CKD-EPI) 49 (A) >60 mL/min/1.73 m 2       Radiology Review:  DX-CHEST-LIMITED (1 VIEW)   Final Result         Right central line is noted with tip at the level of the SVC. No pneumothorax is identified.      DX-CHEST-PORTABLE (1 VIEW)   Final Result         1.  There is some interval increase in poorly defined opacifications in each lung including the lower lungs.      2.  Left pleural effusion is again identified.      DX-CHEST-PORTABLE (1 VIEW)   Final Result         1.  Hazy bilateral pulmonary infiltrates, similar to prior study.   2.  Small bilateral pleural effusions, stable   3.  Cardiomegaly            MDM (Data Review):   -Records reviewed and summarized in current documentation  -I personally reviewed and interpreted the laboratory results  -I personally reviewed the radiology images    Assessment/Recommendations:  IMPRESSION:   Anemia   Heme positive stool   History of perforated gastric ulcer, contained, 2021.  Unknown if ever had f/u EGD   Chronic anticoagulation   Atrial fibrillation, s/p cardioversion 2 weeks ago   Acute on chronic heart failure   Bilateral pleural effusion   CKD IIIb   Dementia   Colon diverticulosis     Recommendations:  She is unable to give history but no record of melena or hematochezia  She is tender and past hx of  perforation that may never have been evaluated endoscopically  She is higher risk for anesthesia  Given clinical deterioration and no overt evidence of gastrointestinal hemorrhage, there is no plan for endoscopy at this time  Please continue pantoprazole  Ongoing goals of care discussion per primary team    GI team will sign off.  Please call us back if the clinical situation changes or  she develops significant GI bleeding.    Plan discussed with RN, Dr. Rhett perez, Dr. Sethi    Core Quality Measures   Reviewed items::  Labs, Medications and Radiology reports reviewed

## 2024-03-31 NOTE — ASSESSMENT & PLAN NOTE
and lasix overnight    POCUS suggests adequate/high SV and low RAP at this time    Wean dobut  Give fluid bolus and wean NE as tolerated    BiPAP PRN and Noc  Hold GDMT  I am actively titrating vasopressors for MAP >65

## 2024-03-31 NOTE — PROGRESS NOTES
Patient arrived to T909 on monitor accompanied by JOSE RN and APRN     Patient's vital signs on admit:     HR: 80  BP: 168/127  RR: 30  SaO2: 92 on HFNC 50L/100%  Wt: 66.6 kg  Temp: 97.7 F  ___________________________________________________________________________     4 Eyes Skin Assessment Completed by Roxie RN and Josias RN.  Generalized mottling  Head WDL  Ears WDL  Nose WDL  Mouth WDL  Neck WDL  Breast/Chest Scar sternum  Shoulder Blades WDL  Spine Bruising  (R) Arm/Elbow/Hand Redness, Blanching, and Bruising  (L) Arm/Elbow/Hand Redness, Blanching, and Bruising  Abdomen Bruising  Groin Redness Petechiae  Scrotum/Coccyx/Buttocks Redness and Blanching slow, Petechiae bruising buttock  (R) Leg Non hal discoloration R knee   (L) Leg Scab knee  (R) Heel/Foot/Toe Redness and Blanching  (L) Heel/Foot/Toe Redness and Blanching, Non hal discoloration foot            Devices In Places ECG, Blood Pressure Cuff, Pulse Ox, Rizo, SCD's, and Bipap    Interventions In Place Bipap Protecta-Gel, Sacral Mepilex, TAP System, Q2 Turns, and Low Air Loss Mattress

## 2024-04-01 ENCOUNTER — APPOINTMENT (OUTPATIENT)
Dept: RADIOLOGY | Facility: MEDICAL CENTER | Age: 89
DRG: 377 | End: 2024-04-01
Attending: INTERNAL MEDICINE
Payer: MEDICARE

## 2024-04-01 LAB
ALBUMIN SERPL BCP-MCNC: 3.3 G/DL (ref 3.2–4.9)
ALBUMIN/GLOB SERPL: 1.2 G/DL
ALP SERPL-CCNC: 88 U/L (ref 30–99)
ALT SERPL-CCNC: 10 U/L (ref 2–50)
ANION GAP SERPL CALC-SCNC: 10 MMOL/L (ref 7–16)
AST SERPL-CCNC: 19 U/L (ref 12–45)
BILIRUB SERPL-MCNC: 0.7 MG/DL (ref 0.1–1.5)
BUN SERPL-MCNC: 24 MG/DL (ref 8–22)
CALCIUM ALBUM COR SERPL-MCNC: 9 MG/DL (ref 8.5–10.5)
CALCIUM SERPL-MCNC: 8.4 MG/DL (ref 8.5–10.5)
CHLORIDE SERPL-SCNC: 97 MMOL/L (ref 96–112)
CO2 SERPL-SCNC: 29 MMOL/L (ref 20–33)
CORTIS SERPL-MCNC: 21.4 UG/DL (ref 0–23)
CREAT SERPL-MCNC: 1.12 MG/DL (ref 0.5–1.4)
ERYTHROCYTE [DISTWIDTH] IN BLOOD BY AUTOMATED COUNT: 54.4 FL (ref 35.9–50)
GFR SERPLBLD CREATININE-BSD FMLA CKD-EPI: 48 ML/MIN/1.73 M 2
GLOBULIN SER CALC-MCNC: 2.8 G/DL (ref 1.9–3.5)
GLUCOSE SERPL-MCNC: 205 MG/DL (ref 65–99)
HCT VFR BLD AUTO: 27.2 % (ref 37–47)
HCT VFR BLD AUTO: 28.3 % (ref 37–47)
HGB BLD-MCNC: 9 G/DL (ref 12–16)
HGB BLD-MCNC: 9.1 G/DL (ref 12–16)
MAGNESIUM SERPL-MCNC: 1.8 MG/DL (ref 1.5–2.5)
MCH RBC QN AUTO: 26.5 PG (ref 27–33)
MCHC RBC AUTO-ENTMCNC: 32.2 G/DL (ref 32.2–35.5)
MCV RBC AUTO: 82.3 FL (ref 81.4–97.8)
PHOSPHATE SERPL-MCNC: 3.1 MG/DL (ref 2.5–4.5)
PLATELET # BLD AUTO: 233 K/UL (ref 164–446)
PMV BLD AUTO: 11.5 FL (ref 9–12.9)
POTASSIUM SERPL-SCNC: 3.4 MMOL/L (ref 3.6–5.5)
PROT SERPL-MCNC: 6.1 G/DL (ref 6–8.2)
RBC # BLD AUTO: 3.44 M/UL (ref 4.2–5.4)
SODIUM SERPL-SCNC: 136 MMOL/L (ref 135–145)
WBC # BLD AUTO: 5 K/UL (ref 4.8–10.8)

## 2024-04-01 PROCEDURE — A9270 NON-COVERED ITEM OR SERVICE: HCPCS | Performed by: HOSPITALIST

## 2024-04-01 PROCEDURE — 80053 COMPREHEN METABOLIC PANEL: CPT

## 2024-04-01 PROCEDURE — 700102 HCHG RX REV CODE 250 W/ 637 OVERRIDE(OP): Performed by: HOSPITALIST

## 2024-04-01 PROCEDURE — 700111 HCHG RX REV CODE 636 W/ 250 OVERRIDE (IP): Mod: JZ | Performed by: INTERNAL MEDICINE

## 2024-04-01 PROCEDURE — C9113 INJ PANTOPRAZOLE SODIUM, VIA: HCPCS | Mod: JZ | Performed by: HOSPITALIST

## 2024-04-01 PROCEDURE — 97167 OT EVAL HIGH COMPLEX 60 MIN: CPT

## 2024-04-01 PROCEDURE — 700102 HCHG RX REV CODE 250 W/ 637 OVERRIDE(OP): Performed by: STUDENT IN AN ORGANIZED HEALTH CARE EDUCATION/TRAINING PROGRAM

## 2024-04-01 PROCEDURE — 99291 CRITICAL CARE FIRST HOUR: CPT | Performed by: INTERNAL MEDICINE

## 2024-04-01 PROCEDURE — 93005 ELECTROCARDIOGRAM TRACING: CPT | Performed by: INTERNAL MEDICINE

## 2024-04-01 PROCEDURE — 700102 HCHG RX REV CODE 250 W/ 637 OVERRIDE(OP): Mod: JZ | Performed by: INTERNAL MEDICINE

## 2024-04-01 PROCEDURE — 700105 HCHG RX REV CODE 258: Performed by: STUDENT IN AN ORGANIZED HEALTH CARE EDUCATION/TRAINING PROGRAM

## 2024-04-01 PROCEDURE — 700101 HCHG RX REV CODE 250: Performed by: STUDENT IN AN ORGANIZED HEALTH CARE EDUCATION/TRAINING PROGRAM

## 2024-04-01 PROCEDURE — 94640 AIRWAY INHALATION TREATMENT: CPT

## 2024-04-01 PROCEDURE — A9270 NON-COVERED ITEM OR SERVICE: HCPCS | Performed by: STUDENT IN AN ORGANIZED HEALTH CARE EDUCATION/TRAINING PROGRAM

## 2024-04-01 PROCEDURE — 82533 TOTAL CORTISOL: CPT

## 2024-04-01 PROCEDURE — 97163 PT EVAL HIGH COMPLEX 45 MIN: CPT

## 2024-04-01 PROCEDURE — 700111 HCHG RX REV CODE 636 W/ 250 OVERRIDE (IP): Mod: JZ | Performed by: STUDENT IN AN ORGANIZED HEALTH CARE EDUCATION/TRAINING PROGRAM

## 2024-04-01 PROCEDURE — A9270 NON-COVERED ITEM OR SERVICE: HCPCS

## 2024-04-01 PROCEDURE — 84100 ASSAY OF PHOSPHORUS: CPT

## 2024-04-01 PROCEDURE — 85014 HEMATOCRIT: CPT | Mod: XE

## 2024-04-01 PROCEDURE — 700111 HCHG RX REV CODE 636 W/ 250 OVERRIDE (IP): Mod: JZ | Performed by: HOSPITALIST

## 2024-04-01 PROCEDURE — 99221 1ST HOSP IP/OBS SF/LOW 40: CPT | Performed by: NURSE PRACTITIONER

## 2024-04-01 PROCEDURE — 83735 ASSAY OF MAGNESIUM: CPT

## 2024-04-01 PROCEDURE — A9270 NON-COVERED ITEM OR SERVICE: HCPCS | Mod: JZ | Performed by: INTERNAL MEDICINE

## 2024-04-01 PROCEDURE — 700102 HCHG RX REV CODE 250 W/ 637 OVERRIDE(OP)

## 2024-04-01 PROCEDURE — 770022 HCHG ROOM/CARE - ICU (200)

## 2024-04-01 PROCEDURE — 85027 COMPLETE CBC AUTOMATED: CPT

## 2024-04-01 PROCEDURE — 71045 X-RAY EXAM CHEST 1 VIEW: CPT

## 2024-04-01 PROCEDURE — 85018 HEMOGLOBIN: CPT | Mod: XU

## 2024-04-01 PROCEDURE — 87040 BLOOD CULTURE FOR BACTERIA: CPT

## 2024-04-01 RX ORDER — POTASSIUM CHLORIDE 20 MEQ/1
60 TABLET, EXTENDED RELEASE ORAL ONCE
Status: COMPLETED | OUTPATIENT
Start: 2024-04-01 | End: 2024-04-01

## 2024-04-01 RX ORDER — MAGNESIUM SULFATE HEPTAHYDRATE 40 MG/ML
2 INJECTION, SOLUTION INTRAVENOUS ONCE
Status: COMPLETED | OUTPATIENT
Start: 2024-04-01 | End: 2024-04-01

## 2024-04-01 RX ADMIN — PANTOPRAZOLE SODIUM 40 MG: 40 INJECTION, POWDER, LYOPHILIZED, FOR SOLUTION INTRAVENOUS at 18:00

## 2024-04-01 RX ADMIN — DEXMEDETOMIDINE HYDROCHLORIDE 0.7 MCG/KG/HR: 100 INJECTION, SOLUTION INTRAVENOUS at 04:08

## 2024-04-01 RX ADMIN — DOCUSATE SODIUM 50 MG AND SENNOSIDES 8.6 MG 2 TABLET: 8.6; 5 TABLET, FILM COATED ORAL at 17:02

## 2024-04-01 RX ADMIN — MAGNESIUM SULFATE HEPTAHYDRATE 2 G: 2 INJECTION, SOLUTION INTRAVENOUS at 09:15

## 2024-04-01 RX ADMIN — OXYCODONE 5 MG: 5 TABLET ORAL at 21:29

## 2024-04-01 RX ADMIN — FUROSEMIDE 80 MG: 10 INJECTION, SOLUTION INTRAMUSCULAR; INTRAVENOUS at 15:43

## 2024-04-01 RX ADMIN — ATORVASTATIN CALCIUM 10 MG: 10 TABLET, FILM COATED ORAL at 20:22

## 2024-04-01 RX ADMIN — LEVOTHYROXINE SODIUM 125 MCG: 0.12 TABLET ORAL at 05:13

## 2024-04-01 RX ADMIN — PANTOPRAZOLE SODIUM 40 MG: 40 INJECTION, POWDER, LYOPHILIZED, FOR SOLUTION INTRAVENOUS at 05:14

## 2024-04-01 RX ADMIN — POTASSIUM CHLORIDE 60 MEQ: 1500 TABLET, EXTENDED RELEASE ORAL at 09:01

## 2024-04-01 RX ADMIN — DEXMEDETOMIDINE HYDROCHLORIDE 0.6 MCG/KG/HR: 100 INJECTION, SOLUTION INTRAVENOUS at 23:44

## 2024-04-01 RX ADMIN — FUROSEMIDE 80 MG: 10 INJECTION, SOLUTION INTRAMUSCULAR; INTRAVENOUS at 12:06

## 2024-04-01 RX ADMIN — AMIODARONE HYDROCHLORIDE 200 MG: 200 TABLET ORAL at 05:14

## 2024-04-01 RX ADMIN — DEXMEDETOMIDINE HYDROCHLORIDE 0.6 MCG/KG/HR: 100 INJECTION, SOLUTION INTRAVENOUS at 13:51

## 2024-04-01 RX ADMIN — FUROSEMIDE 80 MG: 10 INJECTION, SOLUTION INTRAMUSCULAR; INTRAVENOUS at 05:14

## 2024-04-01 ASSESSMENT — PAIN DESCRIPTION - PAIN TYPE
TYPE: ACUTE PAIN

## 2024-04-01 ASSESSMENT — COGNITIVE AND FUNCTIONAL STATUS - GENERAL
DAILY ACTIVITIY SCORE: 15
EATING MEALS: A LITTLE
STANDING UP FROM CHAIR USING ARMS: A LITTLE
DRESSING REGULAR LOWER BODY CLOTHING: A LOT
PERSONAL GROOMING: A LITTLE
CLIMB 3 TO 5 STEPS WITH RAILING: A LOT
MOBILITY SCORE: 17
SUGGESTED CMS G CODE MODIFIER MOBILITY: CK
SUGGESTED CMS G CODE MODIFIER DAILY ACTIVITY: CK
MOVING FROM LYING ON BACK TO SITTING ON SIDE OF FLAT BED: A LITTLE
DRESSING REGULAR UPPER BODY CLOTHING: A LITTLE
TOILETING: A LOT
WALKING IN HOSPITAL ROOM: A LITTLE
HELP NEEDED FOR BATHING: A LOT
MOVING TO AND FROM BED TO CHAIR: A LITTLE
TURNING FROM BACK TO SIDE WHILE IN FLAT BAD: A LITTLE

## 2024-04-01 ASSESSMENT — ACTIVITIES OF DAILY LIVING (ADL): TOILETING: INDEPENDENT

## 2024-04-01 ASSESSMENT — ENCOUNTER SYMPTOMS
FEVER: 0
NAUSEA: 0
CHILLS: 0
ABDOMINAL PAIN: 0
SHORTNESS OF BREATH: 0
VOMITING: 0

## 2024-04-01 ASSESSMENT — GAIT ASSESSMENTS
GAIT LEVEL OF ASSIST: MINIMAL ASSIST
DISTANCE (FEET): 3
DEVIATION: SHUFFLED GAIT;DECREASED BASE OF SUPPORT
ASSISTIVE DEVICE: HAND HELD ASSIST

## 2024-04-01 ASSESSMENT — FIBROSIS 4 INDEX: FIB4 SCORE: 2.47

## 2024-04-01 NOTE — FLOWSHEET NOTE
03/31/24 1942   Events/Summary/Plan   Events/Summary/Plan pt. having desaturation episodes, HHFNC up to 50L/80%, attempted bipap pt. became agitated and took off bipap. pt refusing bipap after education given. saturations mantaining 92% on 50L/80%.

## 2024-04-01 NOTE — PROGRESS NOTES
"Critical Care Progress Note    Date of admission  3/30/2024    Chief Complaint  89 y.o. female with a history of HFrEF (LVEF 20-25%, normal RV size but reduced function, moderate MR/TR), CAD (s/p CABG), HTN, HLD, chronic atrial fibrillation on Eliquis and Plavix, and prior perforated gastric ulcer who was admitted from [facility] with chest pain and shortness of breath and found to have drop in hemoglobin of 1 point as well as heme positive stools.  Her Eliquis and Plavix were held and she was evaluated by GI who is planning on observation over the weekend given small drop in hemoglobin and no evidence of hemorrhage.       ICU was consulted early morning on 3/31 for acute hypoxic respiratory failure, cyanosis and tripoding.  CXR with pulmonary edema and small bilateral pleural effusions.  She is DNR/DNI.  Will move to ICU for dobutamine, lasix and BiPAP.  Of note, she was just admitted from 3/26 - 3/28 for abdominal pain, CTA done at the time without abnormalities, it was thought to be secondary to constipation.\" H&P    Hospital Course  3/31 admitted to ICU for acute hypoxic resp failure    Interval Problem Update  Reviewed last 24 hour events:  Remains critically ill   On precedex gtt at 0.7 RAAS 0 to -1. A+Ox 2-4  On HFNC 50/50 sat >95%. Had to go up overnight   SBP in 120/60s,   Afib HR in 50s  Lactate 1.2  Hgb 9-10s. No tansfusions overnight. No bleeding  Creatinine 1.12, HCO3 29  -1000 cc every 4 hours  Had 1.5L of LR given yesterday on 3/31.   Negative 2L in the past 24 hours, negative 3L since admission  UOP this am is already 550cc.   Afebrile  WBC 5  -200s    On NE gtt 0.02, off dobutamine  On lasix 80 Q8H  Amiodarone 200 PO daily   PPI 40 BID  Negative COVID/influenza/RSV      Review of Systems  Review of Systems   Unable to perform ROS: Dementia   Constitutional:  Negative for chills and fever.   Respiratory:  Negative for shortness of breath.    Cardiovascular:  Negative for chest pain. "   Gastrointestinal:  Negative for abdominal pain, nausea and vomiting.        Vital Signs for last 24 hours   Pulse:  [45-67] 53  Resp:  [10-42] 17  BP: ()/(34-72) 128/63  SpO2:  [86 %-100 %] 98 %    Hemodynamic parameters for last 24 hours       Respiratory Information for the last 24 hours       Physical Exam   Physical Exam  Vitals and nursing note reviewed. Exam conducted with a chaperone present.   Constitutional:       General: She is awake.      Appearance: She is ill-appearing.   HENT:      Head: Normocephalic.      Mouth/Throat:      Mouth: Mucous membranes are moist.   Eyes:      Pupils: Pupils are equal, round, and reactive to light.   Cardiovascular:      Rate and Rhythm: Regular rhythm. Tachycardia present.      Pulses: Normal pulses.   Pulmonary:      Effort: Tachypnea, accessory muscle usage and respiratory distress present.      Breath sounds: Rhonchi present.   Abdominal:      General: There is no distension.      Palpations: Abdomen is soft.      Tenderness: There is abdominal tenderness. There is no guarding or rebound.   Musculoskeletal:         General: Normal range of motion.      Cervical back: Normal range of motion and neck supple.      Right lower leg: No edema.      Left lower leg: No edema.   Skin:     General: Skin is cool.      Capillary Refill: Capillary refill takes 2 to 3 seconds.      Coloration: Skin is mottled (Significant mottling from feet to abdomen).   Neurological:      General: No focal deficit present.      Comments: Alert and conversant but disoriented  WYNN, no defecits           Medications  Current Facility-Administered Medications   Medication Dose Route Frequency Provider Last Rate Last Admin    ipratropium-albuterol (DUONEB) nebulizer solution  3 mL Nebulization Q4H PRN (RT) Edyta Baez D.N.P.        Respiratory Therapy Consult   Nebulization Continuous RT Kathleen Aguilar M.D.        DOBUTamine (Dobutrex) 1 mg/1 mL premix infusion  5 mcg/kg/min (Ideal)  Intravenous Continuous Hany Aguilar M.D.   Stopped at 03/31/24 1402    MD Alert...ICU Electrolyte Replacement per Pharmacy   Other PHARMACY TO DOSE Noé Loyd M.D.        norepinephrine (Levophed) 8 mg in 250 mL NS infusion (premix)  0-1 mcg/kg/min (Ideal) Intravenous Continuous Noé Loyd M.D. 2.4 mL/hr at 04/01/24 0047 0.02 mcg/kg/min at 04/01/24 0047    dexmedetomidine (PRECEDEX) 400 mcg/100mL NS premix infusion  0.1-1.5 mcg/kg/hr (Ideal) Intravenous Continuous Noé Loyd M.D. 11.3 mL/hr at 04/01/24 0408 0.7 mcg/kg/hr at 04/01/24 0408    furosemide (Lasix) injection 80 mg  80 mg Intravenous TID DIURETIC Noé Loyd M.D.   80 mg at 04/01/24 0514    ondansetron (Zofran) syringe/vial injection 4 mg  4 mg Intravenous Q4HRS PRN Noé Loyd M.D.        amiodarone (Cordarone) tablet 200 mg  200 mg Oral DAILY Tom Carrera M.D.   200 mg at 04/01/24 0514    atorvastatin (Lipitor) tablet 10 mg  10 mg Oral Nightly Tom Carrera M.D.   10 mg at 03/31/24 2014    levothyroxine (Synthroid) tablet 125 mcg  125 mcg Oral AM ES Tom Carrera M.D.   125 mcg at 04/01/24 0513    [Held by provider] metoprolol SR (Toprol XL) tablet 12.5 mg  12.5 mg Oral Q EVENING Tom Carrera M.D.        pantoprazole (Protonix) injection 40 mg  40 mg Intravenous BID Tom Carrera M.D.   40 mg at 04/01/24 0514    polyethylene glycol/lytes (Miralax) Packet 1 Packet  1 Packet Oral DAILY Kathleen Aguilar M.D.        senna-docusate (Pericolace Or Senokot S) 8.6-50 MG per tablet 2 Tablet  2 Tablet Oral Q EVENING Kathleen Aguilar M.D.        bisacodyl (Dulcolax) suppository 10 mg  10 mg Rectal QDAY PRN Kathleen Aguilar M.D.        oxyCODONE immediate-release (Roxicodone) tablet 5 mg  5 mg Oral Q4HRS PRN OSMANI GonzalezN.P.   5 mg at 03/30/24 2319       Fluids    Intake/Output Summary (Last 24 hours) at 4/1/2024 0654  Last data filed at 4/1/2024 0600  Gross per 24 hour   Intake 2699.95 ml   Output 4700 ml   Net -2000.05 ml        Laboratory  Recent Labs     03/31/24  0145   XQVII22X 7.27*   [id number] 54.3*   MEUMT036P 125.9*   GGUJ5LIC 97.5   ARTHCO3 24   D1JJCFARV 50L   ARTBE -3         Recent Labs     03/31/24 0143 03/31/24  0600 04/01/24  0533   SODIUM 138 138 136   POTASSIUM 4.5 3.9 3.4*   CHLORIDE 101 102 97   CO2 22 25 29   BUN 24* 25* 24*   CREATININE 1.08 1.10 1.12   MAGNESIUM 2.3  --  1.8   PHOSPHORUS 3.6  --  3.1   CALCIUM 9.4 8.5 8.4*     Recent Labs     03/31/24 0143 03/31/24  0600 04/01/24  0533   ALTSGPT 13 11 10   ASTSGOT 29 20 19   ALKPHOSPHAT 116* 89 88   TBILIRUBIN 0.7 0.6 0.7   GLUCOSE 177* 137* 205*     Recent Labs     03/30/24  0135 03/31/24 0143 03/31/24 0600 04/01/24  0533   WBC 5.4 17.1* 10.1 5.0   NEUTSPOLYS 53.00  --   --   --    LYMPHOCYTES 30.00  --   --   --    MONOCYTES 12.50  --   --   --    EOSINOPHILS 3.40  --   --   --    BASOPHILS 0.70  --   --   --    ASTSGOT 22 29 20 19   ALTSGPT 9 13 11 10   ALKPHOSPHAT 78 116* 89 88   TBILIRUBIN 0.3 0.7 0.6 0.7     Recent Labs     03/30/24  0432 03/30/24  0955 03/31/24  0143 03/31/24  0600 03/31/24  1310 03/31/24  2115 04/01/24  0533   RBC  --   --  4.57 3.62*  --   --  3.44*   HEMOGLOBIN  --    < > 12.0 9.5* 9.1* 9.8* 9.1*   HEMATOCRIT  --    < > 39.0 30.7* 28.2* 30.4* 28.3*   PLATELETCT  --   --  298 212  --   --  233   PROTHROMBTM 18.4*  --   --   --   --   --   --    APTT 48.3*  --   --   --   --   --   --    INR 1.51*  --   --   --   --   --   --     < > = values in this interval not displayed.       Imaging  X-Ray:  I have personally reviewed the images and compared with prior images.  EKG:  I have personally reviewed the images and compared with prior images.    Assessment/Plan  * Acute respiratory failure with hypoxia (HCC)- (present on admission)  Assessment & Plan  Initial concern for low output state with congestion, lasix,  and NE  CXR with edema and effusions  Lung US with large effusions bilaterally, pt refuses for thora  Patient told me she  did not want thora or pigtails  Gentle diruesis with lasix. Diuresing well  Weaning down HFNC  Goal negative fluid balance    Bradycardia  Assessment & Plan  Possible med effect from beta blockade, typically on metoprolol    Sinus  TSH WNL  K normal    No evidence of heart block  Continue NE for beta and for MAP    Cardiogenic shock (HCC)  Assessment & Plan   and lasix overnight    POCUS suggests adequate/high SV and low RAP at this time    Wean dobut  Give fluid bolus and wean NE as tolerated    BiPAP PRN and Noc  Hold GDMT  I am actively titrating vasopressors for MAP >65     Acute blood loss anemia  Assessment & Plan  Mild  Trend H/H  Cautious transfusion given cardiogenic shock and heart failure exacerbation    GI bleed- (present on admission)  Assessment & Plan  No real evidece of this yet, Hb remains stable  Physiology remains hyperdynamic with low RAP pressure so blood loss possible  Trend Hb    Agree to hold NOAC + plavix (stent is old) for now, restart as soon as feasible  PPI BID  H/H q8  GI following, no scope    NPO    AF (atrial fibrillation) ([facility])- (present on admission)  Assessment & Plan  Rate controlled    Hold metoprolol given shock  Hold anticoagulation given potential GI bleeding    Stage 3b chronic kidney disease (CKD)- (present on admission)  Assessment & Plan  Strict I/Os  Renally dosed medications  Avoid nephrotoxic agents as able  Trend    Acute on chronic combined systolic and diastolic congestive heart failure (HCC)- (present on admission)  Assessment & Plan  Slow down diuresis today given hypotension and low right atrial pressure  Continue lasix 80 TID  Wean dobutamine  Trend lactate/urine output      Hypotension- (present on admission)  Assessment & Plan  Unclear etiology - cardiogenic vs septic. Not bleeding.   Hb has been stable  Lactate cleared with diuresis and   Physiology at this point seems vasoplegic with high VTI and low right sided filling pressures  Pt is well perfused  with warm extremities.   Pt did receive hydrocortisone last night - cortisol this am would be difficult to interpret.   No apparent infection. Bcxs and PCT sent. Blood cultures 3/31 with 1/2 CONS. Will repeat blood culture.       Bilateral pleural effusion- (present on admission)  Assessment & Plan  Fairly large on ultrasound, however seems to have enough FRC for oxygenation  Would likely benefit from thoras but patient refused when discussed with Dr. Aguilar   Not tolerant of aggressive diuresis given hemodynamics  Can gently diurese but may need vasopressors to support    Hx of CABG- (present on admission)  Assessment & Plan  Hold Plavix iso og potential GI losses of blood  Trend HB  Hold beta-blocker for shock and bradycardia         I have performed a physical exam and reviewed and updated ROS and Plan today (4/1/2024). In review of yesterday's note (3/31/2024), there are no changes except as documented above.     Discussed patient condition and risk of morbidity and/or mortality with RN, RT, Therapies, Pharmacy, Dietary, and Patient  The patient remains critically ill.  Critical care time = 50 minutes in directly providing and coordinating critical care and extensive data review.  No time overlap and excludes procedures.

## 2024-04-01 NOTE — THERAPY
Physical Therapy   Initial Evaluation     Patient Name: Davie Montejo  Age:  89 y.o., Sex:  female  Medical Record #: [id number]  Today's Date: 4/1/2024     Precautions: Fall Risk    Assessment  Patient is 89 y.o. female admitted from [facility] with chest pain and SOB found to have a hemoglobin 1. PMH includes EF 20-25%, CAD, HTN, HLD, chronic afib, and prior perforated gastric ulcer. Pt found to have acute respiratory failure requiring HFNC, bradycardia, cardiogenic shock, anemia, GIB, hypotension, and bilateral pleural effusion. Pt cooperative with PT evaluation. She required min assist with bed mob, STS, and 3ft of gait with HHA and min assist. Distance limited by symptomatic hypotension and HFNC. PT will cont while pt is in acute care setting to address deficits.     Plan    Physical Therapy Initial Treatment Plan   Treatment Plan : Bed Mobility, Equipment, Gait Training, Neuro Re-Education / Balance, Self Care / Home Evaluation, Stair Training, Therapeutic Activities, Therapeutic Exercise  Treatment Frequency: 3 Times per Week  Duration: Until Therapy Goals Met    DC Equipment Recommendations:Unable to determine at this time  Discharge Recommendations: Recommend post-acute placement for additional physical therapy services prior to discharge home          04/01/24 1122   Vitals   Blood Pressure  (!) 85/46   O2 (LPM) 40   O2 Delivery Device Heated High Flow Nasal Cannula   Pain 0 - 10 Group   Therapist Pain Assessment During Activity;0   Non Verbal Descriptors   Non Verbal Scale  Calm   Prior Living Situation   Prior Services Other (Comments)   Comments pt has been at [facility] since Feb admission. Prior to that admission pt was independent and living in a 2 story apartment using a [facility]   Prior Level of Functional Mobility   Bed Mobility Other (Comments)   Comments Pt reports she was walking well at [facility]   History of Falls   History of Falls Yes   Cognition    Cognition / Consciousness X   Level of  Consciousness Alert   Comments baseline dementia, oriented x2-3   Strength Upper Body   Upper Body Strength  WDL   Active ROM Lower Body    Active ROM Lower Body  WDL   Strength Lower Body   Lower Body Strength  X   Comments generally weak but functional   Balance Assessment   Sitting Balance (Static) Fair   Sitting Balance (Dynamic) Fair   Standing Balance (Static) Poor +   Standing Balance (Dynamic) Poor -   Weight Shift Sitting Fair   Weight Shift Standing Fair   Comments HHA   Bed Mobility    Supine to Sit Minimal Assist   Sit to Supine Contact Guard Assist   Scooting Contact Guard Assist   Comments HOB raised   Gait Analysis   Gait Level Of Assist Minimal Assist   Assistive Device Hand Held Assist   Distance (Feet) 3   # of Times Distance was Traveled 2   Deviation Shuffled Gait;Decreased Base Of Support   Weight Bearing Status no restrictions   Comments limited by HFNC   Functional Mobility   Sit to Stand Minimal Assist   Transfer Method Stand Step   Mobility EOB, STS, steps   Edema / Skin Assessment   Edema / Skin  Not Assessed   Patient / Family Goals    Patient / Family Goal #1 get rid of catheter   Short Term Goals    Short Term Goal # 1 pt will be able to complete supine<>sitting from flat bed with SPV in 6tx in order to return to prior level   Short Term Goal # 2 pt will be able to complete STS with FWW and SPV in 6tx in order to progress to prior level   Short Term Goal # 3 pt will be able to ambulate 50ft with FWW and SPV in 6tx in order to progress to prior level   Short Term Goal # 4 pt will be able to negotiate 6 steps with SPV in 6tx in order to dc home   Education Group   Education Provided Role of Physical Therapist   Role of Physical Therapist Patient Response Patient;Acceptance;Demonstration;Action Demonstration   Anticipated Discharge Equipment and Recommendations   DC Equipment Recommendations Unable to determine at this time   Discharge Recommendations Recommend post-acute placement for  additional physical therapy services prior to discharge home

## 2024-04-01 NOTE — CARE PLAN
The patient is Watcher - Medium risk of patient condition declining or worsening        Progress made toward(s) clinical / shift goals:    Problem: Fall Risk  Goal: Patient will remain free from falls  Outcome: Progressing     Problem: Pain - Standard  Goal: Alleviation of pain or a reduction in pain to the patient’s comfort goal  Outcome: Progressing

## 2024-04-01 NOTE — CARE PLAN
Problem: Humidified High Flow Nasal Cannula  Goal: Maintain adequate oxygenation dependent on patient condition  Description: Target End Date:  resolve prior to discharge or when underlying condition is resolved/stabilized    1.  Implement humidified high flow oxygen therapy  2.  Titrate high flow oxygen to maintain appropriate SpO2  Outcome: Progressing   HHFNC 50L/60%    Problem: Ventilation  Goal: Ability to achieve and maintain unassisted ventilation or tolerate decreased levels of ventilator support  Description: Target End Date:  4 days     Document on Vent flowsheet    1.  Support and monitor invasive and noninvasive mechanical ventilation  2.  Monitor ventilator weaning response  3.  Perform ventilator associated pneumonia prevention interventions  4.  Manage ventilation therapy by monitoring diagnostic test results  Outcome: Progressing   Bipap 12/5/60%  Pt can only tolerate bipap intermittently and will refuse at times

## 2024-04-01 NOTE — DISCHARGE PLANNING
"Care Transition Team Assessment    CM consult as this pt was a recent admission. LSW conducted a chart review due to pt being a readmit from 3/16/24 and was discharged on 3/13/24. The following assessment was completed on 03/18/2024 and copied below;     Pt was DC last admission to [facility]. Pt lives alone in a 2 story apartment and does not use any DME at [facility]. Pt's emergency contact is pt's  and support. Prior to last admission pt was independent with ADLS and IADLS. Pt's PCP is Jeff TRIMBLE. Pt denies any SA or MH concerns. Pt's preferred pharmacy is [facility]. Pt receives $900 monthly SSI. Pt's insurance is Medicare and Medicaid. Upon DC plan is DC back to [facility].     This patient was discussed during ICU rounds this morning, on high flow O2- 50L/50%.  A+Ox 2-4, \"some signs of dementia.\" Waxing and weaning. Possible SLP/PT/OT consults. Pending medical evaluations - not medically cleared. Family contact information was obtained by the RN. [facility] confirmed with me this morning that they have plenty of beds available (5).     Information Source  Orientation Level: Oriented to place, Oriented to person  Information Given By: Other (Comments) (Chart review)  Informant's Name: chart review  Who is responsible for making decisions for patient? : Patient    Readmission Evaluation  Is this a readmission?: Yes - unplanned readmission    Elopement Risk  Legal Hold: No  Ambulatory or Self Mobile in Wheelchair: No-Not an Elopement Risk  Disoriented: No  Psychiatric Symptoms: None  History of Wandering: No  Elopement this Admit: No  Vocalizing Wanting to Leave: No  Displays Behaviors, Body Language Wanting to Leave: No-Not at Risk for Elopement  Elopement Risk: Not at Risk for Elopement    Interdisciplinary Discharge Planning  Lives with - Patient's Self Care Capacity: Attendant / Paid Care Giver  Patient or legal guardian wants to designate a caregiver: No  Support Systems: Other (Comments)  Housing / Facility: " Skilled Nursing Facility  Name of Care Facility: [facility]  Durable Medical Equipment: Not Applicable    Discharge Preparedness  What is your plan after discharge?: Uncertain - pending medical team collaboration  What are your discharge supports?: Other (comment) (- Sana)  Prior Functional Level: Independent with Activities of Daily Living, Independent with Medication Management  Difficulity with ADLs: Walking  Difficulity with IADLs: None    Functional Assesment  Prior Functional Level: Independent with Activities of Daily Living, Independent with Medication Management    Finances  Financial Barriers to Discharge: No  Prescription Coverage: Yes    Vision / Hearing Impairment  Vision Impairment : Yes  Right Eye Vision: Impaired, Wears Glasses  Left Eye Vision: Impaired, Wears Glasses  Hearing Impairment : No  Hearing Impairment: Both Ears, Hearing Device Not Available  Does Pt Need Special Equipment for the Hearing Impaired?: No    Values / Beliefs / Concerns  Values / Beliefs Concerns : No    Advance Directive  Advance Directive?: Clinically incapacitated / Inappropriate    Domestic Abuse  Have you ever been the victim of abuse or violence?: No  Physical Abuse or Sexual Abuse: No  Verbal Abuse or Emotional Abuse: No  Possible Abuse/Neglect Reported to:: Not Applicable    Psychological Assessment  History of Substance Abuse: None  History of Psychiatric Problems: No  Non-compliant with Treatment: No  Newly Diagnosed Illness: No    Discharge Risks or Barriers  Discharge risks or barriers?: Lives alone, no community support, Complex medical needs  Patient risk factors: Lack of outside supports, Lives alone and no community support    Anticipated Discharge Information  Discharge Disposition: D/T to SNF with Medicare cert in anticipation of skilled care (03)
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
## 2024-04-01 NOTE — THERAPY
"Occupational Therapy   Initial Evaluation     Patient Name: Davie Montejo  Age:  89 y.o., Sex:  female  Medical Record #: [id number]  Today's Date: 4/1/2024     Precautions  Precautions: Fall Risk  Comments: HHFNC, orthostatic hypotension    Assessment  Patient is 89 y.o. female admitted from SNF with chest pain and SOB, now on HHFNC, found to have GIB and cardiogenic shock, B pleural effusion. She has been in and out of SNF/hospital since Feb. Hx of EF 20-25%, CAD, HTN, HLD, chronic afib, and prior perforated gastric ulcer, dementia.  Additional factors influencing patient status / progress: weakness, fatigue, impaired balance, impaired cognition, orthostatic hypotension.      Plan    Occupational Therapy Initial Treatment Plan   Treatment Interventions: Self Care / Activities of Daily Living, Adaptive Equipment, Neuro Re-Education / Balance, Therapeutic Exercises, Therapeutic Activity  Treatment Frequency: 3 Times per Week  Duration: Until Therapy Goals Met    DC Equipment Recommendations: Unable to determine at this time  Discharge Recommendations: Recommend post-acute placement for additional occupational therapy services prior to discharge home     Subjective    \"I have chapstick stuck in my mouth!\"     Objective       04/01/24 1125   Prior Living Situation   Housing / Facility Skilled Nursing Facility   Lives with - Patient's Self Care Capacity Attendant / Paid Care Giver   Comments pt has been at [facility] since Feb admission. Prior to that admission pt was independent and living alone in a 2 story apartment using a SPC   Prior Level of ADL Function   Self Feeding Independent   Grooming / Hygiene Independent   Bathing Independent   Dressing Independent   Toileting Independent   Prior Level of IADL Function   Medication Management Independent   Laundry Independent   Kitchen Mobility Independent   Finances Independent   Home Management Independent   Shopping Independent   Prior Level Of Mobility " Independent With Device in Community;Independent With Device in Home   Comments prior to SNF   History of Falls   History of Falls Yes   Precautions   Precautions Fall Risk   Comments FNC   Vitals   Vitals Comments SBP dropped to 80s/40s after standing w/ complaints of dizziness, no improvement, pt assisted BTB   Pain 0 - 10 Group   Therapist Pain Assessment Nurse Notified;0   Cognition    Cognition / Consciousness X   Level of Consciousness Alert   Safety Awareness Impaired;Impulsive   Comments pleasant and cooperative, baseline dementia oriented x2-3, forgetful and impulsive throughout. able to follow directions.   Active ROM Upper Body   Active ROM Upper Body  WDL   Strength Upper Body   Upper Body Strength  WDL   Balance Assessment   Sitting Balance (Static) Fair   Sitting Balance (Dynamic) Fair   Standing Balance (Static) Poor +   Standing Balance (Dynamic) Poor -   Weight Shift Sitting Fair   Weight Shift Standing Poor   Comments w/ HHA   Bed Mobility    Supine to Sit Minimal Assist   Sit to Supine Contact Guard Assist   Scooting Contact Guard Assist   ADL Assessment   Eating Supervision   Grooming Minimal Assist;Seated  (oral care, denture care)   Lower Body Dressing Maximal Assist   Toileting   (schroeder catheter, declined need for BM)   How much help from another person does the patient currently need...   Putting on and taking off regular lower body clothing? 2   Bathing (including washing, rinsing, and drying)? 2   Toileting, which includes using a toilet, bedpan, or urinal? 2   Putting on and taking off regular upper body clothing? 3   Taking care of personal grooming such as brushing teeth? 3   Eating meals? 3   6 Clicks Daily Activity Score 15   Functional Mobility   Sit to Stand Minimal Assist   Mobility EOB>STS>steps>BTB   Comments w/ HHA, limited by high flow nc and dizziness   Patient / Family Goals   Patient / Family Goal #1 go home   Short Term Goals   Short Term Goal # 1 pt will demo ADL txfs w/  supv   Short Term Goal # 2 pt will dress LB with supv and AE prn   Short Term Goal # 3 pt will demo toileting w/ supv   Short Term Goal # 4 pt will demo grooming at the sink w/ setup

## 2024-04-01 NOTE — CARE PLAN
Problem: Pain - Standard  Goal: Alleviation of pain or a reduction in pain to the patient’s comfort goal  Outcome: Progressing  Patient has PRN medication available for pain      The patient is Watcher - Medium risk of patient condition declining or worsening    Shift Goals  Clinical Goals: stable oxygenation, wean off pressors, goals of care  Patient Goals: Sleep, feel warm  Family Goals: DEE DEE    Progress made toward(s) clinical / shift goals:  Progressing    Patient is not progressing towards the following goals: N/A

## 2024-04-02 LAB
ALBUMIN SERPL BCP-MCNC: 3.4 G/DL (ref 3.2–4.9)
ALBUMIN/GLOB SERPL: 1.1 G/DL
ALP SERPL-CCNC: 81 U/L (ref 30–99)
ALT SERPL-CCNC: 9 U/L (ref 2–50)
ANION GAP SERPL CALC-SCNC: 11 MMOL/L (ref 7–16)
AST SERPL-CCNC: 17 U/L (ref 12–45)
BILIRUB SERPL-MCNC: 0.8 MG/DL (ref 0.1–1.5)
BUN SERPL-MCNC: 19 MG/DL (ref 8–22)
CALCIUM ALBUM COR SERPL-MCNC: 9.1 MG/DL (ref 8.5–10.5)
CALCIUM SERPL-MCNC: 8.6 MG/DL (ref 8.5–10.5)
CHLORIDE SERPL-SCNC: 96 MMOL/L (ref 96–112)
CO2 SERPL-SCNC: 30 MMOL/L (ref 20–33)
CREAT SERPL-MCNC: 1.13 MG/DL (ref 0.5–1.4)
EKG IMPRESSION: NORMAL
ERYTHROCYTE [DISTWIDTH] IN BLOOD BY AUTOMATED COUNT: 54 FL (ref 35.9–50)
GFR SERPLBLD CREATININE-BSD FMLA CKD-EPI: 47 ML/MIN/1.73 M 2
GLOBULIN SER CALC-MCNC: 3 G/DL (ref 1.9–3.5)
GLUCOSE SERPL-MCNC: 117 MG/DL (ref 65–99)
HCT VFR BLD AUTO: 27.8 % (ref 37–47)
HGB BLD-MCNC: 9.1 G/DL (ref 12–16)
MAGNESIUM SERPL-MCNC: 1.9 MG/DL (ref 1.5–2.5)
MCH RBC QN AUTO: 26.6 PG (ref 27–33)
MCHC RBC AUTO-ENTMCNC: 32.7 G/DL (ref 32.2–35.5)
MCV RBC AUTO: 81.3 FL (ref 81.4–97.8)
PHOSPHATE SERPL-MCNC: 2.1 MG/DL (ref 2.5–4.5)
PLATELET # BLD AUTO: 231 K/UL (ref 164–446)
PMV BLD AUTO: 11 FL (ref 9–12.9)
POTASSIUM SERPL-SCNC: 3.1 MMOL/L (ref 3.6–5.5)
PROT SERPL-MCNC: 6.4 G/DL (ref 6–8.2)
RBC # BLD AUTO: 3.42 M/UL (ref 4.2–5.4)
SODIUM SERPL-SCNC: 137 MMOL/L (ref 135–145)
WBC # BLD AUTO: 6.6 K/UL (ref 4.8–10.8)

## 2024-04-02 PROCEDURE — C9113 INJ PANTOPRAZOLE SODIUM, VIA: HCPCS | Performed by: HOSPITALIST

## 2024-04-02 PROCEDURE — 700111 HCHG RX REV CODE 636 W/ 250 OVERRIDE (IP): Mod: JZ | Performed by: STUDENT IN AN ORGANIZED HEALTH CARE EDUCATION/TRAINING PROGRAM

## 2024-04-02 PROCEDURE — 700111 HCHG RX REV CODE 636 W/ 250 OVERRIDE (IP): Performed by: HOSPITALIST

## 2024-04-02 PROCEDURE — 94640 AIRWAY INHALATION TREATMENT: CPT

## 2024-04-02 PROCEDURE — 80053 COMPREHEN METABOLIC PANEL: CPT

## 2024-04-02 PROCEDURE — 84100 ASSAY OF PHOSPHORUS: CPT

## 2024-04-02 PROCEDURE — 700102 HCHG RX REV CODE 250 W/ 637 OVERRIDE(OP): Performed by: HOSPITALIST

## 2024-04-02 PROCEDURE — 700102 HCHG RX REV CODE 250 W/ 637 OVERRIDE(OP): Performed by: STUDENT IN AN ORGANIZED HEALTH CARE EDUCATION/TRAINING PROGRAM

## 2024-04-02 PROCEDURE — 700105 HCHG RX REV CODE 258: Performed by: INTERNAL MEDICINE

## 2024-04-02 PROCEDURE — 99291 CRITICAL CARE FIRST HOUR: CPT | Performed by: INTERNAL MEDICINE

## 2024-04-02 PROCEDURE — 700102 HCHG RX REV CODE 250 W/ 637 OVERRIDE(OP): Performed by: INTERNAL MEDICINE

## 2024-04-02 PROCEDURE — A9270 NON-COVERED ITEM OR SERVICE: HCPCS | Performed by: INTERNAL MEDICINE

## 2024-04-02 PROCEDURE — 83735 ASSAY OF MAGNESIUM: CPT

## 2024-04-02 PROCEDURE — A9270 NON-COVERED ITEM OR SERVICE: HCPCS | Performed by: STUDENT IN AN ORGANIZED HEALTH CARE EDUCATION/TRAINING PROGRAM

## 2024-04-02 PROCEDURE — 93010 ELECTROCARDIOGRAM REPORT: CPT | Performed by: INTERNAL MEDICINE

## 2024-04-02 PROCEDURE — 700111 HCHG RX REV CODE 636 W/ 250 OVERRIDE (IP): Mod: JZ | Performed by: INTERNAL MEDICINE

## 2024-04-02 PROCEDURE — 770022 HCHG ROOM/CARE - ICU (200)

## 2024-04-02 PROCEDURE — 700101 HCHG RX REV CODE 250: Performed by: INTERNAL MEDICINE

## 2024-04-02 PROCEDURE — 85027 COMPLETE CBC AUTOMATED: CPT

## 2024-04-02 PROCEDURE — 99231 SBSQ HOSP IP/OBS SF/LOW 25: CPT | Performed by: NURSE PRACTITIONER

## 2024-04-02 PROCEDURE — A9270 NON-COVERED ITEM OR SERVICE: HCPCS | Performed by: HOSPITALIST

## 2024-04-02 RX ORDER — MIDODRINE HYDROCHLORIDE 5 MG/1
5 TABLET ORAL
Status: DISCONTINUED | OUTPATIENT
Start: 2024-04-02 | End: 2024-04-05

## 2024-04-02 RX ORDER — POTASSIUM CHLORIDE 7.45 MG/ML
10 INJECTION INTRAVENOUS
Qty: 200 ML | Refills: 0 | Status: DISCONTINUED | OUTPATIENT
Start: 2024-04-02 | End: 2024-04-02

## 2024-04-02 RX ORDER — CLOPIDOGREL BISULFATE 75 MG/1
75 TABLET ORAL DAILY
Status: DISCONTINUED | OUTPATIENT
Start: 2024-04-02 | End: 2024-04-09 | Stop reason: HOSPADM

## 2024-04-02 RX ORDER — POTASSIUM CHLORIDE 20 MEQ/1
20 TABLET, EXTENDED RELEASE ORAL DAILY
Status: DISCONTINUED | OUTPATIENT
Start: 2024-04-02 | End: 2024-04-06

## 2024-04-02 RX ORDER — MAGNESIUM SULFATE HEPTAHYDRATE 40 MG/ML
2 INJECTION, SOLUTION INTRAVENOUS ONCE
Status: COMPLETED | OUTPATIENT
Start: 2024-04-02 | End: 2024-04-02

## 2024-04-02 RX ORDER — OXYCODONE HYDROCHLORIDE 5 MG/1
2.5 TABLET ORAL EVERY 4 HOURS PRN
Status: DISCONTINUED | OUTPATIENT
Start: 2024-04-02 | End: 2024-04-05

## 2024-04-02 RX ORDER — FUROSEMIDE 10 MG/ML
40 INJECTION INTRAMUSCULAR; INTRAVENOUS
Status: DISCONTINUED | OUTPATIENT
Start: 2024-04-02 | End: 2024-04-03

## 2024-04-02 RX ADMIN — LEVOTHYROXINE SODIUM 125 MCG: 0.12 TABLET ORAL at 05:06

## 2024-04-02 RX ADMIN — PANTOPRAZOLE SODIUM 40 MG: 40 INJECTION, POWDER, LYOPHILIZED, FOR SOLUTION INTRAVENOUS at 05:06

## 2024-04-02 RX ADMIN — POTASSIUM PHOSPHATE, MONOBASIC AND POTASSIUM PHOSPHATE, DIBASIC 15 MMOL: 224; 236 INJECTION, SOLUTION, CONCENTRATE INTRAVENOUS at 10:40

## 2024-04-02 RX ADMIN — FUROSEMIDE 40 MG: 10 INJECTION INTRAMUSCULAR; INTRAVENOUS at 11:05

## 2024-04-02 RX ADMIN — OXYCODONE 2.5 MG: 5 TABLET ORAL at 12:16

## 2024-04-02 RX ADMIN — PANTOPRAZOLE SODIUM 40 MG: 40 INJECTION, POWDER, LYOPHILIZED, FOR SOLUTION INTRAVENOUS at 17:54

## 2024-04-02 RX ADMIN — ATORVASTATIN CALCIUM 10 MG: 10 TABLET, FILM COATED ORAL at 21:19

## 2024-04-02 RX ADMIN — POTASSIUM CHLORIDE 20 MEQ: 1500 TABLET, EXTENDED RELEASE ORAL at 11:06

## 2024-04-02 RX ADMIN — MAGNESIUM SULFATE HEPTAHYDRATE 2 G: 2 INJECTION, SOLUTION INTRAVENOUS at 08:55

## 2024-04-02 RX ADMIN — MIDODRINE HYDROCHLORIDE 5 MG: 5 TABLET ORAL at 09:11

## 2024-04-02 RX ADMIN — MIDODRINE HYDROCHLORIDE 5 MG: 5 TABLET ORAL at 17:54

## 2024-04-02 RX ADMIN — FUROSEMIDE 80 MG: 10 INJECTION, SOLUTION INTRAMUSCULAR; INTRAVENOUS at 05:06

## 2024-04-02 RX ADMIN — FUROSEMIDE 40 MG: 10 INJECTION INTRAMUSCULAR; INTRAVENOUS at 17:53

## 2024-04-02 RX ADMIN — OXYCODONE 2.5 MG: 5 TABLET ORAL at 17:53

## 2024-04-02 RX ADMIN — CLOPIDOGREL BISULFATE 75 MG: 75 TABLET ORAL at 11:05

## 2024-04-02 RX ADMIN — DOCUSATE SODIUM 50 MG AND SENNOSIDES 8.6 MG 2 TABLET: 8.6; 5 TABLET, FILM COATED ORAL at 17:54

## 2024-04-02 RX ADMIN — AMIODARONE HYDROCHLORIDE 200 MG: 200 TABLET ORAL at 05:06

## 2024-04-02 RX ADMIN — MIDODRINE HYDROCHLORIDE 5 MG: 5 TABLET ORAL at 11:05

## 2024-04-02 ASSESSMENT — ENCOUNTER SYMPTOMS
ABDOMINAL PAIN: 0
CHILLS: 0
VOMITING: 0
FEVER: 0
NAUSEA: 0
SHORTNESS OF BREATH: 0

## 2024-04-02 ASSESSMENT — PAIN DESCRIPTION - PAIN TYPE
TYPE: ACUTE PAIN

## 2024-04-02 NOTE — DISCHARGE PLANNING
Medical Social Work -    Called possible relative Laura Shin [phone]. (Left a general VM requesting a call back.)
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
## 2024-04-02 NOTE — CARE PLAN
The patient is Watcher - Medium risk of patient condition declining or worsening    Shift Goals  Clinical Goals: stable hemodynamics  Patient Goals: Find glasses  Family Goals: DEE DEE    Progress made toward(s) clinical / shift goals:    Problem: Knowledge Deficit - Standard  Goal: Patient and family/care givers will demonstrate understanding of plan of care, disease process/condition, diagnostic tests and medications  Outcome: Progressing     Problem: Skin Integrity  Goal: Skin integrity is maintained or improved  Outcome: Progressing     Problem: Pain - Standard  Goal: Alleviation of pain or a reduction in pain to the patient’s comfort goal  Outcome: Progressing     Problem: Fall Risk  Goal: Patient will remain free from falls  Outcome: Met

## 2024-04-02 NOTE — DOCUMENTATION QUERY
"                                                                         [facility]                                                                       Query Response Note      PATIENT:               JUNIOR CHIU  ACCT #:                  [id number]  MRN:                     [id number]  :                      1935  ADMIT DATE:       3/30/2024 1:28 AM  DISCH DATE:          RESPONDING  PROVIDER #:        [id number]           QUERY TEXT:    Please clarify the relationship, if any, between GI bleed, acute blood loss anemia and anticoagulant.    The patient's Clinical Indicators include:  89yo with dx of GI bleed, bilateral pleural effusion, acute blood loss anemia, afib, CKD3b     H&P \"GI bleed Hold Plavix and Eliquis\"    Risk factors: Plavix and Eliquis, advanced age, diverticulosis, GI bleed  Treatments: holding Plavix and Eliquis, labwork, monitoring    Contact me with questions.     Thank you,  Patrica De La Rosa, LARRYP, CDI  [email or web address]  Options provided:   -- GI bleed, acute blood loss anemia is due to/associated with use of anticoagulant   -- GI bleed, acute blood loss anemia is not due to/associated with use of anticoagulant   -- Other explanation, (please specify other explanation)   -- Unable to determine      Query created by: Patrica De La Rosa on 2024 10:19 AM    RESPONSE TEXT:    GI bleed, acute blood loss anemia is due to/associated with use of anticoagulant          Electronically signed by:  MARLA GRIGSBY DO 2024 9:03 AM              "

## 2024-04-02 NOTE — PROGRESS NOTES
"Critical Care Progress Note    Date of admission  3/30/2024    Chief Complaint  89 y.o. female with a history of HFrEF (LVEF 20-25%, normal RV size but reduced function, moderate MR/TR), CAD (s/p CABG), HTN, HLD, chronic atrial fibrillation on Eliquis and Plavix, and prior perforated gastric ulcer who was admitted from [facility] with chest pain and shortness of breath and found to have drop in hemoglobin of 1 point as well as heme positive stools.  Her Eliquis and Plavix were held and she was evaluated by GI who is planning on observation over the weekend given small drop in hemoglobin and no evidence of hemorrhage.       ICU was consulted early morning on 3/31 for acute hypoxic respiratory failure, cyanosis and tripoding.  CXR with pulmonary edema and small bilateral pleural effusions.  She is DNR/DNI.  Will move to ICU for dobutamine, lasix and BiPAP.  Of note, she was just admitted from 3/26 - 3/28 for abdominal pain, CTA done at the time without abnormalities, it was thought to be secondary to constipation.\" H&P    Hospital Course  3/31 admitted to ICU for acute hypoxic resp failure    Interval Problem Update  Reviewed last 24 hour events:  On precedex gtt at 0.6, RASS 0  Precedex gtt was turned off and pt was doing well   Weaning off HFNC - on 6L NC   SBP in 120/60s, MAP >65  Unable to wean off norepinephrine gtt, SBP dropped to 70s when off.   Afib HR in 50s  Hgb 9-10s. No tansfusions overnight. No bleeding  Creatinine 1.12, HCO3 30  K 3.1 Mg 1.9, will replete  -400 cc every 4 hours    Negative 1.4L in the past 24 hours, negative 4L since admission  Afebrile  WBC 5  -200s    On NE gtt 0.0  On lasix 80 Q8H, decrease to 40 Q8H  Amiodarone 200 PO daily   PPI 40 BID      Review of Systems  Review of Systems   Unable to perform ROS: Dementia   Constitutional:  Negative for chills and fever.   Respiratory:  Negative for shortness of breath.    Cardiovascular:  Negative for chest pain.   Gastrointestinal:  " Negative for abdominal pain, nausea and vomiting.        Vital Signs for last 24 hours   Pulse:  [46-65] 51  Resp:  [11-80] 13  BP: ()/(38-66) 93/54  SpO2:  [88 %-99 %] 92 %    Hemodynamic parameters for last 24 hours       Respiratory Information for the last 24 hours       Physical Exam   Physical Exam  Vitals and nursing note reviewed. Exam conducted with a chaperone present.   Constitutional:       General: She is awake.      Appearance: She is ill-appearing.   HENT:      Head: Normocephalic.      Mouth/Throat:      Mouth: Mucous membranes are moist.   Eyes:      Pupils: Pupils are equal, round, and reactive to light.   Cardiovascular:      Rate and Rhythm: Regular rhythm. Tachycardia present.      Pulses: Normal pulses.   Pulmonary:      Effort: Pulmonary effort is normal.      Breath sounds: Rhonchi present.   Abdominal:      General: There is no distension.      Palpations: Abdomen is soft.      Tenderness: There is abdominal tenderness. There is no guarding or rebound.   Musculoskeletal:         General: Normal range of motion.      Cervical back: Normal range of motion and neck supple.      Right lower leg: No edema.      Left lower leg: No edema.   Skin:     General: Skin is cool.      Capillary Refill: Capillary refill takes 2 to 3 seconds.      Coloration: Skin is mottled (Significant mottling from feet to abdomen).   Neurological:      General: No focal deficit present.      Comments: Alert and conversant but disoriented  WYNN, no defecits           Medications  Current Facility-Administered Medications   Medication Dose Route Frequency Provider Last Rate Last Admin    ipratropium-albuterol (DUONEB) nebulizer solution  3 mL Nebulization Q4H PRN (RT) CRISTOBAL GonzalezPFelicia        Respiratory Therapy Consult   Nebulization Continuous RT Kathleen Aguilar M.D.        DOBUTamine (Dobutrex) 1 mg/1 mL premix infusion  5 mcg/kg/min (Ideal) Intravenous Continuous Hany Aguilar M.D.   Stopped at 03/31/24  1402    MD Alert...ICU Electrolyte Replacement per Pharmacy   Other PHARMACY TO DOSE Noé Loyd M.D.        norepinephrine (Levophed) 8 mg in 250 mL NS infusion (premix)  0-1 mcg/kg/min (Ideal) Intravenous Continuous Noé Loyd M.D. 1.2 mL/hr at 04/02/24 0529 0.01 mcg/kg/min at 04/02/24 0529    dexmedetomidine (PRECEDEX) 400 mcg/100mL NS premix infusion  0.1-1.5 mcg/kg/hr (Ideal) Intravenous Continuous Noé Loyd M.D. 9.7 mL/hr at 04/01/24 2344 0.6 mcg/kg/hr at 04/01/24 2344    furosemide (Lasix) injection 80 mg  80 mg Intravenous TID DIURETIC Noé Loyd M.D.   80 mg at 04/02/24 0506    ondansetron (Zofran) syringe/vial injection 4 mg  4 mg Intravenous Q4HRS PRN Noé Loyd M.D.        amiodarone (Cordarone) tablet 200 mg  200 mg Oral DAILY Tom Carrera M.D.   200 mg at 04/02/24 0506    atorvastatin (Lipitor) tablet 10 mg  10 mg Oral Nightly Tom Carrera M.D.   10 mg at 04/01/24 2022    levothyroxine (Synthroid) tablet 125 mcg  125 mcg Oral AM ES Tom Carrera M.D.   125 mcg at 04/02/24 0506    [Held by provider] metoprolol SR (Toprol XL) tablet 12.5 mg  12.5 mg Oral Q EVENING Tom Carrera M.D.        pantoprazole (Protonix) injection 40 mg  40 mg Intravenous BID Tom Carrera M.D.   40 mg at 04/02/24 0506    polyethylene glycol/lytes (Miralax) Packet 1 Packet  1 Packet Oral DAILY Kathleen Aguilar M.D.        senna-docusate (Pericolace Or Senokot S) 8.6-50 MG per tablet 2 Tablet  2 Tablet Oral Q EVENING Kathleen Aguilar M.D.   2 Tablet at 04/01/24 1702    bisacodyl (Dulcolax) suppository 10 mg  10 mg Rectal QDAY PRN Yanning Aguilar, M.D.        oxyCODONE immediate-release (Roxicodone) tablet 5 mg  5 mg Oral Q4HRS PRN OSMANI GonzalezN.P.   5 mg at 04/01/24 2129       Fluids    Intake/Output Summary (Last 24 hours) at 4/2/2024 0636  Last data filed at 4/2/2024 0600  Gross per 24 hour   Intake 987.09 ml   Output 2845 ml   Net -1857.91 ml       Laboratory  Recent Labs     03/31/24  0145   AUXZH58O 7.27*    [id number] 54.3*   ZSUZW162B 125.9*   FYQO0RIZ 97.5   ARTHCO3 24   O2OFYDJUB 50L   ARTBE -3         Recent Labs     03/31/24  0143 03/31/24  0600 04/01/24  0533 04/02/24  0524   SODIUM 138 138 136 137   POTASSIUM 4.5 3.9 3.4* 3.1*   CHLORIDE 101 102 97 96   CO2 22 25 29 30   BUN 24* 25* 24* 19   CREATININE 1.08 1.10 1.12 1.13   MAGNESIUM 2.3  --  1.8 1.9   PHOSPHORUS 3.6  --  3.1 2.1*   CALCIUM 9.4 8.5 8.4* 8.6     Recent Labs     03/31/24  0600 04/01/24  0533 04/02/24  0524   ALTSGPT 11 10 9   ASTSGOT 20 19 17   ALKPHOSPHAT 89 88 81   TBILIRUBIN 0.6 0.7 0.8   GLUCOSE 137* 205* 117*     Recent Labs     03/31/24  0143 03/31/24  0600 04/01/24  0533 04/02/24  0524   WBC 17.1* 10.1 5.0  --    ASTSGOT 29 20 19 17   ALTSGPT 13 11 10 9   ALKPHOSPHAT 116* 89 88 81   TBILIRUBIN 0.7 0.6 0.7 0.8     Recent Labs     03/31/24  0143 03/31/24  0600 03/31/24  1310 03/31/24  2115 04/01/24  0533 04/01/24  1714   RBC 4.57 3.62*  --   --  3.44*  --    HEMOGLOBIN 12.0 9.5*   < > 9.8* 9.1* 9.0*   HEMATOCRIT 39.0 30.7*   < > 30.4* 28.3* 27.2*   PLATELETCT 298 212  --   --  233  --     < > = values in this interval not displayed.       Imaging  X-Ray:  I have personally reviewed the images and compared with prior images.  EKG:  I have personally reviewed the images and compared with prior images.    Assessment/Plan  * Acute respiratory failure with hypoxia (HCC)- (present on admission)  Assessment & Plan  Initial concern for low output state with congestion, lasix,  and NE  CXR with edema and effusions  Lung US with large effusions bilaterally, pt refuses for thora  Patient told me she did not want thora or pigtails  Gentle diruesis with lasix. Diuresing well  Weaning down HFNC  Goal negative fluid balance    Bradycardia  Assessment & Plan  Likely from precedex. Discontinue precedex      Cardiogenic shock (HCC)  Assessment & Plan  Off dobutamine  S/p fluid bolus and lasix last week  Weaning off NE gtt today goal MAP >60  Continue  lasix, decrease to 40 Q8H  BiPAP PRN and Noc  Hold GDMT    Acute blood loss anemia  Assessment & Plan  Mild  Trend H/H  Cautious transfusion given cardiogenic shock and heart failure exacerbation    GI bleed- (present on admission)  Assessment & Plan  No real evidence of bleeding so far. Hgb stable  Will resume plavix   Hold Eliquis  Monitor for bleeindg  Protonix BID  Resuming oral intake    AF (atrial fibrillation) (HCC)- (present on admission)  Assessment & Plan  Rate controlled    Hold metoprolol given shock  Hold anticoagulation given potential GI bleeding    Stage 3b chronic kidney disease (CKD)- (present on admission)  Assessment & Plan  Strict I/Os  Renally dosed medications  Avoid nephrotoxic agents as able  Trend    Acute on chronic combined systolic and diastolic congestive heart failure (HCC)- (present on admission)  Assessment & Plan  Decrease lasix to 40 Q8H  Off dobutamine  Responding well to lasix  Trend lactate/urine output      Hypotension- (present on admission)  Assessment & Plan  Unclear etiology - cardiogenic vs septic. Not bleeding.   Hb has been stable  Lactate cleared with diuresis and   Pt is well perfused with warm extremities.   Pt did receive hydrocortisone last night - cortisol this am would be difficult to interpret.   No apparent infection. Bcxs and PCT sent. Blood cultures 3/31 with 1/2 CONS. Blood cultures repeated.         Bilateral pleural effusion- (present on admission)  Assessment & Plan  Would likely benefit from thoras but patient refused when discussed with Dr. Aguilar   Being diuresed with lasix. Responding well.       Hx of CABG- (present on admission)  Assessment & Plan  Restart Plavix iso og potential GI losses of blood  Trend HB  Hold beta-blocker for shock and bradycardia         I have performed a physical exam and reviewed and updated ROS and Plan today (4/2/2024). In review of yesterday's note (4/1/2024), there are no changes except as documented above.      Discussed patient condition and risk of morbidity and/or mortality with RN, RT, Therapies, Pharmacy, Dietary, and Patient  The patient remains critically ill.  Critical care time = 45 minutes in directly providing and coordinating critical care and extensive data review.  No time overlap and excludes procedures.

## 2024-04-02 NOTE — DISCHARGE PLANNING
Medical Social Work -    Attempted to locate NOK - Used true people search and found a handful of possible relatives. Called all of the following;    Holly Haley - [phone] (not available, non stop ringing)  James Shin - [phone] (disconnected) This is Marcines brother who passed away  Francis Lopez - [phone] (not a true number)  Fortunato Delvalle- [phone] (not a working number)  Sonia Su- [phone] (left a general voicemail requesting a returned call)  Giles Su- [phone] (busy signal)  Lalitha Brittanie [phone] (states she does not recognize pt name)    I then retrieved a contact off of [facility] chart - Yudy Shin [phone] (left a general voicemail requesting a returned call). Rebecca Allen who we have already been speaking to was also a contact on [employer]. James is her father who is now passed.    Will continue to search for NOK and remain available for any further CM needs.

## 2024-04-02 NOTE — PROGRESS NOTES
"Inpatient Palliative Care     Location: [facility]     HPI:   Davie Montejo is a 89 y.o. female with past medical history of dementia, atrial fibrillation, systolic heart failure with an EF of 25%, severe MR, CAD s/p CABG who presented on 3/30/2024 with chest pain, shortness of breath, and generalized weakness.  Patient states she has been having frequent night sweats and was recently placed on 2 L nasal cannula at skilled nursing facility.  Patient recently hospitalized and discharged on 3/26 where she was noted to have pleural effusions and underwent a thoracentesis.  During this hospital stay she was also noted to be in A-fib with RVR.  She is taking Plavix and Eliquis.     In the emergency department patient is found to be Hemoccult positive.  Patient was admitted to the floor for further workup and treatment, however was found to be acutely hypoxic with respiratory failure, cyanosis, and tripoding.  Chest x-ray notable for pulmonary edema and small bilateral pleural effusions.  Patient was moved to the ICU for dobutamine, Lasix, and BiPAP.       Significant/pertinent past medical history: Acute blood loss anemia, acute perforated gastric ulcer with hemorrhage, anemia, angina, cannabis use, generalized abdominal pain, fibromyalgia, MI, hypotension, intractable nausea and vomiting, and pain.  Former smoker, no current drug or alcohol use.   .  Interval:  On Dobutamine and Levophed drips, remains in the ICU.     2024:  and Dex weaned off. Remains on some Levophed. Weaned down to face mask/cannula with meals. More alert today. Some agitation yesterday after Dex paused, none today.     Summary:  Met with patient at bedside. Introduced myself and role of palliative care. Her goal is to go back to her apartment. I am not senile and I just want to get outta here\". Patient has poor insight and some evident cognitive impairment. Per the bedside RN, patient has [facility]  who has provided name of " werner. Will plan on contacting same.       4/2/2024: Met with patient, she is eating breakfast. No c/o. She is looking for her glasses and purse. These are at the facility she came from. She states she does not want Rebecca involved in her care but would like us to contact other nieces, Laura and Nicole. She cannot provide any additional information regarding these family members. TC placed to Rebecca to search for information on cousins, LM on VM.      Active listening, reflection, reminiscing, validation & normalization, and empathic support utilized throughout this encounter.  All questions answered and contact information provided, encouraged to call with any questions or concerns.      Plan:     1) recommend NOK search for family members as listed.  2)   3)        Thank you for allowing me the opportunity to participate in the care of  Davie Montejo.     I spent a total of 39 minutes reviewing medical records, direct face-to-face time with the patient and/or family, coordination of care, and documentation. This is separate from the time spent on advance care planning, which is documented above.     Aishwarya Castillo, MSN, APRN, ACNPC-AG.  Palliative Care Nurse Practitioner  [phone]

## 2024-04-02 NOTE — CARE PLAN
Problem: Humidified High Flow Nasal Cannula  Goal: Maintain adequate oxygenation dependent on patient condition  Description: Target End Date:  resolve prior to discharge or when underlying condition is resolved/stabilized    1.  Implement humidified high flow oxygen therapy  2.  Titrate high flow oxygen to maintain appropriate SpO2  Outcome: Progressing     HHFNC 30 L / 40%

## 2024-04-02 NOTE — CARE PLAN
Problem: Pain - Standard  Goal: Alleviation of pain or a reduction in pain to the patient’s comfort goal  Outcome: Progressing  Patient receiving PRN medication for pain     The patient is Watcher - Medium risk of patient condition declining or worsening    Shift Goals  Clinical Goals: stable hemodynamics  Patient Goals: Find glasses  Family Goals: DEE DEE    Progress made toward(s) clinical / shift goals:  Progressing    Patient is not progressing towards the following goals: Finding her glasses

## 2024-04-03 PROBLEM — I50.9 CONGESTIVE HEART FAILURE WITH CARDIOMYOPATHY (HCC): Status: ACTIVE | Noted: 2024-04-03

## 2024-04-03 PROBLEM — I50.43 CHF (CONGESTIVE HEART FAILURE), NYHA CLASS I, ACUTE ON CHRONIC, COMBINED (HCC): Status: ACTIVE | Noted: 2024-04-03

## 2024-04-03 PROBLEM — I42.9 CONGESTIVE HEART FAILURE WITH CARDIOMYOPATHY (HCC): Status: ACTIVE | Noted: 2024-04-03

## 2024-04-03 LAB
ALBUMIN SERPL BCP-MCNC: 3.7 G/DL (ref 3.2–4.9)
ALBUMIN/GLOB SERPL: 1.2 G/DL
ALP SERPL-CCNC: 86 U/L (ref 30–99)
ALT SERPL-CCNC: 7 U/L (ref 2–50)
ANION GAP SERPL CALC-SCNC: 12 MMOL/L (ref 7–16)
ANION GAP SERPL CALC-SCNC: 12 MMOL/L (ref 7–16)
AST SERPL-CCNC: 18 U/L (ref 12–45)
BACTERIA BLD CULT: ABNORMAL
BACTERIA BLD CULT: ABNORMAL
BILIRUB SERPL-MCNC: 0.7 MG/DL (ref 0.1–1.5)
BUN SERPL-MCNC: 17 MG/DL (ref 8–22)
BUN SERPL-MCNC: 17 MG/DL (ref 8–22)
CALCIUM ALBUM COR SERPL-MCNC: 9 MG/DL (ref 8.5–10.5)
CALCIUM SERPL-MCNC: 8.8 MG/DL (ref 8.5–10.5)
CALCIUM SERPL-MCNC: 9.1 MG/DL (ref 8.5–10.5)
CHLORIDE SERPL-SCNC: 100 MMOL/L (ref 96–112)
CHLORIDE SERPL-SCNC: 95 MMOL/L (ref 96–112)
CO2 SERPL-SCNC: 27 MMOL/L (ref 20–33)
CO2 SERPL-SCNC: 31 MMOL/L (ref 20–33)
CREAT SERPL-MCNC: 1.17 MG/DL (ref 0.5–1.4)
CREAT SERPL-MCNC: 1.29 MG/DL (ref 0.5–1.4)
EKG IMPRESSION: NORMAL
ERYTHROCYTE [DISTWIDTH] IN BLOOD BY AUTOMATED COUNT: 55.8 FL (ref 35.9–50)
GFR SERPLBLD CREATININE-BSD FMLA CKD-EPI: 40 ML/MIN/1.73 M 2
GFR SERPLBLD CREATININE-BSD FMLA CKD-EPI: 45 ML/MIN/1.73 M 2
GLOBULIN SER CALC-MCNC: 3 G/DL (ref 1.9–3.5)
GLUCOSE SERPL-MCNC: 101 MG/DL (ref 65–99)
GLUCOSE SERPL-MCNC: 122 MG/DL (ref 65–99)
HCT VFR BLD AUTO: 32.8 % (ref 37–47)
HGB BLD-MCNC: 10.4 G/DL (ref 12–16)
MAGNESIUM SERPL-MCNC: 2.1 MG/DL (ref 1.5–2.5)
MAGNESIUM SERPL-MCNC: 2.4 MG/DL (ref 1.5–2.5)
MCH RBC QN AUTO: 26.3 PG (ref 27–33)
MCHC RBC AUTO-ENTMCNC: 31.7 G/DL (ref 32.2–35.5)
MCV RBC AUTO: 82.8 FL (ref 81.4–97.8)
PHOSPHATE SERPL-MCNC: 2.6 MG/DL (ref 2.5–4.5)
PHOSPHATE SERPL-MCNC: 3.2 MG/DL (ref 2.5–4.5)
PLATELET # BLD AUTO: 292 K/UL (ref 164–446)
PMV BLD AUTO: 11 FL (ref 9–12.9)
POTASSIUM SERPL-SCNC: 3.2 MMOL/L (ref 3.6–5.5)
POTASSIUM SERPL-SCNC: 4 MMOL/L (ref 3.6–5.5)
PROT SERPL-MCNC: 6.7 G/DL (ref 6–8.2)
RBC # BLD AUTO: 3.96 M/UL (ref 4.2–5.4)
SIGNIFICANT IND 70042: ABNORMAL
SITE SITE: ABNORMAL
SODIUM SERPL-SCNC: 138 MMOL/L (ref 135–145)
SODIUM SERPL-SCNC: 139 MMOL/L (ref 135–145)
SOURCE SOURCE: ABNORMAL
WBC # BLD AUTO: 7.4 K/UL (ref 4.8–10.8)

## 2024-04-03 PROCEDURE — 85027 COMPLETE CBC AUTOMATED: CPT

## 2024-04-03 PROCEDURE — 80048 BASIC METABOLIC PNL TOTAL CA: CPT

## 2024-04-03 PROCEDURE — 700102 HCHG RX REV CODE 250 W/ 637 OVERRIDE(OP): Performed by: INTERNAL MEDICINE

## 2024-04-03 PROCEDURE — 700102 HCHG RX REV CODE 250 W/ 637 OVERRIDE(OP): Performed by: HOSPITALIST

## 2024-04-03 PROCEDURE — C9113 INJ PANTOPRAZOLE SODIUM, VIA: HCPCS | Performed by: HOSPITALIST

## 2024-04-03 PROCEDURE — A9270 NON-COVERED ITEM OR SERVICE: HCPCS | Performed by: INTERNAL MEDICINE

## 2024-04-03 PROCEDURE — 700105 HCHG RX REV CODE 258: Performed by: INTERNAL MEDICINE

## 2024-04-03 PROCEDURE — 99233 SBSQ HOSP IP/OBS HIGH 50: CPT | Performed by: HOSPITALIST

## 2024-04-03 PROCEDURE — 93005 ELECTROCARDIOGRAM TRACING: CPT | Performed by: INTERNAL MEDICINE

## 2024-04-03 PROCEDURE — 700102 HCHG RX REV CODE 250 W/ 637 OVERRIDE(OP): Performed by: STUDENT IN AN ORGANIZED HEALTH CARE EDUCATION/TRAINING PROGRAM

## 2024-04-03 PROCEDURE — 80053 COMPREHEN METABOLIC PANEL: CPT

## 2024-04-03 PROCEDURE — 84100 ASSAY OF PHOSPHORUS: CPT | Mod: 91

## 2024-04-03 PROCEDURE — A9270 NON-COVERED ITEM OR SERVICE: HCPCS | Performed by: STUDENT IN AN ORGANIZED HEALTH CARE EDUCATION/TRAINING PROGRAM

## 2024-04-03 PROCEDURE — 83735 ASSAY OF MAGNESIUM: CPT | Mod: 91

## 2024-04-03 PROCEDURE — 93010 ELECTROCARDIOGRAM REPORT: CPT | Performed by: INTERNAL MEDICINE

## 2024-04-03 PROCEDURE — 700111 HCHG RX REV CODE 636 W/ 250 OVERRIDE (IP): Mod: JZ | Performed by: INTERNAL MEDICINE

## 2024-04-03 PROCEDURE — 770000 HCHG ROOM/CARE - INTERMEDIATE ICU *

## 2024-04-03 PROCEDURE — A9270 NON-COVERED ITEM OR SERVICE: HCPCS | Performed by: HOSPITALIST

## 2024-04-03 PROCEDURE — 700111 HCHG RX REV CODE 636 W/ 250 OVERRIDE (IP): Performed by: HOSPITALIST

## 2024-04-03 RX ORDER — SODIUM CHLORIDE, SODIUM LACTATE, POTASSIUM CHLORIDE, AND CALCIUM CHLORIDE .6; .31; .03; .02 G/100ML; G/100ML; G/100ML; G/100ML
500 INJECTION, SOLUTION INTRAVENOUS ONCE
Status: COMPLETED | OUTPATIENT
Start: 2024-04-03 | End: 2024-04-04

## 2024-04-03 RX ORDER — POTASSIUM CHLORIDE 20 MEQ/1
40 TABLET, EXTENDED RELEASE ORAL EVERY 6 HOURS
Status: COMPLETED | OUTPATIENT
Start: 2024-04-03 | End: 2024-04-03

## 2024-04-03 RX ORDER — SODIUM CHLORIDE, SODIUM LACTATE, POTASSIUM CHLORIDE, AND CALCIUM CHLORIDE .6; .31; .03; .02 G/100ML; G/100ML; G/100ML; G/100ML
250 INJECTION, SOLUTION INTRAVENOUS ONCE
Status: COMPLETED | OUTPATIENT
Start: 2024-04-03 | End: 2024-04-03

## 2024-04-03 RX ADMIN — MIDODRINE HYDROCHLORIDE 5 MG: 5 TABLET ORAL at 17:11

## 2024-04-03 RX ADMIN — METOPROLOL TARTRATE 12.5 MG: 25 TABLET, FILM COATED ORAL at 21:24

## 2024-04-03 RX ADMIN — OXYCODONE 2.5 MG: 5 TABLET ORAL at 23:21

## 2024-04-03 RX ADMIN — AMIODARONE HYDROCHLORIDE 200 MG: 200 TABLET ORAL at 05:20

## 2024-04-03 RX ADMIN — SODIUM CHLORIDE, POTASSIUM CHLORIDE, SODIUM LACTATE AND CALCIUM CHLORIDE 250 ML: 600; 310; 30; 20 INJECTION, SOLUTION INTRAVENOUS at 20:59

## 2024-04-03 RX ADMIN — SODIUM CHLORIDE, POTASSIUM CHLORIDE, SODIUM LACTATE AND CALCIUM CHLORIDE 500 ML: 600; 310; 30; 20 INJECTION, SOLUTION INTRAVENOUS at 08:17

## 2024-04-03 RX ADMIN — OXYCODONE 2.5 MG: 5 TABLET ORAL at 17:10

## 2024-04-03 RX ADMIN — POTASSIUM CHLORIDE 20 MEQ: 1500 TABLET, EXTENDED RELEASE ORAL at 05:21

## 2024-04-03 RX ADMIN — OXYCODONE 2.5 MG: 5 TABLET ORAL at 09:12

## 2024-04-03 RX ADMIN — FUROSEMIDE 40 MG: 10 INJECTION INTRAMUSCULAR; INTRAVENOUS at 05:21

## 2024-04-03 RX ADMIN — POTASSIUM CHLORIDE 40 MEQ: 1500 TABLET, EXTENDED RELEASE ORAL at 08:16

## 2024-04-03 RX ADMIN — ATORVASTATIN CALCIUM 10 MG: 10 TABLET, FILM COATED ORAL at 21:09

## 2024-04-03 RX ADMIN — OXYCODONE 2.5 MG: 5 TABLET ORAL at 03:11

## 2024-04-03 RX ADMIN — DOCUSATE SODIUM 50 MG AND SENNOSIDES 8.6 MG 2 TABLET: 8.6; 5 TABLET, FILM COATED ORAL at 17:10

## 2024-04-03 RX ADMIN — PANTOPRAZOLE SODIUM 40 MG: 40 INJECTION, POWDER, LYOPHILIZED, FOR SOLUTION INTRAVENOUS at 05:21

## 2024-04-03 RX ADMIN — MIDODRINE HYDROCHLORIDE 5 MG: 5 TABLET ORAL at 08:16

## 2024-04-03 RX ADMIN — PANTOPRAZOLE SODIUM 40 MG: 40 INJECTION, POWDER, LYOPHILIZED, FOR SOLUTION INTRAVENOUS at 17:10

## 2024-04-03 RX ADMIN — POTASSIUM CHLORIDE 40 MEQ: 1500 TABLET, EXTENDED RELEASE ORAL at 14:48

## 2024-04-03 RX ADMIN — LEVOTHYROXINE SODIUM 125 MCG: 0.12 TABLET ORAL at 05:20

## 2024-04-03 RX ADMIN — CLOPIDOGREL BISULFATE 75 MG: 75 TABLET ORAL at 05:20

## 2024-04-03 RX ADMIN — MIDODRINE HYDROCHLORIDE 5 MG: 5 TABLET ORAL at 12:53

## 2024-04-03 RX ADMIN — APIXABAN 5 MG: 5 TABLET, FILM COATED ORAL at 10:10

## 2024-04-03 RX ADMIN — APIXABAN 5 MG: 5 TABLET, FILM COATED ORAL at 17:12

## 2024-04-03 ASSESSMENT — PAIN DESCRIPTION - PAIN TYPE
TYPE: ACUTE PAIN

## 2024-04-03 ASSESSMENT — ENCOUNTER SYMPTOMS
NAUSEA: 0
VOMITING: 0
HEADACHES: 0
COUGH: 0
SPEECH CHANGE: 0
STRIDOR: 0
SHORTNESS OF BREATH: 0
DIZZINESS: 0
CHILLS: 0
FEVER: 0
PALPITATIONS: 0
NERVOUS/ANXIOUS: 0
BACK PAIN: 0

## 2024-04-03 ASSESSMENT — CHA2DS2 SCORE
CHF OR LEFT VENTRICULAR DYSFUNCTION: YES
SEX: FEMALE
DIABETES: NO
VASCULAR DISEASE: YES
PRIOR STROKE OR TIA OR THROMBOEMBOLISM: NO
HYPERTENSION: YES
CHA2DS2 VASC SCORE: 6
AGE 75 OR GREATER: YES
AGE 65 TO 74: NO

## 2024-04-03 ASSESSMENT — FIBROSIS 4 INDEX: FIB4 SCORE: 2.03

## 2024-04-03 NOTE — PROGRESS NOTES
Brief progress note    Pt did well overnight, diuresing well and weaning down O2  Off pressor. Hemodynamically stable.   Mentation is the same.   Renal function is stable.   Continue diuresis  Resumed plavix yesterday, no bleeding  Should be able to resume eliquis soon    Can be transferred to telemetry floor  D/w Hosp medicine    Maxwell Ochoa D.O.

## 2024-04-03 NOTE — PROGRESS NOTES
"Hospital Medicine Daily Progress Note    Date of Service  4/3/2024    Chief Complaint  Right sided Chest pain    Hospital Course  Davie Montejo is a 89 y.o. female with a history of dementia, atrial fibrillation, prior perforated gastric ulcer, hypertension, dyslipidemia, HFrEF 25%, severe MR, coronary artery disease status post CABG.  Admitted 3/30/2024 with increasing weakness and shortness of breath with frequent night sweats.  She was transferred from [facility] nursing [facility] placed on 2 L nasal cannula.  She had had a recent discharge from the hospital 3/26 where she was noted to have pleural effusions and underwent a thoracentesis.  She is on Plavix and Eliquis.  In the emergency room she was found to be Hemoccult positive.  Chest x-ray showed bilateral pulmonary edema and bilateral pleural effusions.  EKG: Sinus bradycardia.  During her admission she was transferred to the ICU with acute worsening of shortness of breath/respiratory failure.  She was initiated on dobutamine Lasix and BiPAP.  She was requiring Precedex drip during her ICU stay but this was weaned off.  She was on norepinephrine drip.  The patient had refused thoracentesis per report.  For the patient's GI bleed, her hemoglobin remained stable the Eliquis was held as well as the Plavix but as there is no significant drop in her hemoglobin Plavix was reinitiated.    Interval Problem Update  4/3 K:3.2.  Pleasant and able to talk in full sentences. She was concerned that she has a  that \"took everything I own.\"    I have discussed this patient's plan of care and discharge plan at IDT rounds today with Case Management, Nursing, Nursing leadership, and other members of the IDT team.    Consultants/Specialty  critical care, GI, and palliative care    Code Status  DNAR/DNI    Disposition  The patient is not medically cleared for discharge to home or a post-acute facility.      I have placed the appropriate orders for " post-discharge needs.    Review of Systems  Review of Systems   Constitutional:  Positive for malaise/fatigue. Negative for chills and fever.   Respiratory:  Negative for cough, shortness of breath and stridor.    Cardiovascular:  Negative for chest pain, palpitations and leg swelling.   Gastrointestinal:  Negative for nausea and vomiting.   Musculoskeletal:  Negative for back pain and joint pain.   Neurological:  Negative for dizziness, speech change and headaches.   Psychiatric/Behavioral:  The patient is not nervous/anxious.         Physical Exam  Temp:  [36.4 °C (97.6 °F)-37 °C (98.6 °F)] 36.6 °C (97.8 °F)  Pulse:  [] 136  Resp:  [13-48] 17  BP: ()/(50-66) 123/66  SpO2:  [87 %-99 %] 95 %    Physical Exam  Vitals reviewed.   Constitutional:       Appearance: Normal appearance. She is not diaphoretic.   HENT:      Head: Normocephalic and atraumatic.      Nose: Nose normal.      Mouth/Throat:      Mouth: Mucous membranes are moist.      Pharynx: No oropharyngeal exudate.   Eyes:      General: No scleral icterus.        Right eye: No discharge.         Left eye: No discharge.      Extraocular Movements: Extraocular movements intact.      Conjunctiva/sclera: Conjunctivae normal.   Neck:      Vascular: No carotid bruit.   Cardiovascular:      Rate and Rhythm: Normal rate and regular rhythm.      Pulses:           Radial pulses are 2+ on the right side and 2+ on the left side.        Dorsalis pedis pulses are 2+ on the right side and 2+ on the left side.      Heart sounds: No murmur heard.  Pulmonary:      Effort: Pulmonary effort is normal. No respiratory distress.      Breath sounds: Normal breath sounds. No wheezing or rales.   Abdominal:      General: Bowel sounds are normal. There is no distension.      Palpations: Abdomen is soft.   Musculoskeletal:         General: No swelling or tenderness.      Cervical back: No muscular tenderness.      Right lower leg: No edema.      Left lower leg: No edema.    Lymphadenopathy:      Cervical: No cervical adenopathy.   Skin:     Coloration: Skin is pale. Skin is not jaundiced.   Neurological:      General: No focal deficit present.      Mental Status: She is alert and oriented to person, place, and time. Mental status is at baseline.      Cranial Nerves: No cranial nerve deficit.   Psychiatric:         Mood and Affect: Mood normal.         Behavior: Behavior normal.         Fluids    Intake/Output Summary (Last 24 hours) at 4/3/2024 2156  Last data filed at 4/3/2024 2000  Gross per 24 hour   Intake 1100 ml   Output 1685 ml   Net -585 ml       Laboratory  Recent Labs     04/01/24  0533 04/01/24  1714 04/02/24  0524 04/03/24  0518   WBC 5.0  --  6.6 7.4   RBC 3.44*  --  3.42* 3.96*   HEMOGLOBIN 9.1* 9.0* 9.1* 10.4*   HEMATOCRIT 28.3* 27.2* 27.8* 32.8*   MCV 82.3  --  81.3* 82.8   MCH 26.5*  --  26.6* 26.3*   MCHC 32.2  --  32.7 31.7*   RDW 54.4*  --  54.0* 55.8*   PLATELETCT 233  --  231 292   MPV 11.5  --  11.0 11.0     Recent Labs     04/02/24  0524 04/03/24  0518 04/03/24  2100   SODIUM 137 138 139   POTASSIUM 3.1* 3.2* 4.0   CHLORIDE 96 95* 100   CO2 30 31 27   GLUCOSE 117* 101* 122*   BUN 19 17 17   CREATININE 1.13 1.17 1.29   CALCIUM 8.6 8.8 9.1                   Imaging  DX-CHEST-LIMITED (1 VIEW)   Final Result      1.  Small bilateral pleural effusions with adjacent airspace disease.      DX-CHEST-LIMITED (1 VIEW)   Final Result         Right central line is noted with tip at the level of the SVC. No pneumothorax is identified.      DX-CHEST-PORTABLE (1 VIEW)   Final Result         1.  There is some interval increase in poorly defined opacifications in each lung including the lower lungs.      2.  Left pleural effusion is again identified.      DX-CHEST-PORTABLE (1 VIEW)   Final Result         1.  Hazy bilateral pulmonary infiltrates, similar to prior study.   2.  Small bilateral pleural effusions, stable   3.  Cardiomegaly           Assessment/Plan  * Acute  respiratory failure with hypoxia (HCC)- (present on admission)  Assessment & Plan  On 2 L of O2 above baseline    Acute blood loss anemia  Assessment & Plan  Monitor hemoglobin every 8 hours and transfuse less than 7    GI bleed- (present on admission)  Assessment & Plan  Hold Plavix   Eliquis restarted and watching closely  Continuous cardiac monitoring  IV Protonix  Monitor H&H every 8 hours, transfuse for hemoglobin less than 7  GI consulted and signed off feeling EGD not required but recommended PPI BID    AF (atrial fibrillation) (HCC)- (present on admission)  Assessment & Plan  Presents with bradycardia  Monitor for sinus pauses  Continue metoprolol with holding parameters  Continue amiodarone  Eliquis    Stage 3b chronic kidney disease (CKD)- (present on admission)  Assessment & Plan  4/3 BUN:17, Cr:1.17  Monitor bmp      Acute on chronic combined systolic and diastolic congestive heart failure (HCC)- (present on admission)  Assessment & Plan  Decompensated  Hold Lasix until GI bleed has resolved  Continue metoprolol    Hypotension- (present on admission)  Assessment & Plan  Monitor vitals,I/O's, labs  On midodrine 5mg BID    Bilateral pleural effusion- (present on admission)  Assessment & Plan  Diuresis  Monitor respiratory status    Hx of CABG- (present on admission)  Assessment & Plan  Medical management  Continue statins  Holding plavix for now due to GI bleed  Patient does present with chest pain and will follow troponins         VTE prophylaxis:   SCDs/TEDs   therapeutic anticoagulation with eliquis 5 mg BID      I have performed a physical exam and reviewed and updated ROS and Plan today (4/3/2024). In review of yesterday's note (4/2/2024), there are no changes except as documented above.

## 2024-04-03 NOTE — CARE PLAN
The patient is Stable - Low risk of patient condition declining or worsening    Shift Goals  Clinical Goals: stable hemodryanmics, wean O2  Patient Goals: find glasses and purse  Family Goals: DEE DEE - no family present    Progress made toward(s) clinical / shift goals:    Problem: Knowledge Deficit - Standard  Goal: Patient and family/care givers will demonstrate understanding of plan of care, disease process/condition, diagnostic tests and medications  Outcome: Progressing     Problem: Skin Integrity  Goal: Skin integrity is maintained or improved  Outcome: Progressing     Problem: Pain - Standard  Goal: Alleviation of pain or a reduction in pain to the patient’s comfort goal  Outcome: Progressing     Problem: Safety - Medical Restraint  Goal: Remains free of injury from restraints (Restraint for Interference with Medical Device)  Outcome: Met  Goal: Free from restraint(s) (Restraint for Interference with Medical Device)  Outcome: Met       Patient is not progressing towards the following goals:

## 2024-04-03 NOTE — THERAPY
Physical Therapy Contact Note    Patient Name: Davie Montejo  Age:  89 y.o., Sex:  female  Medical Record #: [id number]  Today's Date: 4/3/2024       04/03/24 0912   Interdisciplinary Plan of Care Collaboration   IDT Collaboration with  Nursing   Collaboration Comments Pt in afib sustaining in the 120s at rest. PT will hold and will follow up as appropriate.

## 2024-04-04 LAB
ALBUMIN SERPL BCP-MCNC: 3.9 G/DL (ref 3.2–4.9)
ALBUMIN/GLOB SERPL: 1.1 G/DL
ALP SERPL-CCNC: 93 U/L (ref 30–99)
ALT SERPL-CCNC: 9 U/L (ref 2–50)
ANION GAP SERPL CALC-SCNC: 13 MMOL/L (ref 7–16)
AST SERPL-CCNC: 19 U/L (ref 12–45)
BILIRUB SERPL-MCNC: 0.6 MG/DL (ref 0.1–1.5)
BUN SERPL-MCNC: 19 MG/DL (ref 8–22)
CALCIUM ALBUM COR SERPL-MCNC: 9.6 MG/DL (ref 8.5–10.5)
CALCIUM SERPL-MCNC: 9.5 MG/DL (ref 8.5–10.5)
CHLORIDE SERPL-SCNC: 99 MMOL/L (ref 96–112)
CO2 SERPL-SCNC: 26 MMOL/L (ref 20–33)
CREAT SERPL-MCNC: 1.36 MG/DL (ref 0.5–1.4)
EKG IMPRESSION: NORMAL
ERYTHROCYTE [DISTWIDTH] IN BLOOD BY AUTOMATED COUNT: 57.3 FL (ref 35.9–50)
GFR SERPLBLD CREATININE-BSD FMLA CKD-EPI: 38 ML/MIN/1.73 M 2
GLOBULIN SER CALC-MCNC: 3.5 G/DL (ref 1.9–3.5)
GLUCOSE BLD STRIP.AUTO-MCNC: 121 MG/DL (ref 65–99)
GLUCOSE SERPL-MCNC: 126 MG/DL (ref 65–99)
HCT VFR BLD AUTO: 39.1 % (ref 37–47)
HGB BLD-MCNC: 12.2 G/DL (ref 12–16)
LACTATE SERPL-SCNC: 1.3 MMOL/L (ref 0.5–2)
MAGNESIUM SERPL-MCNC: 2.3 MG/DL (ref 1.5–2.5)
MCH RBC QN AUTO: 26 PG (ref 27–33)
MCHC RBC AUTO-ENTMCNC: 31.2 G/DL (ref 32.2–35.5)
MCV RBC AUTO: 83.2 FL (ref 81.4–97.8)
PHOSPHATE SERPL-MCNC: 3.2 MG/DL (ref 2.5–4.5)
PLATELET # BLD AUTO: 382 K/UL (ref 164–446)
PMV BLD AUTO: 11 FL (ref 9–12.9)
POTASSIUM SERPL-SCNC: 4.4 MMOL/L (ref 3.6–5.5)
PROCALCITONIN SERPL-MCNC: 0.18 NG/ML
PROT SERPL-MCNC: 7.4 G/DL (ref 6–8.2)
RBC # BLD AUTO: 4.7 M/UL (ref 4.2–5.4)
SODIUM SERPL-SCNC: 138 MMOL/L (ref 135–145)
WBC # BLD AUTO: 10.5 K/UL (ref 4.8–10.8)

## 2024-04-04 PROCEDURE — A9270 NON-COVERED ITEM OR SERVICE: HCPCS | Performed by: INTERNAL MEDICINE

## 2024-04-04 PROCEDURE — 82962 GLUCOSE BLOOD TEST: CPT

## 2024-04-04 PROCEDURE — 93010 ELECTROCARDIOGRAM REPORT: CPT | Performed by: INTERNAL MEDICINE

## 2024-04-04 PROCEDURE — 93005 ELECTROCARDIOGRAM TRACING: CPT | Performed by: HOSPITALIST

## 2024-04-04 PROCEDURE — A9270 NON-COVERED ITEM OR SERVICE: HCPCS | Performed by: HOSPITALIST

## 2024-04-04 PROCEDURE — 80053 COMPREHEN METABOLIC PANEL: CPT

## 2024-04-04 PROCEDURE — 85027 COMPLETE CBC AUTOMATED: CPT

## 2024-04-04 PROCEDURE — 700102 HCHG RX REV CODE 250 W/ 637 OVERRIDE(OP): Performed by: HOSPITALIST

## 2024-04-04 PROCEDURE — 84145 PROCALCITONIN (PCT): CPT

## 2024-04-04 PROCEDURE — C9113 INJ PANTOPRAZOLE SODIUM, VIA: HCPCS | Performed by: HOSPITALIST

## 2024-04-04 PROCEDURE — 83735 ASSAY OF MAGNESIUM: CPT

## 2024-04-04 PROCEDURE — 770020 HCHG ROOM/CARE - TELE (206)

## 2024-04-04 PROCEDURE — 99232 SBSQ HOSP IP/OBS MODERATE 35: CPT | Performed by: HOSPITALIST

## 2024-04-04 PROCEDURE — 83605 ASSAY OF LACTIC ACID: CPT

## 2024-04-04 PROCEDURE — 84100 ASSAY OF PHOSPHORUS: CPT

## 2024-04-04 PROCEDURE — A9270 NON-COVERED ITEM OR SERVICE: HCPCS | Performed by: STUDENT IN AN ORGANIZED HEALTH CARE EDUCATION/TRAINING PROGRAM

## 2024-04-04 PROCEDURE — 700111 HCHG RX REV CODE 636 W/ 250 OVERRIDE (IP): Performed by: HOSPITALIST

## 2024-04-04 PROCEDURE — 700102 HCHG RX REV CODE 250 W/ 637 OVERRIDE(OP): Performed by: STUDENT IN AN ORGANIZED HEALTH CARE EDUCATION/TRAINING PROGRAM

## 2024-04-04 PROCEDURE — 700102 HCHG RX REV CODE 250 W/ 637 OVERRIDE(OP): Performed by: INTERNAL MEDICINE

## 2024-04-04 RX ORDER — DIGOXIN 0.25 MG/ML
250 INJECTION INTRAMUSCULAR; INTRAVENOUS ONCE
Status: COMPLETED | OUTPATIENT
Start: 2024-04-04 | End: 2024-04-04

## 2024-04-04 RX ORDER — OMEPRAZOLE 20 MG/1
20 CAPSULE, DELAYED RELEASE ORAL 2 TIMES DAILY
Status: DISCONTINUED | OUTPATIENT
Start: 2024-04-04 | End: 2024-04-05

## 2024-04-04 RX ADMIN — OXYCODONE 2.5 MG: 5 TABLET ORAL at 08:22

## 2024-04-04 RX ADMIN — MIDODRINE HYDROCHLORIDE 5 MG: 5 TABLET ORAL at 12:17

## 2024-04-04 RX ADMIN — AMIODARONE HYDROCHLORIDE 200 MG: 200 TABLET ORAL at 06:01

## 2024-04-04 RX ADMIN — CLOPIDOGREL BISULFATE 75 MG: 75 TABLET ORAL at 06:01

## 2024-04-04 RX ADMIN — PANTOPRAZOLE SODIUM 40 MG: 40 INJECTION, POWDER, LYOPHILIZED, FOR SOLUTION INTRAVENOUS at 06:01

## 2024-04-04 RX ADMIN — OMEPRAZOLE 20 MG: 20 CAPSULE, DELAYED RELEASE ORAL at 17:28

## 2024-04-04 RX ADMIN — DOCUSATE SODIUM 50 MG AND SENNOSIDES 8.6 MG 2 TABLET: 8.6; 5 TABLET, FILM COATED ORAL at 17:28

## 2024-04-04 RX ADMIN — OXYCODONE 2.5 MG: 5 TABLET ORAL at 17:27

## 2024-04-04 RX ADMIN — POTASSIUM CHLORIDE 20 MEQ: 1500 TABLET, EXTENDED RELEASE ORAL at 06:01

## 2024-04-04 RX ADMIN — DIGOXIN 250 MCG: 0.25 INJECTION INTRAMUSCULAR; INTRAVENOUS at 04:33

## 2024-04-04 RX ADMIN — OXYCODONE 2.5 MG: 5 TABLET ORAL at 12:23

## 2024-04-04 RX ADMIN — OXYCODONE 2.5 MG: 5 TABLET ORAL at 21:59

## 2024-04-04 RX ADMIN — APIXABAN 5 MG: 5 TABLET, FILM COATED ORAL at 17:28

## 2024-04-04 RX ADMIN — ATORVASTATIN CALCIUM 10 MG: 10 TABLET, FILM COATED ORAL at 20:02

## 2024-04-04 RX ADMIN — MIDODRINE HYDROCHLORIDE 5 MG: 5 TABLET ORAL at 17:28

## 2024-04-04 RX ADMIN — MIDODRINE HYDROCHLORIDE 5 MG: 5 TABLET ORAL at 08:22

## 2024-04-04 RX ADMIN — LEVOTHYROXINE SODIUM 125 MCG: 0.12 TABLET ORAL at 06:01

## 2024-04-04 RX ADMIN — APIXABAN 5 MG: 5 TABLET, FILM COATED ORAL at 06:02

## 2024-04-04 ASSESSMENT — PAIN DESCRIPTION - PAIN TYPE
TYPE: ACUTE PAIN

## 2024-04-04 ASSESSMENT — ENCOUNTER SYMPTOMS
SHORTNESS OF BREATH: 0
COUGH: 0
NERVOUS/ANXIOUS: 0
SPEECH CHANGE: 0
ABDOMINAL PAIN: 0
HEADACHES: 0
BACK PAIN: 0
CHILLS: 0
DIARRHEA: 0
FEVER: 0
PALPITATIONS: 0
STRIDOR: 0
NAUSEA: 0
DIZZINESS: 0
VOMITING: 0

## 2024-04-04 ASSESSMENT — FIBROSIS 4 INDEX: FIB4 SCORE: 1.44

## 2024-04-04 NOTE — PROGRESS NOTES
Dr. Carrera notified of hypotensive episode. BP: 70/36, pale, diaphoretic, dizzy, increased O2 demand from 1L to 4L, and 's. Orders received.     Patients BP normalized within 5 min to 94/53. Patient reports feeling better.

## 2024-04-04 NOTE — PROGRESS NOTES
[facility] Medicine Daily Progress Note    Date of Service  4/4/2024    Chief Complaint  Right sided Chest pain    Hospital Course  Davie Montejo is a 89 y.o. female with a history of dementia, atrial fibrillation, prior perforated gastric ulcer, hypertension, dyslipidemia, HFrEF 25%, severe MR, coronary artery disease status post CABG.  Admitted 3/30/2024 with increasing weakness and shortness of breath with frequent night sweats.  She was transferred from [facility] placed on 2 L nasal cannula.  She had had a recent discharge from the hospital 3/26 where she was noted to have pleural effusions and underwent a thoracentesis.  She is on Plavix and Eliquis.  In the emergency room she was found to be Hemoccult positive.  Chest x-ray showed bilateral pulmonary edema and bilateral pleural effusions.  EKG: Sinus bradycardia.  During her admission she was transferred to the ICU with acute worsening of shortness of breath/respiratory failure.  She was initiated on dobutamine Lasix and BiPAP.  She was requiring Precedex drip during her ICU stay but this was weaned off.  She was on norepinephrine drip.  The patient had refused thoracentesis per report.  For the patient's GI bleed, her hemoglobin remained stable the Eliquis was held as well as the Plavix but as there is no significant drop in her hemoglobin Plavix was reinitiated.    Interval Problem Update  4/4: Awake and conversant.  Denies any abdominal discomfort.  She was on 3 L nasal cannula I was weaning her down to 1 L during my evaluation of her with her SpO2 remaining greater than 93%.  Okay to transfer to telemetry floor.    I have discussed this patient's plan of care and discharge plan at IDT rounds today with Case Management, Nursing, Nursing leadership, and other members of the IDT team.    Consultants/Specialty  critical care, GI, and palliative care    Code Status  DNAR/DNI    Disposition  The patient is not medically cleared for  discharge to home or a post-acute facility.      I have placed the appropriate orders for post-discharge needs.    Review of Systems  Review of Systems   Constitutional:  Positive for malaise/fatigue. Negative for chills and fever.   Respiratory:  Negative for cough, shortness of breath and stridor.    Cardiovascular:  Negative for chest pain, palpitations and leg swelling.   Gastrointestinal:  Negative for abdominal pain, diarrhea, nausea and vomiting.   Musculoskeletal:  Negative for back pain and joint pain.   Neurological:  Negative for dizziness, speech change and headaches.   Psychiatric/Behavioral:  The patient is not nervous/anxious.         Physical Exam  Temp:  [35.8 °C (96.4 °F)-36.6 °C (97.8 °F)] 36.5 °C (97.7 °F)  Pulse:  [100-145] 124  Resp:  [7-64] 18  BP: ()/() 91/64  SpO2:  [92 %-99 %] 92 %    Physical Exam  Vitals reviewed.   Constitutional:       Appearance: Normal appearance. She is not diaphoretic.   HENT:      Head: Normocephalic and atraumatic.      Nose: Nose normal.   Eyes:      General: No scleral icterus.        Right eye: No discharge.         Left eye: No discharge.      Extraocular Movements: Extraocular movements intact.      Conjunctiva/sclera: Conjunctivae normal.   Cardiovascular:      Rate and Rhythm: Normal rate and regular rhythm.      Pulses:           Radial pulses are 2+ on the right side and 2+ on the left side.        Dorsalis pedis pulses are 2+ on the right side and 2+ on the left side.      Heart sounds: Murmur heard.   Pulmonary:      Effort: Pulmonary effort is normal. No respiratory distress.      Breath sounds: Normal breath sounds. No wheezing or rales.   Abdominal:      General: Bowel sounds are normal. There is no distension.      Palpations: Abdomen is soft.   Musculoskeletal:         General: No swelling or tenderness.      Cervical back: Neck supple. No muscular tenderness.      Right lower leg: No edema.      Left lower leg: No edema.   Skin:      Coloration: Skin is pale. Skin is not jaundiced.   Neurological:      General: No focal deficit present.      Mental Status: She is alert and oriented to person, place, and time. Mental status is at baseline.      Cranial Nerves: No cranial nerve deficit.   Psychiatric:         Mood and Affect: Mood normal.         Behavior: Behavior normal.         Fluids    Intake/Output Summary (Last 24 hours) at 4/4/2024 1832  Last data filed at 4/4/2024 1200  Gross per 24 hour   Intake 1250 ml   Output --   Net 1250 ml       Laboratory  Recent Labs     04/02/24  0524 04/03/24  0518 04/04/24  0440   WBC 6.6 7.4 10.5   RBC 3.42* 3.96* 4.70   HEMOGLOBIN 9.1* 10.4* 12.2   HEMATOCRIT 27.8* 32.8* 39.1   MCV 81.3* 82.8 83.2   MCH 26.6* 26.3* 26.0*   MCHC 32.7 31.7* 31.2*   RDW 54.0* 55.8* 57.3*   PLATELETCT 231 292 382   MPV 11.0 11.0 11.0     Recent Labs     04/03/24  0518 04/03/24  2100 04/04/24  0440   SODIUM 138 139 138   POTASSIUM 3.2* 4.0 4.4   CHLORIDE 95* 100 99   CO2 31 27 26   GLUCOSE 101* 122* 126*   BUN 17 17 19   CREATININE 1.17 1.29 1.36   CALCIUM 8.8 9.1 9.5                   Imaging  DX-CHEST-LIMITED (1 VIEW)   Final Result      1.  Small bilateral pleural effusions with adjacent airspace disease.      DX-CHEST-LIMITED (1 VIEW)   Final Result         Right central line is noted with tip at the level of the SVC. No pneumothorax is identified.      DX-CHEST-PORTABLE (1 VIEW)   Final Result         1.  There is some interval increase in poorly defined opacifications in each lung including the lower lungs.      2.  Left pleural effusion is again identified.      DX-CHEST-PORTABLE (1 VIEW)   Final Result         1.  Hazy bilateral pulmonary infiltrates, similar to prior study.   2.  Small bilateral pleural effusions, stable   3.  Cardiomegaly           Assessment/Plan  * Acute respiratory failure with hypoxia (HCC)- (present on admission)  Assessment & Plan  On 2 L of O2 above baseline    Acute blood loss anemia  Assessment  & Plan  Monitor hemoglobin every 8 hours and transfuse less than 7    GI bleed- (present on admission)  Assessment & Plan  Hold Plavix   Eliquis restarted and watching closely  4/4 Hgb: 12.2<10.4<9.1  Continuous cardiac monitoring  IV Protonix  Monitor H&H every 8 hours, transfuse for hemoglobin less than 7  GI consulted and signed off feeling EGD not required but recommended PPI BID    AF (atrial fibrillation) (HCC)- (present on admission)  Assessment & Plan  Presents with bradycardia  Monitor for sinus pauses  Continue metoprolol with holding parameters  Continue amiodarone  Eliquis    Stage 3b chronic kidney disease (CKD)- (present on admission)  Assessment & Plan  4/4 BUN: 19 <17, Cr: 1.36 <1.17  Monitor bmp      Acute on chronic combined systolic and diastolic congestive heart failure (HCC)- (present on admission)  Assessment & Plan  Decompensated  Hold Lasix until GI bleed has resolved  Continue metoprolol    Hypotension- (present on admission)  Assessment & Plan  Monitor vitals,I/O's, labs  On midodrine 5mg BID    Bilateral pleural effusion- (present on admission)  Assessment & Plan  Diuresis  Monitor respiratory status    Hx of CABG- (present on admission)  Assessment & Plan  Medical management  Continue statins  Holding plavix for now due to GI bleed  Patient does present with chest pain and will follow troponins         VTE prophylaxis:    therapeutic anticoagulation with eliquis 5 mg BID      I have performed a physical exam and reviewed and updated ROS and Plan today (4/4/2024). In review of yesterday's note (4/3/2024), there are no changes except as documented above.

## 2024-04-04 NOTE — CARE PLAN
The patient is Watcher - Medium risk of patient condition declining or worsening    Shift Goals  Clinical Goals: stable hemodynamics; wean O2  Patient Goals: find glasses and purse  Family Goals: DEE DEE    Progress made toward(s) clinical / shift goals:    Problem: Knowledge Deficit - Standard  Goal: Patient and family/care givers will demonstrate understanding of plan of care, disease process/condition, diagnostic tests and medications  Outcome: Progressing     Problem: Skin Integrity  Goal: Skin integrity is maintained or improved  Outcome: Progressing     Problem: Pain - Standard  Goal: Alleviation of pain or a reduction in pain to the patient’s comfort goal  Outcome: Progressing

## 2024-04-04 NOTE — CARE PLAN
The patient is Stable - Low risk of patient condition declining or worsening    Shift Goals  Clinical Goals: stable hemodynamics; wean O2  Patient Goals: find glasses and purse  Family Goals: DEE DEE    Progress made toward(s) clinical / shift goals:    Problem: Knowledge Deficit - Standard  Goal: Patient and family/care givers will demonstrate understanding of plan of care, disease process/condition, diagnostic tests and medications  Outcome: Progressing     Problem: Skin Integrity  Goal: Skin integrity is maintained or improved  Outcome: Progressing     Problem: Pain - Standard  Goal: Alleviation of pain or a reduction in pain to the patient’s comfort goal  Outcome: Progressing       Patient is not progressing towards the following goals:

## 2024-04-04 NOTE — PROGRESS NOTES
Report given to Taryn ALLISON. Pt transferred to T [id number] on tele box. All belongings with patient at time of transfer.

## 2024-04-04 NOTE — CARE PLAN
The patient is Stable - Low risk of patient condition declining or worsening    Shift Goals  Clinical Goals: stable hemodynamics  Patient Goals: go home  Family Goals: jonah    Progress made toward(s) clinical / shift goals:    Problem: Pain - Standard  Goal: Alleviation of pain or a reduction in pain to the patient’s comfort goal  Outcome: Progressing     Problem: Knowledge Deficit - Standard  Goal: Patient and family/care givers will demonstrate understanding of plan of care, disease process/condition, diagnostic tests and medications  Outcome: Progressing

## 2024-04-04 NOTE — THERAPY
Physical Therapy Contact Note    Patient Name: Davie Montejo  Age:  89 y.o., Sex:  female  Medical Record #: [id number]  Today's Date: 4/4/2024 04/04/24 0839   Interdisciplinary Plan of Care Collaboration   Collaboration Comments Continue to HOLD PT tx, per RN pt sustaining A fib & not medically stable to participate in therapy.  Will continue to monitor & follow up as able/appropriate.

## 2024-04-04 NOTE — THERAPY
Occupational Therapy Contact Note    Attempted OT tx, hold as pt is sustaining afib in 120s. Will defer and attempt tomorrow as appropriate.     Ifeoma Harris, OTR/L

## 2024-04-05 PROBLEM — I48.92 ATRIAL FLUTTER WITH RAPID VENTRICULAR RESPONSE (HCC): Status: ACTIVE | Noted: 2024-03-26

## 2024-04-05 LAB
ALBUMIN SERPL BCP-MCNC: 3.6 G/DL (ref 3.2–4.9)
ALBUMIN/GLOB SERPL: 1.2 G/DL
ALP SERPL-CCNC: 82 U/L (ref 30–99)
ALT SERPL-CCNC: 5 U/L (ref 2–50)
ANION GAP SERPL CALC-SCNC: 13 MMOL/L (ref 7–16)
AST SERPL-CCNC: 17 U/L (ref 12–45)
BACTERIA BLD CULT: NORMAL
BILIRUB SERPL-MCNC: 0.7 MG/DL (ref 0.1–1.5)
BUN SERPL-MCNC: 21 MG/DL (ref 8–22)
CALCIUM ALBUM COR SERPL-MCNC: 10 MG/DL (ref 8.5–10.5)
CALCIUM SERPL-MCNC: 9.7 MG/DL (ref 8.5–10.5)
CHLORIDE SERPL-SCNC: 99 MMOL/L (ref 96–112)
CO2 SERPL-SCNC: 25 MMOL/L (ref 20–33)
CREAT SERPL-MCNC: 1.39 MG/DL (ref 0.5–1.4)
ERYTHROCYTE [DISTWIDTH] IN BLOOD BY AUTOMATED COUNT: 55.8 FL (ref 35.9–50)
GFR SERPLBLD CREATININE-BSD FMLA CKD-EPI: 37 ML/MIN/1.73 M 2
GLOBULIN SER CALC-MCNC: 3.1 G/DL (ref 1.9–3.5)
GLUCOSE SERPL-MCNC: 110 MG/DL (ref 65–99)
HCT VFR BLD AUTO: 32.8 % (ref 37–47)
HGB BLD-MCNC: 10.4 G/DL (ref 12–16)
HGB BLD-MCNC: 10.7 G/DL (ref 12–16)
HGB BLD-MCNC: 10.9 G/DL (ref 12–16)
MAGNESIUM SERPL-MCNC: 2.1 MG/DL (ref 1.5–2.5)
MCH RBC QN AUTO: 26.6 PG (ref 27–33)
MCHC RBC AUTO-ENTMCNC: 32.6 G/DL (ref 32.2–35.5)
MCV RBC AUTO: 81.6 FL (ref 81.4–97.8)
PHOSPHATE SERPL-MCNC: 3 MG/DL (ref 2.5–4.5)
PLATELET # BLD AUTO: 300 K/UL (ref 164–446)
PMV BLD AUTO: 10.7 FL (ref 9–12.9)
POTASSIUM SERPL-SCNC: 3.9 MMOL/L (ref 3.6–5.5)
PROT SERPL-MCNC: 6.7 G/DL (ref 6–8.2)
RBC # BLD AUTO: 4.02 M/UL (ref 4.2–5.4)
SIGNIFICANT IND 70042: NORMAL
SITE SITE: NORMAL
SODIUM SERPL-SCNC: 137 MMOL/L (ref 135–145)
SOURCE SOURCE: NORMAL
WBC # BLD AUTO: 7.5 K/UL (ref 4.8–10.8)

## 2024-04-05 PROCEDURE — 85018 HEMOGLOBIN: CPT | Mod: XU

## 2024-04-05 PROCEDURE — A9270 NON-COVERED ITEM OR SERVICE: HCPCS | Performed by: HOSPITALIST

## 2024-04-05 PROCEDURE — 700102 HCHG RX REV CODE 250 W/ 637 OVERRIDE(OP): Performed by: HOSPITALIST

## 2024-04-05 PROCEDURE — 700111 HCHG RX REV CODE 636 W/ 250 OVERRIDE (IP): Performed by: HOSPITALIST

## 2024-04-05 PROCEDURE — 83735 ASSAY OF MAGNESIUM: CPT

## 2024-04-05 PROCEDURE — 700102 HCHG RX REV CODE 250 W/ 637 OVERRIDE(OP)

## 2024-04-05 PROCEDURE — 36415 COLL VENOUS BLD VENIPUNCTURE: CPT

## 2024-04-05 PROCEDURE — 99233 SBSQ HOSP IP/OBS HIGH 50: CPT | Performed by: INTERNAL MEDICINE

## 2024-04-05 PROCEDURE — 700102 HCHG RX REV CODE 250 W/ 637 OVERRIDE(OP): Performed by: INTERNAL MEDICINE

## 2024-04-05 PROCEDURE — A9270 NON-COVERED ITEM OR SERVICE: HCPCS

## 2024-04-05 PROCEDURE — 700102 HCHG RX REV CODE 250 W/ 637 OVERRIDE(OP): Performed by: STUDENT IN AN ORGANIZED HEALTH CARE EDUCATION/TRAINING PROGRAM

## 2024-04-05 PROCEDURE — C9113 INJ PANTOPRAZOLE SODIUM, VIA: HCPCS | Performed by: HOSPITALIST

## 2024-04-05 PROCEDURE — 97530 THERAPEUTIC ACTIVITIES: CPT | Mod: CO

## 2024-04-05 PROCEDURE — A9270 NON-COVERED ITEM OR SERVICE: HCPCS | Performed by: INTERNAL MEDICINE

## 2024-04-05 PROCEDURE — 97535 SELF CARE MNGMENT TRAINING: CPT | Mod: CO

## 2024-04-05 PROCEDURE — 80053 COMPREHEN METABOLIC PANEL: CPT

## 2024-04-05 PROCEDURE — A9270 NON-COVERED ITEM OR SERVICE: HCPCS | Performed by: STUDENT IN AN ORGANIZED HEALTH CARE EDUCATION/TRAINING PROGRAM

## 2024-04-05 PROCEDURE — 84100 ASSAY OF PHOSPHORUS: CPT

## 2024-04-05 PROCEDURE — 85027 COMPLETE CBC AUTOMATED: CPT

## 2024-04-05 PROCEDURE — 770020 HCHG ROOM/CARE - TELE (206)

## 2024-04-05 PROCEDURE — 99233 SBSQ HOSP IP/OBS HIGH 50: CPT | Performed by: HOSPITALIST

## 2024-04-05 RX ORDER — SUCRALFATE ORAL 1 G/10ML
1 SUSPENSION ORAL EVERY 6 HOURS
Status: DISCONTINUED | OUTPATIENT
Start: 2024-04-05 | End: 2024-04-09 | Stop reason: HOSPADM

## 2024-04-05 RX ORDER — METOPROLOL SUCCINATE 25 MG/1
12.5 TABLET, EXTENDED RELEASE ORAL EVERY EVENING
Status: DISCONTINUED | OUTPATIENT
Start: 2024-04-05 | End: 2024-04-05

## 2024-04-05 RX ORDER — CHOLECALCIFEROL (VITAMIN D3) 125 MCG
5 CAPSULE ORAL NIGHTLY PRN
Status: DISCONTINUED | OUTPATIENT
Start: 2024-04-05 | End: 2024-04-09 | Stop reason: HOSPADM

## 2024-04-05 RX ORDER — LORAZEPAM 0.5 MG/1
0.5 TABLET ORAL EVERY 4 HOURS PRN
Status: DISCONTINUED | OUTPATIENT
Start: 2024-04-05 | End: 2024-04-09 | Stop reason: HOSPADM

## 2024-04-05 RX ORDER — OXYCODONE HYDROCHLORIDE 5 MG/1
5 TABLET ORAL EVERY 6 HOURS PRN
Status: DISCONTINUED | OUTPATIENT
Start: 2024-04-05 | End: 2024-04-06

## 2024-04-05 RX ORDER — GABAPENTIN 300 MG/1
600 CAPSULE ORAL DAILY
Status: DISCONTINUED | OUTPATIENT
Start: 2024-04-05 | End: 2024-04-09 | Stop reason: HOSPADM

## 2024-04-05 RX ORDER — ACETAMINOPHEN 325 MG/1
650 TABLET ORAL EVERY 4 HOURS PRN
Status: DISCONTINUED | OUTPATIENT
Start: 2024-04-05 | End: 2024-04-09 | Stop reason: HOSPADM

## 2024-04-05 RX ORDER — PANTOPRAZOLE SODIUM 40 MG/10ML
40 INJECTION, POWDER, LYOPHILIZED, FOR SOLUTION INTRAVENOUS 2 TIMES DAILY
Status: DISCONTINUED | OUTPATIENT
Start: 2024-04-05 | End: 2024-04-06

## 2024-04-05 RX ORDER — MIDODRINE HYDROCHLORIDE 5 MG/1
10 TABLET ORAL
Status: DISCONTINUED | OUTPATIENT
Start: 2024-04-05 | End: 2024-04-06

## 2024-04-05 RX ADMIN — MIDODRINE HYDROCHLORIDE 10 MG: 5 TABLET ORAL at 18:40

## 2024-04-05 RX ADMIN — METOPROLOL SUCCINATE 12.5 MG: 25 TABLET, EXTENDED RELEASE ORAL at 03:15

## 2024-04-05 RX ADMIN — MIDODRINE HYDROCHLORIDE 5 MG: 5 TABLET ORAL at 08:34

## 2024-04-05 RX ADMIN — ATORVASTATIN CALCIUM 10 MG: 10 TABLET, FILM COATED ORAL at 20:14

## 2024-04-05 RX ADMIN — OXYCODONE 2.5 MG: 5 TABLET ORAL at 08:36

## 2024-04-05 RX ADMIN — GABAPENTIN 600 MG: 300 CAPSULE ORAL at 13:01

## 2024-04-05 RX ADMIN — CLOPIDOGREL BISULFATE 75 MG: 75 TABLET ORAL at 06:29

## 2024-04-05 RX ADMIN — AMIODARONE HYDROCHLORIDE 200 MG: 200 TABLET ORAL at 06:28

## 2024-04-05 RX ADMIN — Medication 5 MG: at 23:41

## 2024-04-05 RX ADMIN — SUCRALFATE 1 G: 1 SUSPENSION ORAL at 13:02

## 2024-04-05 RX ADMIN — OXYCODONE 5 MG: 5 TABLET ORAL at 13:01

## 2024-04-05 RX ADMIN — OXYCODONE 5 MG: 5 TABLET ORAL at 20:20

## 2024-04-05 RX ADMIN — OMEPRAZOLE 20 MG: 20 CAPSULE, DELAYED RELEASE ORAL at 06:29

## 2024-04-05 RX ADMIN — POTASSIUM CHLORIDE 20 MEQ: 1500 TABLET, EXTENDED RELEASE ORAL at 06:29

## 2024-04-05 RX ADMIN — DOCUSATE SODIUM 50 MG AND SENNOSIDES 8.6 MG 2 TABLET: 8.6; 5 TABLET, FILM COATED ORAL at 18:39

## 2024-04-05 RX ADMIN — METOPROLOL TARTRATE 12.5 MG: 25 TABLET, FILM COATED ORAL at 14:34

## 2024-04-05 RX ADMIN — MIDODRINE HYDROCHLORIDE 10 MG: 5 TABLET ORAL at 13:01

## 2024-04-05 RX ADMIN — APIXABAN 5 MG: 5 TABLET, FILM COATED ORAL at 06:29

## 2024-04-05 RX ADMIN — METOPROLOL TARTRATE 12.5 MG: 25 TABLET, FILM COATED ORAL at 21:47

## 2024-04-05 RX ADMIN — POLYETHYLENE GLYCOL 3350 1 PACKET: 17 POWDER, FOR SOLUTION ORAL at 06:29

## 2024-04-05 RX ADMIN — LEVOTHYROXINE SODIUM 125 MCG: 0.12 TABLET ORAL at 06:29

## 2024-04-05 RX ADMIN — SUCRALFATE 1 G: 1 SUSPENSION ORAL at 18:38

## 2024-04-05 RX ADMIN — PANTOPRAZOLE SODIUM 40 MG: 40 INJECTION, POWDER, LYOPHILIZED, FOR SOLUTION INTRAVENOUS at 18:40

## 2024-04-05 ASSESSMENT — ENCOUNTER SYMPTOMS
PALPITATIONS: 0
NERVOUS/ANXIOUS: 0
DIZZINESS: 0
DIARRHEA: 0
SPEECH CHANGE: 0
CHILLS: 0
NAUSEA: 0
HEADACHES: 0
BACK PAIN: 0
ABDOMINAL PAIN: 1
VOMITING: 0
STRIDOR: 0
SHORTNESS OF BREATH: 0
COUGH: 0
FEVER: 0

## 2024-04-05 ASSESSMENT — COGNITIVE AND FUNCTIONAL STATUS - GENERAL
DAILY ACTIVITIY SCORE: 19
CLIMB 3 TO 5 STEPS WITH RAILING: A LITTLE
STANDING UP FROM CHAIR USING ARMS: A LITTLE
DAILY ACTIVITIY SCORE: 21
MOVING TO AND FROM BED TO CHAIR: A LITTLE
TOILETING: A LITTLE
MOBILITY SCORE: 19
WALKING IN HOSPITAL ROOM: A LITTLE
HELP NEEDED FOR BATHING: A LITTLE
SUGGESTED CMS G CODE MODIFIER MOBILITY: CK
SUGGESTED CMS G CODE MODIFIER DAILY ACTIVITY: CJ
MOVING FROM LYING ON BACK TO SITTING ON SIDE OF FLAT BED: A LITTLE
DRESSING REGULAR UPPER BODY CLOTHING: A LITTLE
SUGGESTED CMS G CODE MODIFIER DAILY ACTIVITY: CK
DRESSING REGULAR LOWER BODY CLOTHING: A LITTLE
PERSONAL GROOMING: A LITTLE
DRESSING REGULAR UPPER BODY CLOTHING: A LITTLE
DRESSING REGULAR LOWER BODY CLOTHING: A LITTLE
HELP NEEDED FOR BATHING: A LITTLE

## 2024-04-05 ASSESSMENT — FIBROSIS 4 INDEX: FIB4 SCORE: 2.2

## 2024-04-05 ASSESSMENT — GAIT ASSESSMENTS: DISTANCE (FEET): 28

## 2024-04-05 ASSESSMENT — PAIN DESCRIPTION - PAIN TYPE: TYPE: CHRONIC PAIN

## 2024-04-05 NOTE — CARE PLAN
The patient is Stable - Low risk of patient condition declining or worsening    Shift Goals  Clinical Goals: Hemodynamic Stability, Monitor HR, Pain Management  Patient Goals: Speak to   Family Goals: jonah    Progress made toward(s) clinical / shift goals:        Problem: Pain - Standard  Goal: Alleviation of pain or a reduction in pain to the patient’s comfort goal  Outcome: Progressing      Problem: Knowledge Deficit - Standard  Goal: Patient and family/care givers will demonstrate understanding of plan of care, disease process/condition, diagnostic tests and medications  Outcome: Progressing          Patient is not progressing towards the following goals: NA

## 2024-04-05 NOTE — DISCHARGE PLANNING
Case Management Discharge Planning    Admission Date: 3/30/2024  GMLOS: 4.6  ALOS: 6    6-Clicks ADL Score: 21  6-Clicks Mobility Score: 19      Anticipated Discharge Dispo: Discharge Disposition: D/T to SNF with Medicare cert in anticipation of skilled care (03)    DME Needed: No    Action(s) Taken: Updated Provider/Nurse on Discharge Plan    1000, RN CM visited Pt to discuss discharge planning. Patient is alert and oriented x 4. Pt was asked if she okay to be back to [facility] once medically cleared. Pt declined to be back to [facility]. Pt said she wants to go home but has to find another apartment.     1115, Pt was discussed in IDT rounds. Pt is still not medically cleared. IDT informed that last PT/OT notes was on 04/01/2023.       Escalations Completed: None    Medically Clear: No    Next Steps: CM will continue to assist Pt with discharge needs.    Awaiting updated PT/OT recs      Barriers to Discharge: Medical clearance

## 2024-04-05 NOTE — THERAPY
"Occupational Therapy  Daily Treatment     Patient Name: Davie Montejo  Age:  89 y.o., Sex:  female  Medical Record #: [id number]  Today's Date: 4/5/2024     Precautions  Precautions: Fall Risk  Comments: FNC    Assessment    Pt was seen for OT treatment. Pt initially needed encouragement to participate. Pt then agreed to sit at EOB and to work on UB/LB dressing and toileting. Pt required Min A /CGA for bed mobility. Pt can be impulsive and demo only fair safety awareness. Pt doffed /donned gown with Supervision and Min A for LB dressing seated EOB using tailor tech to doff and don socks and to don pants with verbal cues to use energy conservation techs which we had just reviewed. CGA with standing for clothing management and reminder cues to tie pants before ambulating to BR for safety. Pt stated, \"Why do I need to tie my pants? \" \"They won't fall down\". Informed pt they would and could cause a fall.Pt required Min A for ambulation with FWW to BR and cues for safety awareness with managing O2 tubing with ambulation. Pt urinated in toilet and able to do frontal tia care seated base with set up . Full toilet hygiene not yet assessed since pt stated she had no need to have a BM at this time. Pt stood with cues to use FWW for support. Stood at sink for washing face and hands and combing hair with direct supervision post set up. Pt is making progress however will need assist for most self care tasks due to only fair  safety awareness, problem solving and functional cognition. Fair activity tolerance and general endurance needed for all ADL's and transfers . Pt will need assist for all I ADL's at this time as well.  Pt wanted to get BTB due to fatigue and some pain . RN updated on OT treatment findings and recommendations. Will continue to follow.        Plan    Treatment Plan Status: (P) Continue Current Treatment Plan  Type of Treatment: Self Care / Activities of Daily Living, Adaptive Equipment, Neuro " "Re-Education / Balance, Therapeutic Exercises, Therapeutic Activity  Treatment Frequency: 3 Times per Week  Treatment Duration: Until Therapy Goals Met    DC Equipment Recommendations: (P) Unable to determine at this time  Discharge Recommendations: (P) Recommend post-acute placement for additional occupational therapy services prior to discharge home    Subjective    \"I need to go pee but I won't sit on that thing\". Pt pointing to BSC. Asked pt if she would walk with assist to toilet and pt stated \"Yes\".      Objective       04/05/24 1504   Cognition    Cognition / Consciousness X   Level of Consciousness Alert   Ability To Follow Commands 2 Step   Safety Awareness Impaired;Impulsive   New Learning Impaired   Comments pleasant and cooperative, baseline dementia oriented x2-3, forgetful at times and impulsive throughout. Able to follow directions.   Passive ROM Upper Body   Comments WFL   Active ROM Upper Body   Dominant Hand Right   Comments WFL   Strength Upper Body   Upper Body Strength  X   Gross Strength Generalized Weakness, Equal Bilaterally.    Comments increasing as demo for simple selfcare tasks.   Other Treatments   Other Treatments Provided Psychosocial intervention addressed . Discussed home needs.   Balance   Sitting Balance (Static) Fair   Sitting Balance (Dynamic) Fair   Standing Balance (Static) Fair   Standing Balance (Dynamic) Fair -   Weight Shift Sitting Fair   Weight Shift Standing Fair   Comments Pt stood with CGA and used  FWW and ambuated to BR with Min A   Bed Mobility    Supine to Sit Minimal Assist   Sit to Supine Contact Guard Assist   Scooting Contact Guard Assist   Rolling Supervised   Comments HOB elevated   Activities of Daily Living   Grooming Standby Assist;Seated   Bathing   (declined)   Upper Body Dressing Supervision   Lower Body Dressing Minimal Assist   Toileting Contact Guard Assist  (for pericare post urination on toilet seated base)   Skilled Intervention Verbal " Cuing;Tactile Cuing;Sequencing;Compensatory Strategies   Comments Pt will need assist for most selfcare tasks at this time and all I ADL's due to only fair activity tolerance and safety awareness and functional cognition.   Functional Mobility   Sit to Stand Contact Guard Assist   Bed, Chair, Wheelchair Transfer Contact Guard Assist   Toilet Transfers Contact Guard Assist   Transfer Method Stand Step   Mobility from EOB to/from BR with FWW   Comments with FWW   Activity Tolerance   Comments c/o fatigue   Patient / Family Goals   Patient / Family Goal #1 go home   Goal #1 Outcome Progressing slower than expected   Short Term Goals   Short Term Goal # 1 pt will demo ADL txfs w/ supv   Goal Outcome # 1 Progressing as expected   Short Term Goal # 2 pt will dress LB with supv and AE prn   Goal Outcome # 2 Progressing as expected   Short Term Goal # 3 pt will demo toileting w/ supv   Goal Outcome # 3 Progressing as expected   Short Term Goal # 4 pt will demo grooming at the sink w/ setup   Goal Outcome # 4 Progressing as expected   Occupational Therapy Treatment Plan    O.T. Treatment Plan Continue Current Treatment Plan   Anticipated Discharge Equipment and Recommendations   DC Equipment Recommendations Unable to determine at this time   Discharge Recommendations Recommend post-acute placement for additional occupational therapy services prior to discharge home   Interdisciplinary Plan of Care Collaboration   IDT Collaboration with  Nursing   Collaboration Comments RN updated on OT treatment findings.

## 2024-04-05 NOTE — CARE PLAN
The patient is Watcher - Medium risk of patient condition declining or worsening    Shift Goals  Clinical Goals: VSS, pain management, rest  Patient Goals: Social work  Family Goals: n/a    Progress made toward(s) clinical / shift goals:  rest    Patient is not progressing towards the following goals: rhythm increase

## 2024-04-05 NOTE — PROGRESS NOTES
Monitor summary:        Rhythm: Aflutter  Rate:   Ectopy: PVC  Measurements: ./0.14/0.40          12hr chart check

## 2024-04-05 NOTE — PROGRESS NOTES
Monitor Summary:     Rhythm: Aflutter  Rate: 106-128  Ectopy: (R) PVC  Measurements: -/0.14/0.40           12 Hour Chart Check

## 2024-04-06 LAB
ALBUMIN SERPL BCP-MCNC: 3.3 G/DL (ref 3.2–4.9)
ALBUMIN/GLOB SERPL: 1.1 G/DL
ALP SERPL-CCNC: 74 U/L (ref 30–99)
ALT SERPL-CCNC: 6 U/L (ref 2–50)
ANION GAP SERPL CALC-SCNC: 12 MMOL/L (ref 7–16)
AST SERPL-CCNC: 20 U/L (ref 12–45)
BACTERIA BLD CULT: NORMAL
BACTERIA BLD CULT: NORMAL
BILIRUB SERPL-MCNC: 0.6 MG/DL (ref 0.1–1.5)
BUN SERPL-MCNC: 19 MG/DL (ref 8–22)
CALCIUM ALBUM COR SERPL-MCNC: 9.9 MG/DL (ref 8.5–10.5)
CALCIUM SERPL-MCNC: 9.3 MG/DL (ref 8.5–10.5)
CHLORIDE SERPL-SCNC: 100 MMOL/L (ref 96–112)
CO2 SERPL-SCNC: 23 MMOL/L (ref 20–33)
CREAT SERPL-MCNC: 1.4 MG/DL (ref 0.5–1.4)
ERYTHROCYTE [DISTWIDTH] IN BLOOD BY AUTOMATED COUNT: 56.7 FL (ref 35.9–50)
GFR SERPLBLD CREATININE-BSD FMLA CKD-EPI: 36 ML/MIN/1.73 M 2
GLOBULIN SER CALC-MCNC: 3 G/DL (ref 1.9–3.5)
GLUCOSE SERPL-MCNC: 106 MG/DL (ref 65–99)
HCT VFR BLD AUTO: 31.3 % (ref 37–47)
HGB BLD-MCNC: 10 G/DL (ref 12–16)
MAGNESIUM SERPL-MCNC: 2.1 MG/DL (ref 1.5–2.5)
MCH RBC QN AUTO: 26.3 PG (ref 27–33)
MCHC RBC AUTO-ENTMCNC: 31.9 G/DL (ref 32.2–35.5)
MCV RBC AUTO: 82.4 FL (ref 81.4–97.8)
PHOSPHATE SERPL-MCNC: 3.2 MG/DL (ref 2.5–4.5)
PLATELET # BLD AUTO: 309 K/UL (ref 164–446)
PMV BLD AUTO: 10.8 FL (ref 9–12.9)
POTASSIUM SERPL-SCNC: 5.2 MMOL/L (ref 3.6–5.5)
PROT SERPL-MCNC: 6.3 G/DL (ref 6–8.2)
RBC # BLD AUTO: 3.8 M/UL (ref 4.2–5.4)
SIGNIFICANT IND 70042: NORMAL
SIGNIFICANT IND 70042: NORMAL
SITE SITE: NORMAL
SITE SITE: NORMAL
SODIUM SERPL-SCNC: 135 MMOL/L (ref 135–145)
SOURCE SOURCE: NORMAL
SOURCE SOURCE: NORMAL
WBC # BLD AUTO: 7.1 K/UL (ref 4.8–10.8)

## 2024-04-06 PROCEDURE — 99233 SBSQ HOSP IP/OBS HIGH 50: CPT | Performed by: HOSPITALIST

## 2024-04-06 PROCEDURE — 700102 HCHG RX REV CODE 250 W/ 637 OVERRIDE(OP): Performed by: HOSPITALIST

## 2024-04-06 PROCEDURE — 80053 COMPREHEN METABOLIC PANEL: CPT

## 2024-04-06 PROCEDURE — A9270 NON-COVERED ITEM OR SERVICE: HCPCS | Mod: JZ | Performed by: INTERNAL MEDICINE

## 2024-04-06 PROCEDURE — 85027 COMPLETE CBC AUTOMATED: CPT

## 2024-04-06 PROCEDURE — 99232 SBSQ HOSP IP/OBS MODERATE 35: CPT | Performed by: NURSE PRACTITIONER

## 2024-04-06 PROCEDURE — 83735 ASSAY OF MAGNESIUM: CPT

## 2024-04-06 PROCEDURE — 84100 ASSAY OF PHOSPHORUS: CPT

## 2024-04-06 PROCEDURE — A9270 NON-COVERED ITEM OR SERVICE: HCPCS | Performed by: HOSPITALIST

## 2024-04-06 PROCEDURE — C9113 INJ PANTOPRAZOLE SODIUM, VIA: HCPCS | Performed by: HOSPITALIST

## 2024-04-06 PROCEDURE — A9270 NON-COVERED ITEM OR SERVICE: HCPCS | Performed by: STUDENT IN AN ORGANIZED HEALTH CARE EDUCATION/TRAINING PROGRAM

## 2024-04-06 PROCEDURE — 36415 COLL VENOUS BLD VENIPUNCTURE: CPT

## 2024-04-06 PROCEDURE — 700111 HCHG RX REV CODE 636 W/ 250 OVERRIDE (IP): Performed by: HOSPITALIST

## 2024-04-06 PROCEDURE — 770020 HCHG ROOM/CARE - TELE (206)

## 2024-04-06 PROCEDURE — 700102 HCHG RX REV CODE 250 W/ 637 OVERRIDE(OP): Mod: JZ | Performed by: INTERNAL MEDICINE

## 2024-04-06 PROCEDURE — A9270 NON-COVERED ITEM OR SERVICE: HCPCS

## 2024-04-06 PROCEDURE — 700102 HCHG RX REV CODE 250 W/ 637 OVERRIDE(OP): Performed by: STUDENT IN AN ORGANIZED HEALTH CARE EDUCATION/TRAINING PROGRAM

## 2024-04-06 PROCEDURE — 700102 HCHG RX REV CODE 250 W/ 637 OVERRIDE(OP)

## 2024-04-06 RX ORDER — OXYCODONE HYDROCHLORIDE 5 MG/1
5 TABLET ORAL EVERY 4 HOURS PRN
Status: DISCONTINUED | OUTPATIENT
Start: 2024-04-06 | End: 2024-04-07

## 2024-04-06 RX ORDER — LEVALBUTEROL INHALATION SOLUTION 1.25 MG/3ML
1.25 SOLUTION RESPIRATORY (INHALATION)
Status: DISCONTINUED | OUTPATIENT
Start: 2024-04-06 | End: 2024-04-09 | Stop reason: HOSPADM

## 2024-04-06 RX ORDER — MIDODRINE HYDROCHLORIDE 5 MG/1
5 TABLET ORAL
Status: DISCONTINUED | OUTPATIENT
Start: 2024-04-07 | End: 2024-04-07

## 2024-04-06 RX ORDER — OMEPRAZOLE 20 MG/1
20 CAPSULE, DELAYED RELEASE ORAL 2 TIMES DAILY
Status: DISCONTINUED | OUTPATIENT
Start: 2024-04-06 | End: 2024-04-09 | Stop reason: HOSPADM

## 2024-04-06 RX ADMIN — METOPROLOL TARTRATE 12.5 MG: 25 TABLET, FILM COATED ORAL at 06:07

## 2024-04-06 RX ADMIN — SUCRALFATE 1 G: 1 SUSPENSION ORAL at 17:42

## 2024-04-06 RX ADMIN — APIXABAN 5 MG: 5 TABLET, FILM COATED ORAL at 17:42

## 2024-04-06 RX ADMIN — AMIODARONE HYDROCHLORIDE 200 MG: 200 TABLET ORAL at 06:07

## 2024-04-06 RX ADMIN — MIDODRINE HYDROCHLORIDE 10 MG: 5 TABLET ORAL at 17:42

## 2024-04-06 RX ADMIN — OMEPRAZOLE 20 MG: 20 CAPSULE, DELAYED RELEASE ORAL at 17:42

## 2024-04-06 RX ADMIN — MIDODRINE HYDROCHLORIDE 10 MG: 5 TABLET ORAL at 12:44

## 2024-04-06 RX ADMIN — POTASSIUM CHLORIDE 20 MEQ: 1500 TABLET, EXTENDED RELEASE ORAL at 06:07

## 2024-04-06 RX ADMIN — ATORVASTATIN CALCIUM 10 MG: 10 TABLET, FILM COATED ORAL at 20:46

## 2024-04-06 RX ADMIN — ACETAMINOPHEN 650 MG: 325 TABLET, FILM COATED ORAL at 15:26

## 2024-04-06 RX ADMIN — ACETAMINOPHEN 650 MG: 325 TABLET, FILM COATED ORAL at 20:51

## 2024-04-06 RX ADMIN — SUCRALFATE 1 G: 1 SUSPENSION ORAL at 23:20

## 2024-04-06 RX ADMIN — METOPROLOL TARTRATE 12.5 MG: 25 TABLET, FILM COATED ORAL at 15:26

## 2024-04-06 RX ADMIN — LEVOTHYROXINE SODIUM 125 MCG: 0.12 TABLET ORAL at 06:07

## 2024-04-06 RX ADMIN — SUCRALFATE 1 G: 1 SUSPENSION ORAL at 12:45

## 2024-04-06 RX ADMIN — MIDODRINE HYDROCHLORIDE 10 MG: 5 TABLET ORAL at 09:50

## 2024-04-06 RX ADMIN — METOPROLOL TARTRATE 12.5 MG: 25 TABLET, FILM COATED ORAL at 20:46

## 2024-04-06 RX ADMIN — OXYCODONE 5 MG: 5 TABLET ORAL at 12:44

## 2024-04-06 RX ADMIN — DOCUSATE SODIUM 50 MG AND SENNOSIDES 8.6 MG 2 TABLET: 8.6; 5 TABLET, FILM COATED ORAL at 17:42

## 2024-04-06 RX ADMIN — PANTOPRAZOLE SODIUM 40 MG: 40 INJECTION, POWDER, LYOPHILIZED, FOR SOLUTION INTRAVENOUS at 06:07

## 2024-04-06 RX ADMIN — GABAPENTIN 600 MG: 300 CAPSULE ORAL at 06:07

## 2024-04-06 RX ADMIN — OXYCODONE 5 MG: 5 TABLET ORAL at 22:13

## 2024-04-06 ASSESSMENT — PAIN SCALES - PAIN ASSESSMENT IN ADVANCED DEMENTIA (PAINAD)
BODYLANGUAGE: RELAXED
TOTALSCORE: 0
CONSOLABILITY: NO NEED TO CONSOLE
TOTALSCORE: 0
BODYLANGUAGE: RELAXED
BREATHING: NORMAL
CONSOLABILITY: NO NEED TO CONSOLE
FACIALEXPRESSION: SMILING OR INEXPRESSIVE
BREATHING: NORMAL
FACIALEXPRESSION: SMILING OR INEXPRESSIVE

## 2024-04-06 ASSESSMENT — ENCOUNTER SYMPTOMS
SHORTNESS OF BREATH: 0
STRIDOR: 0
COUGH: 0
HEADACHES: 0
VOMITING: 0
NERVOUS/ANXIOUS: 0
NAUSEA: 0
CHILLS: 0
DIZZINESS: 0
ABDOMINAL PAIN: 1
FEVER: 0
PALPITATIONS: 0
SPEECH CHANGE: 0
BACK PAIN: 0
DIARRHEA: 0

## 2024-04-06 ASSESSMENT — PAIN SCALES - WONG BAKER
WONGBAKER_NUMERICALRESPONSE: HURTS JUST A LITTLE BIT
WONGBAKER_NUMERICALRESPONSE: HURTS JUST A LITTLE BIT

## 2024-04-06 ASSESSMENT — FIBROSIS 4 INDEX: FIB4 SCORE: 2.3

## 2024-04-06 NOTE — CARE PLAN
The patient is Stable - Low risk of patient condition declining or worsening    Shift Goals  Clinical Goals: Pain Mangement, Hemodynamic Stability  Patient Goals: Comfort, Speak with   Family Goals: n/a    Progress made toward(s) clinical / shift goals:        Problem: Knowledge Deficit - Standard  Goal: Patient and family/care givers will demonstrate understanding of plan of care, disease process/condition, diagnostic tests and medications  Outcome: Progressing     Problem: Pain - Standard  Goal: Alleviation of pain or a reduction in pain to the patient’s comfort goal  Outcome: Progressing       Patient is not progressing towards the following goals: NA

## 2024-04-06 NOTE — PROGRESS NOTES
Bedside report received by RN and patient care assumed. Tele Box in place, patient is A&O4, resting in bed, and on 2LNC. Patient states 4/10 pain. Fall precautions in place and patient educated on use of call light for assistance. Pt updated on POC with no questions or concerns. Hourly monitoring initiated.

## 2024-04-06 NOTE — PROGRESS NOTES
[facility] Medicine Daily Progress Note    Date of Service  4/5/2024    Chief Complaint  Right sided Chest pain    Hospital Course  Davie Montejo is a 89 y.o. female with a history of dementia, atrial fibrillation, prior perforated gastric ulcer, hypertension, dyslipidemia, HFrEF 25%, severe MR, coronary artery disease status post CABG.  Admitted 3/30/2024 with increasing weakness and shortness of breath with frequent night sweats.  She was transferred from [facility] placed on 2 L nasal cannula.  She had had a recent discharge from the hospital 3/26 where she was noted to have pleural effusions and underwent a thoracentesis.  She is on Plavix and Eliquis.  In the emergency room she was found to be Hemoccult positive.  Chest x-ray showed bilateral pulmonary edema and bilateral pleural effusions.  EKG: Sinus bradycardia.  During her admission she was transferred to the ICU with acute worsening of shortness of breath/respiratory failure.  She was initiated on dobutamine Lasix and BiPAP.  She was requiring Precedex drip during her ICU stay but this was weaned off.  She was on norepinephrine drip.  The patient had refused thoracentesis per report.  For the patient's GI bleed, her hemoglobin remained stable the Eliquis was held as well as the Plavix but as there is no significant drop in her hemoglobin Plavix was reinitiated.    Interval Problem Update  4/4: Awake and conversant.  Denies any abdominal discomfort.  She was on 3 L nasal cannula I was weaning her down to 1 L during my evaluation of her with her SpO2 remaining greater than 93%.  Okay to transfer to telemetry floor.  4/5: Patient received from ICU.  Currently in atrial flutter with RVR, hypotensive 95/53.  Increase metoprolol from 12.5 XL daily to 12.5 3 times daily.  Increase midodrine from 5 3 times daily to 10 mg p.o. 3 times daily.  Concern is patient is continuing to have epigastric pain history of melena hemoglobin 10 from 12.  I  have reconsulted GI.  Patient is ANO and able to consent.  She is willing to undergo EGD only if she has adequate anesthesia.  She states she previously had EGDs 2 years ago in [facility] without any anesthesia.  Continue PPI IV twice daily.  Hold on Carafate until after EGD.    I have discussed this patient's plan of care and discharge plan at IDT rounds today with Case Management, Nursing, Nursing leadership, and other members of the IDT team.    Consultants/Specialty  critical care, GI, and palliative care    Code Status  DNAR/DNI    Disposition  The patient is not medically cleared for discharge to home or a post-acute facility.  Anticipate discharge to: skilled nursing facility    I have placed the appropriate orders for post-discharge needs.    Review of Systems  Review of Systems   Constitutional:  Positive for malaise/fatigue. Negative for chills and fever.   Respiratory:  Negative for cough, shortness of breath and stridor.    Cardiovascular:  Negative for chest pain, palpitations and leg swelling.   Gastrointestinal:  Positive for abdominal pain. Negative for diarrhea, nausea and vomiting.   Musculoskeletal:  Negative for back pain and joint pain.   Neurological:  Negative for dizziness, speech change and headaches.   Psychiatric/Behavioral:  The patient is not nervous/anxious.         Physical Exam  Temp:  [36.2 °C (97.2 °F)-36.5 °C (97.7 °F)] 36.4 °C (97.5 °F)  Pulse:  [] 61  Resp:  [16-18] 18  BP: ()/(49-74) 112/64  SpO2:  [90 %-95 %] 93 %    Physical Exam  Vitals reviewed.   Constitutional:       Appearance: Normal appearance. She is not diaphoretic.   HENT:      Head: Normocephalic and atraumatic.      Nose: Nose normal.   Eyes:      General: No scleral icterus.        Right eye: No discharge.         Left eye: No discharge.      Extraocular Movements: Extraocular movements intact.      Conjunctiva/sclera: Conjunctivae normal.   Cardiovascular:      Rate and Rhythm: Normal rate  and regular rhythm.      Pulses:           Radial pulses are 2+ on the right side and 2+ on the left side.        Dorsalis pedis pulses are 2+ on the right side and 2+ on the left side.      Heart sounds: Murmur heard.   Pulmonary:      Effort: Pulmonary effort is normal. No respiratory distress.      Breath sounds: Normal breath sounds. No wheezing or rales.   Abdominal:      General: Bowel sounds are normal. There is no distension.      Palpations: Abdomen is soft.   Musculoskeletal:         General: No swelling or tenderness.      Cervical back: Neck supple. No muscular tenderness.      Right lower leg: No edema.      Left lower leg: No edema.   Skin:     Coloration: Skin is pale. Skin is not jaundiced.   Neurological:      General: No focal deficit present.      Mental Status: She is alert and oriented to person, place, and time. Mental status is at baseline.      Cranial Nerves: No cranial nerve deficit.   Psychiatric:         Mood and Affect: Mood normal.         Behavior: Behavior normal.         Fluids    Intake/Output Summary (Last 24 hours) at 4/5/2024 1942  Last data filed at 4/5/2024 0412  Gross per 24 hour   Intake 595 ml   Output --   Net 595 ml       Laboratory  Recent Labs     04/03/24  0518 04/04/24  0440 04/05/24  0214 04/05/24  0951 04/05/24  1734   WBC 7.4 10.5 7.5  --   --    RBC 3.96* 4.70 4.02*  --   --    HEMOGLOBIN 10.4* 12.2 10.7* 10.9* 10.4*   HEMATOCRIT 32.8* 39.1 32.8*  --   --    MCV 82.8 83.2 81.6  --   --    MCH 26.3* 26.0* 26.6*  --   --    MCHC 31.7* 31.2* 32.6  --   --    RDW 55.8* 57.3* 55.8*  --   --    PLATELETCT 292 382 300  --   --    MPV 11.0 11.0 10.7  --   --      Recent Labs     04/03/24  2100 04/04/24  0440 04/05/24  0214   SODIUM 139 138 137   POTASSIUM 4.0 4.4 3.9   CHLORIDE 100 99 99   CO2 27 26 25   GLUCOSE 122* 126* 110*   BUN 17 19 21   CREATININE 1.29 1.36 1.39   CALCIUM 9.1 9.5 9.7                   Imaging  DX-CHEST-LIMITED (1 VIEW)   Final Result      1.  Small  bilateral pleural effusions with adjacent airspace disease.      DX-CHEST-LIMITED (1 VIEW)   Final Result         Right central line is noted with tip at the level of the SVC. No pneumothorax is identified.      DX-CHEST-PORTABLE (1 VIEW)   Final Result         1.  There is some interval increase in poorly defined opacifications in each lung including the lower lungs.      2.  Left pleural effusion is again identified.      DX-CHEST-PORTABLE (1 VIEW)   Final Result         1.  Hazy bilateral pulmonary infiltrates, similar to prior study.   2.  Small bilateral pleural effusions, stable   3.  Cardiomegaly           Assessment/Plan  * Acute respiratory failure with hypoxia (HCC)- (present on admission)  Assessment & Plan  On 2 L of O2 above baseline    Acute blood loss anemia  Assessment & Plan  Monitor hemoglobin every 8 hours and transfuse less than 7    GI bleed- (present on admission)  Assessment & Plan  Hold Plavix   Eliquis hold  4/4 Hgb: 12.2<10.4<9.1  Continuous cardiac monitoring  IV Protonix  4/5:  GI reconsulted given epigastric pain and h/o GI ulcer perforation in past.  Last EGD performed in [facility] 2 years ago.  Appreciate GI willing to do EGD in a.m.   Npo at [facility].  Hold carafate.       Atrial flutter with rapid ventricular response (HCC)- (present on admission)  Assessment & Plan  4/5 aflutter with RVR 140s, increased metoprolol with holding parameters XL 25 d to 12.5mg tid.  Continue amiodarone  Holding Eliquis due to epigastric pain, melena.  Low BP, increased midodrine 5 tid to 10mg po tid.    Stage 3b chronic kidney disease (CKD)- (present on admission)  Assessment & Plan  4/4 BUN: 19 <17, Cr: 1.36 <1.17  Monitor bmp      Acute on chronic combined systolic and diastolic congestive heart failure (HCC)- (present on admission)  Assessment & Plan  Decompensated  Hold Lasix until GI bleed has resolved  Continue metoprolol    Hypotension- (present on admission)  Assessment & Plan  Monitor  vitals,I/O's, labs  On midodrine 5mg BID    Bilateral pleural effusion- (present on admission)  Assessment & Plan  Diuresis  Monitor respiratory status    Hx of CABG- (present on admission)  Assessment & Plan  Medical management  Continue statins  Holding plavix for now due to GI bleed  Patient does present with chest pain and will follow troponins         VTE prophylaxis:   SCDs/TEDs      I have performed a physical exam and reviewed and updated ROS and Plan today (4/5/2024). In review of yesterday's note (4/4/2024), there are no changes except as documented above.

## 2024-04-06 NOTE — PROGRESS NOTES
Contacted by the primary team regarding GI bleeding.      Saw the patient at bedside.       The patient was seen by us days ago for possible GI bleeding, EGD was not performed due to her critical illness, and relative stable Hgb.      Last dose of Eliquis 4/5, last dose of plavix 4/5.      The patient reports no bowel movement today, and she denied any melena or tarry output.      She still hesitates regarding the endoscopy but will to do it if she has to.       Agree to NPO the patient after midnight, and Gi team will see the patient at bedside on again 4/6 morning to decide if EGD on 4/6 based on the risk and benefit (mainly the Eliquis and Plavix and no strong evidence of active bleeding).      ================================================  Consult note on 3/30,   iMPRESSION:   Anemia   Heme positive stool   History of perforated gastric ulcer, contained, 2021.  Unknown if ever had f/u EGD   Chronic anticoagulation   Atrial fibrillation, s/p cardioversion 2 weeks ago   Acute on chronic heart failure   Bilateral pleural effusion   CKD IIIb   Dementia   Colon diverticulosis     Recs:  She is unable to give history but no record of melena or hematochezia  She is tender and past hx of  perforation that may never have been evaluated endoscopically  She is higher risk for anesthesia  No urgent need for endoscopy this weekend.  Need to address who will sign her consent and endo when medically better  Continue pantoprazole

## 2024-04-06 NOTE — PROGRESS NOTES
Monitor summary:        Rhythm: Atrial Flutter  Rate:   Ectopy: BBB, (R) PVC, (O) PVC  Measurements: - /.14/-              12hr chart check

## 2024-04-06 NOTE — PROGRESS NOTES
..Gastroenterology Progress Note               Author:  PERLITA Akien   Date & Time Created: 4/6/2024 9:09 AM       Patient ID:  Name:             Davie Montejo  YOB: 1935  Age:                 89 y.o.  female  MRN:               [id number]        Medical Decision Making, by Problem:  Active Hospital Problems    Diagnosis     Congestive heart failure with cardiomyopathy (HCC) [I50.9, I42.9]     CHF (congestive heart failure), NYHA class I, acute on chronic, combined (HCC) [I50.43]     Cardiogenic shock (HCC) [R57.0]     Bradycardia [R00.1]     GI bleed [K92.2]     Acute blood loss anemia [D62]     Stage 3b chronic kidney disease (CKD) [N18.32]     Atrial flutter with rapid ventricular response (HCC) [I48.92]     Acute on chronic combined systolic and diastolic congestive heart failure (HCC) [I50.43]     Hypotension [I95.9]     Acute respiratory failure with hypoxia (HCC) [J96.01]     Bilateral pleural effusion [J90]     Hx of CABG [Z95.1]            Presenting Chief Complaint:  Anemia and heme positive stool    History of Present Illness:    This is a very pleasant 89 y.o. female with dementia, atrial fibrillation on apixaban, CAD s/p CABG and EF 35% complaining of chest pain over last month BIB [facility] early this am from SNF.  Also with recent constipation but started stooling again.  Patient unaware of blood in the stool.  She was hypotensive and bradycardic in ER.  On BALBINA she was described to have heme positive stool, unknown color of stool. Hgb 8.5, Hgb 9.8 on 3/28, and Hgb 11 on 3/26.  On apixaban and clodiprogel.  Recently discharged on omeprazole and sucralfate.    Overnight, patient developed acute hypoxic respiratory failure, cyanosis, and tripoding and was transferred to the ICU.  Chest x-ray revealed pulmonary edema and small bilateral pleural effusions.  There is concern for heart failure exacerbation progressing into cardiogenic shock and patient was started on BiPAP,  dobutamine and aggressive diuresis.    Interval History:  3/31/2024: Patient seen.  On BiPAP, minimally interactive and mumbles with questioning.  Per RN, no evidence of GI bleeding.  Hemoglobin 12 with BUN 25.  Remains hypotensive    4/6/2024: called back 4/5/2024 regarding consideration of EGD. No overt bleeding but hgb dropping 12-10. Patient seen at bedside, aaox4 but does not have good understanding of situation. She has some epigastric tenderness with palpation. Per nursing afib/flutter with RVR overnight.       Hospital Medications:  Current Facility-Administered Medications   Medication Dose Frequency Provider Last Rate Last Admin    midodrine (Proamatine) tablet 10 mg  10 mg TID WITH MEALS Nathalia Rodriguez M.D.   10 mg at 04/05/24 1840    [Held by provider] sucralfate (Carafate) 1 GM/10ML suspension 1 g  1 g Q6HRS Nathalia Rodriguez M.D.   1 g at 04/05/24 1838    LORazepam (Ativan) tablet 0.5 mg  0.5 mg Q4HRS PRN Nathalia Rodriguez M.D.        metoprolol tartrate (Lopressor) tablet 12.5 mg  12.5 mg Q8HRS Nathalia Rodriguez M.D.   12.5 mg at 04/06/24 0607    acetaminophen (Tylenol) tablet 650 mg  650 mg Q4HRS PRN Nathalia Rodriguez M.D.        gabapentin (Neurontin) capsule 600 mg  600 mg DAILY Nathalia Rodriguez M.D.   600 mg at 04/06/24 0607    oxyCODONE immediate-release (Roxicodone) tablet 5 mg  5 mg Q6HRS PRN Nathalia Rodriguez M.D.   5 mg at 04/05/24 2020    pantoprazole (Protonix) injection 40 mg  40 mg BID Nathalia Rodriguez M.D.   40 mg at 04/06/24 0607    melatonin tablet 5 mg  5 mg HS PRN Laura Shearer A.P.R.NFelicia   5 mg at 04/05/24 2341    [Held by provider] apixaban (Eliquis) tablet 5 mg  5 mg BID Maxwell Ochoa D.O.   5 mg at 04/05/24 0629    potassium chloride SA (Kdur) tablet 20 mEq  20 mEq DAILY Maxwell Ochoa D.O.   20 mEq at 04/06/24 0607    [Held by provider] clopidogrel (Plavix) tablet 75 mg  75 mg DAILY Maxwell Ochoa D.O.   75 mg at 04/05/24 0629    ipratropium-albuterol (DUONEB) nebulizer solution  3 mL Q4H PRN (RT) Edyta ABERNATHY  "JASSI Baez        Respiratory Therapy Consult   Continuous RT Kathleen Aguilar M.D.        amiodarone (Cordarone) tablet 200 mg  200 mg DAILY Tom Carrera M.D.   200 mg at 04/06/24 0607    atorvastatin (Lipitor) tablet 10 mg  10 mg Nightly Tom Carrera M.D.   10 mg at 04/05/24 2014    levothyroxine (Synthroid) tablet 125 mcg  125 mcg AM ES Tom Carrera M.D.   125 mcg at 04/06/24 0607    polyethylene glycol/lytes (Miralax) Packet 1 Packet  1 Packet DAILY Kathleen Aguilar M.D.   1 Packet at 04/05/24 0629    senna-docusate (Pericolace Or Senokot S) 8.6-50 MG per tablet 2 Tablet  2 Tablet Q EVENING Kathleen Aguilar M.D.   2 Tablet at 04/05/24 1839    bisacodyl (Dulcolax) suppository 10 mg  10 mg QDAY PRN Kathleen Aguilar M.D.       Last reviewed on 3/30/2024  3:10 AM by Shawanda Ashley, T       Review of Systems:  Review of Systems   Unable to perform ROS: Medical condition         Vital signs:  Weight/BMI: Body mass index is 20.81 kg/m².  BP 97/78   Pulse 60   Temp 36.4 °C (97.5 °F) (Temporal)   Resp 18   Ht 1.737 m (5' 8.4\")   Wt 62.8 kg (138 lb 7.2 oz)   SpO2 94%   Vitals:    04/05/24 2308 04/06/24 0606 04/06/24 0607 04/06/24 0758   BP: 112/52 121/65 121/65 97/78   Pulse: 68 70 70 60   Resp: 16 16  18   Temp: 36.2 °C (97.2 °F) 36.5 °C (97.7 °F)  36.4 °C (97.5 °F)   TempSrc: Temporal Temporal  Temporal   SpO2: 93% 96%  94%   Weight:  62.8 kg (138 lb 7.2 oz)     Height:         Oxygen Therapy:  Pulse Oximetry: 94 %, O2 (LPM): 1.5, O2 Delivery Device: Silicone Nasal Cannula  No intake or output data in the 24 hours ending 04/06/24 0909        Physical Exam:  Physical Exam  Vitals and nursing note reviewed.   Constitutional:       General: She is not in acute distress.     Appearance: She is ill-appearing.   HENT:      Head: Normocephalic and atraumatic.      Right Ear: External ear normal.      Left Ear: External ear normal.      Nose: Nose normal.      Mouth/Throat:      Mouth: Mucous membranes are dry.      Pharynx: " Oropharynx is clear.   Eyes:      General: No scleral icterus.  Cardiovascular:      Rate and Rhythm: Normal rate and regular rhythm.      Pulses: Normal pulses.      Heart sounds: Murmur heard.   Pulmonary:      Effort: Pulmonary effort is normal.      Breath sounds: No rhonchi.   Abdominal:      General: Abdomen is flat. Bowel sounds are normal. There is no distension.      Palpations: Abdomen is soft.      Tenderness: There is abdominal tenderness (epigastric). There is no guarding.   Musculoskeletal:      Right lower leg: No edema.      Left lower leg: No edema.   Skin:     General: Skin is warm and dry.      Coloration: Skin is pale.   Neurological:      Mental Status: She is oriented to person, place, and time. Mental status is at baseline.             Labs:  Recent Labs     04/04/24 0440 04/05/24 0214 04/06/24  0200   SODIUM 138 137 135   POTASSIUM 4.4 3.9 5.2   CHLORIDE 99 99 100   CO2 26 25 23   BUN 19 21 19   CREATININE 1.36 1.39 1.40   MAGNESIUM 2.3 2.1 2.1   PHOSPHORUS 3.2 3.0 3.2   CALCIUM 9.5 9.7 9.3     Recent Labs     04/04/24 0440 04/05/24 0214 04/06/24  0200   ALTSGPT 9 5 6   ASTSGOT 19 17 20   ALKPHOSPHAT 93 82 74   TBILIRUBIN 0.6 0.7 0.6   GLUCOSE 126* 110* 106*     Recent Labs     04/04/24 0440 04/05/24 0214 04/06/24  0200 04/06/24  0234   WBC 10.5 7.5  --  7.1   ASTSGOT 19 17 20  --    ALTSGPT 9 5 6  --    ALKPHOSPHAT 93 82 74  --    TBILIRUBIN 0.6 0.7 0.6  --      Recent Labs     04/04/24  0440 04/05/24  0214 04/05/24  0951 04/05/24  1734 04/06/24  0234   RBC 4.70 4.02*  --   --  3.80*   HEMOGLOBIN 12.2 10.7* 10.9* 10.4* 10.0*   HEMATOCRIT 39.1 32.8*  --   --  31.3*   PLATELETCT 382 300  --   --  309     Recent Results (from the past 24 hour(s))   HGB    Collection Time: 04/05/24  9:51 AM   Result Value Ref Range    Hemoglobin 10.9 (L) 12.0 - 16.0 g/dL   HGB    Collection Time: 04/05/24  5:34 PM   Result Value Ref Range    Hemoglobin 10.4 (L) 12.0 - 16.0 g/dL   Comp Metabolic Panel     Collection Time: 04/06/24  2:00 AM   Result Value Ref Range    Sodium 135 135 - 145 mmol/L    Potassium 5.2 3.6 - 5.5 mmol/L    Chloride 100 96 - 112 mmol/L    Co2 23 20 - 33 mmol/L    Anion Gap 12.0 7.0 - 16.0    Glucose 106 (H) 65 - 99 mg/dL    Bun 19 8 - 22 mg/dL    Creatinine 1.40 0.50 - 1.40 mg/dL    Calcium 9.3 8.5 - 10.5 mg/dL    Correct Calcium 9.9 8.5 - 10.5 mg/dL    AST(SGOT) 20 12 - 45 U/L    ALT(SGPT) 6 2 - 50 U/L    Alkaline Phosphatase 74 30 - 99 U/L    Total Bilirubin 0.6 0.1 - 1.5 mg/dL    Albumin 3.3 3.2 - 4.9 g/dL    Total Protein 6.3 6.0 - 8.2 g/dL    Globulin 3.0 1.9 - 3.5 g/dL    A-G Ratio 1.1 g/dL   MAGNESIUM    Collection Time: 04/06/24  2:00 AM   Result Value Ref Range    Magnesium 2.1 1.5 - 2.5 mg/dL   PHOSPHORUS    Collection Time: 04/06/24  2:00 AM   Result Value Ref Range    Phosphorus 3.2 2.5 - 4.5 mg/dL   ESTIMATED GFR    Collection Time: 04/06/24  2:00 AM   Result Value Ref Range    GFR (CKD-EPI) 36 (A) >60 mL/min/1.73 m 2   CBC WITHOUT DIFFERENTIAL    Collection Time: 04/06/24  2:34 AM   Result Value Ref Range    WBC 7.1 4.8 - 10.8 K/uL    RBC 3.80 (L) 4.20 - 5.40 M/uL    Hemoglobin 10.0 (L) 12.0 - 16.0 g/dL    Hematocrit 31.3 (L) 37.0 - 47.0 %    MCV 82.4 81.4 - 97.8 fL    MCH 26.3 (L) 27.0 - 33.0 pg    MCHC 31.9 (L) 32.2 - 35.5 g/dL    RDW 56.7 (H) 35.9 - 50.0 fL    Platelet Count 309 164 - 446 K/uL    MPV 10.8 9.0 - 12.9 fL       Radiology Review:  DX-CHEST-LIMITED (1 VIEW)   Final Result      1.  Small bilateral pleural effusions with adjacent airspace disease.      DX-CHEST-LIMITED (1 VIEW)   Final Result         Right central line is noted with tip at the level of the SVC. No pneumothorax is identified.      DX-CHEST-PORTABLE (1 VIEW)   Final Result         1.  There is some interval increase in poorly defined opacifications in each lung including the lower lungs.      2.  Left pleural effusion is again identified.      DX-CHEST-PORTABLE (1 VIEW)   Final Result         1.  Hazy  bilateral pulmonary infiltrates, similar to prior study.   2.  Small bilateral pleural effusions, stable   3.  Cardiomegaly            MDM (Data Review):   -Records reviewed and summarized in current documentation  -I personally reviewed and interpreted the laboratory results  -I personally reviewed the radiology images    Assessment/Recommendations:  IMPRESSION:   Anemia   Heme positive stool   History of perforated gastric ulcer, contained, 2021.  Unknown if ever had f/u EGD   Chronic anticoagulation   Atrial fibrillation, s/p cardioversion 2 weeks ago   Acute on chronic heart failure with reduced ejection fraction EF 25%   Bilateral pleural effusion   CKD IIIb   Dementia   Colon diverticulosis     Recommendations:  She is unable to give history but no record of melena or hematochezia  She has epigastric tenderness and past hx of  perforation that may never have been evaluated endoscopically  She is higher risk for anesthesia with her low EF but moreso with her afib/flutter with RVR overnight.   Due to increased risk and no overt bleeding, would recommend continuing with conservative therapy with PPI BUID, can add carafate.     No further inventions from acute GI team.  GI to sign off.  Please reconsult for any further questions or concerns.    Discussed with patient, nursing at bedside, Dr. Rodriguez, Dr. Simmons.           Plan discussed with RN, Dr. Rhett perez, Dr. Sethi    Core Quality Measures   Reviewed items::  Labs, Medications and Radiology reports reviewed

## 2024-04-06 NOTE — CARE PLAN
The patient is Stable - Low risk of patient condition declining or worsening    Shift Goals  Clinical Goals: vss, pain management,  Patient Goals: rest  Family Goals: n/a    Progress made toward(s) clinical / shift goals:  rest, vss    Patient is not progressing towards the following goals:

## 2024-04-07 LAB
ANION GAP SERPL CALC-SCNC: 13 MMOL/L (ref 7–16)
BASOPHILS # BLD AUTO: 1.1 % (ref 0–1.8)
BASOPHILS # BLD: 0.07 K/UL (ref 0–0.12)
BUN SERPL-MCNC: 24 MG/DL (ref 8–22)
CALCIUM SERPL-MCNC: 9.3 MG/DL (ref 8.5–10.5)
CHLORIDE SERPL-SCNC: 102 MMOL/L (ref 96–112)
CO2 SERPL-SCNC: 22 MMOL/L (ref 20–33)
CREAT SERPL-MCNC: 1.46 MG/DL (ref 0.5–1.4)
EOSINOPHIL # BLD AUTO: 0.31 K/UL (ref 0–0.51)
EOSINOPHIL NFR BLD: 4.9 % (ref 0–6.9)
ERYTHROCYTE [DISTWIDTH] IN BLOOD BY AUTOMATED COUNT: 55.9 FL (ref 35.9–50)
GFR SERPLBLD CREATININE-BSD FMLA CKD-EPI: 35 ML/MIN/1.73 M 2
GLUCOSE SERPL-MCNC: 107 MG/DL (ref 65–99)
HCT VFR BLD AUTO: 30.5 % (ref 37–47)
HGB BLD-MCNC: 9.8 G/DL (ref 12–16)
IMM GRANULOCYTES # BLD AUTO: 0.02 K/UL (ref 0–0.11)
IMM GRANULOCYTES NFR BLD AUTO: 0.3 % (ref 0–0.9)
LYMPHOCYTES # BLD AUTO: 2.21 K/UL (ref 1–4.8)
LYMPHOCYTES NFR BLD: 35 % (ref 22–41)
MCH RBC QN AUTO: 26.6 PG (ref 27–33)
MCHC RBC AUTO-ENTMCNC: 32.1 G/DL (ref 32.2–35.5)
MCV RBC AUTO: 82.7 FL (ref 81.4–97.8)
MONOCYTES # BLD AUTO: 0.85 K/UL (ref 0–0.85)
MONOCYTES NFR BLD AUTO: 13.5 % (ref 0–13.4)
NEUTROPHILS # BLD AUTO: 2.85 K/UL (ref 1.82–7.42)
NEUTROPHILS NFR BLD: 45.2 % (ref 44–72)
NRBC # BLD AUTO: 0 K/UL
NRBC BLD-RTO: 0 /100 WBC (ref 0–0.2)
PLATELET # BLD AUTO: 287 K/UL (ref 164–446)
PMV BLD AUTO: 11.6 FL (ref 9–12.9)
POTASSIUM SERPL-SCNC: 4.8 MMOL/L (ref 3.6–5.5)
RBC # BLD AUTO: 3.69 M/UL (ref 4.2–5.4)
SODIUM SERPL-SCNC: 137 MMOL/L (ref 135–145)
WBC # BLD AUTO: 6.3 K/UL (ref 4.8–10.8)

## 2024-04-07 PROCEDURE — 85025 COMPLETE CBC W/AUTO DIFF WBC: CPT

## 2024-04-07 PROCEDURE — A9270 NON-COVERED ITEM OR SERVICE: HCPCS

## 2024-04-07 PROCEDURE — A9270 NON-COVERED ITEM OR SERVICE: HCPCS | Performed by: STUDENT IN AN ORGANIZED HEALTH CARE EDUCATION/TRAINING PROGRAM

## 2024-04-07 PROCEDURE — 700102 HCHG RX REV CODE 250 W/ 637 OVERRIDE(OP): Performed by: STUDENT IN AN ORGANIZED HEALTH CARE EDUCATION/TRAINING PROGRAM

## 2024-04-07 PROCEDURE — 80048 BASIC METABOLIC PNL TOTAL CA: CPT

## 2024-04-07 PROCEDURE — 700102 HCHG RX REV CODE 250 W/ 637 OVERRIDE(OP)

## 2024-04-07 PROCEDURE — 700102 HCHG RX REV CODE 250 W/ 637 OVERRIDE(OP): Performed by: HOSPITALIST

## 2024-04-07 PROCEDURE — 99233 SBSQ HOSP IP/OBS HIGH 50: CPT | Performed by: HOSPITALIST

## 2024-04-07 PROCEDURE — A9270 NON-COVERED ITEM OR SERVICE: HCPCS | Performed by: HOSPITALIST

## 2024-04-07 PROCEDURE — 770020 HCHG ROOM/CARE - TELE (206)

## 2024-04-07 PROCEDURE — 36415 COLL VENOUS BLD VENIPUNCTURE: CPT

## 2024-04-07 RX ORDER — CYCLOBENZAPRINE HCL 10 MG
5 TABLET ORAL 3 TIMES DAILY PRN
Status: DISCONTINUED | OUTPATIENT
Start: 2024-04-07 | End: 2024-04-09 | Stop reason: HOSPADM

## 2024-04-07 RX ORDER — LIDOCAINE 4 G/G
1 PATCH TOPICAL EVERY 24 HOURS
Status: DISCONTINUED | OUTPATIENT
Start: 2024-04-07 | End: 2024-04-07

## 2024-04-07 RX ORDER — MIDODRINE HYDROCHLORIDE 5 MG/1
2.5 TABLET ORAL
Status: DISCONTINUED | OUTPATIENT
Start: 2024-04-07 | End: 2024-04-08

## 2024-04-07 RX ORDER — AMIODARONE HYDROCHLORIDE 200 MG/1
200 TABLET ORAL TWICE DAILY
Status: DISCONTINUED | OUTPATIENT
Start: 2024-04-07 | End: 2024-04-08

## 2024-04-07 RX ADMIN — SUCRALFATE 1 G: 1 SUSPENSION ORAL at 04:56

## 2024-04-07 RX ADMIN — MIDODRINE HYDROCHLORIDE 2.5 MG: 5 TABLET ORAL at 11:58

## 2024-04-07 RX ADMIN — SUCRALFATE 1 G: 1 SUSPENSION ORAL at 23:26

## 2024-04-07 RX ADMIN — METOPROLOL TARTRATE 12.5 MG: 25 TABLET, FILM COATED ORAL at 17:02

## 2024-04-07 RX ADMIN — OXYCODONE 5 MG: 5 TABLET ORAL at 04:55

## 2024-04-07 RX ADMIN — AMIODARONE HYDROCHLORIDE 200 MG: 200 TABLET ORAL at 04:47

## 2024-04-07 RX ADMIN — CYCLOBENZAPRINE 5 MG: 10 TABLET, FILM COATED ORAL at 18:34

## 2024-04-07 RX ADMIN — APIXABAN 5 MG: 5 TABLET, FILM COATED ORAL at 17:02

## 2024-04-07 RX ADMIN — OMEPRAZOLE 20 MG: 20 CAPSULE, DELAYED RELEASE ORAL at 04:55

## 2024-04-07 RX ADMIN — OMEPRAZOLE 20 MG: 20 CAPSULE, DELAYED RELEASE ORAL at 17:02

## 2024-04-07 RX ADMIN — MIDODRINE HYDROCHLORIDE 5 MG: 5 TABLET ORAL at 07:27

## 2024-04-07 RX ADMIN — METOPROLOL TARTRATE 12.5 MG: 25 TABLET, FILM COATED ORAL at 04:47

## 2024-04-07 RX ADMIN — Medication 5 MG: at 23:25

## 2024-04-07 RX ADMIN — AMIODARONE HYDROCHLORIDE 200 MG: 200 TABLET ORAL at 17:01

## 2024-04-07 RX ADMIN — ATORVASTATIN CALCIUM 10 MG: 10 TABLET, FILM COATED ORAL at 20:59

## 2024-04-07 RX ADMIN — METOPROLOL TARTRATE 12.5 MG: 25 TABLET, FILM COATED ORAL at 21:00

## 2024-04-07 RX ADMIN — DOCUSATE SODIUM 50 MG AND SENNOSIDES 8.6 MG 2 TABLET: 8.6; 5 TABLET, FILM COATED ORAL at 17:02

## 2024-04-07 RX ADMIN — GABAPENTIN 600 MG: 300 CAPSULE ORAL at 04:46

## 2024-04-07 RX ADMIN — MIDODRINE HYDROCHLORIDE 2.5 MG: 5 TABLET ORAL at 17:02

## 2024-04-07 RX ADMIN — ACETAMINOPHEN 650 MG: 325 TABLET, FILM COATED ORAL at 21:00

## 2024-04-07 RX ADMIN — APIXABAN 5 MG: 5 TABLET, FILM COATED ORAL at 04:55

## 2024-04-07 RX ADMIN — LORAZEPAM 0.5 MG: 0.5 TABLET ORAL at 23:26

## 2024-04-07 RX ADMIN — SUCRALFATE 1 G: 1 SUSPENSION ORAL at 11:58

## 2024-04-07 RX ADMIN — SUCRALFATE 1 G: 1 SUSPENSION ORAL at 17:01

## 2024-04-07 RX ADMIN — POLYETHYLENE GLYCOL 3350 1 PACKET: 17 POWDER, FOR SOLUTION ORAL at 04:56

## 2024-04-07 RX ADMIN — LEVOTHYROXINE SODIUM 125 MCG: 0.12 TABLET ORAL at 04:55

## 2024-04-07 ASSESSMENT — ENCOUNTER SYMPTOMS
NAUSEA: 0
DIARRHEA: 0
ABDOMINAL PAIN: 1
NERVOUS/ANXIOUS: 0
PALPITATIONS: 0
VOMITING: 0
CHILLS: 0
FEVER: 0
BACK PAIN: 0
DIZZINESS: 0
STRIDOR: 0
HEADACHES: 0
COUGH: 0
SPEECH CHANGE: 0
SHORTNESS OF BREATH: 0

## 2024-04-07 ASSESSMENT — FIBROSIS 4 INDEX: FIB4 SCORE: 2.48

## 2024-04-07 ASSESSMENT — PAIN DESCRIPTION - PAIN TYPE: TYPE: ACUTE PAIN

## 2024-04-07 NOTE — PROGRESS NOTES
[facility] Medicine Daily Progress Note    Date of Service  4/6/2024    Chief Complaint  Right sided Chest pain    Hospital Course  Davie Montejo is a 89 y.o. female with a history of dementia, atrial fibrillation, prior perforated gastric ulcer, hypertension, dyslipidemia, HFrEF 25%, severe MR, coronary artery disease status post CABG.  Admitted 3/30/2024 with increasing weakness and shortness of breath with frequent night sweats.  She was transferred from [facility] placed on 2 L nasal cannula.  She had had a recent discharge from the hospital 3/26 where she was noted to have pleural effusions and underwent a thoracentesis.  She is on Plavix and Eliquis.  In the emergency room she was found to be Hemoccult positive.  Chest x-ray showed bilateral pulmonary edema and bilateral pleural effusions.  EKG: Sinus bradycardia.  During her admission she was transferred to the ICU with acute worsening of shortness of breath/respiratory failure.  She was initiated on dobutamine Lasix and BiPAP.  She was requiring Precedex drip during her ICU stay but this was weaned off.  She was on norepinephrine drip.  The patient had refused thoracentesis per report.  For the patient's GI bleed, her hemoglobin remained stable the Eliquis was held as well as the Plavix but as there is no significant drop in her hemoglobin Plavix was reinitiated.    Interval Problem Update  4/4: Awake and conversant.  Denies any abdominal discomfort.  She was on 3 L nasal cannula I was weaning her down to 1 L during my evaluation of her with her SpO2 remaining greater than 93%.  Okay to transfer to telemetry floor.  4/5: Patient received from ICU.  Currently in atrial flutter with RVR, hypotensive 95/53.  Increase metoprolol from 12.5 XL daily to 12.5 3 times daily.  Increase midodrine from 5 3 times daily to 10 mg p.o. 3 times daily.  Concern is patient is continuing to have epigastric pain history of melena hemoglobin 10 from 12.  I  have reconsulted GI.  Patient is A&O and able to consent.  She is willing to undergo EGD only if she has adequate anesthesia.  She states she previously had EGDs 2 years ago in [facility] without any anesthesia.  Continue PPI IV twice daily.  Hold on Carafate until after EGD.  4/6: Hemoglobin stable at 10.  Patient still having epigastric pain.  However she is resistant to having EGD performed.  Therefore EGD canceled.  Restart Carafate 4 times daily with PPI.  Remains in a flutter but better rate controlled at 60.  Tapering midodrine.   Discontinued potassium.  DC'd Combivent inhaler every 6 hours.  Placed Xopenex as needed wheeze or shortness of breath.    I have discussed this patient's plan of care and discharge plan at IDT rounds today with Case Management, Nursing, Nursing leadership, and other members of the IDT team.    Consultants/Specialty  critical care, GI, and palliative care  Cardiology    Code Status  DNAR/DNI    Disposition  The patient is not medically cleared for discharge to home or a post-acute facility.  Anticipate discharge to: skilled nursing facility    I have placed the appropriate orders for post-discharge needs.    Review of Systems  Review of Systems   Constitutional:  Negative for chills, fever and malaise/fatigue.   Respiratory:  Negative for cough, shortness of breath and stridor.    Cardiovascular:  Negative for chest pain, palpitations and leg swelling.   Gastrointestinal:  Positive for abdominal pain. Negative for diarrhea, nausea and vomiting.   Musculoskeletal:  Negative for back pain and joint pain.   Neurological:  Negative for dizziness, speech change and headaches.   Psychiatric/Behavioral:  The patient is not nervous/anxious.         Physical Exam  Temp:  [36.2 °C (97.2 °F)-36.5 °C (97.7 °F)] 36.3 °C (97.3 °F)  Pulse:  [60-88] 62  Resp:  [16-18] 18  BP: ()/(44-78) 122/60  SpO2:  [93 %-99 %] 96 %    Physical Exam  Vitals reviewed.   Constitutional:        Appearance: Normal appearance. She is not diaphoretic.   HENT:      Head: Normocephalic and atraumatic.      Nose: Nose normal.   Eyes:      General: No scleral icterus.        Right eye: No discharge.         Left eye: No discharge.      Extraocular Movements: Extraocular movements intact.      Conjunctiva/sclera: Conjunctivae normal.   Cardiovascular:      Rate and Rhythm: Normal rate. Rhythm irregular.      Pulses:           Radial pulses are 2+ on the right side and 2+ on the left side.        Dorsalis pedis pulses are 2+ on the right side and 2+ on the left side.      Heart sounds: Murmur heard.   Pulmonary:      Effort: Pulmonary effort is normal. No respiratory distress.      Breath sounds: Normal breath sounds. No wheezing or rales.   Abdominal:      General: Bowel sounds are normal. There is no distension.      Palpations: Abdomen is soft.      Tenderness: There is abdominal tenderness (epigastric).   Musculoskeletal:         General: No swelling or tenderness.      Cervical back: Neck supple. No muscular tenderness.      Right lower leg: No edema.      Left lower leg: No edema.   Skin:     Coloration: Skin is not jaundiced or pale.   Neurological:      General: No focal deficit present.      Mental Status: She is alert and oriented to person, place, and time. Mental status is at baseline.      Cranial Nerves: No cranial nerve deficit.   Psychiatric:         Mood and Affect: Mood normal.         Behavior: Behavior normal.         Fluids  No intake or output data in the 24 hours ending 04/06/24 1800      Laboratory  Recent Labs     04/04/24  0440 04/05/24  0214 04/05/24  0951 04/05/24  1734 04/06/24  0234   WBC 10.5 7.5  --   --  7.1   RBC 4.70 4.02*  --   --  3.80*   HEMOGLOBIN 12.2 10.7* 10.9* 10.4* 10.0*   HEMATOCRIT 39.1 32.8*  --   --  31.3*   MCV 83.2 81.6  --   --  82.4   MCH 26.0* 26.6*  --   --  26.3*   MCHC 31.2* 32.6  --   --  31.9*   RDW 57.3* 55.8*  --   --  56.7*   PLATELETCT 382 300  --   --   309   MPV 11.0 10.7  --   --  10.8     Recent Labs     04/04/24  0440 04/05/24  0214 04/06/24  0200   SODIUM 138 137 135   POTASSIUM 4.4 3.9 5.2   CHLORIDE 99 99 100   CO2 26 25 23   GLUCOSE 126* 110* 106*   BUN 19 21 19   CREATININE 1.36 1.39 1.40   CALCIUM 9.5 9.7 9.3                   Imaging  DX-CHEST-LIMITED (1 VIEW)   Final Result      1.  Small bilateral pleural effusions with adjacent airspace disease.      DX-CHEST-LIMITED (1 VIEW)   Final Result         Right central line is noted with tip at the level of the SVC. No pneumothorax is identified.      DX-CHEST-PORTABLE (1 VIEW)   Final Result         1.  There is some interval increase in poorly defined opacifications in each lung including the lower lungs.      2.  Left pleural effusion is again identified.      DX-CHEST-PORTABLE (1 VIEW)   Final Result         1.  Hazy bilateral pulmonary infiltrates, similar to prior study.   2.  Small bilateral pleural effusions, stable   3.  Cardiomegaly           Assessment/Plan  * Acute respiratory failure with hypoxia (HCC)- (present on admission)  Assessment & Plan  On 2 L of O2 above baseline    Acute blood loss anemia- (present on admission)  Assessment & Plan  Monitor hemoglobin daily.    GI bleed- (present on admission)  Assessment & Plan  Hold Plavix   Eliquis   4/4 Hgb: 12.2<10.4<9.1 to 10 and stabilized at 10.  Continuous cardiac monitoring  IV Protonix  4/5:  GI reconsulted given epigastric pain and h/o GI ulcer perforation in past.  Last EGD performed in [facility] 2 years ago.  Appreciate GI willing to do EGD in a.m.   4/6: Patient refusing EGD.  Start carafate.  Continue PPI.  Stable hemoglobin at 10.      Atrial flutter with rapid ventricular response (HCC)- (present on admission)  Assessment & Plan  4/5 aflutter with RVR 140s, increased metoprolol with holding parameters XL 25 d to 12.5mg tid.  Continue amiodarone  Eliquis restarted since stable hemoglobin at 10.      Stage 3b chronic kidney  disease (CKD)- (present on admission)  Assessment & Plan  4/4 BUN: 19 <17, Cr: 1.36 <1.17  Monitor bmp      Acute on chronic combined systolic and diastolic congestive heart failure (HCC)- (present on admission)  Assessment & Plan  Decompensated  Hold Lasix until GI bleed has resolved  Continue metoprolol    Hypotension- (present on admission)  Assessment & Plan  Monitor vitals,I/O's, labs  Tapering midodrine.    Bilateral pleural effusion- (present on admission)  Assessment & Plan  Diuresis  Monitor respiratory status    Hx of CABG- (present on admission)  Assessment & Plan  Medical management  Continue statins  Holding plavix for now due to GI bleed  Patient does present with chest pain and will follow troponins         VTE prophylaxis:    therapeutic anticoagulation with eliquis 5 mg BID      I have performed a physical exam and reviewed and updated ROS and Plan today (4/6/2024). In review of yesterday's note (4/5/2024), there are no changes except as documented above.

## 2024-04-07 NOTE — PROGRESS NOTES
Monitor Summary  Rhythm: Afib  Rate: 62-79  Ectopy: PVC's  Measurements: ---/0.13/---  ---12 hr Chart Review---

## 2024-04-07 NOTE — PROGRESS NOTES
Monitor Summary:     Rhythm: Afib  Rate: 66-93  Ectopy: (R) PAC, (R) Coup  Measurements: -/0.13/-           12 Hour Chart Check

## 2024-04-07 NOTE — DISCHARGE PLANNING
Per rounds- Patient is refusing to return to [place]. Patient is refusing all SNF's, she wants to return home. Patient is agreeable to return home with HH for SN, P/T and O/T. RNCM spoke with hospitalist, she is not comfortable with her returning home as it is not a safe discharge. Therefore, no referrals have been sent. Could prove difficult discharge.

## 2024-04-07 NOTE — CARE PLAN
The patient is     Shift Goals  Clinical Goals: pain management, wean o2  Patient Goals: rest  Family Goals: DEE DEE    Progress made toward(s) clinical / shift goals:    Patient is not progressing towards the following goals:      Problem: Knowledge Deficit - Standard  Goal: Patient and family/care givers will demonstrate understanding of plan of care, disease process/condition, diagnostic tests and medications  Outcome: Progressing     Problem: Skin Integrity  Goal: Skin integrity is maintained or improved  Outcome: Progressing     Problem: Pain - Standard  Goal: Alleviation of pain or a reduction in pain to the patient’s comfort goal  Outcome: Progressing

## 2024-04-07 NOTE — CARE PLAN
The patient is Stable - Low risk of patient condition declining or worsening    Shift Goals  Clinical Goals: Pain management  Patient Goals: comfort  Family Goals: DEE DEE    Progress made toward(s) clinical / shift goals:    Problem: Knowledge Deficit - Standard  Goal: Patient and family/care givers will demonstrate understanding of plan of care, disease process/condition, diagnostic tests and medications  Outcome: Progressing     Problem: Skin Integrity  Goal: Skin integrity is maintained or improved  Outcome: Progressing     Problem: Pain - Standard  Goal: Alleviation of pain or a reduction in pain to the patient’s comfort goal  Outcome: Progressing       Patient is not progressing towards the following goals:

## 2024-04-08 ENCOUNTER — APPOINTMENT (OUTPATIENT)
Dept: CARDIOLOGY | Facility: MEDICAL CENTER | Age: 89
End: 2024-04-08
Attending: INTERNAL MEDICINE
Payer: MEDICARE

## 2024-04-08 LAB
ANION GAP SERPL CALC-SCNC: 15 MMOL/L (ref 7–16)
BASOPHILS # BLD AUTO: 1.1 % (ref 0–1.8)
BASOPHILS # BLD: 0.07 K/UL (ref 0–0.12)
BUN SERPL-MCNC: 22 MG/DL (ref 8–22)
CALCIUM SERPL-MCNC: 9.5 MG/DL (ref 8.5–10.5)
CHLORIDE SERPL-SCNC: 104 MMOL/L (ref 96–112)
CO2 SERPL-SCNC: 20 MMOL/L (ref 20–33)
CREAT SERPL-MCNC: 1.12 MG/DL (ref 0.5–1.4)
EOSINOPHIL # BLD AUTO: 0.38 K/UL (ref 0–0.51)
EOSINOPHIL NFR BLD: 6.1 % (ref 0–6.9)
ERYTHROCYTE [DISTWIDTH] IN BLOOD BY AUTOMATED COUNT: 55.9 FL (ref 35.9–50)
GFR SERPLBLD CREATININE-BSD FMLA CKD-EPI: 48 ML/MIN/1.73 M 2
GLUCOSE SERPL-MCNC: 111 MG/DL (ref 65–99)
HCT VFR BLD AUTO: 31.2 % (ref 37–47)
HGB BLD-MCNC: 10.2 G/DL (ref 12–16)
IMM GRANULOCYTES # BLD AUTO: 0.02 K/UL (ref 0–0.11)
IMM GRANULOCYTES NFR BLD AUTO: 0.3 % (ref 0–0.9)
LYMPHOCYTES # BLD AUTO: 2.01 K/UL (ref 1–4.8)
LYMPHOCYTES NFR BLD: 32.3 % (ref 22–41)
MCH RBC QN AUTO: 27 PG (ref 27–33)
MCHC RBC AUTO-ENTMCNC: 32.7 G/DL (ref 32.2–35.5)
MCV RBC AUTO: 82.5 FL (ref 81.4–97.8)
MONOCYTES # BLD AUTO: 0.63 K/UL (ref 0–0.85)
MONOCYTES NFR BLD AUTO: 10.1 % (ref 0–13.4)
NEUTROPHILS # BLD AUTO: 3.11 K/UL (ref 1.82–7.42)
NEUTROPHILS NFR BLD: 50.1 % (ref 44–72)
NRBC # BLD AUTO: 0 K/UL
NRBC BLD-RTO: 0 /100 WBC (ref 0–0.2)
PLATELET # BLD AUTO: 308 K/UL (ref 164–446)
PMV BLD AUTO: 10.3 FL (ref 9–12.9)
POTASSIUM SERPL-SCNC: 4.4 MMOL/L (ref 3.6–5.5)
RBC # BLD AUTO: 3.78 M/UL (ref 4.2–5.4)
SODIUM SERPL-SCNC: 139 MMOL/L (ref 135–145)
WBC # BLD AUTO: 6.2 K/UL (ref 4.8–10.8)

## 2024-04-08 PROCEDURE — 700102 HCHG RX REV CODE 250 W/ 637 OVERRIDE(OP): Performed by: HOSPITALIST

## 2024-04-08 PROCEDURE — 36415 COLL VENOUS BLD VENIPUNCTURE: CPT

## 2024-04-08 PROCEDURE — 99232 SBSQ HOSP IP/OBS MODERATE 35: CPT | Performed by: HOSPITALIST

## 2024-04-08 PROCEDURE — A9270 NON-COVERED ITEM OR SERVICE: HCPCS

## 2024-04-08 PROCEDURE — A9270 NON-COVERED ITEM OR SERVICE: HCPCS | Performed by: HOSPITALIST

## 2024-04-08 PROCEDURE — 85025 COMPLETE CBC W/AUTO DIFF WBC: CPT

## 2024-04-08 PROCEDURE — 700102 HCHG RX REV CODE 250 W/ 637 OVERRIDE(OP)

## 2024-04-08 PROCEDURE — 97116 GAIT TRAINING THERAPY: CPT

## 2024-04-08 PROCEDURE — A9270 NON-COVERED ITEM OR SERVICE: HCPCS | Performed by: STUDENT IN AN ORGANIZED HEALTH CARE EDUCATION/TRAINING PROGRAM

## 2024-04-08 PROCEDURE — 700102 HCHG RX REV CODE 250 W/ 637 OVERRIDE(OP): Performed by: STUDENT IN AN ORGANIZED HEALTH CARE EDUCATION/TRAINING PROGRAM

## 2024-04-08 PROCEDURE — 97530 THERAPEUTIC ACTIVITIES: CPT

## 2024-04-08 PROCEDURE — 80048 BASIC METABOLIC PNL TOTAL CA: CPT

## 2024-04-08 PROCEDURE — 770006 HCHG ROOM/CARE - MED/SURG/GYN SEMI*

## 2024-04-08 RX ORDER — AMIODARONE HYDROCHLORIDE 200 MG/1
200 TABLET ORAL
Status: DISCONTINUED | OUTPATIENT
Start: 2024-04-09 | End: 2024-04-09 | Stop reason: HOSPADM

## 2024-04-08 RX ORDER — OXYCODONE HYDROCHLORIDE 5 MG/1
5 TABLET ORAL EVERY 4 HOURS PRN
Status: DISCONTINUED | OUTPATIENT
Start: 2024-04-08 | End: 2024-04-09 | Stop reason: HOSPADM

## 2024-04-08 RX ADMIN — SUCRALFATE 1 G: 1 SUSPENSION ORAL at 12:08

## 2024-04-08 RX ADMIN — APIXABAN 5 MG: 5 TABLET, FILM COATED ORAL at 04:52

## 2024-04-08 RX ADMIN — SUCRALFATE 1 G: 1 SUSPENSION ORAL at 04:51

## 2024-04-08 RX ADMIN — CYCLOBENZAPRINE 5 MG: 10 TABLET, FILM COATED ORAL at 04:51

## 2024-04-08 RX ADMIN — ATORVASTATIN CALCIUM 10 MG: 10 TABLET, FILM COATED ORAL at 20:08

## 2024-04-08 RX ADMIN — ACETAMINOPHEN 650 MG: 325 TABLET, FILM COATED ORAL at 14:41

## 2024-04-08 RX ADMIN — MIDODRINE HYDROCHLORIDE 2.5 MG: 5 TABLET ORAL at 12:07

## 2024-04-08 RX ADMIN — METOPROLOL TARTRATE 12.5 MG: 25 TABLET, FILM COATED ORAL at 14:34

## 2024-04-08 RX ADMIN — ACETAMINOPHEN 650 MG: 325 TABLET, FILM COATED ORAL at 20:58

## 2024-04-08 RX ADMIN — DOCUSATE SODIUM 50 MG AND SENNOSIDES 8.6 MG 2 TABLET: 8.6; 5 TABLET, FILM COATED ORAL at 17:14

## 2024-04-08 RX ADMIN — SUCRALFATE 1 G: 1 SUSPENSION ORAL at 17:14

## 2024-04-08 RX ADMIN — OMEPRAZOLE 20 MG: 20 CAPSULE, DELAYED RELEASE ORAL at 04:52

## 2024-04-08 RX ADMIN — OMEPRAZOLE 20 MG: 20 CAPSULE, DELAYED RELEASE ORAL at 17:14

## 2024-04-08 RX ADMIN — LEVOTHYROXINE SODIUM 125 MCG: 0.12 TABLET ORAL at 04:51

## 2024-04-08 RX ADMIN — POLYETHYLENE GLYCOL 3350 1 PACKET: 17 POWDER, FOR SOLUTION ORAL at 04:51

## 2024-04-08 RX ADMIN — GABAPENTIN 600 MG: 300 CAPSULE ORAL at 04:52

## 2024-04-08 RX ADMIN — AMIODARONE HYDROCHLORIDE 200 MG: 200 TABLET ORAL at 04:52

## 2024-04-08 RX ADMIN — MIDODRINE HYDROCHLORIDE 2.5 MG: 5 TABLET ORAL at 08:47

## 2024-04-08 RX ADMIN — Medication 5 MG: at 20:09

## 2024-04-08 RX ADMIN — METOPROLOL TARTRATE 25 MG: 25 TABLET, FILM COATED ORAL at 17:15

## 2024-04-08 RX ADMIN — OXYCODONE 5 MG: 5 TABLET ORAL at 23:37

## 2024-04-08 RX ADMIN — APIXABAN 5 MG: 5 TABLET, FILM COATED ORAL at 17:14

## 2024-04-08 RX ADMIN — ACETAMINOPHEN 650 MG: 325 TABLET, FILM COATED ORAL at 09:02

## 2024-04-08 RX ADMIN — METOPROLOL TARTRATE 12.5 MG: 25 TABLET, FILM COATED ORAL at 04:52

## 2024-04-08 RX ADMIN — SUCRALFATE 1 G: 1 SUSPENSION ORAL at 23:37

## 2024-04-08 RX ADMIN — CYCLOBENZAPRINE 5 MG: 10 TABLET, FILM COATED ORAL at 12:07

## 2024-04-08 ASSESSMENT — PAIN DESCRIPTION - PAIN TYPE
TYPE: ACUTE PAIN

## 2024-04-08 ASSESSMENT — COGNITIVE AND FUNCTIONAL STATUS - GENERAL
PERSONAL GROOMING: A LITTLE
MOVING FROM LYING ON BACK TO SITTING ON SIDE OF FLAT BED: A LITTLE
DRESSING REGULAR LOWER BODY CLOTHING: A LITTLE
MOVING TO AND FROM BED TO CHAIR: A LITTLE
TOILETING: A LITTLE
SUGGESTED CMS G CODE MODIFIER MOBILITY: CK
HELP NEEDED FOR BATHING: A LITTLE
WALKING IN HOSPITAL ROOM: A LITTLE
MOBILITY SCORE: 18
TURNING FROM BACK TO SIDE WHILE IN FLAT BAD: A LITTLE
STANDING UP FROM CHAIR USING ARMS: A LITTLE
WALKING IN HOSPITAL ROOM: A LITTLE
CLIMB 3 TO 5 STEPS WITH RAILING: A LITTLE
DRESSING REGULAR UPPER BODY CLOTHING: A LITTLE
STANDING UP FROM CHAIR USING ARMS: A LITTLE
SUGGESTED CMS G CODE MODIFIER DAILY ACTIVITY: CK
DAILY ACTIVITIY SCORE: 18
MOVING TO AND FROM BED TO CHAIR: A LITTLE
EATING MEALS: A LITTLE

## 2024-04-08 ASSESSMENT — ENCOUNTER SYMPTOMS
FEVER: 0
DIARRHEA: 0
SHORTNESS OF BREATH: 0
DIZZINESS: 0
ABDOMINAL PAIN: 0
HEADACHES: 0
SPEECH CHANGE: 0
STRIDOR: 0
PALPITATIONS: 0
BACK PAIN: 0
COUGH: 0
CHILLS: 0
WEAKNESS: 1
NERVOUS/ANXIOUS: 0
VOMITING: 0
NAUSEA: 0

## 2024-04-08 ASSESSMENT — GAIT ASSESSMENTS
DISTANCE (FEET): 40
ASSISTIVE DEVICE: FRONT WHEEL WALKER
GAIT LEVEL OF ASSIST: CONTACT GUARD ASSIST

## 2024-04-08 ASSESSMENT — FIBROSIS 4 INDEX: FIB4 SCORE: 2.48

## 2024-04-08 NOTE — PROGRESS NOTES
[facility] Medicine Daily Progress Note    Date of Service  4/7/2024    Chief Complaint  Right sided Chest pain    Hospital Course  Davie Montejo is a 89 y.o. female with a history of dementia, atrial fibrillation, prior perforated gastric ulcer, hypertension, dyslipidemia, HFrEF 25%, severe MR, coronary artery disease status post CABG.  Admitted 3/30/2024 with increasing weakness and shortness of breath with frequent night sweats.  She was transferred from [facility] placed on 2 L nasal cannula.  She had had a recent discharge from the hospital 3/26 where she was noted to have pleural effusions and underwent a thoracentesis.  She is on Plavix and Eliquis.  In the emergency room she was found to be Hemoccult positive.  Chest x-ray showed bilateral pulmonary edema and bilateral pleural effusions.  EKG: Sinus bradycardia.  During her admission she was transferred to the ICU with acute worsening of shortness of breath/respiratory failure.  She was initiated on dobutamine Lasix and BiPAP.  She was requiring Precedex drip during her ICU stay but this was weaned off.  She was on norepinephrine drip.  The patient had refused thoracentesis per report.  For the patient's GI bleed, her hemoglobin remained stable the Eliquis was held as well as the Plavix but as there is no significant drop in her hemoglobin Plavix was reinitiated.    Interval Problem Update  4/4: Awake and conversant.  Denies any abdominal discomfort.  She was on 3 L nasal cannula I was weaning her down to 1 L during my evaluation of her with her SpO2 remaining greater than 93%.  Okay to transfer to telemetry floor.  4/5: Patient received from ICU.  Currently in atrial flutter with RVR, hypotensive 95/53.  Increase metoprolol from 12.5 XL daily to 12.5 3 times daily.  Increase midodrine from 5 3 times daily to 10 mg p.o. 3 times daily.  Concern is patient is continuing to have epigastric pain history of melena hemoglobin 10 from 12.  I  have reconsulted GI.  Patient is A&O and able to consent.  She is willing to undergo EGD only if she has adequate anesthesia.  She states she previously had EGDs 2 years ago in [facility] without any anesthesia.  Continue PPI IV twice daily.  Hold on Carafate until after EGD.  4/6: Hemoglobin stable at 10.  Patient still having epigastric pain.  However she is resistant to having EGD performed.  Therefore EGD canceled.  Restart Carafate 4 times daily with PPI.  Remains in a flutter but better rate controlled at 60.  Tapering midodrine.   Discontinued potassium.  DC'd Combivent inhaler every 6 hours.  Placed Xopenex as needed wheeze or shortness of breath.  4/7:  states she does not want to go to a SNF rehab.  States she never saw PT/OT.  I told her they did evaluate her 2 days ago and recommended SNF.  CM to check into EPS case and call [facility] since that is where she came from.  Patient states she lives in an apartment by herself in [place].  C/o fibromyalgia pain everywhere, but BP low.    I have discussed this patient's plan of care and discharge plan at IDT rounds today with Case Management, Nursing, Nursing leadership, and other members of the IDT team.    Consultants/Specialty  critical care, GI, and palliative care  Cardiology    Code Status  DNAR/DNI    Disposition  The patient is medically cleared for discharge to home or a post-acute facility.  Anticipate discharge to: skilled nursing facility    I have placed the appropriate orders for post-discharge needs.    Review of Systems  Review of Systems   Constitutional:  Negative for chills, fever and malaise/fatigue.   Respiratory:  Negative for cough, shortness of breath and stridor.    Cardiovascular:  Negative for chest pain, palpitations and leg swelling.   Gastrointestinal:  Positive for abdominal pain. Negative for diarrhea, nausea and vomiting.   Musculoskeletal:  Negative for back pain and joint pain.   Neurological:  Negative for dizziness,  speech change and headaches.   Psychiatric/Behavioral:  The patient is not nervous/anxious.         Physical Exam  Temp:  [36.2 °C (97.2 °F)-36.5 °C (97.7 °F)] 36.5 °C (97.7 °F)  Pulse:  [60-89] 89  Resp:  [18] 18  BP: ()/(47-70) 109/66  SpO2:  [90 %-97 %] 94 %    Physical Exam  Vitals reviewed.   Constitutional:       Appearance: Normal appearance. She is not diaphoretic.   HENT:      Head: Normocephalic and atraumatic.      Nose: Nose normal.   Eyes:      General: No scleral icterus.        Right eye: No discharge.         Left eye: No discharge.      Extraocular Movements: Extraocular movements intact.      Conjunctiva/sclera: Conjunctivae normal.   Cardiovascular:      Rate and Rhythm: Normal rate. Rhythm irregular.      Pulses:           Radial pulses are 2+ on the right side and 2+ on the left side.        Dorsalis pedis pulses are 2+ on the right side and 2+ on the left side.      Heart sounds: Murmur heard.   Pulmonary:      Effort: Pulmonary effort is normal. No respiratory distress.      Breath sounds: Normal breath sounds. No wheezing or rales.   Abdominal:      General: Bowel sounds are normal. There is no distension.      Palpations: Abdomen is soft.      Tenderness: There is abdominal tenderness (epigastric).   Musculoskeletal:         General: No swelling or tenderness.      Cervical back: Neck supple. No muscular tenderness.      Right lower leg: No edema.      Left lower leg: No edema.   Skin:     Coloration: Skin is not jaundiced or pale.   Neurological:      General: No focal deficit present.      Mental Status: She is alert and oriented to person, place, and time. Mental status is at baseline.      Cranial Nerves: No cranial nerve deficit.   Psychiatric:         Mood and Affect: Mood normal.         Behavior: Behavior normal.         Fluids    Intake/Output Summary (Last 24 hours) at 4/7/2024 7687  Last data filed at 4/6/2024 2000  Gross per 24 hour   Intake 120 ml   Output --   Net 120 ml          Laboratory  Recent Labs     04/05/24  0214 04/05/24  0951 04/05/24  1734 04/06/24  0234 04/07/24  0138   WBC 7.5  --   --  7.1 6.3   RBC 4.02*  --   --  3.80* 3.69*   HEMOGLOBIN 10.7*   < > 10.4* 10.0* 9.8*   HEMATOCRIT 32.8*  --   --  31.3* 30.5*   MCV 81.6  --   --  82.4 82.7   MCH 26.6*  --   --  26.3* 26.6*   MCHC 32.6  --   --  31.9* 32.1*   RDW 55.8*  --   --  56.7* 55.9*   PLATELETCT 300  --   --  309 287   MPV 10.7  --   --  10.8 11.6    < > = values in this interval not displayed.     Recent Labs     04/05/24  0214 04/06/24  0200 04/07/24  0138   SODIUM 137 135 137   POTASSIUM 3.9 5.2 4.8   CHLORIDE 99 100 102   CO2 25 23 22   GLUCOSE 110* 106* 107*   BUN 21 19 24*   CREATININE 1.39 1.40 1.46*   CALCIUM 9.7 9.3 9.3                   Imaging  DX-CHEST-LIMITED (1 VIEW)   Final Result      1.  Small bilateral pleural effusions with adjacent airspace disease.      DX-CHEST-LIMITED (1 VIEW)   Final Result         Right central line is noted with tip at the level of the SVC. No pneumothorax is identified.      DX-CHEST-PORTABLE (1 VIEW)   Final Result         1.  There is some interval increase in poorly defined opacifications in each lung including the lower lungs.      2.  Left pleural effusion is again identified.      DX-CHEST-PORTABLE (1 VIEW)   Final Result         1.  Hazy bilateral pulmonary infiltrates, similar to prior study.   2.  Small bilateral pleural effusions, stable   3.  Cardiomegaly           Assessment/Plan  * Acute respiratory failure with hypoxia (HCC)- (present on admission)  Assessment & Plan  On 2 L of O2 above baseline    Acute blood loss anemia- (present on admission)  Assessment & Plan  Monitor hemoglobin daily.    GI bleed- (present on admission)  Assessment & Plan  Hold Plavix   Eliquis   4/4 Hgb: 12.2<10.4<9.1 to 10 and stabilized at 10.  Continuous cardiac monitoring  IV Protonix  4/5:  GI reconsulted given epigastric pain and h/o GI ulcer perforation in past.  Last EGD  performed in [facility] 2 years ago.  Appreciate GI willing to do EGD in a.m.   4/6: Patient refusing EGD.  Start carafate.  Continue PPI.  Stable hemoglobin at 10.      Atrial flutter with rapid ventricular response (HCC)- (present on admission)  Assessment & Plan  4/5 aflutter with RVR 140s, increased metoprolol with holding parameters XL 25 d to 12.5mg tid.  Continue amiodarone  Eliquis restarted since stable hemoglobin at 10.      Stage 3b chronic kidney disease (CKD)- (present on admission)  Assessment & Plan  4/4 BUN: 19 <17, Cr: 1.36 <1.17  Monitor bmp      Acute on chronic combined systolic and diastolic congestive heart failure (HCC)- (present on admission)  Assessment & Plan  Decompensated  Hold Lasix until BP improved.  Continue metoprolol    Hypotension- (present on admission)  Assessment & Plan  Monitor vitals,I/O's, labs  Tapering midodrine.    Bilateral pleural effusion- (present on admission)  Assessment & Plan  Diuresis  Monitor respiratory status    Hx of CABG- (present on admission)  Assessment & Plan  Medical management  Continue statins  Holding plavix for now due to GI bleed  Patient does present with chest pain and will follow troponins    Fibromyalgia- (present on admission)  Assessment & Plan  Gabapentin 600mg daily  Flexeril 5mg po tid prn.         VTE prophylaxis:    therapeutic anticoagulation with eliquis 5 mg BID      I have performed a physical exam and reviewed and updated ROS and Plan today (4/7/2024). In review of yesterday's note (4/6/2024), there are no changes except as documented above.

## 2024-04-08 NOTE — PROGRESS NOTES
Monitor Summary:     Afib   (F) PVC, (O) COUP  -/.14/-  BBB         History/Exam/Medical decision making

## 2024-04-08 NOTE — PROGRESS NOTES
[facility] Medicine Daily Progress Note    Date of Service  4/8/2024    Chief Complaint  Right sided Chest pain    Hospital Course  Davie Montejo is a 89 y.o. female with a history of dementia, atrial fibrillation, prior perforated gastric ulcer, hypertension, dyslipidemia, HFrEF 25%, severe MR, coronary artery disease status post CABG.  Admitted 3/30/2024 with increasing weakness and shortness of breath with frequent night sweats.  She was transferred from [facility] placed on 2 L nasal cannula.  She had had a recent discharge from the hospital 3/26 where she was noted to have pleural effusions and underwent a thoracentesis.  She is on Plavix and Eliquis.  In the emergency room she was found to be Hemoccult positive.  Chest x-ray showed bilateral pulmonary edema and bilateral pleural effusions.  EKG: Sinus bradycardia.  During her admission she was transferred to the ICU with acute worsening of shortness of breath/respiratory failure.  She was initiated on dobutamine Lasix and BiPAP.  She was requiring Precedex drip during her ICU stay but this was weaned off.  She was on norepinephrine drip.  The patient had refused thoracentesis per report.  For the patient's GI bleed, her hemoglobin remained stable the Eliquis was held as well as the Plavix but as there is no significant drop in her hemoglobin Plavix was reinitiated.    Interval Problem Update  4/4: Awake and conversant.  Denies any abdominal discomfort.  She was on 3 L nasal cannula I was weaning her down to 1 L during my evaluation of her with her SpO2 remaining greater than 93%.  Okay to transfer to telemetry floor.  4/5: Patient received from ICU.  Currently in atrial flutter with RVR, hypotensive 95/53.  Increase metoprolol from 12.5 XL daily to 12.5 3 times daily.  Increase midodrine from 5 3 times daily to 10 mg p.o. 3 times daily.  Concern is patient is continuing to have epigastric pain history of melena hemoglobin 10 from 12.  I  have reconsulted GI.  Patient is A&O and able to consent.  She is willing to undergo EGD only if she has adequate anesthesia.  She states she previously had EGDs 2 years ago in [facility] without any anesthesia.  Continue PPI IV twice daily.  Hold on Carafate until after EGD.  4/6: Hemoglobin stable at 10.  Patient still having epigastric pain.  However she is resistant to having EGD performed.  Therefore EGD canceled.  Restart Carafate 4 times daily with PPI.  Remains in a flutter but better rate controlled at 60.  Tapering midodrine.   Discontinued potassium.  DC'd Combivent inhaler every 6 hours.  Placed Xopenex as needed wheeze or shortness of breath.  4/7:  states she does not want to go to a SNF rehab.  States she never saw PT/OT.  I told her they did evaluate her 2 days ago and recommended SNF.  CM to check into EPS case and call [place] [facility] since that is where she came from.  Patient states she lives in an apartment by herself in [place].  C/o fibromyalgia pain everywhere, but BP low.  4/8:  patient states she can go home to her apartment, but this is not realistic since she lives alone and has 1.5 stairs to get to apartment.  She can walk 40 ft with FWW x1 with PT today. She is going to need stair training for this to be possible. She is refusing SNF, but it is not safe for her to dc home.  Stable aflutter, Hg stable back on Eliquis.  Transfer to medical unit for difficult placement.  EPS case noted on chart. Dc'd midodrine 2.5 tid since BP stable.  HR increased, changed metoprolol 12.5 tid to 25 bid, tapered back down on amiodarone to 200mg daily.  Hg stable 9.8 to 10.2.  restarted plavix.    I have discussed this patient's plan of care and discharge plan at IDT rounds today with Case Management, Nursing, Nursing leadership, and other members of the IDT team.    Consultants/Specialty  critical care, GI, and palliative care  Cardiology    Code Status  DNAR/DNI    Disposition  The patient is not medically  cleared for discharge to home or a post-acute facility.  Anticipate discharge to: skilled nursing facility    I have placed the appropriate orders for post-discharge needs.    Review of Systems  Review of Systems   Constitutional:  Negative for chills, fever and malaise/fatigue.   Respiratory:  Negative for cough, shortness of breath and stridor.    Cardiovascular:  Negative for chest pain, palpitations and leg swelling.   Gastrointestinal:  Negative for abdominal pain, diarrhea, nausea and vomiting.   Musculoskeletal:  Negative for back pain and joint pain.   Neurological:  Positive for weakness. Negative for dizziness, speech change and headaches.   Psychiatric/Behavioral:  The patient is not nervous/anxious.         Physical Exam  Temp:  [36.1 °C (97 °F)-36.9 °C (98.4 °F)] 36.1 °C (97 °F)  Pulse:  [102-122] 115  Resp:  [16-18] 18  BP: (103-134)/() 115/77  SpO2:  [91 %-93 %] 91 %    Physical Exam  Vitals reviewed.   Constitutional:       Appearance: Normal appearance. She is not diaphoretic.   HENT:      Head: Normocephalic and atraumatic.      Nose: Nose normal.   Eyes:      General: No scleral icterus.        Right eye: No discharge.         Left eye: No discharge.      Extraocular Movements: Extraocular movements intact.      Conjunctiva/sclera: Conjunctivae normal.   Cardiovascular:      Rate and Rhythm: Normal rate. Rhythm irregular.      Pulses:           Radial pulses are 2+ on the right side and 2+ on the left side.        Dorsalis pedis pulses are 2+ on the right side and 2+ on the left side.      Heart sounds: Murmur heard.   Pulmonary:      Effort: Pulmonary effort is normal. No respiratory distress.      Breath sounds: Normal breath sounds. No wheezing or rales.   Abdominal:      General: Bowel sounds are normal. There is no distension.      Palpations: Abdomen is soft.      Tenderness: There is no abdominal tenderness.   Musculoskeletal:         General: No swelling or tenderness.      Cervical  back: Neck supple. No muscular tenderness.      Right lower leg: No edema.      Left lower leg: No edema.   Skin:     Coloration: Skin is not jaundiced or pale.   Neurological:      General: No focal deficit present.      Mental Status: She is alert and oriented to person, place, and time. Mental status is at baseline.      Cranial Nerves: No cranial nerve deficit.   Psychiatric:         Mood and Affect: Mood normal.         Behavior: Behavior normal.         Fluids    Intake/Output Summary (Last 24 hours) at 4/8/2024 1636  Last data filed at 4/8/2024 0900  Gross per 24 hour   Intake 240 ml   Output --   Net 240 ml         Laboratory  Recent Labs     04/06/24  0234 04/07/24  0138 04/08/24  0510   WBC 7.1 6.3 6.2   RBC 3.80* 3.69* 3.78*   HEMOGLOBIN 10.0* 9.8* 10.2*   HEMATOCRIT 31.3* 30.5* 31.2*   MCV 82.4 82.7 82.5   MCH 26.3* 26.6* 27.0   MCHC 31.9* 32.1* 32.7   RDW 56.7* 55.9* 55.9*   PLATELETCT 309 287 308   MPV 10.8 11.6 10.3     Recent Labs     04/06/24  0200 04/07/24  0138 04/08/24  0510   SODIUM 135 137 139   POTASSIUM 5.2 4.8 4.4   CHLORIDE 100 102 104   CO2 23 22 20   GLUCOSE 106* 107* 111*   BUN 19 24* 22   CREATININE 1.40 1.46* 1.12   CALCIUM 9.3 9.3 9.5                   Imaging  DX-CHEST-LIMITED (1 VIEW)   Final Result      1.  Small bilateral pleural effusions with adjacent airspace disease.      DX-CHEST-LIMITED (1 VIEW)   Final Result         Right central line is noted with tip at the level of the SVC. No pneumothorax is identified.      DX-CHEST-PORTABLE (1 VIEW)   Final Result         1.  There is some interval increase in poorly defined opacifications in each lung including the lower lungs.      2.  Left pleural effusion is again identified.      DX-CHEST-PORTABLE (1 VIEW)   Final Result         1.  Hazy bilateral pulmonary infiltrates, similar to prior study.   2.  Small bilateral pleural effusions, stable   3.  Cardiomegaly           Assessment/Plan  * Acute respiratory failure with hypoxia  (HCC)- (present on admission)  Assessment & Plan  On 2 L of O2 above baseline    Acute blood loss anemia- (present on admission)  Assessment & Plan  Monitor hemoglobin daily.    GI bleed- (present on admission)  Assessment & Plan  Hold Plavix   Eliquis   4/4 Hgb: 12.2<10.4<9.1 to 10 and stabilized at 10.  Continuous cardiac monitoring  IV Protonix  4/5:  GI reconsulted given epigastric pain and h/o GI ulcer perforation in past.  Last EGD performed in [facility] years ago.  Appreciate GI willing to do EGD in a.m.   4/6: Patient refusing EGD.  Start carafate.  Continue PPI.  Stable hemoglobin at 10.      Atrial flutter with rapid ventricular response (HCC)- (present on admission)  Assessment & Plan  4/5 aflutter with RVR 140s, increased metoprolol with holding parameters XL 25 d to 12.5mg tid.  Continue amiodarone  Eliquis restarted since stable hemoglobin at 10.  4/8:  increased , changed metoprolol 12.5 tid to 25mg bid with holding parameters.  Stopped midodrine 2.5 po tid, BP stable.      Stage 3b chronic kidney disease (CKD)- (present on admission)  Assessment & Plan  4/4 BUN: 19 <17, Cr: 1.36 <1.17  Monitor bmp      Acute on chronic combined systolic and diastolic congestive heart failure (HCC)- (present on admission)  Assessment & Plan  Decompensated  Hold Lasix until BP improved.  Continue metoprolol    Hypotension- (present on admission)  Assessment & Plan  Monitor vitals,I/O's, labs  4/8: Tapered off midodrine.    Bilateral pleural effusion- (present on admission)  Assessment & Plan  Diuresis  Monitor respiratory status    Hx of CABG- (present on admission)  Assessment & Plan  Medical management  Continue statins  4/8 restarted plavix with Eliqus for aflutter.  Patient does present with chest pain and will follow troponins    Fibromyalgia- (present on admission)  Assessment & Plan  Gabapentin 600mg daily  Flexeril 5mg po tid prn.         VTE prophylaxis:    therapeutic anticoagulation with  eliquis 5 mg BID      I have performed a physical exam and reviewed and updated ROS and Plan today (4/8/2024). In review of yesterday's note (4/7/2024), there are no changes except as documented above.

## 2024-04-08 NOTE — PROGRESS NOTES
Monitor Summary  Rhythm: Afib  Rate:   Ectopy: PVC  Measurements: ---/0.13/---  ---12 hr Chart Review---

## 2024-04-08 NOTE — PROGRESS NOTES
Assumed care of pt  at 1140. Report received from Breonna ALLISON. Pt is A&O x 4. Patient stated that their pain is 10/10. Pt's pain comfort goal is 0/10. Pt oriented to new room on S532-2. Pt demonstrated a slow and steady gait to the bed from the wheel chair SBA. Pt is on RA.   Pt educated on how to use the call light, pt verbalized understanding. Bed in lowest locked position, call light within reach, hourly rounding in place. Labs reviewed, orders reviewed, communication board updated. Pt declines any further needs at this time

## 2024-04-08 NOTE — CARE PLAN
The patient is Stable - Low risk of patient condition declining or worsening    Shift Goals  Clinical Goals: pain management  Patient Goals: comfort, rest  Family Goals: DEE DEE    Progress made toward(s) clinical / shift goals:    Problem: Knowledge Deficit - Standard  Goal: Patient and family/care givers will demonstrate understanding of plan of care, disease process/condition, diagnostic tests and medications  Outcome: Progressing     Problem: Skin Integrity  Goal: Skin integrity is maintained or improved  Outcome: Progressing     Problem: Pain - Standard  Goal: Alleviation of pain or a reduction in pain to the patient’s comfort goal  Outcome: Progressing       Patient is not progressing towards the following goals:

## 2024-04-08 NOTE — THERAPY
"Physical Therapy   Daily Treatment     Patient Name: Davie Montejo  Age:  89 y.o., Sex:  female  Medical Record #: [id number]  Today's Date: 4/8/2024     Precautions  Precautions: Fall Risk    Assessment    Today, pt is initially agreeable to getting up, does get herself to EOB with SBA, then puts herself back to bed with c/o pain despite pain meds given earlier. Pt with slurred speech, asking for pain meds, reporting that her pain is at her back, then legs, then difficult to describe. Nsg updated. Pt eventually was able to ambulate x 40 feet using FWW with cg assist. . Pt is most limited by endurance issues and would need to be able negotiate 1.5 flights of stairs to enter home if she is to dc to home.(This conflicts with chart that indicates pt lived at [facility] before being at recent SNF) See below for details    Plan    Treatment Plan Status: Continue Current Treatment Plan  Type of Treatment: Bed Mobility, Gait Training, Neuro Re-Education / Balance, Self Care / Home Evaluation, Stair Training, Therapeutic Activities, Therapeutic Exercise  Treatment Frequency: 3 Times per Week  Treatment Duration: Until Therapy Goals Met    DC Equipment Recommendations: Unable to determine at this time  Discharge Recommendations: Recommend post-acute placement for additional physical therapy services prior to discharge home        Objective       04/08/24 1124   Charge Group   Charges  Yes   PT Gait Training (Units) 1   PT Therapeutic Activities (Units) 1   Total Time Spent   PT Total Time Yes   PT Gait Training Time Spent (Mins) 8   PT Therapeutic Activities Time Spent (Mins) 15   PT Total Time Spent (Calculated) 23   Precautions   Precautions Fall Risk   Vitals   O2 Delivery Device None - Room Air   Pain 0 - 10 Group   Therapist Pain Assessment During Activity;Nurse Notified  (not rated, c/o pain \"all over\", then back, then legs, then \"Hard to describe\", pt was premedicated, slurring words, asking for pain meds, nsg " updated.)   Cognition    Cognition / Consciousness X   Speech/ Communication Slurred   Level of Consciousness Responds to voice   Ability To Follow Commands 1 Step   Safety Awareness Impaired   New Learning Impaired   Active ROM Lower Body    Active ROM Lower Body  WDL   Strength Lower Body   Lower Body Strength  X   Comments adequate for transfers, ambulation, but lacks endurance   Balance   Sitting Balance (Static) Fair   Sitting Balance (Dynamic) Fair   Standing Balance (Static) Fair   Standing Balance (Dynamic) Fair -   Weight Shift Sitting Fair   Weight Shift Standing Fair   Skilled Intervention Verbal Cuing;Sequencing   Comments with FWW   Bed Mobility    Supine to Sit Standby Assist  (pt gets to EOB unassisted, then puts herself back to bed, reporting back pain, lethargic. Pt willing to try again with encouragement.)   Sit to Supine Standby Assist   Scooting Standby Assist   Rolling Standby Assist   Skilled Intervention Verbal Cuing;Sequencing   Gait Analysis   Gait Level Of Assist Contact Guard Assist   Assistive Device Front Wheel Walker   Distance (Feet) 40   # of Times Distance was Traveled 1   Deviation   (weaving, but no angie LOB)   Weight Bearing Status no restrictions   Skilled Intervention Verbal Cuing;Sequencing   Functional Mobility   Sit to Stand Contact Guard Assist   Bed, Chair, Wheelchair Transfer Contact Guard Assist   Transfer Method Stand Step   Skilled Intervention Verbal Cuing;Sequencing   6 Clicks Assessment - How much HELP from from another person do you currently need... (If the patient hasn't done an activity recently, how much help from another person do you think he/she would need if he/she tried?)   Turning from your back to your side while in a flat bed without using bedrails? 4   Moving from lying on your back to sitting on the side of a flat bed without using bedrails? 4   Moving to and from a bed to a chair (including a wheelchair)? 3   Standing up from a chair using your arms  (e.g., wheelchair, or bedside chair)? 3   Walking in hospital room? 3   Activity Tolerance   Standing 5 min   Short Term Goals    Short Term Goal # 1 pt will be able to complete supine<>sitting from flat bed with SPV in 6tx in order to return to prior level   Goal Outcome # 1 goal not met   Short Term Goal # 2 pt will be able to complete STS with FWW and SPV in 6tx in order to progress to prior level   Goal Outcome # 2 Goal not met   Short Term Goal # 3 pt will be able to ambulate 50ft with FWW and SPV in 6tx in order to progress to prior level   Goal Outcome # 3 Goal not met   Short Term Goal # 4 pt will be able to negotiate 6 steps with SPV in 6tx in order to dc home   Goal Outcome # 4 Goal not met   Education Group   Role of Physical Therapist Patient Response Patient;Acceptance;Explanation;Verbal Demonstration   Additional Comments lengthy discussion re pt's goal to dc only to home, not to SNF. Pt needed reminding that if he goal is to dc to home, she needs to be up walking and eventually practicing stairs (1.5 flights to her apt with no elevator per her report today). Pt eventually agreeable.   Physical Therapy Treatment Plan   Physical Therapy Treatment Plan Continue Current Treatment Plan   Anticipated Discharge Equipment and Recommendations   DC Equipment Recommendations Unable to determine at this time   Discharge Recommendations Recommend post-acute placement for additional physical therapy services prior to discharge home   Interdisciplinary Plan of Care Collaboration   IDT Collaboration with  Nursing   Patient Position at End of Therapy In Bed;Call Light within Reach;Tray Table within Reach;Phone within Reach;Bed Alarm On   Collaboration Comments nsg updated   Session Information   Date / Session Number  4/8-2 (2/3, 4/7)

## 2024-04-08 NOTE — PROGRESS NOTES
4 Eyes Skin Assessment Completed by Dominga RN and ESTEFANY Stoddard.    Head WDL  Ears WDL  Nose WDL  Mouth WDL  Neck WDL  Breast/Chest WDL  Shoulder Blades WDL  Spine WDL  (R) Arm/Elbow/Hand WDL  (L) Arm/Elbow/Hand WDL  Abdomen WDL  Groin WDL  Scrotum/Coccyx/Buttocks WDL  (R) Leg Scab  (L) Leg Scab  (R) Heel/Foot/Toe WDL  (L) Heel/Foot/Toe WDL          Devices In Places Pulse Ox      Interventions In Place Heel Mepilex and Pillows    Possible Skin Injury No    Pictures Uploaded Into Epic N/A  Wound Consult Placed N/A  RN Wound Prevention Protocol Ordered Yes

## 2024-04-08 NOTE — DISCHARGE PLANNING
Case Management Discharge Planning    Admission Date: 3/30/2024  GMLOS: 4.6  ALOS: 9    6-Clicks ADL Score: 19  6-Clicks Mobility Score: 19      Anticipated Discharge Dispo: Discharge Disposition: D/T to SNF with Medicare cert in anticipation of skilled care (03)    DME Needed: No    Action(s) Taken: Updated Provider/Nurse on Discharge Plan    1115, Pt was discussed in IDT rounds. Per Leadership to send SNF referrals to [facility] for possible acceptance. YARELI Bautista informed.    Escalations Completed: None    Medically Clear: No    Next Steps: CM will continue to assist Pt with discharge needs.      Barriers to Discharge: Pending Placement medical Clearance.    Is the patient up for discharge tomorrow: No

## 2024-04-09 VITALS
OXYGEN SATURATION: 92 % | HEART RATE: 67 BPM | WEIGHT: 140.65 LBS | HEIGHT: 68 IN | DIASTOLIC BLOOD PRESSURE: 44 MMHG | BODY MASS INDEX: 21.32 KG/M2 | RESPIRATION RATE: 17 BRPM | TEMPERATURE: 97.2 F | SYSTOLIC BLOOD PRESSURE: 102 MMHG

## 2024-04-09 PROBLEM — R57.0 CARDIOGENIC SHOCK (HCC): Status: RESOLVED | Noted: 2024-03-31 | Resolved: 2024-04-09

## 2024-04-09 PROBLEM — D62 ACUTE BLOOD LOSS ANEMIA: Status: RESOLVED | Noted: 2024-03-30 | Resolved: 2024-04-09

## 2024-04-09 PROBLEM — I48.92 ATRIAL FLUTTER WITH RAPID VENTRICULAR RESPONSE (HCC): Status: RESOLVED | Noted: 2024-03-26 | Resolved: 2024-04-09

## 2024-04-09 PROBLEM — I95.9 HYPOTENSION: Status: RESOLVED | Noted: 2024-03-11 | Resolved: 2024-04-09

## 2024-04-09 PROBLEM — J96.01 ACUTE RESPIRATORY FAILURE WITH HYPOXIA (HCC): Status: RESOLVED | Noted: 2024-03-08 | Resolved: 2024-04-09

## 2024-04-09 PROBLEM — K92.2 GI BLEED: Status: RESOLVED | Noted: 2024-03-30 | Resolved: 2024-04-09

## 2024-04-09 PROBLEM — I50.43 ACUTE ON CHRONIC COMBINED SYSTOLIC AND DIASTOLIC CONGESTIVE HEART FAILURE (HCC): Status: RESOLVED | Noted: 2024-03-14 | Resolved: 2024-04-09

## 2024-04-09 PROBLEM — R00.1 BRADYCARDIA: Status: RESOLVED | Noted: 2024-03-31 | Resolved: 2024-04-09

## 2024-04-09 LAB
ANION GAP SERPL CALC-SCNC: 13 MMOL/L (ref 7–16)
BASOPHILS # BLD AUTO: 1.1 % (ref 0–1.8)
BASOPHILS # BLD: 0.09 K/UL (ref 0–0.12)
BUN SERPL-MCNC: 25 MG/DL (ref 8–22)
CALCIUM SERPL-MCNC: 9.4 MG/DL (ref 8.5–10.5)
CHLORIDE SERPL-SCNC: 102 MMOL/L (ref 96–112)
CO2 SERPL-SCNC: 22 MMOL/L (ref 20–33)
CREAT SERPL-MCNC: 1.23 MG/DL (ref 0.5–1.4)
EOSINOPHIL # BLD AUTO: 0.46 K/UL (ref 0–0.51)
EOSINOPHIL NFR BLD: 5.4 % (ref 0–6.9)
ERYTHROCYTE [DISTWIDTH] IN BLOOD BY AUTOMATED COUNT: 56.9 FL (ref 35.9–50)
GFR SERPLBLD CREATININE-BSD FMLA CKD-EPI: 42 ML/MIN/1.73 M 2
GLUCOSE SERPL-MCNC: 109 MG/DL (ref 65–99)
HCT VFR BLD AUTO: 35.5 % (ref 37–47)
HGB BLD-MCNC: 11.2 G/DL (ref 12–16)
IMM GRANULOCYTES # BLD AUTO: 0.04 K/UL (ref 0–0.11)
IMM GRANULOCYTES NFR BLD AUTO: 0.5 % (ref 0–0.9)
LYMPHOCYTES # BLD AUTO: 3.14 K/UL (ref 1–4.8)
LYMPHOCYTES NFR BLD: 37 % (ref 22–41)
MCH RBC QN AUTO: 26.5 PG (ref 27–33)
MCHC RBC AUTO-ENTMCNC: 31.5 G/DL (ref 32.2–35.5)
MCV RBC AUTO: 84.1 FL (ref 81.4–97.8)
MONOCYTES # BLD AUTO: 1.1 K/UL (ref 0–0.85)
MONOCYTES NFR BLD AUTO: 13 % (ref 0–13.4)
NEUTROPHILS # BLD AUTO: 3.65 K/UL (ref 1.82–7.42)
NEUTROPHILS NFR BLD: 43 % (ref 44–72)
NRBC # BLD AUTO: 0 K/UL
NRBC BLD-RTO: 0 /100 WBC (ref 0–0.2)
PLATELET # BLD AUTO: 360 K/UL (ref 164–446)
PMV BLD AUTO: 11 FL (ref 9–12.9)
POTASSIUM SERPL-SCNC: 5.1 MMOL/L (ref 3.6–5.5)
RBC # BLD AUTO: 4.22 M/UL (ref 4.2–5.4)
SODIUM SERPL-SCNC: 137 MMOL/L (ref 135–145)
WBC # BLD AUTO: 8.5 K/UL (ref 4.8–10.8)

## 2024-04-09 PROCEDURE — 700102 HCHG RX REV CODE 250 W/ 637 OVERRIDE(OP): Performed by: HOSPITALIST

## 2024-04-09 PROCEDURE — 99239 HOSP IP/OBS DSCHRG MGMT >30: CPT | Performed by: STUDENT IN AN ORGANIZED HEALTH CARE EDUCATION/TRAINING PROGRAM

## 2024-04-09 PROCEDURE — 85025 COMPLETE CBC W/AUTO DIFF WBC: CPT

## 2024-04-09 PROCEDURE — A9270 NON-COVERED ITEM OR SERVICE: HCPCS | Performed by: HOSPITALIST

## 2024-04-09 PROCEDURE — 700102 HCHG RX REV CODE 250 W/ 637 OVERRIDE(OP)

## 2024-04-09 PROCEDURE — 700102 HCHG RX REV CODE 250 W/ 637 OVERRIDE(OP): Performed by: STUDENT IN AN ORGANIZED HEALTH CARE EDUCATION/TRAINING PROGRAM

## 2024-04-09 PROCEDURE — A9270 NON-COVERED ITEM OR SERVICE: HCPCS | Performed by: STUDENT IN AN ORGANIZED HEALTH CARE EDUCATION/TRAINING PROGRAM

## 2024-04-09 PROCEDURE — 80048 BASIC METABOLIC PNL TOTAL CA: CPT

## 2024-04-09 PROCEDURE — A9270 NON-COVERED ITEM OR SERVICE: HCPCS

## 2024-04-09 PROCEDURE — 36415 COLL VENOUS BLD VENIPUNCTURE: CPT

## 2024-04-09 RX ORDER — AMIODARONE HYDROCHLORIDE 200 MG/1
200 TABLET ORAL DAILY
Qty: 30 TABLET | Refills: 1 | Status: SHIPPED | OUTPATIENT
Start: 2024-04-10

## 2024-04-09 RX ORDER — OXYCODONE HYDROCHLORIDE 5 MG/1
5 TABLET ORAL EVERY 4 HOURS PRN
Qty: 10 TABLET | Refills: 0 | Status: SHIPPED | OUTPATIENT
Start: 2024-04-09 | End: 2024-04-12

## 2024-04-09 RX ORDER — CHOLECALCIFEROL (VITAMIN D3) 125 MCG
5 CAPSULE ORAL NIGHTLY PRN
Qty: 30 TABLET | Refills: 1 | Status: SHIPPED | OUTPATIENT
Start: 2024-04-09

## 2024-04-09 RX ADMIN — LORAZEPAM 0.5 MG: 0.5 TABLET ORAL at 08:55

## 2024-04-09 RX ADMIN — SUCRALFATE 1 G: 1 SUSPENSION ORAL at 11:54

## 2024-04-09 RX ADMIN — OXYCODONE 5 MG: 5 TABLET ORAL at 13:24

## 2024-04-09 RX ADMIN — OXYCODONE 5 MG: 5 TABLET ORAL at 09:18

## 2024-04-09 RX ADMIN — POLYETHYLENE GLYCOL 3350 1 PACKET: 17 POWDER, FOR SOLUTION ORAL at 05:11

## 2024-04-09 RX ADMIN — AMIODARONE HYDROCHLORIDE 200 MG: 200 TABLET ORAL at 05:11

## 2024-04-09 RX ADMIN — GABAPENTIN 600 MG: 300 CAPSULE ORAL at 05:10

## 2024-04-09 RX ADMIN — LEVOTHYROXINE SODIUM 125 MCG: 0.12 TABLET ORAL at 05:11

## 2024-04-09 RX ADMIN — APIXABAN 5 MG: 5 TABLET, FILM COATED ORAL at 05:11

## 2024-04-09 RX ADMIN — CLOPIDOGREL BISULFATE 75 MG: 75 TABLET ORAL at 05:11

## 2024-04-09 RX ADMIN — SUCRALFATE 1 G: 1 SUSPENSION ORAL at 05:11

## 2024-04-09 RX ADMIN — METOPROLOL TARTRATE 25 MG: 25 TABLET, FILM COATED ORAL at 05:11

## 2024-04-09 RX ADMIN — OMEPRAZOLE 20 MG: 20 CAPSULE, DELAYED RELEASE ORAL at 05:11

## 2024-04-09 RX ADMIN — OXYCODONE 5 MG: 5 TABLET ORAL at 05:11

## 2024-04-09 ASSESSMENT — PAIN SCALES - PAIN ASSESSMENT IN ADVANCED DEMENTIA (PAINAD)
BODYLANGUAGE: RELAXED
FACIALEXPRESSION: SMILING OR INEXPRESSIVE
BREATHING: NORMAL
TOTALSCORE: 0
CONSOLABILITY: NO NEED TO CONSOLE

## 2024-04-09 ASSESSMENT — PAIN DESCRIPTION - PAIN TYPE
TYPE: ACUTE PAIN

## 2024-04-09 NOTE — DISCHARGE SUMMARY
Discharge Summary    CHIEF COMPLAINT ON ADMISSION  Chief Complaint   Patient presents with    Chest Pain     Right sided        Reason for Admission  EMS    Admission Date  3/30/2024     CODE STATUS  DNAR/DNI    HPI & HOSPITAL COURSE    Davie Montejo is a 89 y.o. female with a history of dementia, atrial fibrillation, prior perforated gastric ulcer, hypertension, dyslipidemia, HFrEF 25%, severe MR, coronary artery disease status post CABG. Admitted 3/30/2024 with increasing weakness and shortness of breath with frequent night sweats. She was transferred from [facility] placed on 2 L nasal cannula.     She had had a recent discharge from the hospital 3/26 where she was noted to have pleural effusions and underwent a thoracentesis. She is on Plavix and Eliquis. In the emergency room she was found to be Hemoccult positive. Chest x-ray showed bilateral pulmonary edema and bilateral pleural effusions. EKG: Sinus bradycardia.     During her admission she was transferred to the ICU with acute worsening of shortness of breath/respiratory failure. She was initiated on dobutamine Lasix and BiPAP. She was requiring Precedex drip during her ICU stay but this was weaned off. She was on norepinephrine drip. The patient had refused thoracentesis per report. For the patient's GI bleed, her hemoglobin remained stable the Eliquis was held as well as the Plavix but as there is no significant drop in her hemoglobin Plavix was reinitiated. Hemoglobin remained stable. Patient refused EGD.     Patient has been accepted to [facility].  She is agreeable to go there for strengthening prior to returning home.    Therefore, she is discharged in good and stable condition to skilled nursing facility.    The patient met 2-midnight criteria for an inpatient stay at the time of discharge.    Discharge date: 4/9/2024  FOLLOW UP ITEMS POST DISCHARGE  Follow-up with primary care physician 1  week    DISCHARGE DIAGNOSES  Principal Problem (Resolved):    Acute respiratory failure with hypoxia (HCC) (POA: Yes)  Active Problems:    Fibromyalgia (Chronic) (POA: Yes)    Hx of CABG (POA: Yes)    Bilateral pleural effusion (POA: Yes)    Stage 3b chronic kidney disease (CKD) (POA: Yes)    Congestive heart failure with cardiomyopathy (HCC) (POA: Yes)    CHF (congestive heart failure), NYHA class I, acute on chronic, combined (HCC) (POA: Yes)  Resolved Problems:    Hypotension (POA: Yes)    Acute on chronic combined systolic and diastolic congestive heart failure (HCC) (POA: Yes)    Atrial flutter with rapid ventricular response (HCC) (POA: Yes)    GI bleed (POA: Yes)    Acute blood loss anemia (POA: Yes)    Cardiogenic shock (HCC) (POA: Unknown)    Bradycardia (POA: Unknown)      FOLLOW UP  Future Appointments   Date Time Provider Department Center   4/17/2024 10:00 AM Ran Kee M.D. [facility] None     [facility]  [address] [place] [address]  [place] [id number]  [phone]          MEDICATIONS ON DISCHARGE     Medication List        START taking these medications        Instructions   melatonin 5 mg Tabs   Take 1 Tablet by mouth at bedtime as needed (Sleep).  Dose: 5 mg     metoprolol tartrate 25 MG Tabs  Commonly known as: Lopressor   Take 1 Tablet by mouth 2 times a day.  Dose: 25 mg            CHANGE how you take these medications        Instructions   amiodarone 200 MG Tabs  Start taking on: April 10, 2024  What changed: See the new instructions.  Commonly known as: Cordarone   Take 1 Tablet by mouth every day.  Dose: 200 mg     oxyCODONE immediate-release 5 MG Tabs  What changed: when to take this  Commonly known as: Roxicodone   Take 1 Tablet by mouth every four hours as needed for Severe Pain for up to 3 days.  Dose: 5 mg            CONTINUE taking these medications        Instructions   acetaminophen 325 MG Tabs  Commonly known as: Tylenol   Take 650 mg by mouth every four hours as needed for Mild  "Pain.  Dose: 650 mg     apixaban 5mg Tabs  Commonly known as: Eliquis   Take 1 Tablet by mouth 2 times a day. Indications: Thromboembolism secondary to Atrial Fibrillation  Dose: 5 mg     atorvastatin 10 MG Tabs  Commonly known as: Lipitor   TAKE 1 TABLET BY MOUTH EVERY EVENING. FOR CHOLESTEROL.  Dose: 10 mg     Carafate 1 GM Tabs  Generic drug: sucralfate   Take 1 g by mouth 2 times a day.  Dose: 1 g     clopidogrel 75 MG Tabs  Commonly known as: Plavix   Take 1 Tablet by mouth every day.  Dose: 75 mg     gabapentin 600 MG tablet  Commonly known as: Neurontin   Take 1 Tablet by mouth 2 times a day.  Dose: 600 mg     levothyroxine 125 MCG Tabs  Commonly known as: Synthroid   TAKE 1 TABLET BY MOUTH EVERY MORNING ON AN EMPTY STOMACH.  Dose: 125 mcg     omeprazole 20 MG delayed-release capsule  Commonly known as: PriLOSEC   TAKE 1 CAPSULE BY MOUTH EVERY DAY.  Dose: 20 mg     polyethylene glycol/lytes Pack  Commonly known as: Miralax   Take 1 Packet by mouth every day.  Dose: 17 g     senna-docusate 8.6-50 MG Tabs  Commonly known as: Pericolace Or Senokot S   Take 2 Tablets by mouth every evening.  Dose: 2 Tablet            STOP taking these medications      DULoxetine 60 MG Cpep delayed-release capsule  Commonly known as: Cymbalta     estradiol 1 MG Tabs  Commonly known as: Estrace     furosemide 20 MG Tabs  Commonly known as: Lasix     loratadine 10 MG Tabs  Commonly known as: Claritin     metoprolol SR 25 MG Tb24  Commonly known as: Toprol XL              Allergies  Allergies   Allergen Reactions    Latex Unspecified          Tramadol Rash     Itchy red in nature    Codeine Unspecified     Unknown reaction      Lisinopril Unspecified     Unknown reaction    Penicillin G Unspecified     Unknown reaction  Tolerated Augmentin 3/2024      Pregabalin Unspecified     Lyrica affects patients vision.    Simvastatin Unspecified     Unknown reaction      Sulfa Drugs Unspecified     Patient states \"I can't remember what this " "does to me\" but recalls and allergy.        DIET  Orders Placed This Encounter   Procedures    Diet Order Diet: Cardiac     Standing Status:   Standing     Number of Occurrences:   1     Order Specific Question:   Diet:     Answer:   Cardiac [6]       ACTIVITY  As tolerated and directed by skilled nursing.  Weight bearing as tolerated    LINES, DRAINS, AND WOUNDS  This is an automated list. Peripheral IVs will be removed prior to discharge.  Peripheral IV 04/05/24 22 G Anterior;Left Forearm (Active)   Site Assessment Clean;Dry;Intact 04/09/24 0800   Dressing Type Transparent 04/09/24 0800   Line Status Saline locked 04/09/24 0800   Dressing Status Clean;Dry;Intact 04/09/24 0800   Dressing Intervention N/A 04/09/24 0800   Dressing Change Due 04/06/24 04/08/24 1200   Infiltration Grading ([facility] [place] [facility]) 0 04/09/24 0800   Phlebitis Scale ([facility] Only) 0 04/09/24 0800          Peripheral IV 04/05/24 22 G Anterior;Left Forearm (Active)   Site Assessment Clean;Dry;Intact 04/09/24 0800   Dressing Type Transparent 04/09/24 0800   Line Status Saline locked 04/09/24 0800   Dressing Status Clean;Dry;Intact 04/09/24 0800   Dressing Intervention N/A 04/09/24 0800   Dressing Change Due 04/06/24 04/08/24 1200   Infiltration Grading ([facility] [place] [facility]) 0 04/09/24 0800   Phlebitis Scale ([facility]) 0 04/09/24 0800               MENTAL STATUS ON TRANSFER  Patient is at her baseline mental status; A&Ox4     CONSULTATIONS  Palliative care  Critical care  Gastroenterology    PROCEDURES  None    LABORATORY  Lab Results   Component Value Date    SODIUM 137 04/09/2024    POTASSIUM 5.1 04/09/2024    CHLORIDE 102 04/09/2024    CO2 22 04/09/2024    GLUCOSE 109 (H) 04/09/2024    BUN 25 (H) 04/09/2024    CREATININE 1.23 04/09/2024        Lab Results   Component Value Date    WBC 8.5 04/09/2024    HEMOGLOBIN 11.2 (L) 04/09/2024    HEMATOCRIT 35.5 (L) 04/09/2024    PLATELETCT 360 04/09/2024        Total time of the discharge process exceeds 41 " minutes.

## 2024-04-09 NOTE — HOSPITAL COURSE
Davie Montejo is a 89 y.o. female with a history of dementia, atrial fibrillation, prior perforated gastric ulcer, hypertension, dyslipidemia, HFrEF 25%, severe MR, coronary artery disease status post CABG. Admitted 3/30/2024 with increasing weakness and shortness of breath with frequent night sweats. She was transferred from [facility] placed on 2 L nasal cannula.     She had had a recent discharge from the hospital 3/26 where she was noted to have pleural effusions and underwent a thoracentesis. She is on Plavix and Eliquis. In the emergency room she was found to be Hemoccult positive. Chest x-ray showed bilateral pulmonary edema and bilateral pleural effusions. EKG: Sinus bradycardia.     During her admission she was transferred to the ICU with acute worsening of shortness of breath/respiratory failure. She was initiated on dobutamine Lasix and BiPAP. She was requiring Precedex drip during her ICU stay but this was weaned off. She was on norepinephrine drip. The patient had refused thoracentesis per report. For the patient's GI bleed, her hemoglobin remained stable the Eliquis was held as well as the Plavix but as there is no significant drop in her hemoglobin Plavix was reinitiated. Hemoglobin remained stable. Patient refused EGD.     Patient has been accepted to [facility].  She is agreeable to go there for strengthening prior to returning home.

## 2024-04-09 NOTE — DISCHARGE PLANNING
DC Transport Scheduled    Transport Company Scheduled:  [employer]   Spoke with Arturo at Transport to schedule transport.  Trip #:[id number]    Scheduled Date: 4/9/2024  Scheduled Time: 1600    Destination: [facility]   Destination address: [address] [place] [address], [place] [id number]    Notified care team of scheduled transport via Voalte.     If there are any changes needed to the DC transportation scheduled, please contact [facility] at ext. [phone] between the hours of 3209-6167 Mon-Fri. If outside those hours, contact the ED Case Manager at ext. [phone].

## 2024-04-09 NOTE — CARE PLAN
The patient is Stable - Low risk of patient condition declining or worsening    Shift Goals  Clinical Goals: pain management, assess baseline, comfrot  Patient Goals: sleep, pain management, finding glasses  Family Goals: DEE DEE    Progress made toward(s) clinical / shift goals:    Problem: Knowledge Deficit - Standard  Goal: Patient and family/care givers will demonstrate understanding of plan of care, disease process/condition, diagnostic tests and medications  Outcome: Progressing     Problem: Skin Integrity  Goal: Skin integrity is maintained or improved  Outcome: Progressing     Problem: Pain - Standard  Goal: Alleviation of pain or a reduction in pain to the patient’s comfort goal  Outcome: Progressing     Problem: Care Map:  Day 1 Optimal Outcome for the Heart Failure Patient  Goal: Day 1:  Optimal Care of the heart failure patient  Outcome: Progressing     Problem: Care Map:  Admission Optimal Outcome for the Heart Failure Patient  Goal: Admission:  Optimal Care of the heart failure patient  Outcome: Met     Patient's pain has been controlled through the use of pharmacological and non-pharmacological methods of intervention. Patient has remained free of falls, call light is within reach and patients bed alarm is on. Patient's skin has remained maintained and intact with no signs of breakdown visible.     Patient has been provided with HF education and verbalized understanding. Patient provided education reinforcement as well.

## 2024-04-09 NOTE — PROGRESS NOTES
Assumed care of pt  at 0700. Report received from NOC RN. Pt is A&O x 4. Patient stated that their pain is 6/10. Pt's pain comfort goal is 0/10.   Pt educated on how to use the call light, pt verbalized understanding. Bed in lowest locked position, call light within reach, hourly rounding in place. Labs reviewed, orders reviewed, communication board updated. Pt declines any further needs at this time

## 2024-04-09 NOTE — CARE PLAN
The patient is Stable - Low risk of patient condition declining or worsening    Shift Goals  Clinical Goals: pain management  Patient Goals: comfort, rest  Family Goals: DEE DEE    Progress made toward(s) clinical / shift goals:    Problem: Knowledge Deficit - Standard  Goal: Patient and family/care givers will demonstrate understanding of plan of care, disease process/condition, diagnostic tests and medications  Outcome: Progressing     Problem: Skin Integrity  Goal: Skin integrity is maintained or improved  Outcome: Progressing     Problem: Pain - Standard  Goal: Alleviation of pain or a reduction in pain to the patient’s comfort goal  Outcome: Progressing   Patients skin has remained free of any injury and remained maintained during shift. Patient has verbalized understanding of disease process; education reinforced throughout shift.     Patient is not progressing towards the following goals:

## 2024-04-09 NOTE — PROGRESS NOTES
This nurse called [facility] at 1605 and left a voicemail regarding report and transport for this patient. Transport picked this patient up at 1600, all belongings placed in bags, and all needed paperwork sent with patient. There were no further needs at this time. This nurse left a call back number for report for the patient.

## 2024-04-09 NOTE — CARE PLAN
The patient is Stable - Low risk of patient condition declining or worsening    Shift Goals  Clinical Goals: pain management  Patient Goals: sleep  Family Goals: DEE DEE    Progress made toward(s) clinical / shift goals:    Problem: Skin Integrity  Goal: Skin integrity is maintained or improved  Outcome: Progressing       Patient aaox4, denies any issues at this time other than generalized pain. ATILIO Rodney aware of patient reporting pain 8/10 - oxycodone ordered at this time. Call light and personal items within reach, encouraged to use call light for assistance - bed alarm on

## 2024-04-09 NOTE — DISCHARGE PLANNING
Choice form received for 1) [facility], 2)[facility]    1128-   Agency/Facility Name: [facility]  Plan or Request: DPA left vm asking for an available bed today.    1132- Per Jose at [facility], bed is available, transport time TBD

## 2024-04-09 NOTE — DISCHARGE PLANNING
Case Management Discharge Planning    Admission Date: 3/30/2024  GMLOS: 4.6  ALOS: 10    6-Clicks ADL Score: 18  6-Clicks Mobility Score: 18      Anticipated Discharge Dispo: Discharge Disposition: D/T to SNF with Medicare cert in anticipation of skilled care (03)    DME Needed: No    Action(s) Taken:   PT/OT recommending post-acute placement for continued physical therapy.     LUKAS RN met with pt at bedside to discuss discharge plan. Choice form completed by pt for SNF and faxed to [facility] for processing.   1) [facility]   2) [facility]   3) [facility] # [id number]  LOC # [id number]    1147  Pt discussed during IDT rounds. Per MD, it pt medically clear to discharge to SNF today.     Per Garrett with [facility], bed is available and pt can be accepted today with transport time of 1600.   MD informed.     LUKAS RN met with pt at bedside and pt verbalized she is agreeable with [facility] upon discharge. Signature provided for COBRA.     IMM delivered.     1206  CM RN requested transport via Ride line for 1600, as requested.   Pending confirmation.     1559  Transport confirmed for 1600 via [facility].     COBRA packet completed with hard scripts and left with bedside RN.     Escalations Completed: None    Medically Clear: No    Next Steps:  LUKAS RN to follow up with medical team to discuss discharge barriers or needs.     Barriers to Discharge: Medical clearance    Is the patient up for discharge tomorrow: No

## 2024-04-17 PROBLEM — I50.810 RIGHT-SIDED HEART FAILURE (HCC): Status: ACTIVE | Noted: 2024-04-17

## 2024-04-17 PROBLEM — F43.9 STRESS: Chronic | Status: RESOLVED | Noted: 2022-02-14 | Resolved: 2024-04-17

## 2024-04-17 PROBLEM — I71.40 ABDOMINAL AORTIC ANEURYSM (AAA) (HCC): Status: ACTIVE | Noted: 2022-05-06

## 2024-04-17 PROBLEM — I50.43 CHF (CONGESTIVE HEART FAILURE), NYHA CLASS I, ACUTE ON CHRONIC, COMBINED (HCC): Status: RESOLVED | Noted: 2024-04-03 | Resolved: 2024-04-17

## 2024-04-17 PROBLEM — E87.6 HYPOKALEMIA: Status: RESOLVED | Noted: 2024-02-26 | Resolved: 2024-04-17

## 2024-05-08 ENCOUNTER — OFFICE VISIT (OUTPATIENT)
Dept: CARDIOLOGY | Facility: MEDICAL CENTER | Age: 89
End: 2024-05-08
Attending: INTERNAL MEDICINE
Payer: MEDICARE

## 2024-05-08 VITALS
RESPIRATION RATE: 18 BRPM | BODY MASS INDEX: 21.39 KG/M2 | SYSTOLIC BLOOD PRESSURE: 92 MMHG | HEART RATE: 61 BPM | DIASTOLIC BLOOD PRESSURE: 42 MMHG | OXYGEN SATURATION: 91 % | HEIGHT: 68 IN

## 2024-05-08 DIAGNOSIS — I48.0 PAF (PAROXYSMAL ATRIAL FIBRILLATION) (HCC): ICD-10-CM

## 2024-05-08 DIAGNOSIS — I34.0 NONRHEUMATIC MITRAL VALVE REGURGITATION: ICD-10-CM

## 2024-05-08 LAB — EKG IMPRESSION: NORMAL

## 2024-05-08 PROCEDURE — 3078F DIAST BP <80 MM HG: CPT | Performed by: INTERNAL MEDICINE

## 2024-05-08 PROCEDURE — 93010 ELECTROCARDIOGRAM REPORT: CPT | Performed by: INTERNAL MEDICINE

## 2024-05-08 PROCEDURE — 3074F SYST BP LT 130 MM HG: CPT | Performed by: INTERNAL MEDICINE

## 2024-05-08 PROCEDURE — 99204 OFFICE O/P NEW MOD 45 MIN: CPT | Performed by: INTERNAL MEDICINE

## 2024-05-08 RX ORDER — METOPROLOL SUCCINATE 25 MG/1
25 TABLET, EXTENDED RELEASE ORAL DAILY
Qty: 30 TABLET | Refills: 11 | Status: SHIPPED | OUTPATIENT
Start: 2024-05-08

## 2024-05-08 NOTE — PROGRESS NOTES
Interventional cardiology Initial Consultation Note      Chief Complaint: Mitral regurgitation, cardiomyopathy, congestive heart failure    Davie Montejo is a 89 y.o. female  patient presented today to establish care for mitral regurgitation.  She has history of GI bleed, hypertension, seizure disorder, dementia, history of CABG, recently hospitalized with congestive heart failure, pleural effusion.  She was noted to have atrial fibrillation with rapid ventricular response, underwent cardioversion.  She was subsequently discharged.  She is at nursing home currently, she would like to go home to her apartment, she does not like to be sedentary.  She denies chest pain, shortness of breath.        Past Medical History:   Diagnosis Date    Acute blood loss anemia 10/21/2021    Acute on chronic systolic heart failure ([facility]) 8/24/2020    Acute perforated gastric ulcer with hemorrhage ([facility]) 10/21/2021    Anemia 2/14/2022    Angina decubitus ([facility]) 6/18/2010    CAD (coronary artery disease)     Cannabis use, unspecified with intoxication, uncomplicated ([facility]) 2/14/2022    Fibromyalgia     Generalized abdominal pain 12/5/2021    Heart attack ([facility]) 2008    Hypotension due to medication 7/18/2022    Intractable nausea and vomiting 12/4/2021    Pain in the chest 11/15/2020             Current Outpatient Medications   Medication Sig Dispense Refill    metoprolol SR (TOPROL XL) 25 MG TABLET SR 24 HR Take 1 Tablet by mouth every day. 30 Tablet 11    cefadroxil (DURICEF) 500 MG capsule       ciprofloxacin (CIPRO) 500 MG Tab       furosemide (LASIX) 40 MG Tab       COMBIVENT RESPIMAT  MCG/ACT Aero Soln       losartan (COZAAR) 25 MG Tab       potassium chloride ER (KLOR-CON) 10 MEQ tablet       gabapentin (NEURONTIN) 100 MG Cap       amiodarone (CORDARONE) 200 MG Tab Take 1 Tablet by mouth every day. 30 Tablet 1    melatonin 5 mg Tab Take 1 Tablet by mouth at bedtime as needed (Sleep). 30 Tablet 1    clopidogrel  "(PLAVIX) 75 MG Tab Take 1 Tablet by mouth every day.      polyethylene glycol/lytes (MIRALAX) Pack Take 1 Packet by mouth every day.      senna-docusate (PERICOLACE OR SENOKOT S) 8.6-50 MG Tab Take 2 Tablets by mouth every evening.      apixaban (ELIQUIS) 5mg Tab Take 1 Tablet by mouth 2 times a day. Indications: Thromboembolism secondary to Atrial Fibrillation      atorvastatin (LIPITOR) 10 MG Tab TAKE 1 TABLET BY MOUTH EVERY EVENING. FOR CHOLESTEROL. 100 Tablet 3    sucralfate (CARAFATE) 1 GM Tab Take 1 g by mouth 2 times a day.      acetaminophen (TYLENOL) 325 MG Tab Take 650 mg by mouth every four hours as needed for Mild Pain.      levothyroxine (SYNTHROID) 125 MCG Tab TAKE 1 TABLET BY MOUTH EVERY MORNING ON AN EMPTY STOMACH. 30 Tablet 11    omeprazole (PRILOSEC) 20 MG delayed-release capsule TAKE 1 CAPSULE BY MOUTH EVERY DAY. 30 Capsule 11    gabapentin (NEURONTIN) 600 MG tablet Take 1 Tablet by mouth 2 times a day. 180 Tablet 1     No current facility-administered medications for this visit.             Physical Exam:  Ambulatory Vitals  BP 92/42 (BP Location: Left arm, Patient Position: Sitting, BP Cuff Size: Adult)   Pulse 61   Resp 18   Ht 1.727 m (5' 8\")   SpO2 91%    Oxygen Therapy:  Pulse Oximetry: 91 %  BP Readings from Last 4 Encounters:   05/08/24 92/42   04/09/24 102/44   03/28/24 104/46   03/20/24 101/44       Weight/BMI: Body mass index is 21.39 kg/m².  Wt Readings from Last 4 Encounters:   04/08/24 63.8 kg (140 lb 10.5 oz)   03/28/24 70 kg (154 lb 5.2 oz)   03/20/24 65 kg (143 lb 4.8 oz)   03/13/24 63.8 kg (140 lb 10.5 oz)           General: Well appearing and in no apparent distress  Neck: carotid bruits absent  Lungs: respiratory sounds  normal  Heart: Regular rhythm,  No palpable thrills on palpation, murmurs present, no rubs,   Lower extremity edema absent.     EKG today shows sinus rhythm, nonspecific IVCD      Medical Decision Making:  Problem List Items Addressed This Visit  "   None  Visit Diagnoses       PAF (paroxysmal atrial fibrillation) (HCC)        Relevant Medications    metoprolol SR (TOPROL XL) 25 MG TABLET SR 24 HR    Other Relevant Orders    EKG - Clinic Performed (Completed)    EC-ECHOCARDIOGRAM COMPLETE W/O CONT    Nonrheumatic mitral valve regurgitation        Relevant Medications    metoprolol SR (TOPROL XL) 25 MG TABLET SR 24 HR    Other Relevant Orders    EC-ECHOCARDIOGRAM COMPLETE W/O CONT          Her heart function might recover if we continue to keep her in sinus rhythm.  Recommend starting amiodarone 200 mg twice daily for 2 weeks then 200 mg daily.  Her blood pressure is low but advised to start low-dose metoprolol succinate, losartan 12.5 mg daily.  Medication from nursing home reviewed, she is not getting any cardiac medications except aspirin, Eliquis.  Recommend stopping aspirin, continue Eliquis.    Follow-up in 4 months with repeat echocardiogram      This note was dictated using Dragon speech recognition software.    Ran HARTLEY  Interventional cardiologist  [facility], [address]  [address] [address]  Phone: [phone]  Fax: [phone]

## 2024-05-09 ENCOUNTER — TELEPHONE (OUTPATIENT)
Dept: CARDIOLOGY | Facility: MEDICAL CENTER | Age: 89
End: 2024-05-09
Payer: MEDICARE

## 2024-05-09 NOTE — TELEPHONE ENCOUNTER
AK    Caller: Valerie calling from [facility]    Topic/issue: Caller states that following appointment yesterday, Dr. Kee prescribed losartan and metoprolol. The physician at the [facility] is wanting the patient to not take these medications due to blood pressure being low, around 97/47. They are asking why Dr. Kee prescribed these medications and requesting a call back.    Callback Number: [phone] (ask for any nurse)    Thank you,  Pilar SHEEHAN

## 2024-05-09 NOTE — TELEPHONE ENCOUNTER
To AK: Please see skilled nursing call and advise on what to tell the providers regarding patient's new medications despite lower blood pressure, or provide hold parameters. Thank you!

## 2024-05-10 ENCOUNTER — TELEPHONE (OUTPATIENT)
Dept: CARDIOLOGY | Facility: MEDICAL CENTER | Age: 89
End: 2024-05-10
Payer: MEDICARE

## 2024-05-10 NOTE — TELEPHONE ENCOUNTER
JEAN-PAUL Rai. to Zofia Romeo R.N.   Ok to hold off on losartan for now. Cont with toprol 12.5 mg QPM, only hold if HR <50 or SBP <90. SC    Phone Number Called: [phone]    Call outcome:  number is for [facility]     Message: Called to inform nurse of medication recommendations per SC. Asked nurse if patient had number for her room (476). Patient phone number is [phone]. All questions answered at this time. Advised to call back with any further questions or concerns.     Phone Number Called: [phone]    Call outcome:  unable to leave message or get through to patient

## 2024-05-10 NOTE — TELEPHONE ENCOUNTER
AK    Caller: Davie Montejo    Topic/issue: Patient called to speak with AK about her medications.     Please advise.     Callback Number: [phone]    Thank you,     Alicia KEEN      Underwent D&C/hysteroscopy (failed Novasure-secondary to uterine perforation)     Diagnostic laparoscopy with tubal ligation (no abnormalities found, except fundal uterine perforation)       Notes abdominal bloating and urinary complaints         Surgery reviewed and uterine perforation     Pathology benign    O: no exam  A: abnormal bleeding with failed Novasure secondary to uterine perforation  P: offered expectant management; intervention if bleeding returns (will likely do so as endometrial ablation not done)      -repeat Novasure endometrial      U/A    She will consider and let us know

## 2024-05-24 ENCOUNTER — TELEPHONE (OUTPATIENT)
Dept: CARDIOLOGY | Facility: MEDICAL CENTER | Age: 89
End: 2024-05-24
Payer: MEDICARE

## 2024-05-24 NOTE — TELEPHONE ENCOUNTER
AK    Caller: Davie Montejo    Topic/issue: Wants a callback to discuss medications. Requested to speak to Dr. Kee directly - did not want to speak with me. Did not understand why I could not reach him directly and did not want to provide any other details at this time.     Callback Number: [phone]     Thank you,  Gardenia MOHAN

## 2024-05-24 NOTE — TELEPHONE ENCOUNTER
Phone Number Called: [phone]    Call outcome:  unable to reach patient or leave voicemail    Message: Attempted to return patients phone call. Unable to reach patient or leave voicemail at this time.

## 2024-06-01 ENCOUNTER — HOSPITAL ENCOUNTER (EMERGENCY)
Facility: MEDICAL CENTER | Age: 89
End: 2024-06-02
Attending: EMERGENCY MEDICINE
Payer: MEDICARE

## 2024-06-01 DIAGNOSIS — R07.9 CHEST PAIN, UNSPECIFIED TYPE: ICD-10-CM

## 2024-06-01 PROCEDURE — 99285 EMERGENCY DEPT VISIT HI MDM: CPT

## 2024-06-02 ENCOUNTER — APPOINTMENT (OUTPATIENT)
Dept: RADIOLOGY | Facility: MEDICAL CENTER | Age: 89
End: 2024-06-02
Attending: EMERGENCY MEDICINE
Payer: MEDICARE

## 2024-06-02 VITALS
BODY MASS INDEX: 22.73 KG/M2 | OXYGEN SATURATION: 99 % | TEMPERATURE: 96.8 F | HEART RATE: 41 BPM | SYSTOLIC BLOOD PRESSURE: 169 MMHG | DIASTOLIC BLOOD PRESSURE: 75 MMHG | RESPIRATION RATE: 17 BRPM | HEIGHT: 68 IN | WEIGHT: 150 LBS

## 2024-06-02 LAB
ALBUMIN SERPL BCP-MCNC: 4.2 G/DL (ref 3.2–4.9)
ALBUMIN/GLOB SERPL: 1.2 G/DL
ALP SERPL-CCNC: 76 U/L (ref 30–99)
ALT SERPL-CCNC: 15 U/L (ref 2–50)
ANION GAP SERPL CALC-SCNC: 12 MMOL/L (ref 7–16)
APTT PPP: 27.8 SEC (ref 24.7–36)
AST SERPL-CCNC: 27 U/L (ref 12–45)
BASOPHILS # BLD AUTO: 1.5 % (ref 0–1.8)
BASOPHILS # BLD: 0.08 K/UL (ref 0–0.12)
BILIRUB SERPL-MCNC: 0.3 MG/DL (ref 0.1–1.5)
BUN SERPL-MCNC: 22 MG/DL (ref 8–22)
CALCIUM ALBUM COR SERPL-MCNC: 9.3 MG/DL (ref 8.5–10.5)
CALCIUM SERPL-MCNC: 9.5 MG/DL (ref 8.5–10.5)
CHLORIDE SERPL-SCNC: 94 MMOL/L (ref 96–112)
CO2 SERPL-SCNC: 25 MMOL/L (ref 20–33)
CREAT SERPL-MCNC: 1.38 MG/DL (ref 0.5–1.4)
EKG IMPRESSION: NORMAL
EOSINOPHIL # BLD AUTO: 0.49 K/UL (ref 0–0.51)
EOSINOPHIL NFR BLD: 9.1 % (ref 0–6.9)
ERYTHROCYTE [DISTWIDTH] IN BLOOD BY AUTOMATED COUNT: 49.1 FL (ref 35.9–50)
GFR SERPLBLD CREATININE-BSD FMLA CKD-EPI: 37 ML/MIN/1.73 M 2
GLOBULIN SER CALC-MCNC: 3.5 G/DL (ref 1.9–3.5)
GLUCOSE SERPL-MCNC: 94 MG/DL (ref 65–99)
HCT VFR BLD AUTO: 27.6 % (ref 37–47)
HGB BLD-MCNC: 8.7 G/DL (ref 12–16)
IMM GRANULOCYTES # BLD AUTO: 0.02 K/UL (ref 0–0.11)
IMM GRANULOCYTES NFR BLD AUTO: 0.4 % (ref 0–0.9)
INR PPP: 1.12 (ref 0.87–1.13)
LIPASE SERPL-CCNC: 50 U/L (ref 11–82)
LYMPHOCYTES # BLD AUTO: 1.8 K/UL (ref 1–4.8)
LYMPHOCYTES NFR BLD: 33.4 % (ref 22–41)
MCH RBC QN AUTO: 26.3 PG (ref 27–33)
MCHC RBC AUTO-ENTMCNC: 31.5 G/DL (ref 32.2–35.5)
MCV RBC AUTO: 83.4 FL (ref 81.4–97.8)
MONOCYTES # BLD AUTO: 0.44 K/UL (ref 0–0.85)
MONOCYTES NFR BLD AUTO: 8.2 % (ref 0–13.4)
NEUTROPHILS # BLD AUTO: 2.56 K/UL (ref 1.82–7.42)
NEUTROPHILS NFR BLD: 47.4 % (ref 44–72)
NRBC # BLD AUTO: 0 K/UL
NRBC BLD-RTO: 0 /100 WBC (ref 0–0.2)
PLATELET # BLD AUTO: 322 K/UL (ref 164–446)
PMV BLD AUTO: 10.6 FL (ref 9–12.9)
POTASSIUM SERPL-SCNC: 4.5 MMOL/L (ref 3.6–5.5)
PROT SERPL-MCNC: 7.7 G/DL (ref 6–8.2)
PROTHROMBIN TIME: 14.6 SEC (ref 12–14.6)
RBC # BLD AUTO: 3.31 M/UL (ref 4.2–5.4)
SODIUM SERPL-SCNC: 131 MMOL/L (ref 135–145)
TROPONIN T SERPL-MCNC: 20 NG/L (ref 6–19)
TROPONIN T SERPL-MCNC: 23 NG/L (ref 6–19)
WBC # BLD AUTO: 5.4 K/UL (ref 4.8–10.8)

## 2024-06-02 PROCEDURE — 93005 ELECTROCARDIOGRAM TRACING: CPT

## 2024-06-02 PROCEDURE — 84484 ASSAY OF TROPONIN QUANT: CPT

## 2024-06-02 PROCEDURE — 36415 COLL VENOUS BLD VENIPUNCTURE: CPT

## 2024-06-02 PROCEDURE — 85025 COMPLETE CBC W/AUTO DIFF WBC: CPT

## 2024-06-02 PROCEDURE — 85610 PROTHROMBIN TIME: CPT

## 2024-06-02 PROCEDURE — 83690 ASSAY OF LIPASE: CPT

## 2024-06-02 PROCEDURE — 71045 X-RAY EXAM CHEST 1 VIEW: CPT

## 2024-06-02 PROCEDURE — 93005 ELECTROCARDIOGRAM TRACING: CPT | Performed by: EMERGENCY MEDICINE

## 2024-06-02 PROCEDURE — 80053 COMPREHEN METABOLIC PANEL: CPT

## 2024-06-02 PROCEDURE — 85730 THROMBOPLASTIN TIME PARTIAL: CPT

## 2024-06-02 ASSESSMENT — PAIN DESCRIPTION - PAIN TYPE: TYPE: ACUTE PAIN

## 2024-06-02 ASSESSMENT — FIBROSIS 4 INDEX: FIB4 SCORE: 1.97

## 2024-06-02 NOTE — ED TRIAGE NOTES
"Chief Complaint   Patient presents with    Chest Pain     Pt coming from [facility], pt states at 2300 today she suddenly felt a severe sharp substernal chest pain then by the time EMS arrived pain had subsided. Pt denies n/v, dizziness, or SOB.        BIB [facility] from [place] for above complaint Pt has hx of bradycardia HR is usually in the 40's pt is asymptomatic. Pt is a/o x 4.     Chest pain protocols placed, Labs drawn, EKG done. Cardiac, spO2, and BP monitor applied.       BP (!) 154/71   Pulse (!) 44   Temp 36 °C (96.8 °F) (Temporal)   Resp 18   Ht 1.727 m (5' 8\")   Wt 68 kg (150 lb)   LMP  (LMP Unknown)   SpO2 100%   BMI 22.81 kg/m²     "

## 2024-06-02 NOTE — ED PROVIDER NOTES
ED Provider Note    CHIEF COMPLAINT  Chief Complaint   Patient presents with    Chest Pain     Pt coming from [facility], pt states at 2300 today she suddenly felt a severe sharp substernal chest pain then by the time EMS arrived pain had subsided. Pt denies n/v, dizziness, or SOB.        EXTERNAL RECORDS REVIEWED  Inpatient Notes patient was last hospitalized in March of this year history of dementia A-fib perforated gastric ulcer congestive heart failure with severe mitral regurg status post CABG.  She had been admitted for worsening shortness of breath had acute fluid overload at the time sinus bradycardia at the time sent home on 2 L nasal cannula    HPI/ROS  LIMITATION TO HISTORY   Select: Dementia  OUTSIDE HISTORIAN(S):  EMS patient came from [facility] for chest pain this evening.  EKG was unremarkable beyond bradycardia glucose was normal no signs of STEMI on EKG    Davie Montejo is a 89 y.o. female who presents presents emerged part with chief complaint of chest pain.  She states it was sharp radiated throughout her chest and then up into her neck.  She states that when she had her CABG she felt pain that was similar.  But also tells me they have had a lot of medication changes lately and she cannot tell me exactly what.  She does have a history of proximal A-fib and she is on metoprolol extended release and amiodarone 200 mg daily they recommended stopping aspirin and continuing Eliquis with cardiology last month.  She states her chest pain is completely gone now she denies any worsening leg swelling she is on the oxygen she is at her baseline and denies any worsening shortness of breath.  She was short of breath when the pain began she is unsure of how long it lasted.  She is unsure of what medications they changed but she knows they have changed a few this is where she states she has a hard time remembering    PAST MEDICAL HISTORY   has a past medical history of Acute blood  loss anemia (10/21/2021), Acute on chronic systolic heart failure (HCC) (8/24/2020), Acute perforated gastric ulcer with hemorrhage (HCC) (10/21/2021), Anemia (2/14/2022), Angina decubitus (HCC) (6/18/2010), CAD (coronary artery disease), Cannabis use, unspecified with intoxication, uncomplicated (HCC) (2/14/2022), Fibromyalgia, Generalized abdominal pain (12/5/2021), Heart attack ([facility]) (2008), Hypotension due to medication (7/18/2022), Intractable nausea and vomiting (12/4/2021), and Pain in the chest (11/15/2020).    SURGICAL HISTORY   has a past surgical history that includes coronary artery bypas, 4 (2008) and coronary artery bypass, 1 (2008).    FAMILY HISTORY  History reviewed. No pertinent family history.    SOCIAL HISTORY  Social History     Tobacco Use    Smoking status: Former    Smokeless tobacco: Never   Vaping Use    Vaping status: Never Used   Substance and Sexual Activity    Alcohol use: Never    Drug use: Not Currently    Sexual activity: Not Currently       CURRENT MEDICATIONS  Home Medications       Reviewed by Maria D Allan R.N. (Registered Nurse) on 06/02/24 at 0014  Med List Status: Not Addressed     Medication Last Dose Status   acetaminophen (TYLENOL) 325 MG Tab  Active   amiodarone (CORDARONE) 200 MG Tab  Active   apixaban (ELIQUIS) 5mg Tab  Active   atorvastatin (LIPITOR) 10 MG Tab  Active   cefadroxil (DURICEF) 500 MG capsule  Active   ciprofloxacin (CIPRO) 500 MG Tab  Active   clopidogrel (PLAVIX) 75 MG Tab  Active   COMBIVENT RESPIMAT  MCG/ACT Aero Soln  Active   furosemide (LASIX) 40 MG Tab  Active   gabapentin (NEURONTIN) 100 MG Cap  Active   gabapentin (NEURONTIN) 600 MG tablet  Active   levothyroxine (SYNTHROID) 125 MCG Tab  Active   losartan (COZAAR) 25 MG Tab  Active   melatonin 5 mg Tab  Active   metoprolol SR (TOPROL XL) 25 MG TABLET SR 24 HR  Active   omeprazole (PRILOSEC) 20 MG delayed-release capsule  Active   polyethylene glycol/lytes (MIRALAX) Pack  Active  "  potassium chloride ER (KLOR-CON) 10 MEQ tablet  Active   senna-docusate (PERICOLACE OR SENOKOT S) 8.6-50 MG Tab  Active   sucralfate (CARAFATE) 1 GM Tab  Active                    ALLERGIES  Allergies   Allergen Reactions    Latex Unspecified          Tramadol Rash     Itchy red in nature    Codeine Unspecified     Unknown reaction      Lisinopril Unspecified     Unknown reaction    Penicillin G Unspecified     Unknown reaction  Tolerated Augmentin 3/2024      Pregabalin Unspecified     Lyrica affects patients vision.    Simvastatin Unspecified     Unknown reaction      Sulfa Drugs Unspecified     Patient states \"I can't remember what this does to me\" but recalls and allergy.        PHYSICAL EXAM  VITAL SIGNS: BP (!) 154/71   Pulse (!) 44   Temp 36 °C (96.8 °F) (Temporal)   Resp 18   Ht 1.727 m (5' 8\")   Wt 68 kg (150 lb)   LMP  (LMP Unknown)   SpO2 100%   BMI 22.81 kg/m²    Pulse OX: Pulse Oxygen level is normal on 2 L nasal cannula  Constitutional: Alert in no apparent distress.  HENT: Normocephalic, Atraumatic, MMM  Eyes: PERound. Conjunctiva normal, non-icteric.   Heart: Irregular rhythm bradycardic intact distal pulses 4 out of 6 diastolic murmur  Lungs: Symmetrical movement, no resp distress clear to auscultation bilaterally  Abdomen: Non-tender, non-distended, normal bowel sounds  EXT/Back no edema  Skin: Warm, Dry, No erythema, No rash.   Neurologic: Alert and oriented to person and place but not year grossly non-focal.       EKG/LABS  Labs Reviewed   CBC WITH DIFFERENTIAL - Abnormal; Notable for the following components:       Result Value    RBC 3.31 (*)     Hemoglobin 8.7 (*)     Hematocrit 27.6 (*)     MCH 26.3 (*)     MCHC 31.5 (*)     Eosinophils 9.10 (*)     All other components within normal limits   COMP METABOLIC PANEL - Abnormal; Notable for the following components:    Sodium 131 (*)     Chloride 94 (*)     All other components within normal limits   TROPONIN - Abnormal; Notable for the " following components:    Troponin T 23 (*)     All other components within normal limits   ESTIMATED GFR - Abnormal; Notable for the following components:    GFR (CKD-EPI) 37 (*)     All other components within normal limits   TROPONIN - Abnormal; Notable for the following components:    Troponin T 20 (*)     All other components within normal limits   LIPASE   PROTHROMBIN TIME   APTT       Results for orders placed or performed during the hospital encounter of 24   EKG   Result Value Ref Range    Report       [facility]    Test Date:  2024  Pt Name:    JUNIOR CHIU            Department: ER  MRN:        [id number]                      Room:       [facility]  Gender:     Female                       Technician: [id number]  :        [id number]                   Requested By:ER TRIAGE PROTOCOL  Order #:    [id number]                    Reading MD: Laura Gillis MD    Measurements  Intervals                                Axis  Rate:       40                           P:          238  NY:         249                          QRS:        -59  QRSD:       160                          T:          115  QT:         562  QTc:        459    Interpretive Statements  Bradycardia at a rate of 40type 1 avb,  with a left bundle branch block Q  waves in the anterior leads was unchanged from prior mild T wave flattening  no abnormal ST elevations  Compared to ECG 2024 16:08:42  no sig change  Electronically Signed On 2024 00:35:33 PDT by Laura Gillis MD         I have independently interpreted this EKG    RADIOLOGY/PROCEDURES   I have independently interpreted the diagnostic imaging associated with this visit and am waiting the final reading from the radiologist.   My preliminary interpretation is as follows:     CXR -, bilateral interstitial edema    Radiologist interpretation:  DX-CHEST-PORTABLE (1 VIEW)   Final Result      1.  Hazy bilateral interstitial pulmonary  opacities, left greater than right, possibly mild edema.   2.  Left basilar atelectasis. No focal consolidation.   3.  Borderline cardiomegaly.          COURSE & MEDICAL DECISION MAKING    ASSESSMENT, COURSE AND PLAN  Care Narrative:     Patient is an 89-year-old female history of a CABG paroxysmal A-fib on Eliquis metoprolol and amiodarone who presents to the emergency department with acute chest pain.  She states that radiated to her neck which was concerning because that was similar to what she had when she had her CABG.  Currently fortunately is pain-free.  Looking through her chart they had canceled her aspirin was seems like maybe they were still giving it at the skilled nursing facility.  Will evaluate for unstable angina or acute ACS she is not having worsening respiratory distress no signs of severe fluid overload on my examination that would require bipap or high flow NC she is doing well on just the 2 L.    DISPOSITION AND DISCUSSIONS  4:24 AM  I reassessed patient at the bedside after 2 troponins which are within normal limits, she still on her baseline oxygen she is not complaining of any signs of acute fluid overload although she does have a little bit of interstitial edema on her chest x-ray, there is no evidence of acute renal injury VINCE or severe electrolyte abnormality.  I discussed with the patient everything kind of looks good with her heart today and she really just wants to go back to the facility.  I offered to keep her here for her but she states if everything looks good now she wants to go back.  She will be discharged back to her skilled nursing facility without evidence of ACS at this time EKG changes or elevated troponin.  She feels comfortable going home    Heart score is 5      I have discussed management of the patient with the following physicians and LEYLA's:  none    Discussion of management with other QHP or appropriate source(s): None     Escalation of care considered, and  ultimately not performed:after discussion with the patient / family, they have elected to decline an escalation in care    Barriers to care at this time, including but not limited to:  na .     Decision tools and prescription drugs considered including, but not limited to:  heart score 5 .    The patient will return for new or worsening symptoms and is stable at the time of discharge.    The patient is referred to a primary physician for blood pressure management, diabetic screening, and for all other preventative health concerns.    DISPOSITION:  Patient will be discharged home in stable condition.    FOLLOW UP:  PERLITA Lew  [address] [address]  [phone]    Schedule an appointment as soon as possible for a visit       [facility], Emergency Dept  [address] [id number] [phone]    If symptoms worsen      OUTPATIENT MEDICATIONS:  Discharge Medication List as of 6/2/2024  4:43 AM            FINAL DIAGNOSIS  1. Chest pain, unspecified type           Electronically signed by: Laura Gillis M.D., 6/2/2024 12:21 AM

## 2024-06-02 NOTE — DISCHARGE PLANNING
Medical Social Work     SW received a call from the RN requesting SW assistance with [facility] transport back to [facility]. MARIA ISABEL faxed a PCS form to [facility] and set up transport with [facility] for 0600. Transport packet completed and given to the RN.

## 2024-06-02 NOTE — ED NOTES
Pt discharged back to SNF, discharge instructions given to EMS team, [place] with  [facility] transport team.

## 2024-07-10 ENCOUNTER — HOSPITAL ENCOUNTER (OUTPATIENT)
Facility: MEDICAL CENTER | Age: 89
End: 2024-07-10
Attending: INTERNAL MEDICINE
Payer: MEDICARE

## 2024-07-10 LAB
ALBUMIN SERPL BCP-MCNC: 4 G/DL (ref 3.2–4.9)
ALBUMIN/GLOB SERPL: 1.6 G/DL
ALP SERPL-CCNC: 127 U/L (ref 30–99)
ALT SERPL-CCNC: 55 U/L (ref 2–50)
ANION GAP SERPL CALC-SCNC: 12 MMOL/L (ref 7–16)
AST SERPL-CCNC: 52 U/L (ref 12–45)
BILIRUB SERPL-MCNC: 0.3 MG/DL (ref 0.1–1.5)
BUN SERPL-MCNC: 30 MG/DL (ref 8–22)
CALCIUM ALBUM COR SERPL-MCNC: 9.2 MG/DL (ref 8.5–10.5)
CALCIUM SERPL-MCNC: 9.2 MG/DL (ref 8.5–10.5)
CHLORIDE SERPL-SCNC: 105 MMOL/L (ref 96–112)
CO2 SERPL-SCNC: 21 MMOL/L (ref 20–33)
CREAT SERPL-MCNC: 1.62 MG/DL (ref 0.5–1.4)
GFR SERPLBLD CREATININE-BSD FMLA CKD-EPI: 30 ML/MIN/1.73 M 2
GLOBULIN SER CALC-MCNC: 2.5 G/DL (ref 1.9–3.5)
GLUCOSE SERPL-MCNC: 95 MG/DL (ref 65–99)
POTASSIUM SERPL-SCNC: 4.4 MMOL/L (ref 3.6–5.5)
PROT SERPL-MCNC: 6.5 G/DL (ref 6–8.2)
SODIUM SERPL-SCNC: 138 MMOL/L (ref 135–145)

## 2024-07-10 PROCEDURE — 87086 URINE CULTURE/COLONY COUNT: CPT

## 2024-07-10 PROCEDURE — 84443 ASSAY THYROID STIM HORMONE: CPT

## 2024-07-10 PROCEDURE — 84436 ASSAY OF TOTAL THYROXINE: CPT

## 2024-07-10 PROCEDURE — 80053 COMPREHEN METABOLIC PANEL: CPT

## 2024-07-11 LAB
T4 SERPL-MCNC: 4.9 UG/DL (ref 4–12)
TSH SERPL DL<=0.005 MIU/L-ACNC: 74.3 UIU/ML (ref 0.38–5.33)

## 2024-07-12 LAB
BACTERIA UR CULT: NORMAL
SIGNIFICANT IND 70042: NORMAL
SITE SITE: NORMAL
SOURCE SOURCE: NORMAL

## 2024-08-01 PROBLEM — N18.32 STAGE 3B CHRONIC KIDNEY DISEASE (CKD): Chronic | Status: ACTIVE | Noted: 2024-03-26

## 2024-08-01 PROBLEM — Z79.899 ENCOUNTER FOR LONG-TERM (CURRENT) USE OF MEDICATIONS: Chronic | Status: ACTIVE | Noted: 2024-08-01

## 2024-08-01 PROBLEM — F11.20 OPIOID DEPENDENCE (HCC): Chronic | Status: ACTIVE | Noted: 2024-03-26

## 2024-08-01 PROBLEM — Z79.899 ENCOUNTER FOR LONG-TERM (CURRENT) USE OF MEDICATIONS: Status: ACTIVE | Noted: 2024-08-01

## 2024-08-01 PROBLEM — I34.0 MITRAL VALVE REGURGITATION: Chronic | Status: ACTIVE | Noted: 2024-08-01

## 2024-08-01 PROBLEM — I77.4 CELIAC ARTERY STENOSIS (HCC): Chronic | Status: ACTIVE | Noted: 2024-03-26

## 2024-08-01 PROBLEM — E43 SEVERE PROTEIN-CALORIE MALNUTRITION (GOMEZ: LESS THAN 60% OF STANDARD WEIGHT) (HCC): Chronic | Status: ACTIVE | Noted: 2024-03-08

## 2024-08-01 PROBLEM — Z79.01 ENCOUNTER FOR CURRENT LONG-TERM USE OF ANTICOAGULANTS: Status: ACTIVE | Noted: 2024-08-01

## 2024-08-01 PROBLEM — I50.42 CHRONIC COMBINED SYSTOLIC AND DIASTOLIC HEART FAILURE (HCC): Status: ACTIVE | Noted: 2024-04-17

## 2024-08-01 PROBLEM — I50.20 HFREF (HEART FAILURE WITH REDUCED EJECTION FRACTION) (HCC): Chronic | Status: ACTIVE | Noted: 2024-03-14

## 2024-08-01 PROBLEM — I50.810 RIGHT-SIDED HEART FAILURE (HCC): Chronic | Status: ACTIVE | Noted: 2024-04-17

## 2024-08-01 PROBLEM — I77.1 CELIAC ARTERY STENOSIS (HCC): Chronic | Status: ACTIVE | Noted: 2024-03-26

## 2024-08-01 PROBLEM — Z79.01 ENCOUNTER FOR CURRENT LONG-TERM USE OF ANTICOAGULANTS: Chronic | Status: ACTIVE | Noted: 2024-08-01

## 2024-08-01 PROBLEM — I48.91 ATRIAL FIBRILLATION WITH RVR (HCC): Chronic | Status: ACTIVE | Noted: 2024-03-16

## 2024-08-01 PROBLEM — I71.40 ABDOMINAL AORTIC ANEURYSM (AAA) (HCC): Chronic | Status: ACTIVE | Noted: 2022-05-06

## 2024-08-01 PROBLEM — I34.0 MITRAL VALVE REGURGITATION: Status: ACTIVE | Noted: 2024-08-01

## 2024-10-02 PROBLEM — B95.2 UTI (URINARY TRACT INFECTION) DUE TO ENTEROCOCCUS: Status: RESOLVED | Noted: 2024-03-26 | Resolved: 2024-10-02

## 2024-10-02 PROBLEM — J96.11 CHRONIC RESPIRATORY FAILURE WITH HYPOXIA (HCC): Chronic | Status: ACTIVE | Noted: 2024-04-15

## 2024-10-02 PROBLEM — D17.1 LIPOMA OF ANTERIOR CHEST WALL: Status: ACTIVE | Noted: 2024-10-02

## 2024-10-02 PROBLEM — F01.52: Chronic | Status: ACTIVE | Noted: 2020-11-15

## 2024-10-02 PROBLEM — F43.21 GRIEF: Status: RESOLVED | Noted: 2022-02-14 | Resolved: 2024-10-02

## 2024-10-02 PROBLEM — R54 AGE-RELATED PHYSICAL DEBILITY: Chronic | Status: ACTIVE | Noted: 2024-03-16

## 2024-10-02 PROBLEM — M54.2 NECK PAIN: Status: RESOLVED | Noted: 2023-05-13 | Resolved: 2024-10-02

## 2024-10-02 PROBLEM — I50.42 CHRONIC COMBINED SYSTOLIC AND DIASTOLIC HEART FAILURE (HCC): Chronic | Status: ACTIVE | Noted: 2024-03-14

## 2024-10-02 PROBLEM — N39.0 UTI (URINARY TRACT INFECTION) DUE TO ENTEROCOCCUS: Status: RESOLVED | Noted: 2024-03-26 | Resolved: 2024-10-02

## 2024-10-02 PROBLEM — J96.11 CHRONIC RESPIRATORY FAILURE WITH HYPOXIA (HCC): Status: ACTIVE | Noted: 2024-04-15

## 2024-10-02 PROBLEM — D17.1 LIPOMA OF ANTERIOR CHEST WALL: Chronic | Status: ACTIVE | Noted: 2024-10-02

## 2024-10-02 PROBLEM — E43 SEVERE PROTEIN-CALORIE MALNUTRITION (GOMEZ: LESS THAN 60% OF STANDARD WEIGHT) (HCC): Chronic | Status: RESOLVED | Noted: 2024-03-08 | Resolved: 2024-10-02

## 2024-10-02 PROBLEM — Z79.899 ENCOUNTER FOR LONG-TERM (CURRENT) USE OF MEDICATIONS: Status: RESOLVED | Noted: 2024-08-01 | Resolved: 2024-10-02

## 2024-10-02 PROBLEM — K59.03 DRUG-INDUCED CONSTIPATION: Status: RESOLVED | Noted: 2024-03-26 | Resolved: 2024-10-02

## 2024-10-02 PROBLEM — I50.42 CHRONIC COMBINED SYSTOLIC AND DIASTOLIC HEART FAILURE (HCC): Chronic | Status: ACTIVE | Noted: 2024-04-17

## 2024-12-18 ENCOUNTER — TELEPHONE (OUTPATIENT)
Dept: CARDIOLOGY | Facility: MEDICAL CENTER | Age: 89
End: 2024-12-18
Payer: MEDICARE

## 2024-12-24 NOTE — TELEPHONE ENCOUNTER
Phone number called: [phone]     Call outcome: Voicemail reached. Message left with number provided to return call.

## 2024-12-27 NOTE — TELEPHONE ENCOUNTER
Phone number called: [phone]     Call outcome: Voicemail reached but now unable to leave further messages.

## 2025-03-26 ENCOUNTER — TELEPHONE (OUTPATIENT)
Dept: CARDIOLOGY | Facility: MEDICAL CENTER | Age: 89
End: 2025-03-26
Payer: MEDICARE

## 2025-03-26 DIAGNOSIS — I25.810 CORONARY ARTERY DISEASE INVOLVING CORONARY BYPASS GRAFT OF NATIVE HEART WITHOUT ANGINA PECTORIS: Chronic | ICD-10-CM

## 2025-08-20 ENCOUNTER — TELEPHONE (OUTPATIENT)
Dept: CARDIOLOGY | Facility: MEDICAL CENTER | Age: 89
End: 2025-08-20
Payer: MEDICARE

## 2025-08-20 DIAGNOSIS — Z13.220 SCREENING CHOLESTEROL LEVEL: Primary | ICD-10-CM
